# Patient Record
Sex: FEMALE | Race: WHITE | NOT HISPANIC OR LATINO | Employment: FULL TIME | URBAN - METROPOLITAN AREA
[De-identification: names, ages, dates, MRNs, and addresses within clinical notes are randomized per-mention and may not be internally consistent; named-entity substitution may affect disease eponyms.]

---

## 2017-01-10 ENCOUNTER — ALLSCRIPTS OFFICE VISIT (OUTPATIENT)
Dept: OTHER | Facility: OTHER | Age: 57
End: 2017-01-10

## 2017-02-10 ENCOUNTER — ALLSCRIPTS OFFICE VISIT (OUTPATIENT)
Dept: OTHER | Facility: OTHER | Age: 57
End: 2017-02-10

## 2017-02-10 DIAGNOSIS — F41.9 ANXIETY DISORDER: ICD-10-CM

## 2017-02-10 DIAGNOSIS — R07.89 OTHER CHEST PAIN: ICD-10-CM

## 2017-02-10 DIAGNOSIS — R31.9 HEMATURIA: ICD-10-CM

## 2017-02-10 DIAGNOSIS — E78.5 HYPERLIPIDEMIA: ICD-10-CM

## 2017-02-10 DIAGNOSIS — Z00.00 ENCOUNTER FOR GENERAL ADULT MEDICAL EXAMINATION WITHOUT ABNORMAL FINDINGS: ICD-10-CM

## 2017-02-10 DIAGNOSIS — K21.9 GASTRO-ESOPHAGEAL REFLUX DISEASE WITHOUT ESOPHAGITIS: ICD-10-CM

## 2017-02-10 DIAGNOSIS — R94.5 ABNORMAL RESULTS OF LIVER FUNCTION STUDIES: ICD-10-CM

## 2017-02-10 DIAGNOSIS — D64.9 ANEMIA: ICD-10-CM

## 2017-02-24 RX ORDER — IBUPROFEN 200 MG
200-800 TABLET ORAL EVERY 6 HOURS PRN
COMMUNITY
End: 2021-09-22 | Stop reason: ALTCHOICE

## 2017-02-27 ENCOUNTER — ANESTHESIA EVENT (OUTPATIENT)
Dept: PERIOP | Facility: HOSPITAL | Age: 57
End: 2017-02-27
Payer: COMMERCIAL

## 2017-02-28 ENCOUNTER — HOSPITAL ENCOUNTER (OUTPATIENT)
Facility: HOSPITAL | Age: 57
Setting detail: OUTPATIENT SURGERY
Discharge: HOME | End: 2017-02-28
Attending: OBSTETRICS & GYNECOLOGY | Admitting: OBSTETRICS & GYNECOLOGY
Payer: COMMERCIAL

## 2017-02-28 ENCOUNTER — ANESTHESIA (OUTPATIENT)
Dept: PERIOP | Facility: HOSPITAL | Age: 57
End: 2017-02-28
Payer: COMMERCIAL

## 2017-02-28 VITALS
HEIGHT: 63 IN | DIASTOLIC BLOOD PRESSURE: 68 MMHG | SYSTOLIC BLOOD PRESSURE: 121 MMHG | RESPIRATION RATE: 16 BRPM | BODY MASS INDEX: 33.66 KG/M2 | OXYGEN SATURATION: 100 % | TEMPERATURE: 97.6 F | HEART RATE: 53 BPM | WEIGHT: 190 LBS

## 2017-02-28 DIAGNOSIS — N95.0 POSTMENOPAUSAL BLEEDING: ICD-10-CM

## 2017-02-28 PROBLEM — Z98.890 S/P DILATION AND CURETTAGE: Status: ACTIVE | Noted: 2017-02-28

## 2017-02-28 LAB
BASOPHILS # BLD AUTO: 0.01 THOUSANDS/ΜL (ref 0–0.1)
BASOPHILS NFR BLD AUTO: 0 % (ref 0–1)
EOSINOPHIL # BLD AUTO: 0.07 THOUSAND/ΜL (ref 0–0.61)
EOSINOPHIL NFR BLD AUTO: 1 % (ref 0–6)
ERYTHROCYTE [DISTWIDTH] IN BLOOD BY AUTOMATED COUNT: 14 % (ref 11.6–15.1)
HCT VFR BLD AUTO: 45.6 % (ref 34.8–46.1)
HGB BLD-MCNC: 15.3 G/DL (ref 11.5–15.4)
LYMPHOCYTES # BLD AUTO: 1.86 THOUSANDS/ΜL (ref 0.6–4.47)
LYMPHOCYTES NFR BLD AUTO: 38 % (ref 14–44)
MCH RBC QN AUTO: 28.9 PG (ref 26.8–34.3)
MCHC RBC AUTO-ENTMCNC: 33.6 G/DL (ref 31.4–37.4)
MCV RBC AUTO: 86 FL (ref 82–98)
MONOCYTES # BLD AUTO: 0.38 THOUSAND/ΜL (ref 0.17–1.22)
MONOCYTES NFR BLD AUTO: 8 % (ref 4–12)
NEUTROPHILS # BLD AUTO: 2.58 THOUSANDS/ΜL (ref 1.85–7.62)
NEUTS SEG NFR BLD AUTO: 53 % (ref 43–75)
NRBC BLD AUTO-RTO: 0 /100 WBCS
PLATELET # BLD AUTO: 130 THOUSANDS/UL (ref 149–390)
PMV BLD AUTO: 9.3 FL (ref 8.9–12.7)
RBC # BLD AUTO: 5.3 MILLION/UL (ref 3.81–5.12)
WBC # BLD AUTO: 4.9 THOUSAND/UL (ref 4.31–10.16)

## 2017-02-28 PROCEDURE — 88305 TISSUE EXAM BY PATHOLOGIST: CPT | Performed by: OBSTETRICS & GYNECOLOGY

## 2017-02-28 PROCEDURE — 85025 COMPLETE CBC W/AUTO DIFF WBC: CPT | Performed by: OBSTETRICS & GYNECOLOGY

## 2017-02-28 RX ORDER — MIDAZOLAM HYDROCHLORIDE 1 MG/ML
INJECTION INTRAMUSCULAR; INTRAVENOUS AS NEEDED
Status: DISCONTINUED | OUTPATIENT
Start: 2017-02-28 | End: 2017-02-28 | Stop reason: SURG

## 2017-02-28 RX ORDER — ONDANSETRON 2 MG/ML
INJECTION INTRAMUSCULAR; INTRAVENOUS AS NEEDED
Status: DISCONTINUED | OUTPATIENT
Start: 2017-02-28 | End: 2017-02-28 | Stop reason: SURG

## 2017-02-28 RX ORDER — SODIUM CHLORIDE 9 MG/ML
125 INJECTION, SOLUTION INTRAVENOUS CONTINUOUS
Status: DISCONTINUED | OUTPATIENT
Start: 2017-02-28 | End: 2017-02-28

## 2017-02-28 RX ORDER — MEPERIDINE HYDROCHLORIDE 50 MG/ML
12.5 INJECTION INTRAMUSCULAR; INTRAVENOUS; SUBCUTANEOUS AS NEEDED
Status: DISCONTINUED | OUTPATIENT
Start: 2017-02-28 | End: 2017-02-28 | Stop reason: HOSPADM

## 2017-02-28 RX ORDER — FENTANYL CITRATE 50 UG/ML
INJECTION, SOLUTION INTRAMUSCULAR; INTRAVENOUS AS NEEDED
Status: DISCONTINUED | OUTPATIENT
Start: 2017-02-28 | End: 2017-02-28 | Stop reason: SURG

## 2017-02-28 RX ORDER — FENTANYL CITRATE/PF 50 MCG/ML
50 SYRINGE (ML) INJECTION
Status: DISCONTINUED | OUTPATIENT
Start: 2017-02-28 | End: 2017-02-28 | Stop reason: HOSPADM

## 2017-02-28 RX ORDER — OXYCODONE HYDROCHLORIDE AND ACETAMINOPHEN 5; 325 MG/1; MG/1
2 TABLET ORAL EVERY 4 HOURS PRN
Status: DISCONTINUED | OUTPATIENT
Start: 2017-02-28 | End: 2017-02-28 | Stop reason: HOSPADM

## 2017-02-28 RX ORDER — ONDANSETRON 2 MG/ML
4 INJECTION INTRAMUSCULAR; INTRAVENOUS EVERY 6 HOURS PRN
Status: DISCONTINUED | OUTPATIENT
Start: 2017-02-28 | End: 2017-02-28 | Stop reason: HOSPADM

## 2017-02-28 RX ORDER — MAGNESIUM HYDROXIDE 1200 MG/15ML
LIQUID ORAL AS NEEDED
Status: DISCONTINUED | OUTPATIENT
Start: 2017-02-28 | End: 2017-02-28 | Stop reason: HOSPADM

## 2017-02-28 RX ORDER — DEXAMETHASONE SODIUM PHOSPHATE 4 MG/ML
INJECTION, SOLUTION INTRA-ARTICULAR; INTRALESIONAL; INTRAMUSCULAR; INTRAVENOUS; SOFT TISSUE AS NEEDED
Status: DISCONTINUED | OUTPATIENT
Start: 2017-02-28 | End: 2017-02-28 | Stop reason: SURG

## 2017-02-28 RX ORDER — OXYCODONE HYDROCHLORIDE AND ACETAMINOPHEN 5; 325 MG/1; MG/1
1 TABLET ORAL EVERY 4 HOURS PRN
Status: DISCONTINUED | OUTPATIENT
Start: 2017-02-28 | End: 2017-02-28 | Stop reason: HOSPADM

## 2017-02-28 RX ORDER — KETOROLAC TROMETHAMINE 30 MG/ML
INJECTION, SOLUTION INTRAMUSCULAR; INTRAVENOUS AS NEEDED
Status: DISCONTINUED | OUTPATIENT
Start: 2017-02-28 | End: 2017-02-28 | Stop reason: SURG

## 2017-02-28 RX ORDER — IBUPROFEN 600 MG/1
600 TABLET ORAL EVERY 6 HOURS PRN
Status: DISCONTINUED | OUTPATIENT
Start: 2017-02-28 | End: 2017-02-28 | Stop reason: HOSPADM

## 2017-02-28 RX ORDER — ALBUTEROL SULFATE 2.5 MG/3ML
2.5 SOLUTION RESPIRATORY (INHALATION) ONCE AS NEEDED
Status: DISCONTINUED | OUTPATIENT
Start: 2017-02-28 | End: 2017-02-28 | Stop reason: HOSPADM

## 2017-02-28 RX ORDER — PROPOFOL 10 MG/ML
INJECTION, EMULSION INTRAVENOUS AS NEEDED
Status: DISCONTINUED | OUTPATIENT
Start: 2017-02-28 | End: 2017-02-28 | Stop reason: SURG

## 2017-02-28 RX ADMIN — FENTANYL CITRATE 25 MCG: 50 INJECTION, SOLUTION INTRAMUSCULAR; INTRAVENOUS at 15:23

## 2017-02-28 RX ADMIN — PROPOFOL 190 MG: 10 INJECTION, EMULSION INTRAVENOUS at 15:10

## 2017-02-28 RX ADMIN — DEXAMETHASONE SODIUM PHOSPHATE 4 MG: 4 INJECTION, SOLUTION INTRAMUSCULAR; INTRAVENOUS at 15:13

## 2017-02-28 RX ADMIN — SODIUM CHLORIDE 125 ML/HR: 0.9 INJECTION, SOLUTION INTRAVENOUS at 13:12

## 2017-02-28 RX ADMIN — KETOROLAC TROMETHAMINE 30 MG: 30 INJECTION, SOLUTION INTRAMUSCULAR at 15:17

## 2017-02-28 RX ADMIN — MIDAZOLAM HYDROCHLORIDE 2 MG: 1 INJECTION, SOLUTION INTRAMUSCULAR; INTRAVENOUS at 15:02

## 2017-02-28 RX ADMIN — FENTANYL CITRATE 25 MCG: 50 INJECTION, SOLUTION INTRAMUSCULAR; INTRAVENOUS at 15:28

## 2017-02-28 RX ADMIN — FENTANYL CITRATE 50 MCG: 50 INJECTION INTRAMUSCULAR; INTRAVENOUS at 16:14

## 2017-02-28 RX ADMIN — IBUPROFEN 600 MG: 600 TABLET, FILM COATED ORAL at 17:02

## 2017-02-28 RX ADMIN — LIDOCAINE HYDROCHLORIDE 60 MG: 20 INJECTION, SOLUTION INTRAVENOUS at 15:10

## 2017-02-28 RX ADMIN — ONDANSETRON HYDROCHLORIDE 4 MG: 2 INJECTION, SOLUTION INTRAVENOUS at 15:18

## 2017-02-28 RX ADMIN — FENTANYL CITRATE 25 MCG: 50 INJECTION, SOLUTION INTRAMUSCULAR; INTRAVENOUS at 15:14

## 2017-02-28 RX ADMIN — SODIUM CHLORIDE: 0.9 INJECTION, SOLUTION INTRAVENOUS at 15:15

## 2017-02-28 RX ADMIN — FENTANYL CITRATE 25 MCG: 50 INJECTION, SOLUTION INTRAMUSCULAR; INTRAVENOUS at 15:02

## 2017-03-07 ENCOUNTER — GENERIC CONVERSION - ENCOUNTER (OUTPATIENT)
Dept: OTHER | Facility: OTHER | Age: 57
End: 2017-03-07

## 2017-03-17 ENCOUNTER — GENERIC CONVERSION - ENCOUNTER (OUTPATIENT)
Dept: OTHER | Facility: OTHER | Age: 57
End: 2017-03-17

## 2017-04-04 ENCOUNTER — ALLSCRIPTS OFFICE VISIT (OUTPATIENT)
Dept: OTHER | Facility: OTHER | Age: 57
End: 2017-04-04

## 2017-04-18 ENCOUNTER — GENERIC CONVERSION - ENCOUNTER (OUTPATIENT)
Dept: OTHER | Facility: OTHER | Age: 57
End: 2017-04-18

## 2017-04-21 ENCOUNTER — HOSPITAL ENCOUNTER (OUTPATIENT)
Dept: RADIOLOGY | Facility: CLINIC | Age: 57
Discharge: HOME | End: 2017-04-21
Payer: COMMERCIAL

## 2017-04-21 ENCOUNTER — ALLSCRIPTS OFFICE VISIT (OUTPATIENT)
Dept: OTHER | Facility: OTHER | Age: 57
End: 2017-04-21

## 2017-04-21 DIAGNOSIS — M17.10 PRIMARY OSTEOARTHRITIS OF ONE KNEE: ICD-10-CM

## 2017-04-21 DIAGNOSIS — M25.569 PAIN IN KNEE: ICD-10-CM

## 2017-04-21 PROCEDURE — 73562 X-RAY EXAM OF KNEE 3: CPT

## 2017-04-21 PROCEDURE — 73564 X-RAY EXAM KNEE 4 OR MORE: CPT

## 2017-05-24 ENCOUNTER — GENERIC CONVERSION - ENCOUNTER (OUTPATIENT)
Dept: OTHER | Facility: OTHER | Age: 57
End: 2017-05-24

## 2017-05-30 ENCOUNTER — ALLSCRIPTS OFFICE VISIT (OUTPATIENT)
Dept: OTHER | Facility: OTHER | Age: 57
End: 2017-05-30

## 2017-08-15 ENCOUNTER — ALLSCRIPTS OFFICE VISIT (OUTPATIENT)
Dept: OTHER | Facility: OTHER | Age: 57
End: 2017-08-15

## 2017-08-15 DIAGNOSIS — M17.10 PRIMARY OSTEOARTHRITIS OF ONE KNEE: ICD-10-CM

## 2017-08-15 DIAGNOSIS — M77.9 ENTHESOPATHY: ICD-10-CM

## 2017-08-15 DIAGNOSIS — B07.0 PLANTAR WART: ICD-10-CM

## 2017-08-22 ENCOUNTER — APPOINTMENT (OUTPATIENT)
Dept: PHYSICAL THERAPY | Facility: MEDICAL CENTER | Age: 57
End: 2017-08-22
Payer: COMMERCIAL

## 2017-08-22 DIAGNOSIS — M17.10 PRIMARY OSTEOARTHRITIS OF ONE KNEE: ICD-10-CM

## 2017-08-22 DIAGNOSIS — B07.0 PLANTAR WART: ICD-10-CM

## 2017-08-22 DIAGNOSIS — M77.9 ENTHESOPATHY: ICD-10-CM

## 2017-08-22 PROCEDURE — 97161 PT EVAL LOW COMPLEX 20 MIN: CPT

## 2017-08-22 PROCEDURE — G8991 OTHER PT/OT GOAL STATUS: HCPCS | Performed by: PHYSICAL THERAPIST

## 2017-08-22 PROCEDURE — 97112 NEUROMUSCULAR REEDUCATION: CPT

## 2017-08-22 PROCEDURE — G8990 OTHER PT/OT CURRENT STATUS: HCPCS | Performed by: PHYSICAL THERAPIST

## 2017-08-29 ENCOUNTER — APPOINTMENT (OUTPATIENT)
Dept: PHYSICAL THERAPY | Facility: MEDICAL CENTER | Age: 57
End: 2017-08-29
Payer: COMMERCIAL

## 2017-08-29 PROCEDURE — 97110 THERAPEUTIC EXERCISES: CPT

## 2017-08-29 PROCEDURE — 97112 NEUROMUSCULAR REEDUCATION: CPT

## 2017-08-30 ENCOUNTER — GENERIC CONVERSION - ENCOUNTER (OUTPATIENT)
Dept: OTHER | Facility: OTHER | Age: 57
End: 2017-08-30

## 2017-09-01 ENCOUNTER — APPOINTMENT (OUTPATIENT)
Dept: PHYSICAL THERAPY | Facility: MEDICAL CENTER | Age: 57
End: 2017-09-01
Payer: COMMERCIAL

## 2017-09-01 PROCEDURE — 97110 THERAPEUTIC EXERCISES: CPT

## 2017-09-01 PROCEDURE — 97112 NEUROMUSCULAR REEDUCATION: CPT

## 2017-09-05 ENCOUNTER — APPOINTMENT (OUTPATIENT)
Dept: PHYSICAL THERAPY | Facility: MEDICAL CENTER | Age: 57
End: 2017-09-05
Payer: COMMERCIAL

## 2017-09-05 PROCEDURE — 97112 NEUROMUSCULAR REEDUCATION: CPT

## 2017-09-05 PROCEDURE — 97110 THERAPEUTIC EXERCISES: CPT

## 2017-09-08 ENCOUNTER — APPOINTMENT (OUTPATIENT)
Dept: PHYSICAL THERAPY | Facility: MEDICAL CENTER | Age: 57
End: 2017-09-08
Payer: COMMERCIAL

## 2017-09-12 ENCOUNTER — APPOINTMENT (OUTPATIENT)
Dept: PHYSICAL THERAPY | Facility: MEDICAL CENTER | Age: 57
End: 2017-09-12
Payer: COMMERCIAL

## 2017-09-12 PROCEDURE — 97110 THERAPEUTIC EXERCISES: CPT

## 2017-09-12 PROCEDURE — 97112 NEUROMUSCULAR REEDUCATION: CPT

## 2017-09-15 ENCOUNTER — APPOINTMENT (OUTPATIENT)
Dept: PHYSICAL THERAPY | Facility: MEDICAL CENTER | Age: 57
End: 2017-09-15
Payer: COMMERCIAL

## 2017-09-15 PROCEDURE — 97110 THERAPEUTIC EXERCISES: CPT

## 2017-09-19 ENCOUNTER — APPOINTMENT (OUTPATIENT)
Dept: PHYSICAL THERAPY | Facility: MEDICAL CENTER | Age: 57
End: 2017-09-19
Payer: COMMERCIAL

## 2017-09-19 PROCEDURE — 97110 THERAPEUTIC EXERCISES: CPT

## 2017-09-19 PROCEDURE — 97112 NEUROMUSCULAR REEDUCATION: CPT

## 2017-09-26 ENCOUNTER — APPOINTMENT (OUTPATIENT)
Dept: PHYSICAL THERAPY | Facility: MEDICAL CENTER | Age: 57
End: 2017-09-26
Payer: COMMERCIAL

## 2017-09-26 PROCEDURE — 97112 NEUROMUSCULAR REEDUCATION: CPT

## 2017-09-26 PROCEDURE — 97110 THERAPEUTIC EXERCISES: CPT

## 2017-09-29 ENCOUNTER — APPOINTMENT (OUTPATIENT)
Dept: PHYSICAL THERAPY | Facility: MEDICAL CENTER | Age: 57
End: 2017-09-29
Payer: COMMERCIAL

## 2017-10-06 ENCOUNTER — APPOINTMENT (OUTPATIENT)
Dept: PHYSICAL THERAPY | Facility: MEDICAL CENTER | Age: 57
End: 2017-10-06
Payer: COMMERCIAL

## 2017-10-06 PROCEDURE — 97110 THERAPEUTIC EXERCISES: CPT

## 2017-10-06 PROCEDURE — 97112 NEUROMUSCULAR REEDUCATION: CPT

## 2017-10-06 PROCEDURE — 97530 THERAPEUTIC ACTIVITIES: CPT

## 2017-10-10 ENCOUNTER — APPOINTMENT (OUTPATIENT)
Dept: PHYSICAL THERAPY | Facility: MEDICAL CENTER | Age: 57
End: 2017-10-10
Payer: COMMERCIAL

## 2017-10-10 PROCEDURE — 97110 THERAPEUTIC EXERCISES: CPT

## 2017-10-10 PROCEDURE — 97530 THERAPEUTIC ACTIVITIES: CPT

## 2017-10-20 ENCOUNTER — APPOINTMENT (OUTPATIENT)
Dept: PHYSICAL THERAPY | Facility: MEDICAL CENTER | Age: 57
End: 2017-10-20
Payer: COMMERCIAL

## 2017-10-20 PROCEDURE — 97530 THERAPEUTIC ACTIVITIES: CPT

## 2017-10-20 PROCEDURE — G8992 OTHER PT/OT  D/C STATUS: HCPCS

## 2017-10-20 PROCEDURE — G8991 OTHER PT/OT GOAL STATUS: HCPCS

## 2017-10-20 PROCEDURE — 97110 THERAPEUTIC EXERCISES: CPT

## 2017-10-23 ENCOUNTER — GENERIC CONVERSION - ENCOUNTER (OUTPATIENT)
Dept: OTHER | Facility: OTHER | Age: 57
End: 2017-10-23

## 2017-10-27 ENCOUNTER — APPOINTMENT (OUTPATIENT)
Dept: PHYSICAL THERAPY | Facility: MEDICAL CENTER | Age: 57
End: 2017-10-27
Payer: COMMERCIAL

## 2017-12-05 ENCOUNTER — GENERIC CONVERSION - ENCOUNTER (OUTPATIENT)
Dept: FAMILY MEDICINE CLINIC | Facility: CLINIC | Age: 57
End: 2017-12-05

## 2017-12-13 ENCOUNTER — GENERIC CONVERSION - ENCOUNTER (OUTPATIENT)
Dept: FAMILY MEDICINE CLINIC | Facility: CLINIC | Age: 57
End: 2017-12-13

## 2018-01-09 ENCOUNTER — ALLSCRIPTS OFFICE VISIT (OUTPATIENT)
Dept: OTHER | Facility: OTHER | Age: 58
End: 2018-01-09

## 2018-01-10 NOTE — PROGRESS NOTES
Assessment   1  Irritable bowel syndrome (564 1) (K58 9)   2  Anxiety (300 00) (F41 9)   3  Abdominal pain (789 00) (R10 9)    Plan   Irritable bowel syndrome    · Dicyclomine HCl - 10 MG Oral Capsule; TAKE 1 CAPSULE 3 times daily    Discussion/Summary      #1  Abdominal pain located left upper quadrant  Irritable bowel syndrome-patient was given dicyclomine 10 mg 1 3 times a day number 30 with 1 refill  Anxiety-patient states that her work is extremely stressful at this time and the diarrhea and abdominal pain started about that time  to get rid of her abdominal pain  1 week he to check on how the dicyclomine helped and if no better to GI  If her anxiety is still bad consider antianxiety medication at her next visit in a week  Possible side effects of new medications were reviewed with the patient/guardian today  The treatment plan was reviewed with the patient/guardian  The patient/guardian understands and agrees with the treatment plan       Self Referrals: No      Chief Complaint   Decreased appetite, bloating / gassy x 1 week  Concerned due to [facility] discharge when she passes gas  - lsh      History of Present Illness   HPI: This is a 72-year-old female who comes in after having episodes of diarrhea for 3-4 days approximately 7-10 days ago  The diarrhea has stopped with Pepto-Bismol and probiotics  She still is gassy and bloated and has a decreased appetite  She has been eating bland Unique for the past week  She has been having mucousy stools but no blood and has a diagnosis of irritable bowel syndrome  She has not had any recent antibiotic therapy  She also had a recent colonoscopy and 1 polyp was found  She feels that maybe this started after things got stressful at work and kicked up her irritable bowel syndrome  Review of Systems        Constitutional: No fever, no chills, feels well, no tiredness, no recent weight gain or loss        Cardiovascular: no complaints of slow or fast heart rate, no chest pain, no palpitations, no leg claudication or lower extremity edema  Respiratory: no complaints of shortness of breath, no wheezing, no dyspnea on exertion, no orthopnea or PND  Gastrointestinal: Decreased appetite, mucousy stools, but-- as noted in HPI,-- no nausea,-- no vomiting,-- no constipation,-- no diarrhea-- and-- no blood in stools--       The patient presents with complaints of sudden onset of intermittent episodes of mild left upper quadrant abdominal pain, described as crampy, non-radiating  ROS reviewed  Active Problems   1  Allergic Reaction (995 3)   2  Anemia (285 9) (D64 9)   3  Anxiety (300 00) (F41 9)   4  Chest discomfort (786 59) (R07 89)   5  Colon cancer screening (V76 51) (Z12 11)   6  Elevated liver function tests (790 6) (R79 89)   7  GERD (gastroesophageal reflux disease) (530 81) (K21 9)   8  Hematuria (599 70) (R31 9)   9  Hyperlipidemia (272 4) (E78 5)   10  Irritable bowel syndrome (564 1) (K58 9)   11  Joint pain, knee (719 46) (M25 569)   12  Left sided chest pain (786 50) (R07 9)   13  OA (osteoarthritis) of knee (715 36) (M17 10)   14  Plantar warts (078 12) (B07 0)   15  Tendonitis (726 90) (M77 9)    Past Medical History   Active Problems And Past Medical History Reviewed: The active problems and past medical history were reviewed and updated today  Family History   Mother    1  Denied: Family history of Arthritis   2  Denied: Family history of Cancer   3  Denied: Family history of Osteopetrosis  Father    4  Denied: Family history of Arthritis   5  Denied: Family history of Cancer   6  Denied: Family history of Osteopetrosis  Family History    7  Denied: Family history of Arthritis   8  Denied: Family history of Cancer   9  Family history of Family Health Status Of Father - Alive   8  Family history of Family Health Status Of Mother - Alive   6  Denied: Family history of Osteopetrosis  Family History Reviewed:     The family history was reviewed and updated today  Social History    · Employed   · Never a smoker   · Never Drank Alcohol   · No secondhand smoke exposure (V49 89) (Z78 9)   ·  (V61 07) (Z63 4)  The social history was reviewed and updated today  The social history was reviewed and is unchanged  Surgical History   Surgical History Reviewed: The surgical history was reviewed and updated today  Current Meds    1  Advil CAPS Recorded   2  [facility] [place]-[facility] CAPS Recorded   3  Glucosamine Chondr 1500 Complx CAPS; 2 CAPSULES DAILY Recorded   4  Manganese TABS; One capsule twice daily Recorded   5  Naproxen 500 MG Oral Tablet; take 1 tablet every 12 hours with food as needed; Therapy: [id number] to (Evaluate:[id number]); Last Rx:04Apr2017 Ordered   6  Pantethine Plus Oral Tablet; Take 1 tablet daily as directed Recorded   7  Remifemin Menopause TABS Recorded     The medication list was reviewed and updated today  Vitals    Recorded: [id number] 10:58AM   Temperature 97 6 F, Oral   Heart Rate 68, L PT   Pulse Quality Regular, L PT   Systolic 669, LUE, Sitting   Diastolic 60, LUE, Sitting   BP CUFF SIZE Large   Height 5 ft 2 in   Weight 192 lb    BMI Calculated 35 12   BSA Calculated 1 88     Physical Exam        Constitutional      General appearance: No acute distress, well appearing and well nourished  Ears, Nose, Mouth, and Throat      External inspection of ears and nose: Normal        Otoscopic examination: Tympanic membranes translucent with normal light reflex  Canals patent without erythema  Oropharynx: Normal with no erythema, edema, exudate or lesions  Pulmonary      Auscultation of lungs: Clear to auscultation  Cardiovascular      Auscultation of heart: Normal rate and rhythm, normal S1 and S2, without murmurs  Examination of extremities for edema and/or varicosities: Normal        Abdomen      Abdomen: Abnormal  -- Mild tenderness to palpation left upper quadrant with no rebound or guarding  Liver and spleen: No hepatomegaly or splenomegaly            Future Appointments      Date/Time Provider Specialty Site   01/16/2018 12:30 PM Janette Whittaker [facility]     Signatures    Electronically signed by : Janette Orozco; Jan 9 2018 11:41AM EST                       (Author)     Electronically signed by : Karyna Bangura MD; Jan 9 2018  5:44PM EST                       (Author)

## 2018-01-11 NOTE — PROGRESS NOTES
Assessment    1  Hyperlipidemia (272 4) (E78 5)    Plan  Health Maintenance, Hyperlipidemia    · (1) OCCULT BLOOD, FECAL IMMUNOCHEMICAL TEST; Status:Hold For - Exact Date; Requested for:Apr 2016;   Hyperlipidemia    · (1) COMPREHENSIVE METABOLIC PANEL; Status:Hold For - Exact Date; Requested  for:Apr 2016;    · (1) LIPID PANEL, FASTING; Status:Hold For - Exact Date; Requested for:Apr 2016;     Discussion/Summary    #1  Hyperlipidemia-patient's LDL went up from 128 to 159 and patient is not willing to take a statin to reduce the LDL  She wants to wait 3 months to get a medication from the [facility] store  If the medication does not help in 3 months she will consider Lipitor 10 mg daily  Followup 3 months with labs  The treatment plan was reviewed with the patient/guardian  The patient/guardian understands and agrees with the treatment plan      Chief Complaint  FOLLOW UP: (R) FOOT PLANTAR WART, ELEVATED LFT'S, HYPERLIPIDEMIA, IBS  REVIEW LABS  PT STATES PAP DONE LAST MONTH BY DR Marek Tena  REFUSED FLU VACCINE  History of Present Illness  This is a 27-year-old female who comes in for a review of her lab work  She has not had labs in over a year and at that time her LDL was 128 and is now 159  Her total cholesterol is 248  She has gained 4 pounds since the previous visit with her weight being 211 pounds  Her blood pressure was also mildly elevated at 130/86  After a long discussion about taking a statin, the patient wants to wait 3 months and get medication to reduce the LDL from her health food store  She has an aversion to taking medication especially a statin  The rest of her labs include her glucose at 77, AST and ALT are mildly elevated with the AST being 45 and the ALT 62, TSH is normal  she denies any chest pain or shortness of breath or dizziness  Review of Systems    Constitutional: No fever, no chills, feels well, no tiredness, no recent weight gain or weight loss     ENT: no complaints of earache, no loss of hearing, no nose bleeds, no nasal discharge, no sore throat, no hoarseness  Cardiovascular: No complaints of slow heart rate, no fast heart rate, no chest pain, no palpitations, no leg claudication, no lower extremity edema  Respiratory: No complaints of shortness of breath, no wheezing, no cough, no SOB on exertion, no orthopnea, no PND  Gastrointestinal: No complaints of abdominal pain, no constipation, no nausea or vomiting, no diarrhea, no bloody stools  Genitourinary: No complaints of dysuria, no incontinence, no pelvic pain, no dysmenorrhea, no vaginal discharge or bleeding  Active Problems    1  Allergic Reaction (995 3)   2  Anemia (285 9) (D64 9)   3  Anxiety (300 00) (F41 9)   4  Elevated liver function tests (790 6) (R79 89)   5  Hematuria (599 70) (R31 9)   6  Hyperlipidemia (272 4) (E78 5)   7  Irritable bowel syndrome (564 1) (K58 9)   8  Joint pain, knee (719 46) (M25 569)   9  Plantar warts (078 12) (B07 0)   10  Tendonitis (726 90) (M77 9)    Family History    1  Denied: Family history of Arthritis   2  Denied: Family history of Cancer   3  Denied: Family history of Osteopetrosis    4  Denied: Family history of Arthritis   5  Denied: Family history of Cancer   6  Denied: Family history of Osteopetrosis    7  Denied: Family history of Arthritis   8  Denied: Family history of Cancer   9  Family history of Family Health Status Of Father - Alive   8  Family history of Family Health Status Of Mother - Alive   6  Denied: Family history of Osteopetrosis    Social History    · Never A Smoker   · Never Drank Alcohol    Current Meds   1  Advil CAPS Recorded   2  [facility] [place]-[facility] CAPS Recorded   3  Glucosamine Chondr 1500 Complx CAPS; 2 CAPSULES DAILY Recorded   4  Manganese TABS; One capsule twice daily Recorded   5  Pantethine Plus Oral Tablet; Take 1 tablet daily as directed Recorded   6  Remifemin Menopause TABS Recorded    Allergies    1   No Known Drug Allergies    Vitals  Vital Signs [Data Includes: Current Encounter]    Recorded: [id number] 08:07AM   Heart Rate 60   Systolic 928   Diastolic 86   Height 5 ft 2 in   Weight 211 lb 9 6 oz   BMI Calculated 38 7   BSA Calculated 1 96     Physical Exam    Constitutional   General appearance: No acute distress, well appearing and well nourished  Pulmonary   Auscultation of lungs: Clear to auscultation  Cardiovascular   Auscultation of heart: Normal rate and rhythm, normal S1 and S2, without murmurs      Examination of extremities for edema and/or varicosities: Normal     Psychiatric   Orientation to person, place, and time: Normal     Mood and affect: Normal          Results/Data  Encounter Results   [place] [id number] 08:16AM User, Ahs     Test Name Result Flag Reference   SBIRT Screen - Tobacco Screening Result Negative       PHQ-2 Adult Depression Screening 26Jan2016 08:16AM User, Ahs     Test Name Result Flag Reference   PHQ-2 Adult Depression Score 0     Q1: 0, Q2: 0   PHQ-2 Adult Depression Screening Negative         Signatures   Electronically signed by : Emily Merino, [facility]; Jan 26 2016  8:53AM EST                       (Author)    Electronically signed by : BANDAR Potts ; Jan 26 2016 11:45AM EST

## 2018-01-12 VITALS
DIASTOLIC BLOOD PRESSURE: 70 MMHG | HEIGHT: 62 IN | BODY MASS INDEX: 35.61 KG/M2 | WEIGHT: 193.5 LBS | SYSTOLIC BLOOD PRESSURE: 128 MMHG | HEART RATE: 60 BPM

## 2018-01-13 VITALS
HEIGHT: 62 IN | SYSTOLIC BLOOD PRESSURE: 135 MMHG | BODY MASS INDEX: 35.88 KG/M2 | DIASTOLIC BLOOD PRESSURE: 73 MMHG | HEART RATE: 71 BPM | WEIGHT: 195 LBS

## 2018-01-14 VITALS
WEIGHT: 197.63 LBS | SYSTOLIC BLOOD PRESSURE: 130 MMHG | BODY MASS INDEX: 36.37 KG/M2 | HEART RATE: 60 BPM | HEIGHT: 62 IN | DIASTOLIC BLOOD PRESSURE: 78 MMHG

## 2018-01-14 VITALS
SYSTOLIC BLOOD PRESSURE: 134 MMHG | WEIGHT: 196.63 LBS | DIASTOLIC BLOOD PRESSURE: 72 MMHG | BODY MASS INDEX: 35.96 KG/M2 | HEART RATE: 60 BPM

## 2018-01-14 VITALS
HEART RATE: 79 BPM | DIASTOLIC BLOOD PRESSURE: 74 MMHG | WEIGHT: 195.25 LBS | HEIGHT: 62 IN | BODY MASS INDEX: 35.93 KG/M2 | SYSTOLIC BLOOD PRESSURE: 132 MMHG

## 2018-01-15 VITALS
WEIGHT: 192.25 LBS | DIASTOLIC BLOOD PRESSURE: 80 MMHG | BODY MASS INDEX: 35.38 KG/M2 | HEART RATE: 102 BPM | HEIGHT: 62 IN | SYSTOLIC BLOOD PRESSURE: 147 MMHG

## 2018-01-23 VITALS
HEIGHT: 62 IN | HEART RATE: 68 BPM | BODY MASS INDEX: 35.33 KG/M2 | DIASTOLIC BLOOD PRESSURE: 60 MMHG | TEMPERATURE: 97.6 F | WEIGHT: 192 LBS | SYSTOLIC BLOOD PRESSURE: 100 MMHG

## 2018-11-20 ENCOUNTER — OFFICE VISIT (OUTPATIENT)
Dept: FAMILY MEDICINE CLINIC | Facility: CLINIC | Age: 58
End: 2018-11-20
Payer: COMMERCIAL

## 2018-11-20 ENCOUNTER — LAB (OUTPATIENT)
Dept: LAB | Facility: CLINIC | Age: 58
End: 2018-11-20
Payer: COMMERCIAL

## 2018-11-20 VITALS
WEIGHT: 204 LBS | HEART RATE: 60 BPM | HEIGHT: 62 IN | SYSTOLIC BLOOD PRESSURE: 130 MMHG | DIASTOLIC BLOOD PRESSURE: 84 MMHG | BODY MASS INDEX: 37.54 KG/M2

## 2018-11-20 DIAGNOSIS — K21.00 GERD WITH ESOPHAGITIS: Primary | ICD-10-CM

## 2018-11-20 DIAGNOSIS — R00.1 BRADYCARDIA: ICD-10-CM

## 2018-11-20 DIAGNOSIS — K21.00 GASTROESOPHAGEAL REFLUX DISEASE WITH ESOPHAGITIS: ICD-10-CM

## 2018-11-20 DIAGNOSIS — E66.01 SEVERE OBESITY (BMI 35.0-39.9) WITH COMORBIDITY (HCC): ICD-10-CM

## 2018-11-20 PROBLEM — M17.10 OA (OSTEOARTHRITIS) OF KNEE: Status: ACTIVE | Noted: 2017-04-04

## 2018-11-20 PROBLEM — K21.9 GERD (GASTROESOPHAGEAL REFLUX DISEASE): Status: ACTIVE | Noted: 2017-01-10

## 2018-11-20 PROBLEM — N85.00 HYPERPLASIA OF ENDOMETRIUM DETERMINED BY BIOPSY: Status: ACTIVE | Noted: 2017-03-17

## 2018-11-20 PROBLEM — Z98.890 S/P COLONOSCOPY: Status: ACTIVE | Noted: 2018-01-09

## 2018-11-20 PROBLEM — C54.1 ENDOMETRIAL CANCER (HCC): Status: ACTIVE | Noted: 2017-07-06

## 2018-11-20 PROBLEM — M17.9 OA (OSTEOARTHRITIS) OF KNEE: Status: ACTIVE | Noted: 2017-04-04

## 2018-11-20 LAB
ANION GAP SERPL CALCULATED.3IONS-SCNC: 3 MMOL/L (ref 4–13)
BUN SERPL-MCNC: 34 MG/DL (ref 5–25)
CALCIUM SERPL-MCNC: 9.3 MG/DL (ref 8.3–10.1)
CHLORIDE SERPL-SCNC: 107 MMOL/L (ref 100–108)
CO2 SERPL-SCNC: 29 MMOL/L (ref 21–32)
CREAT SERPL-MCNC: 0.63 MG/DL (ref 0.6–1.3)
GFR SERPL CREATININE-BSD FRML MDRD: 99 ML/MIN/1.73SQ M
GLUCOSE P FAST SERPL-MCNC: 90 MG/DL (ref 65–99)
POTASSIUM SERPL-SCNC: 4.5 MMOL/L (ref 3.5–5.3)
SODIUM SERPL-SCNC: 139 MMOL/L (ref 136–145)

## 2018-11-20 PROCEDURE — 36415 COLL VENOUS BLD VENIPUNCTURE: CPT

## 2018-11-20 PROCEDURE — 80048 BASIC METABOLIC PNL TOTAL CA: CPT

## 2018-11-20 PROCEDURE — 99213 OFFICE O/P EST LOW 20 MIN: CPT | Performed by: FAMILY MEDICINE

## 2018-11-20 PROCEDURE — 1036F TOBACCO NON-USER: CPT | Performed by: FAMILY MEDICINE

## 2018-11-20 PROCEDURE — 93000 ELECTROCARDIOGRAM COMPLETE: CPT | Performed by: FAMILY MEDICINE

## 2018-11-20 PROCEDURE — 3008F BODY MASS INDEX DOCD: CPT | Performed by: FAMILY MEDICINE

## 2018-11-20 RX ORDER — HYDROCORTISONE ACETATE 0.5 %
2 CREAM (GRAM) TOPICAL DAILY
COMMUNITY
End: 2021-05-12 | Stop reason: ALTCHOICE

## 2018-11-20 RX ORDER — DICYCLOMINE HYDROCHLORIDE 10 MG/1
1 CAPSULE ORAL 3 TIMES DAILY
COMMUNITY
Start: 2018-01-09 | End: 2018-11-20

## 2018-11-20 RX ORDER — OMEPRAZOLE 20 MG/1
20 CAPSULE, DELAYED RELEASE ORAL DAILY
Qty: 30 CAPSULE | Refills: 1 | Status: SHIPPED | OUTPATIENT
Start: 2018-11-20 | End: 2019-06-18 | Stop reason: ALTCHOICE

## 2018-11-20 NOTE — ASSESSMENT & PLAN NOTE
She was started on omeprazole 20 mg in the morning and Zantac 150 mg HS  She will stay on the omeprazole for 6 weeks and hopefully be controlled on Zantac 150 mg twice a day and then that omeprazole can be discontinued  If she continues to get GERD symptoms a GI consult will be obtained

## 2018-11-20 NOTE — ASSESSMENT & PLAN NOTE
An EKG was done in the office today which showed sinus bradycardia and tall T-waves on V 3, V 4, and V5 with no ST-T-wave changes  It was suggested by Dr José Miguel Roe that she get a BMP done

## 2018-11-20 NOTE — PROGRESS NOTES
Assessment/Plan:    Bradycardia  An EKG was done in the office today which showed sinus bradycardia and tall T-waves on V 3, V 4, and V5 with no ST-T-wave changes  It was suggested by Dr Darrel Marshall that she get a BMP done  GERD (gastroesophageal reflux disease)  She was started on omeprazole 20 mg in the morning and Zantac 150 mg HS  She will stay on the omeprazole for 6 weeks and hopefully be controlled on Zantac 150 mg twice a day and then that omeprazole can be discontinued  If she continues to get GERD symptoms a GI consult will be obtained  Diagnoses and all orders for this visit:    GERD with esophagitis  -     omeprazole (PriLOSEC) 20 mg delayed release capsule; Take 1 capsule (20 mg total) by mouth daily    Gastroesophageal reflux disease with esophagitis    Bradycardia  -     Basic metabolic panel; Future    Severe obesity (BMI 35 0-39  9) with comorbidity ([address] )    Other orders  -     Discontinue: dicyclomine (BENTYL) 10 mg capsule; Take 1 capsule by mouth 3 (three) times a day  -     Glucosamine-Chondroit-Vit C-Mn (GLUCOSAMINE CHONDR 1500 COMPLX) CAPS; Take 2 capsules by mouth daily  -     Lactobacillus Casei-Folic Acid 03-6 89 MG PACK; Take 1 capsule by mouth          Subjective: Follow up  Anxiety, anemia,  GERD, hyperlipidemia, IBS  Pt c/o heartburn for the past 1 1/2 weeks that comes and goes     Flu immunization declilned  -  [facility]     Patient ID: Abdirahman Vallejo is a 62 y o  female  This is a 49-year-old female who comes in with symptoms of heartburn for the past 1 and half weeks intermittently  She had previously been on omeprazole which resolved her symptoms and she stop the medication  She has noticed that some foods give her heartburn in that she had some coffee and that gave her heartburn with some GERD symptoms in that she got some acid reflux in her mouth  She denies any chest pain shortness of breath or dizziness    She does not have any numbness of the left arm or pain radiating to the jaw  Her blood pressure today is 130/84 and her weight is up 12 lb from the previous visit to 204 lb  She had an EKG here in the office which showed sinus bradycardia and tall T-waves  It was read by Dr Salas Villeda and she suggested doing a BMP  The following portions of the patient's history were reviewed and updated as appropriate: allergies, current medications, past family history, past medical history, past social history, past surgical history and problem list     Review of Systems   Constitutional: Negative  HENT: Negative  Eyes: Negative  Respiratory: Negative  Cardiovascular: Negative  Gastrointestinal: Negative  Endocrine: Negative  Genitourinary: Negative  Musculoskeletal: Negative  Skin: Negative  Allergic/Immunologic: Negative  Neurological: Negative  Hematological: Negative  Psychiatric/Behavioral: Negative  Objective:      /84 (BP Location: Left arm, Patient Position: Sitting, Cuff Size: Large)   Pulse 60   Ht 5' 2" (1 575 m)   Wt 92 5 kg (204 lb)   BMI 37 31 kg/m²          Physical Exam   Constitutional: She is oriented to person, place, and time  She appears well-developed and well-nourished  HENT:   Head: Normocephalic  Right Ear: External ear normal    Left Ear: External ear normal    Mouth/Throat: Oropharynx is clear and moist    Eyes: Pupils are equal, round, and reactive to light  Conjunctivae and EOM are normal    Neck: Normal range of motion  Neck supple  Cardiovascular: Normal rate, regular rhythm and normal heart sounds  Pulmonary/Chest: Effort normal and breath sounds normal    Abdominal: Soft  Bowel sounds are normal    Musculoskeletal: Normal range of motion  Neurological: She is alert and oriented to person, place, and time  Skin: Skin is warm  Psychiatric: She has a normal mood and affect  Her behavior is normal  Judgment and thought content normal    Nursing note and vitals reviewed  BMI Counseling: Body mass index is 37 31 kg/m²  Discussed with patient's BMI with her  The BMI is above average  BMI counseling and education was provided to the patient  Nutrition recommendations include reducing portion sizes, decreasing overall calorie intake and 3-5 servings of fruits/vegetables daily  Exercise recommendations include moderate aerobic physical activity for 150 minutes/week

## 2018-12-12 ENCOUNTER — OFFICE VISIT (OUTPATIENT)
Dept: FAMILY MEDICINE CLINIC | Facility: CLINIC | Age: 58
End: 2018-12-12
Payer: COMMERCIAL

## 2018-12-12 VITALS
SYSTOLIC BLOOD PRESSURE: 110 MMHG | DIASTOLIC BLOOD PRESSURE: 78 MMHG | WEIGHT: 204 LBS | BODY MASS INDEX: 37.54 KG/M2 | HEIGHT: 62 IN | HEART RATE: 76 BPM

## 2018-12-12 DIAGNOSIS — F41.9 ANXIETY: ICD-10-CM

## 2018-12-12 DIAGNOSIS — E78.2 MIXED HYPERLIPIDEMIA: ICD-10-CM

## 2018-12-12 DIAGNOSIS — R79.89 ELEVATED LIVER FUNCTION TESTS: ICD-10-CM

## 2018-12-12 DIAGNOSIS — K21.00 GASTROESOPHAGEAL REFLUX DISEASE WITH ESOPHAGITIS: Primary | ICD-10-CM

## 2018-12-12 DIAGNOSIS — Z12.11 SCREEN FOR COLON CANCER: ICD-10-CM

## 2018-12-12 DIAGNOSIS — E66.9 OBESITY (BMI 35.0-39.9 WITHOUT COMORBIDITY): ICD-10-CM

## 2018-12-12 DIAGNOSIS — D64.9 ANEMIA, UNSPECIFIED TYPE: ICD-10-CM

## 2018-12-12 PROCEDURE — 1036F TOBACCO NON-USER: CPT | Performed by: FAMILY MEDICINE

## 2018-12-12 PROCEDURE — 99214 OFFICE O/P EST MOD 30 MIN: CPT | Performed by: FAMILY MEDICINE

## 2018-12-12 PROCEDURE — 3008F BODY MASS INDEX DOCD: CPT | Performed by: FAMILY MEDICINE

## 2018-12-12 NOTE — ASSESSMENT & PLAN NOTE
Her symptoms of GERD have resolved on 6 weeks of omeprazole and Zantac 150 mg at bedtime  She will continue taking the omeprazole for the full 6 week course and then use Zantac once or twice a day to control symptoms

## 2018-12-12 NOTE — PATIENT INSTRUCTIONS

## 2018-12-12 NOTE — PROGRESS NOTES
Assessment/Plan:    Anemia  Patient to get a CBC done with her labs in 6 months  Anxiety  Stable on no medication  Elevated liver function tests  Labs are ordered including liver function studies    GERD (gastroesophageal reflux disease)  Her symptoms of GERD have resolved on 6 weeks of omeprazole and Zantac 150 mg at bedtime  She will continue taking the omeprazole for the full 6 week course and then use Zantac once or twice a day to control symptoms  Hyperlipidemia  A lipid profile is ordered with her labs in June Diagnoses and all orders for this visit:    Gastroesophageal reflux disease with esophagitis  -     CBC and differential; Future  -     Comprehensive metabolic panel; Future  -     Lipid Panel with Direct LDL reflex; Future  -     TSH, 3rd generation; Future    Anemia, unspecified type  -     CBC and differential; Future  -     Comprehensive metabolic panel; Future  -     Lipid Panel with Direct LDL reflex; Future  -     TSH, 3rd generation; Future    Mixed hyperlipidemia  -     CBC and differential; Future  -     Comprehensive metabolic panel; Future  -     Lipid Panel with Direct LDL reflex; Future  -     TSH, 3rd generation; Future    Elevated liver function tests  -     CBC and differential; Future  -     Comprehensive metabolic panel; Future  -     Lipid Panel with Direct LDL reflex; Future  -     TSH, 3rd generation; Future    Anxiety  -     CBC and differential; Future  -     Comprehensive metabolic panel; Future  -     Lipid Panel with Direct LDL reflex; Future  -     TSH, 3rd generation; Future    Screen for colon cancer  -     CBC and differential; Future  -     Comprehensive metabolic panel; Future  -     Lipid Panel with Direct LDL reflex; Future  -     TSH, 3rd generation; Future  -     Occult Blood, Fecal Immunochemical; Future          Subjective: Follow up chest discomfort / GERD  Review BW results  -[facility]     Patient ID: Rina Landaverde is a 62 y o  female      This is a 59-year-old female who comes in for a 3 week check on her GERD with esophagitis  She was placed on omeprazole in the morning and Zantac in the evening and the symptoms have totally resolved and she is slowly weaning herself off the medication  She will stay on the omeprazole for the full 6 weeks and use the Zantac for any kind of break through symptoms of GERD  Her blood pressure is 110/78 her weight remains the same at 204 lb  Her BMI is 37 3 and we had a discussion about diet and exercise  The following portions of the patient's history were reviewed and updated as appropriate: allergies, current medications, past family history, past medical history, past social history, past surgical history and problem list     Review of Systems   Constitutional: Negative  HENT: Negative  Eyes: Negative  Respiratory: Negative  Cardiovascular: Negative  Gastrointestinal: Negative  Endocrine: Negative  Genitourinary: Negative  Musculoskeletal: Negative  Skin: Negative  Allergic/Immunologic: Negative  Neurological: Negative  Hematological: Negative  Psychiatric/Behavioral: Negative  Objective:      /78 (BP Location: Left arm, Patient Position: Sitting, Cuff Size: Large)   Pulse 76   Ht 5' 2" (1 575 m)   Wt 92 5 kg (204 lb)   BMI 37 31 kg/m²          Physical Exam   Constitutional: She is oriented to person, place, and time  She appears well-developed and well-nourished  HENT:   Head: Normocephalic  Right Ear: External ear normal    Left Ear: External ear normal    Mouth/Throat: Oropharynx is clear and moist    Eyes: Pupils are equal, round, and reactive to light  Conjunctivae and EOM are normal    Neck: Normal range of motion  Neck supple  Cardiovascular: Normal rate, regular rhythm and normal heart sounds  Pulmonary/Chest: Effort normal and breath sounds normal    Abdominal: Soft  Bowel sounds are normal    Musculoskeletal: Normal range of motion  Neurological: She is alert and oriented to person, place, and time  Skin: Skin is warm  Psychiatric: She has a normal mood and affect  Her behavior is normal  Judgment and thought content normal    Nursing note and vitals reviewed  BMI Counseling: Body mass index is 37 31 kg/m²  Discussed the patient's BMI with her  The BMI is above average  BMI counseling and education was provided to the patient  Nutrition recommendations include reducing portion sizes, decreasing overall calorie intake, 3-5 servings of fruits/vegetables daily, reducing fast food intake, moderation in carbohydrate intake and reducing intake of cholesterol  Exercise recommendations include moderate aerobic physical activity for 150 minutes/week

## 2019-02-08 ENCOUNTER — OFFICE VISIT (OUTPATIENT)
Dept: OBGYN CLINIC | Facility: MEDICAL CENTER | Age: 59
End: 2019-02-08
Payer: COMMERCIAL

## 2019-02-08 ENCOUNTER — APPOINTMENT (OUTPATIENT)
Dept: RADIOLOGY | Facility: CLINIC | Age: 59
End: 2019-02-08
Payer: COMMERCIAL

## 2019-02-08 VITALS
BODY MASS INDEX: 36.36 KG/M2 | HEART RATE: 51 BPM | WEIGHT: 205.2 LBS | SYSTOLIC BLOOD PRESSURE: 133 MMHG | DIASTOLIC BLOOD PRESSURE: 83 MMHG | HEIGHT: 63 IN

## 2019-02-08 DIAGNOSIS — M75.81 TENDINITIS OF RIGHT ROTATOR CUFF: ICD-10-CM

## 2019-02-08 DIAGNOSIS — M25.511 RIGHT SHOULDER PAIN, UNSPECIFIED CHRONICITY: ICD-10-CM

## 2019-02-08 DIAGNOSIS — M25.511 RIGHT SHOULDER PAIN, UNSPECIFIED CHRONICITY: Primary | ICD-10-CM

## 2019-02-08 DIAGNOSIS — M19.011 PRIMARY OSTEOARTHRITIS OF RIGHT SHOULDER: ICD-10-CM

## 2019-02-08 PROCEDURE — 99213 OFFICE O/P EST LOW 20 MIN: CPT | Performed by: ORTHOPAEDIC SURGERY

## 2019-02-08 PROCEDURE — 73030 X-RAY EXAM OF SHOULDER: CPT

## 2019-02-08 NOTE — PROGRESS NOTES
Orthopaedic Surgery - Office Note  Alexia Lovelace (23 y o  female)   : 1960   MRN: [id number]  Encounter Date: 2019    Chief Complaint   Patient presents with    Right Shoulder - Pain       Assessment / Plan  Rotator cuff tendinitis-RIGHT   OA shoulder-RIGHT    · Activity as tolerated  · Begin outpatient PT for RC tendinitis and OA of the right shoulder  · Anti-inflammatories or Tylenol prn pain  Return in about 6 weeks (around 3/22/2019) for Recheck  History of Present Illness  Alexia Lovelace is a 62 y o  female who presents with right shoulder pain for 2 weeks  She is RHD  She denies neck pain but has occasional radiating pain to the deltoid and forearm  She cannot define an exact location of her pain in the shoulder  She has occasional pain at rest, increasing pain with activity  She denies night pain  There have been no formal treatments for this thus far  At home she has been using an e-stim machine and applying heat with NSAID's as needed  Review of Systems  Pertinent items are noted in HPI  All other systems were reviewed and are negative  Physical Exam  /83   Pulse (!) 51   Ht 5' 3" (1 6 m)   Wt 93 1 kg (205 lb 3 2 oz)   BMI 36 35 kg/m²   Cons: Appears well  No apparent distress  Psych: Alert  Oriented x3  Mood and affect normal   Eyes: PERRLA, EOMI  Resp: Normal effort  No audible wheezing or stridor  CV: Palpable pulse  No discernable arrhythmia  No LE edema  Lymph:  No palpable cervical, axillary, or inguinal lymphadenopathy  Skin: Warm  No palpable masses  No visible lesions  Neuro: Normal muscle tone  Normal and symmetric DTR's  Right Shoulder Exam  Alignment / Posture:  Normal shoulder posture  Normal scapular position  Inspection:  No swelling  No ecchymosis  No muscle atrophy  Palpation:  Mild  tenderness at the subacromial bursa  ROM:  Shoulder   Shoulder ER 40  Shoulder IR T8  Strength:  Supraspinatus 4/5  Infraspinatus 4/5  Subscapularis 5/5  Stability:  No objective shoulder instability  Tests: (+) Villanueva  (-) Speed  Neurovascular:  Sensation intact in Ax/R/M/U nerve distributions  2+ radial pulse  Brisk capillary refill in all fingertips  Fingers warm and perfused  Studies Reviewed  I have personally reviewed pertinent films in PACS and my interpretation is x-rays taken 2/8/19 of the right shoulder show bone spuring of the humeral head with mild joint space narrowing  Procedures  No procedures today  Medical, Surgical, Family, and Social History  The patient's medical history, family history, and social history, were reviewed and updated as appropriate  Past Medical History:   Diagnosis Date    Claustrophobia     mild    Endometrial polyp     PMB (postmenopausal bleeding)        Past Surgical History:   Procedure Laterality Date    COLONOSCOPY      PLANTAR FASCIA SURGERY Bilateral     [place] HYSTEROSCOPY,W/ENDO BX N/A 2/28/2017    Procedure: DILATATION AND CURETTAGE (D&C) WITH HYSTEROSCOPY,POLYPECTOMY;  Surgeon: Brandon Stevens MD;  Location: [facility];  Service: Gynecology    SHOULDER SURGERY Left     TONSILLECTOMY      WISDOM TOOTH EXTRACTION         History reviewed  No pertinent family history  Social History     Occupational History    Not on file       Social History Main Topics    Smoking status: Never Smoker    Smokeless tobacco: Never Used      Comment: No secondhand smoke exposure    Alcohol use No    Drug use: No    Sexual activity: Not on file       Allergies   Allergen Reactions    Iv Contrast  [Iodinated Diagnostic Agents]          Current Outpatient Prescriptions:     BIOTIN PO, Take 1 tablet by mouth daily, Disp: , Rfl:     Black Cohosh (REMIFEMIN MENOPAUSE PO), Take 1 tablet by mouth 2 (two) times a day, Disp: , Rfl:     Glucosamine-Chondroit-Vit C-Mn (GLUCOSAMINE CHONDR 1500 COMPLX) CAPS, Take 2 capsules by mouth daily, Disp: , Rfl:     ibuprofen (MOTRIN) 200 mg tablet, Take 200-800 mg by mouth every 6 (six) hours as needed for mild pain, Disp: , Rfl:     Lactobacillus Casei-Folic Acid 57-6 13 MG PACK, Take 1 capsule by mouth, Disp: , Rfl:     Misc Natural Products (OSTEO BI-FLEX ADV DOUBLE ST PO), Take 1 capsule by mouth 2 (two) times a day, Disp: , Rfl:     Omega 3-6-9 Fatty Acids (OMEGA 3-6-9 PO), Take 1 capsule by mouth daily, Disp: , Rfl:     omeprazole (PriLOSEC) 20 mg delayed release capsule, Take 1 capsule (20 mg total) by mouth daily, Disp: 30 capsule, Rfl: 1    PANTETHINE PO, Take 1 tablet by mouth 2 (two) times a day, Disp: , Rfl:       Sushila Mcgregor MA    Scribe Attestation    I,:   Sushila Mcgregor MA am acting as a scribe while in the presence of the attending physician :        I,:   Rene Ramos MD personally performed the services described in this documentation    as scribed in my presence :

## 2019-02-12 ENCOUNTER — APPOINTMENT (OUTPATIENT)
Dept: PHYSICAL THERAPY | Facility: MEDICAL CENTER | Age: 59
End: 2019-02-12
Payer: COMMERCIAL

## 2019-02-15 ENCOUNTER — EVALUATION (OUTPATIENT)
Dept: PHYSICAL THERAPY | Facility: MEDICAL CENTER | Age: 59
End: 2019-02-15
Payer: COMMERCIAL

## 2019-02-15 ENCOUNTER — TRANSCRIBE ORDERS (OUTPATIENT)
Dept: PHYSICAL THERAPY | Facility: MEDICAL CENTER | Age: 59
End: 2019-02-15

## 2019-02-15 DIAGNOSIS — M75.81 TENDINITIS OF RIGHT ROTATOR CUFF: Primary | ICD-10-CM

## 2019-02-15 DIAGNOSIS — M25.511 ACUTE PAIN OF RIGHT SHOULDER: Primary | ICD-10-CM

## 2019-02-15 DIAGNOSIS — M19.011 PRIMARY OSTEOARTHRITIS OF RIGHT SHOULDER: ICD-10-CM

## 2019-02-15 PROCEDURE — 97161 PT EVAL LOW COMPLEX 20 MIN: CPT | Performed by: PHYSICAL THERAPIST

## 2019-02-15 NOTE — PROGRESS NOTES
PT Evaluation     Today's date: 2/15/2019  Patient name: Anju Davis  : 1960  MRN: [id number]  Referring provider: Hortensia Cazares MD  Dx:   Encounter Diagnosis     ICD-10-CM    1  Tendinitis of right rotator cuff M75 81 Ambulatory referral to Physical Therapy   2  Primary osteoarthritis of right shoulder M19 011 Ambulatory referral to Physical Therapy                  Assessment  Assessment details: Anju Davis is a 62 y o  female was evaluated on 2/15/2019  for Tendinitis of right rotator cuff  (primary encounter diagnosis)  Primary osteoarthritis of right shoulder  Anju Davis has the above listed impairments resulting in functional deficits and negative impact to quality of life  Patient is appropriate for skilled PT intervention to promote maximal return to function and patient specific goals  Patient agrees with outlined treatment plan and all questions were answered to their satisfaction  Impairments: abnormal muscle firing, abnormal muscle tone, abnormal or restricted ROM, impaired physical strength, lacks appropriate home exercise program and pain with function    Symptom irritability: lowUnderstanding of Dx/Px/POC: good   Prognosis: good    Goals  Patient will successfully transition to home exercise program   Patient will be able to manage symptoms independently      Patient will report no pain with overhead reaching  Patient will report no limitation with transferring her father   Patient will report no pain with work activity     Plan  Plan details: Patient given HEP, will only return if needed given high deductible   Patient would benefit from: skilled PT  Referral necessary: No  Planned modality interventions: thermotherapy: hydrocollator packs  Planned therapy interventions: home exercise program, manual therapy, neuromuscular re-education, patient education, functional ROM exercises, strengthening, stretching, joint mobilization, graded activity, graded exercise, therapeutic exercise, body mechanics training, motor coordination training and activity modification  Frequency: 1x week  Duration in weeks: 12  Treatment plan discussed with: patient        Subjective Evaluation    History of Present Illness  Mechanism of injury: Dimas Grissom is a 62 y o  female presenting to therapy with complaints of right shoulder pain  Pain began 2 or so weeks ago and the only causative factor she can think of is helping her dad in and out of car a lot that day and packing his wheelchair in and out of car  Denies any radiation to hand or numbness  No neck pain  She is right hand dominant  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[address] [employer]  and frequently using her shoulder in repetitive motion      Pain  Current pain ratin  At best pain ratin  At worst pain ratin  Quality: dull ache    Patient Goals  Patient goals for therapy: decreased pain  Patient goal: no pain with overhead reaching, no pain with work        Objective   Red flag screen (-)  Cervical screen: Unremarkable with exception of R UT pain with R rotation   Neer (+), Villanueva (+), Drop arm (-), ER Lag (-)  Shoulder AROM:  25% limitation in flexion, 50% limitation in IR BTB  PROM:  Flexion 145, ER 75, IR 45, abduction 145  Weakness present in ER and abduction with pain       Flowsheet Rows      Most Recent Value   PT/OT G-Codes   Current Score  61   Projected Score  72          Precautions: None    Daily Treatment Diary     Manual  /15                                                                                 Exercise Diary              TB single arm row yellow            TB single arm extension yellow            ER stretch             ER walkouts yellow                                                                                                                                                                                                                                Modalities

## 2019-06-12 ENCOUNTER — APPOINTMENT (OUTPATIENT)
Dept: LAB | Facility: CLINIC | Age: 59
End: 2019-06-12
Payer: COMMERCIAL

## 2019-06-12 DIAGNOSIS — F41.9 ANXIETY: ICD-10-CM

## 2019-06-12 DIAGNOSIS — E78.2 MIXED HYPERLIPIDEMIA: ICD-10-CM

## 2019-06-12 DIAGNOSIS — K21.00 GASTROESOPHAGEAL REFLUX DISEASE WITH ESOPHAGITIS: ICD-10-CM

## 2019-06-12 DIAGNOSIS — D64.9 ANEMIA, UNSPECIFIED TYPE: ICD-10-CM

## 2019-06-12 DIAGNOSIS — Z12.11 SCREEN FOR COLON CANCER: ICD-10-CM

## 2019-06-12 DIAGNOSIS — R79.89 ELEVATED LIVER FUNCTION TESTS: ICD-10-CM

## 2019-06-12 LAB
ALBUMIN SERPL BCP-MCNC: 3.7 G/DL (ref 3.5–5)
ALP SERPL-CCNC: 64 U/L (ref 46–116)
ALT SERPL W P-5'-P-CCNC: 45 U/L (ref 12–78)
ANION GAP SERPL CALCULATED.3IONS-SCNC: 5 MMOL/L (ref 4–13)
AST SERPL W P-5'-P-CCNC: 35 U/L (ref 5–45)
BASOPHILS # BLD AUTO: 0.03 THOUSANDS/ΜL (ref 0–0.1)
BASOPHILS NFR BLD AUTO: 1 % (ref 0–1)
BILIRUB SERPL-MCNC: 0.6 MG/DL (ref 0.2–1)
BUN SERPL-MCNC: 32 MG/DL (ref 5–25)
CALCIUM SERPL-MCNC: 9.3 MG/DL (ref 8.3–10.1)
CHLORIDE SERPL-SCNC: 107 MMOL/L (ref 100–108)
CHOLEST SERPL-MCNC: 209 MG/DL (ref 50–200)
CO2 SERPL-SCNC: 27 MMOL/L (ref 21–32)
CREAT SERPL-MCNC: 0.78 MG/DL (ref 0.6–1.3)
EOSINOPHIL # BLD AUTO: 0.08 THOUSAND/ΜL (ref 0–0.61)
EOSINOPHIL NFR BLD AUTO: 2 % (ref 0–6)
ERYTHROCYTE [DISTWIDTH] IN BLOOD BY AUTOMATED COUNT: 13.7 % (ref 11.6–15.1)
GFR SERPL CREATININE-BSD FRML MDRD: 83 ML/MIN/1.73SQ M
GLUCOSE P FAST SERPL-MCNC: 89 MG/DL (ref 65–99)
HCT VFR BLD AUTO: 47.4 % (ref 34.8–46.1)
HDLC SERPL-MCNC: 74 MG/DL (ref 40–60)
HEMOCCULT STL QL IA: NEGATIVE
HGB BLD-MCNC: 14.8 G/DL (ref 11.5–15.4)
IMM GRANULOCYTES # BLD AUTO: 0.01 THOUSAND/UL (ref 0–0.2)
IMM GRANULOCYTES NFR BLD AUTO: 0 % (ref 0–2)
LDLC SERPL CALC-MCNC: 121 MG/DL (ref 0–100)
LYMPHOCYTES # BLD AUTO: 1.55 THOUSANDS/ΜL (ref 0.6–4.47)
LYMPHOCYTES NFR BLD AUTO: 31 % (ref 14–44)
MCH RBC QN AUTO: 26.8 PG (ref 26.8–34.3)
MCHC RBC AUTO-ENTMCNC: 31.2 G/DL (ref 31.4–37.4)
MCV RBC AUTO: 86 FL (ref 82–98)
MONOCYTES # BLD AUTO: 0.49 THOUSAND/ΜL (ref 0.17–1.22)
MONOCYTES NFR BLD AUTO: 10 % (ref 4–12)
NEUTROPHILS # BLD AUTO: 2.79 THOUSANDS/ΜL (ref 1.85–7.62)
NEUTS SEG NFR BLD AUTO: 56 % (ref 43–75)
NRBC BLD AUTO-RTO: 0 /100 WBCS
PLATELET # BLD AUTO: 177 THOUSANDS/UL (ref 149–390)
PMV BLD AUTO: 9.1 FL (ref 8.9–12.7)
POTASSIUM SERPL-SCNC: 4 MMOL/L (ref 3.5–5.3)
PROT SERPL-MCNC: 7.1 G/DL (ref 6.4–8.2)
RBC # BLD AUTO: 5.53 MILLION/UL (ref 3.81–5.12)
SODIUM SERPL-SCNC: 139 MMOL/L (ref 136–145)
TRIGL SERPL-MCNC: 71 MG/DL
TSH SERPL DL<=0.05 MIU/L-ACNC: 2.04 UIU/ML (ref 0.36–3.74)
WBC # BLD AUTO: 4.95 THOUSAND/UL (ref 4.31–10.16)

## 2019-06-12 PROCEDURE — 80061 LIPID PANEL: CPT

## 2019-06-12 PROCEDURE — 84443 ASSAY THYROID STIM HORMONE: CPT

## 2019-06-12 PROCEDURE — 80053 COMPREHEN METABOLIC PANEL: CPT

## 2019-06-12 PROCEDURE — G0328 FECAL BLOOD SCRN IMMUNOASSAY: HCPCS

## 2019-06-12 PROCEDURE — 36415 COLL VENOUS BLD VENIPUNCTURE: CPT

## 2019-06-12 PROCEDURE — 85025 COMPLETE CBC W/AUTO DIFF WBC: CPT

## 2019-06-18 ENCOUNTER — OFFICE VISIT (OUTPATIENT)
Dept: FAMILY MEDICINE CLINIC | Facility: CLINIC | Age: 59
End: 2019-06-18
Payer: COMMERCIAL

## 2019-06-18 ENCOUNTER — APPOINTMENT (OUTPATIENT)
Dept: LAB | Facility: CLINIC | Age: 59
End: 2019-06-18
Payer: COMMERCIAL

## 2019-06-18 VITALS
HEART RATE: 68 BPM | WEIGHT: 200 LBS | SYSTOLIC BLOOD PRESSURE: 120 MMHG | DIASTOLIC BLOOD PRESSURE: 84 MMHG | HEIGHT: 63 IN | BODY MASS INDEX: 35.44 KG/M2

## 2019-06-18 DIAGNOSIS — E78.2 MIXED HYPERLIPIDEMIA: Primary | ICD-10-CM

## 2019-06-18 DIAGNOSIS — M25.50 PAIN IN JOINT, MULTIPLE SITES: ICD-10-CM

## 2019-06-18 LAB — ERYTHROCYTE [SEDIMENTATION RATE] IN BLOOD: 17 MM/HOUR (ref 0–20)

## 2019-06-18 PROCEDURE — 86038 ANTINUCLEAR ANTIBODIES: CPT

## 2019-06-18 PROCEDURE — 86618 LYME DISEASE ANTIBODY: CPT

## 2019-06-18 PROCEDURE — 36415 COLL VENOUS BLD VENIPUNCTURE: CPT

## 2019-06-18 PROCEDURE — 99214 OFFICE O/P EST MOD 30 MIN: CPT | Performed by: FAMILY MEDICINE

## 2019-06-18 PROCEDURE — 85652 RBC SED RATE AUTOMATED: CPT

## 2019-06-18 PROCEDURE — 86430 RHEUMATOID FACTOR TEST QUAL: CPT

## 2019-06-18 PROCEDURE — 1036F TOBACCO NON-USER: CPT | Performed by: FAMILY MEDICINE

## 2019-06-18 PROCEDURE — 3008F BODY MASS INDEX DOCD: CPT | Performed by: FAMILY MEDICINE

## 2019-06-18 RX ORDER — PRAVASTATIN SODIUM 20 MG
20 TABLET ORAL
Qty: 90 TABLET | Refills: 3 | Status: SHIPPED | OUTPATIENT
Start: 2019-06-18 | End: 2019-09-20 | Stop reason: ALTCHOICE

## 2019-06-19 LAB
RHEUMATOID FACT SER QL LA: NEGATIVE
RYE IGE QN: NEGATIVE

## 2019-06-20 LAB
B BURGDOR IGG SER IA-ACNC: 0.07
B BURGDOR IGM SER IA-ACNC: 0.62

## 2019-07-17 ENCOUNTER — OFFICE VISIT (OUTPATIENT)
Dept: FAMILY MEDICINE CLINIC | Facility: CLINIC | Age: 59
End: 2019-07-17
Payer: COMMERCIAL

## 2019-07-17 VITALS
WEIGHT: 204.2 LBS | BODY MASS INDEX: 36.18 KG/M2 | HEIGHT: 63 IN | HEART RATE: 96 BPM | DIASTOLIC BLOOD PRESSURE: 80 MMHG | SYSTOLIC BLOOD PRESSURE: 150 MMHG

## 2019-07-17 DIAGNOSIS — M25.50 ARTHRALGIA, UNSPECIFIED JOINT: Primary | ICD-10-CM

## 2019-07-17 DIAGNOSIS — M25.50 PAIN IN JOINT, MULTIPLE SITES: ICD-10-CM

## 2019-07-17 PROCEDURE — 99214 OFFICE O/P EST MOD 30 MIN: CPT | Performed by: PHYSICIAN ASSISTANT

## 2019-07-17 RX ORDER — MELOXICAM 15 MG/1
15 TABLET ORAL DAILY
Qty: 30 TABLET | Refills: 0 | Status: SHIPPED | OUTPATIENT
Start: 2019-07-17 | End: 2019-09-20 | Stop reason: ALTCHOICE

## 2019-07-17 NOTE — PROGRESS NOTES
Assessment and Plan:    Problem List Items Addressed This Visit        Other    Pain in joint, multiple sites    Relevant Medications    meloxicam (MOBIC) 15 mg tablet    Other Relevant Orders    Ambulatory referral to Rheumatology    Arthralgia - Primary     Pt will be referred to Rheum for further work up  Lyme and RF negative  Trial mobic 15 mg once daily with food, not to take OTC NSAID on same day only tylenol if needed  Relevant Medications    meloxicam (MOBIC) 15 mg tablet    Other Relevant Orders    Ambulatory referral to Rheumatology                 Diagnoses and all orders for this visit:    Arthralgia, unspecified joint  -     Ambulatory referral to Rheumatology; Future  -     meloxicam (MOBIC) 15 mg tablet; Take 1 tablet (15 mg total) by mouth daily for 30 days    Pain in joint, multiple sites  -     Ambulatory referral to Rheumatology; Future  -     meloxicam (MOBIC) 15 mg tablet; Take 1 tablet (15 mg total) by mouth daily for 30 days              Subjective:      Patient ID: Alejandro Chowdhury is a 61 y o  female  CC:    Chief Complaint   Patient presents with    Other     c/o body aches, mainly in feet  Pt  saw her foot dr and had MRI done he recomments she sees Rheum  HPI:      Alejandro Chowdhury is here for chronic conditions f/u including the diagnosis of Arthralgia, unspecified joint  (primary encounter diagnosis)  Pain in joint, multiple sites   Blood work was done including Lyme JOHN rheumatoid factor all within normal limits  Patient has been seeing a foot doctor who has been trying injections in her feet without relief  She is going to the gym on a regular basis which does make her symptoms worse  She is here today because the podiatrist told her that she should see a rheumatologist for further evaluation  Patient states that all of her like she never had this problem until a few months ago now all of her joints are really giving her issues and she is uncertain why    She has been taking 1-3 Advil twice daily without much relief and Tylenol has done nothing for her in the past       The following portions of the patient's history were reviewed and updated as appropriate: allergies, current medications, past family history, past medical history, past social history, past surgical history and problem list       Review of Systems   Constitutional: Negative  HENT: Negative  Eyes: Negative  Respiratory: Negative  Cardiovascular: Negative  Gastrointestinal: Negative  Endocrine: Negative  Genitourinary: Negative  Musculoskeletal: Positive for arthralgias  Skin: Negative  Allergic/Immunologic: Negative  Neurological: Negative  Hematological: Negative  Psychiatric/Behavioral: Negative  Data to review:       Objective:    Vitals:    07/17/19 1003   BP: 150/80   BP Location: Left arm   Patient Position: Sitting   Pulse: 96   Weight: 92 6 kg (204 lb 3 2 oz)   Height: 5' 3" (1 6 m)        Physical Exam   Constitutional: She is oriented to person, place, and time  She appears well-developed and well-nourished  No distress  HENT:   Head: Normocephalic and atraumatic  Eyes: Conjunctivae are normal  Right eye exhibits no discharge  Left eye exhibits no discharge  Neck: Neck supple  Carotid bruit is not present  Cardiovascular: Normal rate  Pulmonary/Chest: Effort normal  No respiratory distress  Neurological: She is alert and oriented to person, place, and time  Skin: Skin is warm and dry  She is not diaphoretic  Psychiatric: She has a normal mood and affect  Judgment normal    Nursing note and vitals reviewed

## 2019-07-17 NOTE — PATIENT INSTRUCTIONS
Problem List Items Addressed This Visit        Other    Pain in joint, multiple sites    Relevant Medications    meloxicam (MOBIC) 15 mg tablet    Other Relevant Orders    Ambulatory referral to Rheumatology    Arthralgia - Primary     Pt will be referred to Rheum for further work up  Lyme and RF negative  Trial mobic 15 mg once daily with food, not to take OTC NSAID on same day only tylenol if needed            Relevant Medications    meloxicam (MOBIC) 15 mg tablet    Other Relevant Orders    Ambulatory referral to Rheumatology

## 2019-07-17 NOTE — ASSESSMENT & PLAN NOTE
Pt will be referred to Rheum for further work up  Lyme and RF negative  Trial mobic 15 mg once daily with food, not to take OTC NSAID on same day only tylenol if needed

## 2019-08-26 ENCOUNTER — OFFICE VISIT (OUTPATIENT)
Dept: OBGYN CLINIC | Facility: MEDICAL CENTER | Age: 59
End: 2019-08-26
Payer: COMMERCIAL

## 2019-08-26 VITALS
WEIGHT: 198.6 LBS | BODY MASS INDEX: 35.19 KG/M2 | HEART RATE: 68 BPM | DIASTOLIC BLOOD PRESSURE: 84 MMHG | HEIGHT: 63 IN | SYSTOLIC BLOOD PRESSURE: 154 MMHG

## 2019-08-26 DIAGNOSIS — M19.072 ARTHRITIS OF MIDTARSAL JOINT OF LEFT FOOT: Primary | ICD-10-CM

## 2019-08-26 PROCEDURE — 99213 OFFICE O/P EST LOW 20 MIN: CPT | Performed by: ORTHOPAEDIC SURGERY

## 2019-08-26 NOTE — PROGRESS NOTES
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 seen in clinic for evaluation of left foot/ankle pain  Pain is located medial   The patient rates the pain as a 3/10  The pain has been present for  years  The patient sustained no acute injury or trauma, gradual progression of pain  She is treating with podiatrist, treating for flat feet, pain in foot  He has tried cortisone injection which only gave 2 days of relief in medial midfoot, possible for arthritis  The pain is characterized as dull, achy  The pain is present daily  Pain is improved by rest, ice and NSAIDS  Pain is aggravated by weight bearing  The patient has tried rest, ice, NSAIDS and orthotics             Ankle Surgical History:  None      Past Medical, Social and Family History:  Past Medical History:   Diagnosis Date    Claustrophobia     mild    Endometrial polyp     PMB (postmenopausal bleeding)      Past Surgical History:   Procedure Laterality Date    COLONOSCOPY      PLANTAR FASCIA SURGERY Bilateral     [place] HYSTEROSCOPY,W/ENDO BX N/A 2/28/2017    Procedure: DILATATION AND CURETTAGE (D&C) WITH HYSTEROSCOPY,POLYPECTOMY;  Surgeon: Gomez Lobo MD;  Location: [facility];  Service: Gynecology    SHOULDER SURGERY Left     TONSILLECTOMY      WISDOM TOOTH EXTRACTION       Allergies   Allergen Reactions    Iv Contrast  [Iodinated Diagnostic Agents]      Current Outpatient Medications on File Prior to Visit   Medication Sig Dispense Refill    BIOTIN PO Take 1 tablet by mouth daily      Black Cohosh (REMIFEMIN MENOPAUSE PO) Take 1 tablet by mouth 2 (two) times a day      Glucosamine-Chondroit-Vit C-Mn (GLUCOSAMINE CHONDR 1500 COMPLX) CAPS Take 2 capsules by mouth daily      ibuprofen (MOTRIN) 200 mg tablet Take 200-800 mg by mouth every 6 (six) hours as needed for mild pain      Misc Natural Products (OSTEO BI-FLEX ADV DOUBLE ST PO) Take 1 capsule by mouth 2 (two) times a day      Omega 3-6-9 Fatty Acids (OMEGA 3-6-9 PO) Take 1 capsule by mouth daily      PANTETHINE PO Take 1 tablet by mouth 2 (two) times a day      pravastatin (PRAVACHOL) 20 mg tablet Take 1 tablet (20 mg total) by mouth daily at bedtime 90 tablet 3    meloxicam (MOBIC) 15 mg tablet Take 1 tablet (15 mg total) by mouth daily for 30 days 30 tablet 0     No current facility-administered medications on file prior to visit  Social History     Socioeconomic History    Marital status:       Spouse name: Not on file    Number of children: Not on file    Years of education: Not on file    Highest education level: Not on file   Occupational History    Not on file   Social Needs    Financial resource strain: Not on file    Food insecurity:     Worry: Not on file     Inability: Not on file    Transportation needs:     Medical: Not on file     Non-medical: Not on file   Tobacco Use    Smoking status: Never Smoker    Smokeless tobacco: Never Used    Tobacco comment: No secondhand smoke exposure   Substance and Sexual Activity    Alcohol use: No    Drug use: No    Sexual activity: Not on file   Lifestyle    Physical activity:     Days per week: Not on file     Minutes per session: Not on file    Stress: Not on file   Relationships    Social connections:     Talks on phone: Not on file     Gets together: Not on file     Attends [employer] service: Not on file     Active member of club or organization: Not on file     Attends meetings of clubs or organizations: Not on file     Relationship status: Not on file    Intimate partner violence:     Fear of current or ex partner: Not on file     Emotionally abused: Not on file     Physically abused: Not on file     Forced sexual activity: Not on file   Other Topics Concern    Not on file   Social History Narrative    Not on file         I have reviewed the past medical, surgical, social and family history, medications and allergies as documented in the EMR  Review of systems: ROS is negative other than that noted in the HPI  Constitutional: Negative for fatigue and fever  HENT: Negative for sore throat  Respiratory: Negative for shortness of breath  Cardiovascular: Negative for chest pain  Gastrointestinal: Negative for abdominal pain  Endocrine: Negative for cold intolerance and heat intolerance  Genitourinary: Negative for flank pain  Musculoskeletal: Negative for back pain  Skin: Negative for rash  Allergic/Immunologic: Negative for immunocompromised state  Neurological: Negative for dizziness  Psychiatric/Behavioral: Negative for agitation  Physical Exam:    Blood pressure 154/84, pulse 68, height 5' 3" (1 6 m), weight 90 1 kg (198 lb 9 6 oz), not currently breastfeeding  General/Constitutional: NAD, well developed, well nourished  HENT: Normocephalic, atraumatic  CV: Intact distal pulses, regular rate  Resp: No respiratory distress or labored breathing  Lymphatic: No lymphadenopathy palpated  Neuro: Alert and Oriented x 3, no focal deficits  Psych: Normal mood, normal affect, normal judgement, normal behavior  Skin: Warm, dry, no rashes, no erythema     Ankle Exam (focused): Ankle Examination (focused):     RIGHT LEFT   ROM:  full full   Palpation:  none Medial midfoot   Anterior Drawer negative negative   Calcaneal inversion negative negative   Squeeze Test negative negative       No subluxation of the peroneal tendons or tenderness to palpation along the peroneal tendons     No pain with palpation or range of motion of midfoot and forefoot bilaterally    No limp upon gait exam    No calf tenderness to palpation bilaterally    LE NV Exam: +2 DP/PT pulses bilaterally  Sensation intact to light touch L2-S1 bilaterally          Ankle Imaging:    MRI of the left foot/ankle were reviewed, which demonstrate diffuse mid foot arthritis changes with no other bony abnormalities  I have reviewed the radiology report and agree with their impression      Scribe Attestation    I,:   Joanna Munguia am acting as a scribe while in the presence of the attending physician :        I,:   Shellie Gallegos DO personally performed the services described in this documentation    as scribed in my presence :

## 2019-08-30 ENCOUNTER — EVALUATION (OUTPATIENT)
Dept: PHYSICAL THERAPY | Facility: MEDICAL CENTER | Age: 59
End: 2019-08-30
Payer: COMMERCIAL

## 2019-08-30 DIAGNOSIS — M19.072 ARTHRITIS OF MIDTARSAL JOINT OF LEFT FOOT: Primary | ICD-10-CM

## 2019-08-30 PROCEDURE — 97110 THERAPEUTIC EXERCISES: CPT | Performed by: PHYSICAL THERAPIST

## 2019-08-30 PROCEDURE — 97161 PT EVAL LOW COMPLEX 20 MIN: CPT | Performed by: PHYSICAL THERAPIST

## 2019-08-30 NOTE — PROGRESS NOTES
PT Evaluation     Today's date: 2019  Patient name: Davion Brown  : 1960  MRN: [id number]  Referring provider: Charissa Leon DO  Dx:   Encounter Diagnosis     ICD-10-CM    1  Arthritis of midtarsal joint of left foot M19 072 Ambulatory referral to Physical Therapy                  Assessment  Assessment details: Davion Brown is a pleasant 61 y o  female who presents with acute L foot pain of a gradual onset for the last 2 months after joining the gym  The patient's greatest concerns are fear of not being able to keep active  No further referral appears necessary at this time based upon examination results  Primary movement impairment diagnosis of midfoot hypomobility resulting in pathoanatomical symptoms of Arthritis of midtarsal joint of left foot and limiting her ability to stand  In addition, patient presents with bilateral pes planus (L worse than R) and weakness of the L posterior tibialis muscle, which limits her ability to achieve proper push-off during the gait cycle  Proximal weakness in the hip girdle also contributes to abnormalities in the resting position of the foot and compensatory strain on bony and muscular structures of the midfoot when descending stairs  Patient would benefit from skilled PT services to address the listed impairments and facilitate a return to OF   Thank you for the referral     Primary Impairments:  1) L midfoot hypomobility--> addressing with joint mobilizations and stretching  2) Decreased bilateral ankle DF ROM--> addressing with joint mobilizations and stretching  3) Weakness in L ankle posterior tibialis--> addressing with progressive exercise as tolerated  4) Weakness in hip ABDs--> addressing with progressive exercise        Impairments: abnormal coordination, abnormal muscle firing, abnormal muscle tone, abnormal or restricted ROM, abnormal movement, activity intolerance, impaired physical strength, lacks appropriate home exercise program, pain with function and poor body mechanics  Functional limitations: standing for job duties, full participation in recreation  Symptom irritability: lowBarriers to therapy: none  Understanding of Dx/Px/POC: good   Prognosis: good  Prognosis details: Positive prognostic indicators include positive attitude toward recovery  Negative prognostic indicators include anxiety  Goals  Patient will be independent with home exercise program    Patient will improve bilateral ankle DF ROM to 10 deg to increase quality of gait  Patient will increase strength in L ankle PF/INV to be comparable to the contralateral side  Patient will increase hip ABD strength to 4 to 4+/5 to control LE mechanics when descending stairs  Patient will be able to perform SLS for 15s bilaterally  Patient will be able to stand for job duties without limitation  Patient will make a full return to gym workouts with modifications as necessary  Patient will be able to manage symptoms independently  Plan  Plan details: Prognosis above is given PT services 2x/week tapering to 1x/week over the next 2 months and home program adherence  Patient would benefit from: skilled physical therapy  Referral necessary: No  Planned modality interventions: thermotherapy: hydrocollator packs and cryotherapy  Planned therapy interventions: activity modification, joint mobilization, manual therapy, motor coordination training, neuromuscular re-education, patient education, self care, therapeutic activities, therapeutic exercise, graded activity, home exercise program, behavior modification, massage, Suárez taping, strengthening, stretching, functional ROM exercises, flexibility and postural training  Frequency: 2x week  Duration in weeks: 6  Treatment plan discussed with: patient        Subjective Evaluation    History of Present Illness  Mechanism of injury: This is 62 yo female presenting with L foot pain for the last 2 months  No hx of trauma   She recently started going to the gym doing the ellipitcal and stair master prior to the foot pain onset  She does report that it feels unstable at times, especially when coming down the stairs  She had an injection 6 weeks ago that gave her relief for 2 days  The pain is located on the inside of her L foot that radiates to the bottom of her foot  She also has numbness at times in the same area  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  Her foot bothers her when she is standing still, and walking seems to help decrease the pain  She has a hx of bilateral plantar fascia surgeries from 20 years ago, but she is unsure what the procedures were  She has a hx of uterine cancer, but she had a hysterectomy in 2018 to remove it  Not a recurrent problem   Quality of life: good    Pain  Current pain ratin  At best pain ratin  At worst pain ratin  Location: L medial ankle/foot  Quality: throbbing  Relieving factors: ice and medications (walking, Advil)  Aggravating factors: standing  Progression: worsening      Diagnostic Tests  MRI studies: normal (per patient report)  Treatments  Previous treatment: injection treatment (1 injection)  Patient Goals  Patient goals for therapy: increased strength, decreased pain and increased motion  Patient goal: to be able to descend stairs, to be able to stand for longer periods at work, to be able to participate in gym workouts without limitations        Objective     Static Posture     Ankle/Foot   Ankle/Foot (Left): Pes planus  Ankle/Foot (Right): Pes planus  Palpation   Left   Tenderness of the posterior tibialis  Tenderness   Left Ankle/Foot   Tenderness in the navicular and posterior tibial tendon       Active Range of Motion   Left Ankle/Foot   Dorsiflexion (ke): 5 degrees   Plantar flexion: 40 degrees   Inversion: 20 degrees   Eversion: 5 degrees     Right Ankle/Foot   Dorsiflexion (ke): 5 degrees   Plantar flexion: 40 degrees   Inversion: 40 degrees   Eversion: 2 degrees     Passive Range of Motion   Left Ankle/Foot    Dorsiflexion (ke): 5 degrees   Plantar flexion: WFL  Inversion: WFL  Eversion: WFL    Right Ankle/Foot    Dorsiflexion (ke): 5 degrees   Plantar flexion: WFL  Inversion: WFL  Eversion: WFL    Joint Play   Left Ankle/Foot  Joints within functional limits are the subtalar joint  Hypomobile in the talocrural joint and midfoot  Right Ankle/Foot  Joints within functional limits are the subtalar joint  Hypomobile in the talocrural joint and midfoot       Strength/Myotome Testing     Left Hip   Planes of Motion   Abduction: 3-    Right Hip   Planes of Motion   Abduction: 3-    Left Ankle/Foot   Dorsiflexion: 4+  Plantar flexion: 3+  Inversion: 3+  Eversion: 4    Right Ankle/Foot   Dorsiflexion: 4+  Plantar flexion: 4+  Inversion: 4+  Eversion: 4+    Ambulation     Observational Gait   Left foot contact pattern: foot flat  Right foot contact pattern: foot flat    Functional Assessment        Single Leg Stance   Left: 2 seconds  Right: 15 seconds    Comments  Pain in L foot with SL heel raise             Precautions: GERD, IBS, hx of endometrial cancer, bilateral plantar fascia surgeries 20 years ago, OA of knee      Manual  8/30            L midfoot mobs             Talocrural mobs for DF                                                        Exercise Diary  8/30            Rec bike ARDON             Strap gastroc stretch 4x30:            Resisted ankle INV 15 pink            Resisted ankle PF             Sidelying clams             Short arch 20            BAPS             Prostretch             Heel raises   With ball squeeze           Tandem stance             SLS                                                                                                                                                   Modalities  8/30

## 2019-09-03 ENCOUNTER — OFFICE VISIT (OUTPATIENT)
Dept: OBGYN CLINIC | Facility: MEDICAL CENTER | Age: 59
End: 2019-09-03
Payer: COMMERCIAL

## 2019-09-03 ENCOUNTER — APPOINTMENT (OUTPATIENT)
Dept: RADIOLOGY | Facility: MEDICAL CENTER | Age: 59
End: 2019-09-03
Payer: COMMERCIAL

## 2019-09-03 VITALS
BODY MASS INDEX: 35.08 KG/M2 | WEIGHT: 198 LBS | SYSTOLIC BLOOD PRESSURE: 160 MMHG | HEIGHT: 63 IN | DIASTOLIC BLOOD PRESSURE: 90 MMHG | HEART RATE: 48 BPM

## 2019-09-03 DIAGNOSIS — M17.0 PRIMARY OSTEOARTHRITIS OF BOTH KNEES: Primary | ICD-10-CM

## 2019-09-03 DIAGNOSIS — M25.561 RIGHT KNEE PAIN, UNSPECIFIED CHRONICITY: ICD-10-CM

## 2019-09-03 DIAGNOSIS — M25.562 LEFT KNEE PAIN, UNSPECIFIED CHRONICITY: ICD-10-CM

## 2019-09-03 PROCEDURE — 20610 DRAIN/INJ JOINT/BURSA W/O US: CPT | Performed by: ORTHOPAEDIC SURGERY

## 2019-09-03 PROCEDURE — 99214 OFFICE O/P EST MOD 30 MIN: CPT | Performed by: ORTHOPAEDIC SURGERY

## 2019-09-03 PROCEDURE — 73564 X-RAY EXAM KNEE 4 OR MORE: CPT

## 2019-09-03 RX ORDER — BUPIVACAINE HYDROCHLORIDE 2.5 MG/ML
4 INJECTION, SOLUTION INFILTRATION; PERINEURAL
Status: COMPLETED | OUTPATIENT
Start: 2019-09-03 | End: 2019-09-03

## 2019-09-03 RX ORDER — METHYLPREDNISOLONE ACETATE 40 MG/ML
1 INJECTION, SUSPENSION INTRA-ARTICULAR; INTRALESIONAL; INTRAMUSCULAR; SOFT TISSUE
Status: COMPLETED | OUTPATIENT
Start: 2019-09-03 | End: 2019-09-03

## 2019-09-03 RX ADMIN — METHYLPREDNISOLONE ACETATE 1 ML: 40 INJECTION, SUSPENSION INTRA-ARTICULAR; INTRALESIONAL; INTRAMUSCULAR; SOFT TISSUE at 09:49

## 2019-09-03 RX ADMIN — BUPIVACAINE HYDROCHLORIDE 4 ML: 2.5 INJECTION, SOLUTION INFILTRATION; PERINEURAL at 09:49

## 2019-09-03 NOTE — PROGRESS NOTES
Orthopaedic Surgery - Office Note  Sheldon Rascon (18 y o  female)   : 1960   MRN: [id number]  Encounter Date: 9/3/2019    Chief Complaint   Patient presents with    Left Knee - Pain    Right Knee - Pain       Assessment / Plan  Bilateral knee OA    · Bilateral knee CSI were offered and accepted today  These were performed without difficulty  She was encouraged to avoid strenuous activity for the next 1-2 days  · Continue OTC analgesics and ice prn pain  · Wear braces as needed for comfort  · Continue exercise as tolerated  Return in about 3 months (around 12/3/2019)  History of Present Illness  Sheldon Rascon is a 61 y o  female who presents for evaluation of bilateral knee pain  Patient has been seen previously, back in 2017, for right knee pain  She states over the past 2 years, she has noted increase in her pain despite a 40 lb weight loss  One knee does not hurt more than the other  She states she goes to the gym twice per week and performs a variety of exercises including weightlifting, elliptical, biking, and treadmill use  She takes Advil daily for pain control  She states she has tried acupuncture in the past without significant improvement  She does states that she had a steroid injection into the right knee in 2017 which did offer her significant improvement  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  She states weight-bearing activities and going down stairs cause her increased pain  She also reports that going down stairs causes clicking and feelings of instability  She wears braces that she purchased from the drugstore for additional support  She denies any numbness or tingling in bilateral lower extremities  Review of Systems  Pertinent items are noted in HPI  All other systems were reviewed and are negative  Physical Exam  /90   Pulse (!) 48   Ht 5' 3" (1 6 m)   Wt 89 8 kg (198 lb)   BMI 35 07 kg/m²   Cons: Appears well  No apparent distress  Psych: Alert  Oriented x3  Mood and affect normal   Eyes: PERRLA, EOMI  Resp: Normal effort  No audible wheezing or stridor  CV: Palpable pulse  No discernable arrhythmia  No LE edema  Lymph:  No palpable cervical, axillary, or inguinal lymphadenopathy  Skin: Warm  No palpable masses  No visible lesions  Neuro: Normal muscle tone  Normal and symmetric DTR's  Right Knee Exam  Alignment:  Valgus defomity  Inspection:  No swelling  No muscle atrophy  Palpation:  medial and lateral joint line  tenderness  ROM:  0-120 degrees  Strength:  5/5 quadriceps and hamstrings  Stability:  No objective knee instability  Stable Varus / Valgus stress, Lachman, and Posterior drawer  Tests:  No pertinent positive or negative tests  Patella:  Patella tracks centrally without crepitus  Neurovascular:  Sensation intact in DP/SP/Gill/Sa/T nerve distributions  2+ DP & PT pulses  Gait:  Normal  Steady  Left Knee Exam  Alignment:  Normal knee alignment  Inspection:  No swelling  Palpation:  mild medial joint line tenderness  ROM:  Normal knee ROM  Strength:  5/5 quadriceps and hamstrings  Stability:  No objective knee instability  Stable Varus / Valgus stress, Lachman, and Posterior drawer  Tests:  No pertinent positive or negative tests  Patella:  Patella tracks centrally without crepitus  Neurovascular:  Sensation intact in DP/SP/Gill/Sa/T nerve distributions  2+ DP & PT pulses  Gait:  Normal         Studies Reviewed  XR of bilateral knee - performed today in clinic demonstrate severe lateral OA of the right knee with valgus deformity and moderate medial compartment OA of the left knee       Large joint arthrocentesis: bilateral knee  Date/Time: 9/3/2019 9:49 AM  Consent given by: patient  Site marked: site marked  Timeout: Immediately prior to procedure a time out was called to verify the correct patient, procedure, equipment, support staff and site/side marked as required Supporting Documentation  Indications: pain   Procedure Details  Location: knee - bilateral knee  Preparation: Patient was prepped and draped in the usual sterile fashion  Needle size: 22 G  Ultrasound guidance: no  Approach: superior    Medications (Right): 4 mL bupivacaine 0 25 %; 1 mL methylPREDNISolone acetate 40 mg/mLMedications (Left): 4 mL bupivacaine 0 25 %; 1 mL methylPREDNISolone acetate 40 mg/mL   Patient tolerance: patient tolerated the procedure well with no immediate complications  Dressing:  Sterile dressing applied             Medical, Surgical, Family, and Social History  The patient's medical history, family history, and social history, were reviewed and updated as appropriate  Past Medical History:   Diagnosis Date    Claustrophobia     mild    Endometrial polyp     PMB (postmenopausal bleeding)        Past Surgical History:   Procedure Laterality Date    COLONOSCOPY      PLANTAR FASCIA SURGERY Bilateral     MD HYSTEROSCOPY,W/ENDO BX N/A 2/28/2017    Procedure: DILATATION AND CURETTAGE (D&C) WITH HYSTEROSCOPY,POLYPECTOMY;  Surgeon: Shahram Avery MD;  [facility];  Service: Gynecology    SHOULDER SURGERY Left     TONSILLECTOMY      WISDOM TOOTH EXTRACTION         History reviewed  No pertinent family history      Social History     Occupational History    Not on file   Tobacco Use    Smoking status: Never Smoker    Smokeless tobacco: Never Used    Tobacco comment: No secondhand smoke exposure   Substance and Sexual Activity    Alcohol use: No    Drug use: No    Sexual activity: Not on file       Allergies   Allergen Reactions    Iv Contrast  [Iodinated Diagnostic Agents]          Current Outpatient Medications:     BIOTIN PO, Take 1 tablet by mouth daily, Disp: , Rfl:     Black Cohosh (REMIFEMIN MENOPAUSE PO), Take 1 tablet by mouth 2 (two) times a day, Disp: , Rfl:     Glucosamine-Chondroit-Vit C-Mn (GLUCOSAMINE CHONDR 1500 COMPLX) CAPS, Take 2 capsules by mouth daily, Disp: , Rfl:     ibuprofen (MOTRIN) 200 mg tablet, Take 200-800 mg by mouth every 6 (six) hours as needed for mild pain, Disp: , Rfl:     Misc Natural Products (OSTEO BI-FLEX ADV DOUBLE ST PO), Take 1 capsule by mouth 2 (two) times a day, Disp: , Rfl:     Omega 3-6-9 Fatty Acids (OMEGA 3-6-9 PO), Take 1 capsule by mouth daily, Disp: , Rfl:     PANTETHINE PO, Take 1 tablet by mouth 2 (two) times a day, Disp: , Rfl:     pravastatin (PRAVACHOL) 20 mg tablet, Take 1 tablet (20 mg total) by mouth daily at bedtime, Disp: 90 tablet, Rfl: 3    meloxicam (MOBIC) 15 mg tablet, Take 1 tablet (15 mg total) by mouth daily for 30 days, Disp: 30 tablet, Rfl: 0      Daniela S ROSEANNA Wong

## 2019-09-06 ENCOUNTER — OFFICE VISIT (OUTPATIENT)
Dept: PHYSICAL THERAPY | Facility: MEDICAL CENTER | Age: 59
End: 2019-09-06
Payer: COMMERCIAL

## 2019-09-06 DIAGNOSIS — M19.072 ARTHRITIS OF MIDTARSAL JOINT OF LEFT FOOT: Primary | ICD-10-CM

## 2019-09-06 PROCEDURE — 97140 MANUAL THERAPY 1/> REGIONS: CPT | Performed by: PHYSICAL THERAPIST

## 2019-09-06 PROCEDURE — 97110 THERAPEUTIC EXERCISES: CPT | Performed by: PHYSICAL THERAPIST

## 2019-09-06 NOTE — PROGRESS NOTES
Daily Note     Today's date: 2019  Patient name: Lory Sauer  : 1960  MRN: [id number]  Referring provider: Sruthi Ward DO  Dx:   Encounter Diagnosis     ICD-10-CM    1  Arthritis of midtarsal joint of left foot M19 072                   Subjective: Patient reports that she is having some soreness today due to performing some heel raises on the leg press at the gym  Overall, she feels that she is making progress, and her ankle feels looser  She reports having pain on the inside of her L foot after periods of prolonged standing on concrete at work  Objective: See treatment diary below      Assessment: L ankle DF ROM improved today compared to last visit  Patient was educated to try to take seated rest breaks during long shifts at work  Added tband resisted PF and heel raises to HEP  Cueing provided during sidelying clams to prevent compensation from trunk extensors  Patient would benefit from continued PT to improve ankle/foot mobility and stability for prolonged standing during job duties  Plan: Continue per plan of care        Precautions: GERD, IBS, hx of endometrial cancer, bilateral plantar fascia surgeries 20 years ago, OA of knee      Manual             L midfoot mobs  KP Gr  III-IV           Talocrural mobs for DF  KP Gr  III-IV                                                      Exercise Diary             Rec bike ARDON  10'           Strap gastroc stretch 4x30: 4x30:           Resisted ankle INV 15 pink 20 pink           Resisted ankle DF  20 pink           Resisted ankle PF  20 pink           Sidelying clams  20 ea           Short arch 20 30           BAPS             Prostretch  NV           Heel raises   20x5: With ball squeeze          Tandem stance  NV           SLS                                                                                                                                                   Modalities

## 2019-09-10 ENCOUNTER — OFFICE VISIT (OUTPATIENT)
Dept: PHYSICAL THERAPY | Facility: MEDICAL CENTER | Age: 59
End: 2019-09-10
Payer: COMMERCIAL

## 2019-09-10 DIAGNOSIS — M19.072 ARTHRITIS OF MIDTARSAL JOINT OF LEFT FOOT: Primary | ICD-10-CM

## 2019-09-10 PROCEDURE — 97110 THERAPEUTIC EXERCISES: CPT | Performed by: PHYSICAL THERAPIST

## 2019-09-10 PROCEDURE — 97140 MANUAL THERAPY 1/> REGIONS: CPT | Performed by: PHYSICAL THERAPIST

## 2019-09-10 NOTE — PROGRESS NOTES
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  She also reports that the pain sometimes travels from the inside of the foot to the bottom and outside of her foot  Her R foot occasionally causes her some pain as well  Objective: See treatment diary below      Assessment: Positive response to manual therapy with an improvement in both midtarsal mobility and DF ROM  Added tandem stance to initiate balance training with some difficulty but no foot pain  All exercises performed in a pain-free range  Patient would benefit from continued PT to improve midtarsal mobility and ankle stability to alleviate tension on soft tissues when walking  Goals  Patient will be independent with home exercise program    Patient will improve bilateral ankle DF ROM to 10 deg to increase quality of gait  Patient will increase strength in L ankle PF/INV to be comparable to the contralateral side  Patient will increase hip ABD strength to 4 to 4+/5 to control LE mechanics when descending stairs  Patient will be able to perform SLS for 15s bilaterally  Patient will be able to stand for job duties without limitation  Patient will make a full return to gym workouts with modifications as necessary  Patient will be able to manage symptoms independently  Plan: Continue per plan of care        Precautions: GERD, IBS, hx of endometrial cancer, bilateral plantar fascia surgeries 20 years ago, OA of knee      Manual   9/10          L midfoot mobs   Gr  III-IV  Gr  III-IV          Talocrural mobs for DF   Gr  III-IV  Gr  III-IV Exercise Diary  8/30 9/6 9/10          Rec bike ARDON  10' 10'          Strap gastroc stretch 4x30: 4x30: 4x30"          Resisted ankle INV 15 pink 20 pink 30 pink          Resisted ankle DF  20 pink           Resisted ankle PF  20 pink 20 pink          Sidelying clams  20 ea 20 ea          Short arch 20 30           BAPS             Prostretch  NV 4x30: gastroc          Heel raises   20x5: 30x5: With ball squeeze         Tandem stance  NV 5x30: b/l airex         SLS   NV                                                                                                                                                Modalities  8/30 9/6 9/10

## 2019-09-11 ENCOUNTER — APPOINTMENT (OUTPATIENT)
Dept: LAB | Facility: CLINIC | Age: 59
End: 2019-09-11
Payer: COMMERCIAL

## 2019-09-11 DIAGNOSIS — E78.2 MIXED HYPERLIPIDEMIA: ICD-10-CM

## 2019-09-11 LAB
ALBUMIN SERPL BCP-MCNC: 4 G/DL (ref 3.5–5)
ALP SERPL-CCNC: 69 U/L (ref 46–116)
ALT SERPL W P-5'-P-CCNC: 40 U/L (ref 12–78)
ANION GAP SERPL CALCULATED.3IONS-SCNC: 7 MMOL/L (ref 4–13)
AST SERPL W P-5'-P-CCNC: 24 U/L (ref 5–45)
BILIRUB SERPL-MCNC: 0.5 MG/DL (ref 0.2–1)
BUN SERPL-MCNC: 25 MG/DL (ref 5–25)
CALCIUM SERPL-MCNC: 9.9 MG/DL (ref 8.3–10.1)
CHLORIDE SERPL-SCNC: 109 MMOL/L (ref 100–108)
CO2 SERPL-SCNC: 26 MMOL/L (ref 21–32)
CREAT SERPL-MCNC: 0.8 MG/DL (ref 0.6–1.3)
GFR SERPL CREATININE-BSD FRML MDRD: 81 ML/MIN/1.73SQ M
GLUCOSE P FAST SERPL-MCNC: 92 MG/DL (ref 65–99)
LDLC SERPL DIRECT ASSAY-MCNC: 125 MG/DL (ref 0–100)
POTASSIUM SERPL-SCNC: 4.3 MMOL/L (ref 3.5–5.3)
PROT SERPL-MCNC: 7 G/DL (ref 6.4–8.2)
SODIUM SERPL-SCNC: 142 MMOL/L (ref 136–145)

## 2019-09-11 PROCEDURE — 83721 ASSAY OF BLOOD LIPOPROTEIN: CPT

## 2019-09-11 PROCEDURE — 80053 COMPREHEN METABOLIC PANEL: CPT

## 2019-09-11 PROCEDURE — 36415 COLL VENOUS BLD VENIPUNCTURE: CPT

## 2019-09-13 ENCOUNTER — OFFICE VISIT (OUTPATIENT)
Dept: PHYSICAL THERAPY | Facility: MEDICAL CENTER | Age: 59
End: 2019-09-13
Payer: COMMERCIAL

## 2019-09-13 DIAGNOSIS — M19.072 ARTHRITIS OF MIDTARSAL JOINT OF LEFT FOOT: Primary | ICD-10-CM

## 2019-09-13 PROCEDURE — 97110 THERAPEUTIC EXERCISES: CPT | Performed by: PHYSICAL THERAPIST

## 2019-09-13 PROCEDURE — 97112 NEUROMUSCULAR REEDUCATION: CPT | Performed by: PHYSICAL THERAPIST

## 2019-09-13 PROCEDURE — 97140 MANUAL THERAPY 1/> REGIONS: CPT | Performed by: PHYSICAL THERAPIST

## 2019-09-13 NOTE — PROGRESS NOTES
Daily Note     Today's date: 2019  Patient name: Felipa Moore  : 1960  MRN: [id number]  Referring provider: Severo Lynn DO  Dx:   Encounter Diagnosis     ICD-10-CM    1  Arthritis of midtarsal joint of left foot M19 072                   Subjective: Patient reports that she had some minor tenderness on the inside of her L foot after last session, but she does report that her foot pain is getting better overall  Objective: See treatment diary below      Assessment: Improvement mobility in both midfoot and talocrural joints today  Able to progress resistance for ankle stability exercises, which demonstrates good progress toward goals  Patient was also able to progress tandem stance to airex pad with some difficulty but no pain  However, patient continues to demonstrate significant difficulty with SLS on L  Patient would benefit from continued PT to further improve mechanics of the foot/ankle complex to increase standing tolerance for job duties  Plan: Continue per plan of care        Precautions: GERD, IBS, hx of endometrial cancer, bilateral plantar fascia surgeries 20 years ago, OA of knee      Manual  8/30 9/6 9/10 9/13         L midfoot mobs  KP Gr  III-IV KP Gr  III-IV KP Gr  III-IV         Talocrural mobs for DF  KP Gr  III-IV KP Gr  III-IV KP Gr  III-IV                                                    Exercise Diary  8/30 9/6 9/10 9/13         Rec bike ARDON  10' 10' 10'         Strap gastroc stretch 4x30: 4x30: 4x30" 4x30"         Resisted ankle INV 15 pink 20 pink 30 pink 30 green         Resisted ankle DF  20 pink           Resisted ankle PF  20 pink 20 pink 30 green         Sidelying clams  20 ea 20 ea 20 ea         Short arch 20 30           BAPS             Prostretch  NV 4x30: gastroc 4x30: gastroc          Heel raises   20x5: 30x5: 30x5: With ball squeeze        Tandem stance  NV 5x30: b/l 5x30: b/l airex         SLS   NV 5x3: Modalities  8/30 9/6 9/10 9/13

## 2019-09-17 ENCOUNTER — OFFICE VISIT (OUTPATIENT)
Dept: PHYSICAL THERAPY | Facility: MEDICAL CENTER | Age: 59
End: 2019-09-17
Payer: COMMERCIAL

## 2019-09-17 DIAGNOSIS — M19.072 ARTHRITIS OF MIDTARSAL JOINT OF LEFT FOOT: Primary | ICD-10-CM

## 2019-09-17 PROCEDURE — 97112 NEUROMUSCULAR REEDUCATION: CPT | Performed by: PHYSICAL THERAPIST

## 2019-09-17 PROCEDURE — 97110 THERAPEUTIC EXERCISES: CPT | Performed by: PHYSICAL THERAPIST

## 2019-09-17 PROCEDURE — 97140 MANUAL THERAPY 1/> REGIONS: CPT | Performed by: PHYSICAL THERAPIST

## 2019-09-17 NOTE — PROGRESS NOTES
Daily Note     Today's date: 2019  Patient name: Norberto Muñoz  : 1960  MRN: [id number]  Referring provider: Cheo Peters DO  Dx:   Encounter Diagnosis     ICD-10-CM    1  Arthritis of midtarsal joint of left foot M19 072                   Subjective: Patient reports that she had some soreness on the inside of her foot after standing for prolonged periods when working  She reports that the pain feels better with resting and when she is walking  Objective: See treatment diary below      Assessment: Patient demonstrated TTP at L hallucis longus tendon today  Added eccentric resisted DF to improve strength of EXTs  Also added short arches to improve stability in medial arch  Improvement in SLS time today  Held heel raises to prevent over-straining soft tissues of the foot  Patient would benefit from continued PT to improve soft tissue extensibility in the foot to increase standing tolerance  Goals  Patient will be independent with home exercise program    Patient will improve bilateral ankle DF ROM to 10 deg to increase quality of gait  Patient will increase strength in L ankle PF/INV to be comparable to the contralateral side  Patient will increase hip ABD strength to 4 to 4+/5 to control LE mechanics when descending stairs  Patient will be able to perform SLS for 15s bilaterally  Patient will be able to stand for job duties without limitation  Patient will make a full return to gym workouts with modifications as necessary  Patient will be able to manage symptoms independently  Plan: Continue per plan of care        Precautions: GERD, IBS, hx of endometrial cancer, bilateral plantar fascia surgeries 20 years ago, OA of knee      Manual   9/6 9/10 9/13 9/17        L midfoot mobs  KP Gr  III-IV KP Gr  III-IV KP Gr  III-IV KP Gr  III-IV        Talocrural mobs for DF  KP Gr  III-IV KP Gr  III-IV KP Gr  III-IV KP Gr  III-IV        STM to L hallucis longus Exercise Diary  8/30 9/6 9/10 9/13 9/17        Rec bike ARDON  10' 10' 10' 10'        Strap gastroc stretch 4x30: 4x30: 4x30" 4x30" 4x30"        Resisted ankle INV 15 pink 20 pink 30 pink 30 green 30 green        Resisted ankle DF  20 pink   30 green eccentric        Resisted ankle PF  20 pink 20 pink 30 green 30 green        Sidelying clams  20 ea 20 ea 20 ea HEP        Short arch 20 30   20        BAPS             Prostretch  NV 4x30: gastroc 4x30: gastroc  4x30: gastroc        Heel raises   20x5: 30x5: 30x5: held With ball squeeze       Tandem stance  NV 5x30: b/l 5x30: b/l airex 5x30: b/l airex        SLS   NV 5x3: 5x10: airex                                                                                                                                              Modalities  8/30 9/6 9/10 9/13 9/17        Laser to L foot

## 2019-09-20 ENCOUNTER — OFFICE VISIT (OUTPATIENT)
Dept: FAMILY MEDICINE CLINIC | Facility: CLINIC | Age: 59
End: 2019-09-20
Payer: COMMERCIAL

## 2019-09-20 ENCOUNTER — OFFICE VISIT (OUTPATIENT)
Dept: PHYSICAL THERAPY | Facility: MEDICAL CENTER | Age: 59
End: 2019-09-20
Payer: COMMERCIAL

## 2019-09-20 VITALS
SYSTOLIC BLOOD PRESSURE: 110 MMHG | HEART RATE: 72 BPM | HEIGHT: 63 IN | BODY MASS INDEX: 35.61 KG/M2 | WEIGHT: 201 LBS | DIASTOLIC BLOOD PRESSURE: 80 MMHG

## 2019-09-20 DIAGNOSIS — M19.072 ARTHRITIS OF MIDTARSAL JOINT OF LEFT FOOT: Primary | ICD-10-CM

## 2019-09-20 DIAGNOSIS — K21.00 GASTROESOPHAGEAL REFLUX DISEASE WITH ESOPHAGITIS: ICD-10-CM

## 2019-09-20 DIAGNOSIS — E78.2 MIXED HYPERLIPIDEMIA: Primary | ICD-10-CM

## 2019-09-20 DIAGNOSIS — M25.50 PAIN IN JOINT, MULTIPLE SITES: ICD-10-CM

## 2019-09-20 DIAGNOSIS — F41.9 ANXIETY: ICD-10-CM

## 2019-09-20 PROBLEM — M79.671 FOOT PAIN, BILATERAL: Status: ACTIVE | Noted: 2019-09-20

## 2019-09-20 PROBLEM — M79.672 FOOT PAIN, BILATERAL: Status: ACTIVE | Noted: 2019-09-20

## 2019-09-20 PROCEDURE — 99214 OFFICE O/P EST MOD 30 MIN: CPT | Performed by: FAMILY MEDICINE

## 2019-09-20 PROCEDURE — 97110 THERAPEUTIC EXERCISES: CPT | Performed by: PHYSICAL THERAPIST

## 2019-09-20 PROCEDURE — 97140 MANUAL THERAPY 1/> REGIONS: CPT | Performed by: PHYSICAL THERAPIST

## 2019-09-20 RX ORDER — PYRIDOXAL PHOSPHATE 100 %
POWDER (GRAM) MISCELLANEOUS
COMMUNITY
Start: 2019-08-16 | End: 2020-11-18

## 2019-09-20 RX ORDER — PRAVASTATIN SODIUM 20 MG
20 TABLET ORAL
Qty: 90 TABLET | Refills: 3 | Status: SHIPPED | OUTPATIENT
Start: 2019-09-20 | End: 2020-11-18

## 2019-09-20 NOTE — PROGRESS NOTES
Assessment/Plan:  Follow-up 6 months with labs  Anxiety  Stable on no medication  GERD (gastroesophageal reflux disease)  Stable on no medication  Mixed hyperlipidemia  Her LDL went up from 121 to 125  She was prescribed Pravachol at her last visit but did not take it  She will now go on Pravachol 10 mg daily for her cholesterol  Pain in joint, multiple sites  She uses tumeric for her joint pain which help slightly       Diagnoses and all orders for this visit:    Mixed hyperlipidemia  -     pravastatin (PRAVACHOL) 20 mg tablet; Take 1 tablet (20 mg total) by mouth daily at bedtime  -     Comprehensive metabolic panel; Future  -     Lipid Panel with Direct LDL reflex; Future  -     TSH, 3rd generation; Future    Gastroesophageal reflux disease with esophagitis  -     Comprehensive metabolic panel; Future  -     Lipid Panel with Direct LDL reflex; Future  -     TSH, 3rd generation; Future    Anxiety  -     Comprehensive metabolic panel; Future  -     Lipid Panel with Direct LDL reflex; Future  -     TSH, 3rd generation; Future    Pain in joint, multiple sites    Other orders  -     Pyridoxal-5 Phosphate POWD  -     Turmeric Curcumin 500 MG CAPS  -     Zinc Picolinate 25 MG TABS  -     patient supplied medication; Healthy feet and nerve  OTC supplement          Subjective:     Patient ID: Mirella Cherry is a 61 y o  female  This is a 59-year-old female who comes in for her regular 3 month visit  She is having a problem with her feet in that 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 [employer] and saw her podiatrist that she is been seen for many years and he gave her a cortisone shot and orthotics and the cortisone shot lasted a day or 2  She went back to him and was told to see another podiatrist   She is currently seeing another podiatrist and sees him next week  Her blood pressure is 110/80 and her weight is up 3 lb from the previous visit to 201 lb  Her BMI is 35 6    Reviewing her labs her LDL went up from 121 to 125 because she did not get her Pravachol filled and will now go on Pravachol once a day  Her glucose was stable at 92  Her anxiety is stable on no medication and her GERD does not bother her at all  Review of Systems   Constitutional: Negative  HENT: Negative  Eyes: Negative  Respiratory: Negative  Cardiovascular: Negative  Gastrointestinal: Negative  Endocrine: Negative  Genitourinary: Negative  Musculoskeletal: Negative  Bilateral foot pain   Skin: Negative  Allergic/Immunologic: Negative  Neurological: Negative  Hematological: Negative  Psychiatric/Behavioral: Negative  Objective:     Physical Exam   Constitutional: She is oriented to person, place, and time  She appears well-developed and well-nourished  HENT:   Head: Normocephalic  Right Ear: External ear normal    Left Ear: External ear normal    Mouth/Throat: Oropharynx is clear and moist    Eyes: Pupils are equal, round, and reactive to light  Conjunctivae and EOM are normal    Neck: Normal range of motion  Neck supple  Cardiovascular: Normal rate, regular rhythm and normal heart sounds  Pulmonary/Chest: Effort normal and breath sounds normal    Abdominal: Soft  Bowel sounds are normal    Musculoskeletal: Normal range of motion  Tender to palpation of arches bilaterally   Neurological: She is alert and oriented to person, place, and time  Skin: Skin is warm  Psychiatric: She has a normal mood and affect  Her behavior is normal  Judgment and thought content normal    Nursing note and vitals reviewed  Body mass index is 35 61 kg/m²

## 2019-09-20 NOTE — PROGRESS NOTES
Daily Note     Today's date: 2019  Patient name: Greg Mccarthy  : 1960  MRN: [id number]  Referring provider: Violette Lopez DO  Dx:   Encounter Diagnosis     ICD-10-CM    1  Arthritis of midtarsal joint of left foot M19 072                   Subjective: Patient reports that she is continuing to see improvements and did not have any soreness after last session  Objective: See treatment diary below      Assessment: Added STM to L EXT hallucis tendon today to address soft tissue restrictions  Improvement in midtarsal mobility and DF mobility after manual interventions  Patient also demonstrated an improvement in stability during balance exercises today  Patient would benefit from continued PT to improve soft tissue extensibility, joint mobility in the foot, and ankle stability for standing and walking at work  Goals  Patient will be independent with home exercise program    Patient will improve bilateral ankle DF ROM to 10 deg to increase quality of gait  Patient will increase strength in L ankle PF/INV to be comparable to the contralateral side  Patient will increase hip ABD strength to 4 to 4+/5 to control LE mechanics when descending stairs  Patient will be able to perform SLS for 15s bilaterally  Patient will be able to stand for job duties without limitation  Patient will make a full return to gym workouts with modifications as necessary  Patient will be able to manage symptoms independently  Plan: Continue per plan of care        Precautions: GERD, IBS, hx of endometrial cancer, bilateral plantar fascia surgeries 20 years ago, OA of knee      Manual  8/30 9/6 9/10 9/13 9/17 9/20       L midfoot mobs  KP Gr  III-IV KP Gr  III-IV KP Gr  III-IV KP Gr  III-IV KP Gr  III-IV       Talocrural mobs for DF  KP Gr  III-IV KP Gr  III-IV KP Gr  III-IV KP Gr  III-IV KP Gr  III-IV       STM to L hallucis longus      KP                                     Exercise Diary  8/30 9/6 9/10 9/13 9/17 9/20       Rec bike ARDON  10' 10' 10' 10' 10'       Strap gastroc stretch 4x30: 4x30: 4x30" 4x30" 4x30" 4x30"       Resisted ankle INV 15 pink 20 pink 30 pink 30 green 30 green 30 green        Resisted ankle DF  20 pink   30 green eccentric 30 green eccentric       Resisted ankle PF  20 pink 20 pink 30 green 30 green 30 green       Sidelying clams  20 ea 20 ea 20 ea HEP HEP       Short arch 20 30   20 20       BAPS             Prostretch  NV 4x30: gastroc 4x30: gastroc  4x30: gastroc 4x30: gastroc       Heel raises   20x5: 30x5: 30x5: held held with ball squeeze      Tandem stance  NV 5x30: b/l 5x30: b/l airex 5x30: b/l airex 5x30: b/l airex       SLS   NV 5x3: 5x10: airex 5x10:, 5x10: airex                                                                                                                                             Modalities  8/30 9/6 9/10 9/13 9/17 9/20       Laser to L foot

## 2019-09-20 NOTE — ASSESSMENT & PLAN NOTE
Her LDL went up from 121 to 125  She was prescribed Pravachol at her last visit but did not take it  She will now go on Pravachol 10 mg daily for her cholesterol

## 2019-09-24 ENCOUNTER — OFFICE VISIT (OUTPATIENT)
Dept: PHYSICAL THERAPY | Facility: MEDICAL CENTER | Age: 59
End: 2019-09-24
Payer: COMMERCIAL

## 2019-09-24 DIAGNOSIS — M19.072 ARTHRITIS OF MIDTARSAL JOINT OF LEFT FOOT: Primary | ICD-10-CM

## 2019-09-24 PROCEDURE — 97140 MANUAL THERAPY 1/> REGIONS: CPT | Performed by: PHYSICAL THERAPIST

## 2019-09-24 PROCEDURE — 97110 THERAPEUTIC EXERCISES: CPT | Performed by: PHYSICAL THERAPIST

## 2019-09-24 NOTE — PROGRESS NOTES
Daily Note     Today's date: 2019  Patient name: Noris Peña  : 1960  MRN: [id number]  Referring provider: Amadeo Phoenix, DO  Dx:   Encounter Diagnosis     ICD-10-CM    1  Arthritis of midtarsal joint of left foot M19 072                   Subjective: Patient reports that she felt pretty good after last session, but she developed increased pain on the bottom of both feet over the weekend  She reports a lot of pain when standing and walking for prolonged periods  Objective: See treatment diary below      Assessment: Patient demonstrated TTP and significant soft tissue restrictions in bilateral plantar fascia today  Added IASTM to address this dysfunction  Held progressive strengthening exercises today due to increased soreness  Patient reported alleviation of pain at end of session  Patient would benefit from continued PT to address mobility limitations and functional stability in the foot for a return to PLOF  Plan: Continue per plan of care        Precautions: GERD, IBS, hx of endometrial cancer, bilateral plantar fascia surgeries 20 years ago, OA of knee      Manual  8/30 9/6 9/10 9/13 9/17 9/20 9/24      L midfoot mobs  KP Gr  III-IV KP Gr  III-IV KP Gr  III-IV KP Gr  III-IV KP Gr  III-IV       Talocrural mobs for DF  KP Gr  III-IV KP Gr  III-IV KP Gr  III-IV KP Gr  III-IV KP Gr  III-IV       STM to L hallucis longus      KP KP      IASTM to bilateral PF/gastroc       KP                       Exercise Diary  8/30 9/6 9/10 9/13 9/17 9/20 9/24      Rec bike ARDON  10' 10' 10' 10' 10' 10'      Strap gastroc stretch 4x30: 4x30: 4x30" 4x30" 4x30" 4x30" HEP      Resisted ankle INV 15 pink 20 pink 30 pink 30 green 30 green 30 green  HEP      Resisted ankle DF  20 pink   30 green eccentric 30 green eccentric HEP      Resisted ankle PF  20 pink 20 pink 30 green 30 green 30 green HEP      Sidelying clams  20 ea 20 ea 20 ea HEP HEP HEP      Short arch 20 30   20 20 HEP      BAPS Prostretch  NV 4x30: gastroc 4x30: gastroc  4x30: gastroc 4x30: gastroc 4x30: gastroc      Heel raises   20x5: 30x5: 30x5: held held HEP      Tandem stance  NV 5x30: b/l 5x30: b/l airex 5x30: b/l airex 5x30: b/l airex       SLS   NV 5x3: 5x10: airex 5x10:, 5x10: airex                                                                                                                                             Modalities  8/30 9/6 9/10 9/13 9/17 9/20 9/24      Laser to L foot

## 2019-09-27 ENCOUNTER — OFFICE VISIT (OUTPATIENT)
Dept: PHYSICAL THERAPY | Facility: MEDICAL CENTER | Age: 59
End: 2019-09-27
Payer: COMMERCIAL

## 2019-09-27 DIAGNOSIS — M19.072 ARTHRITIS OF MIDTARSAL JOINT OF LEFT FOOT: Primary | ICD-10-CM

## 2019-09-27 PROCEDURE — 97110 THERAPEUTIC EXERCISES: CPT | Performed by: PHYSICAL THERAPIST

## 2019-09-27 PROCEDURE — 97140 MANUAL THERAPY 1/> REGIONS: CPT | Performed by: PHYSICAL THERAPIST

## 2019-09-27 NOTE — PROGRESS NOTES
Daily Note     Today's date: 2019  Patient name: Katie Vale  : 1960  MRN: [id number]  Referring provider: Alisia Robledo DO  Dx:   Encounter Diagnosis     ICD-10-CM    1  Arthritis of midtarsal joint of left foot M19 072                   Subjective: Patient reports that she felt achy after last session, but this subsided after a few hours  She does note that she feels the IASTM last session helped alleviate the pain somewhat  She also notes that the other foot is starting to give her discomfort in the same area  Objective: See treatment diary below      Assessment: Soft tissue restrictions noted in bilateral distal posterior tib tendons along with plantar fascia  Addressed restrictions with IASTM due to positive response after last session  Also addressed restrictions in bilateral gastrocs with IASTM to improve DF mobility and alleviate compensatory stress on structures of the feet  Patient may be limited in progress due to inability to rest from prolonged standing during job duties  Will potentially refer for a new orthotic consult if no changes in the next few sessions  Patient would benefit from continued PT to improve ankle/foot mobility to return to PLOF  Plan: Continue per plan of care        Precautions: GERD, IBS, hx of endometrial cancer, bilateral plantar fascia surgeries 20 years ago, OA of knee      Manual  8/30 9/6 9/10 9/13 9/17 9/20 9/24 9/27     L midfoot mobs  KP Gr  III-IV KP Gr  III-IV KP Gr  III-IV KP Gr  III-IV KP Gr  III-IV       Talocrural mobs for DF  KP Gr  III-IV KP Gr  III-IV KP Gr  III-IV KP Gr  III-IV KP Gr  III-IV       STM to L hallucis longus      KP KP      IASTM to bilateral PF/gastroc       KP KP b/l PF/distal post tib/gastroc                      Exercise Diary  8/30 9/6 9/10 9/13 9/17 9/20 9/24 9/27     Rec bike ARDON  10' 10' 10' 10' 10' 10' 10'     Strap gastroc stretch 4x30: 4x30: 4x30" 4x30" 4x30" 4x30" HEP HEP     Resisted ankle INV 15 pink 20 pink 30 pink 30 green 30 green 30 green  HEP HEP     Resisted ankle DF  20 pink   30 green eccentric 30 green eccentric HEP HEP     Resisted ankle PF  20 pink 20 pink 30 green 30 green 30 green HEP HEP     Sidelying clams  20 ea 20 ea 20 ea HEP HEP HEP HEP     Short arch 20 30   20 20 HEP HEP     BAPS             Prostretch  NV 4x30: gastroc 4x30: gastroc  4x30: gastroc 4x30: gastroc 4x30: gastroc 4x30: gastroc      Heel raises   20x5: 30x5: 30x5: held held HEP HEP     Tandem stance  NV 5x30: b/l 5x30: b/l airex 5x30: b/l airex 5x30: b/l airex       SLS   NV 5x3: 5x10: airex 5x10:, 5x10: airex                                                                                                                                             Modalities  8/30 9/6 9/10 9/13 9/17 9/20 9/24 9/27     Laser to L foot

## 2019-10-01 ENCOUNTER — OFFICE VISIT (OUTPATIENT)
Dept: PHYSICAL THERAPY | Facility: MEDICAL CENTER | Age: 59
End: 2019-10-01
Payer: COMMERCIAL

## 2019-10-01 DIAGNOSIS — M19.072 ARTHRITIS OF MIDTARSAL JOINT OF LEFT FOOT: Primary | ICD-10-CM

## 2019-10-01 PROCEDURE — 97140 MANUAL THERAPY 1/> REGIONS: CPT | Performed by: PHYSICAL THERAPIST

## 2019-10-01 PROCEDURE — 97110 THERAPEUTIC EXERCISES: CPT | Performed by: PHYSICAL THERAPIST

## 2019-10-01 NOTE — PROGRESS NOTES
Daily Note     Today's date: 10/1/2019  Patient name: Sudarshan Sousa  : 1960  MRN: [id number]  Referring provider: Qi Montes DO  Dx:   Encounter Diagnosis     ICD-10-CM    1  Arthritis of midtarsal joint of left foot M19 072                   Subjective: Patient reports that she had some tenderness in her foot after last session and continues to have pain after standing for prolonged periods at work  Objective: See treatment diary below      Assessment: Patient initially presented with slight limitation in L ankle DF ROM  Improvement noted in ankle DF mobility after manual intervention  Negative SLR today for tibial/sural/common peroneal nerve contribution to symptoms  Held IASTM due to soreness after last session  Performed manual STM to address soft tissue restrictions with less force  Patient was educated to remove heel raises from HEP to minimize soreness and excessive strain on EHL and posterior tib tendons  She agreed and verbalized understanding  Patient would benefit from continued PT to address impairments to maximize functional mobility  Plan: Continue per plan of care        Precautions: GERD, IBS, hx of endometrial cancer, bilateral plantar fascia surgeries 20 years ago, OA of knee      Manual  8/30 9/6 9/10 9/13 9/17 9/20 9/24 9/27 10/1    L midfoot mobs  KP Gr  III-IV KP Gr  III-IV KP Gr  III-IV KP Gr  III-IV KP Gr  III-IV       Talocrural mobs for DF  KP Gr  III-IV KP Gr  III-IV KP Gr  III-IV KP Gr  III-IV KP Gr  III-IV   KP Gr  III-IV    STM to L hallucis longus      KP KP  KP    IASTM to bilateral PF/gastroc       KP KP b/l PF/distal post tib/gastroc                      Exercise Diary  8/30 9/6 9/10 9/13 9/17 9/20 9/24 9/27 10/1    Rec bike ARDON  10' 10' 10' 10' 10' 10' 10' 10'    Strap gastroc stretch 4x30: 4x30: 4x30" 4x30" 4x30" 4x30" HEP HEP 4x30"    Resisted ankle INV 15 pink 20 pink 30 pink 30 green 30 green 30 green  HEP HEP HEP    Resisted ankle DF  20 pink   30 green eccentric 30 green eccentric HEP HEP HEP    Resisted ankle PF  20 pink 20 pink 30 green 30 green 30 green HEP HEP HEP    Sidelying clams  20 ea 20 ea 20 ea HEP HEP HEP HEP HEP    Short arch 20 30   20 20 HEP HEP HEP    BAPS             Prostretch  NV 4x30: gastroc 4x30: gastroc  4x30: gastroc 4x30: gastroc 4x30: gastroc 4x30: gastroc  4x30: gastroc    Heel raises   20x5: 30x5: 30x5: held held HEP HEP HEP    Tandem stance  NV 5x30: b/l 5x30: b/l airex 5x30: b/l airex 5x30: b/l airex       SLS   NV 5x3: 5x10: airex 5x10:, 5x10: airex                                                                                                                                             Modalities  8/30 9/6 9/10 9/13 9/17 9/20 9/24 9/27 10/1    Laser to L foot

## 2019-10-04 ENCOUNTER — OFFICE VISIT (OUTPATIENT)
Dept: PHYSICAL THERAPY | Facility: MEDICAL CENTER | Age: 59
End: 2019-10-04
Payer: COMMERCIAL

## 2019-10-04 DIAGNOSIS — M19.072 ARTHRITIS OF MIDTARSAL JOINT OF LEFT FOOT: Primary | ICD-10-CM

## 2019-10-04 PROCEDURE — 97140 MANUAL THERAPY 1/> REGIONS: CPT | Performed by: PHYSICAL THERAPIST

## 2019-10-04 PROCEDURE — 97110 THERAPEUTIC EXERCISES: CPT | Performed by: PHYSICAL THERAPIST

## 2019-10-04 NOTE — PROGRESS NOTES
Daily Note     Today's date: 10/4/2019  Patient name: Richie Orellana  : 1960  MRN: [id number]  Referring provider: Hugh Scott DO  Dx:   Encounter Diagnosis     ICD-10-CM    1  Arthritis of midtarsal joint of left foot M19 072               Addendum from 10/21/2019: Patient has been discharged from PT per physician  She returned to physician for further diagnostics/treatment  Subjective: Patient reports that she felt better after the manual therapy performed last session  She worked the night after last session with an improvement in her pain level compared to previous nights of working  Objective: See treatment diary below      Assessment: Continued with STM to L medial plantar foot due to positive response after last session  Improvement in L ankle DF ROM after talocrural mobs  Patient was educated to try to take seated rest breaks as needed when standing for prolonged periods at work  She agreed and verbalized understanding  Patient would benefit from continued PT to improve foot/ankle mobility and stability to return to active lifestyle  Plan: Continue per plan of care        Precautions: GERD, IBS, hx of endometrial cancer, bilateral plantar fascia surgeries 20 years ago, OA of knee      Manual  10/4 8/30 9/6 9/10 9/13 9/17 9/20 9/24 9/27 10/1    L midfoot mobs   KP Gr  III-IV KP Gr  III-IV KP Gr  III-IV KP Gr  III-IV KP Gr  III-IV       Talocrural mobs for DF KP Gr  III-IV  KP Gr  III-IV KP Gr  III-IV KP Gr  III-IV KP Gr  III-IV KP Gr  III-IV   KP Gr  III-IV    STM to L EXT hallucis longus       KP KP  KP    STM to L medial plantar foot KP             IASTM to bilateral PF/gastroc        KP KP b/l PF/distal post tib/gastroc                       Exercise Diary  10/4 8/30 9/6 9/10 9/13 9/17 9/20 9/24 9/27 10/1    Rec bike ARDON 10'  10' 10' 10' 10' 10' 10' 10' 10'    Strap gastroc stretch HEP 4x30: 4x30: 4x30" 4x30" 4x30" 4x30" HEP HEP 4x30"    Resisted ankle INV  15 pink 20 pink 30 pink 30 green 30 green 30 green  HEP HEP HEP    Resisted ankle DF   20 pink   30 green eccentric 30 green eccentric HEP HEP HEP    Resisted ankle PF   20 pink 20 pink 30 green 30 green 30 green HEP HEP HEP    Sidelying clams HEP  20 ea 20 ea 20 ea HEP HEP HEP HEP HEP    Short arch  20 30   20 20 HEP HEP HEP    BAPS              Prostretch 4x30: gastroc  NV 4x30: gastroc 4x30: gastroc  4x30: gastroc 4x30: gastroc 4x30: gastroc 4x30: gastroc  4x30: gastroc    Heel raises  HEP  20x5: 30x5: 30x5: held held HEP HEP HEP    Tandem stance HEP  NV 5x30: b/l 5x30: b/l airex 5x30: b/l airex 5x30: b/l airex       SLS HEP   NV 5x3: 5x10: airex 5x10:, 5x10: airex                                                                                                                                                       Modalities  10/4 8/30 9/6 9/10 9/13 9/17 9/20 9/24 9/27 10/1    Laser to L foot

## 2019-10-07 ENCOUNTER — OFFICE VISIT (OUTPATIENT)
Dept: OBGYN CLINIC | Facility: MEDICAL CENTER | Age: 59
End: 2019-10-07
Payer: COMMERCIAL

## 2019-10-07 ENCOUNTER — APPOINTMENT (OUTPATIENT)
Dept: RADIOLOGY | Facility: MEDICAL CENTER | Age: 59
End: 2019-10-07
Payer: COMMERCIAL

## 2019-10-07 VITALS
WEIGHT: 190 LBS | BODY MASS INDEX: 34.96 KG/M2 | HEIGHT: 62 IN | HEART RATE: 60 BPM | DIASTOLIC BLOOD PRESSURE: 74 MMHG | SYSTOLIC BLOOD PRESSURE: 138 MMHG

## 2019-10-07 DIAGNOSIS — M79.672 PAIN IN LEFT FOOT: Primary | ICD-10-CM

## 2019-10-07 DIAGNOSIS — M79.672 PAIN IN LEFT FOOT: ICD-10-CM

## 2019-10-07 PROCEDURE — 73630 X-RAY EXAM OF FOOT: CPT

## 2019-10-07 PROCEDURE — 99213 OFFICE O/P EST LOW 20 MIN: CPT | Performed by: ORTHOPAEDIC SURGERY

## 2019-10-07 NOTE — PROGRESS NOTES
Ortho Sports Medicine Follow-Up Ankle Visit    Assesment:  61year old Female with Left midfoot arthritis that has continued to be symptomatic after physical therapy and orthotics    Plan:    1  Xray of the Left foot was obtained  2  Instructed to continue with orthotics, limit any painful activity, ice and analgesics as needed  3  Follow-up with Dr Lachman to consider any potential surgical interventions    Follow up: No follow-ups on file  Chief Complaint   Patient presents with    Left Ankle - Follow-up       History of Present Illness: The patient is returns for follow up of Left foot pain  Since the prior visit, She reports no improvement  She stated that she has been going to physical therapy without relief  She has continued to go to the gym and work on strengthening without relief  She stated that she will ride the bike at the gym and placing her foot on the pedal aggravates the foot  She has been wearing her orthotics daily but doesn;t really help  She will experiencing pain in the midfoot after standing for about 10-30 minutes and constantly has to take breaks and sit down to rest the foot  She stated that she has started to experiencing a burning sensation along  The bottom from both feet  She has recently at her PCP and had blood work that was negative for diabetes  The pain is characterized as dull, achy, burning  The pain is present daily  At Today's visit, the pain is located medial midfoot  Pain is improved by rest   Pain is aggravated by weight bearing, walking and standing  The patient has tried rest, ice, NSAIDS, physical therapy, injection and orthotics  I have reviewed the past medical, surgical, social and family history, medications and allergies as documented in the EMR        Ankle Surgical History:  None      Past Medical, Social and Family History:  Past Medical History:   Diagnosis Date    Claustrophobia     mild    Endometrial polyp     PMB (postmenopausal bleeding)      Past Surgical History:   Procedure Laterality Date    COLONOSCOPY      PLANTAR FASCIA SURGERY Bilateral     [place] HYSTEROSCOPY,W/ENDO BX N/A 2/28/2017    Procedure: DILATATION AND CURETTAGE (D&C) WITH HYSTEROSCOPY,POLYPECTOMY;  Surgeon: Lewis Coffey MD;  Location: [facility];  Service: Gynecology    SHOULDER SURGERY Left     TONSILLECTOMY      WISDOM TOOTH EXTRACTION       Allergies   Allergen Reactions    Iv Contrast  [Iodinated Diagnostic Agents]      Current Outpatient Medications on File Prior to Visit   Medication Sig Dispense Refill    BIOTIN PO Take 1 tablet by mouth daily      Black Cohosh (REMIFEMIN MENOPAUSE PO) Take 1 tablet by mouth 2 (two) times a day      Glucosamine-Chondroit-Vit C-Mn (GLUCOSAMINE CHONDR 1500 COMPLX) CAPS Take 2 capsules by mouth daily      ibuprofen (MOTRIN) 200 mg tablet Take 200-800 mg by mouth every 6 (six) hours as needed for mild pain      Misc Natural Products (OSTEO BI-FLEX ADV DOUBLE ST PO) Take 1 capsule by mouth 2 (two) times a day      Omega 3-6-9 Fatty Acids (OMEGA 3-6-9 PO) Take 1 capsule by mouth daily      PANTETHINE PO Take 1 tablet by mouth 2 (two) times a day      patient supplied medication Healthy feet and nerve  OTC supplement      pravastatin (PRAVACHOL) 20 mg tablet Take 1 tablet (20 mg total) by mouth daily at bedtime 90 tablet 3    Pyridoxal-5 Phosphate POWD       Turmeric Curcumin 500 MG CAPS       Zinc Picolinate 25 MG TABS        No current facility-administered medications on file prior to visit  Social History     Socioeconomic History    Marital status:       Spouse name: Not on file    Number of children: Not on file    Years of education: Not on file    Highest education level: Not on file   Occupational History    Not on file   Social Needs    Financial resource strain: Not on file    Food insecurity:     Worry: Not on file     Inability: Not on file    Transportation needs: Medical: Not on file     Non-medical: Not on file   Tobacco Use    Smoking status: Never Smoker    Smokeless tobacco: Never Used    Tobacco comment: No secondhand smoke exposure   Substance and Sexual Activity    Alcohol use: No    Drug use: No    Sexual activity: Not on file   Lifestyle    Physical activity:     Days per week: Not on file     Minutes per session: Not on file    Stress: Not on file   Relationships    Social connections:     Talks on phone: Not on file     Gets together: Not on file     Attends Taoist service: Not on file     Active member of club or organization: Not on file     Attends meetings of clubs or organizations: Not on file     Relationship status: Not on file    Intimate partner violence:     Fear of current or ex partner: Not on file     Emotionally abused: Not on file     Physically abused: Not on file     Forced sexual activity: Not on file   Other Topics Concern    Not on file   Social History Narrative    Not on file         I have reviewed the past medical, surgical, social and family history, medications and allergies as documented in the EMR  Review of systems: ROS is negative other than that noted in the HPI  Constitutional: Negative for fatigue and fever  HENT: Negative for sore throat  Respiratory: Negative for shortness of breath  Cardiovascular: Negative for chest pain  Gastrointestinal: Negative for abdominal pain  Endocrine: Negative for cold intolerance and heat intolerance  Genitourinary: Negative for flank pain  Musculoskeletal: Negative for back pain  Skin: Negative for rash  Allergic/Immunologic: Negative for immunocompromised state  Neurological: Negative for dizziness  Psychiatric/Behavioral: Negative for agitation  Physical Exam:    Blood pressure 138/74, pulse 60, height 5' 2" (1 575 m), weight 86 2 kg (190 lb), not currently breastfeeding      General/Constitutional: NAD, well developed, well nourished  HENT: Normocephalic, atraumatic  CV: Intact distal pulses, regular rate  Resp: No respiratory distress or labored breathing  Lymphatic: No lymphadenopathy palpated  Neuro: Alert and Oriented x 3, no focal deficits  Psych: Normal mood, normal affect, normal judgement, normal behavior  Skin: Warm, dry, no rashes, no erythema       Ankle Examination (focused): RIGHT LEFT   ROM:  Full Full   Palpation:  Non-ttp Medial midfoot    Anterior Drawer negative negative   Calcaneal inversion negative negative   Squeeze Test negative negative       No subluxation of the peroneal tendons or tenderness to palpation along the peroneal tendons     No pain with palpation or range of motion of midfoot and forefoot bilaterally    No limp upon gait exam    No calf tenderness to palpation bilaterally    LE NV Exam: +2 DP/PT pulses bilaterally  Sensation intact to light touch L2-S1 bilaterally        Ankle Imaging:    X-rays of the left foot/ankle were reviewed, which demonstrate arthritis throughout the midfoot, most significant between the navicular and medial cuneiform  I have reviewed the radiology report and do not currently have a radiology reading from [facility], but will check the result once the reading is performed

## 2019-10-08 ENCOUNTER — APPOINTMENT (OUTPATIENT)
Dept: PHYSICAL THERAPY | Facility: MEDICAL CENTER | Age: 59
End: 2019-10-08
Payer: COMMERCIAL

## 2019-10-11 ENCOUNTER — APPOINTMENT (OUTPATIENT)
Dept: PHYSICAL THERAPY | Facility: MEDICAL CENTER | Age: 59
End: 2019-10-11
Payer: COMMERCIAL

## 2019-10-15 ENCOUNTER — APPOINTMENT (OUTPATIENT)
Dept: PHYSICAL THERAPY | Facility: MEDICAL CENTER | Age: 59
End: 2019-10-15
Payer: COMMERCIAL

## 2019-10-18 ENCOUNTER — APPOINTMENT (OUTPATIENT)
Dept: PHYSICAL THERAPY | Facility: MEDICAL CENTER | Age: 59
End: 2019-10-18
Payer: COMMERCIAL

## 2019-10-22 ENCOUNTER — APPOINTMENT (OUTPATIENT)
Dept: PHYSICAL THERAPY | Facility: MEDICAL CENTER | Age: 59
End: 2019-10-22
Payer: COMMERCIAL

## 2019-10-25 ENCOUNTER — APPOINTMENT (OUTPATIENT)
Dept: PHYSICAL THERAPY | Facility: MEDICAL CENTER | Age: 59
End: 2019-10-25
Payer: COMMERCIAL

## 2019-10-29 ENCOUNTER — APPOINTMENT (OUTPATIENT)
Dept: PHYSICAL THERAPY | Facility: MEDICAL CENTER | Age: 59
End: 2019-10-29
Payer: COMMERCIAL

## 2019-10-30 ENCOUNTER — OFFICE VISIT (OUTPATIENT)
Dept: FAMILY MEDICINE CLINIC | Facility: CLINIC | Age: 59
End: 2019-10-30
Payer: COMMERCIAL

## 2019-10-30 VITALS
BODY MASS INDEX: 36.44 KG/M2 | HEIGHT: 62 IN | SYSTOLIC BLOOD PRESSURE: 146 MMHG | HEART RATE: 52 BPM | TEMPERATURE: 97.7 F | WEIGHT: 198 LBS | RESPIRATION RATE: 18 BRPM | DIASTOLIC BLOOD PRESSURE: 82 MMHG

## 2019-10-30 DIAGNOSIS — M54.10 RADICULAR LEG PAIN: ICD-10-CM

## 2019-10-30 DIAGNOSIS — M54.31 SCIATICA OF RIGHT SIDE: Primary | ICD-10-CM

## 2019-10-30 PROCEDURE — 3008F BODY MASS INDEX DOCD: CPT | Performed by: PHYSICIAN ASSISTANT

## 2019-10-30 PROCEDURE — 99214 OFFICE O/P EST MOD 30 MIN: CPT | Performed by: PHYSICIAN ASSISTANT

## 2019-10-30 RX ORDER — METHOCARBAMOL 750 MG/1
750 TABLET, FILM COATED ORAL EVERY 6 HOURS PRN
Qty: 30 TABLET | Refills: 1 | Status: SHIPPED | OUTPATIENT
Start: 2019-10-30 | End: 2020-11-18

## 2019-10-30 NOTE — ASSESSMENT & PLAN NOTE
Could be caused by piriformis syndrome  Given piriformis stretches  Aleve 2 tabs twice daily with food  Robaxin as directed at bedtime and then up to 3 times a day as needed  If no improvement will consider xray lumbar spine with possible MRI to r/o disc disease causing symptoms

## 2019-10-30 NOTE — LETTER
October 30, 2019     Patient: Tariq Diaz   YOB: 1960   Date of Visit: 10/30/2019       To Whom it May Concern:    Naldo Encinas is under my professional care  She was seen in my office on 10/30/2019  She may return to work on 11/4/19  R sided sciatica with weakness       If you have any questions or concerns, please don't hesitate to call           Sincerely,          Liliana Clemens PA-C        CC: No Recipients

## 2019-10-30 NOTE — PROGRESS NOTES
Assessment and Plan:    Problem List Items Addressed This Visit        Nervous and Auditory    Sciatica of right side - Primary     Could be caused by piriformis syndrome  Given piriformis stretches  Aleve 2 tabs twice daily with food  Robaxin as directed at bedtime and then up to 3 times a day as needed  If no improvement will consider xray lumbar spine with possible MRI to r/o disc disease causing symptoms  Relevant Medications    methocarbamol (ROBAXIN) 750 mg tablet       Other    Radicular leg pain                 Diagnoses and all orders for this visit:    Sciatica of right side  -     methocarbamol (ROBAXIN) 750 mg tablet; Take 1 tablet (750 mg total) by mouth every 6 (six) hours as needed for muscle spasms for up to 14 days    Radicular leg pain              Subjective:      Patient ID: Haleigh Hernandez is a 61 y o  female  CC:    Chief Complaint   Patient presents with    Back Pain     Patient present today for right lower back pain radiating down her leg and foot for the past 2 weeks and pt also mentioned an on and off numbness on her leg  HPI:    Two weeks ago woke up with pain in R low back which has been getting worse  Going down R leg  No hx of injury  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  Monday worked 13 hours on feet  She has been seeing chiro and not getting better  Always has knee issues and has a fracture in her L foot and should be wearing a boot but does not as this makes it worse  Advil  Has been using TENS unit  Has been going to the gym  The following portions of the patient's history were reviewed and updated as appropriate: allergies, current medications, past family history, past medical history, past social history, past surgical history and problem list       Review of Systems   Constitutional: Negative  HENT: Negative  Eyes: Negative  Respiratory: Negative  Cardiovascular: Negative  Gastrointestinal: Negative  Endocrine: Negative  Genitourinary: Negative  Musculoskeletal: Positive for back pain  Skin: Negative  Allergic/Immunologic: Negative  Neurological: Positive for numbness  Hematological: Negative  Psychiatric/Behavioral: Negative  Data to review:       Objective:    Vitals:    10/30/19 1134   BP: 146/82   BP Location: Left arm   Patient Position: Sitting   Cuff Size: Large   Pulse: (!) 52   Resp: 18   Temp: 97 7 °F (36 5 °C)   TempSrc: Temporal   Weight: 89 8 kg (198 lb)   Height: 5' 2" (1 575 m)        Physical Exam   Constitutional: She is oriented to person, place, and time  She appears well-developed and well-nourished  No distress  HENT:   Head: Normocephalic and atraumatic  Eyes: Conjunctivae are normal  Right eye exhibits no discharge  Left eye exhibits no discharge  Neck: Neck supple  Carotid bruit is not present  Cardiovascular: Normal rate, regular rhythm and normal heart sounds  Exam reveals no gallop and no friction rub  No murmur heard  Pulmonary/Chest: Effort normal and breath sounds normal  No respiratory distress  She has no wheezes  She has no rales  Musculoskeletal:   No pain to sciatic notch or low back palpation but pt does have weakness to her RLE and only able to straight leg raise 50% on R side  Pt with mild limp on ambulation down the hallway  Neurological: She is alert and oriented to person, place, and time  Skin: Skin is warm and dry  She is not diaphoretic  Psychiatric: She has a normal mood and affect  Judgment normal    Nursing note and vitals reviewed

## 2019-10-30 NOTE — PATIENT INSTRUCTIONS
Problem List Items Addressed This Visit        Nervous and Auditory    Sciatica of right side - Primary     Could be caused by piriformis syndrome  Given piriformis stretches  Aleve 2 tabs twice daily with food  Robaxin as directed at bedtime and then up to 3 times a day as needed  If no improvement will consider xray lumbar spine with possible MRI to r/o disc disease causing symptoms  Relevant Medications    methocarbamol (ROBAXIN) 750 mg tablet        Piriformis Syndrome   WHAT YOU NEED TO KNOW:   Piriformis syndrome is sciatic nerve pain caused by an injured or overused piriformis muscle  This is a muscle inside your buttocks that helps you move your leg  The pain is caused when this muscle pinches your sciatic nerve  You may feel the pain in your hip or down your leg  DISCHARGE INSTRUCTIONS:   Medicines: You may need any of the following:  · Prescription medicines  may be used to relax your muscles and decrease pain and swelling  · NSAIDs  help decrease swelling and pain  This medicine is available with or without a doctor's order  NSAIDs can cause stomach bleeding or kidney problems in certain people  If you take blood thinner medicine, always ask your healthcare provider if NSAIDs are safe for you  Always read the medicine label and follow directions  · Acetaminophen  decreases pain  It is available without a doctor's order  Ask how much to take and how often to take it  Follow directions  Acetaminophen can cause liver damage if not taken correctly  · Take your medicine as directed  Contact your healthcare provider if you think your medicine is not helping or if you have side effects  Tell him or her if you are allergic to any medicine  Keep a list of the medicines, vitamins, and herbs you take  Include the amounts, and when and why you take them  Bring the list or the pill bottles to follow-up visits  Carry your medicine list with you in case of an emergency    Follow up with your healthcare provider as directed: You may need to return for more tests  You may also be referred to a physical therapist  Write down your questions so you remember to ask them during your visits  Manage your symptoms of piriformis syndrome:   · Rest as directed  Avoid activities that make your pain worse  · Apply ice to the buttock on your injured side  Use an ice pack, or put crushed ice in a plastic bag  Cover it with a towel  Leave the ice on for 15 to 20 minutes every hour, or as directed  Ice helps prevent tissue damage and decreases swelling and pain  · Apply heat to the buttock on your injured side  Use heating pads for 20 to 30 minutes every 2 hours for as many days as directed  Heat helps decrease pain and muscle spasms  · Stretch as directed  Lie on your back with your knees bent  Place the ankle of your injured leg on the knee of your other leg  Gently pull your bent leg toward your chest, until you feel a stretch in the buttock of your injured leg  A physical therapist may show you other exercises to stretch and strengthen your hip muscles  Contact your healthcare provider if:   · Your pain worsens or returns, even with treatment  · You have questions or concerns about your condition or care  Return to the emergency department if:   · You cannot move your leg or foot  © 2017 [facility] Information is for End User's use only and may not be sold, redistributed or otherwise used for commercial purposes  All illustrations and images included in CareNotes® are the copyrighted property of [email or web address] , [employer]  or [employer]  The above information is an  only  It is not intended as medical advice for individual conditions or treatments  Talk to your doctor, nurse or pharmacist before following any medical regimen to see if it is safe and effective for you

## 2019-12-03 ENCOUNTER — OFFICE VISIT (OUTPATIENT)
Dept: OBGYN CLINIC | Facility: MEDICAL CENTER | Age: 59
End: 2019-12-03
Payer: COMMERCIAL

## 2019-12-03 VITALS
HEART RATE: 57 BPM | HEIGHT: 63 IN | BODY MASS INDEX: 35.44 KG/M2 | DIASTOLIC BLOOD PRESSURE: 84 MMHG | WEIGHT: 200 LBS | SYSTOLIC BLOOD PRESSURE: 143 MMHG

## 2019-12-03 DIAGNOSIS — M17.0 PRIMARY OSTEOARTHRITIS OF BOTH KNEES: Primary | ICD-10-CM

## 2019-12-03 PROCEDURE — 20610 DRAIN/INJ JOINT/BURSA W/O US: CPT | Performed by: PHYSICIAN ASSISTANT

## 2019-12-03 PROCEDURE — 99213 OFFICE O/P EST LOW 20 MIN: CPT | Performed by: PHYSICIAN ASSISTANT

## 2019-12-03 RX ORDER — METHYLPREDNISOLONE ACETATE 40 MG/ML
1 INJECTION, SUSPENSION INTRA-ARTICULAR; INTRALESIONAL; INTRAMUSCULAR; SOFT TISSUE
Status: COMPLETED | OUTPATIENT
Start: 2019-12-03 | End: 2019-12-03

## 2019-12-03 RX ORDER — BUPIVACAINE HYDROCHLORIDE 5 MG/ML
6 INJECTION, SOLUTION EPIDURAL; INTRACAUDAL
Status: COMPLETED | OUTPATIENT
Start: 2019-12-03 | End: 2019-12-03

## 2019-12-03 RX ADMIN — METHYLPREDNISOLONE ACETATE 1 ML: 40 INJECTION, SUSPENSION INTRA-ARTICULAR; INTRALESIONAL; INTRAMUSCULAR; SOFT TISSUE at 11:02

## 2019-12-03 RX ADMIN — METHYLPREDNISOLONE ACETATE 1 ML: 40 INJECTION, SUSPENSION INTRA-ARTICULAR; INTRALESIONAL; INTRAMUSCULAR; SOFT TISSUE at 11:01

## 2019-12-03 RX ADMIN — BUPIVACAINE HYDROCHLORIDE 6 ML: 5 INJECTION, SOLUTION EPIDURAL; INTRACAUDAL at 11:01

## 2019-12-03 RX ADMIN — BUPIVACAINE HYDROCHLORIDE 6 ML: 5 INJECTION, SOLUTION EPIDURAL; INTRACAUDAL at 11:02

## 2019-12-03 NOTE — PROGRESS NOTES
Orthopaedic Surgery - Office Note  Demi Frey (76 y o  female)   : 1960   MRN: [id number]  Encounter Date: 12/3/2019    Chief Complaint   Patient presents with    Left Knee - Follow-up    Right Knee - Follow-up       Assessment / Plan  Bilateral knee OA    · After discussion of risk and benefits the patient agreed to proceed with bilateral steroid injections in her knees today  These were prepared injected sterilely and tolerated well  · Continue NSAIDs, OTC analgesics and ice prn pain  · Continue with Wear braces as needed for comfort  · Continue exercise as tolerated  · We did have a discussion about viscosupplementation at this time and if she fails to have good relief with the steroid shots we will proceed to getting authorization for the viscosupplementation in the future  Return in about 6 weeks (around 2020)  History of Present Illness  Demi Frey is a 61 y o  female follow-up for bilateral knee osteoarthritis  Patient was seen on September 3, 2019 at which time she received bilateral steroid injections which she feels has helped up to about 2 weeks ago  She states over the past 2 years, she has noted increase in her pain despite a 40 lb weight loss  One knee does not hurt more than the other  She states she goes to the gym twice per week and performs a variety of exercises including weightlifting, elliptical, biking, and treadmill use  She takes Advil daily for pain control  She states she has tried acupuncture in the past without significant improvement  She does states that she had a steroid injection into the right knee in 2017 which did offer her significant improvement at that time  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  She states weight-bearing activities and going down stairs cause her increased pain  She also reports that going down stairs causes clicking and feelings of instability    She wears braces that she purchased from the drugstore for additional support  She denies any numbness or tingling in bilateral lower extremities  She has not had viscosupplementation  Review of Systems  Pertinent items are noted in HPI  All other systems were reviewed and are negative  Physical Exam  /84   Pulse 57   Ht 5' 3" (1 6 m)   Wt 90 7 kg (200 lb)   BMI 35 43 kg/m²   Cons: Appears well  No apparent distress  Psych: Alert  Oriented x3  Mood and affect normal   Eyes: PERRLA, EOMI  Resp: Normal effort  No audible wheezing or stridor  CV: Palpable pulse  No discernable arrhythmia  No LE edema  Lymph:  No palpable cervical, axillary, or inguinal lymphadenopathy  Skin: Warm  No palpable masses  No visible lesions  Neuro: Normal muscle tone  Normal and symmetric DTR's  Right Knee Exam  Alignment:  Valgus defomity  Inspection:  No swelling  No muscle atrophy  Palpation:  medial and lateral joint line  tenderness  ROM:  0-120 degrees  Strength:  5/5 quadriceps and hamstrings  Stability:  No objective knee instability  Stable Varus / Valgus stress, Lachman, and Posterior drawer  Tests:  No pertinent positive or negative tests  Patella:  Patella tracks centrally without crepitus  Neurovascular:  Sensation intact in DP/SP/Gill/Sa/T nerve distributions  2+ DP & PT pulses  Gait:  Normal  Steady  Left Knee Exam  Alignment:  Normal knee alignment  Inspection:  No swelling  Palpation:  mild medial joint line tenderness  ROM:  Normal knee ROM  Strength:  5/5 quadriceps and hamstrings  Stability:  No objective knee instability  Stable Varus / Valgus stress, Lachman, and Posterior drawer  Tests:  No pertinent positive or negative tests  Patella:  Patella tracks centrally without crepitus  Neurovascular:  Sensation intact in DP/SP/Gill/Sa/T nerve distributions  2+ DP & PT pulses    Gait:  Normal         Studies Reviewed  XR of bilateral knee - performed today in clinic demonstrate severe lateral OA of the right knee with valgus deformity and moderate medial compartment OA of the left knee  Large joint arthrocentesis: R knee  Date/Time: 12/3/2019 11:01 AM  Consent given by: patient  Site marked: site marked  Timeout: Immediately prior to procedure a time out was called to verify the correct patient, procedure, equipment, support staff and site/side marked as required   Supporting Documentation  Indications: pain and joint swelling   Procedure Details  Location: knee - R knee  Needle size: 25 G  Ultrasound guidance: no  Approach: anteromedial  Medications administered: 6 mL bupivacaine (PF) 0 5 %; 1 mL methylPREDNISolone acetate 40 mg/mL    Patient tolerance: patient tolerated the procedure well with no immediate complications  Dressing:  Sterile dressing applied    Large joint arthrocentesis: L knee  Date/Time: 12/3/2019 11:02 AM  Consent given by: patient  Supporting Documentation  Indications: pain   Procedure Details  Location: knee - L knee  Needle size: 25 G  Approach: anterolateral  Medications administered: 6 mL bupivacaine (PF) 0 5 %; 1 mL methylPREDNISolone acetate 40 mg/mL    Patient tolerance: patient tolerated the procedure well with no immediate complications  Dressing:  Sterile dressing applied             Medical, Surgical, Family, and Social History  The patient's medical history, family history, and social history, were reviewed and updated as appropriate  Past Medical History:   Diagnosis Date    Claustrophobia     mild    Endometrial polyp     PMB (postmenopausal bleeding)        Past Surgical History:   Procedure Laterality Date    COLONOSCOPY      PLANTAR FASCIA SURGERY Bilateral     [place] HYSTEROSCOPY,W/ENDO BX N/A 2/28/2017    Procedure: DILATATION AND CURETTAGE (D&C) WITH HYSTEROSCOPY,POLYPECTOMY;  Surgeon: Lexi Sanabria MD;  Location: [facility];  Service: Gynecology    SHOULDER SURGERY Left     TONSILLECTOMY      WISDOM TOOTH EXTRACTION         History reviewed   No pertinent family history      Social History     Occupational History    Not on file   Tobacco Use    Smoking status: Never Smoker    Smokeless tobacco: Never Used    Tobacco comment: No secondhand smoke exposure   Substance and Sexual Activity    Alcohol use: No    Drug use: No    Sexual activity: Not on file       Allergies   Allergen Reactions    Iv Contrast  [Iodinated Diagnostic Agents]          Current Outpatient Medications:     BIOTIN PO, Take 1 tablet by mouth daily, Disp: , Rfl:     Black Cohosh (REMIFEMIN MENOPAUSE PO), Take 1 tablet by mouth 2 (two) times a day, Disp: , Rfl:     Glucosamine-Chondroit-Vit C-Mn (GLUCOSAMINE CHONDR 1500 COMPLX) CAPS, Take 2 capsules by mouth daily, Disp: , Rfl:     ibuprofen (MOTRIN) 200 mg tablet, Take 200-800 mg by mouth every 6 (six) hours as needed for mild pain, Disp: , Rfl:     Misc Natural Products (OSTEO BI-FLEX ADV DOUBLE ST PO), Take 1 capsule by mouth 2 (two) times a day, Disp: , Rfl:     Omega 3-6-9 Fatty Acids (OMEGA 3-6-9 PO), Take 1 capsule by mouth daily, Disp: , Rfl:     PANTETHINE PO, Take 1 tablet by mouth 2 (two) times a day, Disp: , Rfl:     patient supplied medication, Healthy feet and nerve  OTC supplement, Disp: , Rfl:     pravastatin (PRAVACHOL) 20 mg tablet, Take 1 tablet (20 mg total) by mouth daily at bedtime, Disp: 90 tablet, Rfl: 3    Pyridoxal-5 Phosphate POWD, , Disp: , Rfl:     Turmeric Curcumin 500 MG CAPS, , Disp: , Rfl:     Zinc Picolinate 25 MG TABS, , Disp: , Rfl:     methocarbamol (ROBAXIN) 750 mg tablet, Take 1 tablet (750 mg total) by mouth every 6 (six) hours as needed for muscle spasms for up to 14 days, Disp: 30 tablet, Rfl: 1      Che Magana PA-C

## 2019-12-30 ENCOUNTER — TELEPHONE (OUTPATIENT)
Dept: OBGYN CLINIC | Facility: HOSPITAL | Age: 59
End: 2019-12-30

## 2019-12-30 NOTE — TELEPHONE ENCOUNTER
Caller: patient  Call back number: [phone]  Patient's doctor: Dr Shelley Fish    Patient called asking if visco injections can be ordered

## 2019-12-30 NOTE — TELEPHONE ENCOUNTER
Please order viscosupplementation for both knees, and notify the patient we are starting the process

## 2020-01-06 DIAGNOSIS — M25.561 PAIN IN BOTH KNEES, UNSPECIFIED CHRONICITY: Primary | ICD-10-CM

## 2020-01-06 DIAGNOSIS — M25.562 PAIN IN BOTH KNEES, UNSPECIFIED CHRONICITY: Primary | ICD-10-CM

## 2020-01-06 NOTE — TELEPHONE ENCOUNTER
Patient is aware that the Visco order was place, I let her know that someone will call her once order is approve

## 2020-02-04 ENCOUNTER — TELEPHONE (OUTPATIENT)
Dept: OBGYN CLINIC | Facility: MEDICAL CENTER | Age: 60
End: 2020-02-04

## 2020-02-04 NOTE — TELEPHONE ENCOUNTER
She stated that she is still waiting for approval on her get shot  No one has gotten back to her yet

## 2020-02-05 ENCOUNTER — TELEPHONE (OUTPATIENT)
Dept: OBGYN CLINIC | Facility: MEDICAL CENTER | Age: 60
End: 2020-02-05

## 2020-02-05 NOTE — TELEPHONE ENCOUNTER
Spoke to patient in regards the Visco injection  Made patient aware this process it take few weeks to be approved  Once order is approved a member team will call patient to schedule injection

## 2020-03-11 ENCOUNTER — TELEPHONE (OUTPATIENT)
Dept: OBGYN CLINIC | Facility: HOSPITAL | Age: 60
End: 2020-03-11

## 2020-03-11 NOTE — TELEPHONE ENCOUNTER
Called and lvm for patient letting her know that her insurance doesn't cover the injections  They consider it a plan exclusion and experimental  Did advise her that she can make an apt to see Dr Terrence Pratt   Provided her with our line [phone]

## 2020-07-06 ENCOUNTER — OFFICE VISIT (OUTPATIENT)
Dept: FAMILY MEDICINE CLINIC | Facility: CLINIC | Age: 60
End: 2020-07-06
Payer: COMMERCIAL

## 2020-07-06 VITALS
TEMPERATURE: 97.5 F | WEIGHT: 200 LBS | HEART RATE: 60 BPM | SYSTOLIC BLOOD PRESSURE: 148 MMHG | DIASTOLIC BLOOD PRESSURE: 90 MMHG | HEIGHT: 61 IN | BODY MASS INDEX: 37.76 KG/M2

## 2020-07-06 DIAGNOSIS — M17.0 PRIMARY OSTEOARTHRITIS OF BOTH KNEES: ICD-10-CM

## 2020-07-06 DIAGNOSIS — M79.671 FOOT PAIN, BILATERAL: Primary | ICD-10-CM

## 2020-07-06 DIAGNOSIS — M79.672 FOOT PAIN, BILATERAL: Primary | ICD-10-CM

## 2020-07-06 DIAGNOSIS — E78.2 MIXED HYPERLIPIDEMIA: ICD-10-CM

## 2020-07-06 PROCEDURE — 1036F TOBACCO NON-USER: CPT | Performed by: PHYSICIAN ASSISTANT

## 2020-07-06 PROCEDURE — 3008F BODY MASS INDEX DOCD: CPT | Performed by: PHYSICIAN ASSISTANT

## 2020-07-06 PROCEDURE — 99213 OFFICE O/P EST LOW 20 MIN: CPT | Performed by: PHYSICIAN ASSISTANT

## 2020-07-06 NOTE — ASSESSMENT & PLAN NOTE
Patient has seen multiple specialists and is now scheduled for an MRI this Friday of her low back for further evaluation  I am hoping patient get me a copy of this dictation and not the actual disc as I cannot technically read the radiology imaging

## 2020-07-06 NOTE — PROGRESS NOTES
Assessment and Plan:    Problem List Items Addressed This Visit        Other    Mixed hyperlipidemia     Check fasting lipid panel which is due in March  Patient is not taking Pravachol  Primary osteoarthritis of both knees    Foot pain, bilateral - Primary     Patient has seen multiple specialists and is now scheduled for an MRI this Friday of her low back for further evaluation  I am hoping patient get me a copy of this dictation and not the actual disc as I cannot technically read the radiology imaging  Diagnoses and all orders for this visit:    Foot pain, bilateral    Primary osteoarthritis of both knees    Mixed hyperlipidemia              Subjective:      Patient ID: Price Sow is a 61 y o  female  CC:    Chief Complaint   Patient presents with    Joint Pain     lower back, knees, feet  and (R) hip pain       HPI:    Patient has been having issues that she is here for today for at least 3 years at the minimum  She has already had bilateral foot plantar fasciitis release is many many years ago  Had continued marissa  foot pain, saw specialist and said no can help, went to another after MRI normal, inserts, too high, then saw another specialist and still with feet pain, knee pain back problems  Has MRI low back this Friday from Dr Linnette Fernandez seeing Dr Terence Howe)  Saw chiro today and past three treatments not helping  All this started when she started working out at the gym  Sheis constantly trying to lose weight  Gets up one hour earlier to do stretching and then walks everyday averaging a mile a day for every month     She also did see Dr Nikkie Stuart in 2017  She is just very frustrated because she has tried some any braces injections specialists that she just does not know where to go at this point  She realizes that weight loss would help overall    She states that her feet from the initial problem which has resolved is now pain in a different area of her foot       The following portions of the patient's history were reviewed and updated as appropriate: allergies, current medications, past family history, past medical history, past social history, past surgical history and problem list       Review of Systems   Constitutional: Negative  HENT: Negative  Eyes: Negative  Respiratory: Negative  Cardiovascular: Negative  Gastrointestinal: Negative  Endocrine: Negative  Genitourinary: Negative  Musculoskeletal: Positive for arthralgias  Skin: Negative  Allergic/Immunologic: Negative  Neurological: Negative  Hematological: Negative  Psychiatric/Behavioral: Negative  Data to review:       Objective:    Vitals:    07/06/20 1408   BP: 148/90   BP Location: Left arm   Patient Position: Sitting   Cuff Size: Large   Pulse: 60   Temp: 97 5 °F (36 4 °C)   TempSrc: Tympanic   Weight: 90 7 kg (200 lb)   Height: 5' 1" (1 549 m)        Physical Exam   Constitutional: She is oriented to person, place, and time  She appears well-developed and well-nourished  No distress  HENT:   Head: Normocephalic and atraumatic  Eyes: Conjunctivae are normal  Right eye exhibits no discharge  Left eye exhibits no discharge  Neck: Neck supple  Carotid bruit is not present  Cardiovascular: Normal rate, regular rhythm and normal heart sounds  Exam reveals no gallop and no friction rub  No murmur heard  Pulmonary/Chest: Effort normal and breath sounds normal  No respiratory distress  She has no wheezes  She has no rales  Musculoskeletal:   Bilateral knee pain  Bilateral outer edge foot pain  Neurological: She is alert and oriented to person, place, and time  Skin: Skin is warm and dry  She is not diaphoretic  Psychiatric: She has a normal mood and affect  Judgment normal    Nursing note and vitals reviewed  BMI Counseling: Body mass index is 37 79 kg/m²   The BMI is above normal  Nutrition recommendations include decreasing portion sizes, encouraging healthy choices of fruits and vegetables, decreasing fast food intake, consuming healthier snacks, limiting drinks that contain sugar, moderation in carbohydrate intake, increasing intake of lean protein, reducing intake of saturated and trans fat and reducing intake of cholesterol  Exercise recommendations include exercising 3-5 times per week  No pharmacotherapy was ordered

## 2020-07-10 ENCOUNTER — LAB (OUTPATIENT)
Dept: LAB | Facility: CLINIC | Age: 60
End: 2020-07-10
Payer: COMMERCIAL

## 2020-07-10 DIAGNOSIS — F41.9 ANXIETY: ICD-10-CM

## 2020-07-10 DIAGNOSIS — K21.00 GASTROESOPHAGEAL REFLUX DISEASE WITH ESOPHAGITIS: ICD-10-CM

## 2020-07-10 DIAGNOSIS — E78.2 MIXED HYPERLIPIDEMIA: ICD-10-CM

## 2020-07-10 LAB
ALBUMIN SERPL BCP-MCNC: 3.5 G/DL (ref 3.5–5)
ALP SERPL-CCNC: 59 U/L (ref 46–116)
ALT SERPL W P-5'-P-CCNC: 33 U/L (ref 12–78)
ANION GAP SERPL CALCULATED.3IONS-SCNC: 4 MMOL/L (ref 4–13)
AST SERPL W P-5'-P-CCNC: 26 U/L (ref 5–45)
BILIRUB SERPL-MCNC: 0.6 MG/DL (ref 0.2–1)
BUN SERPL-MCNC: 25 MG/DL (ref 5–25)
CALCIUM SERPL-MCNC: 9 MG/DL (ref 8.3–10.1)
CHLORIDE SERPL-SCNC: 110 MMOL/L (ref 100–108)
CHOLEST SERPL-MCNC: 221 MG/DL (ref 50–200)
CO2 SERPL-SCNC: 28 MMOL/L (ref 21–32)
CREAT SERPL-MCNC: 0.59 MG/DL (ref 0.6–1.3)
GFR SERPL CREATININE-BSD FRML MDRD: 100 ML/MIN/1.73SQ M
GLUCOSE P FAST SERPL-MCNC: 92 MG/DL (ref 65–99)
HDLC SERPL-MCNC: 75 MG/DL
LDLC SERPL CALC-MCNC: 132 MG/DL (ref 0–100)
POTASSIUM SERPL-SCNC: 4.4 MMOL/L (ref 3.5–5.3)
PROT SERPL-MCNC: 6.7 G/DL (ref 6.4–8.2)
SODIUM SERPL-SCNC: 142 MMOL/L (ref 136–145)
TRIGL SERPL-MCNC: 71 MG/DL
TSH SERPL DL<=0.05 MIU/L-ACNC: 2.05 UIU/ML (ref 0.36–3.74)

## 2020-07-10 PROCEDURE — 36415 COLL VENOUS BLD VENIPUNCTURE: CPT

## 2020-07-10 PROCEDURE — 80053 COMPREHEN METABOLIC PANEL: CPT

## 2020-07-10 PROCEDURE — 80061 LIPID PANEL: CPT

## 2020-07-10 PROCEDURE — 84443 ASSAY THYROID STIM HORMONE: CPT

## 2020-07-11 DIAGNOSIS — E78.2 MIXED HYPERLIPIDEMIA: ICD-10-CM

## 2020-07-13 DIAGNOSIS — E78.2 MIXED HYPERLIPIDEMIA: Primary | ICD-10-CM

## 2020-07-13 NOTE — RESULT ENCOUNTER NOTE
Please let the patient know that to have her blood work results back her CMP TSH is within normal limits however her LDL shows an elevation at 132 up from 121  Her good cholesterol is 75  Instead of restarting cholesterol medication at this time as patient had expressed she is not taking her Pravachol  I would like to have her work on her fat and cholesterol intake and repeat in 6 months  Orders are placed  Thank you

## 2020-07-17 ENCOUNTER — TELEPHONE (OUTPATIENT)
Dept: FAMILY MEDICINE CLINIC | Facility: CLINIC | Age: 60
End: 2020-07-17

## 2020-07-17 DIAGNOSIS — M54.10 RADICULAR LEG PAIN: ICD-10-CM

## 2020-07-17 DIAGNOSIS — M79.671 FOOT PAIN, BILATERAL: ICD-10-CM

## 2020-07-17 DIAGNOSIS — M51.36 DEGENERATIVE DISC DISEASE, LUMBAR: ICD-10-CM

## 2020-07-17 DIAGNOSIS — M79.672 FOOT PAIN, BILATERAL: ICD-10-CM

## 2020-07-17 DIAGNOSIS — M48.07 SPINAL STENOSIS OF LUMBOSACRAL REGION: Primary | ICD-10-CM

## 2020-07-17 PROBLEM — M51.369 DEGENERATIVE DISC DISEASE, LUMBAR: Status: ACTIVE | Noted: 2020-07-17

## 2020-07-17 NOTE — TELEPHONE ENCOUNTER
Patient notified of results and recommendations    Please submit an order for her to see Spine specialist

## 2020-07-17 NOTE — TELEPHONE ENCOUNTER
OK so tell the pt it does look like she has some significant findings on her MRI low back including spinal stenosis and disc issues all of which could be the cause of her symptoms  I would like to place an order for her to see our Spine specialists with [facility] for further eval  If this is ok with her?

## 2020-07-31 ENCOUNTER — CONSULT (OUTPATIENT)
Dept: PAIN MEDICINE | Facility: MEDICAL CENTER | Age: 60
End: 2020-07-31
Payer: COMMERCIAL

## 2020-07-31 VITALS
BODY MASS INDEX: 36.25 KG/M2 | DIASTOLIC BLOOD PRESSURE: 81 MMHG | HEIGHT: 62 IN | HEART RATE: 80 BPM | RESPIRATION RATE: 18 BRPM | TEMPERATURE: 98.6 F | WEIGHT: 197 LBS | SYSTOLIC BLOOD PRESSURE: 159 MMHG

## 2020-07-31 DIAGNOSIS — M79.671 FOOT PAIN, BILATERAL: ICD-10-CM

## 2020-07-31 DIAGNOSIS — M51.36 DEGENERATIVE DISC DISEASE, LUMBAR: ICD-10-CM

## 2020-07-31 DIAGNOSIS — M54.10 RADICULAR LEG PAIN: Primary | ICD-10-CM

## 2020-07-31 DIAGNOSIS — M48.07 SPINAL STENOSIS OF LUMBOSACRAL REGION: ICD-10-CM

## 2020-07-31 DIAGNOSIS — M79.672 FOOT PAIN, BILATERAL: ICD-10-CM

## 2020-07-31 PROCEDURE — 3008F BODY MASS INDEX DOCD: CPT | Performed by: PHYSICAL MEDICINE & REHABILITATION

## 2020-07-31 PROCEDURE — 99244 OFF/OP CNSLTJ NEW/EST MOD 40: CPT | Performed by: PHYSICAL MEDICINE & REHABILITATION

## 2020-07-31 NOTE — PATIENT INSTRUCTIONS

## 2020-07-31 NOTE — PROGRESS NOTES
Assessment  1  Spinal stenosis of lumbosacral region    2  Radicular leg pain    3  Foot pain, bilateral    4  Degenerative disc disease, lumbar        Plan  Ms Mikhail Perez is a pleasant 80-year-old female who presents for initial evaluation regarding low back pain with intermittent radiating symptoms into the bilateral lower extremities  She has been dealing with this pain that got progressively worse over the last year and has continued to trial conservative measures including physical therapy, home stretches, over-the-counter NSAIDs all with minimal relief in her pain  During today's evaluation she is demonstrating clinical as well as diagnostic evidence of lumbar radiculopathy likely in relation to the disc herniation at L4-L5  As such I believe an interventional approach would be beneficial warranted  We will  1  Plan for right and left-sided L4-L5 TFESI under fluoro guidance  2  Advised to continue with current medication regimen  She does not wish to trial any further oral medications due to side effect profiles  3  Advised to continue home exercises and core strengthening  I will also provide the patient with core strengthening exercises to be done at home several times per week  4  Complete risks and benefits including bleeding, infection, tissue reaction, nerve injury and allergic reaction were discussed  The approach was demonstrated using models and literature was provided  Verbal and written consent was obtained  My impressions and treatment recommendations were discussed in detail with the patient who verbalized understanding and had no further questions  Discharge instructions were provided  I personally saw and examined the patient and I agree with the above discussed plan of care  No orders of the defined types were placed in this encounter  No orders of the defined types were placed in this encounter        History of Present Illness    Juan Bermudez is a 61 y o  female presents to [facility] for initial evaluation regarding 1 year in 4 months duration of right and left-sided low back pain with intermittent radiating symptoms into the bilateral knees  Patient denies any significant inciting event or recent trauma  Today reports moderate to severe pain rated 4 to 7/10 pain that is interfering with activities of daily living  Pain is constant 100% the time with no specific pattern of morning, afternoon or evening  Describes the pain as sharp, pressure-like, throbbing, dull and aching  Denies significant weakness or falls  Pain is worse with standing  She has had moderate relief with physical therapy, exercise, acupuncture, chiropractic treatment, 10s unit  Currently taking over-the-counter NSAIDs including Tylenol and ibuprofen which is providing some relief in her pain  Presents today for initial evaluation  We do have an MRI of the lumbar spine that has been scanned into documents and does demonstrate a central and right-sided herniation at L4-L5 with impingement upon the right anterolateral margin of the thecal sac and right lateral recess as well as multilevel central canal stenosis  I have personally reviewed and/or updated the patient's past medical history, past surgical history, family history, social history, current medications, allergies, and vital signs today  Review of Systems   Constitutional: Negative for fever and unexpected weight change  HENT: Negative for trouble swallowing  Eyes: Negative for visual disturbance  Respiratory: Negative for shortness of breath and wheezing  Cardiovascular: Negative for chest pain and palpitations  Gastrointestinal: Negative for constipation, diarrhea, nausea and vomiting  Endocrine: Negative for cold intolerance, heat intolerance and polydipsia  Genitourinary: Negative for difficulty urinating and frequency  Musculoskeletal: Positive for back pain, joint swelling and myalgias  Negative for arthralgias and gait problem  Skin: Negative for rash  Neurological: Positive for weakness  Negative for dizziness, seizures, syncope and headaches  Hematological: Does not bruise/bleed easily  Psychiatric/Behavioral: Positive for sleep disturbance  Negative for dysphoric mood  All other systems reviewed and are negative        Patient Active Problem List   Diagnosis    Postmenopausal bleeding    S/P dilation and curettage    Allergic state    Anxiety    S/P colonoscopy    Elevated liver function tests    Endometrial cancer (HCC)    GERD (gastroesophageal reflux disease)    Mixed hyperlipidemia    Hyperplasia of endometrium determined by biopsy    Irritable bowel syndrome    OA (osteoarthritis) of knee    Bradycardia    Pain in joint, multiple sites    Arthralgia    Primary osteoarthritis of both knees    Foot pain, bilateral    Sciatica of right side    Radicular leg pain    Degenerative disc disease, lumbar    Spinal stenosis of lumbosacral region       Past Medical History:   Diagnosis Date    Chronic pain disorder     Claustrophobia     mild    Endometrial polyp     Low back pain     Osteoarthritis     PMB (postmenopausal bleeding)        Past Surgical History:   Procedure Laterality Date    COLONOSCOPY      ORTHOPEDIC SURGERY      PLANTAR FASCIA SURGERY Bilateral     [place] HYSTEROSCOPY,W/ENDO BX N/A 2/28/2017    Procedure: DILATATION AND CURETTAGE (D&C) WITH HYSTEROSCOPY,POLYPECTOMY;  Surgeon: Julio Garcia MD;  Location: [facility] OR;  Service: Gynecology    SHOULDER SURGERY Left     TONSILLECTOMY      WISDOM TOOTH EXTRACTION         Family History   Problem Relation Age of Onset    No Known Problems Mother     No Known Problems Father        Social History     Occupational History    Not on file   Tobacco Use    Smoking status: Never Smoker    Smokeless tobacco: Never Used    Tobacco comment: No secondhand smoke exposure   Substance and Sexual Activity    Alcohol use: No    Drug use: No    Sexual activity: Not on file       Current Outpatient Medications on File Prior to Visit   Medication Sig    BIOTIN PO Take 1 tablet by mouth daily    Black Cohosh (REMIFEMIN MENOPAUSE PO) Take 1 tablet by mouth 2 (two) times a day    Glucosamine-Chondroit-Vit C-Mn (GLUCOSAMINE CHONDR 1500 COMPLX) CAPS Take 2 capsules by mouth daily    ibuprofen (MOTRIN) 200 mg tablet Take 200-800 mg by mouth every 6 (six) hours as needed for mild pain    Misc Natural Products (OSTEO BI-FLEX ADV DOUBLE ST PO) Take 1 capsule by mouth 2 (two) times a day    Omega 3-6-9 Fatty Acids (OMEGA 3-6-9 PO) Take 1 capsule by mouth daily    PANTETHINE PO Take 1 tablet by mouth 2 (two) times a day    methocarbamol (ROBAXIN) 750 mg tablet Take 1 tablet (750 mg total) by mouth every 6 (six) hours as needed for muscle spasms for up to 14 days    patient supplied medication Healthy feet and nerve  OTC supplement    pravastatin (PRAVACHOL) 20 mg tablet Take 1 tablet (20 mg total) by mouth daily at bedtime (Patient not taking: Reported on 7/6/2020)    Pyridoxal-5 Phosphate POWD     Turmeric Curcumin 500 MG CAPS     Zinc Picolinate 25 MG TABS      No current facility-administered medications on file prior to visit  Allergies   Allergen Reactions    Iv Contrast  [Iodinated Diagnostic Agents]        Physical Exam    /81   Pulse 80   Temp 98 6 °F (37 °C)   Resp 18   Ht 5' 2" (1 575 m)   Wt 89 4 kg (197 lb)   BMI 36 03 kg/m²     General: Well-developed, well-nourished individual in no acute distress  Mental: Appropriate mood and affect  Grossly oriented with coherent speech and thought processing  Neuro:  Cranial nerves: Cranial nerve function is grossly intact bilaterally  Strength: Bilateral lower extremity strength is normal and symmetric  No atrophy or tone abnormalities noted    Reflexes: Bilateral lower extremity muscle stretch reflexes are physiologic and symmetric  No ankle clonus is noted  Sensation:  Decreased sensation to light touch in patchy distribution right sided low back and lower extremity  SLR/Foraminal Compression Maneuvers: Straight leg raising in the   supine position is  positive for radicular pain right lower extremity  Gait:  Gait/gross motor: Gait is normal  Station is for flexed posture  Musculoskeletal:  Palpation: Inspection and palpation of the spine and extremities are remarkable for tenderness to palpation bilateral lumbar paraspinals  Spine:  Decreased active and passive range of motion with lumbar flexion and extension limited by pain  No gross axial skeletal deformities  Skin: Skin inspection grossly negative for erythema, breakdown, or concerning lesions in affected area  Lymph: No lymphadenopathy is appreciated in the involved extremity  Vessels: No lower extremity edema  Lungs: Breathing is comfortable and regular  No dyspnea noted during examination  Eyes: Visual field grossly intact to confrontation  No redness appreciated  ENT: No craniofacial deformities or asymmetry  No neck masses appreciated  Imaging  Main Report    Report Date    Jul 13,2020 08:07    Approval Date    Jul 13,2020 12:29        Radiologist    Rosalina Murillo MD      Approved by    Josiah Gautam MD         Images    0                     OBSERVATION    MRI LUMBAR SPINE  HISTORY:         Pain  TECHNIQUE:  Sagittal and axial imaging was obtained  Sequences included T1 as well as T2 weighted imaging   X-rays are not available for review    FINDINGS:        Anterolisthesis of the L4 on L5 vertebral body is present with an offset of 3 mm  Degenerative desiccation is seen throughout the lumbar disc spaces  Narrowing of the disc space involves L5-S1 with degenerative marrow reaction beneath the vertebral endplates  There are no findings of an acute fracture    The conus medullaris is normal   The L1-2 level is notable for bulging of the disc with bilateral impingement upon the foraminal fat without compromise of the exiting nerve roots  Arthritic changes of the posterior joints result in minor encroachment upon the central canal   The L2-3 disc space level is notable for a small component of central canal stenosis from arthritic changes of the posterior joints  Bulging of the disc results in minor impingement upon the foraminal fat without compromise of the exiting nerve roots  The L3-4 disc space level is notable for a small component of central canal stenosis from left-sided bulging of the disc, spondylosis, and arthritic changes of the posterior joints with impingement upon the left and to a lesser extent right foraminal fat without compromise of the exiting nerve roots  The L4-5 disc space level is notable for a small to moderate-sized right posterolateral herniation and spondylosis impinging upon the right anterolateral margin of the thecal sac and right lateral recess containing the right L5 proximal traversing nerve root which is most likely impinged upon  Arthritic changes of the posterior joints contribute to early central canal stenosis  Additional factors include bulging of the disc, spondylosis, and the anterolisthesis  The disc bulge impinges upon the right and to a lesser extent left foraminal fat  There is questionable abutment upon the right L4 exiting nerve root  The L5-S1 level is notable for a small broad-based central and left-sided disc protrusion and spondylosis which abut against the left anterolateral margin of the thecal sac with little if any deformity  Impingement is present upon the left foraminal fat without compromise of the left exiting nerve root  The right neural foramen is minimally compromised  Arthritic changes of the posterior joints result in minimal impingement upon the central canal   IMPRESSION:     1   Central and right-sided herniation at L4-5 with impingement upon the right anterolateral margin of the thecal sac and right lateral recess  2  Multilevel central canal stenosis  3  No acute osseous abnormality  JS/RC/pp   Released by BANDAR Aguilar

## 2020-08-24 ENCOUNTER — HOSPITAL ENCOUNTER (OUTPATIENT)
Dept: RADIOLOGY | Facility: MEDICAL CENTER | Age: 60
Discharge: HOME | End: 2020-08-24
Attending: PHYSICAL MEDICINE & REHABILITATION | Admitting: PHYSICAL MEDICINE & REHABILITATION
Payer: COMMERCIAL

## 2020-08-24 VITALS
DIASTOLIC BLOOD PRESSURE: 87 MMHG | SYSTOLIC BLOOD PRESSURE: 161 MMHG | RESPIRATION RATE: 20 BRPM | OXYGEN SATURATION: 100 % | HEART RATE: 56 BPM | TEMPERATURE: 98.3 F

## 2020-08-24 DIAGNOSIS — M79.672 FOOT PAIN, BILATERAL: ICD-10-CM

## 2020-08-24 DIAGNOSIS — M51.36 DEGENERATIVE DISC DISEASE, LUMBAR: ICD-10-CM

## 2020-08-24 DIAGNOSIS — M79.671 FOOT PAIN, BILATERAL: ICD-10-CM

## 2020-08-24 DIAGNOSIS — M48.07 SPINAL STENOSIS OF LUMBOSACRAL REGION: ICD-10-CM

## 2020-08-24 DIAGNOSIS — M54.10 RADICULAR LEG PAIN: ICD-10-CM

## 2020-08-24 PROCEDURE — 64483 NJX AA&/STRD TFRM EPI L/S 1: CPT | Performed by: PHYSICAL MEDICINE & REHABILITATION

## 2020-08-24 RX ORDER — PAPAVERINE HCL 150 MG
20 CAPSULE, EXTENDED RELEASE ORAL ONCE
Status: COMPLETED | OUTPATIENT
Start: 2020-08-24 | End: 2020-08-24

## 2020-08-24 RX ORDER — BUPIVACAINE HCL/PF 2.5 MG/ML
10 VIAL (ML) INJECTION ONCE
Status: COMPLETED | OUTPATIENT
Start: 2020-08-24 | End: 2020-08-24

## 2020-08-24 RX ORDER — LIDOCAINE HYDROCHLORIDE 10 MG/ML
5 INJECTION, SOLUTION EPIDURAL; INFILTRATION; INTRACAUDAL; PERINEURAL ONCE
Status: COMPLETED | OUTPATIENT
Start: 2020-08-24 | End: 2020-08-24

## 2020-08-24 RX ADMIN — LIDOCAINE HYDROCHLORIDE 4 ML: 10 INJECTION, SOLUTION EPIDURAL; INFILTRATION; INTRACAUDAL; PERINEURAL at 09:29

## 2020-08-24 RX ADMIN — Medication 1 ML: at 09:34

## 2020-08-24 RX ADMIN — DEXAMETHASONE SODIUM PHOSPHATE 20 MG: 10 INJECTION, SOLUTION INTRAMUSCULAR; INTRAVENOUS at 09:34

## 2020-08-24 NOTE — H&P
History of Present Illness:  The patient is a 61 y o  female who presents with complaints of low back pain    Patient Active Problem List   Diagnosis    Postmenopausal bleeding    S/P dilation and curettage    Allergic state    Anxiety    S/P colonoscopy    Elevated liver function tests    Endometrial cancer (HCC)    GERD (gastroesophageal reflux disease)    Mixed hyperlipidemia    Hyperplasia of endometrium determined by biopsy    Irritable bowel syndrome    OA (osteoarthritis) of knee    Bradycardia    Pain in joint, multiple sites    Arthralgia    Primary osteoarthritis of both knees    Foot pain, bilateral    Sciatica of right side    Radicular leg pain    Degenerative disc disease, lumbar    Spinal stenosis of lumbosacral region       Past Medical History:   Diagnosis Date    Chronic pain disorder     Claustrophobia     mild    Endometrial polyp     Low back pain     Osteoarthritis     PMB (postmenopausal bleeding)        Past Surgical History:   Procedure Laterality Date    COLONOSCOPY      ORTHOPEDIC SURGERY      PLANTAR FASCIA SURGERY Bilateral     [facility] HYSTEROSCOPY,W/ENDO BX N/A 2/28/2017    Procedure: DILATATION AND CURETTAGE (D&C) WITH HYSTEROSCOPY,POLYPECTOMY;  Surgeon: Cheryl Ashby MD;  Location: [facility] OR;  Service: Gynecology    SHOULDER SURGERY Left     TONSILLECTOMY      WISDOM TOOTH EXTRACTION           Current Outpatient Medications:     BIOTIN PO, Take 1 tablet by mouth daily, Disp: , Rfl:     Black Cohosh (REMIFEMIN MENOPAUSE PO), Take 1 tablet by mouth 2 (two) times a day, Disp: , Rfl:     Glucosamine-Chondroit-Vit C-Mn (GLUCOSAMINE CHONDR 1500 COMPLX) CAPS, Take 2 capsules by mouth daily, Disp: , Rfl:     ibuprofen (MOTRIN) 200 mg tablet, Take 200-800 mg by mouth every 6 (six) hours as needed for mild pain, Disp: , Rfl:     methocarbamol (ROBAXIN) 750 mg tablet, Take 1 tablet (750 mg total) by mouth every 6 (six) hours as needed for muscle spasms for up to 14 days, Disp: 30 tablet, Rfl: 1    Misc Natural Products (OSTEO BI-FLEX ADV DOUBLE ST PO), Take 1 capsule by mouth 2 (two) times a day, Disp: , Rfl:     Omega 3-6-9 Fatty Acids (OMEGA 3-6-9 PO), Take 1 capsule by mouth daily, Disp: , Rfl:     PANTETHINE PO, Take 1 tablet by mouth 2 (two) times a day, Disp: , Rfl:     patient supplied medication, Healthy feet and nerve  OTC supplement, Disp: , Rfl:     pravastatin (PRAVACHOL) 20 mg tablet, Take 1 tablet (20 mg total) by mouth daily at bedtime (Patient not taking: Reported on 7/6/2020), Disp: 90 tablet, Rfl: 3    Pyridoxal-5 Phosphate POWD, , Disp: , Rfl:     Turmeric Curcumin 500 MG CAPS, , Disp: , Rfl:     Zinc Picolinate 25 MG TABS, , Disp: , Rfl:     Current Facility-Administered Medications:     bupivacaine (PF) (MARCAINE) 0 25 % injection 10 mL, 10 mL, Epidural, Once, Isadora Boom, DO    dexamethasone (PF) (DECADRON) injection 20 mg, 20 mg, Epidural, Once, Isadora Boom, DO    gadopentetate dimeglumine (MAGNEVIST) injection 10 mL, 10 mL, Epidural, Once in imaging, Isadora Boom, DO    lidocaine (PF) (XYLOCAINE-MPF) 1 % injection 5 mL, 5 mL, Infiltration, Once, Isadora Boom, DO    Allergies   Allergen Reactions    Iv Contrast  [Iodinated Diagnostic Agents]        Physical Exam:   Vitals:    08/24/20 0908   BP: 147/90   Pulse: 64   Resp: 20   Temp: 98 3 °F (36 8 °C)   SpO2: 97%     General: Awake, Alert, Oriented x 3, Mood and affect appropriate  Respiratory: Respirations even and unlabored  Cardiovascular: Peripheral pulses intact; no edema  Musculoskeletal Exam:  Tenderness to palpation bilateral lumbar paraspinals    ASA Score: 2  Patient has been made explicitly aware that the injection will consist of steroid which may cause a temporary suppression in immune system activity  This, in turn, may increase the patient's risk of casie corona virus  He was advised to continue with social distancing and self quarantine    Patient wishes to proceed with the injection    Patient/Chart Verification  Patient ID Verified: Verbal  ID Band Applied: No  Consents Confirmed: Procedural, To be obtained in the Pre-Procedure area  H&P( within 30 days) Verified: To be obtained in the Pre-Procedure area  Interval H&P(within 24 hr) Complete (required for Outpatients and Surgery Admit only): To be obtained in the Pre-Procedure area  Allergies Reviewed: Yes(contrast allergy)  Anticoag/NSAID held?: NA  Currently on antibiotics?: No    Assessment:   1  Spinal stenosis of lumbosacral region    2  Radicular leg pain    3  Foot pain, bilateral    4   Degenerative disc disease, lumbar        Plan: Bilateral L4-L5 TFESI

## 2020-08-24 NOTE — DISCHARGE INSTRUCTIONS
Epidural Steroid Injection   WHAT YOU NEED TO KNOW:   An epidural steroid injection (ANAHY) is a procedure to inject steroid medicine into the epidural space  The epidural space is between your spinal cord and vertebrae  Steroids reduce inflammation and fluid buildup in your spine that may be causing pain  You may be given pain medicine along with the steroids  ACTIVITY  · Do not drive or operate machinery today  · No strenuous activity today - bending, lifting, etc   · You may resume normal activites starting tomorrow - start slowly and as tolerated  · You may shower today, but no tub baths or hot tubs  · You may have numbness for several hours from the local anesthetic  Please use caution and common sense, especially with weight-bearing activities  CARE OF THE INJECTION SITE  · If you have soreness or pain, apply ice to the area today (20 minutes on/20 minutes off)  · Starting tomorrow, you may use warm, moist heat or ice if needed  · You may have an increase or change in your discomfort for 36-48 hours after your treatment  · Apply ice and continue with any pain medication you have been prescribed  · Notify [facility] if you have any of the following: redness, drainage, swelling, headache, stiff neck or fever above 100°F     SPECIAL INSTRUCTIONS  · Our office will contact you in approximately 7 days for a progress report  MEDICATIONS  · Continue to take all routine medications  · Our office may have instructed you to hold some medications  If you have a problem specifically related to your procedure, please call our office at [phone]  Problems not related to your procedure should be directed to your primary care physician

## 2020-08-31 ENCOUNTER — TELEPHONE (OUTPATIENT)
Dept: PAIN MEDICINE | Facility: CLINIC | Age: 60
End: 2020-08-31

## 2020-09-02 NOTE — TELEPHONE ENCOUNTER
Pt report 98% improvement     5/10  Pt says every now and then she will get a dull ache for a few minutes and then it goes away

## 2020-09-29 ENCOUNTER — OFFICE VISIT (OUTPATIENT)
Dept: OBGYN CLINIC | Facility: MEDICAL CENTER | Age: 60
End: 2020-09-29
Payer: COMMERCIAL

## 2020-09-29 VITALS
DIASTOLIC BLOOD PRESSURE: 87 MMHG | WEIGHT: 197 LBS | BODY MASS INDEX: 36.03 KG/M2 | HEART RATE: 66 BPM | SYSTOLIC BLOOD PRESSURE: 143 MMHG | TEMPERATURE: 98.8 F

## 2020-09-29 DIAGNOSIS — M17.0 PRIMARY OSTEOARTHRITIS OF BOTH KNEES: Primary | ICD-10-CM

## 2020-09-29 PROCEDURE — 1036F TOBACCO NON-USER: CPT | Performed by: ORTHOPAEDIC SURGERY

## 2020-09-29 PROCEDURE — 99213 OFFICE O/P EST LOW 20 MIN: CPT | Performed by: ORTHOPAEDIC SURGERY

## 2020-09-29 NOTE — PROGRESS NOTES
Orthopaedic Surgery - Office Note  Graciela Carrion (8 y o  female)   : 1960   MRN: [id number]  Encounter Date: 2020    Chief Complaint   Patient presents with    Left Knee - Follow-up    Right Knee - Follow-up       Assessment / Plan  Serve lateral OA of right knee    · Discussed at length surgical intervention of a right total knee replacement  She has some concerns regarding work, getting help at home, post op care, transportation and care of her father  She is going to work on figuring out the logistics and will be in contact if or when she is to move forward with surgery  · Continue activity as tolerated  Return if symptoms worsen or fail to improve  History of Present Illness  Graciela Carrion is a 61 y o  female who presents for follow-up of bilateral knee osteoarthritis  She was last seen in the office in  2019 where she received a CSI in bilateral knees  She states she did have some relief from the injections but then sought out a second opinion  The physician she saw offer do to Viscosupplementation which she just completed the 5th round on 2020  She states she has not had an relief from these injections  She continues to be active, walking daily and working out 3-4 times a week at the gym  She states she is unable to walk as much as she used to  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-12 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 which has become almost unmanageable  She reports weight-bearing activities cause her the most discomfort including steps  She does report a feeling of instability and popping as she walks  Her right knee is bothering her more than her left knee at this point  She has been using ice, heat, TENS unit and Advil for pain management  She denies any further injury or trauma  She denies any distal paresthesias  Review of Systems  Pertinent items are noted in HPI  All other systems were reviewed and are negative      Physical Exam  /87   Pulse 66   Temp 98 8 °F (37 1 °C)   Wt 89 4 kg (197 lb)   BMI 36 03 kg/m²   Cons: Appears well  No apparent distress  Psych: Alert  Oriented x3  Mood and affect normal   Eyes: PERRLA, EOMI  Resp: Normal effort  No audible wheezing or stridor  CV: Palpable pulse  No discernable arrhythmia  No LE edema  Lymph:  No palpable cervical, axillary, or inguinal lymphadenopathy  Skin: Warm  No palpable masses  No visible lesions  Neuro: Normal muscle tone  Normal and symmetric DTR's  Right Knee Exam  Alignment:  Valgus deformity correctable to neutral  Inspection:  No swelling  No ecchymosis  Palpation:  mild joint line tenderness  ROM:  Knee Extension 0  Knee Flexion 120  Strength:  Not tested  Stability:  No objective knee instability  Stable Varus / Valgus stress, Lachman, and Posterior drawer  Tests:  No pertinent positive or negative tests  Patella:  Not tested  Neurovascular:  Sensation intact in DP/SP/Gill/Sa/T nerve distributions  2+ DP & PT pulses  Gait:  Antalgic  Studies Reviewed  I have personally reviewed pertinent films in PACS  XR of bilateral knee - images reviewed from 09/03/2019 which show severe lateral OA of the right knee with valgus deformity  Moderate OA of the left knee in the meidal compartement     Procedures  No procedures today  Medical, Surgical, Family, and Social History  The patient's medical history, family history, and social history, were reviewed and updated as appropriate      Past Medical History:   Diagnosis Date    Chronic pain disorder     Claustrophobia     mild    Endometrial polyp     Low back pain     Osteoarthritis     PMB (postmenopausal bleeding)        Past Surgical History:   Procedure Laterality Date    COLONOSCOPY      ORTHOPEDIC SURGERY      PLANTAR FASCIA SURGERY Bilateral     RI HYSTEROSCOPY,W/ENDO BX N/A 2/28/2017    Procedure: DILATATION AND CURETTAGE (D&C) WITH HYSTEROSCOPY,POLYPECTOMY;  Surgeon: Katiuska Brown MD;  Location: [facility]; Service: Gynecology    SHOULDER SURGERY Left     TONSILLECTOMY      WISDOM TOOTH EXTRACTION         Family History   Problem Relation Age of Onset    No Known Problems Mother     No Known Problems Father        Social History     Occupational History    Not on file   Tobacco Use    Smoking status: Never Smoker    Smokeless tobacco: Never Used    Tobacco comment: No secondhand smoke exposure   Substance and Sexual Activity    Alcohol use: No    Drug use: No    Sexual activity: Not on file       Allergies   Allergen Reactions    Iv Contrast  [Iodinated Diagnostic Agents]          Current Outpatient Medications:     BIOTIN PO, Take 1 tablet by mouth daily, Disp: , Rfl:     Black Cohosh (REMIFEMIN MENOPAUSE PO), Take 1 tablet by mouth 2 (two) times a day, Disp: , Rfl:     Glucosamine-Chondroit-Vit C-Mn (GLUCOSAMINE CHONDR 1500 COMPLX) CAPS, Take 2 capsules by mouth daily, Disp: , Rfl:     ibuprofen (MOTRIN) 200 mg tablet, Take 200-800 mg by mouth every 6 (six) hours as needed for mild pain, Disp: , Rfl:     Misc Natural Products (OSTEO BI-FLEX ADV DOUBLE ST PO), Take 1 capsule by mouth 2 (two) times a day, Disp: , Rfl:     Omega 3-6-9 Fatty Acids (OMEGA 3-6-9 PO), Take 1 capsule by mouth daily, Disp: , Rfl:     PANTETHINE PO, Take 1 tablet by mouth 2 (two) times a day, Disp: , Rfl:     patient supplied medication, Healthy feet and nerve  OTC supplement, Disp: , Rfl:     pravastatin (PRAVACHOL) 20 mg tablet, Take 1 tablet (20 mg total) by mouth daily at bedtime, Disp: 90 tablet, Rfl: 3    Pyridoxal-5 Phosphate POWD, , Disp: , Rfl:     Turmeric Curcumin 500 MG CAPS, , Disp: , Rfl:     Zinc Picolinate 25 MG TABS, , Disp: , Rfl:     methocarbamol (ROBAXIN) 750 mg tablet, Take 1 tablet (750 mg total) by mouth every 6 (six) hours as needed for muscle spasms for up to 14 days, Disp: 30 tablet, Rfl: 1      [employer]    I,:   Yonis Sandoval am acting as a scribe while in the presence of the attending physician :        I,:   Pancho Ricks MD personally performed the services described in this documentation    as scribed in my presence :

## 2020-11-03 ENCOUNTER — OFFICE VISIT (OUTPATIENT)
Dept: OBGYN CLINIC | Facility: MEDICAL CENTER | Age: 60
End: 2020-11-03
Payer: COMMERCIAL

## 2020-11-03 ENCOUNTER — APPOINTMENT (OUTPATIENT)
Dept: RADIOLOGY | Facility: MEDICAL CENTER | Age: 60
End: 2020-11-03
Payer: COMMERCIAL

## 2020-11-03 VITALS
DIASTOLIC BLOOD PRESSURE: 79 MMHG | HEART RATE: 66 BPM | HEIGHT: 62 IN | TEMPERATURE: 97.3 F | SYSTOLIC BLOOD PRESSURE: 147 MMHG | WEIGHT: 197 LBS | BODY MASS INDEX: 36.25 KG/M2

## 2020-11-03 DIAGNOSIS — M17.0 PRIMARY OSTEOARTHRITIS OF BOTH KNEES: ICD-10-CM

## 2020-11-03 DIAGNOSIS — M17.0 PRIMARY OSTEOARTHRITIS OF BOTH KNEES: Primary | ICD-10-CM

## 2020-11-03 PROCEDURE — 1036F TOBACCO NON-USER: CPT | Performed by: ORTHOPAEDIC SURGERY

## 2020-11-03 PROCEDURE — 99213 OFFICE O/P EST LOW 20 MIN: CPT | Performed by: ORTHOPAEDIC SURGERY

## 2020-11-03 PROCEDURE — 77073 BONE LENGTH STUDIES: CPT

## 2020-11-03 RX ORDER — CHLORHEXIDINE GLUCONATE 0.12 MG/ML
15 RINSE ORAL ONCE
Status: CANCELLED | OUTPATIENT
Start: 2020-11-30 | End: 2020-11-03

## 2020-11-03 RX ORDER — CEFAZOLIN SODIUM 1 G/50ML
1000 SOLUTION INTRAVENOUS ONCE
Status: CANCELLED | OUTPATIENT
Start: 2020-11-30 | End: 2020-11-03

## 2020-11-09 ENCOUNTER — TELEPHONE (OUTPATIENT)
Dept: OBGYN CLINIC | Facility: HOSPITAL | Age: 60
End: 2020-11-09

## 2020-11-10 ENCOUNTER — LAB (OUTPATIENT)
Dept: LAB | Facility: CLINIC | Age: 60
End: 2020-11-10
Payer: COMMERCIAL

## 2020-11-10 ENCOUNTER — TRANSCRIBE ORDERS (OUTPATIENT)
Dept: LAB | Facility: CLINIC | Age: 60
End: 2020-11-10

## 2020-11-10 DIAGNOSIS — M17.0 PRIMARY OSTEOARTHRITIS OF BOTH KNEES: ICD-10-CM

## 2020-11-10 DIAGNOSIS — M17.0 PRIMARY OSTEOARTHRITIS OF BOTH KNEES: Primary | ICD-10-CM

## 2020-11-10 LAB
ABO GROUP BLD: NORMAL
ALBUMIN SERPL BCP-MCNC: 3.6 G/DL (ref 3.5–5)
ALP SERPL-CCNC: 61 U/L (ref 46–116)
ALT SERPL W P-5'-P-CCNC: 45 U/L (ref 12–78)
ANION GAP SERPL CALCULATED.3IONS-SCNC: 4 MMOL/L (ref 4–13)
APTT PPP: 26 SECONDS (ref 23–37)
AST SERPL W P-5'-P-CCNC: 29 U/L (ref 5–45)
BASOPHILS # BLD AUTO: 0.02 THOUSANDS/ΜL (ref 0–0.1)
BASOPHILS NFR BLD AUTO: 1 % (ref 0–1)
BILIRUB SERPL-MCNC: 0.55 MG/DL (ref 0.2–1)
BLD GP AB SCN SERPL QL: NEGATIVE
BUN SERPL-MCNC: 36 MG/DL (ref 5–25)
CALCIUM SERPL-MCNC: 9.8 MG/DL (ref 8.3–10.1)
CHLORIDE SERPL-SCNC: 110 MMOL/L (ref 100–108)
CO2 SERPL-SCNC: 28 MMOL/L (ref 21–32)
CREAT SERPL-MCNC: 0.82 MG/DL (ref 0.6–1.3)
CRP SERPL QL: <3 MG/L
EOSINOPHIL # BLD AUTO: 0.05 THOUSAND/ΜL (ref 0–0.61)
EOSINOPHIL NFR BLD AUTO: 1 % (ref 0–6)
ERYTHROCYTE [DISTWIDTH] IN BLOOD BY AUTOMATED COUNT: 13.8 % (ref 11.6–15.1)
EST. AVERAGE GLUCOSE BLD GHB EST-MCNC: 111 MG/DL
FERRITIN SERPL-MCNC: 36 NG/ML (ref 8–388)
GFR SERPL CREATININE-BSD FRML MDRD: 78 ML/MIN/1.73SQ M
GLUCOSE SERPL-MCNC: 89 MG/DL (ref 65–140)
HBA1C MFR BLD: 5.5 %
HCT VFR BLD AUTO: 45.1 % (ref 34.8–46.1)
HGB BLD-MCNC: 14.3 G/DL (ref 11.5–15.4)
IMM GRANULOCYTES # BLD AUTO: 0 THOUSAND/UL (ref 0–0.2)
IMM GRANULOCYTES NFR BLD AUTO: 0 % (ref 0–2)
INR PPP: 0.88 (ref 0.84–1.19)
IRON SATN MFR SERPL: 27 %
IRON SERPL-MCNC: 101 UG/DL (ref 50–170)
LYMPHOCYTES # BLD AUTO: 1.34 THOUSANDS/ΜL (ref 0.6–4.47)
LYMPHOCYTES NFR BLD AUTO: 31 % (ref 14–44)
MCH RBC QN AUTO: 27.4 PG (ref 26.8–34.3)
MCHC RBC AUTO-ENTMCNC: 31.7 G/DL (ref 31.4–37.4)
MCV RBC AUTO: 87 FL (ref 82–98)
MONOCYTES # BLD AUTO: 0.3 THOUSAND/ΜL (ref 0.17–1.22)
MONOCYTES NFR BLD AUTO: 7 % (ref 4–12)
NEUTROPHILS # BLD AUTO: 2.57 THOUSANDS/ΜL (ref 1.85–7.62)
NEUTS SEG NFR BLD AUTO: 60 % (ref 43–75)
NRBC BLD AUTO-RTO: 0 /100 WBCS
PLATELET # BLD AUTO: 177 THOUSANDS/UL (ref 149–390)
PMV BLD AUTO: 8.9 FL (ref 8.9–12.7)
POTASSIUM SERPL-SCNC: 4.2 MMOL/L (ref 3.5–5.3)
PROT SERPL-MCNC: 6.7 G/DL (ref 6.4–8.2)
PROTHROMBIN TIME: 12 SECONDS (ref 11.6–14.5)
RBC # BLD AUTO: 5.21 MILLION/UL (ref 3.81–5.12)
RH BLD: POSITIVE
SODIUM SERPL-SCNC: 142 MMOL/L (ref 136–145)
SPECIMEN EXPIRATION DATE: NORMAL
TIBC SERPL-MCNC: 369 UG/DL (ref 250–450)
WBC # BLD AUTO: 4.28 THOUSAND/UL (ref 4.31–10.16)

## 2020-11-10 PROCEDURE — 86901 BLOOD TYPING SEROLOGIC RH(D): CPT

## 2020-11-10 PROCEDURE — 80053 COMPREHEN METABOLIC PANEL: CPT

## 2020-11-10 PROCEDURE — 83036 HEMOGLOBIN GLYCOSYLATED A1C: CPT

## 2020-11-10 PROCEDURE — 83540 ASSAY OF IRON: CPT

## 2020-11-10 PROCEDURE — 82728 ASSAY OF FERRITIN: CPT

## 2020-11-10 PROCEDURE — 86850 RBC ANTIBODY SCREEN: CPT

## 2020-11-10 PROCEDURE — 85730 THROMBOPLASTIN TIME PARTIAL: CPT

## 2020-11-10 PROCEDURE — 86140 C-REACTIVE PROTEIN: CPT

## 2020-11-10 PROCEDURE — 86900 BLOOD TYPING SEROLOGIC ABO: CPT

## 2020-11-10 PROCEDURE — 85025 COMPLETE CBC W/AUTO DIFF WBC: CPT

## 2020-11-10 PROCEDURE — 36415 COLL VENOUS BLD VENIPUNCTURE: CPT

## 2020-11-10 PROCEDURE — 85610 PROTHROMBIN TIME: CPT

## 2020-11-10 PROCEDURE — 83550 IRON BINDING TEST: CPT

## 2020-11-10 RX ORDER — MULTIVITAMIN
1 TABLET ORAL DAILY
Qty: 30 TABLET | Refills: 1 | Status: SHIPPED | OUTPATIENT
Start: 2020-11-10 | End: 2020-11-18

## 2020-11-10 RX ORDER — FERROUS SULFATE TAB EC 324 MG (65 MG FE EQUIVALENT) 324 (65 FE) MG
324 TABLET DELAYED RESPONSE ORAL
Qty: 60 TABLET | Refills: 0 | Status: SHIPPED | OUTPATIENT
Start: 2020-11-10 | End: 2021-05-12 | Stop reason: ALTCHOICE

## 2020-11-10 RX ORDER — FOLIC ACID 1 MG/1
1 TABLET ORAL DAILY
Qty: 30 TABLET | Refills: 0 | Status: SHIPPED | OUTPATIENT
Start: 2020-11-10 | End: 2021-05-12 | Stop reason: ALTCHOICE

## 2020-11-10 RX ORDER — ASCORBIC ACID 500 MG
500 TABLET ORAL 2 TIMES DAILY
Qty: 60 TABLET | Refills: 1 | Status: SHIPPED | OUTPATIENT
Start: 2020-11-10 | End: 2021-05-12 | Stop reason: ALTCHOICE

## 2020-11-11 ENCOUNTER — TELEPHONE (OUTPATIENT)
Dept: OCCUPATIONAL THERAPY | Facility: HOSPITAL | Age: 60
End: 2020-11-11

## 2020-11-12 ENCOUNTER — TELEPHONE (OUTPATIENT)
Dept: ADMINISTRATIVE | Facility: OTHER | Age: 60
End: 2020-11-12

## 2020-11-17 ENCOUNTER — TELEPHONE (OUTPATIENT)
Dept: OBGYN CLINIC | Facility: MEDICAL CENTER | Age: 60
End: 2020-11-17

## 2020-11-18 ENCOUNTER — ANESTHESIA EVENT (OUTPATIENT)
Dept: PERIOP | Facility: HOSPITAL | Age: 60
DRG: 470 | End: 2020-11-18
Payer: COMMERCIAL

## 2020-11-18 RX ORDER — COVID-19 ANTIGEN TEST
2 KIT MISCELLANEOUS DAILY
COMMUNITY
End: 2021-09-22 | Stop reason: DRUGHIGH

## 2020-11-19 PROBLEM — Z98.890 S/P DILATION AND CURETTAGE: Status: RESOLVED | Noted: 2017-02-28 | Resolved: 2020-11-19

## 2020-11-19 PROBLEM — E66.9 OBESITY: Status: ACTIVE | Noted: 2020-07-10

## 2020-11-19 PROBLEM — Z01.818 PRE-OP EXAMINATION: Status: ACTIVE | Noted: 2020-11-19

## 2020-11-20 ENCOUNTER — OFFICE VISIT (OUTPATIENT)
Dept: FAMILY MEDICINE CLINIC | Facility: CLINIC | Age: 60
End: 2020-11-20
Payer: COMMERCIAL

## 2020-11-20 VITALS
WEIGHT: 200 LBS | SYSTOLIC BLOOD PRESSURE: 130 MMHG | BODY MASS INDEX: 36.8 KG/M2 | HEART RATE: 60 BPM | DIASTOLIC BLOOD PRESSURE: 72 MMHG | HEIGHT: 62 IN | TEMPERATURE: 97.5 F

## 2020-11-20 DIAGNOSIS — M17.0 PRIMARY OSTEOARTHRITIS OF BOTH KNEES: ICD-10-CM

## 2020-11-20 DIAGNOSIS — Z01.818 PRE-OP EXAMINATION: Primary | ICD-10-CM

## 2020-11-20 DIAGNOSIS — M17.11 PRIMARY OSTEOARTHRITIS OF RIGHT KNEE: ICD-10-CM

## 2020-11-20 PROCEDURE — 3725F SCREEN DEPRESSION PERFORMED: CPT | Performed by: PHYSICIAN ASSISTANT

## 2020-11-20 PROCEDURE — 3008F BODY MASS INDEX DOCD: CPT | Performed by: PHYSICIAN ASSISTANT

## 2020-11-20 PROCEDURE — 99243 OFF/OP CNSLTJ NEW/EST LOW 30: CPT | Performed by: PHYSICIAN ASSISTANT

## 2020-11-20 PROCEDURE — U0003 INFECTIOUS AGENT DETECTION BY NUCLEIC ACID (DNA OR RNA); SEVERE ACUTE RESPIRATORY SYNDROME CORONAVIRUS 2 (SARS-COV-2) (CORONAVIRUS DISEASE [COVID-19]), AMPLIFIED PROBE TECHNIQUE, MAKING USE OF HIGH THROUGHPUT TECHNOLOGIES AS DESCRIBED BY CMS-2020-01-R: HCPCS | Performed by: ORTHOPAEDIC SURGERY

## 2020-11-20 PROCEDURE — 93000 ELECTROCARDIOGRAM COMPLETE: CPT | Performed by: PHYSICIAN ASSISTANT

## 2020-11-20 PROCEDURE — 1036F TOBACCO NON-USER: CPT | Performed by: PHYSICIAN ASSISTANT

## 2020-11-22 LAB — SARS-COV-2 RNA SPEC QL NAA+PROBE: NOT DETECTED

## 2020-11-24 ENCOUNTER — EVALUATION (OUTPATIENT)
Dept: PHYSICAL THERAPY | Facility: MEDICAL CENTER | Age: 60
End: 2020-11-24
Payer: COMMERCIAL

## 2020-11-24 DIAGNOSIS — M17.0 PRIMARY OSTEOARTHRITIS OF BOTH KNEES: Primary | ICD-10-CM

## 2020-11-24 PROCEDURE — 97161 PT EVAL LOW COMPLEX 20 MIN: CPT | Performed by: PHYSICAL THERAPIST

## 2020-11-30 ENCOUNTER — ANESTHESIA (OUTPATIENT)
Dept: PERIOP | Facility: HOSPITAL | Age: 60
DRG: 470 | End: 2020-11-30
Payer: COMMERCIAL

## 2020-11-30 ENCOUNTER — HOSPITAL ENCOUNTER (INPATIENT)
Facility: HOSPITAL | Age: 60
LOS: 1 days | Discharge: HOME | DRG: 470 | End: 2020-12-01
Attending: ORTHOPAEDIC SURGERY | Admitting: ORTHOPAEDIC SURGERY
Payer: COMMERCIAL

## 2020-11-30 VITALS — HEART RATE: 54 BPM

## 2020-11-30 DIAGNOSIS — M17.11 PRIMARY OSTEOARTHRITIS OF RIGHT KNEE: Primary | ICD-10-CM

## 2020-11-30 PROCEDURE — C1713 ANCHOR/SCREW BN/BN,TIS/BN: HCPCS | Performed by: ORTHOPAEDIC SURGERY

## 2020-11-30 PROCEDURE — 0SRC0J9 REPLACEMENT OF RIGHT KNEE JOINT WITH SYNTHETIC SUBSTITUTE, CEMENTED, OPEN APPROACH: ICD-10-PCS | Performed by: ORTHOPAEDIC SURGERY

## 2020-11-30 PROCEDURE — C1776 JOINT DEVICE (IMPLANTABLE): HCPCS | Performed by: ORTHOPAEDIC SURGERY

## 2020-11-30 PROCEDURE — 27447 TOTAL KNEE ARTHROPLASTY: CPT | Performed by: ORTHOPAEDIC SURGERY

## 2020-11-30 PROCEDURE — 97163 PT EVAL HIGH COMPLEX 45 MIN: CPT

## 2020-11-30 PROCEDURE — 27447 TOTAL KNEE ARTHROPLASTY: CPT | Performed by: PHYSICIAN ASSISTANT

## 2020-11-30 DEVICE — SMARTSET GMV HIGH PERFORMANCE GENTAMICIN MEDIUM VISCOSITY BONE CEMENT 40G
Type: IMPLANTABLE DEVICE | Site: KNEE | Status: FUNCTIONAL
Brand: SMARTSET

## 2020-11-30 DEVICE — ATTUNE PATELLA MEDIALIZED ANATOMIC 35MM CEMENTED AOX
Type: IMPLANTABLE DEVICE | Site: KNEE | Status: FUNCTIONAL
Brand: ATTUNE

## 2020-11-30 DEVICE — ATTUNE KNEE SYSTEM FEMORAL POSTERIOR STABILIZED SIZE 5 RIGHT CEMENTED
Type: IMPLANTABLE DEVICE | Site: KNEE | Status: FUNCTIONAL
Brand: ATTUNE

## 2020-11-30 DEVICE — ATTUNE KNEE SYSTEM TIBIAL BASE ROTATING PLATFORM SIZE 5 CEMENTED
Type: IMPLANTABLE DEVICE | Site: KNEE | Status: FUNCTIONAL
Brand: ATTUNE

## 2020-11-30 DEVICE — ATTUNE KNEE SYSTEM TIBIAL INSERT ROTATING PLATFORM POSTERIOR STABILIZED 5 6MM AOX
Type: IMPLANTABLE DEVICE | Site: KNEE | Status: FUNCTIONAL
Brand: ATTUNE

## 2020-11-30 RX ORDER — SODIUM CHLORIDE 9 MG/ML
125 INJECTION, SOLUTION INTRAVENOUS CONTINUOUS
Status: DISCONTINUED | OUTPATIENT
Start: 2020-11-30 | End: 2020-12-01 | Stop reason: HOSPADM

## 2020-11-30 RX ORDER — OXYCODONE HYDROCHLORIDE 5 MG/1
5 TABLET ORAL EVERY 4 HOURS PRN
Status: DISCONTINUED | OUTPATIENT
Start: 2020-11-30 | End: 2020-12-01 | Stop reason: HOSPADM

## 2020-11-30 RX ORDER — CHLORHEXIDINE GLUCONATE 0.12 MG/ML
15 RINSE ORAL ONCE
Status: COMPLETED | OUTPATIENT
Start: 2020-11-30 | End: 2020-11-30

## 2020-11-30 RX ORDER — FENTANYL CITRATE 50 UG/ML
INJECTION, SOLUTION INTRAMUSCULAR; INTRAVENOUS AS NEEDED
Status: DISCONTINUED | OUTPATIENT
Start: 2020-11-30 | End: 2020-11-30

## 2020-11-30 RX ORDER — TRAMADOL HYDROCHLORIDE 50 MG/1
50 TABLET ORAL EVERY 6 HOURS SCHEDULED
Status: DISCONTINUED | OUTPATIENT
Start: 2020-11-30 | End: 2020-12-01 | Stop reason: HOSPADM

## 2020-11-30 RX ORDER — DOCUSATE SODIUM 100 MG/1
100 CAPSULE, LIQUID FILLED ORAL 2 TIMES DAILY
Status: DISCONTINUED | OUTPATIENT
Start: 2020-11-30 | End: 2020-12-01 | Stop reason: HOSPADM

## 2020-11-30 RX ORDER — SODIUM CHLORIDE, SODIUM LACTATE, POTASSIUM CHLORIDE, CALCIUM CHLORIDE 600; 310; 30; 20 MG/100ML; MG/100ML; MG/100ML; MG/100ML
75 INJECTION, SOLUTION INTRAVENOUS CONTINUOUS
Status: DISCONTINUED | OUTPATIENT
Start: 2020-11-30 | End: 2020-12-01 | Stop reason: HOSPADM

## 2020-11-30 RX ORDER — ACETAMINOPHEN 325 MG/1
650 TABLET ORAL EVERY 4 HOURS PRN
Status: DISCONTINUED | OUTPATIENT
Start: 2020-11-30 | End: 2020-12-01 | Stop reason: HOSPADM

## 2020-11-30 RX ORDER — PROPOFOL 10 MG/ML
INJECTION, EMULSION INTRAVENOUS CONTINUOUS PRN
Status: DISCONTINUED | OUTPATIENT
Start: 2020-11-30 | End: 2020-11-30

## 2020-11-30 RX ORDER — OXYCODONE HYDROCHLORIDE 10 MG/1
10 TABLET ORAL EVERY 4 HOURS PRN
Status: DISCONTINUED | OUTPATIENT
Start: 2020-11-30 | End: 2020-12-01 | Stop reason: HOSPADM

## 2020-11-30 RX ORDER — GABAPENTIN 300 MG/1
300 CAPSULE ORAL
Status: DISCONTINUED | OUTPATIENT
Start: 2020-11-30 | End: 2020-12-01 | Stop reason: HOSPADM

## 2020-11-30 RX ORDER — MIDAZOLAM HYDROCHLORIDE 2 MG/2ML
INJECTION, SOLUTION INTRAMUSCULAR; INTRAVENOUS AS NEEDED
Status: DISCONTINUED | OUTPATIENT
Start: 2020-11-30 | End: 2020-11-30

## 2020-11-30 RX ORDER — SIMETHICONE 80 MG
80 TABLET,CHEWABLE ORAL 4 TIMES DAILY PRN
Status: DISCONTINUED | OUTPATIENT
Start: 2020-11-30 | End: 2020-12-01 | Stop reason: HOSPADM

## 2020-11-30 RX ORDER — MAGNESIUM HYDROXIDE 1200 MG/15ML
LIQUID ORAL AS NEEDED
Status: DISCONTINUED | OUTPATIENT
Start: 2020-11-30 | End: 2020-11-30 | Stop reason: HOSPADM

## 2020-11-30 RX ORDER — FENTANYL CITRATE/PF 50 MCG/ML
50 SYRINGE (ML) INJECTION
Status: DISCONTINUED | OUTPATIENT
Start: 2020-11-30 | End: 2020-11-30 | Stop reason: HOSPADM

## 2020-11-30 RX ORDER — FOLIC ACID 1 MG/1
1 TABLET ORAL DAILY
Status: DISCONTINUED | OUTPATIENT
Start: 2020-11-30 | End: 2020-12-01 | Stop reason: HOSPADM

## 2020-11-30 RX ORDER — ROPIVACAINE HYDROCHLORIDE 5 MG/ML
INJECTION, SOLUTION EPIDURAL; INFILTRATION; PERINEURAL
Status: COMPLETED | OUTPATIENT
Start: 2020-11-30 | End: 2020-11-30

## 2020-11-30 RX ORDER — CALCIUM CARBONATE 200(500)MG
1000 TABLET,CHEWABLE ORAL DAILY PRN
Status: DISCONTINUED | OUTPATIENT
Start: 2020-11-30 | End: 2020-12-01 | Stop reason: HOSPADM

## 2020-11-30 RX ORDER — CEFAZOLIN SODIUM 1 G/50ML
1000 SOLUTION INTRAVENOUS ONCE
Status: COMPLETED | OUTPATIENT
Start: 2020-11-30 | End: 2020-11-30

## 2020-11-30 RX ORDER — FERROUS SULFATE 325(65) MG
325 TABLET ORAL 2 TIMES DAILY WITH MEALS
Status: DISCONTINUED | OUTPATIENT
Start: 2020-11-30 | End: 2020-12-01 | Stop reason: HOSPADM

## 2020-11-30 RX ORDER — ONDANSETRON 2 MG/ML
INJECTION INTRAMUSCULAR; INTRAVENOUS AS NEEDED
Status: DISCONTINUED | OUTPATIENT
Start: 2020-11-30 | End: 2020-11-30

## 2020-11-30 RX ORDER — PANTOPRAZOLE SODIUM 40 MG/1
40 TABLET, DELAYED RELEASE ORAL
Status: DISCONTINUED | OUTPATIENT
Start: 2020-12-01 | End: 2020-12-01 | Stop reason: HOSPADM

## 2020-11-30 RX ORDER — SENNOSIDES 8.6 MG
1 TABLET ORAL DAILY
Status: DISCONTINUED | OUTPATIENT
Start: 2020-11-30 | End: 2020-12-01 | Stop reason: HOSPADM

## 2020-11-30 RX ORDER — BUPIVACAINE HYDROCHLORIDE 7.5 MG/ML
INJECTION, SOLUTION INTRASPINAL AS NEEDED
Status: DISCONTINUED | OUTPATIENT
Start: 2020-11-30 | End: 2020-11-30

## 2020-11-30 RX ORDER — ONDANSETRON 2 MG/ML
4 INJECTION INTRAMUSCULAR; INTRAVENOUS ONCE AS NEEDED
Status: DISCONTINUED | OUTPATIENT
Start: 2020-11-30 | End: 2020-11-30 | Stop reason: HOSPADM

## 2020-11-30 RX ORDER — ACETAMINOPHEN 325 MG/1
650 TABLET ORAL EVERY 6 HOURS SCHEDULED
Status: DISCONTINUED | OUTPATIENT
Start: 2020-11-30 | End: 2020-12-01 | Stop reason: HOSPADM

## 2020-11-30 RX ORDER — CEFAZOLIN SODIUM 2 G/50ML
2000 SOLUTION INTRAVENOUS EVERY 8 HOURS
Status: COMPLETED | OUTPATIENT
Start: 2020-11-30 | End: 2020-12-01

## 2020-11-30 RX ADMIN — ACETAMINOPHEN 650 MG: 325 TABLET, FILM COATED ORAL at 17:45

## 2020-11-30 RX ADMIN — CEFAZOLIN SODIUM 2000 MG: 2 SOLUTION INTRAVENOUS at 23:38

## 2020-11-30 RX ADMIN — ONDANSETRON 4 MG: 2 INJECTION INTRAMUSCULAR; INTRAVENOUS at 09:05

## 2020-11-30 RX ADMIN — FENTANYL CITRATE 50 MCG: 50 INJECTION, SOLUTION INTRAMUSCULAR; INTRAVENOUS at 07:21

## 2020-11-30 RX ADMIN — SODIUM CHLORIDE: 0.9 INJECTION, SOLUTION INTRAVENOUS at 07:49

## 2020-11-30 RX ADMIN — CEFAZOLIN SODIUM 2000 MG: 1 SOLUTION INTRAVENOUS at 07:10

## 2020-11-30 RX ADMIN — BUPIVACAINE HYDROCHLORIDE IN DEXTROSE 1.6 ML: 7.5 INJECTION, SOLUTION SUBARACHNOID at 07:25

## 2020-11-30 RX ADMIN — CEFAZOLIN SODIUM 2000 MG: 2 SOLUTION INTRAVENOUS at 17:05

## 2020-11-30 RX ADMIN — GABAPENTIN 300 MG: 300 CAPSULE ORAL at 21:20

## 2020-11-30 RX ADMIN — TRAMADOL HYDROCHLORIDE 50 MG: 50 TABLET, FILM COATED ORAL at 23:37

## 2020-11-30 RX ADMIN — ACETAMINOPHEN 650 MG: 325 TABLET, FILM COATED ORAL at 23:37

## 2020-11-30 RX ADMIN — DOCUSATE SODIUM 100 MG: 100 CAPSULE, LIQUID FILLED ORAL at 17:46

## 2020-11-30 RX ADMIN — FENTANYL CITRATE 50 MCG: 50 INJECTION, SOLUTION INTRAMUSCULAR; INTRAVENOUS at 07:23

## 2020-11-30 RX ADMIN — IRON SUCROSE 300 MG: 20 INJECTION, SOLUTION INTRAVENOUS at 17:44

## 2020-11-30 RX ADMIN — PHENYLEPHRINE HYDROCHLORIDE 50 MCG: 10 INJECTION INTRAVENOUS at 07:56

## 2020-11-30 RX ADMIN — MIDAZOLAM 1 MG: 1 INJECTION INTRAMUSCULAR; INTRAVENOUS at 07:23

## 2020-11-30 RX ADMIN — TRAMADOL HYDROCHLORIDE 50 MG: 50 TABLET, FILM COATED ORAL at 17:45

## 2020-11-30 RX ADMIN — MIDAZOLAM 1 MG: 1 INJECTION INTRAMUSCULAR; INTRAVENOUS at 07:21

## 2020-11-30 RX ADMIN — ROPIVACAINE HYDROCHLORIDE 30 ML: 5 INJECTION, SOLUTION EPIDURAL; INFILTRATION; PERINEURAL at 07:05

## 2020-11-30 RX ADMIN — MIDAZOLAM 2 MG: 1 INJECTION INTRAMUSCULAR; INTRAVENOUS at 07:02

## 2020-11-30 RX ADMIN — SODIUM CHLORIDE, SODIUM LACTATE, POTASSIUM CHLORIDE, AND CALCIUM CHLORIDE 75 ML/HR: .6; .31; .03; .02 INJECTION, SOLUTION INTRAVENOUS at 11:01

## 2020-11-30 RX ADMIN — TRANEXAMIC ACID 1000 MG: 1 INJECTION, SOLUTION INTRAVENOUS at 07:30

## 2020-11-30 RX ADMIN — PHENYLEPHRINE HYDROCHLORIDE 50 MCG: 10 INJECTION INTRAVENOUS at 08:13

## 2020-11-30 RX ADMIN — CHLORHEXIDINE GLUCONATE 0.12% ORAL RINSE 15 ML: 1.2 LIQUID ORAL at 05:35

## 2020-11-30 RX ADMIN — PROPOFOL 75 MCG/KG/MIN: 10 INJECTION, EMULSION INTRAVENOUS at 07:26

## 2020-11-30 RX ADMIN — SENNOSIDES 8.6 MG: 8.6 TABLET ORAL at 17:08

## 2020-11-30 RX ADMIN — SODIUM CHLORIDE, SODIUM LACTATE, POTASSIUM CHLORIDE, AND CALCIUM CHLORIDE 75 ML/HR: .6; .31; .03; .02 INJECTION, SOLUTION INTRAVENOUS at 14:45

## 2020-11-30 RX ADMIN — PHENYLEPHRINE HYDROCHLORIDE 100 MCG: 10 INJECTION INTRAVENOUS at 07:41

## 2020-11-30 RX ADMIN — PHENYLEPHRINE HYDROCHLORIDE 50 MCG: 10 INJECTION INTRAVENOUS at 08:07

## 2020-11-30 RX ADMIN — ENOXAPARIN SODIUM 40 MG: 40 INJECTION SUBCUTANEOUS at 21:20

## 2020-11-30 RX ADMIN — FOLIC ACID 1 MG: 1 TABLET ORAL at 17:09

## 2020-11-30 RX ADMIN — FERROUS SULFATE TAB 325 MG (65 MG ELEMENTAL FE) 325 MG: 325 (65 FE) TAB at 17:09

## 2020-11-30 RX ADMIN — SODIUM CHLORIDE 125 ML/HR: 0.9 INJECTION, SOLUTION INTRAVENOUS at 05:45

## 2020-12-01 VITALS
DIASTOLIC BLOOD PRESSURE: 65 MMHG | HEIGHT: 62 IN | SYSTOLIC BLOOD PRESSURE: 101 MMHG | TEMPERATURE: 98.1 F | BODY MASS INDEX: 36.25 KG/M2 | RESPIRATION RATE: 18 BRPM | HEART RATE: 60 BPM | OXYGEN SATURATION: 98 % | WEIGHT: 197 LBS

## 2020-12-01 PROBLEM — M17.11 PRIMARY OSTEOARTHRITIS OF RIGHT KNEE: Status: ACTIVE | Noted: 2019-09-03

## 2020-12-01 PROBLEM — D62 ACUTE BLOOD LOSS ANEMIA: Status: ACTIVE | Noted: 2020-12-01

## 2020-12-01 LAB
ANION GAP SERPL CALCULATED.3IONS-SCNC: 5 MMOL/L (ref 4–13)
BUN SERPL-MCNC: 25 MG/DL (ref 5–25)
CALCIUM SERPL-MCNC: 7.8 MG/DL (ref 8.3–10.1)
CHLORIDE SERPL-SCNC: 109 MMOL/L (ref 100–108)
CO2 SERPL-SCNC: 25 MMOL/L (ref 21–32)
CREAT SERPL-MCNC: 0.85 MG/DL (ref 0.6–1.3)
GFR SERPL CREATININE-BSD FRML MDRD: 75 ML/MIN/1.73SQ M
GLUCOSE SERPL-MCNC: 97 MG/DL (ref 65–140)
HCT VFR BLD AUTO: 33.9 % (ref 34.8–46.1)
HGB BLD-MCNC: 10.4 G/DL (ref 11.5–15.4)
POTASSIUM SERPL-SCNC: 4.3 MMOL/L (ref 3.5–5.3)
SODIUM SERPL-SCNC: 139 MMOL/L (ref 136–145)

## 2020-12-01 PROCEDURE — NC001 PR NO CHARGE: Performed by: PHYSICIAN ASSISTANT

## 2020-12-01 PROCEDURE — 85018 HEMOGLOBIN: CPT | Performed by: PHYSICIAN ASSISTANT

## 2020-12-01 PROCEDURE — 80048 BASIC METABOLIC PNL TOTAL CA: CPT | Performed by: PHYSICIAN ASSISTANT

## 2020-12-01 PROCEDURE — 97530 THERAPEUTIC ACTIVITIES: CPT

## 2020-12-01 PROCEDURE — 97116 GAIT TRAINING THERAPY: CPT

## 2020-12-01 PROCEDURE — 85014 HEMATOCRIT: CPT | Performed by: PHYSICIAN ASSISTANT

## 2020-12-01 PROCEDURE — 97110 THERAPEUTIC EXERCISES: CPT

## 2020-12-01 PROCEDURE — 99024 POSTOP FOLLOW-UP VISIT: CPT | Performed by: PHYSICIAN ASSISTANT

## 2020-12-01 PROCEDURE — 99252 IP/OBS CONSLTJ NEW/EST SF 35: CPT | Performed by: PHYSICIAN ASSISTANT

## 2020-12-01 PROCEDURE — 97166 OT EVAL MOD COMPLEX 45 MIN: CPT

## 2020-12-01 RX ORDER — OXYCODONE HYDROCHLORIDE 5 MG/1
5 TABLET ORAL EVERY 4 HOURS PRN
Qty: 50 TABLET | Refills: 0 | Status: SHIPPED | OUTPATIENT
Start: 2020-12-01 | End: 2020-12-11

## 2020-12-01 RX ADMIN — SENNOSIDES 8.6 MG: 8.6 TABLET ORAL at 08:44

## 2020-12-01 RX ADMIN — OXYCODONE HYDROCHLORIDE 5 MG: 5 TABLET ORAL at 08:48

## 2020-12-01 RX ADMIN — FOLIC ACID 1 MG: 1 TABLET ORAL at 08:43

## 2020-12-01 RX ADMIN — OXYCODONE HYDROCHLORIDE 10 MG: 10 TABLET ORAL at 13:10

## 2020-12-01 RX ADMIN — PANTOPRAZOLE SODIUM 40 MG: 40 TABLET, DELAYED RELEASE ORAL at 06:09

## 2020-12-01 RX ADMIN — FERROUS SULFATE TAB 325 MG (65 MG ELEMENTAL FE) 325 MG: 325 (65 FE) TAB at 08:43

## 2020-12-01 RX ADMIN — DOCUSATE SODIUM 100 MG: 100 CAPSULE, LIQUID FILLED ORAL at 08:49

## 2020-12-01 RX ADMIN — CEFAZOLIN SODIUM 2000 MG: 2 SOLUTION INTRAVENOUS at 08:45

## 2020-12-01 RX ADMIN — OXYCODONE HYDROCHLORIDE 5 MG: 5 TABLET ORAL at 02:42

## 2020-12-01 RX ADMIN — ENOXAPARIN SODIUM 40 MG: 40 INJECTION SUBCUTANEOUS at 08:44

## 2020-12-01 RX ADMIN — ACETAMINOPHEN 650 MG: 325 TABLET, FILM COATED ORAL at 12:02

## 2020-12-01 RX ADMIN — IRON SUCROSE 300 MG: 20 INJECTION, SOLUTION INTRAVENOUS at 10:04

## 2020-12-01 RX ADMIN — ACETAMINOPHEN 650 MG: 325 TABLET, FILM COATED ORAL at 06:08

## 2020-12-01 RX ADMIN — TRAMADOL HYDROCHLORIDE 50 MG: 50 TABLET, FILM COATED ORAL at 06:09

## 2020-12-01 RX ADMIN — TRAMADOL HYDROCHLORIDE 50 MG: 50 TABLET, FILM COATED ORAL at 12:02

## 2020-12-02 ENCOUNTER — TRANSITIONAL CARE MANAGEMENT (OUTPATIENT)
Dept: FAMILY MEDICINE CLINIC | Facility: CLINIC | Age: 60
End: 2020-12-02

## 2020-12-03 ENCOUNTER — TELEPHONE (OUTPATIENT)
Dept: OBGYN CLINIC | Facility: HOSPITAL | Age: 60
End: 2020-12-03

## 2020-12-04 ENCOUNTER — TELEPHONE (OUTPATIENT)
Dept: OBGYN CLINIC | Facility: HOSPITAL | Age: 60
End: 2020-12-04

## 2020-12-08 ENCOUNTER — OFFICE VISIT (OUTPATIENT)
Dept: OBGYN CLINIC | Facility: MEDICAL CENTER | Age: 60
End: 2020-12-08

## 2020-12-08 ENCOUNTER — APPOINTMENT (OUTPATIENT)
Dept: RADIOLOGY | Facility: MEDICAL CENTER | Age: 60
End: 2020-12-08
Payer: COMMERCIAL

## 2020-12-08 VITALS
TEMPERATURE: 98.5 F | DIASTOLIC BLOOD PRESSURE: 97 MMHG | HEART RATE: 71 BPM | SYSTOLIC BLOOD PRESSURE: 146 MMHG | BODY MASS INDEX: 36.25 KG/M2 | WEIGHT: 197 LBS | HEIGHT: 62 IN

## 2020-12-08 DIAGNOSIS — Z96.651 S/P TOTAL KNEE ARTHROPLASTY, RIGHT: Primary | ICD-10-CM

## 2020-12-08 DIAGNOSIS — Z96.651 S/P TOTAL KNEE ARTHROPLASTY, RIGHT: ICD-10-CM

## 2020-12-08 PROCEDURE — 73562 X-RAY EXAM OF KNEE 3: CPT

## 2020-12-08 PROCEDURE — 3008F BODY MASS INDEX DOCD: CPT | Performed by: PHYSICIAN ASSISTANT

## 2020-12-08 PROCEDURE — 99024 POSTOP FOLLOW-UP VISIT: CPT | Performed by: ORTHOPAEDIC SURGERY

## 2020-12-11 ENCOUNTER — TELEPHONE (OUTPATIENT)
Dept: OBGYN CLINIC | Facility: HOSPITAL | Age: 60
End: 2020-12-11

## 2020-12-15 ENCOUNTER — EVALUATION (OUTPATIENT)
Dept: PHYSICAL THERAPY | Facility: CLINIC | Age: 60
End: 2020-12-15
Payer: COMMERCIAL

## 2020-12-15 DIAGNOSIS — Z96.651 S/P TOTAL KNEE ARTHROPLASTY, RIGHT: Primary | ICD-10-CM

## 2020-12-15 PROCEDURE — 97110 THERAPEUTIC EXERCISES: CPT | Performed by: PHYSICAL THERAPIST

## 2020-12-15 PROCEDURE — 97161 PT EVAL LOW COMPLEX 20 MIN: CPT | Performed by: PHYSICAL THERAPIST

## 2020-12-16 ENCOUNTER — APPOINTMENT (OUTPATIENT)
Dept: PHYSICAL THERAPY | Facility: CLINIC | Age: 60
End: 2020-12-16
Payer: COMMERCIAL

## 2020-12-17 ENCOUNTER — APPOINTMENT (OUTPATIENT)
Dept: PHYSICAL THERAPY | Facility: CLINIC | Age: 60
End: 2020-12-17
Payer: COMMERCIAL

## 2020-12-18 ENCOUNTER — OFFICE VISIT (OUTPATIENT)
Dept: PHYSICAL THERAPY | Facility: CLINIC | Age: 60
End: 2020-12-18
Payer: COMMERCIAL

## 2020-12-18 DIAGNOSIS — M17.0 PRIMARY OSTEOARTHRITIS OF BOTH KNEES: ICD-10-CM

## 2020-12-18 DIAGNOSIS — Z96.651 S/P TOTAL KNEE ARTHROPLASTY, RIGHT: Primary | ICD-10-CM

## 2020-12-18 PROCEDURE — 97110 THERAPEUTIC EXERCISES: CPT | Performed by: PHYSICAL THERAPIST

## 2020-12-18 PROCEDURE — 97140 MANUAL THERAPY 1/> REGIONS: CPT | Performed by: PHYSICAL THERAPIST

## 2020-12-18 PROCEDURE — 97530 THERAPEUTIC ACTIVITIES: CPT | Performed by: PHYSICAL THERAPIST

## 2020-12-21 ENCOUNTER — OFFICE VISIT (OUTPATIENT)
Dept: PHYSICAL THERAPY | Facility: CLINIC | Age: 60
End: 2020-12-21
Payer: COMMERCIAL

## 2020-12-21 DIAGNOSIS — Z96.651 S/P TOTAL KNEE ARTHROPLASTY, RIGHT: Primary | ICD-10-CM

## 2020-12-21 DIAGNOSIS — M17.0 PRIMARY OSTEOARTHRITIS OF BOTH KNEES: ICD-10-CM

## 2020-12-21 PROCEDURE — 97530 THERAPEUTIC ACTIVITIES: CPT

## 2020-12-21 PROCEDURE — 97110 THERAPEUTIC EXERCISES: CPT

## 2020-12-21 PROCEDURE — 97140 MANUAL THERAPY 1/> REGIONS: CPT

## 2020-12-23 ENCOUNTER — OFFICE VISIT (OUTPATIENT)
Dept: PHYSICAL THERAPY | Facility: CLINIC | Age: 60
End: 2020-12-23
Payer: COMMERCIAL

## 2020-12-23 DIAGNOSIS — Z96.651 S/P TOTAL KNEE ARTHROPLASTY, RIGHT: Primary | ICD-10-CM

## 2020-12-23 DIAGNOSIS — M17.0 PRIMARY OSTEOARTHRITIS OF BOTH KNEES: ICD-10-CM

## 2020-12-23 PROCEDURE — 97110 THERAPEUTIC EXERCISES: CPT

## 2020-12-23 PROCEDURE — 97530 THERAPEUTIC ACTIVITIES: CPT

## 2020-12-23 PROCEDURE — 97140 MANUAL THERAPY 1/> REGIONS: CPT

## 2020-12-28 ENCOUNTER — OFFICE VISIT (OUTPATIENT)
Dept: PHYSICAL THERAPY | Facility: CLINIC | Age: 60
End: 2020-12-28
Payer: COMMERCIAL

## 2020-12-28 DIAGNOSIS — Z96.651 S/P TOTAL KNEE ARTHROPLASTY, RIGHT: Primary | ICD-10-CM

## 2020-12-28 PROCEDURE — 97110 THERAPEUTIC EXERCISES: CPT | Performed by: PHYSICAL THERAPIST

## 2020-12-28 PROCEDURE — 97140 MANUAL THERAPY 1/> REGIONS: CPT | Performed by: PHYSICAL THERAPIST

## 2020-12-28 PROCEDURE — 97530 THERAPEUTIC ACTIVITIES: CPT | Performed by: PHYSICAL THERAPIST

## 2020-12-30 ENCOUNTER — OFFICE VISIT (OUTPATIENT)
Dept: PHYSICAL THERAPY | Facility: CLINIC | Age: 60
End: 2020-12-30
Payer: COMMERCIAL

## 2020-12-30 DIAGNOSIS — Z96.651 S/P TOTAL KNEE ARTHROPLASTY, RIGHT: Primary | ICD-10-CM

## 2020-12-30 PROCEDURE — 97110 THERAPEUTIC EXERCISES: CPT | Performed by: PHYSICAL THERAPIST

## 2020-12-30 PROCEDURE — 97530 THERAPEUTIC ACTIVITIES: CPT | Performed by: PHYSICAL THERAPIST

## 2020-12-30 PROCEDURE — 97140 MANUAL THERAPY 1/> REGIONS: CPT | Performed by: PHYSICAL THERAPIST

## 2021-01-04 ENCOUNTER — OFFICE VISIT (OUTPATIENT)
Dept: PHYSICAL THERAPY | Facility: CLINIC | Age: 61
End: 2021-01-04
Payer: COMMERCIAL

## 2021-01-04 DIAGNOSIS — Z96.651 S/P TOTAL KNEE ARTHROPLASTY, RIGHT: Primary | ICD-10-CM

## 2021-01-04 PROCEDURE — 97530 THERAPEUTIC ACTIVITIES: CPT | Performed by: PHYSICAL THERAPIST

## 2021-01-04 PROCEDURE — 97140 MANUAL THERAPY 1/> REGIONS: CPT | Performed by: PHYSICAL THERAPIST

## 2021-01-04 PROCEDURE — 97110 THERAPEUTIC EXERCISES: CPT | Performed by: PHYSICAL THERAPIST

## 2021-01-04 NOTE — PROGRESS NOTES
Daily Note     Today's date: 2021  Patient name: Marisela Zuñiga  : 1960  MRN: [id number]  Referring provider: Stormy Gazron MD  Dx:   Encounter Diagnosis     ICD-10-CM    1  S/P total knee arthroplasty, right  Z96 651                   Subjective: "I'm only using the cane when I go out for a long walk "      Objective: See treatment diary below      Assessment: Pt had good tolerance to progression of program  Able to complete 8" step ups and had better tolerance to manual ext stretch  Plan: Continue per plan of care        Precautions: none      Manuals 12/15 12/18 12/21 12/23 12/28 12/30 1/4      PROM to hammad  10'SZ 10' MO 10' MO 8' ext only 8' ext only 8' ext only                                             Neuro Re-Ed             SLS      x30" x30"      Tandem      x30" x30"      Aeromat             Toe taps on cones             Heel/toe walking                                       Ther Ex             Recumbent bike  10' 10' 10' 8' 8' 8'      Quad sets 10x10" 10x10" 10x10" 10x10"  HEP       SAQ 5"x15 5"x15 5"x15 5"x20 5"x20 5"x20 HEP      Supine SLR x15 x15 x15 x16 x20 x20 x20      SL clamshells      NV 5"x15      Bridging x15 x15 x15 x15 x20 x20 DC      Stair stretches (ext/gastroc)  Step flex FF/HS stretch 3x15" FF/HS  3x30"  ea Ext/gastroc  3x30"  ea 3x30" ea 3x30" ea 3x30" ea      Standing SLR 3 ways    x10ea  R only x20ea B x20ea B x20ea B      ALBARO     R x10 x20 x20 x20      Heel/toe walking     x1 lap ea x2 laps ea x2 laps ea      Heel slides  10x15" ea  10x15"  ea  10x15"  w/strap x20 x20 x20      Ther Activity             Step ups (f/l)    6"x15  ea 6"x15ea 6"x15ea 8"x15ea      Sit to stand  10x 12x 12x airex on seat x15 airex on seat x15 airex on seat x20 without     BOSU step ups      NV x15      Squats     x15 x20 x20      Step overs             Eccentric leg lowering             Leg press             Stairs  Up step over, down step to 2 flights with B UE hold 2 flights

## 2021-01-07 ENCOUNTER — OFFICE VISIT (OUTPATIENT)
Dept: PHYSICAL THERAPY | Facility: CLINIC | Age: 61
End: 2021-01-07
Payer: COMMERCIAL

## 2021-01-07 DIAGNOSIS — Z96.651 S/P TOTAL KNEE ARTHROPLASTY, RIGHT: Primary | ICD-10-CM

## 2021-01-07 DIAGNOSIS — M17.0 PRIMARY OSTEOARTHRITIS OF BOTH KNEES: ICD-10-CM

## 2021-01-07 PROCEDURE — 97110 THERAPEUTIC EXERCISES: CPT

## 2021-01-07 PROCEDURE — 97140 MANUAL THERAPY 1/> REGIONS: CPT

## 2021-01-07 PROCEDURE — 97530 THERAPEUTIC ACTIVITIES: CPT

## 2021-01-07 NOTE — PROGRESS NOTES
Daily Note     Today's date: 2021  Patient name: Shanice Linton  : 1960  MRN: [id number]  Referring provider: Poppy Dunlap MD  Dx:   Encounter Diagnosis     ICD-10-CM    1  S/P total knee arthroplasty, right  Z96 651    2  Primary osteoarthritis of both knees  M17 0                   Subjective: "Not a whole lot, it depends on what I'm doing "      Objective: See treatment diary below       Assessment: Performed exercise program w/o complaint of pain  End reps 8" step ups were challenging, 2* fatigue  Able to hammad manual extension stretch  Tolerated treatment well  Will monitor  Patient would benefit from continued PT      Plan: Continue per plan of care        Precautions: none      Manuals 12/15 12/18 12/21 12/23 12/28 12/30 1/4 1/7     PROM to hammad  10'SZ 10' MO 10' MO 8' ext only 8' ext only 8' ext only 8' ext  only                                            Neuro Re-Ed             SLS      x30" x30" x30"     Tandem      x30" x30" x30"ea     Aeromat             Toe taps on cones             Heel/toe walking                                       Ther Ex             Recumbent bike  10' 10' 10' 8' 8' 8' 8'     Quad sets 10x10" 10x10" 10x10" 10x10"  HEP  HEP     SAQ 5"x15 5"x15 5"x15 5"x20 5"x20 5"x20 HEP HEP     Supine SLR x15 x15 x15 x16 x20 x20 x20 x20     SL clamshells      NV 5"x15 5"x15     Bridging x15 x15 x15 x15 x20 x20 DC ----     Stair stretches (ext/gastroc)  Step flex FF/HS stretch 3x15" FF/HS  3x30"  ea Ext/gastroc  3x30"  ea 3x30" ea 3x30" ea 3x30" ea 3x30"  ea     Standing SLR 3 ways    x10ea  R only x20ea B x20ea B x20ea B x20ea  B     ALBARO     R x10 x20 x20 x20  x20     Heel/toe walking     x1 lap ea x2 laps ea x2 laps ea  x2 laps   ea     Heel slides  10x15" ea  10x15"  ea  10x15"  w/strap x20 x20 x20  x20     Ther Activity             Step ups (f/l)    6"x15  ea 6"x15ea 6"x15ea 8"x15ea 8" x15  ea     Sit to stand  10x 12x 12x airex on seat x15 airex on seat x15 airex on seat x20 Without  airex  x15     BOSU step ups      NV x15 x15     Squats     x15 x20 x20 x20     Step overs             Eccentric leg lowering             Leg press             Stairs  Up step over, down step to 2 flights with B UE hold 2 flights

## 2021-01-11 ENCOUNTER — OFFICE VISIT (OUTPATIENT)
Dept: PHYSICAL THERAPY | Facility: CLINIC | Age: 61
End: 2021-01-11
Payer: COMMERCIAL

## 2021-01-11 DIAGNOSIS — Z96.651 S/P TOTAL KNEE ARTHROPLASTY, RIGHT: Primary | ICD-10-CM

## 2021-01-11 DIAGNOSIS — M17.0 PRIMARY OSTEOARTHRITIS OF BOTH KNEES: ICD-10-CM

## 2021-01-11 PROCEDURE — 97530 THERAPEUTIC ACTIVITIES: CPT

## 2021-01-11 PROCEDURE — 97140 MANUAL THERAPY 1/> REGIONS: CPT

## 2021-01-11 PROCEDURE — 97110 THERAPEUTIC EXERCISES: CPT

## 2021-01-11 NOTE — PROGRESS NOTES
Daily Note     Today's date: 2021  Patient name: Tang Martinez  : 1960  MRN: [id number]  Referring provider: Samuel River MD  Dx:   Encounter Diagnosis     ICD-10-CM    1  S/P total knee arthroplasty, right  Z96 651    2  Primary osteoarthritis of both knees  M17 0                   Subjective: "It hurt" over the weekend, adding "it feels really tight " Notes "the bottom of my feet hurt," adding "I can't break this waddling when I walk "      Objective: See treatment diary below      Assessment: Performed exercise progressions w/o complaint, concentrated on maintaining level hips during same  Able to hammad manual extension stretch  Tolerated treatment well  Will monitor  Patient would benefit from continued PT      Plan: Continue per plan of care        Precautions: none      Manuals 12/15 12/18 12/21 12/23 12/28 12/30 1/4 1/7 1/11    PROM to hammad  10'SZ 10' MO 10' MO 8' ext only 8' ext only 8' ext only 8' ext  only 8' ext  only                                           Neuro Re-Ed             SLS      x30" x30" x30" x30"    Tandem      x30" x30" x30"ea x30"ea    Aeromat             Toe taps on cones             Heel/toe walking                                       Ther Ex             Recumbent bike  10' 10' 10' 8' 8' 8' 8' 8'    Quad sets 10x10" 10x10" 10x10" 10x10"  HEP  HEP HEP    SAQ 5"x15 5"x15 5"x15 5"x20 5"x20 5"x20 HEP HEP HEP    Supine SLR x15 x15 x15 x16 x20 x20 x20 x20 x20    SL clamshells      NV 5"x15 5"x15 5"x15    Bridging x15 x15 x15 x15 x20 x20 DC ---- ----    Stair stretches (ext/gastroc)  Step flex FF/HS stretch 3x15" FF/HS  3x30"  ea Ext/gastroc  3x30"  ea 3x30" ea 3x30" ea 3x30" ea 3x30"  ea 3x30"  ea    Standing SLR 3 ways    x10ea  R only x20ea B x20ea B x20ea B x20ea  B x20ea  B    ALBARO     R x10 x20 x20 x20  x20  x20    Heel/toe walking     x1 lap ea x2 laps ea x2 laps ea  x2 laps   ea  x2 laps  ea    Heel slides  10x15" ea  10x15"  ea  10x15"  w/strap x20 x20 x20  x20  x20 Ther Activity             Step ups (f/l)    6"x15  ea 6"x15ea 6"x15ea 8"x15ea 8" x15  ea 8"x20  ea    Sit to stand  10x 12x 12x airex on seat x15 airex on seat x15 airex on seat x20 Without  airex  x15 W/o  airex  X15, no  UE    BOSU step ups      NV x15 x15  x15    Squats     x15 x20 x20 x20 x20    Step overs             Eccentric leg lowering             Leg press             Stairs  Up step over, down step to 2 flights with B UE hold 2 flights

## 2021-01-14 ENCOUNTER — OFFICE VISIT (OUTPATIENT)
Dept: PHYSICAL THERAPY | Facility: CLINIC | Age: 61
End: 2021-01-14
Payer: COMMERCIAL

## 2021-01-14 DIAGNOSIS — M17.0 PRIMARY OSTEOARTHRITIS OF BOTH KNEES: ICD-10-CM

## 2021-01-14 DIAGNOSIS — Z96.651 S/P TOTAL KNEE ARTHROPLASTY, RIGHT: Primary | ICD-10-CM

## 2021-01-14 PROCEDURE — 97530 THERAPEUTIC ACTIVITIES: CPT

## 2021-01-14 PROCEDURE — 97110 THERAPEUTIC EXERCISES: CPT

## 2021-01-14 PROCEDURE — 97140 MANUAL THERAPY 1/> REGIONS: CPT

## 2021-01-14 NOTE — PROGRESS NOTES
Daily Note     Today's date: 2021  Patient name: Sudarshan Sousa  : 1960  MRN: [id number]  Referring provider: Elizabeth Park MD  Dx:   Encounter Diagnosis     ICD-10-CM    1  S/P total knee arthroplasty, right  Z96 651    2  Primary osteoarthritis of both knees  M17 0                   Subjective: "It has it's moments "        Objective: See treatment diary below      Assessment: New exercise and ex progressions w/o difficulty or discomfort  Able to hammad passive extension  Tolerated treatment well  Patient would benefit from continued PT      Plan: Continue per plan of care        Precautions: none      Manuals 12/15 12/18 12/21 12/23 12/28 12/30 1/4 1/7 1/11 1/14   PROM to hammad  10'SZ 10' MO 10' MO 8' ext only 8' ext only 8' ext only 8' ext  only 8' ext  only 8' ext  only                                          Neuro Re-Ed             SLS      x30" x30" x30" x30" x30"   Tandem      x30" x30" x30"ea x30"ea x30"ea   Aeromat             Toe taps on cones          x15  ea   Heel/toe walking                                       Ther Ex             Recumbent bike  10' 10' 10' 8' 8' 8' 8' 8' 8'   Quad sets 10x10" 10x10" 10x10" 10x10"  HEP  HEP HEP ----   SAQ 5"x15 5"x15 5"x15 5"x20 5"x20 5"x20 HEP HEP HEP ----   Supine SLR x15 x15 x15 x16 x20 x20 x20 x20 x20 x20   SL clamshells      NV 5"x15 5"x15 5"x15 5"x15   Bridging x15 x15 x15 x15 x20 x20 DC ---- ---- ----   Stair stretches (ext/gastroc)  Step flex FF/HS stretch 3x15" FF/HS  3x30"  ea Ext/gastroc  3x30"  ea 3x30" ea 3x30" ea 3x30" ea 3x30"  ea 3x30"  ea 3x30"  ea   Standing SLR 3 ways    x10ea  R only x20ea B x20ea B x20ea B x20ea  B x20ea  B x20ea  B   ALBARO     R x10 x20 x20 x20  x20  x20 x20   Heel/toe walking     x1 lap ea x2 laps ea x2 laps ea  x2 laps   ea  x2 laps  ea  x2 laps   ea   Heel slides  10x15" ea  10x15"  ea  10x15"  w/strap x20 x20 x20  x20  x20  x20   Ther Activity             Step ups (f/l)    6"x15  ea 6"x15ea 6"x15ea 8"x15ea 8" x15  ea 8"x20  ea 8"x 20  ea   Sit to stand  10x 12x 12x airex on seat x15 airex on seat x15 airex on seat x20 Without  airex  x15 W/o  airex  X15, no  UE W/o airex  x25  No UE   BOSU step ups      NV x15 x15  x15 x20   Squats     x15 x20 x20 x20 x20 x20   Step overs             Eccentric leg lowering             Leg press             Stairs  Up step over, down step to 2 flights with B UE hold 2 flights

## 2021-01-18 ENCOUNTER — OFFICE VISIT (OUTPATIENT)
Dept: PHYSICAL THERAPY | Facility: CLINIC | Age: 61
End: 2021-01-18
Payer: COMMERCIAL

## 2021-01-18 DIAGNOSIS — M17.0 PRIMARY OSTEOARTHRITIS OF BOTH KNEES: ICD-10-CM

## 2021-01-18 DIAGNOSIS — Z96.651 S/P TOTAL KNEE ARTHROPLASTY, RIGHT: Primary | ICD-10-CM

## 2021-01-18 PROCEDURE — 97140 MANUAL THERAPY 1/> REGIONS: CPT

## 2021-01-18 PROCEDURE — 97110 THERAPEUTIC EXERCISES: CPT

## 2021-01-18 PROCEDURE — 97530 THERAPEUTIC ACTIVITIES: CPT

## 2021-01-18 NOTE — PROGRESS NOTES
Daily Note     Today's date: 2021  Patient name: Alexandro Lopez  : 1960  MRN: [id number]  Referring provider: Andrea Do MD  Dx:   Encounter Diagnosis     ICD-10-CM    1  S/P total knee arthroplasty, right  Z96 651    2  Primary osteoarthritis of both knees  M17 0                   Subjective: "It's a little stiff today "       Objective: See treatment diary below      Assessment: Performed exercise program w/o increasing symptoms  Able to hammad manual therapy  Tolerated treatment well  Will monitor  Patient would benefit from continued PT      Plan: Continue per plan of care        Precautions: none      Manuals    PROM to hammad 8' Ext  only  10' MO 10' MO 8' ext only 8' ext only 8' ext only 8' ext  only 8' ext  only 8' ext  only                                          Neuro Re-Ed             SLS x30"     x30" x30" x30" x30" x30"   Tandem X30" ea     x30" x30" x30"ea x30"ea x30"ea   Aeromat             Toe taps on cones x15ea         x15  ea   Heel/toe walking                                       Ther Ex             Recumbent bike 8'  10' 10' 8' 8' 8' 8' 8' 8'   Quad sets ----  10x10" 10x10"  HEP  HEP HEP ----   SAQ ----  5"x15 5"x20 5"x20 5"x20 HEP HEP HEP ----   Supine SLR x20  x15 x16 x20 x20 x20 x20 x20 x20   SL clamshells      NV 5"x15 5"x15 5"x15 5"x15   Bridging ----  x15 x15 x20 x20 DC ---- ---- ----   Stair stretches (ext/gastroc) 3x30"  ea  FF/HS  3x30"  ea Ext/gastroc  3x30"  ea 3x30" ea 3x30" ea 3x30" ea 3x30"  ea 3x30"  ea 3x30"  ea   Standing SLR 3 ways x20ea  B   x10ea  R only x20ea B x20ea B x20ea B x20ea  B x20ea  B x20ea  B   ALBARO x20    R x10 x20 x20 x20  x20  x20 x20   Heel/toe walking x2 laps  ea    x1 lap ea x2 laps ea x2 laps ea  x2 laps   ea  x2 laps  ea  x2 laps   ea   Heel slides x20   10x15"  ea  10x15"  w/strap x20 x20 x20  x20  x20  x20   Ther Activity             Step ups (f/l) 8"x20  ea   6"x15  ea 6"x15ea 6"x15ea 8"x15ea 8" x15  ea 8"x20  ea 8"x 20  ea   Sit to stand W/o  airex  x20  12x 12x airex on seat x15 airex on seat x15 airex on seat x20 Without  airex  x15 W/o  airex  X15, no  UE W/o airex  x25  No UE   BOSU step ups x20     NV x15 x15  x15 x20   Squats x20    x15 x20 x20 x20 x20 x20   Step overs             Eccentric leg lowering             Leg press             Stairs   2 flights

## 2021-01-19 ENCOUNTER — OFFICE VISIT (OUTPATIENT)
Dept: OBGYN CLINIC | Facility: MEDICAL CENTER | Age: 61
End: 2021-01-19
Payer: COMMERCIAL

## 2021-01-19 VITALS
HEART RATE: 57 BPM | SYSTOLIC BLOOD PRESSURE: 149 MMHG | DIASTOLIC BLOOD PRESSURE: 83 MMHG | HEIGHT: 62 IN | WEIGHT: 197 LBS | BODY MASS INDEX: 36.25 KG/M2

## 2021-01-19 DIAGNOSIS — Z96.651 S/P TOTAL KNEE ARTHROPLASTY, RIGHT: Primary | ICD-10-CM

## 2021-01-19 PROCEDURE — 20610 DRAIN/INJ JOINT/BURSA W/O US: CPT | Performed by: PHYSICIAN ASSISTANT

## 2021-01-19 PROCEDURE — 99024 POSTOP FOLLOW-UP VISIT: CPT | Performed by: PHYSICIAN ASSISTANT

## 2021-01-19 RX ORDER — BUPIVACAINE HYDROCHLORIDE 2.5 MG/ML
4 INJECTION, SOLUTION INFILTRATION; PERINEURAL
Status: COMPLETED | OUTPATIENT
Start: 2021-01-19 | End: 2021-01-19

## 2021-01-19 RX ORDER — METHYLPREDNISOLONE ACETATE 40 MG/ML
1 INJECTION, SUSPENSION INTRA-ARTICULAR; INTRALESIONAL; INTRAMUSCULAR; SOFT TISSUE
Status: COMPLETED | OUTPATIENT
Start: 2021-01-19 | End: 2021-01-19

## 2021-01-19 RX ADMIN — BUPIVACAINE HYDROCHLORIDE 4 ML: 2.5 INJECTION, SOLUTION INFILTRATION; PERINEURAL at 12:07

## 2021-01-19 RX ADMIN — METHYLPREDNISOLONE ACETATE 1 ML: 40 INJECTION, SUSPENSION INTRA-ARTICULAR; INTRALESIONAL; INTRAMUSCULAR; SOFT TISSUE at 12:07

## 2021-01-19 NOTE — LETTER
January 19, 2021     Patient: Funmilayo Mesa   YOB: 1960   Date of Visit: 1/19/2021       To Whom it May Concern:    Jose Landaverde is under my professional care  She was seen in my office on 1/19/2021  She will be cleared to return to work February 23rd 2021  At that time she should be able to work 25 hours a week  This may need to be adjusted based on her progress over the next 6 weeks  If you have any questions or concerns, please don't hesitate to call           Sincerely,          Aletha Spurling, MD        CC: Marden Hodgkins

## 2021-01-19 NOTE — PROGRESS NOTES
Orthopaedic Surgery - Office Note  Tairq Diaz (71 y o  female)   : 1960   MRN: [id number]  Encounter Date: 2021    Chief Complaint   Patient presents with    Right Knee - Follow-up       Assessment / Plan  Right total knee arthroplasty 20 and osteoarthritis left knee    · Continue outpatient physical therapy with focus on hip and thigh strengthening and gait training  Patient did receive an updated PT script stating this  · We did discuss treatment options for osteoarthritis of the knee and concern for the left knee discomfort she was having  These included conservative versus surgical treatment  After discussion of risk and benefits the patient did agree to proceed with a steroid injection in the left knee  This was injected sterilely and tolerated well  · Continue with ice and analgesics as needed for both knees  · We did discuss that the small patch of numbness skin over the lateral aspect of the knee was normal at this point and will hopefully continue to resolve but we will continue to watch at this time  · Patient was provided with a note for return to work on 2021  At that time we expect her to go back to 25 hours a week but we will adjust it based on her progress over the next 6 weeks  Return in about 6 weeks (around 3/2/2021)  History of Present Illness  Tariq Diaz is a 61 y o  female status post right knee arthroplasty on 2020 and osteoarthritis of the left knee  She states that she has been becoming more more limited with the left knee medial joint line pain over the last few weeks  Her right knee has continue to improve and she feels she is tolerating the physical therapy without much issue  She is walking frequently and does have some concern about the way that she wobbles  She denies any distal numbness or tingling but does have a patch of numb skin over the lateral aspect of the right knee         Review of Systems  Pertinent items are noted in HPI  All other systems were reviewed and are negative  Physical Exam  /83   Pulse 57   Ht 5' 2" (1 575 m)   Wt 89 4 kg (197 lb)   BMI 36 03 kg/m²   Cons: Appears well  No apparent distress  Psych: Alert  Oriented x3  Mood and affect normal   Eyes: PERRLA, EOMI  Resp: Normal effort  No audible wheezing or stridor  CV: Palpable pulse  No discernable arrhythmia  No LE edema  Lymph:  No palpable cervical, axillary, or inguinal lymphadenopathy  Skin: Warm  No palpable masses  No visible lesions  Neuro: Normal muscle tone  Normal and symmetric DTR's  Right Knee Exam  Alignment:  Normal knee alignment  Inspection:  No edema  No erythema  No ecchymosis  normal PO swelling Incisions clean and dry  Palpation:  No tenderness  calf is soft and supple   ROM:  Knee Extension 0°  Knee Flexion 125°  Strength:   Patient able to straight leg raise without lag  Stability:  No objective knee instability  Stable Varus / Valgus stress, Lachman, and Posterior drawer  Tests:  No pertinent positive or negative tests  Patella:  Not tested  Neurovascular:  Sensation intact in DP/SP/Gill/Sa/T nerve distributions  2+ DP & PT pulses  Gait:  Trendelenberg  Left Knee Exam  Alignment:  Normal knee alignment  Inspection:  No swelling  Palpation:  Moderate tenderness at Medial joint line of the right knee  ROM:  Knee Extension 0°  Knee Flexion 120°  Strength:  5/5 quadriceps and hamstrings  Stability:  No objective knee instability  Stable Varus / Valgus stress, Lachman, and Posterior drawer  Tests:  (+) Miracle  Patella:  Patella tracks centrally with crepitus  Neurovascular:  Sensation intact in DP/SP/Gill/Sa/T nerve distributions  Sensation intact in all digital nerve distributions  Toes warm and perfused  Gait:  Trendelenberg  Studies Reviewed  I have personally reviewed pertinent films in PACS    XR of right knee - From 12/08/2020 no fracture or dislocation, prothesis remain in acceptable alignment and position   XR of bilateral knee - From 2019 shows moderate medial joint line narrowing in the left knee with severe narrowing in the lateral joint line of the right knee  Large joint arthrocentesis: L knee  Universal Protocol:  Consent: Verbal consent obtained  Consent given by: patient    Supporting Documentation  Indications: pain   Procedure Details  Location: knee - L knee  Needle size: 25 G  Approach: anterolateral  Medications administered: 4 mL bupivacaine 0 25 %; 1 mL methylPREDNISolone acetate 40 mg/mL    Patient tolerance: patient tolerated the procedure well with no immediate complications  Dressing:  Sterile dressing applied             Medical, Surgical, Family, and Social History  The patient's medical history, family history, and social history, were reviewed and updated as appropriate      Past Medical History:   Diagnosis Date    Ambulatory dysfunction     uses walking stick    Chronic pain disorder     Claustrophobia     mild    DDD (degenerative disc disease), lumbar     Low back pain     Mild acid reflux     OA (osteoarthritis)     marissa knees    Spinal stenosis of lumbosacral region     Uterine cancer ([facility] [address] )     2017- hysterectomy and chemo    Wears glasses        Past Surgical History:   Procedure Laterality Date    COLONOSCOPY      ORTHOPEDIC SURGERY      PLANTAR FASCIA SURGERY Bilateral     [place] HYSTEROSCOPY,W/ENDO BX N/A 2/28/2017    Procedure: DILATATION AND CURETTAGE (D&C) WITH HYSTEROSCOPY,POLYPECTOMY;  Surgeon: Kev Wisdom MD;  Location: [facility];  Service: Gynecology    [place] TOTAL KNEE ARTHROPLASTY Right 11/30/2020    Procedure: ARTHROPLASTY KNEE TOTAL;  Surgeon: Tasha Loyola MD;  Location: [facility];  Service: Orthopedics    SHOULDER SURGERY Left     TONSILLECTOMY      WISDOM TOOTH EXTRACTION         Family History   Problem Relation Age of Onset    No Known Problems Mother     No Known Problems Father        Social History Occupational History    Not on file   Tobacco Use    Smoking status: Never Smoker    Smokeless tobacco: Never Used    Tobacco comment: No secondhand smoke exposure   Substance and Sexual Activity    Alcohol use: Yes     Frequency: Monthly or less     Drinks per session: 1 or 2     Binge frequency: Never     Comment: rarely    Drug use: No    Sexual activity: Not on file       Allergies   Allergen Reactions    Iv Contrast [Iodinated Diagnostic Agents] Hives    Vitamin C [Ascorbate]      Queasy stomach, loose stool         Current Outpatient Medications:     BIOTIN PO, Take 1 tablet by mouth daily, Disp: , Rfl:     Black Cohosh (REMIFEMIN MENOPAUSE PO), Take 1 tablet by mouth 2 (two) times a day, Disp: , Rfl:     Glucosamine-Chondroit-Vit C-Mn (GLUCOSAMINE CHONDR 1500 COMPLX) CAPS, Take 2 capsules by mouth daily, Disp: , Rfl:     ibuprofen (MOTRIN) 200 mg tablet, Take 200-800 mg by mouth every 6 (six) hours as needed for mild pain, Disp: , Rfl:     Naproxen Sodium 220 MG CAPS, Take 2 capsules by mouth daily, Disp: , Rfl:     Omega 3-6-9 Fatty Acids (OMEGA 3-6-9 PO), Take 1 capsule by mouth daily, Disp: , Rfl:     PANTETHINE PO, Take 1 tablet by mouth 2 (two) times a day, Disp: , Rfl:     apixaban (ELIQUIS) 2 5 mg, Take 1 tablet (2 5 mg total) by mouth 2 (two) times a day for 29 days, Disp: 58 tablet, Rfl: 0    ascorbic acid (VITAMIN C) 500 mg tablet, Take 1 tablet (500 mg total) by mouth 2 (two) times a day (Patient not taking: Reported on 11/30/2020), Disp: 60 tablet, Rfl: 1    ferrous sulfate 324 (65 Fe) mg, Take 1 tablet (324 mg total) by mouth 2 (two) times a day before meals, Disp: 60 tablet, Rfl: 0    folic acid (FOLVITE) 1 mg tablet, Take 1 tablet (1 mg total) by mouth daily, Disp: 30 tablet, Rfl: 0      Manny Amaya PA-C    Scribe Attestation    I,:   am acting as a scribe while in the presence of the attending physician :       I,:   personally performed the services described in this documentation    as scribed in my presence :

## 2021-01-21 ENCOUNTER — OFFICE VISIT (OUTPATIENT)
Dept: PHYSICAL THERAPY | Facility: CLINIC | Age: 61
End: 2021-01-21
Payer: COMMERCIAL

## 2021-01-21 DIAGNOSIS — Z96.651 S/P TOTAL KNEE ARTHROPLASTY, RIGHT: Primary | ICD-10-CM

## 2021-01-21 DIAGNOSIS — M17.0 PRIMARY OSTEOARTHRITIS OF BOTH KNEES: ICD-10-CM

## 2021-01-21 PROCEDURE — 97110 THERAPEUTIC EXERCISES: CPT

## 2021-01-21 PROCEDURE — 97140 MANUAL THERAPY 1/> REGIONS: CPT

## 2021-01-21 PROCEDURE — 97530 THERAPEUTIC ACTIVITIES: CPT

## 2021-01-21 NOTE — PROGRESS NOTES
Daily Note     Today's date: 2021  Patient name: Richie Orellana  : 1960  MRN: [id number]  Referring provider: Merari Thakkar MD  Dx:   Encounter Diagnosis     ICD-10-CM    1  S/P total knee arthroplasty, right  Z96 651    2  Primary osteoarthritis of both knees  M17 0                   Subjective: "Pretty good, I can't complain "       Objective: See treatment diary below      Assessment: Added stair climbing, required B hand rails  Remaining exercise program performed w/o difficulty or discomfort  Passive R knee extension cont to improve  Tolerated treatment well  Patient demonstrated fatigue post treatment      Plan: Continue per plan of care        Precautions: none      Manuals    PROM to hammad 8' Ext  only 8' ext  only  10' MO 8' ext only 8' ext only 8' ext only 8' ext  only 8' ext  only 8' ext  only                                          Neuro Re-Ed             SLS x30" x30"    x30" x30" x30" x30" x30"   Tandem X30" ea X30" ea    x30" x30" x30"ea x30"ea x30"ea   [employer]             Toe taps on cones x15ea x20ea        x15  ea   Heel/toe walking                                       Ther Ex             Recumbent bike 8' 8'  10' 8' 8' 8' 8' 8' 8'   Quad sets ---- ----  10x10"  HEP  HEP HEP ----   SAQ ---- ----  5"x20 5"x20 5"x20 HEP HEP HEP ----   Supine SLR x20 x20  x16 x20 x20 x20 x20 x20 x20   SL clamshells  5"x20    NV 5"x15 5"x15 5"x15 5"x15   Bridging ---- ----  x15 x20 x20 DC ---- ---- ----   Stair stretches (ext/gastroc) 3x30"  ea 3x30"  ea  Ext/gastroc  3x30"  ea 3x30" ea 3x30" ea 3x30" ea 3x30"  ea 3x30"  ea 3x30"  ea   Standing SLR 3 ways x20ea  B x20ea  B  x10ea  R only x20ea B x20ea B x20ea B x20ea  B x20ea  B x20ea  B   ALBARO x20 x20ea   R x10 x20 x20 x20  x20  x20 x20   Heel/toe walking x2 laps  ea  x2 laps   ea   x1 lap ea x2 laps ea x2 laps ea  x2 laps   ea  x2 laps  ea  x2 laps   ea   Heel slides x20  x20   10x15"  w/strap x20 x20 x20 x20  x20  x20   Ther Activity             Step ups (f/l) 8"x20  ea 8"x20  ea  6"x15  ea 6"x15ea 6"x15ea 8"x15ea 8" x15  ea 8"x20  ea 8"x 20  ea   Sit to stand W/o  airex  x20 W/o  airex  x20  12x airex on seat x15 airex on seat x15 airex on seat x20 Without  airex  x15 W/o  airex  X15, no  UE W/o airex  x25  No UE   BOSU step ups x20   x20    NV x15 x15  x15 x20   Squats x20 x20   x15 x20 x20 x20 x20 x20   Step overs  NV           Eccentric leg lowering   NV           Leg press             Stairs  x2

## 2021-01-25 ENCOUNTER — OFFICE VISIT (OUTPATIENT)
Dept: PHYSICAL THERAPY | Facility: CLINIC | Age: 61
End: 2021-01-25
Payer: COMMERCIAL

## 2021-01-25 DIAGNOSIS — M17.0 PRIMARY OSTEOARTHRITIS OF BOTH KNEES: ICD-10-CM

## 2021-01-25 DIAGNOSIS — Z96.651 S/P TOTAL KNEE ARTHROPLASTY, RIGHT: Primary | ICD-10-CM

## 2021-01-25 PROCEDURE — 97110 THERAPEUTIC EXERCISES: CPT

## 2021-01-25 PROCEDURE — 97530 THERAPEUTIC ACTIVITIES: CPT

## 2021-01-25 NOTE — PROGRESS NOTES
Daily Note     Today's date: 2021  Patient name: Sepideh Newell  : 1960  MRN: [id number]  Referring provider: Terry Beauchamp MD  Dx:   Encounter Diagnosis     ICD-10-CM    1  S/P total knee arthroplasty, right  Z96 651    2  Primary osteoarthritis of both knees  M17 0                   Subjective: pt cont to report her R knee is improving  Objective: See treatment diary below      Assessment: Able to hammad new exercises and ex progressions  Required vc'ing at times throughout same  Passive R knee extension is improving  Tolerated treatment well  Patient would benefit from continued PT      Plan: Continue per plan of care        Precautions: none      Manuals    PROM to hammad 8' Ext  only 8' ext  only 5' ext  only  8' ext only 8' ext only 8' ext only 8' ext  only 8' ext  only 8' ext  only                                          Neuro Re-Ed             SLS x30" x30" airex  30"   x30" x30" x30" x30" x30"   Tandem X30" ea X30" ea airex  30"ea   x30" x30" x30"ea x30"ea x30"ea   Toe taps on cones x15ea x20ea x20ea       x15  ea   Heel/toe walking                                       Ther Ex             Recumbent bike 8' 8' 8'  8' 8' 8' 8' 8' 8'   Quad sets ---- ---- ----   HEP  HEP HEP ----   SAQ ---- ---- ----  5"x20 5"x20 HEP HEP HEP ----   Supine SLR x20 x20 x20  x20 x20 x20 x20 x20 x20   SL clamshells  5"x20 GTB  5"x15   NV 5"x15 5"x15 5"x15 5"x15   Bridging ---- ---- ----  x20 x20 DC ---- ---- ----   Stair stretches (ext/gastroc) 3x30"  ea 3x30"  ea 3x30"  ea  3x30" ea 3x30" ea 3x30" ea 3x30"  ea 3x30"  ea 3x30"  ea   Standing SLR 3 ways x20ea  B x20ea  B airex  20ea B  x20ea B x20ea B x20ea B x20ea  B x20ea  B x20ea  B   ALBARO x20 x20ea  x20ea  x20 x20 x20  x20  x20 x20   Heel/toe walking x2 laps  ea  x2 laps   ea  x2 laps   ea  x1 lap ea x2 laps ea x2 laps ea  x2 laps   ea  x2 laps  ea  x2 laps   ea   Heel slides x20  x20  x20  x20 x20 x20  x20  x20  x20 Ther Activity             Step ups (f/l) 8"x20  ea 8"x20  ea 8"x20  ea  6"x15ea 6"x15ea 8"x15ea 8" x15  ea 8"x20  ea 8"x 20  ea   Sit to stand W/o  airex  x20 W/o  airex  x20 W/o  airex  x20  airex on seat x15 airex on seat x15 airex on seat x20 Without  airex  x15 W/o  airex  X15, no  UE W/o airex  x25  No UE   BOSU step ups x20   x20  x20   NV x15 x15  x15 x20   Squats x20 x20 x20  x15 x20 x20 x20 x20 x20   Step overs  NV 4"x10          Eccentric leg lowering   NV  4"x10          Leg press             Stairs  x2 x1

## 2021-01-28 ENCOUNTER — OFFICE VISIT (OUTPATIENT)
Dept: PHYSICAL THERAPY | Facility: CLINIC | Age: 61
End: 2021-01-28
Payer: COMMERCIAL

## 2021-01-28 DIAGNOSIS — Z96.651 S/P TOTAL KNEE ARTHROPLASTY, RIGHT: Primary | ICD-10-CM

## 2021-01-28 DIAGNOSIS — M17.0 PRIMARY OSTEOARTHRITIS OF BOTH KNEES: ICD-10-CM

## 2021-01-28 PROCEDURE — 97110 THERAPEUTIC EXERCISES: CPT

## 2021-01-28 PROCEDURE — 97530 THERAPEUTIC ACTIVITIES: CPT

## 2021-01-28 NOTE — PROGRESS NOTES
Daily Note     Today's date: 2021  Patient name: Funmilayo Mesa  : 1960  MRN: [id number]  Referring provider: Christina Wilkerson MD  Dx:   Encounter Diagnosis     ICD-10-CM    1  S/P total knee arthroplasty, right  Z96 651    2  Primary osteoarthritis of both knees  M17 0                   Subjective: "The side and bottom of my foot (R) bother me " Pt later noted she saw her podiatrist on Tuesday he added to her R orthotic and "injected the top of my foot "        Objective: See treatment diary below      Assessment: Performed exercise program w/o complaint, occasional vc'ing needed  Able to hammad passive R knee extension  Tolerated treatment well  Patient would benefit from continued PT   MB, PT examined pt's gait and her orthotics  Plan: Continue per plan of care        Precautions: none      Manuals    PROM to hammad 8' Ext  only 8' ext  only 5' ext  only 5'ext  only  8' ext only 8' ext only 8' ext  only 8' ext  only 8' ext  only                                          Neuro Re-Ed             SLS x30" x30" airex  30" airex  30"  x30" x30" x30" x30" x30"   Tandem X30" ea X30" ea airex  30"ea airex  30"ea  x30" x30" x30"ea x30"ea x30"ea   Toe taps on cones x15ea x20ea x20ea x20ea      x15  ea   Heel/toe walking                                       Ther Ex             Recumbent bike 8' 8' 8' 8'  8' 8' 8' 8' 8'   Quad sets ---- ---- ---- ----  HEP  HEP HEP ----   SAQ ---- ---- ---- ----  5"x20 HEP HEP HEP ----   Supine SLR x20 x20 x20 x20  x20 x20 x20 x20 x20   SL clamshells  5"x20 GTB  5"x15 GTB  5"x15  NV 5"x15 5"x15 5"x15 5"x15   Bridging ---- ---- ---- ----  x20 DC ---- ---- ----   Stair stretches (ext/gastroc) 3x30"  ea 3x30"  ea 3x30"  ea 3x30"  ea  3x30" ea 3x30" ea 3x30"  ea 3x30"  ea 3x30"  ea   Standing SLR 3 ways x20ea  B x20ea  B airex  20ea B airex  x20ea  x20ea B x20ea B x20ea  B x20ea  B x20ea  B   ALBARO x20 x20ea  x20ea x20ea  x20 x20  x20  x20 x20   Heel/toe walking x2 laps  ea  x2 laps   ea  x2 laps   ea  x2laps   ea  x2 laps ea x2 laps ea  x2 laps   ea  x2 laps  ea  x2 laps   ea   Heel slides x20  x20  x20  x20  x20 x20  x20  x20  x20   Ther Activity             Step ups (f/l) 8"x20  ea 8"x20  ea 8"x20  ea 8"x20  ea  6"x15ea 8"x15ea 8" x15  ea 8"x20  ea 8"x 20  ea   Sit to stand W/o  airex  x20 W/o  airex  x20 W/o  airex  x20 W/o  airex  x20  airex on seat x15 airex on seat x20 Without  airex  x15 W/o  airex  X15, no  UE W/o airex  x25  No UE   BOSU step ups x20   x20  x20  x20  NV x15 x15  x15 x20   Squats x20 x20 x20 x20  x20 x20 x20 x20 x20   Step overs  NV 4"x10 4"x15          Eccentric leg lowering   NV  4"x10   4"x15         Leg press             Stairs  x2 x1 x1

## 2021-02-01 ENCOUNTER — APPOINTMENT (OUTPATIENT)
Dept: PHYSICAL THERAPY | Facility: CLINIC | Age: 61
End: 2021-02-01
Payer: COMMERCIAL

## 2021-02-03 ENCOUNTER — OFFICE VISIT (OUTPATIENT)
Dept: PHYSICAL THERAPY | Facility: CLINIC | Age: 61
End: 2021-02-03
Payer: COMMERCIAL

## 2021-02-03 DIAGNOSIS — Z96.651 S/P TOTAL KNEE ARTHROPLASTY, RIGHT: Primary | ICD-10-CM

## 2021-02-03 PROCEDURE — 97110 THERAPEUTIC EXERCISES: CPT | Performed by: PHYSICAL THERAPIST

## 2021-02-03 PROCEDURE — 97530 THERAPEUTIC ACTIVITIES: CPT | Performed by: PHYSICAL THERAPIST

## 2021-02-03 NOTE — PROGRESS NOTES
Daily Note     Today's date: 2/3/2021  Patient name: Funmilayo Mesa  : 1960  MRN: [id number]  Referring provider: Christina Wilkerson MD  Dx:   Encounter Diagnosis     ICD-10-CM    1  S/P total knee arthroplasty, right  Z96 651                   Subjective: "I'm just stiff with this weather "      Objective: See treatment diary below      Assessment: Able to obtain 0-135 degrees of knee ROM  Considerable weakness is glute med, resulting in Trendelenburg gait  Will continue to strengthen  Plan: Continue per plan of care        Precautions: none      Manuals  23        PROM to hammad 8' Ext  only 8' ext  only 5' ext  only 5'ext  only DC                                               Neuro Re-Ed             SLS x30" x30" airex  30" airex  30" airex 30"        Tandem X30" ea X30" ea airex  30"ea airex  30"ea airex 30" ea        Toe taps on cones x15ea x20ea x20ea x20ea x20ea                                               Ther Ex             Recumbent bike 8' 8' 8' 8' upright 8'        Supine SLR x20 x20 x20 x20 2 5# x20        SL clamshells  5"x20 GTB  5"x15 GTB  5"x15 BTB 5"x15                     Stair stretches (ext/gastroc) 3x30"  ea 3x30"  ea 3x30"  ea 3x30"  ea         Standing SLR 3 ways x20ea  B x20ea  B airex  20ea B airex  x20ea         ALBARO x20 x20ea  x20ea x20ea x20ea        Heel/toe walking x2 laps  ea  x2 laps   ea  x2 laps   ea  x2laps   ea         Heel slides x20  x20  x20  x20 DC        Ther Activity             Step ups (f/l) 8"x20  ea 8"x20  ea 8"x20  ea 8"x20  ea 8"x 20 (lat only)        Crossover step ups     6"x20        Sit to stand W/o  airex  x20 W/o  airex  x20 W/o  airex  x20 W/o  airex  x20 x20        BOSU step ups x20   x20  x20  x20 x20        Squats x20 x20 x20 x20         Step overs  NV 4"x10 4"x15  6"x15- with SPC        Eccentric leg lowering   NV  4"x10   4"x15 6"x20- manual cues on hips        Leg press     NV        Stairs  x2 x1 x1

## 2021-02-04 ENCOUNTER — OFFICE VISIT (OUTPATIENT)
Dept: PHYSICAL THERAPY | Facility: CLINIC | Age: 61
End: 2021-02-04
Payer: COMMERCIAL

## 2021-02-04 DIAGNOSIS — Z96.651 S/P TOTAL KNEE ARTHROPLASTY, RIGHT: Primary | ICD-10-CM

## 2021-02-04 DIAGNOSIS — M17.0 PRIMARY OSTEOARTHRITIS OF BOTH KNEES: ICD-10-CM

## 2021-02-04 PROCEDURE — 97530 THERAPEUTIC ACTIVITIES: CPT

## 2021-02-04 PROCEDURE — 97110 THERAPEUTIC EXERCISES: CPT

## 2021-02-04 NOTE — PROGRESS NOTES
Daily Note     Today's date: 2021  Patient name: Mey Erwin  : 1960  MRN: [id number]  Referring provider: Deb Jimenez MD  Dx:   Encounter Diagnosis     ICD-10-CM    1  S/P total knee arthroplasty, right  Z96 651    2  Primary osteoarthritis of both knees  M17 0                   Subjective: "I was a little sore, it wasn't anything I couldn't handle " "They were a little stiff this morning "      Objective: See treatment diary below      Assessment: New exercise and ex progression performed w/o complaint  Technique for step overs and eccentric leg lowering exercises improved slightly  Tolerated treatment well  Patient would benefit from continued PT      Plan: Continue per plan of care        Precautions: none      Manuals 1/18  1/21 1/25 1/28 2/3 2/4       PROM to hammad 8' Ext  only 8' ext  only 5' ext  only 5'ext  only DC ----                                              Neuro Re-Ed             SLS x30" x30" airex  30" airex  30" airex 30" airex  30"       Tandem X30" ea X30" ea airex  30"ea airex  30"ea airex 30" ea airex  30"ea       Toe taps on cones x15ea x20ea x20ea x20ea x20ea x20ea                                              Ther Ex             Recumbent bike 8' 8' 8' 8' upright 8' upright 8'       Supine SLR x20 x20 x20 x20 2 5# x20 2 5#  x20       SL clamshells  5"x20 GTB  5"x15 GTB  5"x15 BTB 5"x15 BTB  5"x20                    Stair stretches (ext/gastroc) 3x30"  ea 3x30"  ea 3x30"  ea 3x30"  ea         Standing SLR 3 ways x20ea  B x20ea  B airex  20ea B airex  x20ea         ALBARO x20 x20ea  x20ea x20ea x20ea x20ea       Heel/toe walking x2 laps  ea  x2 laps   ea  x2 laps   ea  x2laps   ea         Heel slides x20  x20  x20  x20 DC  ----       Ther Activity             Step ups (f/l) 8"x20  ea 8"x20  ea 8"x20  ea 8"x20  ea 8"x 20 (lat only) 8"x20  (Lat only)       Crossover step ups     6"x20 6"x20       Sit to stand W/o  airex  x20 W/o  airex  x20 W/o  airex  x20 W/o  airex  x20 x20 x20 BOSU step ups x20   x20  x20  x20 x20  x20       Squats x20 x20 x20 x20         Step overs  NV 4"x10 4"x15  6"x15- with SPC [id number]  SPC       Eccentric leg lowering   NV  4"x10   4"x15 6"x20- manual cues on hips 6"x20  Manual cues on  hips       Leg press     NV 20#  x20       Stairs  x2 x1 x1

## 2021-02-08 ENCOUNTER — OFFICE VISIT (OUTPATIENT)
Dept: PHYSICAL THERAPY | Facility: CLINIC | Age: 61
End: 2021-02-08
Payer: COMMERCIAL

## 2021-02-08 DIAGNOSIS — Z96.651 S/P TOTAL KNEE ARTHROPLASTY, RIGHT: Primary | ICD-10-CM

## 2021-02-08 DIAGNOSIS — M17.0 PRIMARY OSTEOARTHRITIS OF BOTH KNEES: ICD-10-CM

## 2021-02-08 PROCEDURE — 97110 THERAPEUTIC EXERCISES: CPT

## 2021-02-08 PROCEDURE — 97530 THERAPEUTIC ACTIVITIES: CPT

## 2021-02-08 NOTE — PROGRESS NOTES
Daily Note     Today's date: 2021  Patient name: Tim Fletcher  : 1960  MRN: [id number]  Referring provider: Stefany Brito MD  Dx:   Encounter Diagnosis     ICD-10-CM    1  S/P total knee arthroplasty, right  Z96 651    2  Primary osteoarthritis of both knees  M17 0                   Subjective: "Maybe a little stiff "      Objective: See treatment diary below      Assessment: Performed exercise program w/o complaint  Eccentric leg lowering technique is improving  Performed stairs with SPC and handrail  Tolerated treatment well  Will monitor  Patient would benefit from continued PT      Plan: Continue per plan of care        Precautions: none      Manuals 1/18  1/21 1/25 1/28 2/3 2/4 2/8      PROM to hammad 8' Ext  only 8' ext  only 5' ext  only 5'ext  only DC ---- ----                                             Neuro Re-Ed             SLS x30" x30" airex  30" airex  30" airex 30" airex  30" airex  30"      Tandem X30" ea X30" ea airex  30"ea airex  30"ea airex 30" ea airex  30"ea airex  30"ea      Toe taps on cones x15ea x20ea x20ea x20ea x20ea x20ea x20ea                                             Ther Ex             Recumbent bike 8' 8' 8' 8' upright 8' upright 8' Upright  8'      Supine SLR x20 x20 x20 x20 2 5# x20 2 5#  x20 2 5#  x20      SL clamshells  5"x20 GTB  5"x15 GTB  5"x15 BTB 5"x15 BTB  5"x20 BTB  5"x20                   Stair stretches (ext/gastroc) 3x30"  ea 3x30"  ea 3x30"  ea 3x30"  ea         Standing SLR 3 ways x20ea  B x20ea  B airex  20ea B airex  x20ea         ALBARO x20 x20ea  x20ea x20ea x20ea x20ea        Heel/toe walking x2 laps  ea  x2 laps   ea  x2 laps   ea  x2laps   ea         Heel slides x20  x20  x20  x20 DC  ----  ----      Ther Activity             Step ups (f/l) 8"x20  ea 8"x20  ea 8"x20  ea 8"x20  ea 8"x 20 (lat only) 8"x20  (Lat only) 8'x20  (Lat  Only)      Crossover step ups     6"x20 6"x20 6"x20      Sit to stand W/o  airex  x20 W/o  airex  x20 W/o  airex  x20 W/o  airex  x20 x20 x20 x20      BOSU step ups x20   x20  x20  x20 x20  x20 x20      Squats x20 x20 x20 x20         Step overs  NV 4"x10 4"x15  6"x15- with SPC [id number]  SPC 6"x20  With  [facility]      Eccentric leg lowering   NV  4"x10   4"x15 6"x20- manual cues on hips 6"x20  Manual cues on  hips 6"x10  Manual  Cues on hips      Leg press     NV 20#  x20 20#  x20      Stairs  x2 x1 x1   x1 w/SPC

## 2021-02-11 ENCOUNTER — OFFICE VISIT (OUTPATIENT)
Dept: PHYSICAL THERAPY | Facility: CLINIC | Age: 61
End: 2021-02-11
Payer: COMMERCIAL

## 2021-02-11 DIAGNOSIS — Z96.651 S/P TOTAL KNEE ARTHROPLASTY, RIGHT: Primary | ICD-10-CM

## 2021-02-11 DIAGNOSIS — M17.0 PRIMARY OSTEOARTHRITIS OF BOTH KNEES: ICD-10-CM

## 2021-02-11 PROCEDURE — 97530 THERAPEUTIC ACTIVITIES: CPT

## 2021-02-11 PROCEDURE — 97110 THERAPEUTIC EXERCISES: CPT

## 2021-02-11 NOTE — PROGRESS NOTES
Daily Note     Today's date: 2021  Patient name: Clem Borden  : 1960  MRN: [id number]  Referring provider: Aleks Obrien MD  Dx: No diagnosis found  Subjective: "I'm doing okay "      Objective: See treatment diary below      Assessment: Performed exercise progressions w/o difficulty or discomfort  Pt noted she has been using 3# wt  for SLR at home for a week  Tolerated treatment well  Patient would benefit from continued PT      Plan: Continue per plan of care        Precautions: none      Manuals 1/18  1/21 1/25 1/28 2/3 2/4 2/8 2/11     PROM to hammad 8' Ext  only 8' ext  only 5' ext  only 5'ext  only DC ---- ---- ----                                            Neuro Re-Ed             SLS x30" x30" airex  30" airex  30" airex 30" airex  30" airex  30" airex  30"     Tandem X30" ea X30" ea airex  30"ea airex  30"ea airex 30" ea airex  30"ea airex  30"ea airex  30"ea     Toe taps on cones x15ea x20ea x20ea x20ea x20ea x20ea x20ea x20ea                                            Ther Ex             Recumbent bike 8' 8' 8' 8' upright 8' upright 8' Upright  8' Upright  8'     Supine SLR x20 x20 x20 x20 2 5# x20 2 5#  x20 2 5#  x20 3#  x20     SL clamshells  5"x20 GTB  5"x15 GTB  5"x15 BTB 5"x15 BTB  5"x20 BTB  5"x20 BTB  5"x20                  Stair stretches (ext/gastroc) 3x30"  ea 3x30"  ea 3x30"  ea 3x30"  ea         Standing SLR 3 ways x20ea  B x20ea  B airex  20ea B airex  x20ea         ALBARO x20 x20ea  x20ea x20ea x20ea x20ea        Heel/toe walking x2 laps  ea  x2 laps   ea  x2 laps   ea  x2laps   ea         Heel slides x20  x20  x20  x20 DC  ----  ----  ----     Ther Activity             Step ups (f/l) 8"x20  ea 8"x20  ea 8"x20  ea 8"x20  ea 8"x 20 (lat only) 8"x20  (Lat only) 8"x20  (Lat  Only) 8"x20  (Lat only)     Crossover step ups     6"x20 6"x20 6"x20 6"x20     Sit to stand W/o  airex  x20 W/o  airex  x20 W/o  airex  x20 W/o  airex  x20 x20 x20 x20 x20     BOSU step ups x20 x20  x20  x20 x20  x20 x20  x20     Squats x20 x20 x20 x20         Step overs  NV 4"x10 4"x15  6"x15- with SPC 6"[id number]  SPC 6"x20  With  SPC 6"x20  With  [facility]     Eccentric leg lowering   NV  4"x10   4"x15 6"x20- manual cues on hips 6"x20  Manual cues on  hips 6"x10  Manual  Cues on hips 6"x20  Manual  Cues on  hips     Leg press     NV 20#  x20 20#  x20 30#  x20     Stairs  x2 x1 x1   x1 w/SPC

## 2021-02-15 ENCOUNTER — OFFICE VISIT (OUTPATIENT)
Dept: PHYSICAL THERAPY | Facility: CLINIC | Age: 61
End: 2021-02-15
Payer: COMMERCIAL

## 2021-02-15 DIAGNOSIS — Z96.651 S/P TOTAL KNEE ARTHROPLASTY, RIGHT: Primary | ICD-10-CM

## 2021-02-15 DIAGNOSIS — M17.0 PRIMARY OSTEOARTHRITIS OF BOTH KNEES: ICD-10-CM

## 2021-02-15 PROCEDURE — 97110 THERAPEUTIC EXERCISES: CPT

## 2021-02-15 PROCEDURE — 97530 THERAPEUTIC ACTIVITIES: CPT

## 2021-02-15 NOTE — PROGRESS NOTES
Daily Note     Today's date: 2/15/2021  Patient name: Kevin Haas  : 1960  MRN: [id number]  Referring provider: Fernando Brown MD  Dx:   Encounter Diagnosis     ICD-10-CM    1  S/P total knee arthroplasty, right  Z96 651    2  Primary osteoarthritis of both knees  M17 0                   Subjective: "Pretty good," adding the "waddling is coming down a bit " Notes the bottom of her feet are still bothering her  Objective: See treatment diary below      Assessment: Demonstrates good knowledge of exercise program  Required less manual cueing for eccentric leg lowering  Tolerated treatment well  Patient would benefit from continued PT      Plan: Continue per plan of care        Precautions: none      Manuals 1/18  1/21 1/25 1/28 2/3 2/4 2/8 2/11 2/15    PROM to hammad 8' Ext  only 8' ext  only 5' ext  only 5'ext  only DC ---- ---- ---- ----                                           Neuro Re-Ed             SLS x30" x30" airex  30" airex  30" airex 30" airex  30" airex  30" airex  30" airex  30"    Tandem X30" ea X30" ea airex  30"ea airex  30"ea airex 30" ea airex  30"ea airex  30"ea airex  30"ea airex  30'ea    Toe taps on cones x15ea x20ea x20ea x20ea x20ea x20ea x20ea x20ea x20ea                                           Ther Ex             Recumbent bike 8' 8' 8' 8' upright 8' upright 8' Upright  8' Upright  8' Upright  8'    Supine SLR x20 x20 x20 x20 2 5# x20 2 5#  x20 2 5#  x20 3#  x20 3#  x20    SL clamshells  5"x20 GTB  5"x15 GTB  5"x15 BTB 5"x15 BTB  5"x20 BTB  5"x20 BTB  5"x20 BTB  5"x20                 Stair stretches (ext/gastroc) 3x30"  ea 3x30"  ea 3x30"  ea 3x30"  ea         Standing SLR 3 ways x20ea  B x20ea  B airex  20ea B airex  x20ea         ALBARO x20 x20ea  x20ea x20ea x20ea x20ea        Heel/toe walking x2 laps  ea  x2 laps   ea  x2 laps   ea  x2laps   ea         Heel slides x20  x20  x20  x20 DC  ----  ----  ----  ----    Ther Activity             Step ups (f/l) 8"x20  ea 8"x20  ea 8"x20  ea 8"x20  ea 8"x 20 (lat only) 8"x20  (Lat only) 8"x20  (Lat  Only) 8"x20  (Lat only) 8"x20  (lat only)    Crossover step ups     6"x20 6"x20 6"x20 6"x20 6"x20    Sit to stand W/o  airex  x20 W/o  airex  x20 W/o  airex  x20 W/o  airex  x20 x20 x20 x20 x20 x20    BOSU step ups x20   x20  x20  x20 x20  x20 x20  x20  x20    Squats x20 x20 x20 x20         Step overs  NV 4"x10 4"x15  6"x15- with SPC 6"[id number]  SPC 6"x20  With  SPC 6"x20  With  SPC 6"x20  With  [facility]    Eccentric leg lowering   NV  4"x10   4"x15 6"x20- manual cues on hips 6"x20  Manual cues on  hips 6"x10  Manual  Cues on hips 6"x20  Manual  Cues on  hips 6"x20  Minimal  Cues on  hips    Leg press     NV 20#  x20 20#  x20 30#  x20 30#  x20 40#   Stairs  x2 x1 x1   x1 w/SPC

## 2021-02-18 ENCOUNTER — APPOINTMENT (OUTPATIENT)
Dept: PHYSICAL THERAPY | Facility: CLINIC | Age: 61
End: 2021-02-18
Payer: COMMERCIAL

## 2021-02-22 ENCOUNTER — OFFICE VISIT (OUTPATIENT)
Dept: PHYSICAL THERAPY | Facility: CLINIC | Age: 61
End: 2021-02-22
Payer: COMMERCIAL

## 2021-02-22 DIAGNOSIS — M17.0 PRIMARY OSTEOARTHRITIS OF BOTH KNEES: ICD-10-CM

## 2021-02-22 DIAGNOSIS — Z96.651 S/P TOTAL KNEE ARTHROPLASTY, RIGHT: Primary | ICD-10-CM

## 2021-02-22 PROCEDURE — 97110 THERAPEUTIC EXERCISES: CPT

## 2021-02-22 PROCEDURE — 97530 THERAPEUTIC ACTIVITIES: CPT

## 2021-02-22 NOTE — PROGRESS NOTES
Daily Note     Today's date: 2021  Patient name: Shanice Linton  : 1960  MRN: [id number]  Referring provider: Poppy Dunlap MD  Dx:   Encounter Diagnosis     ICD-10-CM    1  S/P total knee arthroplasty, right  Z96 651    2  Primary osteoarthritis of both knees  M17 0                   Subjective: "Tomorrow is my first day back to work," adding she is limited to 25 hours a week for 2 weeks  Notes some discomfort R SIJ and an episode of L knee pain this morning  Objective: See treatment diary below      Assessment: Increased wt on leg press, as below  Performed exercise program w/o complaint  Tolerated treatment well  Patient would benefit from continued PT      Plan: Continue per plan of care        Precautions: none      Manuals 1/18  1/21 1/25 1/28 2/3 2/4 2/8 2/11 2/15 2/22   PROM to hammad 8' Ext  only 8' ext  only 5' ext  only 5'ext  only DC ---- ---- ---- ---- ---                                          Neuro Re-Ed             SLS x30" x30" airex  30" airex  30" airex 30" airex  30" airex  30" airex  30" airex  30" airex  30"   Tandem X30" ea X30" ea airex  30"ea airex  30"ea airex 30" ea airex  30"ea airex  30"ea airex  30"ea airex  30'ea airex  30"ea   Toe taps on cones x15ea x20ea x20ea x20ea x20ea x20ea x20ea x20ea x20ea x20ea                                          Ther Ex             Recumbent bike 8' 8' 8' 8' upright 8' upright 8' Upright  8' Upright  8' Upright  8' Upright  8'   Supine SLR x20 x20 x20 x20 2 5# x20 2 5#  x20 2 5#  x20 3#  x20 3#  x20 3#  x20   SL clamshells  5"x20 GTB  5"x15 GTB  5"x15 BTB 5"x15 BTB  5"x20 BTB  5"x20 BTB  5"x20 BTB  5"x20 BTB  5"x20                Stair stretches (ext/gastroc) 3x30"  ea 3x30"  ea 3x30"  ea 3x30"  ea         Standing SLR 3 ways x20ea  B x20ea  B airex  20ea B airex  x20ea         ALBARO x20 x20ea  x20ea x20ea x20ea x20ea        Heel/toe walking x2 laps  ea  x2 laps   ea  x2 laps   ea  x2laps   ea         Heel slides x20  x20  x20  x20 DC ----  ----  ----  ----  ----   Ther Activity             Step ups (f/l) 8"x20  ea 8"x20  ea 8"x20  ea 8"x20  ea 8"x 20 (lat only) 8"x20  (Lat only) 8"x20  (Lat  Only) 8"x20  (Lat only) 8"x20  (lat only) 8'x20  (Lat only)   Crossover step ups     6"x20 6"x20 6"x20 6"x20 6"x20 6"x20   Sit to stand W/o  airex  x20 W/o  airex  x20 W/o  airex  x20 W/o  airex  x20 x20 x20 x20 x20 x20 x20   BOSU step ups x20   x20  x20  x20 x20  x20 x20  x20  x20  x20   Squats x20 x20 x20 x20         Step overs  NV 4"x10 4"x15  6"x15- with SPC 6"[id number]  SPC 6"x20  With  SPC 6"x20  With  SPC 6"x20  With  SPC 6"x20  w/SPC   Eccentric leg lowering   NV  4"x10   4"x15 6"x20- manual cues on hips 6"x20  Manual cues on  hips 6"x10  Manual  Cues on hips 6"x20  Manual  Cues on  hips 6"x20  Minimal  Cues on  hips 6"x20  Minimal  Cues on  hips   Leg press     NV 20#  x20 20#  x20 30#  x20 30#  x20 40#  x20   Stairs  x2 x1 x1   x1 w/SPC

## 2021-02-25 ENCOUNTER — OFFICE VISIT (OUTPATIENT)
Dept: PHYSICAL THERAPY | Facility: CLINIC | Age: 61
End: 2021-02-25
Payer: COMMERCIAL

## 2021-02-25 DIAGNOSIS — Z96.651 S/P TOTAL KNEE ARTHROPLASTY, RIGHT: Primary | ICD-10-CM

## 2021-02-25 DIAGNOSIS — M17.0 PRIMARY OSTEOARTHRITIS OF BOTH KNEES: ICD-10-CM

## 2021-02-25 PROCEDURE — 97110 THERAPEUTIC EXERCISES: CPT

## 2021-02-25 PROCEDURE — 97530 THERAPEUTIC ACTIVITIES: CPT

## 2021-02-25 NOTE — PROGRESS NOTES
Daily Note     Today's date: 2021  Patient name: Shanice Linton  : 1960  MRN: [id number]  Referring provider: Poppy Dunlap MD  Dx:   Encounter Diagnosis     ICD-10-CM    1  S/P total knee arthroplasty, right  Z96 651    2  Primary osteoarthritis of both knees  M17 0                   Subjective: RTW yesterday, notes regarding her R knee, "It was challenging " Notes her LB and feet hurt as well  Objective: See treatment diary below      Assessment: Performed exercise program w/o difficulty or discomfort  Tolerated treatment well  Will monitor  Patient would benefit from continued PT      Plan: Continue per plan of care        Precautions: none      Manuals  2/25  1/25 1/28 2/3 2/4 2/8 2/11 2/15 2/22   PROM to hammad ----  5' ext  only 5'ext  only DC ---- ---- ---- ---- ---                                          Neuro Re-Ed             SLS airex  30"  airex  30" airex  30" airex 30" airex  30" airex  30" airex  30" airex  30" airex  30"   Tandem airex  30"ea  airex  30"ea airex  30"ea airex 30" ea airex  30"ea airex  30"ea airex  30"ea airex  30'ea airex  30"ea   Toe taps on cones x20ea  x20ea x20ea x20ea x20ea x20ea x20ea x20ea x20ea                                          Ther Ex             Recumbent bike Upright  8'  8' 8' upright 8' upright 8' Upright  8' Upright  8' Upright  8' Upright  8'   Supine SLR 3#x20  x20 x20 2 5# x20 2 5#  x20 2 5#  x20 3#  x20 3#  x20 3#  x20   SL clamshells BTB  5"x20  GTB  5"x15 GTB  5"x15 BTB 5"x15 BTB  5"x20 BTB  5"x20 BTB  5"x20 BTB  5"x20 BTB  5"x20                Stair stretches (ext/gastroc)   3x30"  ea 3x30"  ea         Standing SLR 3 ways   airex  20ea B airex  x20ea         ALBARO    x20ea x20ea x20ea x20ea        Heel/toe walking    x2 laps   ea  x2laps   ea         Heel slides    x20  x20 DC  ----  ----  ----  ----  ----   Ther Activity             Step ups (f/l) 8"x20  (lat  Only)  8"x20  ea 8"x20  ea 8"x 20 (lat only) 8"x20  (Lat only) 8"x20  (Lat  Only) 8"x20  (Lat only) 8"x20  (lat only) 8"x20  (Lat only)   Crossover step ups 6"x20    6"x20 6"x20 6"x20 6"x20 6"x20 6"x20   Sit to stand x20  W/o  airex  x20 W/o  airex  x20 x20 x20 x20 x20 x20 x20   BOSU step ups x20   x20  x20 x20  x20 x20  x20  x20  x20   Squats   x20 x20         Step overs 6"x20  w/SPC  4"x10 4"x15  6"x15- with SPC 6"[id number]  SPC 6"x20  With  SPC 6"x20  With  SPC 6"x20  With  SPC 6"x20  w/SPC   Eccentric leg lowering 6"x20  Minimal cues on  hips   4"x10   4"x15 6"x20- manual cues on hips 6"x20  Manual cues on  hips 6"x10  Manual  Cues on hips 6"x20  Manual  Cues on  hips 6"x20  Minimal  Cues on  hips 6"x20  Minimal  Cues on  hips   Leg press 40#x20    NV 20#  x20 20#  x20 30#  x20 30#  x20 40#  x20   Stairs   x1 x1   x1 w/SPC

## 2021-03-02 ENCOUNTER — OFFICE VISIT (OUTPATIENT)
Dept: OBGYN CLINIC | Facility: MEDICAL CENTER | Age: 61
End: 2021-03-02
Payer: COMMERCIAL

## 2021-03-02 VITALS — HEIGHT: 62 IN | WEIGHT: 197 LBS | BODY MASS INDEX: 36.25 KG/M2

## 2021-03-02 DIAGNOSIS — M25.561 ACUTE PAIN OF RIGHT KNEE: Primary | ICD-10-CM

## 2021-03-02 DIAGNOSIS — M17.11 PRIMARY OSTEOARTHRITIS OF RIGHT KNEE: ICD-10-CM

## 2021-03-02 PROCEDURE — 99213 OFFICE O/P EST LOW 20 MIN: CPT | Performed by: ORTHOPAEDIC SURGERY

## 2021-03-02 PROCEDURE — 1036F TOBACCO NON-USER: CPT | Performed by: ORTHOPAEDIC SURGERY

## 2021-03-02 RX ORDER — CEPHALEXIN 500 MG/1
2000 CAPSULE ORAL ONCE
Qty: 4 CAPSULE | Refills: 0 | Status: SHIPPED | OUTPATIENT
Start: 2021-03-02 | End: 2021-03-02

## 2021-03-02 NOTE — PROGRESS NOTES
Orthopaedic Surgery - Office Note  Olimpia Reid (89 y o  female)   : 1960   MRN: [id number]  Encounter Date: 3/2/2021    Chief Complaint   Patient presents with    Right Knee - Follow-up       Assessment / Plan  S/p Right total knee arthroplasty with good progress    · Continue with PT and HEP  · Anti-inflammatories and ice prn pain  · Activity as tolerated  Return in about 2 months (around 2021)  History of Present Illness  Olimpia Reid is a 61 y o  female status post right knee arthroplasty on 2020  She is feeling well with respect to the right knee  She denies significant right knee pain or swelling  She still is working on normalizing her gait, but she feels this may be related to her back  Overall she seems to be pleased with her progress so far  Review of Systems  Pertinent items are noted in HPI  All other systems were reviewed and are negative  Physical Exam  Ht 5' 2" (1 575 m)   Wt 89 4 kg (197 lb)   BMI 36 03 kg/m²   Cons: Appears well  No apparent distress  Psych: Alert  Oriented x3  Mood and affect normal   Eyes: PERRLA, EOMI  Resp: Normal effort  No audible wheezing or stridor  CV: Palpable pulse  No discernable arrhythmia  No LE edema  Lymph:  No palpable cervical, axillary, or inguinal lymphadenopathy  Skin: Warm  No palpable masses  No visible lesions  Neuro: Normal muscle tone  Normal and symmetric DTR's  Right Knee Exam  Alignment:  Normal knee alignment  Inspection:  No edema  No erythema  No ecchymosis  Incision healed  Palpation:  No tenderness  ROM:  Knee Extension 0°  Knee Flexion 125°  Strength:  Quad 5/5; Hamstring 5/5  Stability:  No objective knee instability  Stable Varus / Valgus stress, Lachman, and Posterior drawer  Tests:  No pertinent positive or negative tests  Patella:  Not tested  Neurovascular:  Sensation intact in DP/SP/Gill/Sa/T nerve distributions  2+ DP & PT pulses  Gait:  Trendelenberg       Studies Reviewed  No studies to review    Procedures       Medical, Surgical, Family, and Social History  The patient's medical history, family history, and social history, were reviewed and updated as appropriate      Past Medical History:   Diagnosis Date    Ambulatory dysfunction     uses walking stick    Chronic pain disorder     Claustrophobia     mild    DDD (degenerative disc disease), lumbar     Low back pain     Mild acid reflux     OA (osteoarthritis)     marissa knees    Spinal stenosis of lumbosacral region     Uterine cancer ([facility] [address] )     2017- hysterectomy and chemo    Wears glasses        Past Surgical History:   Procedure Laterality Date    COLONOSCOPY      ORTHOPEDIC SURGERY      PLANTAR FASCIA SURGERY Bilateral     DC HYSTEROSCOPY,W/ENDO BX N/A 2/28/2017    Procedure: DILATATION AND CURETTAGE (D&C) WITH HYSTEROSCOPY,POLYPECTOMY;  Surgeon: Jaqueline Olmstead MD;  Location: [facility];  Service: Gynecology    DC TOTAL KNEE ARTHROPLASTY Right 11/30/2020    Procedure: ARTHROPLASTY KNEE TOTAL;  Surgeon: Elmira Bucio MD;  Location: [facility];  Service: Orthopedics    SHOULDER SURGERY Left     TONSILLECTOMY      WISDOM TOOTH EXTRACTION         Family History   Problem Relation Age of Onset    No Known Problems Mother     No Known Problems Father        Social History     Occupational History    Not on file   Tobacco Use    Smoking status: Never Smoker    Smokeless tobacco: Never Used    Tobacco comment: No secondhand smoke exposure   Substance and Sexual Activity    Alcohol use: Yes     Frequency: Monthly or less     Drinks per session: 1 or 2     Binge frequency: Never     Comment: rarely    Drug use: No    Sexual activity: Not on file       Allergies   Allergen Reactions    Iv Contrast [Iodinated Diagnostic Agents] Hives    Vitamin C [Ascorbate]      Queasy stomach, loose stool         Current Outpatient Medications:     BIOTIN PO, Take 1 tablet by mouth daily, Disp: , Rfl:     Black Cohosh (REMIFEMIN MENOPAUSE PO), Take 1 tablet by mouth 2 (two) times a day, Disp: , Rfl:     Glucosamine-Chondroit-Vit C-Mn (GLUCOSAMINE CHONDR 1500 COMPLX) CAPS, Take 2 capsules by mouth daily, Disp: , Rfl:     ibuprofen (MOTRIN) 200 mg tablet, Take 200-800 mg by mouth every 6 (six) hours as needed for mild pain, Disp: , Rfl:     Naproxen Sodium 220 MG CAPS, Take 2 capsules by mouth daily, Disp: , Rfl:     Omega 3-6-9 Fatty Acids (OMEGA 3-6-9 PO), Take 1 capsule by mouth daily, Disp: , Rfl:     PANTETHINE PO, Take 1 tablet by mouth 2 (two) times a day, Disp: , Rfl:     apixaban (ELIQUIS) 2 5 mg, Take 1 tablet (2 5 mg total) by mouth 2 (two) times a day for 29 days, Disp: 58 tablet, Rfl: 0    ascorbic acid (VITAMIN C) 500 mg tablet, Take 1 tablet (500 mg total) by mouth 2 (two) times a day (Patient not taking: Reported on 11/30/2020), Disp: 60 tablet, Rfl: 1    cephalexin (KEFLEX) 500 mg capsule, Take 4 capsules (2,000 mg total) by mouth once for 1 dose Take 1 hour prior to dental work , Disp: 4 capsule, Rfl: 0    ferrous sulfate 324 (65 Fe) mg, Take 1 tablet (324 mg total) by mouth 2 (two) times a day before meals, Disp: 60 tablet, Rfl: 0    folic acid (FOLVITE) 1 mg tablet, Take 1 tablet (1 mg total) by mouth daily, Disp: 30 tablet, Rfl: 0      Kandis Randall MD    Scribe Attestation    I,:   am acting as a scribe while in the presence of the attending physician :       I,:   personally performed the services described in this documentation    as scribed in my presence :

## 2021-05-03 ENCOUNTER — TELEPHONE (OUTPATIENT)
Dept: OBGYN CLINIC | Facility: HOSPITAL | Age: 61
End: 2021-05-03

## 2021-05-12 ENCOUNTER — OFFICE VISIT (OUTPATIENT)
Dept: FAMILY MEDICINE CLINIC | Facility: CLINIC | Age: 61
End: 2021-05-12
Payer: COMMERCIAL

## 2021-05-12 VITALS
HEART RATE: 58 BPM | BODY MASS INDEX: 38.28 KG/M2 | DIASTOLIC BLOOD PRESSURE: 82 MMHG | WEIGHT: 208 LBS | HEIGHT: 62 IN | TEMPERATURE: 97.5 F | SYSTOLIC BLOOD PRESSURE: 154 MMHG

## 2021-05-12 DIAGNOSIS — D62 ACUTE BLOOD LOSS ANEMIA: ICD-10-CM

## 2021-05-12 DIAGNOSIS — Z11.4 SCREENING FOR HIV (HUMAN IMMUNODEFICIENCY VIRUS): ICD-10-CM

## 2021-05-12 DIAGNOSIS — M79.10 MYALGIA: ICD-10-CM

## 2021-05-12 DIAGNOSIS — R03.0 ELEVATED BP WITHOUT DIAGNOSIS OF HYPERTENSION: ICD-10-CM

## 2021-05-12 DIAGNOSIS — C54.1 ENDOMETRIAL CANCER (HCC): ICD-10-CM

## 2021-05-12 DIAGNOSIS — M25.50 ARTHRALGIA, UNSPECIFIED JOINT: ICD-10-CM

## 2021-05-12 DIAGNOSIS — E78.2 MIXED HYPERLIPIDEMIA: Primary | ICD-10-CM

## 2021-05-12 DIAGNOSIS — M25.50 PAIN IN JOINT, MULTIPLE SITES: ICD-10-CM

## 2021-05-12 DIAGNOSIS — R79.89 ELEVATED LIVER FUNCTION TESTS: ICD-10-CM

## 2021-05-12 PROBLEM — Z01.818 PRE-OP EXAMINATION: Status: RESOLVED | Noted: 2020-11-19 | Resolved: 2021-05-12

## 2021-05-12 PROBLEM — R00.1 BRADYCARDIA: Status: RESOLVED | Noted: 2018-11-20 | Resolved: 2021-05-12

## 2021-05-12 PROBLEM — N85.00 HYPERPLASIA OF ENDOMETRIUM DETERMINED BY BIOPSY: Status: RESOLVED | Noted: 2017-03-17 | Resolved: 2021-05-12

## 2021-05-12 PROBLEM — N95.0 POSTMENOPAUSAL BLEEDING: Status: RESOLVED | Noted: 2017-02-28 | Resolved: 2021-05-12

## 2021-05-12 PROBLEM — M17.11 PRIMARY OSTEOARTHRITIS OF RIGHT KNEE: Status: RESOLVED | Noted: 2019-09-03 | Resolved: 2021-05-12

## 2021-05-12 PROCEDURE — 99214 OFFICE O/P EST MOD 30 MIN: CPT | Performed by: PHYSICIAN ASSISTANT

## 2021-05-12 NOTE — PROGRESS NOTES
Assessment and Plan:    Problem List Items Addressed This Visit        Genitourinary    RESOLVED: Endometrial cancer ([address] )       Other    Elevated liver function tests     Check CMP  Relevant Orders    Comprehensive metabolic panel    Mixed hyperlipidemia - Primary     Check fasting lipid panel  Relevant Orders    Lipid Panel with Direct LDL reflex    Pain in joint, multiple sites     Refer to Rheum for further eval  Labs done in the past WNL/negaitve  Relevant Orders    Ambulatory referral to Rheumatology    TSH, 3rd generation    Arthralgia     Ongoing  Lab work up done in 2019 WNL  At this time I would like to refer pt to Rheum for further eval           Relevant Orders    Ambulatory referral to Rheumatology    TSH, 3rd generation    Elevated BP without diagnosis of hypertension     Info given for HTN  Check labs  Repeat in 2-3 weeks and if still elevated needs meds  RESOLVED: Acute blood loss anemia    Relevant Orders    CBC and differential      Other Visit Diagnoses     Myalgia        Relevant Orders    Vitamin D 25 hydroxy    TSH, 3rd generation with Free T4 reflex    Screening for HIV (human immunodeficiency virus)        Relevant Orders    HIV 1/2 Antigen/Antibody (4th Generation) w Reflex [facility]                 Diagnoses and all orders for this visit:    Mixed hyperlipidemia  -     Lipid Panel with Direct LDL reflex    Acute blood loss anemia  -     CBC and differential    Elevated liver function tests  -     Comprehensive metabolic panel    Myalgia  -     Vitamin D 25 hydroxy  -     TSH, 3rd generation with Free T4 reflex    Screening for HIV (human immunodeficiency virus)  -     HIV 1/2 Antigen/Antibody (4th Generation) w Reflex [facility]; Future    Arthralgia, unspecified joint  -     Ambulatory referral to Rheumatology; Future  -     TSH, 3rd generation;  Future    Endometrial cancer (HCC)    Pain in joint, multiple sites  -     Ambulatory referral to Rheumatology; Future  -     TSH, 3rd generation; Future    Elevated BP without diagnosis of hypertension              Subjective:      Patient ID: Heath Parrish is a 64 y o  female  CC:    Chief Complaint   Patient presents with    GERD    Irritable Bowel Syndrome     Pt states there are no concerns at this time  - [facility]       HPI:     Patient here today for chronic condition check  Patient states that she know she is due for blood work  She had a knee replacement in the fall of last year and she still is having a lot of problems with her right knee being very stiff  She states that she also has continued pain bilateral feet and soles of her feet and had 200 dollar shoes bought for her and still has no improvement she has been seeing Podiatry  She also has pain in her hands elbows and tends to wobble when she walks  She is very frustrated all this and states that she does know what else to do orthopedics does not seem to be helping her  She has never seen rheumatoid period about 2 years ago we did check her for multiple rule out conditions such as RF JOHN and Lyme which were all negative in 2019  The following portions of the patient's history were reviewed and updated as appropriate: allergies, current medications, past family history, past medical history, past social history, past surgical history and problem list       Review of Systems   Constitutional: Negative  HENT: Negative  Eyes: Negative  Respiratory: Negative  Cardiovascular: Negative  Gastrointestinal: Negative  Endocrine: Negative  Genitourinary: Negative  Musculoskeletal: Negative  Skin: Negative  Allergic/Immunologic: Negative  Neurological: Negative  Hematological: Negative  Psychiatric/Behavioral: Negative            Data to review:       Objective:    Vitals:    05/12/21 1042   BP: 154/82   BP Location: Left arm   Patient Position: Sitting   Cuff Size: Large   Pulse: 58   Temp: 97 5 °F (36 4 °C)   TempSrc: Oral   Weight: 94 3 kg (208 lb)   Height: 5' 2" (1 575 m)        Physical Exam  Vitals signs and nursing note reviewed  Constitutional:       Appearance: Normal appearance  She is well-developed  HENT:      Head: Normocephalic and atraumatic  Eyes:      General: Lids are normal       Conjunctiva/sclera: Conjunctivae normal       Pupils: Pupils are equal, round, and reactive to light  Cardiovascular:      Rate and Rhythm: Normal rate and regular rhythm  Heart sounds: No murmur  Pulmonary:      Effort: Pulmonary effort is normal       Breath sounds: Normal breath sounds  Musculoskeletal:      Comments: Patient does waddle from side to side at her waist when walking down the hallway it does appear that her feet and legs are appropriately ambulating  Skin:     General: Skin is warm and dry  Neurological:      General: No focal deficit present  Mental Status: She is alert  Coordination: Coordination is intact  Psychiatric:         Mood and Affect: Mood normal          Behavior: Behavior normal  Behavior is cooperative  Thought Content: Thought content normal          Judgment: Judgment normal            BMI Counseling: Body mass index is 38 04 kg/m²  The BMI is above normal  Nutrition recommendations include decreasing portion sizes, encouraging healthy choices of fruits and vegetables, decreasing fast food intake, consuming healthier snacks, limiting drinks that contain sugar, moderation in carbohydrate intake, increasing intake of lean protein, reducing intake of saturated and trans fat and reducing intake of cholesterol  Exercise recommendations include exercising 3-5 times per week  No pharmacotherapy was ordered

## 2021-05-12 NOTE — ASSESSMENT & PLAN NOTE
Ongoing  Lab work up done in 2019 WNL   At this time I would like to refer pt to Rheum for further eval

## 2021-05-12 NOTE — PATIENT INSTRUCTIONS
Problem List Items Addressed This Visit        Genitourinary    RESOLVED: Endometrial cancer ([facility] [address] )       Other    Elevated liver function tests     Check CMP  Relevant Orders    Comprehensive metabolic panel    Mixed hyperlipidemia - Primary     Check fasting lipid panel  Relevant Orders    Lipid Panel with Direct LDL reflex    Pain in joint, multiple sites    Relevant Orders    Ambulatory referral to Rheumatology    TSH, 3rd generation    Arthralgia     Ongoing  Lab work up done in 2019 WNL  At this time I would like to refer pt to Rheum for further eval           Relevant Orders    Ambulatory referral to Rheumatology    TSH, 3rd generation    Elevated BP without diagnosis of hypertension     Info given for HTN  Check labs  Repeat in 2-3 weeks and if still elevated needs meds  RESOLVED: Acute blood loss anemia    Relevant Orders    CBC and differential      Other Visit Diagnoses     Myalgia        Relevant Orders    Vitamin D 25 hydroxy    TSH, 3rd generation with Free T4 reflex    Screening for HIV (human immunodeficiency virus)        Relevant Orders    HIV 1/2 Antigen/Antibody (4th Generation) w Reflex [facility]        Chronic Hypertension   AMBULATORY CARE:   Hypertension  is high blood pressure  Your blood pressure is the force of your blood moving against the walls of your arteries  Hypertension causes your blood pressure to get so high that your heart has to work much harder than normal  This can damage your heart  Even if you have hypertension for years, lifestyle changes, medicines, or both can help bring your blood pressure to normal   Call 911 for any of the following:   · You have chest pain  · You have any of the following signs of a heart attack:      ?  Squeezing, pressure, or pain in your chest    ? You may  also have any of the following:     § Discomfort or pain in your back, neck, jaw, stomach, or arm    § Shortness of breath    § Nausea or vomiting    § Lightheadedness or a sudden cold sweat    · You become confused or have difficulty speaking  · You suddenly feel lightheaded or have trouble breathing  Seek care immediately if:   · You have a severe headache or vision loss  · You have weakness in an arm or leg  Contact your healthcare provider if:   · You feel faint, dizzy, confused, or drowsy  · You have been taking your blood pressure medicine but your pressure is higher than your provider says it should be  · You have questions or concerns about your condition or care  Treatment for chronic hypertension  may include medicine to lower your blood pressure and cholesterol levels  A low cholesterol level helps prevent heart disease and makes it easier to control your blood pressure  Heart disease can make your blood pressure harder to control  You may also need to make lifestyle changes  What you need to know about the stages of hypertension:       · Normal blood pressure is 119/79 or lower   Your healthcare provider may only check your blood pressure each year if it stays at a normal level  · Elevated blood pressure is 120/79 to 129/79   This is sometimes called prehypertension  Your healthcare provider may suggest lifestyle changes to help lower your blood pressure to a normal level  He or she may then check it again in 3 to 6 months  · Stage 1 hypertension is 130/80  to 139/89   Your provider may recommend lifestyle changes, medication, and checks every 3 to 6 months until your blood pressure is controlled  · Stage 2 hypertension is 140/90 or higher   Your provider will recommend lifestyle changes and have you take 2 kinds of hypertension medicines  You will also need to have your blood pressure checked monthly until it is controlled  Manage chronic hypertension:   · Check your blood pressure at home  Avoid smoking, caffeine, and exercise at least 30 minutes before checking your blood pressure   Sit and rest for 5 minutes before you take your blood pressure  Extend your arm and support it on a flat surface  Your arm should be at the same level as your heart  Follow the directions that came with your blood pressure monitor  Check your blood pressure 2 times, 1 minute apart, before you take your medicine in the morning  Also check your blood pressure before your evening meal  Keep a record of your readings and bring it to your follow-up visits  Ask your healthcare provider what your blood pressure should be  · Manage any other health conditions you have  Health conditions such as diabetes can increase your risk for hypertension  Follow your healthcare provider's instructions and take all your medicines as directed  Talk to your healthcare provider about any new health conditions you have recently developed  · Ask about all medicines  Certain medicines can increase your blood pressure  Examples include oral birth control pills, decongestants, herbal supplements, and NSAIDs, such as ibuprofen  Your healthcare provider can tell you which medicines are safe for you to take  This includes prescription and over-the-counter medicines  Lifestyle changes you can make to lower your blood pressure: Your provider may want you to make more lifestyle changes if you are having trouble controlling your blood pressure  This may feel difficult over time, especially if you think you are making good changes but your pressure is still high  It might help to focus on one new change at a time  For example, try to add 1 more day of exercise, or exercise for an extra 10 minutes on 2 days  Small changes can make a big difference  Your healthcare provider can also refer you to specialists such as a dietitian who can help you make small changes  · Limit sodium (salt) as directed  Too much sodium can affect your fluid balance  Check labels to find low-sodium or no-salt-added foods  Some low-sodium foods use potassium salts for flavor   Too much potassium can also cause health problems  Your healthcare provider will tell you how much sodium and potassium are safe for you to have in a day  He or she may recommend that you limit sodium to 2,300 mg a day  · Follow the meal plan recommended by your healthcare provider  A dietitian or your provider can give you more information on low-sodium plans or the DASH (Dietary Approaches to Stop Hypertension) eating plan  The DASH plan is low in sodium, unhealthy fats, and total fat  It is high in potassium, calcium, and fiber  · Exercise to maintain a healthy weight  Exercise at least 30 minutes per day, on most days of the week  This will help decrease your blood pressure  Ask your healthcare provider about the best exercise plan for you  · Decrease stress  This may help lower your blood pressure  Learn ways to relax, such as deep breathing or listening to music  · Limit alcohol as directed  Alcohol can increase your blood pressure  A drink of alcohol is 12 ounces of beer, 5 ounces of wine, or 1½ ounces of liquor  · Do not smoke  Nicotine and other chemicals in cigarettes and cigars can increase your blood pressure and also cause lung damage  Ask your healthcare provider for information if you currently smoke and need help to quit  E-cigarettes or smokeless tobacco still contain nicotine  Talk to your healthcare provider before you use these products  Follow up with your healthcare provider as directed: You will need to return to have your blood pressure checked and to have other lab tests done  Write down your questions so you remember to ask them during your visits  © Copyright [address] Information is for End User's use only and may not be sold, redistributed or otherwise used for commercial purposes  All illustrations and images included in CareNotes® are the copyrighted property of A D [employer] M [employer] Inc  or [facility]   The above information is an  only   It is not intended as medical advice for individual conditions or treatments  Talk to your doctor, nurse or pharmacist before following any medical regimen to see if it is safe and effective for you

## 2021-05-21 ENCOUNTER — APPOINTMENT (OUTPATIENT)
Dept: LAB | Facility: CLINIC | Age: 61
End: 2021-05-21
Payer: COMMERCIAL

## 2021-05-21 DIAGNOSIS — M25.50 PAIN IN JOINT, MULTIPLE SITES: ICD-10-CM

## 2021-05-21 DIAGNOSIS — Z11.4 SCREENING FOR HIV (HUMAN IMMUNODEFICIENCY VIRUS): ICD-10-CM

## 2021-05-21 DIAGNOSIS — M25.50 ARTHRALGIA, UNSPECIFIED JOINT: ICD-10-CM

## 2021-05-21 DIAGNOSIS — E78.2 MIXED HYPERLIPIDEMIA: ICD-10-CM

## 2021-05-21 LAB
25(OH)D3 SERPL-MCNC: 19.9 NG/ML (ref 30–100)
ALBUMIN SERPL BCP-MCNC: 3.5 G/DL (ref 3.5–5)
ALP SERPL-CCNC: 62 U/L (ref 46–116)
ALT SERPL W P-5'-P-CCNC: 40 U/L (ref 12–78)
ANION GAP SERPL CALCULATED.3IONS-SCNC: 7 MMOL/L (ref 4–13)
AST SERPL W P-5'-P-CCNC: 34 U/L (ref 5–45)
BASOPHILS # BLD AUTO: 0.02 THOUSANDS/ΜL (ref 0–0.1)
BASOPHILS NFR BLD AUTO: 1 % (ref 0–1)
BILIRUB SERPL-MCNC: 0.67 MG/DL (ref 0.2–1)
BUN SERPL-MCNC: 29 MG/DL (ref 5–25)
CALCIUM SERPL-MCNC: 9.2 MG/DL (ref 8.3–10.1)
CHLORIDE SERPL-SCNC: 111 MMOL/L (ref 100–108)
CHOLEST SERPL-MCNC: 230 MG/DL (ref 50–200)
CO2 SERPL-SCNC: 25 MMOL/L (ref 21–32)
CREAT SERPL-MCNC: 0.74 MG/DL (ref 0.6–1.3)
EOSINOPHIL # BLD AUTO: 0.11 THOUSAND/ΜL (ref 0–0.61)
EOSINOPHIL NFR BLD AUTO: 3 % (ref 0–6)
ERYTHROCYTE [DISTWIDTH] IN BLOOD BY AUTOMATED COUNT: 13.9 % (ref 11.6–15.1)
GFR SERPL CREATININE-BSD FRML MDRD: 88 ML/MIN/1.73SQ M
GLUCOSE P FAST SERPL-MCNC: 91 MG/DL (ref 65–99)
HCT VFR BLD AUTO: 44.1 % (ref 34.8–46.1)
HDLC SERPL-MCNC: 73 MG/DL
HGB BLD-MCNC: 14 G/DL (ref 11.5–15.4)
IMM GRANULOCYTES # BLD AUTO: 0.01 THOUSAND/UL (ref 0–0.2)
IMM GRANULOCYTES NFR BLD AUTO: 0 % (ref 0–2)
LDLC SERPL CALC-MCNC: 141 MG/DL (ref 0–100)
LYMPHOCYTES # BLD AUTO: 1.1 THOUSANDS/ΜL (ref 0.6–4.47)
LYMPHOCYTES NFR BLD AUTO: 29 % (ref 14–44)
MCH RBC QN AUTO: 28.6 PG (ref 26.8–34.3)
MCHC RBC AUTO-ENTMCNC: 31.7 G/DL (ref 31.4–37.4)
MCV RBC AUTO: 90 FL (ref 82–98)
MONOCYTES # BLD AUTO: 0.3 THOUSAND/ΜL (ref 0.17–1.22)
MONOCYTES NFR BLD AUTO: 8 % (ref 4–12)
NEUTROPHILS # BLD AUTO: 2.25 THOUSANDS/ΜL (ref 1.85–7.62)
NEUTS SEG NFR BLD AUTO: 59 % (ref 43–75)
NRBC BLD AUTO-RTO: 0 /100 WBCS
PLATELET # BLD AUTO: 169 THOUSANDS/UL (ref 149–390)
PMV BLD AUTO: 8.9 FL (ref 8.9–12.7)
POTASSIUM SERPL-SCNC: 4.2 MMOL/L (ref 3.5–5.3)
PROT SERPL-MCNC: 6.5 G/DL (ref 6.4–8.2)
RBC # BLD AUTO: 4.9 MILLION/UL (ref 3.81–5.12)
SODIUM SERPL-SCNC: 143 MMOL/L (ref 136–145)
TRIGL SERPL-MCNC: 81 MG/DL
TSH SERPL DL<=0.05 MIU/L-ACNC: 2.61 UIU/ML (ref 0.36–3.74)
WBC # BLD AUTO: 3.79 THOUSAND/UL (ref 4.31–10.16)

## 2021-05-21 PROCEDURE — 80053 COMPREHEN METABOLIC PANEL: CPT | Performed by: PHYSICIAN ASSISTANT

## 2021-05-21 PROCEDURE — 85025 COMPLETE CBC W/AUTO DIFF WBC: CPT | Performed by: PHYSICIAN ASSISTANT

## 2021-05-21 PROCEDURE — 84443 ASSAY THYROID STIM HORMONE: CPT | Performed by: PHYSICIAN ASSISTANT

## 2021-05-21 PROCEDURE — 80061 LIPID PANEL: CPT | Performed by: PHYSICIAN ASSISTANT

## 2021-05-21 PROCEDURE — 36415 COLL VENOUS BLD VENIPUNCTURE: CPT | Performed by: PHYSICIAN ASSISTANT

## 2021-05-21 PROCEDURE — 87389 HIV-1 AG W/HIV-1&-2 AB AG IA: CPT

## 2021-05-21 PROCEDURE — 82306 VITAMIN D 25 HYDROXY: CPT | Performed by: PHYSICIAN ASSISTANT

## 2021-05-23 LAB — HIV 1+2 AB+HIV1 P24 AG SERPL QL IA: NORMAL

## 2021-06-04 ENCOUNTER — OFFICE VISIT (OUTPATIENT)
Dept: OBGYN CLINIC | Facility: MEDICAL CENTER | Age: 61
End: 2021-06-04
Payer: COMMERCIAL

## 2021-06-04 ENCOUNTER — APPOINTMENT (OUTPATIENT)
Dept: RADIOLOGY | Facility: MEDICAL CENTER | Age: 61
End: 2021-06-04
Payer: COMMERCIAL

## 2021-06-04 VITALS
BODY MASS INDEX: 38.09 KG/M2 | WEIGHT: 207 LBS | HEART RATE: 57 BPM | DIASTOLIC BLOOD PRESSURE: 76 MMHG | SYSTOLIC BLOOD PRESSURE: 152 MMHG | RESPIRATION RATE: 16 BRPM | HEIGHT: 62 IN

## 2021-06-04 DIAGNOSIS — Z96.651 S/P TOTAL KNEE ARTHROPLASTY, RIGHT: ICD-10-CM

## 2021-06-04 DIAGNOSIS — R29.898 WEAKNESS OF BOTH HIPS: ICD-10-CM

## 2021-06-04 DIAGNOSIS — M17.11 PRIMARY OSTEOARTHRITIS OF RIGHT KNEE: ICD-10-CM

## 2021-06-04 DIAGNOSIS — M17.11 PRIMARY OSTEOARTHRITIS OF RIGHT KNEE: Primary | ICD-10-CM

## 2021-06-04 PROCEDURE — 99213 OFFICE O/P EST LOW 20 MIN: CPT | Performed by: ORTHOPAEDIC SURGERY

## 2021-06-04 PROCEDURE — 1036F TOBACCO NON-USER: CPT | Performed by: ORTHOPAEDIC SURGERY

## 2021-06-04 PROCEDURE — 73562 X-RAY EXAM OF KNEE 3: CPT

## 2021-06-04 NOTE — PROGRESS NOTES
Orthopaedic Surgery - Office Note  Rina Landaverde (32 y o  female)   : 1960   MRN: [id number]  Encounter Date: 2021    Chief Complaint   Patient presents with    Right Knee - Follow-up       Assessment / Plan   Status post right total knee arthroplasty performed on 2020 with pes anserine bursitis and bilateral hip weakness    · Activity as tolerated  · Home exercise program reviewed  · Begin outpatient PT for bilateral hip weakness  Focus on hip, thigh and   Core strengthening and gait training  · Focus on progressing strength  · anti-inflammatories and ice for pain control  · She is also experiencing pes anserine bursitis a CSI was offered today but she would like to hold off at this time  Return in about 3 months (around 2021)  History of Present Illness  Rina Landaverde is a 64 y o  female who presents for follow-up evaluation, she is status post right knee  Arthroplasty performed on 2020  She states overall she is doing well however she is experiencing anterior knee medial knee pain  She is also noting that she is "wobbling" when she walks  Overall she is happy with her progress thus far  She denies any further injury or trauma to her right knee  She denies any distal paresthesias  Review of Systems  Pertinent items are noted in HPI  All other systems were reviewed and are negative  Physical Exam  /76   Pulse 57   Resp 16   Ht 5' 2" (1 575 m)   Wt 93 9 kg (207 lb)   BMI 37 86 kg/m²   Cons: Appears well  No apparent distress  Psych: Alert  Oriented x3  Mood and affect normal   Eyes: PERRLA, EOMI  Resp: Normal effort  No audible wheezing or stridor  CV: Palpable pulse  No discernable arrhythmia  No LE edema  Lymph:  No palpable cervical, axillary, or inguinal lymphadenopathy  Skin: Warm  No palpable masses  No visible lesions  Neuro: Normal muscle tone  Normal and symmetric DTR's       Right Knee Exam  Alignment:  Normal knee alignment  Inspection:  No swelling  No edema  No erythema  No ecchymosis  Incision healed  Palpation:  pes anserine  tenderness  No effusion  No warmth  ROM:  Knee Extension 0  Knee Flexion 125  Strength:  Hip Flexors 4+/5  Hip Adductors 3/5  5/5 quadriceps and hamstrings  Able to SLR without lag  Able to actively extend knee against gravity  Stability:  No objective knee instability  Stable Varus / Valgus stress, Lachman, and Posterior drawer  Tests:  No pertinent positive or negative tests  Patella:  Normal patellar mobility  Neurovascular:  Sensation intact in DP/SP/Gill/Sa/T nerve distributions  2+ DP & PT pulses  Gait:  Trendelenberg  Studies Reviewed  XR of right knee - i personally reviewed the xray obtained on june 4, 2021 which demonstrates acceptable alignment and stable appearance of impants    Procedures  No procedures today  Medical, Surgical, Family, and Social History  The patient's medical history, family history, and social history, were reviewed and updated as appropriate      Past Medical History:   Diagnosis Date    Ambulatory dysfunction     uses walking stick    Chronic pain disorder     Claustrophobia     mild    DDD (degenerative disc disease), lumbar     Endometrial cancer ([facility] [address] ) 7/6/2017    Low back pain     Mild acid reflux     OA (osteoarthritis)     marissa knees    Spinal stenosis of lumbosacral region     Uterine cancer ([address] )     2017- hysterectomy and chemo    Wears glasses        Past Surgical History:   Procedure Laterality Date    COLONOSCOPY      ORTHOPEDIC SURGERY      PLANTAR FASCIA SURGERY Bilateral     LA HYSTEROSCOPY,W/ENDO BX N/A 2/28/2017    Procedure: DILATATION AND CURETTAGE (D&C) WITH HYSTEROSCOPY,POLYPECTOMY;  Surgeon: Nesha Liu MD;  Location: [facility];  Service: Gynecology    LA TOTAL KNEE ARTHROPLASTY Right 11/30/2020    Procedure: ARTHROPLASTY KNEE TOTAL;  Surgeon: Simpson Olszewski, MD;  Location: [facility];  Service: Orthopedics  SHOULDER SURGERY Left     TONSILLECTOMY      WISDOM TOOTH EXTRACTION         Family History   Problem Relation Age of Onset    No Known Problems Mother     No Known Problems Father        Social History     Occupational History    Not on file   Tobacco Use    Smoking status: Never Smoker    Smokeless tobacco: Never Used    Tobacco comment: No secondhand smoke exposure   Substance and Sexual Activity    Alcohol use: Yes     Frequency: Monthly or less     Drinks per session: 1 or 2     Binge frequency: Never     Comment: very rarely    Drug use: No    Sexual activity: Not on file       Allergies   Allergen Reactions    Iv Contrast [Iodinated Diagnostic Agents] Hives    Vitamin C [Ascorbate - Food Allergy]      Queasy stomach, loose stool         Current Outpatient Medications:     Black Cohosh (REMIFEMIN MENOPAUSE PO), Take 1 tablet by mouth 2 (two) times a day, Disp: , Rfl:     ibuprofen (MOTRIN) 200 mg tablet, Take 200-800 mg by mouth every 6 (six) hours as needed for mild pain, Disp: , Rfl:     Naproxen Sodium 220 MG CAPS, Take 2 capsules by mouth daily, Disp: , Rfl:     Omega 3-6-9 Fatty Acids (OMEGA 3-6-9 PO), Take 1 capsule by mouth daily, Disp: , Rfl:     PANTETHINE PO, Take 1 tablet by mouth 2 (two) times a day, Disp: , Rfl:       Chacha Cyr    Scribe Attestation    I,:  Brittaney Dean am acting as a scribe while in the presence of the attending physician :       I,:  Yomaira Crandall MD personally performed the services described in this documentation    as scribed in my presence :

## 2021-06-08 ENCOUNTER — EVALUATION (OUTPATIENT)
Dept: PHYSICAL THERAPY | Facility: CLINIC | Age: 61
End: 2021-06-08
Payer: COMMERCIAL

## 2021-06-08 DIAGNOSIS — M17.11 PRIMARY OSTEOARTHRITIS OF RIGHT KNEE: ICD-10-CM

## 2021-06-08 DIAGNOSIS — R29.898 WEAKNESS OF BOTH HIPS: Primary | ICD-10-CM

## 2021-06-08 PROCEDURE — 97110 THERAPEUTIC EXERCISES: CPT | Performed by: PHYSICAL THERAPIST

## 2021-06-08 PROCEDURE — 97161 PT EVAL LOW COMPLEX 20 MIN: CPT | Performed by: PHYSICAL THERAPIST

## 2021-06-08 NOTE — PROGRESS NOTES
PT Evaluation     Today's date: 2021  Patient name: Chelita Fernandez  : 1960  MRN: [id number]  Referring provider: Jose Brown MD  Dx:   Encounter Diagnosis     ICD-10-CM    1  Primary osteoarthritis of right knee  M17 11 Ambulatory referral to Physical Therapy   2  Weakness of both hips  R29 898 Ambulatory referral to Physical Therapy                  Assessment  Assessment details: Chelita Fernandez is a 64 y o  female who presents with B hip weakness  Pt has decreased B LE strength and Trendelenburg gait  Pt currently has difficulty negotiating stairs/curbs, difficulty with prolonged standing/ambulation (>20 minutes), difficulty negotiating uneven surfaces  Pt would benefit from skilled PT to address the above impairments and to return to pain free function  Impairments: abnormal or restricted ROM, impaired physical strength, lacks appropriate home exercise program and pain with function  Functional limitations: difficulty negotiating stairs/curbs, difficulty with prolonged standing/ambulation (>20 minutes), difficulty negotiating uneven surfaces  Symptom irritability: moderateUnderstanding of Dx/Px/POC: good   Prognosis: good    Goals  1  Pt will be independent with HEP upon DC  2  Pt's B LE strength will be at least 4/5 t/o to allow for stair/curb negotiation with greater ease  3  Pt will report ambulation up to 40 minutes at a time  4  Pt's pain will be no more than 2/10 to allow for prolonged standing  Plan  Patient would benefit from: skilled physical therapy  Planned modality interventions: low level laser therapy  Planned therapy interventions: therapeutic activities, therapeutic exercise, neuromuscular re-education and joint mobilization  Frequency: 2x week  Duration in visits: 12  Duration in weeks: 6  Treatment plan discussed with: patient        Subjective Evaluation    History of Present Illness  Onset date: chronic  Mechanism of injury: Pt underwent a R TKA back in November   Pt reports that she still has residual pain and struggles at time with functional things such as stairs  Pt has B hip weakness and still presents with trendelenburg gait  Pt had an x-ray of the knee which was unremarkable  Pt presents to OP PT            Recurrent probem    Quality of life: good    Pain  Current pain ratin  At best pain ratin  At worst pain ratin  Location: R knee  Quality: dull ache  Relieving factors: rest  Aggravating factors: stair climbing and standing  Progression: no change      Diagnostic Tests  X-ray: normal  Treatments  Previous treatment: physical therapy  Patient Goals  Patient goals for therapy: increased strength, decreased pain and independence with ADLs/IADLs          Objective     Active Range of Motion     Right Knee   Flexion: WFL  Extension: 4 degrees     Strength/Myotome Testing     Left Hip   Planes of Motion   Flexion: 4-  Extension: 4-  Abduction: 3+    Right Hip   Planes of Motion   Flexion: 4-  Extension: 4-  Abduction: 3    Left Knee   Flexion: 5  Extension: 5             Precautions: none      Manuals                                                                 Neuro Re-Ed             SLS             Tandem stance                                                                              Ther Ex             SL Clamshells RTB 5"x15            SL abduction x20            Reverse clamshell             Prone ER ball sq             Prone IR tband             Prone hip ext             Prone donkey kick             Standing fire hydrants             Standing abduction with tband             Ther Activity             TM             Step ups (lat/cross)             Lat BOSU step ups             Squats             Side stepping with tband

## 2021-06-10 ENCOUNTER — OFFICE VISIT (OUTPATIENT)
Dept: PHYSICAL THERAPY | Facility: CLINIC | Age: 61
End: 2021-06-10
Payer: COMMERCIAL

## 2021-06-10 DIAGNOSIS — R29.898 WEAKNESS OF BOTH HIPS: Primary | ICD-10-CM

## 2021-06-10 PROCEDURE — 97530 THERAPEUTIC ACTIVITIES: CPT | Performed by: PHYSICAL THERAPIST

## 2021-06-10 PROCEDURE — 97110 THERAPEUTIC EXERCISES: CPT | Performed by: PHYSICAL THERAPIST

## 2021-06-10 NOTE — PROGRESS NOTES
Daily Note     Today's date: 6/10/2021  Patient name: Lakhwinder Jordan  : 1960  MRN: [id number]  Referring provider: Amanda Huang MD  Dx:   Encounter Diagnosis     ICD-10-CM    1  Weakness of both hips  R29 898                   Subjective: Pt reports no new changes  Objective: See treatment diary below      Assessment: Pt required verbal and manual cues t/o session to avoid compensation  Substantial weakness in hip abductors  Plan: Continue per plan of care        Precautions: none      Manuals 6/8 6/10                                                               Neuro Re-Ed             SLS             Tandem stance                                                                              Ther Ex             SL Clamshells RTB 5"x15 RTB 5"x15ea           SL abduction x20 x15ea           Reverse clamshell             Prone ER ball sq  5"x15           Prone IR tband  RTB 5"x15           Prone hip ext  x15ea           Prone donkey kick             Standing fire hydrants  x15ea           Standing abduction with tband             Ther Activity             TM   0 5 mph 6'           Step ups (lat/cross)  6"x15 ea           Lat BOSU step ups             Squats             Side stepping with tband             Hip hiking

## 2021-06-15 ENCOUNTER — OFFICE VISIT (OUTPATIENT)
Dept: FAMILY MEDICINE CLINIC | Facility: CLINIC | Age: 61
End: 2021-06-15
Payer: COMMERCIAL

## 2021-06-15 VITALS
DIASTOLIC BLOOD PRESSURE: 88 MMHG | WEIGHT: 206 LBS | HEART RATE: 80 BPM | SYSTOLIC BLOOD PRESSURE: 134 MMHG | BODY MASS INDEX: 37.91 KG/M2 | HEIGHT: 62 IN | TEMPERATURE: 98 F

## 2021-06-15 DIAGNOSIS — D72.819 LEUKOPENIA, UNSPECIFIED TYPE: ICD-10-CM

## 2021-06-15 DIAGNOSIS — E78.2 MIXED HYPERLIPIDEMIA: Primary | ICD-10-CM

## 2021-06-15 DIAGNOSIS — R03.0 ELEVATED BP WITHOUT DIAGNOSIS OF HYPERTENSION: ICD-10-CM

## 2021-06-15 DIAGNOSIS — R79.89 ELEVATED LIVER FUNCTION TESTS: ICD-10-CM

## 2021-06-15 DIAGNOSIS — M79.671 FOOT PAIN, BILATERAL: ICD-10-CM

## 2021-06-15 DIAGNOSIS — E55.9 VITAMIN D DEFICIENCY: ICD-10-CM

## 2021-06-15 DIAGNOSIS — M79.672 FOOT PAIN, BILATERAL: ICD-10-CM

## 2021-06-15 PROCEDURE — 1036F TOBACCO NON-USER: CPT | Performed by: PHYSICIAN ASSISTANT

## 2021-06-15 PROCEDURE — 3725F SCREEN DEPRESSION PERFORMED: CPT | Performed by: PHYSICIAN ASSISTANT

## 2021-06-15 PROCEDURE — 3008F BODY MASS INDEX DOCD: CPT | Performed by: PHYSICIAN ASSISTANT

## 2021-06-15 PROCEDURE — 99214 OFFICE O/P EST MOD 30 MIN: CPT | Performed by: PHYSICIAN ASSISTANT

## 2021-06-15 RX ORDER — CHOLECALCIFEROL (VITAMIN D3) 50 MCG
4000 CAPSULE ORAL DAILY
Qty: 30 CAPSULE | Refills: 0
Start: 2021-06-15

## 2021-06-15 NOTE — PATIENT INSTRUCTIONS
Problem List Items Addressed This Visit        Other    Elevated liver function tests      Within normal limits today  Mixed hyperlipidemia - Primary       Patient is on no medication for cholesterol and her LDL is elevated at 141 up from 132  Pt  will decrease fat/chol and keep up her activity which is keeping her good HDL great at 70's  Relevant Orders    Lipid Panel with Direct LDL reflex    Elevated BP without diagnosis of hypertension       Blood pressure improved currently 134/88 down from 152  Vitamin D deficiency      Vitamin-D is low at 23  I will have patient initiate 4000 International Units vitamin-D once daily recheck in 4-6  Months  Relevant Medications    Cholecalciferol (HM Vitamin D3) 100 MCG (4000 UT) CAPS    Other Relevant Orders    Vitamin D 25 hydroxy    Leukopenia      White count slightly low at 3 79 with otherwise normal CBC  Continue to observe yearly  Hyperlipidemia   WHAT YOU NEED TO KNOW:   What is hyperlipidemia? Hyperlipidemia is a high level of lipids (fats) in your blood  These lipids include cholesterol or triglycerides  Lipids are made by your body  They also come from the foods you eat  Your body needs lipids to work properly, but high levels increase your risk for heart disease, heart attack, and stroke  What increases my risk for hyperlipidemia? · Family history of high lipid levels    · Diet high in saturated fats, cholesterol, or calories     · High alcohol intake or smoking    · Lack of regular physical activity    · Medical conditions such as hypothyroidism, obesity, or type 2 diabetes    · Certain medicines, such as blood pressure medicines, hormones, and steroids    How is hyperlipidemia managed and treated? Your healthcare provider may first recommend that you make lifestyle changes to help decrease your lipid levels  You may also need to take medicine to lower your lipid levels   Some of the lifestyle changes you may need to make include the following:  · Maintain a healthy weight  Ask your healthcare provider how much you should weigh  Ask him or her to help you create a weight loss plan if you are overweight  Weight loss can decrease your cholesterol and triglyceride levels  · Exercise as directed  Exercise lowers your cholesterol levels and helps you maintain a healthy weight  Get 30 minutes or more of aerobic exercise 4 to 6 days each week  You can split your exercise into four 10-minute workouts instead of 30 minutes at one time  Examples of aerobic exercises include walking briskly, swimming, or riding a bike  Work with your healthcare provider to plan the best exercise program for you  · Do not smoke  Nicotine and other chemicals in cigarettes and cigars can increase your risk for a heart attack and stroke  Ask your healthcare provider for information if you currently smoke and need help to quit  E-cigarettes or smokeless tobacco still contain nicotine  Talk to your healthcare provider before you use these products  · Eat heart-healthy foods  Talk to your dietitian about a heart-healthy diet  The following will help you manage hyperlipidemia:     ? Decrease the total amount of fat you eat  Choose lean meats, fat-free or 1% fat milk, and low-fat dairy products, such as yogurt and cheese  Limit or do not eat red meat  Red meats are high in fat and cholesterol  ? Replace unhealthy fats with healthy fats  Unhealthy fats include saturated fat, trans fat, and cholesterol  Choose soft margarines that are low in saturated fat and have little or no trans fat  Monounsaturated fats are healthy fats  These are found in olive oil, canola oil, avocado, and nuts  Polyunsaturated fats are also healthy  These are found in fish, flaxseed, walnuts, and soybeans  ? Eat 5 or more servings of fruits and vegetables every day  They are low in calories and fat, and a good source of essential vitamins   Include dark green, red, and orange vegetables  Examples include spinach, kale, broccoli, and carrots  ? Eat foods high in fiber  Fiber can help lower your cholesterol levels  Choose whole grain, high-fiber foods  Good choices include whole-wheat breads or cereals, beans, peas, fruits, and vegetables  · Ask your healthcare provider if it is safe for you to drink alcohol  Alcohol can increase your cholesterol and triglyceride levels  A drink of alcohol is 12 ounces of beer, 5 ounces of wine, or 1½ ounces of liquor  Call 911 for any of the following:   · You have any of the following signs of a heart attack:      ? Squeezing, pressure, or pain in your chest    ? You may  also have any of the following:     ? Discomfort or pain in your back, neck, jaw, stomach, or arm    ? Shortness of breath    ? Nausea or vomiting    ? Lightheadedness or a sudden cold sweat    · You have any of the following signs of a stroke:      ? Numbness or drooping on one side of your face     ? Weakness in an arm or leg    ? Confusion or difficulty speaking    ? Dizziness, a severe headache, or vision loss    When should I contact my healthcare provider? · You have questions or concerns about your condition or care  CARE AGREEMENT:   You have the right to help plan your care  Learn about your health condition and how it may be treated  Discuss treatment options with your healthcare providers to decide what care you want to receive  You always have the right to refuse treatment  The above information is an  only  It is not intended as medical advice for individual conditions or treatments  Talk to your doctor, nurse or pharmacist before following any medical regimen to see if it is safe and effective for you  © Copyright [address] Information is for End User's use only and may not be sold, redistributed or otherwise used for commercial purposes   All illustrations and images included in CareNotes® are the copyrighted property of [employer]  or [address] [place] [address]

## 2021-06-15 NOTE — PROGRESS NOTES
Assessment and Plan:    Problem List Items Addressed This Visit        Other    Elevated liver function tests      Within normal limits today  Mixed hyperlipidemia - Primary       Patient is on no medication for cholesterol and her LDL is elevated at 141 up from 132  Pt  will decrease fat/chol and keep up her activity which is keeping her good HDL great at 70's  Relevant Orders    Lipid Panel with Direct LDL reflex    Foot pain, bilateral     Consider seeing Dr Tucker Pearson  Relevant Orders    Ambulatory referral to Podiatry    Elevated BP without diagnosis of hypertension       Blood pressure improved currently 134/88 down from 152  Check again in 4 months  No meds started today  Vitamin D deficiency      Vitamin-D is low at 23  I will have patient initiate 4000 International Units vitamin-D once daily recheck in 4-6  Months  Relevant Medications    Cholecalciferol (HM Vitamin D3) 100 MCG (4000 UT) CAPS    Other Relevant Orders    Vitamin D 25 hydroxy    Leukopenia      White count slightly low at 3 79 with otherwise normal CBC  Continue to observe yearly  Diagnoses and all orders for this visit:    Mixed hyperlipidemia  -     Lipid Panel with Direct LDL reflex; Future    Elevated liver function tests    Elevated BP without diagnosis of hypertension    Vitamin D deficiency  -     Vitamin D 25 hydroxy; Future  -     Cholecalciferol (HM Vitamin D3) 100 MCG (4000 UT) CAPS; Take 1 capsule (4,000 Units total) by mouth daily    Leukopenia, unspecified type    Foot pain, bilateral  -     Ambulatory referral to Podiatry; Future              Subjective:      Patient ID: Virginia Clemente is a 64 y o  female      CC:    Chief Complaint   Patient presents with    Follow-up     review labs 5/21/21       HPI:      Virginia Clemente is here for chronic conditions f/u including the diagnosis of Mixed hyperlipidemia  (primary encounter diagnosis)  Elevated liver function tests  Elevated bp without diagnosis of hypertension  Vitamin d deficiency  Leukopenia, unspecified type   Pt  states they are taking all medications as directed without complaints or side effects   Pt  had labs done prior to today's visit which included Recent Results (from the past 672 hour(s))  -CBC and differential  Collection Time: 05/21/21  9:54 AM       Result                      Value             Ref Range           WBC                         3 79 (L)          4 31 - 10 16*       RBC                         4 90              3 81 - 5 12 *       Hemoglobin                  14 0              11 5 - 15 4 *       Hematocrit                  44 1              34 8 - 46 1 %       MCV                         90                82 - 98 fL          MCH                         28 6              26 8 - 34 3 *       MCHC                        31 7              31 4 - 37 4 *       RDW                         13 9              11 6 - 15 1 %       MPV                         8 9               8 9 - 12 7 fL       Platelets                   169               149 - 390 Th*       nRBC                        0                 /100 WBCs           Neutrophils Relative        59                43 - 75 %           Immat GRANS %               0                 0 - 2 %             Lymphocytes Relative        29                14 - 44 %           Monocytes Relative          8                 4 - 12 %            Eosinophils Relative        3                 0 - 6 %             Basophils Relative          1                 0 - 1 %             Neutrophils Absolute        2 25              1 85 - 7 62 *       Immature Grans Absolute     0 01              0 00 - 0 20 *       Lymphocytes Absolute        1 10              0 60 - 4 47 *       Monocytes Absolute          0 30              0 17 - 1 22 *       Eosinophils Absolute        0 11              0 00 - 0 61 *       Basophils Absolute          0 02              0 00 - 0 10 *  -Comprehensive metabolic panel  Collection Time: 05/21/21  9:54 AM       Result                      Value             Ref Range           Sodium                      143               136 - 145 mm*       Potassium                   4 2               3 5 - 5 3 mm*       Chloride                    111 (H)           100 - 108 mm*       CO2                         25                21 - 32 mmol*       ANION GAP                   7                 4 - 13 mmol/L       BUN                         29 (H)            5 - 25 mg/dL        Creatinine                  0 74              0 60 - 1 30 *       Glucose, Fasting            91                65 - 99 mg/dL       Calcium                     9 2               8 3 - 10 1 m*       AST                         34                5 - 45 U/L          ALT                         40                12 - 78 U/L         Alkaline Phosphatase        62                46 - 116 U/L        Total Protein               6 5               6 4 - 8 2 g/*       Albumin                     3 5               3 5 - 5 0 g/*       Total Bilirubin             0 67              0 20 - 1 00 *       eGFR                        88                ml/min/1 73s*  -Lipid Panel with Direct LDL reflex  Collection Time: 05/21/21  9:54 AM       Result                      Value             Ref Range           Cholesterol                 230 (H)           50 - 200 mg/*       Triglycerides               81                <=150 mg/dL         HDL, Direct                 73                >=40 mg/dL          LDL Calculated              141 (H)           0 - 100 mg/dL  -Vitamin D 25 hydroxy  Collection Time: 05/21/21  9:54 AM       Result                      Value             Ref Range           Vit D, 25-Hydroxy           19 9 (L)          30 0 - 100 0*  -TSH, 3rd generation with Free T4 reflex  Collection Time: 05/21/21  9:54 AM       Result                      Value             Ref Range           TSH 3RD FLO           2 610             0 358 - 3 74*  -HIV 1/2 Antigen/Antibody (4th Generation) w Reflex [facility]  Collection Time: 05/21/21  9:54 AM       Result                      Value             Ref Range           HIV-1/HIV-2 Ab              Non-Reactive      Non-Reactive         The following portions of the patient's history were reviewed and updated as appropriate: allergies, current medications, past family history, past medical history, past social history, past surgical history and problem list       Review of Systems   Constitutional: Negative  HENT: Negative  Eyes: Negative  Respiratory: Negative  Cardiovascular: Negative  Gastrointestinal: Negative  Endocrine: Negative  Genitourinary: Negative  Musculoskeletal: Negative  Skin: Negative  Allergic/Immunologic: Negative  Neurological: Negative  Hematological: Negative  Psychiatric/Behavioral: Negative  Data to review:       Objective:    Vitals:    06/15/21 1258   BP: 134/88   BP Location: Left arm   Patient Position: Sitting   Cuff Size: Adult   Pulse: 80   Temp: 98 °F (36 7 °C)   TempSrc: Temporal   Weight: 93 4 kg (206 lb)   Height: 5' 2" (1 575 m)        Physical Exam  Vitals and nursing note reviewed  Constitutional:       Appearance: Normal appearance  She is well-developed  HENT:      Head: Normocephalic and atraumatic  Eyes:      General: Lids are normal       Conjunctiva/sclera: Conjunctivae normal       Pupils: Pupils are equal, round, and reactive to light  Cardiovascular:      Rate and Rhythm: Normal rate and regular rhythm  Heart sounds: No murmur heard  Pulmonary:      Effort: Pulmonary effort is normal       Breath sounds: Normal breath sounds  Skin:     General: Skin is warm and dry  Neurological:      General: No focal deficit present  Mental Status: She is alert  Coordination: Coordination is intact     Psychiatric:         Mood and Affect: Mood normal  Behavior: Behavior normal  Behavior is cooperative  Thought Content:  Thought content normal          Judgment: Judgment normal

## 2021-06-15 NOTE — ASSESSMENT & PLAN NOTE
Patient is on no medication for cholesterol and her LDL is elevated at 141 up from 132  Pt  will decrease fat/chol and keep up her activity which is keeping her good HDL great at 70's

## 2021-06-15 NOTE — ASSESSMENT & PLAN NOTE
Vitamin-D is low at 19  I will have patient initiate 4000 International Units vitamin-D once daily recheck in 4-6  Months

## 2021-06-15 NOTE — ASSESSMENT & PLAN NOTE
Blood pressure improved currently 134/88 down from 152  Check again in 4 months  No meds started today

## 2021-06-17 ENCOUNTER — OCCMED (OUTPATIENT)
Dept: URGENT CARE | Facility: CLINIC | Age: 61
End: 2021-06-17
Payer: COMMERCIAL

## 2021-06-17 ENCOUNTER — APPOINTMENT (OUTPATIENT)
Dept: RADIOLOGY | Facility: CLINIC | Age: 61
End: 2021-06-17
Payer: COMMERCIAL

## 2021-06-17 DIAGNOSIS — S89.92XA INJURY OF LEFT KNEE, INITIAL ENCOUNTER: ICD-10-CM

## 2021-06-17 DIAGNOSIS — S89.92XA INJURY OF LEFT KNEE, INITIAL ENCOUNTER: Primary | ICD-10-CM

## 2021-06-17 PROCEDURE — 73564 X-RAY EXAM KNEE 4 OR MORE: CPT

## 2021-06-17 PROCEDURE — 99213 OFFICE O/P EST LOW 20 MIN: CPT | Performed by: PHYSICIAN ASSISTANT

## 2021-06-18 ENCOUNTER — OFFICE VISIT (OUTPATIENT)
Dept: PHYSICAL THERAPY | Facility: CLINIC | Age: 61
End: 2021-06-18
Payer: COMMERCIAL

## 2021-06-18 DIAGNOSIS — R29.898 WEAKNESS OF BOTH HIPS: Primary | ICD-10-CM

## 2021-06-18 DIAGNOSIS — M17.11 PRIMARY OSTEOARTHRITIS OF RIGHT KNEE: ICD-10-CM

## 2021-06-18 PROCEDURE — 97110 THERAPEUTIC EXERCISES: CPT

## 2021-06-18 PROCEDURE — 97530 THERAPEUTIC ACTIVITIES: CPT

## 2021-06-18 NOTE — PROGRESS NOTES
Daily Note     Today's date: 2021  Patient name: Chelita Fernandez  : 1960  MRN: [id number]  Referring provider: Jose Brown MD  Dx: No diagnosis found  Subjective: Pt reports yesterday when at work, she stepped in a pot hole and her L knee gave out  Notes she did not fall, but hurt her knee  She is not aware if the incident aggravated her hips yet, as she hasn't done much walking today  Objective: See treatment diary below      Assessment: Addition of SLS was challenging on L 2* L knee pain  Able to to addtion of prone donkey kicks and ex progressions w/o complaint  CP to L knee x 10'  Will monitor  Patient would benefit from continued PT      Plan: Continue per plan of care        Precautions: none      Manuals 6/8 6/10 6/18                                                              Neuro Re-Ed             SLS   R30"x2  L30"x1          Tandem stance                                                                              Ther Ex             SL Clamshells RTB 5"x15 RTB 5"x15ea RTB  5"x20ea          SL abduction x20 x15ea x20ea          Reverse clamshell             Prone ER ball sq  5"x15 5"x20          Prone IR tband  RTB 5"x15 RTB  5"x20          Prone hip ext  x15ea x20ea          Prone donkey kick   x10ea          Standing fire hydrants  x15ea x15          Standing abduction with tband             Ther Activity             TM   0 5 mph 6' 0 5mph 6'          Step ups (lat/cross)  6"x15 ea np          Lat BOSU step ups             Squats             Side stepping with tband             Hip hiking             Modalities             CP   10' L knee

## 2021-06-22 ENCOUNTER — OFFICE VISIT (OUTPATIENT)
Dept: PHYSICAL THERAPY | Facility: CLINIC | Age: 61
End: 2021-06-22
Payer: COMMERCIAL

## 2021-06-22 ENCOUNTER — APPOINTMENT (OUTPATIENT)
Dept: URGENT CARE | Facility: CLINIC | Age: 61
End: 2021-06-22
Payer: COMMERCIAL

## 2021-06-22 DIAGNOSIS — R29.898 WEAKNESS OF BOTH HIPS: Primary | ICD-10-CM

## 2021-06-22 PROCEDURE — 97110 THERAPEUTIC EXERCISES: CPT | Performed by: PHYSICAL THERAPIST

## 2021-06-22 PROCEDURE — 97530 THERAPEUTIC ACTIVITIES: CPT | Performed by: PHYSICAL THERAPIST

## 2021-06-22 PROCEDURE — 99213 OFFICE O/P EST LOW 20 MIN: CPT | Performed by: FAMILY MEDICINE

## 2021-06-22 NOTE — PROGRESS NOTES
Daily Note     Today's date: 2021  Patient name: Lakhwinder Jordan  : 1960  MRN: [id number]  Referring provider: Amanda Huang MD  Dx:   Encounter Diagnosis     ICD-10-CM    1  Weakness of both hips  R29 898                   Subjective: Pt reports since her accident at work her L knee has been buckeling causing near falls  Pt to f/u with Eddi [address] Dr today  Objective: See treatment diary below      Assessment: Did not progress to WB exercises due to L knee injury  Will progress as tolerated  Plan: Continue per plan of care        Precautions: none      Manuals 6/8 6/10 6/18 6/22                                                             Neuro Re-Ed             SLS   R30"x2  L30"x1          Tandem stance                                                                              Ther Ex             SL Clamshells RTB 5"x15 RTB 5"x15ea RTB  5"x20ea RTB 5"x20         SL abduction x20 x15ea x20ea x20ea         Reverse clamshell    5"x15         Prone ER ball sq  5"x15 5"x20 5"x20         Prone IR tband  RTB 5"x15 RTB  5"x20 RTB 5"x20         Prone hip ext  x15ea x20ea x20ea         Prone donkey kick   x10ea x15         Standing fire hydrants  x15ea x15 x15         Standing abduction with tband             Ther Activity             TM   0 5 mph 6' 0 5mph 6' upright bike 8'         Step ups (lat/cross)  6"x15 ea np          Lat BOSU step ups             Squats             Side stepping with tband             Hip hiking             Modalities             CP   10' L knee

## 2021-06-24 ENCOUNTER — OFFICE VISIT (OUTPATIENT)
Dept: PHYSICAL THERAPY | Facility: CLINIC | Age: 61
End: 2021-06-24
Payer: COMMERCIAL

## 2021-06-24 DIAGNOSIS — R29.898 WEAKNESS OF BOTH HIPS: Primary | ICD-10-CM

## 2021-06-24 PROCEDURE — 97530 THERAPEUTIC ACTIVITIES: CPT | Performed by: PHYSICAL THERAPIST

## 2021-06-24 PROCEDURE — 97110 THERAPEUTIC EXERCISES: CPT | Performed by: PHYSICAL THERAPIST

## 2021-06-24 NOTE — PROGRESS NOTES
Daily Note     Today's date: 2021  Patient name: Gerald Jones  : 1960  MRN: [id number]  Referring provider: Krista Lopez MD  Dx:   Encounter Diagnosis     ICD-10-CM    1  Weakness of both hips  R29 898                   Subjective: Pt reports that the R [facility] [address] wants her to have an MRI of L knee  Objective: See treatment diary below      Assessment: Continued with all non weightbearing exercises due to continued L knee pain and buckling  Required frequent manual cues to avoid compensation  Plan: Continue per plan of care        Precautions: none      Manuals 6/8 6/10 6/18 6/22 6/24                                                            Neuro Re-Ed             SLS   R30"x2  L30"x1          Tandem stance                                                                              Ther Ex             SL Clamshells RTB 5"x15 RTB 5"x15ea RTB  5"x20ea RTB 5"x20 GTB 5"x20        Bridging     x20        Supine SLR with ER     x20        SL abduction x20 x15ea x20ea x20ea x20        Reverse clamshell    5"x15 5"x20         Prone ER ball sq  5"x15 5"x20 5"x20 5"x20        Prone IR tband  RTB 5"x15 RTB  5"x20 RTB 5"x20 RTB 5"x20        Prone hip ext  x15ea x20ea x20ea x20ea        Prone donkey kick   x10ea x15 x20        Standing fire hydrants  x15ea x15 x15         Standing abduction with tband             Ther Activity             TM   0 5 mph 6' 0 5mph 6' upright bike 8' upright bike 8'        Step ups (lat/cross)  6"x15 ea np          Lat BOSU step ups             Squats             Side stepping with tband             Hip hiking             Modalities             CP   10' L knee Venancio Raygoza

## 2021-06-28 ENCOUNTER — OFFICE VISIT (OUTPATIENT)
Dept: PHYSICAL THERAPY | Facility: CLINIC | Age: 61
End: 2021-06-28
Payer: COMMERCIAL

## 2021-06-28 DIAGNOSIS — M17.11 PRIMARY OSTEOARTHRITIS OF RIGHT KNEE: ICD-10-CM

## 2021-06-28 DIAGNOSIS — R29.898 WEAKNESS OF BOTH HIPS: Primary | ICD-10-CM

## 2021-06-28 PROCEDURE — 97530 THERAPEUTIC ACTIVITIES: CPT | Performed by: PHYSICAL THERAPIST

## 2021-06-28 PROCEDURE — 97110 THERAPEUTIC EXERCISES: CPT | Performed by: PHYSICAL THERAPIST

## 2021-06-28 NOTE — PROGRESS NOTES
Daily Note     Today's date: 2021  Patient name: Ilda Russo  : 1960  MRN: [id number]  Referring provider: Jacobo Guzmán MD  Dx:   Encounter Diagnosis     ICD-10-CM    1  Weakness of both hips  R29 898    2  Primary osteoarthritis of right knee  M17 11                   Subjective: No new changes verbalized after last tx  (+) antalgic gait with L knee brace in place and u/l walking stick use  4-5/10 in L knee today voiced  Still awaiting MRI  Objective: See treatment diary below      Assessment: Tolerated treatment well  Patient demonstrated fatigue post treatment and would benefit from continued PT  Modified therex was performed without any worsening of pain  Plan: Continue per plan of care  Progress treatment as tolerated  Precautions: none      Manuals 6/8 6/10 6/18 6/22 6/24 6/28                                                           Neuro Re-Ed             SLS   R30"x2  L30"x1          Tandem stance                                                                              Ther Ex             SL Clamshells RTB 5"x15 RTB 5"x15ea RTB  5"x20ea RTB 5"x20 GTB 5"x20 GTB 5"x20       Bridging     x20 x20       Supine SLR with ER     x20 x20       SL abduction x20 x15ea x20ea x20ea x20 x20       Reverse clamshell    5"x15 5"x20         Prone ER ball sq  5"x15 5"x20 5"x20 5"x20 5"x20       Prone IR tband  RTB 5"x15 RTB  5"x20 RTB 5"x20 RTB 5"x20 RTB 5"x20       Prone hip ext  x15ea x20ea x20ea x20ea x20 ea       Prone donkey kick   x10ea x15 x20 x20       Standing fire hydrants  x15ea x15 x15         Standing abduction with tband      GTB 10x2 ea LE, B UE use       Ther Activity             TM   0 5 mph 6' 0 5mph 6' upright bike 8' upright bike 8' Upright bike 10'       Step ups (lat/cross)  6"x15 ea np          Lat BOSU step ups             Squats             Side stepping with tband             Hip hiking             Modalities             CP   10' L knee   7' L knee post tx

## 2021-07-02 ENCOUNTER — OFFICE VISIT (OUTPATIENT)
Dept: PHYSICAL THERAPY | Facility: CLINIC | Age: 61
End: 2021-07-02
Payer: COMMERCIAL

## 2021-07-02 DIAGNOSIS — R29.898 WEAKNESS OF BOTH HIPS: Primary | ICD-10-CM

## 2021-07-02 DIAGNOSIS — M17.11 PRIMARY OSTEOARTHRITIS OF RIGHT KNEE: ICD-10-CM

## 2021-07-02 PROCEDURE — 97530 THERAPEUTIC ACTIVITIES: CPT

## 2021-07-02 PROCEDURE — 97110 THERAPEUTIC EXERCISES: CPT

## 2021-07-02 NOTE — PROGRESS NOTES
Daily Note     Today's date: 2021  Patient name: Anahi Mason  : 1960  MRN: [id number]  Referring provider: Dimple Sutton MD  Dx:   Encounter Diagnosis     ICD-10-CM    1  Weakness of both hips  R29 898    2  Primary osteoarthritis of right knee  M17 11                   Subjective: Regarding how her hips are feeling, pt stated "it's hard to say, with my knee issues " Notes she still "wobbles" when she walks  Objective: See treatment diary below      Assessment: Performed exercise program w/o complaint  Required VC's and TC's during same for technique/form  Tolerated treatment well  Will monitor  Patient would benefit from continued PT      Plan: Continue per plan of care        Precautions: none      Manuals 6/8 6/10 6/18 6/22 6/24 6/28 7/2                                                          Neuro Re-Ed             SLS   R30"x2  L30"x1          Tandem stance                                                                              Ther Ex             SL Clamshells RTB 5"x15 RTB 5"x15ea RTB  5"x20ea RTB 5"x20 GTB 5"x20 GTB 5"x20 GTB  5"x20      Bridging     x20 x20 x20      Supine SLR with ER     x20 x20 x20      SL abduction x20 x15ea x20ea x20ea x20 x20 x20      Reverse clamshell    5"x15 5"x20         Prone ER ball sq  5"x15 5"x20 5"x20 5"x20 5"x20 5"x20      Prone IR tband  RTB 5"x15 RTB  5"x20 RTB 5"x20 RTB 5"x20 RTB 5"x20 RTB  5"x20      Prone hip ext  x15ea x20ea x20ea x20ea x20 ea x20 ea      Prone donkey kick   x10ea x15 x20 x20 x20 ea      Standing fire hydrants  x15ea x15 x15         Standing abduction with tband      GTB 10x2 ea LE, B UE use GTB  10x2 ea  LE, B UE use      Ther Activity             TM   0 5 mph 6' 0 5mph 6' upright bike 8' upright bike 8' Upright bike 10' Upright  Bike 10'      Step ups (lat/cross)  6"x15 ea np          Lat BOSU step ups             Squats             Side stepping with tband             Hip hiking             Modalities             CP   10' L knee   7' L knee post tx

## 2021-07-07 ENCOUNTER — OFFICE VISIT (OUTPATIENT)
Dept: PHYSICAL THERAPY | Facility: CLINIC | Age: 61
End: 2021-07-07
Payer: COMMERCIAL

## 2021-07-07 ENCOUNTER — APPOINTMENT (OUTPATIENT)
Dept: URGENT CARE | Facility: CLINIC | Age: 61
End: 2021-07-07
Payer: OTHER MISCELLANEOUS

## 2021-07-07 DIAGNOSIS — M17.11 PRIMARY OSTEOARTHRITIS OF RIGHT KNEE: ICD-10-CM

## 2021-07-07 DIAGNOSIS — R29.898 WEAKNESS OF BOTH HIPS: Primary | ICD-10-CM

## 2021-07-07 PROCEDURE — 97110 THERAPEUTIC EXERCISES: CPT

## 2021-07-07 PROCEDURE — 99213 OFFICE O/P EST LOW 20 MIN: CPT | Performed by: FAMILY MEDICINE

## 2021-07-07 PROCEDURE — 97530 THERAPEUTIC ACTIVITIES: CPT

## 2021-07-07 NOTE — PROGRESS NOTES
Daily Note     Today's date: 2021  Patient name: Favio Spain  : 1960  MRN: [id number]  Referring provider: Julienne Mascorro MD  Dx:   Encounter Diagnosis     ICD-10-CM    1  Weakness of both hips  R29 898    2  Primary osteoarthritis of right knee  M17 11                   Subjective: "I still have the wobbling thing going on " Pt getting L knee MRI results today  Objective: See treatment diary below      Assessment: Performed exercise w/o discomfort  Required VC's and TC's for donkey kicks  Fatigue w/exercise cont  Will monitor  Patient would benefit from continued PT      Plan: Continue per plan of care        Precautions: none      Manuals 6/8 6/10 6/18 6/22 6/24 6/28 7/2 7/7                                                         Neuro Re-Ed             SLS   R30"x2  L30"x1          Tandem stance                                                                              Ther Ex             SL Clamshells RTB 5"x15 RTB 5"x15ea RTB  5"x20ea RTB 5"x20 GTB 5"x20 GTB 5"x20 GTB  5"x20 GTB  5"x20     Bridging     x20 x20 x20 x20     Supine SLR with ER     x20 x20 x20 x20     SL abduction x20 x15ea x20ea x20ea x20 x20 x20 x20     Reverse clamshell    5"x15 5"x20         Prone ER ball sq  5"x15 5"x20 5"x20 5"x20 5"x20 5"x20 5"x20     Prone IR tband  RTB 5"x15 RTB  5"x20 RTB 5"x20 RTB 5"x20 RTB 5"x20 RTB  5"x20 RTB  5"x20     Prone hip ext  x15ea x20ea x20ea x20ea x20 ea x20 ea x20ea     Prone donkey kick   x10ea x15 x20 x20 x20 ea x20 ea     Standing fire hydrants  x15ea x15 x15         Standing abduction with tband      GTB 10x2 ea LE, B UE use GTB  10x2 ea  LE, B UE use GTB  10x2 ea  LE, B UE use     Ther Activity             TM   0 5 mph 6' 0 5mph 6' upright bike 8' upright bike 8' Upright bike 10' Upright  Bike 10' Upright  Bike  10'     Step ups (lat/cross)  6"x15 ea np          Lat BOSU step ups             Squats             Side stepping with tband             Hip hiking             Modalities CP   10' L knee   7' L knee post tx

## 2021-07-09 ENCOUNTER — APPOINTMENT (OUTPATIENT)
Dept: PHYSICAL THERAPY | Facility: CLINIC | Age: 61
End: 2021-07-09
Payer: COMMERCIAL

## 2021-07-12 ENCOUNTER — APPOINTMENT (OUTPATIENT)
Dept: PHYSICAL THERAPY | Facility: CLINIC | Age: 61
End: 2021-07-12
Payer: COMMERCIAL

## 2021-07-15 ENCOUNTER — TELEPHONE (OUTPATIENT)
Dept: OBGYN CLINIC | Facility: MEDICAL CENTER | Age: 61
End: 2021-07-15

## 2021-07-15 ENCOUNTER — APPOINTMENT (OUTPATIENT)
Dept: PHYSICAL THERAPY | Facility: CLINIC | Age: 61
End: 2021-07-15
Payer: COMMERCIAL

## 2021-07-15 ENCOUNTER — OFFICE VISIT (OUTPATIENT)
Dept: OBGYN CLINIC | Facility: MEDICAL CENTER | Age: 61
End: 2021-07-15
Payer: OTHER MISCELLANEOUS

## 2021-07-15 VITALS
WEIGHT: 206 LBS | HEART RATE: 80 BPM | DIASTOLIC BLOOD PRESSURE: 78 MMHG | SYSTOLIC BLOOD PRESSURE: 117 MMHG | HEIGHT: 62 IN | BODY MASS INDEX: 37.91 KG/M2

## 2021-07-15 DIAGNOSIS — S83.242A OTHER TEAR OF MEDIAL MENISCUS OF LEFT KNEE AS CURRENT INJURY, INITIAL ENCOUNTER: ICD-10-CM

## 2021-07-15 DIAGNOSIS — M17.12 PRIMARY OSTEOARTHRITIS OF LEFT KNEE: Primary | ICD-10-CM

## 2021-07-15 DIAGNOSIS — S83.512A RUPTURE OF ANTERIOR CRUCIATE LIGAMENT OF LEFT KNEE, INITIAL ENCOUNTER: ICD-10-CM

## 2021-07-15 DIAGNOSIS — M25.562 LEFT KNEE PAIN, UNSPECIFIED CHRONICITY: ICD-10-CM

## 2021-07-15 PROCEDURE — 3008F BODY MASS INDEX DOCD: CPT | Performed by: PHYSICIAN ASSISTANT

## 2021-07-15 PROCEDURE — 99214 OFFICE O/P EST MOD 30 MIN: CPT | Performed by: ORTHOPAEDIC SURGERY

## 2021-07-15 NOTE — PROGRESS NOTES
Assessment/Plan:  1  Primary osteoarthritis of left knee    2  Rupture of anterior cruciate ligament of left knee, initial encounter    3  Other tear of medial meniscus of left knee as current injury, initial encounter    4  Left knee pain, unspecified chronicity      Orders Placed This Encounter   Procedures    Brace    Ambulatory referral to Physical Therapy     Ms Karyn Doshi has an ACL tear, meniscal tear, and moderate arthritis  She recently had a fall on 6/16/21 causing the ACL tear and possibly meniscal tear  Brace L knee  PT  Continue advil prn pain, declined rx  Recommended a trial of conservative treatment as above  Check weight bearing XR L knee  Would consider L TKA if continued pain and instability despite conservative treatment  Return for appt with Dr Jessi Romero  I answered all of the patient's questions during the visit and provided education of the patient's condition during the visit  The patient verbalized understanding of the information given and agrees with the plan  This note was dictated using [employer] software  It may contain errors including improperly dictated words  Please contact physician directly for any questions  Subjective   Chief Complaint:   Chief Complaint   Patient presents with    Left Knee - Pain       HPI  Agustin Culp is a 64 y o  female who presents for left knee pain since a fall on 6/16/2021  She states she was walking through the parking lot at work when her left foot stepped on the side of a pothole and fell on medial aspect of the left knee  She states she was having increased pain and swelling later that day  She went to urgent care the same day had x-rays taken the left knee which showed no acute fractures and was ordered MRI of the right left knee  She was provided with crutches and short hinged knee brace for comfort  She states she is having a sharp pain that comes and goes over the anteromedial and anterolateral aspect of the left knee    She does state instability  Pain is worse with standing, pivoting, walking  as of today patient states that her instability feels it has gotten a little better, but her pain is no better and possibly a little worse on the medial aspect of the knee  She has been taking over-the-counter naproxen and over-the-counter Advil two tablets at night as needed for pain with relief  She also has been icing and elevating daily  She has been using a walking stick  for  assistance when ambulating  She did have a left knee steroid injection by Dr Lisa Holguin on 01/19/2021 with 50/50 relief  She does have history of having a right total knee arthroplasty by Dr Lisa Holguin  She has no history of having any surgeries or physical therapy on the left knee  Review of Systems  ROS:    See HPI for musculoskeletal review     All other systems reviewed are negative     History:  Past Medical History:   Diagnosis Date    Ambulatory dysfunction     uses walking stick    Chronic pain disorder     Claustrophobia     mild    DDD (degenerative disc disease), lumbar     Endometrial cancer ([facility] [address] ) 7/6/2017    Low back pain     Mild acid reflux     OA (osteoarthritis)     marissa knees    Spinal stenosis of lumbosacral region     Uterine cancer ([facility] [address] )     2017- hysterectomy and chemo    Wears glasses      Past Surgical History:   Procedure Laterality Date    COLONOSCOPY      ORTHOPEDIC SURGERY      PLANTAR FASCIA SURGERY Bilateral     [place] HYSTEROSCOPY,W/ENDO BX N/A 2/28/2017    Procedure: DILATATION AND CURETTAGE (D&C) WITH HYSTEROSCOPY,POLYPECTOMY;  Surgeon: Bria Pemberton MD;  [facility];  Service: Gynecology    [place] TOTAL KNEE ARTHROPLASTY Right 11/30/2020    Procedure: ARTHROPLASTY KNEE TOTAL;  Surgeon: Frank Ramires MD;  Location: [facility];  Service: Orthopedics    SHOULDER SURGERY Left     TONSILLECTOMY      WISDOM TOOTH EXTRACTION       Social History   Social History     Substance and Sexual Activity   Alcohol Use Yes    Comment: very rarely     Social History     Substance and Sexual Activity   Drug Use No     Social History     Tobacco Use   Smoking Status Never Smoker   Smokeless Tobacco Never Used   Tobacco Comment    No secondhand smoke exposure     Family History:   Family History   Problem Relation Age of Onset    No Known Problems Mother     No Known Problems Father        Current Outpatient Medications on File Prior to Visit   Medication Sig Dispense Refill    Black Cohosh (REMIFEMIN MENOPAUSE PO) Take 1 tablet by mouth 2 (two) times a day      Cholecalciferol (HM Vitamin D3) 100 MCG (4000 UT) CAPS Take 1 capsule (4,000 Units total) by mouth daily 30 capsule 0    ibuprofen (MOTRIN) 200 mg tablet Take 200-800 mg by mouth every 6 (six) hours as needed for mild pain      Naproxen Sodium 220 MG CAPS Take 2 capsules by mouth daily      Omega 3-6-9 Fatty Acids (OMEGA 3-6-9 PO) Take 1 capsule by mouth daily      PANTETHINE PO Take 1 tablet by mouth 2 (two) times a day       No current facility-administered medications on file prior to visit       Allergies   Allergen Reactions    Iv Contrast [Iodinated Diagnostic Agents] Hives    Vitamin C [Ascorbate - Food Allergy]      Queasy stomach, loose stool        Objective     /78   Pulse 80   Ht 5' 2" (1 575 m)   Wt 93 4 kg (206 lb)   BMI 37 68 kg/m²      PE:  AAOx 3  WDWN  Hearing intact, no drainage from eyes  no audible wheezing  no abdominal distension  LE compartments soft, skin intact    leftknee:    Appearance:  Mild generalized swelling   No bruising  no obvious joint deformity   mild effusion  Palpation/Tenderness:  Generalized TTP  Active Range of Motion:  AROM:   Special Tests:  Medial Miracle's Test:  Positive  Lateral Miracle's Test:  +  Apley's compression test:  +  Lachman's Test:  +  Anterior Drawer Test:  negative  Valgus Stress Test:  Mild laxity at 0 and 30 degrees  Varus Stress Test:  negative       Imaging Studies: I have personally reviewed pertinent films in PACS  XR left knee: moderate DJD, of note supine XRs  MRI L knee:  ACL tear, medial meniscal tear, arthritis

## 2021-07-15 NOTE — LETTER
July 15, 2021     Patient: Sherin Rausch   YOB: 1960   Date of Visit: 7/15/2021       To Whom it May Concern:    Moise Frey is under my professional care  She was seen in my office on 7/15/2021  She may return to work on 7/16/21 on sedentary duty only       If you have any questions or concerns, please don't hesitate to call           Sincerely,          Nicolas Shrot DO        CC: No Recipients

## 2021-07-15 NOTE — TELEPHONE ENCOUNTER
Patient was seen today with Dr Lang  The knee brace pt receive is too big and would like to know if she can stop by and get a different brace?        Please advise,       Cb#: [phone]

## 2021-07-16 ENCOUNTER — TELEPHONE (OUTPATIENT)
Dept: OBGYN CLINIC | Facility: HOSPITAL | Age: 61
End: 2021-07-16

## 2021-07-16 NOTE — TELEPHONE ENCOUNTER
Patient sees Dr Solo Santoro    Patient states her work note must include more information about what sedentary includes in as much detail as possible   They need to know how long the patient can stand for, if at all, how many hours she can work, can she twist and bend, etc     Call back # [phone]

## 2021-07-16 NOTE — TELEPHONE ENCOUNTER
Revised work note placed on chart  Called patient and made her aware  She will call back on Monday if she needs it faxed to work

## 2021-07-20 ENCOUNTER — APPOINTMENT (OUTPATIENT)
Dept: PHYSICAL THERAPY | Facility: CLINIC | Age: 61
End: 2021-07-20
Payer: COMMERCIAL

## 2021-07-22 ENCOUNTER — EVALUATION (OUTPATIENT)
Dept: PHYSICAL THERAPY | Facility: CLINIC | Age: 61
End: 2021-07-22
Payer: OTHER MISCELLANEOUS

## 2021-07-22 ENCOUNTER — APPOINTMENT (OUTPATIENT)
Dept: PHYSICAL THERAPY | Facility: CLINIC | Age: 61
End: 2021-07-22
Payer: COMMERCIAL

## 2021-07-22 DIAGNOSIS — S83.242A OTHER TEAR OF MEDIAL MENISCUS OF LEFT KNEE AS CURRENT INJURY, INITIAL ENCOUNTER: ICD-10-CM

## 2021-07-22 DIAGNOSIS — M17.12 PRIMARY OSTEOARTHRITIS OF LEFT KNEE: ICD-10-CM

## 2021-07-22 DIAGNOSIS — M25.562 LEFT KNEE PAIN, UNSPECIFIED CHRONICITY: ICD-10-CM

## 2021-07-22 DIAGNOSIS — S83.512A RUPTURE OF ANTERIOR CRUCIATE LIGAMENT OF LEFT KNEE, INITIAL ENCOUNTER: Primary | ICD-10-CM

## 2021-07-22 PROCEDURE — 97110 THERAPEUTIC EXERCISES: CPT | Performed by: PHYSICAL THERAPIST

## 2021-07-22 PROCEDURE — 97161 PT EVAL LOW COMPLEX 20 MIN: CPT | Performed by: PHYSICAL THERAPIST

## 2021-07-22 NOTE — PROGRESS NOTES
PT Evaluation     Today's date: 2021  Patient name: Katie Vale  : 1960  MRN: [id number]  Referring provider: Ana Maria Siddiqui DO  Dx:   Encounter Diagnosis     ICD-10-CM    1  Primary osteoarthritis of left knee  M17 12 Ambulatory referral to Physical Therapy   2  Rupture of anterior cruciate ligament of left knee, initial encounter  S83 512A Ambulatory referral to Physical Therapy   3  Other tear of medial meniscus of left knee as current injury, initial encounter  S83 242A Ambulatory referral to Physical Therapy   4  Left knee pain, unspecified chronicity  M25 562 Ambulatory referral to Physical Therapy                  Assessment  Assessment details: Katie Vale is a 64 y o  female who presents with an acute L ACL rupture  Pt has decreased L LE strength and instability  Pt currently is unable to ambulate w/o SPC, difficulty negotiating stairs, difficulty negotiating uneven surfaces, OOW  Pt would benefit from 2-4 sessions of PT to establish independent HEP  Impairments: abnormal or restricted ROM, impaired physical strength, lacks appropriate home exercise program and pain with function  Functional limitations: unable to ambulate w/o SPC, difficulty negotiating stairs, difficulty negotiating uneven surfaces, OOW  Symptom irritability: highUnderstanding of Dx/Px/POC: good   Prognosis: good    Goals  1  Pt will be independent with HEP upon DC  2  Pt's L LE strength will be at least 4-/5 t/o to allow for safe stair negotiation  3  Pt's pain will be no more than 5/10   4  Pt will report 50% return to PLOF      Plan  Patient would benefit from: skilled physical therapy  Planned therapy interventions: joint mobilization, manual therapy, neuromuscular re-education, therapeutic activities and therapeutic exercise  Frequency: 1x week  Duration in visits: 4  Duration in weeks: 4  Treatment plan discussed with: patient        Subjective Evaluation    History of Present Illness  Date of onset: 2021  Mechanism of injury: Pt fell into a pot hole at work in  and twisted her knee  Pt reported that her knee has been "giving out" ever since  MRI revealed tear of ACL, partial tear of PCL, and meniscus tear  Dr recommended a TKR  Pt presents to OP PT for strengthening of L knee            Not a recurrent problem   Quality of life: good    Pain  Current pain ratin  At best pain ratin  At worst pain ratin  Location: L knee  Quality: sharp, dull ache and throbbing  Relieving factors: rest  Aggravating factors: stair climbing, walking and standing  Progression: no change      Diagnostic Tests  MRI studies: abnormal  Treatments  No previous or current treatments  Patient Goals  Patient goals for therapy: decreased pain and return to work          Objective     Active Range of Motion   Left Knee   Flexion: 115 degrees   Extension: 0 degrees     Strength/Myotome Testing     Left Hip   Planes of Motion   Flexion: 3+  Extension: 3+  Abduction: 3+    Left Knee   Flexion: 4  Extension: 4-             Precautions: L ACL rupture      Manuals                                                                 Neuro Re-Ed             Heel/toe walking             Toe taps on cones             SLS             NBOS EC             Bideox balance                                       Ther Ex             Upright bike             Quad sets 10x10"            SAQ 5"x15            Supine SLR x20            Bridging with ball sq             SLR with ER             Prone ALBARO                          Ther Activity                                       Gait Training                                       Modalities

## 2021-07-26 ENCOUNTER — OFFICE VISIT (OUTPATIENT)
Dept: PHYSICAL THERAPY | Facility: CLINIC | Age: 61
End: 2021-07-26
Payer: OTHER MISCELLANEOUS

## 2021-07-26 DIAGNOSIS — S83.512A RUPTURE OF ANTERIOR CRUCIATE LIGAMENT OF LEFT KNEE, INITIAL ENCOUNTER: Primary | ICD-10-CM

## 2021-07-26 PROCEDURE — 97010 HOT OR COLD PACKS THERAPY: CPT

## 2021-07-26 PROCEDURE — 97110 THERAPEUTIC EXERCISES: CPT

## 2021-07-26 PROCEDURE — 97112 NEUROMUSCULAR REEDUCATION: CPT

## 2021-07-26 NOTE — PROGRESS NOTES
Daily Note     Today's date: 2021  Patient name: Asia Valdez  : 1960  MRN: [id number]  Referring provider: Kevin Arredondo DO  Dx: No diagnosis found  Subjective: Pt states she has pain,"it depends on what I'm doing," adding the pain "comes and goes "      Objective: See treatment diary below      Assessment: L knee discomfort with addition of bike, toe taps and SLS  Concluded w/CP x 10  Will monitor  Patient would benefit from continued PT      Plan: Continue per plan of care  Re-assess NV, will D/C at that time       Precautions: L ACL rupture      Manuals                                                                 Neuro Re-Ed             Heel/toe walking             Toe taps on cones  x10ea           SLS  15"x2           NBOS EC  3x30"           Bideox balance                                       Ther Ex             Upright bike  4'           Quad sets 10x10" 10x  10"           SAQ 5"x15 5"x15           Supine SLR x20 x20           Bridging with ball sq  x15           SLR with ER  x12           Prone ALBARO  x15                        Ther Activity                                       Gait Training                                       Modalities

## 2021-07-27 ENCOUNTER — APPOINTMENT (OUTPATIENT)
Dept: PHYSICAL THERAPY | Facility: CLINIC | Age: 61
End: 2021-07-27
Payer: COMMERCIAL

## 2021-07-29 ENCOUNTER — APPOINTMENT (OUTPATIENT)
Dept: PHYSICAL THERAPY | Facility: CLINIC | Age: 61
End: 2021-07-29
Payer: COMMERCIAL

## 2021-08-02 ENCOUNTER — EVALUATION (OUTPATIENT)
Dept: PHYSICAL THERAPY | Facility: CLINIC | Age: 61
End: 2021-08-02
Payer: OTHER MISCELLANEOUS

## 2021-08-02 DIAGNOSIS — S83.512A RUPTURE OF ANTERIOR CRUCIATE LIGAMENT OF LEFT KNEE, INITIAL ENCOUNTER: Primary | ICD-10-CM

## 2021-08-02 PROCEDURE — 97110 THERAPEUTIC EXERCISES: CPT | Performed by: PHYSICAL THERAPIST

## 2021-08-02 PROCEDURE — 97164 PT RE-EVAL EST PLAN CARE: CPT | Performed by: PHYSICAL THERAPIST

## 2021-08-02 NOTE — PROGRESS NOTES
Daily Note/Discharge summary     Today's date: 2021  Patient name: Alejandro Chowdhury  : 1960  MRN: [id number]  Referring provider: Lisa Garcia DO  Dx:   Encounter Diagnosis     ICD-10-CM    1  Rupture of anterior cruciate ligament of left knee, initial encounter  S83 512A                   Subjective: "I'm just frustrated "      Objective: See treatment diary below            Assessment  All L LE strength and ROM remains unchanged  Pain levels persist  Pt continues to have the following limitations    unable to ambulate w/o SPC, difficulty negotiating stairs, difficulty negotiating uneven surfaces, OOW  Maximum rehab potential has been reached at this time  No further skilled PT required  Pt to f/u with the surgeon  Goals  1  Pt will be independent with HEP upon DC - met  2  Pt's L LE strength will be at least 4-/5 t/o to allow for safe stair negotiation  - not met  3  Pt's pain will be no more than 5/10 - not met  4  Pt will report 50% return to PLOF  - not met    Plan  No further skilled PT required          Subjective Evaluation    History of Present Illness    Pain  Current pain ratin  At best pain ratin  At worst pain ratin  Location: L knee        Patient Goals  Patient goals for therapy: decreased pain and return to work          Objective     Active Range of Motion   Left Knee   Flexion: 115 degrees   Extension: 0 degrees     Strength/Myotome Testing     Left Hip   Planes of Motion   Flexion: 3+  Extension: 3+  Abduction: 3+    Left Knee   Flexion: 4  Extension: 4-     Precautions: L ACL rupture      Manuals                                                                Neuro Re-Ed             Heel/toe walking             Toe taps on cones  x10ea           SLS  15"x2           NBOS EC  3x30"           Bideox balance                                       Ther Ex             Upright bike  4' 8'          Quad sets 10x10" 10x  10" 10x10"          SAQ 5"x15 5"x15 5"x15 Supine SLR x20 x20 x20          Bridging with ball sq  x15 x20          SLR with ER  x12 x15          Prone ALBARO  x15 x15          Prone hip ext   x15          Prone ER ball sq   5"x15          Ther Activity                                       Gait Training                                       Modalities

## 2021-08-03 ENCOUNTER — OFFICE VISIT (OUTPATIENT)
Dept: OBGYN CLINIC | Facility: MEDICAL CENTER | Age: 61
End: 2021-08-03
Payer: OTHER MISCELLANEOUS

## 2021-08-03 VITALS
WEIGHT: 200 LBS | SYSTOLIC BLOOD PRESSURE: 122 MMHG | DIASTOLIC BLOOD PRESSURE: 77 MMHG | HEIGHT: 62 IN | HEART RATE: 58 BPM | BODY MASS INDEX: 36.8 KG/M2

## 2021-08-03 DIAGNOSIS — M17.12 PRIMARY OSTEOARTHRITIS OF LEFT KNEE: ICD-10-CM

## 2021-08-03 DIAGNOSIS — S83.512A RUPTURE OF ANTERIOR CRUCIATE LIGAMENT OF LEFT KNEE, INITIAL ENCOUNTER: Primary | ICD-10-CM

## 2021-08-03 DIAGNOSIS — S83.242A OTHER TEAR OF MEDIAL MENISCUS OF LEFT KNEE AS CURRENT INJURY, INITIAL ENCOUNTER: ICD-10-CM

## 2021-08-03 PROCEDURE — 3008F BODY MASS INDEX DOCD: CPT | Performed by: PHYSICIAN ASSISTANT

## 2021-08-03 PROCEDURE — 99213 OFFICE O/P EST LOW 20 MIN: CPT | Performed by: ORTHOPAEDIC SURGERY

## 2021-08-03 NOTE — LETTER
August 3, 2021     Patient: Rudy Mail   YOB: 1960   Date of Visit: 8/3/2021       To Whom it May Concern:    Anthony Billingsley is under my professional care  She was seen in my office on 8/3/2021  She can return to work on with a limitation of working a maximum 25 hours a week/ 5 hours a day  If you have any questions or concerns, please don't hesitate to call           Sincerely,          Emely Williamson MD        CC: No Recipients

## 2021-08-03 NOTE — PROGRESS NOTES
Orthopaedic Surgery - Office Note  Adam Grimes (50 y o  female)   : 1960   MRN: [id number]  Encounter Date: 8/3/2021    Chief Complaint   Patient presents with    Left Knee - Pain       Assessment / Plan  Left knee ACL tear and medial mensicus tear, current injury   Left knee moderate OA, preexisting   History of RTKA on 2020    · Activity as tolerated   · Continue with outpatient PT to work on strengthening hip and thigh   · Continue to wear brace prn  · Continue to use anti-inflammatories and ice for pain and swelling management   · Patient can consider CSI to aid in pain management   · Work note provided today stating she can work a maximum of 25 hours a week/ 5 hours a day  Please allow her to use cane prn  Please allow sitting breaks prn She can return to work on 8/10/2021  · Patient understands that surgical intervention of the ACL and meniscus is not recommended at this time   Return in about 1 month (around 9/3/2021)  History of Present Illness  Adam Grimes is a 64 y o  female who presents for evaluation of left knee pain  She was referred to my office for further evaluation by Dr Jennifer Lopez  She states that on 2021 she caught her left foot in a pothole at work causing her to fall sustaining a twisting injury  She then had an MRI showing an ACL tear and medial mensicus tear  She immediately had pain, swelling and pain with WB following the injury  She was seen by occupational medicine and was given brace and crutches for comfort  She has since transitioned to using a cane for ambulation due to continued to instability  She has been wearing a brace which has provided her some relief in symptoms  She has attended a few sessions of PT which she hasn't seen much improvement with at this time  She has remained out of work at this time due to the injury and no sedentary work being available  She denies any radiating symptoms  Review of Systems  Pertinent items are noted in HPI    All other systems were reviewed and are negative  Physical Exam  /77   Pulse 58   Ht 5' 2" (1 575 m)   Wt 90 7 kg (200 lb)   BMI 36 58 kg/m²   Cons: Appears well  No apparent distress  Psych: Alert  Oriented x3  Mood and affect normal   Eyes: PERRLA, EOMI  Resp: Normal effort  No audible wheezing or stridor  CV: Palpable pulse  No discernable arrhythmia  No LE edema  Lymph:  No palpable cervical, axillary, or inguinal lymphadenopathy  Skin: Warm  No palpable masses  No visible lesions  Neuro: Normal muscle tone  Normal and symmetric DTR's  Left Knee Exam  Alignment:  Normal knee alignment  Inspection:  mild swelling  No erythema  No ecchymosis  Palpation:  mild medial joint line tenderness  No effusion  ROM:  Knee Extension 0  Knee Flexion 100  Strength:  Able to actively extend knee against gravity  Stability:  (+) Lachman (Grade 2b) with significant muscle guarding  (-) Varus instability  (-) Valgus instability   (+) Miracle  Tests:  not tested  Patella:  Patella tracks centrally with crepitus  Neurovascular:  Sensation intact in DP/SP/Gill/Sa/T nerve distributions  2+ DP & PT pulses  Gait:  Steady with cane  Studies Reviewed  I have personally reviewed pertinent films in PACS  XR of left knee - images from 6/17/2021 which show moderate OA more severe in the medial compartment   MRI of left knee - outside images from 7/02/2021 which show complete tear of the ACL and medial meniscus tear    Procedures  No procedures today  Medical, Surgical, Family, and Social History  The patient's medical history, family history, and social history, were reviewed and updated as appropriate      Past Medical History:   Diagnosis Date    Ambulatory dysfunction     uses walking stick    Chronic pain disorder     Claustrophobia     mild    DDD (degenerative disc disease), lumbar     Endometrial cancer ([facility] [address] 7/6/2017    Low back pain     Mild acid reflux     OA (osteoarthritis)     marissa knees    Spinal stenosis of lumbosacral region     Uterine cancer ([facility] [address] )     2017- hysterectomy and chemo    Wears glasses        Past Surgical History:   Procedure Laterality Date    COLONOSCOPY      ORTHOPEDIC SURGERY      PLANTAR FASCIA SURGERY Bilateral     [place] HYSTEROSCOPY,W/ENDO BX N/A 2/28/2017    Procedure: DILATATION AND CURETTAGE (D&C) WITH HYSTEROSCOPY,POLYPECTOMY;  Surgeon: Raulito Major MD;  [facility];  Service: Gynecology    [place] TOTAL KNEE ARTHROPLASTY Right 11/30/2020    Procedure: ARTHROPLASTY KNEE TOTAL;  Surgeon: Nya Merino MD;  [facility];  Service: Orthopedics    SHOULDER SURGERY Left     TONSILLECTOMY      WISDOM TOOTH EXTRACTION         Family History   Problem Relation Age of Onset    No Known Problems Mother     No Known Problems Father        Social History     Occupational History    Not on file   Tobacco Use    Smoking status: Never Smoker    Smokeless tobacco: Never Used    Tobacco comment: No secondhand smoke exposure   Vaping Use    Vaping Use: Never used   Substance and Sexual Activity    Alcohol use: Yes     Comment: very rarely    Drug use: No    Sexual activity: Not on file       Allergies   Allergen Reactions    Iv Contrast [Iodinated Diagnostic Agents] Hives    Vitamin C [Ascorbate - Food Allergy]      Queasy stomach, loose stool         Current Outpatient Medications:     Black Cohosh (REMIFEMIN MENOPAUSE PO), Take 1 tablet by mouth 2 (two) times a day, Disp: , Rfl:     Cholecalciferol (HM Vitamin D3) 100 MCG (4000 UT) CAPS, Take 1 capsule (4,000 Units total) by mouth daily, Disp: 30 capsule, Rfl: 0    ibuprofen (MOTRIN) 200 mg tablet, Take 200-800 mg by mouth every 6 (six) hours as needed for mild pain, Disp: , Rfl:     Naproxen Sodium 220 MG CAPS, Take 2 capsules by mouth daily, Disp: , Rfl:     Omega 3-6-9 Fatty Acids (OMEGA 3-6-9 PO), Take 1 capsule by mouth daily, Disp: , Rfl:     PANTETHINE PO, Take 1 tablet by mouth 2 (two) times a day, Disp: , Rfl:       [employer]    I,:  Komal Beth am acting as a scribe while in the presence of the attending physician :       I,:  Alessandra Valdez MD personally performed the services described in this documentation    as scribed in my presence :

## 2021-08-03 NOTE — LETTER
August 3, 2021     Patient: Favio Spain   YOB: 1960   Date of Visit: 8/3/2021       To Whom it May Concern:    · Davidson Ramos is under my professional care  She was seen in my office on 8/3/2021  She can work a maximum of 25 hours a week/ 5 hours a day  Please allow her to use cane as needed  Please allow her sitting breaks as needed  She can return to work on 8/10/2021  If you have any questions or concerns, please don't hesitate to call           Sincerely,          Veronica Umana MD        CC: No Recipients

## 2021-08-12 ENCOUNTER — OFFICE VISIT (OUTPATIENT)
Dept: OBGYN CLINIC | Facility: MEDICAL CENTER | Age: 61
End: 2021-08-12
Payer: COMMERCIAL

## 2021-08-12 VITALS
BODY MASS INDEX: 36.8 KG/M2 | HEIGHT: 62 IN | DIASTOLIC BLOOD PRESSURE: 85 MMHG | SYSTOLIC BLOOD PRESSURE: 146 MMHG | HEART RATE: 46 BPM | WEIGHT: 200 LBS

## 2021-08-12 DIAGNOSIS — M19.011 PRIMARY OSTEOARTHRITIS OF RIGHT SHOULDER: Primary | ICD-10-CM

## 2021-08-12 PROCEDURE — 99213 OFFICE O/P EST LOW 20 MIN: CPT | Performed by: ORTHOPAEDIC SURGERY

## 2021-08-12 NOTE — PROGRESS NOTES
Ortho Sports Medicine Shoulder New Patient Visit     Assesment:   64 y o  female right shoulder severe glenohumeral OA with possible biceps tendonitis    Plan:    Conservative treatment:    Ice to shoulder 1-2 times daily, for 20 minutes at a time  PT for ROM and strengthening to shoulder, rotator cuff, scapular stabilizers  FL  Spine and Pain procedure written to receive an ultrasound-guided corticosteroid injection into the right glenohumeral joint by Dr Jennifer Simeon: All imaging from today was reviewed by myself and explained to the patient  Injection:  Referral to Spine and Pain to receive injection       Surgery:     No surgery is recommended at this point, continue with conservative treatment plan as noted  Follow up:    Return for Injection with Terell Issa and then 6 weeks after with Gabrielle   Chief Complaint   Patient presents with    Right Shoulder - Pain       History of Present Illness: The patient is a 64 y o , right hand dominant female whose occupation is unemployed, referred to me by their primary care physician, seen in clinic for consultation of right shoulder pain  The patient denies a history of diabetes  The patient denies a history of thyroid disorder  Pain is located anterior  The patient rates the pain as a 8/10  The pain has been present for a few weeks  The patient denies specific injury  Patient underwent recent left knee surgery and has been having to use the arm more to push herself up out of chairs  Patient believes that this has been aggravating the shoulder  She states most of her pain is in the anterior aspect of the shoulder  She states that she does have pain at night that wakes her up from sleep  She also notes having restricted motion  Patient denies prior physical therapy or receiving corticosteroid injection into the shoulder  The pain is characterized as sharp, stabbing, dull, achy  The pain is present daily  Pain is improved by rest   Pain is aggravated by overhead activity, reaching back and lifting   The patient denies weakness  The patient denies numbness and tingling  The patient has tried rest, ice, NSAIDS and HEP            Shoulder Surgical History:  None    Past Medical, Social and Family History:  Past Medical History:   Diagnosis Date    Ambulatory dysfunction     uses walking stick    Chronic pain disorder     Claustrophobia     mild    DDD (degenerative disc disease), lumbar     Endometrial cancer ([facility] [address] ) 7/6/2017    Low back pain     Mild acid reflux     OA (osteoarthritis)     marissa knees    Spinal stenosis of lumbosacral region     Uterine cancer ([facility] [address] )     2017- hysterectomy and chemo    Wears glasses      Past Surgical History:   Procedure Laterality Date    COLONOSCOPY      ORTHOPEDIC SURGERY      PLANTAR FASCIA SURGERY Bilateral     IA HYSTEROSCOPY,W/ENDO BX N/A 2/28/2017    Procedure: DILATATION AND CURETTAGE (D&C) WITH HYSTEROSCOPY,POLYPECTOMY;  Surgeon: Jalil Rodriguez MD;  Location: [facility];  Service: Gynecology    IA TOTAL KNEE ARTHROPLASTY Right 11/30/2020    Procedure: ARTHROPLASTY KNEE TOTAL;  Surgeon: Prieto Friend MD;  Location: [facility];  Service: Orthopedics    SHOULDER SURGERY Left     TONSILLECTOMY      WISDOM TOOTH EXTRACTION       Allergies   Allergen Reactions    Iv Contrast [Iodinated Diagnostic Agents] Hives    Vitamin C [Ascorbate - Food Allergy]      Queasy stomach, loose stool     Current Outpatient Medications on File Prior to Visit   Medication Sig Dispense Refill    Black Cohosh (REMIFEMIN MENOPAUSE PO) Take 1 tablet by mouth 2 (two) times a day      Cholecalciferol (HM Vitamin D3) 100 MCG (4000 UT) CAPS Take 1 capsule (4,000 Units total) by mouth daily 30 capsule 0    ibuprofen (MOTRIN) 200 mg tablet Take 200-800 mg by mouth every 6 (six) hours as needed for mild pain      Naproxen Sodium 220 MG CAPS Take 2 capsules by mouth daily  Omega 3-6-9 Fatty Acids (OMEGA 3-6-9 PO) Take 1 capsule by mouth daily      PANTETHINE PO Take 1 tablet by mouth 2 (two) times a day       No current facility-administered medications on file prior to visit  Social History     Socioeconomic History    Marital status:      Spouse name: Not on file    Number of children: Not on file    Years of education: Not on file    Highest education level: Not on file   Occupational History    Not on file   Tobacco Use    Smoking status: Never Smoker    Smokeless tobacco: Never Used    Tobacco comment: No secondhand smoke exposure   Vaping Use    Vaping Use: Never used   Substance and Sexual Activity    Alcohol use: Yes     Comment: very rarely    Drug use: No    Sexual activity: Not on file   Other Topics Concern    Not on file   Social History Narrative    Not on file     Social Determinants of Health     Financial Resource Strain:     Difficulty of Paying Living Expenses:    Food Insecurity:     Worried About Running Out of Food in the Last Year:     [address] in the Last Year:    Transportation Needs:     Lack of Transportation (Medical):  Lack of Transportation (Non-Medical):    Physical Activity:     Days of Exercise per Week:     Minutes of Exercise per Session:    Stress:     Feeling of Stress :    Social Connections:     Frequency of Communication with Friends and Family:     Frequency of Social Gatherings with Friends and Family:     Attends Bahai Services:     Active Member of Clubs or Organizations:     Attends Club or Organization Meetings:     Marital Status:    Intimate Partner Violence:     Fear of Current or Ex-Partner:     Emotionally Abused:     Physically Abused:     Sexually Abused:          I have reviewed the past medical, surgical, social and family history, medications and allergies as documented in the EMR  Review of systems: ROS is negative other than that noted in the HPI    Constitutional: Negative for fatigue and fever  HENT: Negative for sore throat  Respiratory: Negative for shortness of breath  Cardiovascular: Negative for chest pain  Gastrointestinal: Negative for abdominal pain  Endocrine: Negative for cold intolerance and heat intolerance  Genitourinary: Negative for flank pain  Musculoskeletal: Negative for back pain  Skin: Negative for rash  Allergic/Immunologic: Negative for immunocompromised state  Neurological: Negative for dizziness  Psychiatric/Behavioral: Negative for agitation  Physical Exam:    Blood pressure 146/85, pulse (!) 46, height 5' 2" (1 575 m), weight 90 7 kg (200 lb), not currently breastfeeding      General/Constitutional: NAD, well developed, well nourished  HENT: Normocephalic, atraumatic  CV: Intact distal pulses, regular rate  Resp: No respiratory distress or labored breathing  Lymphatic: No lymphadenopathy palpated  Neuro: Alert and Oriented x 3, no focal deficits  Psych: Normal mood, normal affect, normal judgement, normal behavior  Skin: Warm, dry, no rashes, no erythema      Shoulder focused exam:       RIGHT LEFT    Scapula Atrophy Negative Negative     Winging Negative Negative     Protraction Negative Negative    Rotator cuff SS 4+/5 5/5     IS 5/5 5/5     SubS 5/5 5/5    ROM      150     ER0 45 60                   IRb L5    L5    TTP: AC Negative Negative     Biceps Positive Negative     Coracoid Negative Negative    Special Tests: O'Briens Positive Negative     Correia-shear Negative Negative     Cross body Adduction Negative Negative     Speeds  Negative Negative     Parmjit's Negative Negative     Whipple Negative 5/5 without pain Negative       Neer Negative Negative     Villanueva Negative Negative    Instability: Apprehension & relocation not tested not tested     Load & shift not tested not tested    Other: Crank Negative Negative                 UE NV Exam: +2 Radial pulses bilaterally  Sensation intact to light touch C5-T1 bilaterally, Radial/median/ulnar nerve motor intact      Bilateral elbow, wrist, and and forearm ROM full, painless with passive ROM, no ttp or crepitance throughout extremities below shoulder joint    Cervical ROM is full without pain, numbness or tingling      Shoulder Imaging    X-rays of the right shoulder were reviewed, which demonstrate   Severe osteoarthritis of the glenohumeral joint with joint space narrowing and osteophyte formation  I have reviewed the radiology report and agree with their impression

## 2021-08-16 ENCOUNTER — EVALUATION (OUTPATIENT)
Dept: PHYSICAL THERAPY | Facility: CLINIC | Age: 61
End: 2021-08-16
Payer: OTHER MISCELLANEOUS

## 2021-08-16 ENCOUNTER — EVALUATION (OUTPATIENT)
Dept: PHYSICAL THERAPY | Facility: CLINIC | Age: 61
End: 2021-08-16
Payer: COMMERCIAL

## 2021-08-16 DIAGNOSIS — M19.011 PRIMARY OSTEOARTHRITIS OF RIGHT SHOULDER: Primary | ICD-10-CM

## 2021-08-16 DIAGNOSIS — S83.512A RUPTURE OF ANTERIOR CRUCIATE LIGAMENT OF LEFT KNEE, INITIAL ENCOUNTER: Primary | ICD-10-CM

## 2021-08-16 DIAGNOSIS — S83.242A OTHER TEAR OF MEDIAL MENISCUS OF LEFT KNEE AS CURRENT INJURY, INITIAL ENCOUNTER: ICD-10-CM

## 2021-08-16 PROCEDURE — 97164 PT RE-EVAL EST PLAN CARE: CPT | Performed by: PHYSICAL THERAPIST

## 2021-08-16 PROCEDURE — 97110 THERAPEUTIC EXERCISES: CPT | Performed by: PHYSICAL THERAPIST

## 2021-08-16 PROCEDURE — 97161 PT EVAL LOW COMPLEX 20 MIN: CPT | Performed by: PHYSICAL THERAPIST

## 2021-08-16 NOTE — PROGRESS NOTES
PT Evaluation     Today's date: 2021  Patient name: Vinny Loaiza  : 1960  MRN: [id number]  Referring provider: Jovan Ramirez,*  Dx:   Encounter Diagnosis     ICD-10-CM    1  Primary osteoarthritis of right shoulder  M19 011 Ambulatory referral to Physical Therapy                  Assessment  Assessment details: Vinny Loaiza is a 64 y o  female who presents with acute R shoulder pain  Pt has decreased R UE strength and ROM as well as constant pain  Pt currently has interrupted sleep 3x/night, difficulty reaching above head, modified ADL's  Pt would benefit from skilled PT to address the above impairments and to return to pain free function  Impairments: abnormal or restricted ROM, impaired physical strength, lacks appropriate home exercise program and pain with function  Functional limitations: interrupted sleep 3x/night, difficulty reaching above head, modified ADL's  Symptom irritability: moderateUnderstanding of Dx/Px/POC: good   Prognosis: good    Goals  1  Pt will be independent with HEP upon DC  2  Pt's R UE ROM will increase by at least 25% to allow for performing ADL's with ease  3  Pt's R UE strength will be at least 4/5 and pain free  4  Pt's pain will be no more than 3/10 to allow for uninterrupted sleep  Plan  Patient would benefit from: skilled physical therapy  Planned modality interventions: low level laser therapy  Planned therapy interventions: joint mobilization, manual therapy, neuromuscular re-education, therapeutic activities and therapeutic exercise  Frequency: 2x week  Duration in visits: 12  Duration in weeks: 6  Treatment plan discussed with: patient        Subjective Evaluation    History of Present Illness  Date of onset: 2021  Mechanism of injury: Pt reports an insidious onset of R shoulder pain that began a few weeks ago  Pt reports that she injured her L knee and has been using her R arm for support when walking, climbing stairs, and standing  X-rays revealed OA of GHJ  Pt presents to OP PT  Not a recurrent problem   Quality of life: good    Pain  Current pain ratin  At best pain rating: 3  At worst pain rating: 10  Location: R shoulder  Quality: throbbing, dull ache and sharp  Progression: worsening    Hand dominance: right      Diagnostic Tests  X-ray: abnormal  Treatments  No previous or current treatments  Patient Goals  Patient goals for therapy: increased strength, decreased pain, increased motion and independence with ADLs/IADLs          Objective     Active Range of Motion     Right Shoulder   Flexion: 130 degrees   Abduction: 120 degrees   External rotation 90°: 40 degrees  Internal rotation 90°: 40 degrees     Passive Range of Motion     Right Shoulder   Flexion: 140 degrees   Abduction: 145 degrees   External rotation 90°: 52 degrees   Internal rotation 90°: 60 degrees     Strength/Myotome Testing     Right Shoulder     Planes of Motion   Flexion: 4   Abduction: 4-   External rotation at 90°: 4-   Internal rotation at 90°: 4     Tests     Right Shoulder   Positive full can, Hawkin's and Neer's                Precautions: none      Manuals             Laser to LHB insertion             PROM to hammad                                       Neuro Re-Ed             Cane ext with scap sq             Posture tband             B Er with tband             H ab with tband             Prone flex/h ab ext                                       Ther Ex             UBE (back)             Pulleys (flex/ab)             Wall slides (flex) 10x10" HEP           IR stretch with strap 3x30"            Scap retraction 5"x15 HEP           Cane AAROM (flex/ER)             Supine flexion             SL serratus punch             SL ER             Wall push ups                                                    Ther Activity             Dolphin lift             [facility]

## 2021-08-16 NOTE — PROGRESS NOTES
PT Evaluation     Today's date: 2021  Patient name: Mary Beth Perkins  : 1960  MRN: [id number]  Referring provider: Bobby Proctor, *  Dx:   Encounter Diagnosis     ICD-10-CM    1  Rupture of anterior cruciate ligament of left knee, initial encounter  S83 512A Ambulatory referral to Physical Therapy   2  Other tear of medial meniscus of left knee as current injury, initial encounter  S83 242A Ambulatory referral to Physical Therapy                  Assessment  Assessment details: Mary Beth Perkins is a 64 y o  female who presents with rupture of L ACL  Pt has decreased L LE strength and ROM as well as gait dysfunction  Pt currently has difficulty negotiating uneven surfaces, difficulty negotiating stairs/curbs, difficulty with prolonged walking (requires SPC), OOW  Pt would benefit from skilled PT to address the above impairments and to return to pain free function  Impairments: abnormal or restricted ROM, impaired physical strength, lacks appropriate home exercise program and pain with function  Functional limitations: difficulty negotiating uneven surfaces, difficulty negotiating stairs/curbs, difficulty with prolonged walking (requires SPC), OOW  Symptom irritability: highUnderstanding of Dx/Px/POC: good   Prognosis: good    Goals  1  Pt will be independent with HEP upon DC  2  Pt's L LE strength will be at least 4-/5 t/o to allow for safe stair negotiation  3  Pt's pain will be no more than 5/10   4  Pt will report 50% return to PLOF      Plan  Patient would benefit from: skilled physical therapy  Planned modality interventions: low level laser therapy  Planned therapy interventions: joint mobilization, manual therapy, neuromuscular re-education, therapeutic activities and therapeutic exercise  Frequency: 1x week  Duration in visits: 8  Duration in weeks: 8  Treatment plan discussed with: patient        Subjective Evaluation    History of Present Illness  Date of onset: 2021  Mechanism of injury: Pt fell into a pot hole at work in  and twisted her knee  Pt reported that initially her knee was frequently giving out  Pt reports that over the past few weeks the "giving out" has improved however knee still feels unstable and pt has difficulty negotiating uneven terrain and downhill slopes  MRI revealed tear of ACL, partial tear of PCL, and meniscus tear  Pt presents to OP PT for strengthening of L knee  Not a recurrent problem   Quality of life: good    Pain  Current pain ratin  At best pain ratin  At worst pain ratin  Location: L knee  Quality: dull ache and sharp  Relieving factors: rest  Aggravating factors: stair climbing, walking and standing  Progression: no change      Diagnostic Tests  MRI studies: abnormal  Treatments  Previous treatment: physical therapy  Patient Goals  Patient goals for therapy: decreased pain, return to work, increased strength and return to sport/leisure activities          Objective     Active Range of Motion   Left Knee   Flexion: 115 degrees   Extension: 0 degrees     Strength/Myotome Testing     Left Hip   Planes of Motion   Flexion: 3+  Extension: 4-  Abduction: 3+    Left Knee   Flexion: 5  Extension: 3+    Tests     Left Knee   Positive anterior drawer                Precautions: rupture of L ACL      Manuals             Laser prn                                                    Neuro Re-Ed             SLS             biodex balance             Tandem walking             Toe taps on cones                                                    Ther Ex             Upright bike             Sl clamshells             SL abduction 5"x15            Bridging with ball sq 5"x15            Standing SLR 3 ways             ALBARO             Prone IR tband             Prone ER ball sq             TKE with tband                                       Ther Activity             Step ups (f/l/cross)             Sit to stand             [facility] step ups Hip hiking

## 2021-08-18 ENCOUNTER — OFFICE VISIT (OUTPATIENT)
Dept: PHYSICAL THERAPY | Facility: CLINIC | Age: 61
End: 2021-08-18
Payer: COMMERCIAL

## 2021-08-18 ENCOUNTER — OFFICE VISIT (OUTPATIENT)
Dept: PHYSICAL THERAPY | Facility: CLINIC | Age: 61
End: 2021-08-18
Payer: OTHER MISCELLANEOUS

## 2021-08-18 ENCOUNTER — TELEPHONE (OUTPATIENT)
Dept: OBGYN CLINIC | Facility: MEDICAL CENTER | Age: 61
End: 2021-08-18

## 2021-08-18 DIAGNOSIS — M19.011 PRIMARY OSTEOARTHRITIS OF RIGHT SHOULDER: Primary | ICD-10-CM

## 2021-08-18 DIAGNOSIS — S83.512A RUPTURE OF ANTERIOR CRUCIATE LIGAMENT OF LEFT KNEE, INITIAL ENCOUNTER: Primary | ICD-10-CM

## 2021-08-18 PROCEDURE — 97110 THERAPEUTIC EXERCISES: CPT

## 2021-08-18 PROCEDURE — 97530 THERAPEUTIC ACTIVITIES: CPT

## 2021-08-18 PROCEDURE — 97140 MANUAL THERAPY 1/> REGIONS: CPT

## 2021-08-18 NOTE — TELEPHONE ENCOUNTER
Patient brenna borden requesting an updated work note  Her employer is requesting more details on her work restrictions  They want to know         1  For how long does the dr want pt to be on the restriction of 25 hrs a week/5 hrs day for? 2  How many breaks does dr want her to take? And how long does he want the breaks? 3  Which job duties does the dr recommend with her using the cane?        Please adviseMic#: [phone]

## 2021-08-18 NOTE — PROGRESS NOTES
PT Evaluation     Today's date: 2021  Patient name: Xochilt Simons  : 1960  MRN: [id number]  Referring provider: Jose Pagan,*  Dx:   Encounter Diagnosis     ICD-10-CM    1  Primary osteoarthritis of right shoulder  M19 011 Ambulatory referral to Physical Therapy          Subjective: "A little achy today " Pt compliant w/HEP  Objective: See treatment diary below  Assessment: Performed new exercises w/o difficulty or discomfort  Added PROM to R shoulder as hammad and laser  Will monitor  Pt would benefit from continued PT  Plan: Continue treatment as per PT plan of care         Precautions: none      Manuals            Laser to LHB insertion  4'           PROM to hammad  8'                                     Neuro Re-Ed             Cane ext with scap sq  NV           Posture tband             B Er with tband             H ab with tband             Prone flex/h ab ext                                       Ther Ex             UBE (back)  4'           Pulleys (flex/ab)  10"x10  ea             Wall slides (flex) 10x10" HEP           IR stretch with strap 3x30" 3x30"           Scap retraction 5"x15 HEP           Cane AAROM (flex/ER)             Supine flexion             SL serratus punch             SL ER             Wall push ups                                                    Ther Activity             Dolphin lift             [facility] [employer]

## 2021-08-18 NOTE — TELEPHONE ENCOUNTER
New note provided  Spoke with patient to confirm info was sufficient  She will let us know if she needs anything else

## 2021-08-23 ENCOUNTER — OFFICE VISIT (OUTPATIENT)
Dept: PHYSICAL THERAPY | Facility: CLINIC | Age: 61
End: 2021-08-23
Payer: COMMERCIAL

## 2021-08-23 ENCOUNTER — APPOINTMENT (OUTPATIENT)
Dept: PHYSICAL THERAPY | Facility: CLINIC | Age: 61
End: 2021-08-23
Payer: OTHER MISCELLANEOUS

## 2021-08-23 DIAGNOSIS — M19.011 PRIMARY OSTEOARTHRITIS OF RIGHT SHOULDER: Primary | ICD-10-CM

## 2021-08-23 PROCEDURE — 97110 THERAPEUTIC EXERCISES: CPT

## 2021-08-23 PROCEDURE — 97140 MANUAL THERAPY 1/> REGIONS: CPT

## 2021-08-23 NOTE — PROGRESS NOTES
PT Evaluation     Today's date: 2021  Patient name: Davion Brown  : 1960  MRN: [id number]  Referring provider: Alhaji Lindsey,*  Dx:   Encounter Diagnosis     ICD-10-CM    1  Primary osteoarthritis of right shoulder  M19 011 Ambulatory referral to Physical Therapy          Subjective: Pt states her R shoulder feels "a little better " Pt compliant w/HEP  Objective: See treatment diary below  Assessment: Performed new exercises w/o complaint  PROM to R shoulder, flex is most limited by pain/mm guarding  Will monitor  Pt would benefit from continued PT  Plan: Continue treatment as per PT plan of care         Precautions: none      Manuals           Laser to LHB insertion  4' 4'          PROM to hammad  8' 8'                                    Neuro Re-Ed             [place] ext with scap sq  NV 10"x10          Posture tband   GTB  x15ea          B Er with tband   RTB  x15          H ab with tband             Prone flex/h ab ext                                       Ther Ex             UBE (back)  4' 6'          Pulleys (flex/ab)  10"x10  ea   10"x10  Ea          Wall slides (flex) 10x10" HEP HEP          IR stretch with strap 3x30" 3x30" 3x30"          Scap retraction 5"x15 HEP HEP          Cane AAROM (flex/ER)   10"x10  flex          Supine flexion             SL serratus punch             SL ER             Wall push ups                                                    Ther Activity             Dolphin lift             Shari James

## 2021-08-25 ENCOUNTER — PROCEDURE VISIT (OUTPATIENT)
Dept: PAIN MEDICINE | Facility: MEDICAL CENTER | Age: 61
End: 2021-08-25
Payer: COMMERCIAL

## 2021-08-25 DIAGNOSIS — M75.51 BURSITIS OF RIGHT SHOULDER: Primary | ICD-10-CM

## 2021-08-25 PROCEDURE — 20611 DRAIN/INJ JOINT/BURSA W/US: CPT | Performed by: PHYSICAL MEDICINE & REHABILITATION

## 2021-08-25 RX ORDER — METHYLPREDNISOLONE ACETATE 40 MG/ML
40 INJECTION, SUSPENSION INTRA-ARTICULAR; INTRALESIONAL; INTRAMUSCULAR; SOFT TISSUE ONCE
Status: COMPLETED | OUTPATIENT
Start: 2021-08-25 | End: 2021-08-25

## 2021-08-25 RX ORDER — BUPIVACAINE HYDROCHLORIDE 2.5 MG/ML
10 INJECTION, SOLUTION EPIDURAL; INFILTRATION; INTRACAUDAL ONCE
Status: COMPLETED | OUTPATIENT
Start: 2021-08-25 | End: 2021-08-25

## 2021-08-25 RX ADMIN — METHYLPREDNISOLONE ACETATE 40 MG: 40 INJECTION, SUSPENSION INTRA-ARTICULAR; INTRALESIONAL; INTRAMUSCULAR; SOFT TISSUE at 14:45

## 2021-08-25 RX ADMIN — BUPIVACAINE HYDROCHLORIDE 10 ML: 2.5 INJECTION, SOLUTION EPIDURAL; INFILTRATION; INTRACAUDAL at 14:45

## 2021-08-25 NOTE — PROGRESS NOTES
Indication:  Shoulder pain  Preprocedure diagnosis:  1  Osteoarthritis of the shoulder                                                   2  Shoulder pain  Postprocedure diagnosis:  1  Osteoarthritis of the shoulder                                                     2  Shoulder pain    Procedure: Ultrasound-guided right glenohumeral injection    After discussing the risks, benefits, and alternatives to the procedure, the patient expressed understanding and wished to proceed  The patient was brought to the procedure suite and placed in the sidelying position  A procedural pause was conducted to verify:  correct patient identity, procedure to be performed and as applicable, correct side and site, correct patient position, and availability of implants, special equipment or special requirements  A simple surgical tray was used  Prior to the procedure, the shoulder was examined with a 12 MHz transducer to visualize the glenohumeral joint and determine the optimal needle path  Following this, the shoulder was prepared with a ChloraPrep scrub, then re-examined using the same transducer, a sterile ultrasound transducer cover, and sterile ultrasound transducer gel  Thereafter, using continuous ultrasound guidance, a 2 5 in 25 gauge needle was advanced into the glenohumeral joint  After visualization of the tip in the target area and negative aspiration for blood, a mixture 40 mg Depo-Medrol in 3 mL of 0 25% bupivacaine was injected into the joint  Following the injection the needle was withdrawn  The patient tolerated the procedure well and there were no apparent complications  After an appropriate amount of observation, the patient was dismissed from the clinic in good condition under their own power

## 2021-08-27 ENCOUNTER — OFFICE VISIT (OUTPATIENT)
Dept: PHYSICAL THERAPY | Facility: CLINIC | Age: 61
End: 2021-08-27
Payer: COMMERCIAL

## 2021-08-27 DIAGNOSIS — M19.011 PRIMARY OSTEOARTHRITIS OF RIGHT SHOULDER: Primary | ICD-10-CM

## 2021-08-27 PROCEDURE — 97140 MANUAL THERAPY 1/> REGIONS: CPT | Performed by: PHYSICAL THERAPIST

## 2021-08-27 PROCEDURE — 97112 NEUROMUSCULAR REEDUCATION: CPT | Performed by: PHYSICAL THERAPIST

## 2021-08-27 PROCEDURE — 97110 THERAPEUTIC EXERCISES: CPT | Performed by: PHYSICAL THERAPIST

## 2021-08-27 NOTE — PROGRESS NOTES
Daily Note     Today's date: 2021  Patient name: Reno Bonilla  : 1960  MRN: [id number]  Referring provider: Vashti Zuluaga,*  Dx:   Encounter Diagnosis     ICD-10-CM    1  Primary osteoarthritis of right shoulder  M19 011                   Subjective: "It just feels sore "      Objective: See treatment diary below      Assessment: Pt had good tolerance to program  Increase in overall ROM noted  Plan: Continue per plan of care        Precautions: rupture of L ACL      Manuals           Laser to LHB insertion  4' 4'          PROM to hammad  8' 8'                                    Neuro Re-Ed             [employer] ext with scap sq  NV 10x10"          Posture tband   RTB x15ea          B Er with tband             H ab with tband             Prone flex/h ab ext                                       Ther Ex             UBE (back)  4' 6'          Pulleys (flex/ab)  10"x10  ea   10x10" ea          Wall slides (flex) 10x10" HEP           IR stretch with strap 3x30" 3x30" 3x30"          Scap retraction 5"x15 HEP           Cane AAROM (flex/ER)   10x10" ea          Supine flexion             SL serratus punch             SL ER             Wall push ups                                                    Ther Activity             Dolphin lift             Nisa Poll

## 2021-08-30 ENCOUNTER — OFFICE VISIT (OUTPATIENT)
Dept: PHYSICAL THERAPY | Facility: CLINIC | Age: 61
End: 2021-08-30
Payer: COMMERCIAL

## 2021-08-30 DIAGNOSIS — M19.011 PRIMARY OSTEOARTHRITIS OF RIGHT SHOULDER: Primary | ICD-10-CM

## 2021-08-30 PROCEDURE — 97112 NEUROMUSCULAR REEDUCATION: CPT

## 2021-08-30 PROCEDURE — 97110 THERAPEUTIC EXERCISES: CPT

## 2021-08-30 PROCEDURE — 97140 MANUAL THERAPY 1/> REGIONS: CPT

## 2021-08-30 NOTE — PROGRESS NOTES
Daily Note     Today's date: 2021  Patient name: Alejandro Chowdhury  : 1960  MRN: [id number]  Referring provider: Antonio Myers,*  Dx:   Encounter Diagnosis     ICD-10-CM    1  Primary osteoarthritis of right shoulder  M19 011                   Subjective: Patient states, "It feels a little stronger "  Patient reports she is sleeping better  Patient complains of "pulling and it feels weird" in the posterior aspect of the right shoulder when reaching overhead  Objective: See treatment diary below  Assessment: Therapeutic exercise program is tolerated well  Right shoulder external rotation ROM most limited during the manual stretching today  Plan: Continue treatment as per PT plan of care         Precautions: rupture of L ACL      Manuals          Laser to LHB insertion  4' 4' 4'         PROM to hammad  8' 8' 8'                                   Neuro Re-Ed             [facility] ext with scap sq  NV 10x10" 10"x10         Posture tband   RTB x15ea red  20 ea         B Er with tband             H ab with tband             Prone flex/h ab ext                                       Ther Ex             UBE (back)  4' 6' 6'         Pulleys (flex/ab)  10"x10  ea   10x10" ea 10"x10 ea         Wall slides (flex) 10x10" HEP           IR stretch with strap 3x30" 3x30" 3x30" 30"x3         Scap retraction 5"x15 HEP           Cane AAROM (flex/ER)   10x10" ea 10"x10 ea         Supine flexion             SL serratus punch             SL ER             Wall push ups                                                    Ther Activity             Dolphin lift             Christi Nieves

## 2021-08-31 ENCOUNTER — OFFICE VISIT (OUTPATIENT)
Dept: OBGYN CLINIC | Facility: MEDICAL CENTER | Age: 61
End: 2021-08-31
Payer: COMMERCIAL

## 2021-08-31 ENCOUNTER — APPOINTMENT (OUTPATIENT)
Dept: RADIOLOGY | Facility: MEDICAL CENTER | Age: 61
End: 2021-08-31
Payer: COMMERCIAL

## 2021-08-31 VITALS
DIASTOLIC BLOOD PRESSURE: 77 MMHG | WEIGHT: 199 LBS | BODY MASS INDEX: 36.62 KG/M2 | HEART RATE: 74 BPM | SYSTOLIC BLOOD PRESSURE: 138 MMHG | HEIGHT: 62 IN

## 2021-08-31 DIAGNOSIS — M17.11 PRIMARY OSTEOARTHRITIS OF RIGHT KNEE: Primary | ICD-10-CM

## 2021-08-31 DIAGNOSIS — M17.11 PRIMARY OSTEOARTHRITIS OF RIGHT KNEE: ICD-10-CM

## 2021-08-31 DIAGNOSIS — Z96.651 HISTORY OF TOTAL RIGHT KNEE REPLACEMENT: ICD-10-CM

## 2021-08-31 PROCEDURE — 73562 X-RAY EXAM OF KNEE 3: CPT

## 2021-08-31 PROCEDURE — 3008F BODY MASS INDEX DOCD: CPT | Performed by: ORTHOPAEDIC SURGERY

## 2021-08-31 PROCEDURE — 99214 OFFICE O/P EST MOD 30 MIN: CPT | Performed by: ORTHOPAEDIC SURGERY

## 2021-08-31 PROCEDURE — 1036F TOBACCO NON-USER: CPT | Performed by: ORTHOPAEDIC SURGERY

## 2021-08-31 NOTE — PROGRESS NOTES
Orthopaedic Surgery - Office Note  Lizbeth Soulier (89 y o  female)   : 1960   MRN: [id number]  Encounter Date: 2021    Chief Complaint   Patient presents with    Left Knee - Follow-up       Assessment / Plan  s/p right total knee arthroplasty, with hamstring tendonitis and patellar tendonitis     · Activity as tolerated  · Home exercise program reviewed, Focus on low weight and eccentric exercises focusing on hip and thigh strengthening  · Anti-inflammatories or Tylenol prn pain  · heat and ice for pain control  · XR AT NEXT VISIT:  right knee, 3 views  · if she continues to experience pain we will consider a full work up of right knee s/p TKA  If she experiences increased pain before her next appointment she should call for a sooner appointment  Return in about 3 months (around 2021)  History of Present Illness  Lizbeth Soulier is a 64 y o  female who presents for follow-up evaluation, she is status post right total knee arthroplasty performed on 2020  She states she is experiencing continued pain in her right knee  She did complete 2 months of formal physical therapy after surgery and has been compliant with a home exercise program 5-6 days a week with daily walks  She has been experiencing anterior lateral and posterior medial knee pain  She indicates she experiences a mild to moderate pain with prolonged standing and going up and down stairs  She uses Advil for pain control as needed  She denies any instability  She denies any swelling  She denies any further injury or trauma to her right knee  She denies any distal paresthesias  Review of Systems  Pertinent items are noted in HPI  All other systems were reviewed and are negative  Physical Exam  /77   Pulse 74   Ht 5' 2" (1 575 m)   Wt 90 3 kg (199 lb)   BMI 36 40 kg/m²   Cons: Appears well  No apparent distress  Psych: Alert  Oriented x3    Mood and affect normal   Eyes: PERRLA, EOMI  Resp: Normal effort  No audible wheezing or stridor  CV: Palpable pulse  No discernable arrhythmia  No LE edema  Lymph:  No palpable cervical, axillary, or inguinal lymphadenopathy  Skin: Warm  No palpable masses  No visible lesions  Neuro: Normal muscle tone  Normal and symmetric DTR's  Right Knee Exam  Alignment:  Normal knee alignment  Inspection:  No swelling  No edema  No erythema  No ecchymosis  Incision healed  Palpation:  anterior lateral and posterior medial knee tenderness  trace effusion  No warmth  ROM:  Knee Extension 0  Knee Flexion 130  Strength:  Able to actively extend knee against gravity  Stability:  No objective knee instability  Stable Varus / Valgus stress, Lachman, and Posterior drawer  Tests:  No pertinent positive or negative tests  Patella:  Normal patellar mobility  Neurovascular:  Sensation intact in DP/SP/Gill/Sa/T nerve distributions  2+ DP & PT pulses  Gait:  Antalgic  Studies Reviewed  XR of right knee - i personally reviewed the xray obtained on 8/31/21  Acceptable alignment and stable appearance of hardware  Procedures  No procedures today  Medical, Surgical, Family, and Social History  The patient's medical history, family history, and social history, were reviewed and updated as appropriate      Past Medical History:   Diagnosis Date    Ambulatory dysfunction     uses walking stick    Chronic pain disorder     Claustrophobia     mild    DDD (degenerative disc disease), lumbar     Endometrial cancer ([facility] [address] ) 7/6/2017    Low back pain     Mild acid reflux     OA (osteoarthritis)     marissa knees    Spinal stenosis of lumbosacral region     Uterine cancer ([address] )     2017- hysterectomy and chemo    Wears glasses        Past Surgical History:   Procedure Laterality Date    COLONOSCOPY      ORTHOPEDIC SURGERY      PLANTAR FASCIA SURGERY Bilateral     RI HYSTEROSCOPY,W/ENDO BX N/A 2/28/2017    Procedure: DILATATION AND CURETTAGE (D&C) WITH HYSTEROSCOPY,POLYPECTOMY;  Surgeon: Perla Soler MD;  Location: [facility];  Service: Gynecology    MO TOTAL KNEE ARTHROPLASTY Right 11/30/2020    Procedure: ARTHROPLASTY KNEE TOTAL;  Surgeon: Moses Narayanan MD;  Location: [facility];  Service: Orthopedics    SHOULDER SURGERY Left     TONSILLECTOMY      WISDOM TOOTH EXTRACTION         Family History   Problem Relation Age of Onset    No Known Problems Mother     No Known Problems Father        Social History     Occupational History    Not on file   Tobacco Use    Smoking status: Never Smoker    Smokeless tobacco: Never Used    Tobacco comment: No secondhand smoke exposure   Vaping Use    Vaping Use: Never used   Substance and Sexual Activity    Alcohol use: Yes     Comment: very rarely    Drug use: No    Sexual activity: Not on file       Allergies   Allergen Reactions    Iv Contrast [Iodinated Diagnostic Agents] Hives    Vitamin C [Ascorbate - Food Allergy]      Queasy stomach, loose stool         Current Outpatient Medications:     Black Cohosh (REMIFEMIN MENOPAUSE PO), Take 1 tablet by mouth 2 (two) times a day, Disp: , Rfl:     Cholecalciferol (HM Vitamin D3) 100 MCG (4000 UT) CAPS, Take 1 capsule (4,000 Units total) by mouth daily, Disp: 30 capsule, Rfl: 0    ibuprofen (MOTRIN) 200 mg tablet, Take 200-800 mg by mouth every 6 (six) hours as needed for mild pain, Disp: , Rfl:     Naproxen Sodium 220 MG CAPS, Take 2 capsules by mouth daily, Disp: , Rfl:     Omega 3-6-9 Fatty Acids (OMEGA 3-6-9 PO), Take 1 capsule by mouth daily, Disp: , Rfl:     PANTETHINE PO, Take 1 tablet by mouth 2 (two) times a day, Disp: , Rfl:       Chacha Cyr    Scribe Attestation    I,:  Khris Webb am acting as a scribe while in the presence of the attending physician :       I,:  Madhuri Meneses MD personally performed the services described in this documentation    as scribed in my presence :

## 2021-09-02 ENCOUNTER — OFFICE VISIT (OUTPATIENT)
Dept: PHYSICAL THERAPY | Facility: CLINIC | Age: 61
End: 2021-09-02
Payer: COMMERCIAL

## 2021-09-02 DIAGNOSIS — M19.011 PRIMARY OSTEOARTHRITIS OF RIGHT SHOULDER: Primary | ICD-10-CM

## 2021-09-02 PROCEDURE — 97140 MANUAL THERAPY 1/> REGIONS: CPT

## 2021-09-02 PROCEDURE — 97110 THERAPEUTIC EXERCISES: CPT

## 2021-09-02 NOTE — PROGRESS NOTES
Daily Note     Today's date: 2021  Patient name: Mary Beth Perkins  : 1960  MRN: [id number]  Referring provider: Leroy Portillo,*  Dx:   Encounter Diagnosis     ICD-10-CM    1  Primary osteoarthritis of right shoulder  M19 011                   Subjective: "It was a little better, until I had to pull the generator out yesterday, so it's a little aggravated today "      Objective: See treatment diary below      Assessment: Performed exercise program w/o increasing symptoms  Able to hammad PROM to R shoulder  Tolerated treatment well  Patient would benefit from continued PT      Plan: Continue per plan of care        Precautions: rupture of L ACL      Manuals         Laser to LHB insertion  4' 4' 4' 4'        PROM to hammad  8' 8' 8' 8'                                  Neuro Re-Ed             [facility] ext with scap sq  NV 10x10" 10"x10 10"x10        Posture tband   RTB x15ea red  20 ea Red  20 ea        B Er with tband             H ab with tband             Prone flex/h ab ext                                       Ther Ex             UBE (back)  4' 6' 6' 6'        Pulleys (flex/ab)  10"x10  ea   10x10" ea 10"x10 ea 10"x10ea          Wall slides (flex) 10x10" HEP           IR stretch with strap 3x30" 3x30" 3x30" 30"x3 30"x3        Scap retraction 5"x15 HEP           Cane AAROM (flex/ER)   10x10" ea 10"x10 ea 10x  10"  ea        Supine flexion             SL serratus punch             SL ER             Wall push ups                                                    Ther Activity             Dolphin lift             [facility]

## 2021-09-07 ENCOUNTER — OFFICE VISIT (OUTPATIENT)
Dept: PHYSICAL THERAPY | Facility: CLINIC | Age: 61
End: 2021-09-07
Payer: COMMERCIAL

## 2021-09-07 DIAGNOSIS — M19.011 PRIMARY OSTEOARTHRITIS OF RIGHT SHOULDER: Primary | ICD-10-CM

## 2021-09-07 PROCEDURE — 97140 MANUAL THERAPY 1/> REGIONS: CPT

## 2021-09-07 PROCEDURE — 97110 THERAPEUTIC EXERCISES: CPT

## 2021-09-07 NOTE — PROGRESS NOTES
Daily Note     Today's date: 2021  Patient name: Bishop Seip  : 1960  MRN: [id number]  Referring provider: Nerissa Patton,*  Dx:   Encounter Diagnosis     ICD-10-CM    1  Primary osteoarthritis of right shoulder  M19 011                   Subjective: Pt reports her R shoulder is feeling better overall  Objective: See treatment diary below      Assessment: Performed new exercises and ex progressions w/o complaint  Comfortable throughout PROM to R shoulder  Tolerated treatment well  Patient would benefit from continued PT      Plan: Continue per plan of care        Precautions: rupture of L ACL      Manuals        Laser to LHB insertion  4' 4' 4' 4' 4'       PROM to hammad  8' 8' 8' 8' 8'                                 Neuro Re-Ed             Cane ext with scap sq  NV 10x10" 10"x10 10"x10 10x  10"       Posture tband   RTB x15ea red  20 ea Red  20 ea GTB  x15  ea       B Er with tband      RTB  x15       H ab with tband      RTB  x15       Prone flex/h ab ext                                       Ther Ex             UBE (back)  4' 6' 6' 6' 6'       Pulleys (flex/ab)  10"x10  ea   10x10" ea 10"x10 ea 10"x10ea   10"x  10 ea       Wall slides (flex) 10x10" HEP           IR stretch with strap 3x30" 3x30" 3x30" 30"x3 30"x3 30"  x3       Scap retraction 5"x15 HEP           Cane AAROM (flex/ER)   10x10" ea 10"x10 ea 10x  10"  ea 10x  10" ea       Supine flexion      x10       SL serratus punch             SL ER             Wall push ups                                                    Ther Activity             Dolphin lift             [facility]

## 2021-09-09 ENCOUNTER — OFFICE VISIT (OUTPATIENT)
Dept: PHYSICAL THERAPY | Facility: CLINIC | Age: 61
End: 2021-09-09
Payer: COMMERCIAL

## 2021-09-09 DIAGNOSIS — M19.011 PRIMARY OSTEOARTHRITIS OF RIGHT SHOULDER: Primary | ICD-10-CM

## 2021-09-09 PROCEDURE — 97110 THERAPEUTIC EXERCISES: CPT | Performed by: PHYSICAL THERAPIST

## 2021-09-09 PROCEDURE — 97112 NEUROMUSCULAR REEDUCATION: CPT | Performed by: PHYSICAL THERAPIST

## 2021-09-09 PROCEDURE — 97140 MANUAL THERAPY 1/> REGIONS: CPT | Performed by: PHYSICAL THERAPIST

## 2021-09-09 NOTE — PROGRESS NOTES
Daily Note     Today's date: 2021  Patient name: Adam Grimes  : 1960  MRN: [id number]  Referring provider: Palak Ga,*  Dx:   Encounter Diagnosis     ICD-10-CM    1  Primary osteoarthritis of right shoulder  M19 011                   Subjective: "I was trimming tree branches and I think I overdid it  I'm feeling sore "      Objective: See treatment diary below      Assessment: Substantial increase in R shoulder ROM  Pt tolerated progression without complaints  Plan: Continue per plan of care        Precautions: rupture of L ACL      Manuals       Laser to LHB insertion  4' 4' 4' 4' 4' 4'      PROM to hammad  8' 8' 8' 8' 8' 6'                                Neuro Re-Ed             Cane ext with scap sq  NV 10x10" 10"x10 10"x10 10x  10" 10x10"      Posture tband   RTB x15ea red  20 ea Red  20 ea GTB  x15  ea GTB x20ea      B Er with tband      RTB  x15 RTB x15      H ab with tband      RTB  x15 RTB x15      Prone flex/h ab ext                                       Ther Ex             UBE (back)  4' 6' 6' 6' 6' 6'      Pulleys (flex/ab)  10"x10  ea   10x10" ea 10"x10 ea 10"x10ea   10"x  10 ea 10x10" ea      Wall slides (flex) 10x10" HEP           IR stretch with strap 3x30" 3x30" 3x30" 30"x3 30"x3 30"  x3       Scap retraction 5"x15 HEP           Cane AAROM (flex/ER)   10x10" ea 10"x10 ea 10x  10"  ea 10x  10" ea 10x10" ea      Supine flexion      x10 x15      SL serratus punch             SL ER             Wall push ups                                                    Ther Activity             Dolphin lift             Mabel Glass

## 2021-09-14 ENCOUNTER — OFFICE VISIT (OUTPATIENT)
Dept: PHYSICAL THERAPY | Facility: CLINIC | Age: 61
End: 2021-09-14
Payer: COMMERCIAL

## 2021-09-14 DIAGNOSIS — M19.011 PRIMARY OSTEOARTHRITIS OF RIGHT SHOULDER: Primary | ICD-10-CM

## 2021-09-14 PROCEDURE — 97110 THERAPEUTIC EXERCISES: CPT

## 2021-09-14 PROCEDURE — 97140 MANUAL THERAPY 1/> REGIONS: CPT

## 2021-09-14 PROCEDURE — 97112 NEUROMUSCULAR REEDUCATION: CPT

## 2021-09-14 NOTE — PROGRESS NOTES
Daily Note     Today's date: 2021  Patient name: Davion Brown  : 1960  MRN: [id number]  Referring provider: Alhaji Lindsey,*  Dx:   Encounter Diagnosis     ICD-10-CM    1  Primary osteoarthritis of right shoulder  M19 011                   Subjective: "There's days when it's doing pretty descent and days when it's uncomfortable "  Notes more better days then bad days  Objective: See treatment diary below      Assessment: Performed new exercises and progressions w/o issue  Comfortable throughout PROM to R shoulder  Tolerated treatment well  Will monitor  Patient would benefit from continued PT      Plan: Continue per plan of care        Precautions: rupture of L ACL      Manuals      Laser to LHB insertion  4' 4' 4' 4' 4' 4' 4'     PROM to hammad  8' 8' 8' 8' 8' 6' 6'                               Neuro Re-Ed             Cane ext with scap sq  NV 10x10" 10"x10 10"x10 10x  10" 10x10" 10x10"     Posture tband   RTB x15ea red  20 ea Red  20 ea GTB  x15  ea GTB x20ea GTB  x20 ea     B Er with tband      RTB  x15 RTB x15 RTB  x20     H ab with tband      RTB  x15 RTB x15 RTB  x20     Prone flex/h ab ext                                       Ther Ex             UBE (back)  4' 6' 6' 6' 6' 6' 6'     Pulleys (flex/ab)  10"x10  ea   10x10" ea 10"x10 ea 10"x10ea   10"x  10 ea 10x10" ea 10x10"  Ea     Wall slides (flex) 10x10" HEP           IR stretch with strap 3x30" 3x30" 3x30" 30"x3 30"x3 30"  x3  30"x3     Scap retraction 5"x15 HEP           Cane AAROM (flex/ER)   10x10" ea 10"x10 ea 10x  10"  ea 10x  10" ea 10x10" ea 10x10"  ea     Supine flexion      x10 x15 x15     SL serratus punch        x10     SL ER        x10     Wall push ups                                                    Ther Activity             Dolphin lift             Shari James

## 2021-09-16 ENCOUNTER — OFFICE VISIT (OUTPATIENT)
Dept: PHYSICAL THERAPY | Facility: CLINIC | Age: 61
End: 2021-09-16
Payer: COMMERCIAL

## 2021-09-16 DIAGNOSIS — M19.011 PRIMARY OSTEOARTHRITIS OF RIGHT SHOULDER: Primary | ICD-10-CM

## 2021-09-16 PROCEDURE — 97110 THERAPEUTIC EXERCISES: CPT | Performed by: PHYSICAL THERAPIST

## 2021-09-16 PROCEDURE — 97140 MANUAL THERAPY 1/> REGIONS: CPT | Performed by: PHYSICAL THERAPIST

## 2021-09-16 PROCEDURE — 97112 NEUROMUSCULAR REEDUCATION: CPT | Performed by: PHYSICAL THERAPIST

## 2021-09-16 NOTE — PROGRESS NOTES
Daily Note     Today's date: 2021  Patient name: Melissa Griffith  : 1960  MRN: [id number]  Referring provider: Pat Keen,*  Dx:   Encounter Diagnosis     ICD-10-CM    1  Primary osteoarthritis of right shoulder  M19 011                   Subjective: Pt reports she's feeling better but every so often has a twinge of pain  Objective: See treatment diary below      Assessment: Tolerated treatment well  Patient exhibited good technique with therapeutic exercises and would benefit from continued PT      Plan: Continue per plan of care        Precautions: rupture of L ACL      Manuals     Laser to LHB insertion  4' 4' 4' 4' 4' 4' 4' 4'    PROM to hammad  8' 8' 8' 8' 8' 6' 6' 6'                              Neuro Re-Ed             Cane ext with scap sq  NV 10x10" 10"x10 10"x10 10x  10" 10x10" 10x10" 10x10" red    Posture tband   RTB x15ea red  20 ea Red  20 ea GTB  x15  ea GTB x20ea GTB  x20 ea grn 30x ea    B Er with tband      RTB  x15 RTB x15 RTB  x20 Red x30    H ab with tband      RTB  x15 RTB x15 RTB  x20 Red 30x    Prone flex/h ab ext                                       Ther Ex             UBE (back)  4' 6' 6' 6' 6' 6' 6' 6'    Pulleys (flex/ab)  10"x10  ea   10x10" ea 10"x10 ea 10"x10ea   10"x  10 ea 10x10" ea 10x10"  Ea 10x10" ea    Wall slides (flex) 10x10" HEP           IR stretch with strap 3x30" 3x30" 3x30" 30"x3 30"x3 30"  x3  30"x3 30"x3    Scap retraction 5"x15 HEP           Cane AAROM (flex/ER)   10x10" ea 10"x10 ea 10x  10"  ea 10x  10" ea 10x10" ea 10x10"  ea 10x10" ea    Supine flexion      x10 x15 x15 x15    SL serratus punch        x10 x15    SL ER        x10 x15    Wall push ups         x10                                           Ther Activity             Dolphin lift             [employer]

## 2021-09-20 ENCOUNTER — APPOINTMENT (OUTPATIENT)
Dept: RADIOLOGY | Facility: MEDICAL CENTER | Age: 61
End: 2021-09-20
Payer: COMMERCIAL

## 2021-09-20 ENCOUNTER — CONSULT (OUTPATIENT)
Dept: RHEUMATOLOGY | Facility: CLINIC | Age: 61
End: 2021-09-20
Payer: COMMERCIAL

## 2021-09-20 ENCOUNTER — APPOINTMENT (OUTPATIENT)
Dept: LAB | Facility: MEDICAL CENTER | Age: 61
End: 2021-09-20
Payer: COMMERCIAL

## 2021-09-20 ENCOUNTER — OFFICE VISIT (OUTPATIENT)
Dept: OBGYN CLINIC | Facility: MEDICAL CENTER | Age: 61
End: 2021-09-20
Payer: COMMERCIAL

## 2021-09-20 VITALS
BODY MASS INDEX: 36.62 KG/M2 | DIASTOLIC BLOOD PRESSURE: 80 MMHG | SYSTOLIC BLOOD PRESSURE: 140 MMHG | WEIGHT: 199 LBS | HEIGHT: 62 IN

## 2021-09-20 VITALS
HEART RATE: 71 BPM | SYSTOLIC BLOOD PRESSURE: 152 MMHG | DIASTOLIC BLOOD PRESSURE: 87 MMHG | HEIGHT: 62 IN | BODY MASS INDEX: 36.62 KG/M2 | WEIGHT: 199 LBS

## 2021-09-20 DIAGNOSIS — M25.50 PAIN IN JOINT, MULTIPLE SITES: ICD-10-CM

## 2021-09-20 DIAGNOSIS — M17.11 PRIMARY OSTEOARTHRITIS OF RIGHT KNEE: Primary | ICD-10-CM

## 2021-09-20 DIAGNOSIS — M25.50 ARTHRALGIA, UNSPECIFIED JOINT: ICD-10-CM

## 2021-09-20 DIAGNOSIS — M19.90 OSTEOARTHRITIS, UNSPECIFIED OSTEOARTHRITIS TYPE, UNSPECIFIED SITE: Primary | ICD-10-CM

## 2021-09-20 DIAGNOSIS — M25.511 ACUTE PAIN OF RIGHT SHOULDER: ICD-10-CM

## 2021-09-20 LAB
CRP SERPL QL: <3 MG/L
ERYTHROCYTE [SEDIMENTATION RATE] IN BLOOD: 31 MM/HOUR (ref 0–29)

## 2021-09-20 PROCEDURE — 86140 C-REACTIVE PROTEIN: CPT | Performed by: INTERNAL MEDICINE

## 2021-09-20 PROCEDURE — 99213 OFFICE O/P EST LOW 20 MIN: CPT | Performed by: ORTHOPAEDIC SURGERY

## 2021-09-20 PROCEDURE — 73630 X-RAY EXAM OF FOOT: CPT

## 2021-09-20 PROCEDURE — 36415 COLL VENOUS BLD VENIPUNCTURE: CPT | Performed by: INTERNAL MEDICINE

## 2021-09-20 PROCEDURE — 99244 OFF/OP CNSLTJ NEW/EST MOD 40: CPT | Performed by: INTERNAL MEDICINE

## 2021-09-20 PROCEDURE — 85652 RBC SED RATE AUTOMATED: CPT | Performed by: INTERNAL MEDICINE

## 2021-09-20 PROCEDURE — 86200 CCP ANTIBODY: CPT | Performed by: INTERNAL MEDICINE

## 2021-09-20 NOTE — PROGRESS NOTES
Assessment and Plan:   Kathie Hernandez is a 64 y o   female who presents as a Rheumatology consult referred by Burke Driscoll PA-C for evaluation of possible inflammatory arthritis  Patient's clinical picture of bilateral knee and foot pain seems due to underlying osteoarthritis based on degenerative changes on imaging and the fact that it has gotten worse with use  Inflammatory arthritis workup In the past has been unremarkable  Patient has history of negative rheumatoid factor and JOHN  To complete an inflammatory arthritis workup, ordered anti CCP along with ESR/ CRP inflammatory markers  Also ordered repeat feet x-rays to evaluate for extent of likely progression of osteoarthritis; asked patient to follow-up with Podiatry regarding her feet osteoarthritis; can be considered by them for steroid injections  She can continue to take Advil as needed up to 800mg three times a day with food, which is an appropriate osteoarthritis dose  Plan:  Diagnoses and all orders for this visit:    Osteoarthritis, unspecified osteoarthritis type, unspecified site    Arthralgia, unspecified joint  -     Ambulatory referral to Rheumatology  -     Sedimentation rate, automated  -     C-reactive protein  -     Cyclic citrul peptide antibody, IgG  -     XR foot 3+ vw left; Future  -     XR foot 3+ vw right; Future    Pain in joint, multiple sites  -     Ambulatory referral to Rheumatology  -     XR foot 3+ vw left; Future  -     XR foot 3+ vw right; Future    Follow-up plan: RTC as needed        HPI  Kathie Hernandez is a 64 y o   female who presents as a Rheumatology consult referred by Burke Driscoll PA-C for evaluation of possible inflammatory arthritis  Patient had right knee replacement in 11/2020; fell in pothole at work in 6/2021, and injured left knee, tore meniscus and ACL, is workman's compensation case  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[employer]; requires a lot of manual labor   Feet have been bothersome last two years on and off; no set pattern  Sometimes when first gets up in the morning, feet are a little sore  Walking makes foot pain better  Has right shoulder issues; has been going to PT for it; was using her shoulders to lift herself as compensation for her knees  Injection with Dr Domitila Naidu helped  Orthotics made foot pain worse  Takes 2 Advil before she goes to bed  Review of Systems  Review of Systems   Constitutional: Negative for chills, fatigue, fever and unexpected weight change  HENT: Negative for mouth sores and trouble swallowing  Eyes: Negative for pain and visual disturbance  Respiratory: Negative for cough and shortness of breath  Cardiovascular: Negative for chest pain and leg swelling  Gastrointestinal: Negative for abdominal pain, blood in stool, constipation, diarrhea and nausea  Heartburn   Endocrine:        Tired/sluggish   Genitourinary: Positive for frequency  Musculoskeletal: Positive for arthralgias and back pain  Negative for joint swelling and myalgias  Skin: Negative for color change and rash  itching   Neurological: Positive for numbness  Negative for weakness  Hematological: Negative for adenopathy  Psychiatric/Behavioral: Negative for sleep disturbance  Reviewed and agree      Allergies  Allergies   Allergen Reactions    Iv Contrast [Iodinated Diagnostic Agents] Hives    Vitamin C [Ascorbate - Food Allergy]      Queasy stomach, loose stool       Home Medications    Current Outpatient Medications:     Black Cohosh (REMIFEMIN MENOPAUSE PO), Take 1 tablet by mouth 2 (two) times a day, Disp: , Rfl:     Cholecalciferol (HM Vitamin D3) 100 MCG (4000 UT) CAPS, Take 1 capsule (4,000 Units total) by mouth daily, Disp: 30 capsule, Rfl: 0    ibuprofen (MOTRIN) 200 mg tablet, Take 200-800 mg by mouth every 6 (six) hours as needed for mild pain, Disp: , Rfl:     Naproxen Sodium 220 MG CAPS, Take 2 capsules by mouth daily, Disp: , Rfl:     Omega 3-6-9 Fatty Acids (OMEGA 3-6-9 PO), Take 1 capsule by mouth daily, Disp: , Rfl:     PANTETHINE PO, Take 1 tablet by mouth 2 (two) times a day, Disp: , Rfl:     Past Medical History  Past Medical History:   Diagnosis Date    Ambulatory dysfunction     uses walking stick    Chronic pain disorder     Claustrophobia     mild    DDD (degenerative disc disease), lumbar     Endometrial cancer ([facility] [address] ) 7/6/2017    Low back pain     Mild acid reflux     OA (osteoarthritis)     marissa knees    Spinal stenosis of lumbosacral region     Uterine cancer ([facility] [address] )     2017- hysterectomy and chemo    Wears glasses        Past Surgical History   Past Surgical History:   Procedure Laterality Date    COLONOSCOPY      ORTHOPEDIC SURGERY      PLANTAR FASCIA SURGERY Bilateral     LA HYSTEROSCOPY,W/ENDO BX N/A 2/28/2017    Procedure: DILATATION AND CURETTAGE (D&C) WITH HYSTEROSCOPY,POLYPECTOMY;  Surgeon: Fabian Johnson MD;  Location: [facility];  Service: Gynecology    LA TOTAL KNEE ARTHROPLASTY Right 11/30/2020    Procedure: ARTHROPLASTY KNEE TOTAL;  Surgeon: Jasson Burns MD;  Location: [facility];  Service: Orthopedics    SHOULDER SURGERY Left     TONSILLECTOMY      WISDOM TOOTH EXTRACTION         Family History    Family History   Problem Relation Age of Onset    No Known Problems Mother     No Known Problems Father      No known family history of autoimmune or inflammatory diseases      Social History  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 [address] History     Substance and Sexual Activity   Alcohol Use Yes    Comment: very rarely     Social History     Substance and Sexual Activity   Drug Use No     Social History     Tobacco Use   Smoking Status Never Smoker   Smokeless Tobacco Never Used   Tobacco Comment    No secondhand smoke exposure       Objective:  Vitals:    09/20/21 0845   BP: 140/80   BP Location: Left arm   Patient Position: Sitting   Cuff Size: Standard   Weight: 90 3 kg (199 lb)   Height: 5' 2" (1 575 m)       Physical Exam  Constitutional:       General: She is not in acute distress  Appearance: She is well-developed  HENT:      Head: Normocephalic and atraumatic  Eyes:      General: Lids are normal  No scleral icterus  Conjunctiva/sclera: Conjunctivae normal    Neck:      Thyroid: No thyromegaly  Cardiovascular:      Rate and Rhythm: Normal rate and regular rhythm  Heart sounds: S1 normal and S2 normal  No murmur heard  No friction rub  Pulmonary:      Effort: Pulmonary effort is normal  No tachypnea or respiratory distress  Breath sounds: Normal breath sounds  No wheezing, rhonchi or rales  Musculoskeletal:         General: Swelling and deformity present  Cervical back: Neck supple  No muscular tenderness  Comments: Halima's nodes present bilaterally; slight right ankle swelling; left knee brace present   Lymphadenopathy:      Head:      Right side of head: No submental or submandibular adenopathy  Left side of head: No submental or submandibular adenopathy  Cervical: No cervical adenopathy  Skin:     General: Skin is warm and dry  Findings: No rash  Nails: There is no clubbing  Comments: Right knee surgical scar   Neurological:      Mental Status: She is alert  Sensory: No sensory deficit  Psychiatric:         Behavior: Behavior normal  Behavior is cooperative  Reviewed labs and imaging  Imaging:   XR knee 3 vw right non injury  Result Date: 9/7/2021  Narrative: RIGHT KNEE INDICATION:   M17 11: Unilateral primary osteoarthritis, right knee Z96 651: Presence of right artificial knee joint  COMPARISON:  None VIEWS:  XR KNEE 3 VW RIGHT NON INJURY FINDINGS: There is no acute fracture or dislocation  There is no joint effusion  Unremarkable appearance of total knee arthroplasty  No evidence of hardware complication  No lytic or blastic osseous lesion  Soft tissues are unremarkable       Impression: Unremarkable appearance of total knee arthroplasty  Workstation performed: [id number]     Left Knee x-rays 6/17/21  There is a large joint effusion however laterally suggesting possible significant internal derangement       Left foot x-rays 10/07/2019  Mild degenerative osteoarthritis of the midfoot with dorsal tarsal metatarsal osteophyte formation  There is a large plantar calcaneal spur and a small retrocalcaneal spur  Mild degenerative osteoarthritis of the 1st metatarsophalangeal joint      Labs:   Appointment on 05/21/2021   Component Date Value Ref Range Status    HIV-1/HIV-2 Ab 05/21/2021 Non-Reactive  Non-Reactive Final   Office Visit on 05/12/2021   Component Date Value Ref Range Status    WBC 05/21/2021 3 79* 4 31 - 10 16 Thousand/uL Final    RBC 05/21/2021 4 90  3 81 - 5 12 Million/uL Final    Hemoglobin 05/21/2021 14 0  11 5 - 15 4 g/dL Final    Hematocrit 05/21/2021 44 1  34 8 - 46 1 % Final    MCV 05/21/2021 90  82 - 98 fL Final    MCH 05/21/2021 28 6  26 8 - 34 3 pg Final    MCHC 05/21/2021 31 7  31 4 - 37 4 g/dL Final    RDW 05/21/2021 13 9  11 6 - 15 1 % Final    MPV 05/21/2021 8 9  8 9 - 12 7 fL Final    Platelets 22/49/3012 169  149 - 390 Thousands/uL Final    nRBC 05/21/2021 0  /100 WBCs Final    Neutrophils Relative 05/21/2021 59  43 - 75 % Final    Immat GRANS % 05/21/2021 0  0 - 2 % Final    Lymphocytes Relative 05/21/2021 29  14 - 44 % Final    Monocytes Relative 05/21/2021 8  4 - 12 % Final    Eosinophils Relative 05/21/2021 3  0 - 6 % Final    Basophils Relative 05/21/2021 1  0 - 1 % Final    Neutrophils Absolute 05/21/2021 2 25  1 85 - 7 62 Thousands/µL Final    Immature Grans Absolute 05/21/2021 0 01  0 00 - 0 20 Thousand/uL Final    Lymphocytes Absolute 05/21/2021 1 10  0 60 - 4 47 Thousands/µL Final    Monocytes Absolute 05/21/2021 0 30  0 17 - 1 22 Thousand/µL Final    Eosinophils Absolute 05/21/2021 0 11  0 00 - 0 61 Thousand/µL Final    Basophils Absolute 05/21/2021 0 02  0 00 - 0 10 Thousands/µL Final    Sodium 05/21/2021 143  136 - 145 mmol/L Final    Potassium 05/21/2021 4 2  3 5 - 5 3 mmol/L Final    Chloride 05/21/2021 111* 100 - 108 mmol/L Final    CO2 05/21/2021 25  21 - 32 mmol/L Final    ANION GAP 05/21/2021 7  4 - 13 mmol/L Final    BUN 05/21/2021 29* 5 - 25 mg/dL Final    Creatinine 05/21/2021 0 74  0 60 - 1 30 mg/dL Final    Standardized to IDMS reference method    Glucose, Fasting 05/21/2021 91  65 - 99 mg/dL Final    Specimen collection should occur prior to Sulfasalazine administration due to the potential for falsely depressed results  Specimen collection should occur prior to Sulfapyridine administration due to the potential for falsely elevated results   Calcium 05/21/2021 9 2  8 3 - 10 1 mg/dL Final    AST 05/21/2021 34  5 - 45 U/L Final    Specimen collection should occur prior to Sulfasalazine administration due to the potential for falsely depressed results   ALT 05/21/2021 40  12 - 78 U/L Final    Specimen collection should occur prior to Sulfasalazine and/or Sulfapyridine administration due to the potential for falsely depressed results   Alkaline Phosphatase 05/21/2021 62  46 - 116 U/L Final    Total Protein 05/21/2021 6 5  6 4 - 8 2 g/dL Final    Albumin 05/21/2021 3 5  3 5 - 5 0 g/dL Final    Total Bilirubin 05/21/2021 0 67  0 20 - 1 00 mg/dL Final    Use of this assay is not recommended for patients undergoing treatment with eltrombopag due to the potential for falsely elevated results      eGFR 05/21/2021 88  ml/min/1 73sq m Final    Cholesterol 05/21/2021 230* 50 - 200 mg/dL Final    Cholesterol:       Desirable         <200 mg/dl       Borderline         200-239 mg/dl       High              >239           Triglycerides 05/21/2021 81  <=150 mg/dL Final    Triglyceride:     Normal          <150 mg/dl     Borderline High 150-199 mg/dl     High            200-499 mg/dl        Very High       >499 mg/dl    Specimen collection should occur prior to N-Acetylcysteine or Metamizole administration due to the potential for falsely depressed results   HDL, Direct 05/21/2021 73  >=40 mg/dL Final    HDL Cholesterol:       Low     <41 mg/dL  Specimen collection should occur prior to Metamizole administration due to the potential for falsley depressed results   LDL Calculated 05/21/2021 141* 0 - 100 mg/dL Final    LDL Cholesterol:     Optimal           <100 mg/dl     Near Optimal      100-129 mg/dl     Above Optimal       Borderline High 130-159 mg/dl       High            160-189 mg/dl       Very High       >189 mg/dl         This screening LDL is a calculated result  It does not have the accuracy of the Direct Measured LDL in the monitoring of patients with hyperlipidemia and/or statin therapy  Direct Measure LDL (GOM297) must be ordered separately in these patients   Vit D, 25-Hydroxy 05/21/2021 19 9* 30 0 - 100 0 ng/mL Final    TSH 3RD GENERATON 05/21/2021 2 610  0 358 - 3 740 uIU/mL Final    The recommended reference ranges for TSH during pregnancy are as follows:   First trimester 0 1 to 2 5 uIU/mL   Second trimester  0 2 to 3 0 uIU/mL   Third trimester 0 3 to 3 0 uIU/m    Note: Normal ranges may not apply to patients who are transgender, non-binary, or whose legal sex, sex at birth, and gender identity differ

## 2021-09-20 NOTE — PATIENT INSTRUCTIONS
Do labs  Do feet x-rays; follow-up with Podiatry  Can continue to take Advil as needed up to 800mg three times a day with food    Return to clinic as needed    Osteoarthritis   AMBULATORY CARE:   Osteoarthritis  occurs when cartilage (tissue that cushions a joint) wears away slowly and causes the bones to rub together  Osteoarthritis (OA) is a long-term condition that often affects the hands, neck, lower back, knees, and hips  OA is also called arthrosis or degenerative joint disease  Common signs and symptoms include the following:   · Joint pain that gets worse when you move the joint     · Joint stiffness that decreases after you move the joint     · Decreased range of movement     · Hard, bony enlargement on your fingers or toes    · A grinding or cracking sound when you move your joint    Call your doctor or specialist if:   · You have severe pain  · You cannot move your joint  · You have a fever  · Your joint is red and tender  · You have questions or concerns about your condition or care  Treatment for osteoarthritis  may include any of the following:  · Acetaminophen  decreases pain and fever  It is available without a doctor's order  Ask how much to take and how often to take it  Follow directions  Read the labels of all other medicines you are using to see if they also contain acetaminophen, or ask your doctor or pharmacist  Acetaminophen can cause liver damage if not taken correctly  Do not use more than 4 grams (4,000 milligrams) total of acetaminophen in one day  · NSAIDs , such as ibuprofen, help decrease swelling, pain, and fever  This medicine is available with or without a doctor's order  NSAIDs can cause stomach bleeding or kidney problems in certain people  If you take blood thinner medicine, always ask your healthcare provider if NSAIDs are safe for you  Always read the medicine label and follow directions  · Capsaicin cream  may help decrease pain in your joint  · Prescription pain medicine  may be given  Ask your healthcare provider how to take this medicine safely  Some prescription pain medicines contain acetaminophen  Do not take other medicines that contain acetaminophen without talking to your healthcare provider  Too much acetaminophen may cause liver damage  Prescription pain medicine may cause constipation  Ask your healthcare provider how to prevent or treat constipation  · A steroid injection  may be given if your symptoms get worse  · Physical therapy  is used to teach you exercises to help improve movement and strength, and to decrease pain  A physical therapist may move an area with his or her hands  For example, he or she may move your leg in certain ways to treat osteoarthritis in your hip  · Ultrasound  may be used to treat osteoarthritis in certain areas, such as your knee  Ultrasound produces heat that can relieve pain  · Surgery  may be needed if other treatments do not work  Manage your symptoms:   · Stay active  Physical activity may reduce your pain and improve your ability to do daily activities  Avoid activities that cause pain  Ask your healthcare provider what type of exercise would be best for you  · Maintain a healthy weight  This helps decrease the strain on the joints in your back, hips, knees, ankles, and feet  Ask your healthcare provider what a healthy weight is for you  He or she can help you create a weight loss plan if you are overweight  · Use heat or ice on your joints as directed  Heat and ice help decrease pain, swelling, and muscle spasms  For heat, use a heating pad on a low setting for 20 minutes, or take a warm bath  For ice, use an ice pack, or put crushed ice in a plastic bag  Cover it with a towel before you place it on your joint  Use ice for 15 minutes every hour  · Massage the muscles around the joint  Massage helps relieve pain and stiffness   Your healthcare provider or a physical therapist can show you how to do this  If you have hip OA, another person may need to help you massage the area  · Use a cane, crutches, or a walker if directed  These help protect and relieve pressure on your ankle, knee, and hip joints  You may also be prescribed shoe inserts to decrease pressure in your joints  · Wear flat or low-heeled shoes  This will help decrease pain and reduce pressure on your ankle, knee, and hip joints  Follow up with your healthcare provider as directed:  Write down your questions so you remember to ask them during your visits  © Copyright [employer] 2021 Information is for End User's use only and may not be sold, redistributed or otherwise used for commercial purposes  All illustrations and images included in CareNotes® are the copyrighted property of [employer]  or Sharri George  The above information is an  only  It is not intended as medical advice for individual conditions or treatments  Talk to your doctor, nurse or pharmacist before following any medical regimen to see if it is safe and effective for you

## 2021-09-20 NOTE — PROGRESS NOTES
Ortho Sports Medicine Shoulder Follow Up Visit     Assesment:     64 y o  female right shoulder osteoarthritis [address] [facility] [place] [facility]    Plan:    Conservative treatment:    Ice to shoulder 1-2 times daily, for 20 minutes at a time  PT for ROM and strengthening to shoulder, rotator cuff, scapular stabilizers  She can gradually transition to a HEP and save a couple visits here and there to check in with the therapist    Home exercise program for shoulder, including ROM and strenthening  Instructions given to patient of what exercises to perform  Let pain guide return to activities  Imaging:    No imaging was available for review today  Injection:    No Injection planned at this time  Surgery:     No surgery is recommended at this point, continue with conservative treatment plan as noted  Follow up:    Return if symptoms worsen or fail to improve  Chief Complaint   Patient presents with    Right Shoulder - Follow-up         History of Present Illness: The patient is returns for follow up of Right shoulder  Since the prior visit, She reports significant improvement  Pain is improved by rest, physical therapy and injection  Pain is aggravated by overhead activity and reaching back  The patient denies weakness  The patient has tried rest and physical therapy          Shoulder Surgical History:  None    Past Medical, Social and Family History:  Past Medical History:   Diagnosis Date    Ambulatory dysfunction     uses walking stick    Chronic pain disorder     Claustrophobia     mild    DDD (degenerative disc disease), lumbar     Endometrial cancer ([facility] [address] ) 7/6/2017    Low back pain     Mild acid reflux     OA (osteoarthritis)     marissa knees    Spinal stenosis of lumbosacral region     Uterine cancer ([facility] [address] )     2017- hysterectomy and chemo    Wears glasses      Past Surgical History:   Procedure Laterality Date    COLONOSCOPY      ORTHOPEDIC SURGERY      PLANTAR FASCIA SURGERY Bilateral     MI HYSTEROSCOPY,W/ENDO BX N/A 2/28/2017    Procedure: DILATATION AND CURETTAGE (D&C) WITH HYSTEROSCOPY,POLYPECTOMY;  Surgeon: Raulito Major MD;  [facility];  Service: Gynecology    MI TOTAL KNEE ARTHROPLASTY Right 11/30/2020    Procedure: ARTHROPLASTY KNEE TOTAL;  Surgeon: Nya Merino MD;  [facility];  Service: Orthopedics    SHOULDER SURGERY Left     TONSILLECTOMY      WISDOM TOOTH EXTRACTION       Allergies   Allergen Reactions    Iv Contrast [Iodinated Diagnostic Agents] Hives    Vitamin C [Ascorbate - Food Allergy]      Queasy stomach, loose stool     Current Outpatient Medications on File Prior to Visit   Medication Sig Dispense Refill    Black Cohosh (REMIFEMIN MENOPAUSE PO) Take 1 tablet by mouth 2 (two) times a day      Cholecalciferol (HM Vitamin D3) 100 MCG (4000 UT) CAPS Take 1 capsule (4,000 Units total) by mouth daily 30 capsule 0    ibuprofen (MOTRIN) 200 mg tablet Take 200-800 mg by mouth every 6 (six) hours as needed for mild pain      Naproxen Sodium 220 MG CAPS Take 2 capsules by mouth daily      Omega 3-6-9 Fatty Acids (OMEGA 3-6-9 PO) Take 1 capsule by mouth daily      PANTETHINE PO Take 1 tablet by mouth 2 (two) times a day       No current facility-administered medications on file prior to visit  Social History     Socioeconomic History    Marital status:       Spouse name: Not on file    Number of children: Not on file    Years of education: Not on file    Highest education level: Not on file   Occupational History    Not on file   Tobacco Use    Smoking status: Never Smoker    Smokeless tobacco: Never Used    Tobacco comment: No secondhand smoke exposure   Vaping Use    Vaping Use: Never used   Substance and Sexual Activity    Alcohol use: Yes     Comment: very rarely    Drug use: No    Sexual activity: Not on file   Other Topics Concern    Not on file   Social History Narrative    Not on file     Social Determinants of Health     Financial Resource Strain:     Difficulty of Paying Living Expenses:    Food Insecurity:     Worried About Running Out of Food in the Last Year:     [address] in the Last Year:    Transportation Needs:     Lack of Transportation (Medical):  Lack of Transportation (Non-Medical):    Physical Activity:     Days of Exercise per Week:     Minutes of Exercise per Session:    Stress:     Feeling of Stress :    Social Connections:     Frequency of Communication with Friends and Family:     Frequency of Social Gatherings with Friends and Family:     Attends [employer] Services:     Active Member of Clubs or Organizations:     Attends Club or Organization Meetings:     Marital Status:    Intimate Partner Violence:     Fear of Current or Ex-Partner:     Emotionally Abused:     Physically Abused:     Sexually Abused:        I have reviewed the past medical, surgical, social and family history, medications and allergies as documented in the EMR  Review of systems: ROS is negative other than that noted in the HPI  Constitutional: Negative for fatigue and fever  Physical Exam:    Blood pressure 152/87, pulse 71, height 5' 2" (1 575 m), weight 90 3 kg (199 lb), not currently breastfeeding      General/Constitutional: NAD, well developed, well nourished  HENT: Normocephalic, atraumatic  CV: Intact distal pulses, regular rate  Resp: No respiratory distress or labored breathing  Lymphatic: No lymphadenopathy palpated  Neuro: Alert and Oriented x 3, no focal deficits  Psych: Normal mood, normal affect, normal judgement, normal behavior  Skin: Warm, dry, no rashes, no erythema      Shoulder focused exam:       RIGHT LEFT    Scapula Atrophy Negative Negative     Winging Negative Negative     Protraction Negative Negative    Rotator cuff SS 5/5 5/5     IS 5/5 5/5     SubS 5/5 5/5    ROM     170     ER0 60 60     ER90 90    90     IR90 T6    T6     IRb T6    T6    TTP: AC Negative Negative     Biceps Negative Negative     Coracoid Negative Negative    Special Tests: O'Briens Negative Negative     Correia-shear Negative Negative     Cross body Adduction Negative Negative     Speeds  Negative Negative     Parmjit's Negative Negative     Whipple Negative Negative       Neer Negative Negative     Ivllanueva Negative Negative    Instability: Apprehension & relocation not tested not tested     Load & shift not tested not tested    Other: Crank Negative Negative               UE NV Exam: +2 Radial pulses bilaterally  Sensation intact to light touch C5-T1 bilaterally, Radial/median/ulnar nerve motor intact    Cervical ROM is full without pain, numbness or tingling      Shoulder Imaging    No imaging was performed today      Scribe Attestation    I,:  Sandra Robles am acting as a scribe while in the presence of the attending physician :       I,:  Praneeth Anthony, DO personally performed the services described in this documentation    as scribed in my presence :

## 2021-09-21 ENCOUNTER — OFFICE VISIT (OUTPATIENT)
Dept: PHYSICAL THERAPY | Facility: CLINIC | Age: 61
End: 2021-09-21
Payer: COMMERCIAL

## 2021-09-21 DIAGNOSIS — M19.011 PRIMARY OSTEOARTHRITIS OF RIGHT SHOULDER: Primary | ICD-10-CM

## 2021-09-21 LAB — CCP AB SER IA-ACNC: 0.7

## 2021-09-21 PROCEDURE — 97530 THERAPEUTIC ACTIVITIES: CPT | Performed by: PHYSICAL THERAPIST

## 2021-09-21 PROCEDURE — 97140 MANUAL THERAPY 1/> REGIONS: CPT | Performed by: PHYSICAL THERAPIST

## 2021-09-21 PROCEDURE — 97112 NEUROMUSCULAR REEDUCATION: CPT | Performed by: PHYSICAL THERAPIST

## 2021-09-21 PROCEDURE — 97110 THERAPEUTIC EXERCISES: CPT | Performed by: PHYSICAL THERAPIST

## 2021-09-21 NOTE — PROGRESS NOTES
Daily Note     Today's date: 2021  Patient name: Noris Peña  : 1960  MRN: [id number]  Referring provider: Lakeisha Stanford,*  Dx:   Encounter Diagnosis     ICD-10-CM    1  Primary osteoarthritis of right shoulder  M19 011                   Subjective: "My shoulder was feeling really good but then I went to the Dr's yesterday and he was pushing and pulling on it  I had a hard time sleeping last night "      Objective: See treatment diary below      Assessment: Pt had good tolerance to progression  Limited into ER/IR during PROM  Plan: Continue per plan of care        Precautions: rupture of L ACL      Manuals    Laser to LHB insertion  4' 4' 4' 4' 4' 4' 4' 4' 4'   PROM to hammad  8' 8' 8' 8' 8' 6' 6' 6' 6'                             Neuro Re-Ed             Cane ext with scap sq  NV 10x10" 10"x10 10"x10 10x  10" 10x10" 10x10" 10x10" red 10x10"   Posture tband   RTB x15ea red  20 ea Red  20 ea GTB  x15  ea GTB x20ea GTB  x20 ea grn 30x ea GTB x20ea   B Er with tband      RTB  x15 RTB x15 RTB  x20 Red x30 GTB x20   H ab with tband      RTB  x15 RTB x15 RTB  x20 Red 30x GTB x20   Prone flex/h ab ext          NV                             Ther Ex             UBE (back)  4' 6' 6' 6' 6' 6' 6' 6' 6'   Pulleys (flex/ab)  10"x10  ea   10x10" ea 10"x10 ea 10"x10ea   10"x  10 ea 10x10" ea 10x10"  Ea 10x10" ea DC   IR stretch with strap 3x30" 3x30" 3x30" 30"x3 30"x3 30"  x3  30"x3 30"x3    Sleeper stretch          3x30"   Cane AAROM (flex/ER)   10x10" ea 10"x10 ea 10x  10"  ea 10x  10" ea 10x10" ea 10x10"  ea 10x10" ea 10x10" ea   Supine flexion      x10 x15 x15 x15 x20   SL serratus punch        x10 x15 x20   SL ER        x10 x15 x20   Wall push ups         x10                                           Ther Activity             Dolphin lift          x15-[address] [place] [address]          x15

## 2021-09-22 ENCOUNTER — OFFICE VISIT (OUTPATIENT)
Dept: PAIN MEDICINE | Facility: MEDICAL CENTER | Age: 61
End: 2021-09-22
Payer: COMMERCIAL

## 2021-09-22 VITALS
WEIGHT: 209 LBS | BODY MASS INDEX: 38.46 KG/M2 | HEIGHT: 62 IN | HEART RATE: 55 BPM | DIASTOLIC BLOOD PRESSURE: 84 MMHG | SYSTOLIC BLOOD PRESSURE: 153 MMHG

## 2021-09-22 DIAGNOSIS — M51.36 DEGENERATIVE DISC DISEASE, LUMBAR: ICD-10-CM

## 2021-09-22 DIAGNOSIS — G89.4 CHRONIC PAIN SYNDROME: Primary | ICD-10-CM

## 2021-09-22 DIAGNOSIS — M46.1 SACROILIITIS (HCC): ICD-10-CM

## 2021-09-22 PROCEDURE — 99214 OFFICE O/P EST MOD 30 MIN: CPT | Performed by: NURSE PRACTITIONER

## 2021-09-22 PROCEDURE — 3008F BODY MASS INDEX DOCD: CPT | Performed by: NURSE PRACTITIONER

## 2021-09-22 PROCEDURE — 1036F TOBACCO NON-USER: CPT | Performed by: NURSE PRACTITIONER

## 2021-09-22 RX ORDER — NAPROXEN 500 MG/1
TABLET ORAL
Qty: 60 TABLET | Refills: 1 | Status: SHIPPED | OUTPATIENT
Start: 2021-09-22 | End: 2022-04-27

## 2021-09-22 NOTE — PROGRESS NOTES
Assessment  1  Chronic pain syndrome    2  Degenerative disc disease, lumbar    3  Sacroiliitis ([address] )        Plan  The patient was seen in the office today for follow-up after her glenohumeral joint injection on 08/25/2021  She states that this injection helped her shoulder and that her shoulder is no longer really bothering her  She does complain of right-sided low back and upper buttock pain that does not radiate down her leg as well as bilateral feeling of pins and needles intermittently in her feet  She had a right-sided L4-L5 transforaminal epidural steroid injection on 08/24/2020 that she states provided her with excellent relief from her back pain  She also had a right total knee replacement in November of 2020 and is going for a right total knee replacement on 10/22/2021  We will hold off on doing any injections on her at this time due to her upcoming surgery  Patient has been taking over-the-counter Naprosyn 220 mg in the morning and ibuprofen 200-800 mg at night  At this time I did feel like it was reasonable to start her on prescription strength naproxen 500 mg up to twice a day as needed for her pain symptoms  She knows not to take ibuprofen with naproxen if she takes naproxen twice a day  I did review side effects with patient and she verbalized understanding and she will take with food  Prescription was sent to her pharmacy on file  She will follow-up in 3 months or sooner if warranted  At that time, she may benefit from right-sided sacroiliac joint injection  My impressions and treatment recommendations were discussed in detail with the patient who verbalized understanding and had no further questions  Discharge instructions were provided  I personally saw and examined the patient and I agree with the above discussed plan of care  No orders of the defined types were placed in this encounter      New Medications Ordered This Visit   Medications    naproxen (NAPROSYN) 500 mg tablet     Sig: TAKE ONE TABLET (500 MG) BY MOUTH UP TO TWICE DAILY AS NEEDED FOR PAIN  TAKE WITH FOOD     Dispense:  60 tablet     Refill:  1       History of Present Illness    Kathie Hernandez is a 64 y o  female who presents to the office with a pain score of 4/10 today that she states is intermittent and the quality is dull aching, sharp and pressure-like in her low back and upper buttock on the right side  She also states she has intermittent feeling of pins and needles in both of her feet  She currently takes over-the-counter Naprosyn and ibuprofen  She takes Naprosyn 220 mg in the morning and 200 mg-800 mg of ibuprofen at night  She has been going to physical therapy recently for her knee  She had a right total knee replacement last November  She states that she continues to walk and do exercises at home at least 5 days a week  I have personally reviewed and/or updated the patient's past medical history, past surgical history, family history, social history, current medications, allergies, and vital signs today  Review of Systems   Respiratory: Negative for shortness of breath  Cardiovascular: Negative for chest pain  Gastrointestinal: Negative for constipation, diarrhea, nausea and vomiting  Musculoskeletal: Positive for arthralgias, back pain and gait problem  Negative for joint swelling and myalgias  Skin: Negative for rash  Neurological: Negative for dizziness, seizures and weakness  All other systems reviewed and are negative        Patient Active Problem List   Diagnosis    Anxiety    S/P colonoscopy    Elevated liver function tests    GERD (gastroesophageal reflux disease)    Mixed hyperlipidemia    Irritable bowel syndrome    OA (osteoarthritis) of knee    Pain in joint, multiple sites    Arthralgia    Foot pain, bilateral    Sciatica of right side    Radicular leg pain    Degenerative disc disease, lumbar    Spinal stenosis of lumbosacral region    Obesity    Chronic pain syndrome    Elevated BP without diagnosis of hypertension    Vitamin D deficiency    Leukopenia    Bursitis of right shoulder       Past Medical History:   Diagnosis Date    Ambulatory dysfunction     uses walking stick    Chronic pain disorder     Claustrophobia     mild    DDD (degenerative disc disease), lumbar     Endometrial cancer ([facility] [address] ) 7/6/2017    Low back pain     Mild acid reflux     OA (osteoarthritis)     marissa knees    Spinal stenosis of lumbosacral region     Uterine cancer ([facility] [address] )     2017- hysterectomy and chemo    Wears glasses        Past Surgical History:   Procedure Laterality Date    COLONOSCOPY      ORTHOPEDIC SURGERY      PLANTAR FASCIA SURGERY Bilateral     MT HYSTEROSCOPY,W/ENDO BX N/A 2/28/2017    Procedure: DILATATION AND CURETTAGE (D&C) WITH HYSTEROSCOPY,POLYPECTOMY;  Surgeon: Job Melendez MD;  [facility];  Service: Gynecology    MT TOTAL KNEE ARTHROPLASTY Right 11/30/2020    Procedure: ARTHROPLASTY KNEE TOTAL;  Surgeon: Dony Lopez MD;  [facility];  Service: Orthopedics    SHOULDER SURGERY Left     TONSILLECTOMY      WISDOM TOOTH EXTRACTION         Family History   Problem Relation Age of Onset    No Known Problems Mother     No Known Problems Father        Social History     Occupational History    Not on file   Tobacco Use    Smoking status: Never Smoker    Smokeless tobacco: Never Used    Tobacco comment: No secondhand smoke exposure   Vaping Use    Vaping Use: Never used   Substance and Sexual Activity    Alcohol use: Yes     Comment: very rarely    Drug use: No    Sexual activity: Not on file       Current Outpatient Medications on File Prior to Visit   Medication Sig    Black Cohosh (REMIFEMIN MENOPAUSE PO) Take 1 tablet by mouth 2 (two) times a day    Cholecalciferol (HM Vitamin D3) 100 MCG (4000 UT) CAPS Take 1 capsule (4,000 Units total) by mouth daily    Omega 3-6-9 Fatty Acids (OMEGA 3-6-9 PO) Take 1 capsule by mouth daily    PANTETHINE PO Take 1 tablet by mouth 2 (two) times a day    [DISCONTINUED] ibuprofen (MOTRIN) 200 mg tablet Take 200-800 mg by mouth every 6 (six) hours as needed for mild pain    [DISCONTINUED] Naproxen Sodium 220 MG CAPS Take 2 capsules by mouth daily     No current facility-administered medications on file prior to visit         Allergies   Allergen Reactions    Iv Contrast [Iodinated Diagnostic Agents] Hives    Vitamin C [Ascorbate - Food Allergy]      Queasy stomach, loose stool       Physical Exam    /84   Pulse 55   Ht 5' 2" (1 575 m)   Wt 94 8 kg (209 lb)   BMI 38 23 kg/m²     Constitutional: obese  Eyes: anicteric  HEENT: grossly intact  Neck: supple, symmetric, trachea midline and no masses   Pulmonary:even and unlabored  Cardiovascular:No edema or pitting edema present  Skin:Normal without rashes or lesions and well hydrated  Psychiatric:Mood and affect appropriate  Neurologic:Cranial Nerves II-XII grossly intact  Musculoskeletal:antalgic   Lumbar Spine Exam    Appearance:  Normal lordosis  Palpation/Tenderness:  right sacroiliac joint tenderness  Sensory:  no sensory deficits noted  Special Tests:  Right Straight Leg Test:  negative  Right Delmar's Maneuver:  positive  Right Gaenslen's Test:  positive  Right Pelvic Distraction Test:  negative  Right Thigh Thrust: positive      Imaging

## 2021-09-22 NOTE — PATIENT INSTRUCTIONS
LOOK UP SACROILIAC JOINT EXERCISES ON [facility] OR [employer]  SACROILIITIS    Hip Bursitis Exercises   AMBULATORY CARE:   Hip bursitis exercises  help strengthen the muscles in your hip and keep the joint flexible  Strong muscles can help reduce pain, prevent injury, and keep the joint stable  The exercises can also help increase the range of motion in your hip joint  What you need to know about exercise safety:   · Move slowly and smoothly  Avoid fast or jerky motions  This will help prevent an injury  · Breathe normally  Do not hold your breath  It is important to breathe in and out so you do not tense up during exercise  Tension could prevent you from moving your joint in a full range of motion  · Do the exercises and stretches on both legs  Do this so both hips remain strong and flexible  · Stop if you feel sharp pain or an increase in pain  Contact your healthcare provider or physical therapist  It is normal to feel some discomfort during exercise  Regular exercise will help decrease your discomfort over time  · Warm up before you stretch and exercise  Walk or ride a stationary bike for 5 to 10 minutes  How to do hip stretches: Your healthcare provider or physical therapist will tell you how many times to do each stretch  Do the stretch on both sides before you move to the next stretch  · Standing iliotibial band stretch:  Stand with the leg on your injured side behind your other leg  Bend sideways toward the side that is not injured  Stop when you feel a stretch in your outer hip  Hold for 5 to 10 seconds  Then return to the starting position  · Lying iliotibial band stretch:  Lie on your back  Bend the knee on your injured side toward your chest  Place your hands on the outside of your knee and thigh  Slowly pull the knee across your body  Stop when you feel a stretch in your hip and outer thigh  Hold for 5 to 10 seconds  Return your leg to the starting position  · Hip stretch:  Lie on your back with both legs straight and on the ground  Bend the knee on your injured side toward your chest until you can reach your lower leg  Place both hands on your shin and pull your knee toward your chest  Hold for 5 to 10 seconds  Return your leg to the starting position  · Knee to chest:  Lie on your back with both knees bent and feet flat on the floor  Bend the knee on your injured side toward your chest until you can reach your lower leg  Place both hands on your shin and pull your knee toward your chest  Hold for 5 to 10 seconds  Return your leg to the starting position  · Internal hip rotator stretch:  You will do this exercise on a table  Lie on your side with the injured hip on top  You may be told to keep a pillow between your thighs  Move the top leg so the foot hangs below the edge of the table  Rotate your hip to raise your foot in the opposite direction of the bottom shoulder  Raise your foot as high as you can so you feel a stretch in the back of your thigh  Hold for 5 seconds  Then slowly lower your foot to the starting position  · External hip rotator stretch:  You will do this exercise on a table  Lie on your side with the injured hip on the bottom  You do not need a pillow between your thighs for this exercise  Move the bottom leg so the foot is off the edge of the table  Rotate your hip to lift the foot in the opposite direction of the bottom shoulder  Raise your foot as high as you can so you feel a stretch in your buttock  Hold for 5 seconds  Then slowly lower your foot to the starting position  · Kneeling hip flexor stretch:  Kneel on your knee on the injured side  Place the foot of your other leg on the floor so the knee is bent  Put both hands on top of your thigh  Keep your back straight and abdominal muscles tight  Lean forward until you feel a stretch in your other thigh  Hold the stretch for 10 seconds   Return to the starting position  How to do hip strengthening exercises: Your healthcare provider or physical therapist will tell you how many times to do each exercise  Do the exercise on both sides before you move to the next exercise  · Straight leg lift to the side: This may also be called hip abduction  Lie on your side with straight legs, with the injured hip on top  Slowly raise your top leg toward the ceiling as high as you can  Keep your foot pointed  Hold for 5 seconds  Then slowly lower your leg to the starting position  · Inner thigh lift: This may also be called hip adduction  Lie on your side with straight legs, with the injured hip on the bottom  Cross your top leg over your bottom leg  Put the foot of your top leg on the floor in front of you  Raise your bottom leg until it touches the top leg  Hold for 5 seconds  Then slowly lower the leg to the floor  · Clam exercise:  Lie on your side so your injured side is on top  Bend your knees  Keep your heels together during this exercise  Slowly raise your top knee toward the ceiling  Then lower your leg so your knees are together  Call your doctor if:   · You have sharp pain during exercise or at rest     · You have questions or concerns about the stretches or exercises  © Copyright [employer] 2021 Information is for End User's use only and may not be sold, redistributed or otherwise used for commercial purposes  All illustrations and images included in CareNotes® are the copyrighted property of [employer]  or Sharri George  The above information is an  only  It is not intended as medical advice for individual conditions or treatments  Talk to your doctor, nurse or pharmacist before following any medical regimen to see if it is safe and effective for you  Lower Back Exercises   AMBULATORY CARE:   Lower back exercises  help heal and strengthen your back muscles to prevent another injury   Ask your healthcare provider if you need to see a physical therapist for more advanced exercises  Seek care immediately if:   · You have severe pain that prevents you from moving  Contact your healthcare provider if:   · Your pain becomes worse  · You have new pain  · You have questions or concerns about your condition or care  Do lower back exercises safely:   · Do the exercises on a mat or firm surface  (not on a bed) to support your spine and prevent low back pain  · Move slowly and smoothly  Avoid fast or jerky motions  · Breathe normally  Do not hold your breath  · Stop if you feel pain  It is normal to feel some discomfort at first  Regular exercise will help decrease your discomfort over time  Lower back exercises: Your healthcare provider may recommend that you do back exercises 10 to 30 minutes each day  He may also recommend that you do exercises 1 to 3 times each day  Ask your healthcare provider which exercises are best for you and how often to do them  · Ankle pumps:  Lie on your back  Move your foot up (with your toes pointing toward your head)  Then, move your foot down (with your toes pointing away from you)  Repeat this exercise 10 times on each side  · Heel slides:  Lie on your back  Slowly bend one leg and then straighten it  Next, bend the other leg and then straighten it  Repeat 10 times on each side  · Pelvic tilt:  Lie on your back with your knees bent and feet flat on the floor  Place your arms in a relaxed position beside your body  Tighten the muscles of your abdomen and flatten your back against the floor  Hold for 5 seconds  Repeat 5 times  · Back stretch:  Lie on your back with your hands behind your head  Bend your knees and turn the lower half of your body to one side  Hold this position for 10 seconds  Repeat 3 times on each side  · Straight leg raises:  Lie on your back with one leg straight  Bend the other knee   Tighten your abdomen and then slowly lift the straight leg up about 6 to 12 inches off the floor  Hold for 1 to 5 seconds  Lower your leg slowly  Repeat 10 times on each leg  · Knee-to-chest:  Lie on your back with your knees bent and feet flat on the floor  Pull one of your knees toward your chest and hold it there for 5 seconds  Return your leg to the starting position  Lift the other knee toward your chest and hold for 5 seconds  Do this 5 times on each side  · Cat and camel:  Place your hands and knees on the floor  Arch your back upward toward the ceiling and lower your head  Round out your spine as much as you can  Hold for 5 seconds  Lift your head upward and push your chest downward toward the floor  Hold for 5 seconds  Do 3 sets or as directed  · Wall squats:  Stand with your back against a wall  Tighten the muscles of your abdomen  Slowly lower your body until your knees are bent at a 45 degree angle  Hold this position for 5 seconds  Slowly move back up to a standing position  Repeat 10 times  · Curl up:  Lie on your back with your knees bent and feet flat on the floor  Place your hands, palms down, underneath the curve in your lower back  Next, with your elbows on the floor, lift your shoulders and chest 2 to 3 inches  Keep your head in line with your shoulders  Hold this position for 5 seconds  When you can do this exercise without pain for 10 to 15 seconds, you may add a rotation  While your shoulders and chest are lifted off the ground, turn slightly to the left and hold  Repeat on the other side  · Bird dog:  Place your hands and knees on the floor  Keep your wrists directly below your shoulders and your knees directly below your hips  Pull your belly button in toward your spine  Do not flatten or arch your back  Tighten your abdominal muscles  Raise one arm straight out so that it is aligned with your head  Next, raise the leg opposite your arm  Hold this position for 15 seconds   Lower your arm and leg slowly and change sides  Do 5 sets  © Copyright [employer] 2021 Information is for End User's use only and may not be sold, redistributed or otherwise used for commercial purposes  All illustrations and images included in CareNotes® are the copyrighted property of [employer]  or Sharri George  The above information is an  only  It is not intended as medical advice for individual conditions or treatments  Talk to your doctor, nurse or pharmacist before following any medical regimen to see if it is safe and effective for you  Core Strengthening Exercises   AMBULATORY CARE:   What you need to know about core strengthening exercises: Your core includes the muscles of your lower back, hip, pelvis, and abdomen  Core strengthening exercises help heal and strengthen these muscles  This helps prevent another injury, and keeps your pelvis, spine, and hips in the correct position  Contact your healthcare provider if:   · You have sharp or worsening pain during exercise or at rest     · You have questions or concerns about your shoulder exercises  Safety tips:  Talk to your healthcare provider before you start an exercise program  A physical therapist can teach you how to do core strengthening exercises safely  · Do the exercises on a mat or firm surface  A firm surface will support your spine and prevent low back pain  Do not do these exercises on a bed  · Move slowly and smoothly  Avoid fast or jerky motions  · Stop if you feel pain  Core exercises should not be painful  Stop if you feel pain  · Breathe normally during core exercises  Do not hold your breath  This may cause an increase in blood pressure and prevent muscle strengthening  Your healthcare provider will tell you when to inhale and exhale during the exercise  · Begin all of your exercises with abdominal bracing  Abdominal bracing helps warm up your core muscles   You can also practice abdominal bracing throughout the day  Lie on your back with your knees bent and feet flat on the floor  Place your arms in a relaxed position beside your body  Tighten your abdominal muscles  Pull your belly button in and up toward your spine  Hold for 5 seconds  Relax your muscles  Repeat 10 times  Core strengthening exercises: Your healthcare provider will tell you how often to do these exercises  The provider will also tell you how many repetitions of each exercise you should do  Hold each exercise for 5 seconds or as directed  As you get stronger, increase your hold to 10 to 15 seconds  You can do some of these exercises on a stability ball, or with a weight  Ask your healthcare provider how to use a stability ball or weight for these exercises:  · Bridging:  Lie on your back with your knees bent and feet flat on the floor  Rest your arms at your side  Tighten your buttocks, and then lift your hips 1 inch off the floor  Hold for 5 seconds  When you can do this exercise without pain for 10 seconds, increase the distance you lift your hips  A good goal is to be able to lift your hips so that your shoulders, hips, and knees are in a straight line  · Dead bug:  Lie on your back with your knees bent and feet flat on the floor  Place your arms in a relaxed position beside your body  Begin with abdominal bracing  Next, raise one leg, keeping your knee bent  Hold for 5 seconds  Repeat with the other leg  When you can do this exercise without pain for 10 to 15 seconds, you may raise one straight leg and hold  Repeat with the other leg  · Quadruped:  Place your hands and knees on the floor  Keep your wrists directly below your shoulders and your knees directly below your hips  Pull your belly button in toward your spine  Do not flatten or arch your back  Tighten your abdominal muscles below your belly button  Hold for 5 seconds   When you can do this exercise without pain for 10 to 15 seconds, you may extend one arm and hold  Repeat on the other side  · Side bridge exercises:      ? Standing side bridge:  Stand next to a wall and extend one arm toward the wall  Place your palm flat on the wall with your fingers pointing upward  Begin with abdominal bracing  Next, without moving your feet, slowly bend your arm to 90 degrees  Hold for 5 seconds  Repeat on the other side  When you can do this exercise without pain for 10 to 15 seconds, you may do the bent leg side bridge on the floor  ? Bent leg side bridge:  Lie on one side with your legs, hips, and shoulders in a straight line  Prop yourself up onto your forearm so your elbow is directly below your shoulder  Bend your knees back to 90 degrees  Begin with abdominal bracing  Next, lift your hips and balance yourself on your forearm and knees  Hold for 5 seconds  Repeat on the other side  When you can do this exercise without pain for 10 to 15 seconds, you may do the straight leg side bridge on the floor  ? Straight leg side bridge:  Lie on one side with your legs, hips, and shoulders in a straight line  Prop yourself up onto your forearm so your elbow is directly below your shoulder  Begin with abdominal bracing  Lift your hips off the floor and balance yourself on your forearm and the outside of your flexed foot  Do not let your ankle bend sideways  Hold for 5 seconds  Repeat on the other side  When you can do this exercise without pain for 10 to 15 seconds, ask your healthcare provider for more advanced exercises  · Superman:  Lie on your stomach  Extend your arms forward on the floor  Tighten your abdominal muscles and lift your right hand and left leg off the floor  Hold this position  Slowly return to the starting position  Tighten your abdominal muscles and lift your left hand and right leg off the floor  Hold this position  Slowly return to the starting position  · Clam:  Lie on your side with your knees bent   Put your bottom arm under your head to keep your neck in line  Put your top hand on your hip to keep your pelvis from moving  Put your heels together, and keep them together during this exercise  Slowly raise your top knee toward the ceiling  Then lower your leg so your knees are together  Repeat this exercise 10 times  Then switch sides and do the exercise 10 times with the other leg  · Curl up:  Lie on your back with your knees bent and feet flat on the floor  Place your hands, palms down, underneath your lower back  Next, with your elbows on the floor, lift your shoulders and chest 2 to 3 inches off the floor  Keep your head in line with your shoulders  Hold this position  Slowly return to the starting position  · Straight leg raises:  Lie on your back with one leg straight  Bend the other knee and place your foot flat on the floor  Tighten your abdominal muscles  Keep your leg straight and slowly lift it straight up 6 to 12 inches off the floor  Hold this position  Lower your leg slowly  Do as many repetitions as directed on this side  Repeat with the other leg  · Plank:  Lie on your stomach  Bend your elbows and place your forearms flat on the floor  Lift your chest, stomach, and knees off the floor  Make sure your elbows are below your shoulders  Your body should be in a straight line  Do not let your hips or lower back sink to the ground  Squeeze your abdominal muscles together and hold for 15 seconds  To make this exercise harder, hold for 30 seconds or lift 1 leg at a time  · Bicycles:  Lie on your back  Bend both knees and bring them toward your chest  Your calves should be parallel to the floor  Place the palms of your hands on the back of your head  Straighten your right leg and keep it lifted 2 inches off the floor  Raise your head and shoulders off the floor and twist towards your left  Keep your head and shoulders lifted   Bend your right knee while you straighten your left leg  Keep your left leg 2 inches off the floor  Twist your head and chest towards the left leg  Continue to straighten 1 leg at a time and twist        Follow up with your healthcare provider as directed:  Write down your questions so you remember to ask them during your visits  © Copyright [employer] 2021 Information is for End User's use only and may not be sold, redistributed or otherwise used for commercial purposes  All illustrations and images included in CareNotes® are the copyrighted property of [employer]  or Sharri Montaño   The above information is an  only  It is not intended as medical advice for individual conditions or treatments  Talk to your doctor, nurse or pharmacist before following any medical regimen to see if it is safe and effective for you  Sacroiliac Joint Injection   WHAT YOU NEED TO KNOW:   What do I need to know about a sacroiliac joint injection? A sacroiliac (SI) joint injection is done to diagnose or treat pain from sacroiliac joint syndrome  The pain caused by this syndrome may be felt in your lower back, buttocks, groin, and your thigh  How do I prepare for an SI injection? Your healthcare provider will ask you to not take any pain medicine the day of the injection  Ask him if there are any other medicines you should not take  You will need to find someone to drive you home after your procedure  What will happen during the SI injection? You will be awake for your injection  You may be given calming medicine if you are anxious  · You will lie on your stomach with a pillow under your abdomen  Your healthcare provider will give you an injection of medicine to numb the area  He may use an x-ray, ultrasound, or CT scan to find the area to inject  You may also be given an injection of contrast material to help your SI joint show up better   Then, your healthcare provider will inject local anesthesia, antiinflammatory medicine, or both into your SI joint     · Healthcare providers will watch you closely for any problems for up to 30 minutes after your injection  Your healthcare provider will check to see if your pain decreases after the injection  What are the risks of an SI injection? You may have some weakness for a short time after your injection  The SI injection can cause bleeding, infection, and pain  It can also cause temporary weakness in your leg and problems urinating  You may have an allergic reaction to the medicine that is injected into your SI joint  Your pain may return and you may need more treatment  CARE AGREEMENT:   You have the right to help plan your care  Learn about your health condition and how it may be treated  Discuss treatment options with your healthcare providers to decide what care you want to receive  You always have the right to refuse treatment  The above information is an  only  It is not intended as medical advice for individual conditions or treatments  Talk to your doctor, nurse or pharmacist before following any medical regimen to see if it is safe and effective for you  © Copyright [employer] 2021 Information is for End User's use only and may not be sold, redistributed or otherwise used for commercial purposes   All illustrations and images included in CareNotes® are the copyrighted property of A [employer] A M [employer] Inc  or [address] [place] [address] [employer]

## 2021-09-23 ENCOUNTER — OFFICE VISIT (OUTPATIENT)
Dept: PHYSICAL THERAPY | Facility: CLINIC | Age: 61
End: 2021-09-23
Payer: COMMERCIAL

## 2021-09-23 DIAGNOSIS — M19.011 PRIMARY OSTEOARTHRITIS OF RIGHT SHOULDER: Primary | ICD-10-CM

## 2021-09-23 PROCEDURE — 97112 NEUROMUSCULAR REEDUCATION: CPT

## 2021-09-23 PROCEDURE — 97110 THERAPEUTIC EXERCISES: CPT

## 2021-09-23 PROCEDURE — 97140 MANUAL THERAPY 1/> REGIONS: CPT

## 2021-09-23 NOTE — PROGRESS NOTES
Daily Note     Today's date: 2021  Patient name: Xochilt Simons  : 1960  MRN: [id number]  Referring provider: Jose Pagan,*  Dx:   Encounter Diagnosis     ICD-10-CM    1  Primary osteoarthritis of right shoulder  M19 011                   Subjective: "It's doing okay " Notes she saw her Dr "the other day , and he moved it around a lot, then it was a little sore "       Objective: See treatment diary below      Assessment: Performed exercise progressions Tolerated treatment well  Patient would benefit from continued PT      Plan: Continue per plan of care        Precautions: rupture of L ACL      Manuals    Laser to LHB insertion 4'  4' 4' 4' 4' 4' 4' 4' 4'   PROM to hammad 6'  8' 8' 8' 8' 6' 6' 6' 6'                             Neuro Re-Ed             Cane ext with scap sq Red  10x10"  10x10" 10"x10 10"x10 10x  10" 10x10" 10x10" 10x10" red 10x10"   Posture tband GTB  30x ea  RTB x15ea red  20 ea Red  20 ea GTB  x15  ea GTB x20ea GTB  x20 ea grn 30x ea GTB x20ea   B Er with tband GTB  x20     RTB  x15 RTB x15 RTB  x20 Red x30 GTB x20   H ab with tband GTB  x20      RTB  x15 RTB x15 RTB  x20 Red 30x GTB x20   Prone flex/h ab ext x10 ea         NV                             Ther Ex             UBE (back) 6'  6' 6' 6' 6' 6' 6' 6' 6'   Pulleys (flex/ab) ----  10x10" ea 10"x10 ea 10"x10ea   10"x  10 ea 10x10" ea 10x10"  Ea 10x10" ea DC   IR stretch with strap   3x30" 30"x3 30"x3 30"  x3  30"x3 30"x3    Sleeper stretch 3x30"         3x30"   Cane AAROM (flex/ER) 10x10"  ea  10x10" ea 10"x10 ea 10x  10"  ea 10x  10" ea 10x10" ea 10x10"  ea 10x10" ea 10x10" ea   Supine flexion x20     x10 x15 x15 x15 x20   SL serratus punch x20       x10 x15 x20   SL ER x20       x10 x15 x20   Wall push ups         x10                                           Ther Activity             Dolphin lift x20 2nd  shelf         x15-2nd shelf   Squigz x20         x15

## 2021-09-24 ENCOUNTER — APPOINTMENT (OUTPATIENT)
Dept: LAB | Facility: MEDICAL CENTER | Age: 61
End: 2021-09-24
Payer: COMMERCIAL

## 2021-09-24 ENCOUNTER — CLINICAL SUPPORT (OUTPATIENT)
Dept: URGENT CARE | Facility: MEDICAL CENTER | Age: 61
End: 2021-09-24
Payer: COMMERCIAL

## 2021-09-24 DIAGNOSIS — S83.8X2D SPRAIN OF OTHER SPECIFIED PARTS OF LEFT KNEE, SUBSEQUENT ENCOUNTER: ICD-10-CM

## 2021-09-24 DIAGNOSIS — M25.462 SWELLING OF LEFT KNEE JOINT: ICD-10-CM

## 2021-09-24 DIAGNOSIS — S83.232A COMPLEX TEAR OF MEDIAL MENISCUS OF LEFT KNEE AS CURRENT INJURY, INITIAL ENCOUNTER: ICD-10-CM

## 2021-09-24 DIAGNOSIS — M23.622: ICD-10-CM

## 2021-09-24 LAB
ALBUMIN SERPL BCP-MCNC: 3.9 G/DL (ref 3.5–5)
ALP SERPL-CCNC: 79 U/L (ref 46–116)
ALT SERPL W P-5'-P-CCNC: 42 U/L (ref 12–78)
ANION GAP SERPL CALCULATED.3IONS-SCNC: 4 MMOL/L (ref 4–13)
AST SERPL W P-5'-P-CCNC: 34 U/L (ref 5–45)
ATRIAL RATE: 58 BPM
BASOPHILS # BLD AUTO: 0.02 THOUSANDS/ΜL (ref 0–0.1)
BASOPHILS NFR BLD AUTO: 0 % (ref 0–1)
BILIRUB SERPL-MCNC: 0.72 MG/DL (ref 0.2–1)
BUN SERPL-MCNC: 31 MG/DL (ref 5–25)
CALCIUM SERPL-MCNC: 9.9 MG/DL (ref 8.3–10.1)
CHLORIDE SERPL-SCNC: 108 MMOL/L (ref 100–108)
CO2 SERPL-SCNC: 28 MMOL/L (ref 21–32)
CREAT SERPL-MCNC: 0.88 MG/DL (ref 0.6–1.3)
EOSINOPHIL # BLD AUTO: 0.01 THOUSAND/ΜL (ref 0–0.61)
EOSINOPHIL NFR BLD AUTO: 0 % (ref 0–6)
ERYTHROCYTE [DISTWIDTH] IN BLOOD BY AUTOMATED COUNT: 14.1 % (ref 11.6–15.1)
GFR SERPL CREATININE-BSD FRML MDRD: 71 ML/MIN/1.73SQ M
GLUCOSE P FAST SERPL-MCNC: 163 MG/DL (ref 65–99)
HCT VFR BLD AUTO: 49.3 % (ref 34.8–46.1)
HGB BLD-MCNC: 15.4 G/DL (ref 11.5–15.4)
IMM GRANULOCYTES # BLD AUTO: 0.01 THOUSAND/UL (ref 0–0.2)
IMM GRANULOCYTES NFR BLD AUTO: 0 % (ref 0–2)
INR PPP: 0.94 (ref 0.84–1.19)
LYMPHOCYTES # BLD AUTO: 0.67 THOUSANDS/ΜL (ref 0.6–4.47)
LYMPHOCYTES NFR BLD AUTO: 14 % (ref 14–44)
MCH RBC QN AUTO: 27.9 PG (ref 26.8–34.3)
MCHC RBC AUTO-ENTMCNC: 31.2 G/DL (ref 31.4–37.4)
MCV RBC AUTO: 89 FL (ref 82–98)
MONOCYTES # BLD AUTO: 0.09 THOUSAND/ΜL (ref 0.17–1.22)
MONOCYTES NFR BLD AUTO: 2 % (ref 4–12)
NEUTROPHILS # BLD AUTO: 3.94 THOUSANDS/ΜL (ref 1.85–7.62)
NEUTS SEG NFR BLD AUTO: 84 % (ref 43–75)
NRBC BLD AUTO-RTO: 0 /100 WBCS
P AXIS: 55 DEGREES
PLATELET # BLD AUTO: 186 THOUSANDS/UL (ref 149–390)
PMV BLD AUTO: 8.9 FL (ref 8.9–12.7)
POTASSIUM SERPL-SCNC: 5.6 MMOL/L (ref 3.5–5.3)
PR INTERVAL: 166 MS
PROT SERPL-MCNC: 7.7 G/DL (ref 6.4–8.2)
PROTHROMBIN TIME: 12.2 SECONDS (ref 11.6–14.5)
QRS AXIS: -16 DEGREES
QRSD INTERVAL: 80 MS
QT INTERVAL: 394 MS
QTC INTERVAL: 386 MS
RBC # BLD AUTO: 5.52 MILLION/UL (ref 3.81–5.12)
SODIUM SERPL-SCNC: 140 MMOL/L (ref 136–145)
T WAVE AXIS: 30 DEGREES
VENTRICULAR RATE: 58 BPM
WBC # BLD AUTO: 4.74 THOUSAND/UL (ref 4.31–10.16)

## 2021-09-24 PROCEDURE — 80053 COMPREHEN METABOLIC PANEL: CPT

## 2021-09-24 PROCEDURE — 36415 COLL VENOUS BLD VENIPUNCTURE: CPT

## 2021-09-24 PROCEDURE — 85025 COMPLETE CBC W/AUTO DIFF WBC: CPT

## 2021-09-24 PROCEDURE — 93010 ELECTROCARDIOGRAM REPORT: CPT | Performed by: INTERNAL MEDICINE

## 2021-09-24 PROCEDURE — 85610 PROTHROMBIN TIME: CPT

## 2021-09-24 PROCEDURE — 93005 ELECTROCARDIOGRAM TRACING: CPT

## 2021-09-28 ENCOUNTER — EVALUATION (OUTPATIENT)
Dept: PHYSICAL THERAPY | Facility: CLINIC | Age: 61
End: 2021-09-28
Payer: COMMERCIAL

## 2021-09-28 DIAGNOSIS — M19.011 PRIMARY OSTEOARTHRITIS OF RIGHT SHOULDER: Primary | ICD-10-CM

## 2021-09-28 PROCEDURE — 97110 THERAPEUTIC EXERCISES: CPT | Performed by: PHYSICAL THERAPIST

## 2021-09-28 PROCEDURE — 97140 MANUAL THERAPY 1/> REGIONS: CPT | Performed by: PHYSICAL THERAPIST

## 2021-09-28 PROCEDURE — 97164 PT RE-EVAL EST PLAN CARE: CPT | Performed by: PHYSICAL THERAPIST

## 2021-09-28 PROCEDURE — 97112 NEUROMUSCULAR REEDUCATION: CPT | Performed by: PHYSICAL THERAPIST

## 2021-09-28 PROCEDURE — 97530 THERAPEUTIC ACTIVITIES: CPT | Performed by: PHYSICAL THERAPIST

## 2021-09-28 NOTE — PROGRESS NOTES
Daily Note/Re-evaluation     Today's date: 2021  Patient name: Chyna Park  : 1960  MRN: [id number]  Referring provider: Nathalie Wei,*  Dx:   Encounter Diagnosis     ICD-10-CM    1  Primary osteoarthritis of right shoulder  M19 011                   Subjective: "It's better  I still feel pain from time to time "        Assessment  Pt has made gains in R UE strength and ROM  Pt reports a decrease in overall pain levels  Pt no longer has interrupted sleep, no difficulty with ADL's, and has improvements with reaching above head  Pt would benefit from 2 more weeks of skilled PT to further address impairments and to maximize function  Goals  1  Pt will be independent with HEP upon DC - ongoing  2  Pt's R UE ROM will increase by at least 25% to allow for performing ADL's with ease  - ongoing  3  Pt's R UE strength will be at least 4/5 and pain free  -met  4   Pt's pain will be no more than 3/10 to allow for uninterrupted sleep - met    Plan  Patient would benefit from: skilled physical therapy  Planned modality interventions: low level laser therapy  Planned therapy interventions: joint mobilization, manual therapy, neuromuscular re-education, therapeutic activities and therapeutic exercise  Frequency: 2x week  Duration in visits: 4  Duration in weeks: 2  Treatment plan discussed with: patient        Subjective Evaluation        Pain  Current pain ratin  At best pain ratin  At worst pain ratin  Location: R shoulder          Patient Goals  Patient goals for therapy: increased strength, decreased pain, increased motion and independence with ADLs/IADLs          Objective     Active Range of Motion     Right Shoulder   Flexion: 140 degrees   Abduction: 125 degrees   External rotation 90°: 40 degrees  Internal rotation 90°: 50 degrees     Passive Range of Motion     Right Shoulder   Flexion: 150 degrees   Abduction: 150 degrees   External rotation 90°: 60 degrees   Internal rotation 90°: 60 degrees     Strength/Myotome Testing     Right Shoulder     Planes of Motion   Flexion: 5   Abduction: 4   External rotation at 90°: 4   Internal rotation at 90°: 5              Precautions: rupture of L ACL      Manuals 9/23 9/28 9/9 9/14 9/16 9/21   Laser to LHB insertion 4'      4' 4' 4' 4'   PROM to hammad 6' 8'     6' 6' 6' 6'                             Neuro Re-Ed             Cane ext with scap sq Red  10x10" 10x10"     10x10" 10x10" 10x10" red 10x10"   Posture tband GTB  30x ea GTB x20ea     GTB x20ea GTB  x20 ea grn 30x ea GTB x20ea   B Er with tband GTB  x20 GTB 5"x15     RTB x15 RTB  x20 Red x30 GTB x20   H ab with tband GTB  x20       RTB x15 RTB  x20 Red 30x GTB x20   Prone flex/h ab ext x10 ea x15ea        NV                             Ther Ex             UBE (back) 6' 6'     6' 6' 6' 6'   Pulleys (flex/ab) ----      10x10" ea 10x10"  Ea 10x10" ea DC   IR stretch with strap        30"x3 30"x3    Sleeper stretch 3x30" 3x30"        3x30"   Cane AAROM (flex/ER) 10x10"  ea Red 10x10" ea     10x10" ea 10x10"  ea 10x10" ea 10x10" ea   Supine flexion x20 1#x20     x15 x15 x15 x20   SL serratus punch x20 1#x15      x10 x15 x20   SL ER x20 1#x15      x10 x15 x20   Wall push ups         x10    Prolonged ER stretch   1#x1'                                     Ther Activity             Dolphin lift x20 2nd  shelf x20 2nd shelf        x15-2nd shelf   Squigz x20 x20        x15

## 2021-09-30 ENCOUNTER — OFFICE VISIT (OUTPATIENT)
Dept: PHYSICAL THERAPY | Facility: CLINIC | Age: 61
End: 2021-09-30
Payer: COMMERCIAL

## 2021-09-30 DIAGNOSIS — M19.011 PRIMARY OSTEOARTHRITIS OF RIGHT SHOULDER: Primary | ICD-10-CM

## 2021-09-30 PROCEDURE — 97530 THERAPEUTIC ACTIVITIES: CPT

## 2021-09-30 PROCEDURE — 97110 THERAPEUTIC EXERCISES: CPT

## 2021-09-30 PROCEDURE — 97112 NEUROMUSCULAR REEDUCATION: CPT

## 2021-09-30 PROCEDURE — 97140 MANUAL THERAPY 1/> REGIONS: CPT

## 2021-09-30 NOTE — PROGRESS NOTES
Daily Note     Today's date: 2021  Patient name: Tony Bolton  : 1960  MRN: [id number]  Referring provider: Aissatou Stark,*  Dx:   Encounter Diagnosis     ICD-10-CM    1  Primary osteoarthritis of right shoulder  M19 011                   Subjective: "It's doing good, it's a lot better "      Objective: See treatment diary below      Assessment: Performed exercise program w/o complaint  PROM to R shoulder mild discomfort x2 end range passive flexion  Tolerated treatment well  Will monitor  Patient would benefit from continued PT      Plan: Continue per plan of care        Precautions: rupture of L ACL      Manuals    Laser to LHB insertion 4'      4' 4' 4' 4'   PROM to hammad 6' 8' 8'    6' 6' 6' 6'                             Neuro Re-Ed             Cane ext with scap sq Red  10x10" 10x10" 10x10"    10x10" 10x10" 10x10" red 10x10"   Posture tband GTB  30x ea GTB x20ea GTB  x20ea    GTB x20ea GTB  x20 ea grn 30x ea GTB x20ea   B Er with tband GTB  x20 GTB 5"x15 GTB  5"x    RTB x15 RTB  x20 Red x30 GTB x20   H ab with tband GTB  x20       RTB x15 RTB  x20 Red 30x GTB x20   Prone flex/h ab ext x10 ea X78DL x15ea       NV                             Ther Ex             UBE (back) 6' 6' 6'    6' 6' 6' 6'   Pulleys (flex/ab) ----      10x10" ea 10x10"  Ea 10x10" ea DC   IR stretch with strap        30"x3 30"x3    Sleeper stretch 3x30" 3x30" 3x30"       3x30"   Cane AAROM (flex/ER) 10x10"  ea Red 10x10" ea Red  10x10" ea    10x10" ea 10x10"  ea 10x10" ea 10x10" ea   Supine flexion x20 1#x20 1#x20    x15 x15 x15 x20   SL serratus punch x20 1#x15 1#x20     x10 x15 x20   SL ER x20 1#x15 1#x20     x10 x15 x20   Wall push ups         x10    Prolonged ER stretch   1#x1' 1#x1'                                    Ther Activity             Dolphin lift x20 2nd  shelf x20 2nd shelf x20  2nd  shelf       x15-2nd shelf   Squigz x20 x20 x20       x15

## 2021-10-05 ENCOUNTER — OFFICE VISIT (OUTPATIENT)
Dept: PHYSICAL THERAPY | Facility: CLINIC | Age: 61
End: 2021-10-05
Payer: COMMERCIAL

## 2021-10-05 DIAGNOSIS — M19.011 PRIMARY OSTEOARTHRITIS OF RIGHT SHOULDER: Primary | ICD-10-CM

## 2021-10-05 PROCEDURE — 97140 MANUAL THERAPY 1/> REGIONS: CPT | Performed by: PHYSICAL THERAPIST

## 2021-10-05 PROCEDURE — 97110 THERAPEUTIC EXERCISES: CPT | Performed by: PHYSICAL THERAPIST

## 2021-10-05 PROCEDURE — 97112 NEUROMUSCULAR REEDUCATION: CPT | Performed by: PHYSICAL THERAPIST

## 2021-10-05 PROCEDURE — 97530 THERAPEUTIC ACTIVITIES: CPT | Performed by: PHYSICAL THERAPIST

## 2021-10-07 ENCOUNTER — OFFICE VISIT (OUTPATIENT)
Dept: PHYSICAL THERAPY | Facility: CLINIC | Age: 61
End: 2021-10-07
Payer: COMMERCIAL

## 2021-10-07 DIAGNOSIS — M19.011 PRIMARY OSTEOARTHRITIS OF RIGHT SHOULDER: Primary | ICD-10-CM

## 2021-10-07 PROCEDURE — 97140 MANUAL THERAPY 1/> REGIONS: CPT | Performed by: PHYSICAL THERAPIST

## 2021-10-07 PROCEDURE — 97530 THERAPEUTIC ACTIVITIES: CPT | Performed by: PHYSICAL THERAPIST

## 2021-10-07 PROCEDURE — 97112 NEUROMUSCULAR REEDUCATION: CPT | Performed by: PHYSICAL THERAPIST

## 2021-10-07 PROCEDURE — 97110 THERAPEUTIC EXERCISES: CPT | Performed by: PHYSICAL THERAPIST

## 2021-10-14 ENCOUNTER — OFFICE VISIT (OUTPATIENT)
Dept: FAMILY MEDICINE CLINIC | Facility: CLINIC | Age: 61
End: 2021-10-14
Payer: COMMERCIAL

## 2021-10-14 VITALS
HEART RATE: 60 BPM | WEIGHT: 208 LBS | RESPIRATION RATE: 20 BRPM | DIASTOLIC BLOOD PRESSURE: 82 MMHG | HEIGHT: 62 IN | TEMPERATURE: 96.5 F | BODY MASS INDEX: 38.28 KG/M2 | SYSTOLIC BLOOD PRESSURE: 148 MMHG

## 2021-10-14 DIAGNOSIS — R73.9 HYPERGLYCEMIA: ICD-10-CM

## 2021-10-14 DIAGNOSIS — Z01.818 PRE-OP EXAMINATION: Primary | ICD-10-CM

## 2021-10-14 DIAGNOSIS — S83.512D RUPTURE OF ANTERIOR CRUCIATE LIGAMENT OF LEFT KNEE, SUBSEQUENT ENCOUNTER: ICD-10-CM

## 2021-10-14 DIAGNOSIS — S83.242D ACUTE MEDIAL MENISCUS TEAR OF LEFT KNEE, SUBSEQUENT ENCOUNTER: ICD-10-CM

## 2021-10-14 DIAGNOSIS — M17.12 PRIMARY OSTEOARTHRITIS OF LEFT KNEE: ICD-10-CM

## 2021-10-14 PROBLEM — S83.242A ACUTE MEDIAL MENISCUS TEAR OF LEFT KNEE: Status: ACTIVE | Noted: 2021-10-14

## 2021-10-14 PROBLEM — S83.512A RUPTURE OF ANTERIOR CRUCIATE LIGAMENT OF LEFT KNEE: Status: ACTIVE | Noted: 2021-10-14

## 2021-10-14 LAB — SL AMB POCT HEMOGLOBIN AIC: 5.1 (ref ?–6.5)

## 2021-10-14 PROCEDURE — 99244 OFF/OP CNSLTJ NEW/EST MOD 40: CPT | Performed by: PHYSICIAN ASSISTANT

## 2021-10-14 PROCEDURE — 83036 HEMOGLOBIN GLYCOSYLATED A1C: CPT | Performed by: PHYSICIAN ASSISTANT

## 2021-10-15 ENCOUNTER — APPOINTMENT (OUTPATIENT)
Dept: LAB | Facility: CLINIC | Age: 61
End: 2021-10-15
Payer: COMMERCIAL

## 2021-10-15 DIAGNOSIS — E55.9 VITAMIN D DEFICIENCY: ICD-10-CM

## 2021-10-15 DIAGNOSIS — E78.2 MIXED HYPERLIPIDEMIA: ICD-10-CM

## 2021-10-15 LAB
25(OH)D3 SERPL-MCNC: 36.9 NG/ML (ref 30–100)
CHOLEST SERPL-MCNC: 250 MG/DL (ref 50–200)
HDLC SERPL-MCNC: 82 MG/DL
LDLC SERPL CALC-MCNC: 147 MG/DL (ref 0–100)
TRIGL SERPL-MCNC: 105 MG/DL

## 2021-10-15 PROCEDURE — 82306 VITAMIN D 25 HYDROXY: CPT

## 2021-10-15 PROCEDURE — 80061 LIPID PANEL: CPT

## 2021-10-15 PROCEDURE — 36415 COLL VENOUS BLD VENIPUNCTURE: CPT

## 2021-10-19 ENCOUNTER — CLINICAL SUPPORT (OUTPATIENT)
Dept: FAMILY MEDICINE CLINIC | Facility: CLINIC | Age: 61
End: 2021-10-19

## 2021-10-19 DIAGNOSIS — Z01.812 ENCOUNTER FOR PREOPERATIVE SCREENING LABORATORY TESTING FOR COVID-19 VIRUS: Primary | ICD-10-CM

## 2021-10-19 DIAGNOSIS — Z20.822 ENCOUNTER FOR PREOPERATIVE SCREENING LABORATORY TESTING FOR COVID-19 VIRUS: Primary | ICD-10-CM

## 2021-10-19 PROBLEM — R73.9 HYPERGLYCEMIA: Status: ACTIVE | Noted: 2021-10-19

## 2021-10-19 PROCEDURE — U0005 INFEC AGEN DETEC AMPLI PROBE: HCPCS | Performed by: PHYSICIAN ASSISTANT

## 2021-10-19 PROCEDURE — U0003 INFECTIOUS AGENT DETECTION BY NUCLEIC ACID (DNA OR RNA); SEVERE ACUTE RESPIRATORY SYNDROME CORONAVIRUS 2 (SARS-COV-2) (CORONAVIRUS DISEASE [COVID-19]), AMPLIFIED PROBE TECHNIQUE, MAKING USE OF HIGH THROUGHPUT TECHNOLOGIES AS DESCRIBED BY CMS-2020-01-R: HCPCS | Performed by: PHYSICIAN ASSISTANT

## 2021-10-20 ENCOUNTER — OFFICE VISIT (OUTPATIENT)
Dept: FAMILY MEDICINE CLINIC | Facility: CLINIC | Age: 61
End: 2021-10-20
Payer: COMMERCIAL

## 2021-10-20 VITALS
DIASTOLIC BLOOD PRESSURE: 94 MMHG | HEART RATE: 60 BPM | BODY MASS INDEX: 38.64 KG/M2 | SYSTOLIC BLOOD PRESSURE: 150 MMHG | HEIGHT: 62 IN | TEMPERATURE: 97.7 F | WEIGHT: 210 LBS

## 2021-10-20 DIAGNOSIS — R03.0 ELEVATED BP WITHOUT DIAGNOSIS OF HYPERTENSION: ICD-10-CM

## 2021-10-20 DIAGNOSIS — D72.819 LEUKOPENIA, UNSPECIFIED TYPE: ICD-10-CM

## 2021-10-20 DIAGNOSIS — R73.9 HYPERGLYCEMIA: ICD-10-CM

## 2021-10-20 DIAGNOSIS — E55.9 VITAMIN D DEFICIENCY: ICD-10-CM

## 2021-10-20 DIAGNOSIS — E78.2 MIXED HYPERLIPIDEMIA: Primary | ICD-10-CM

## 2021-10-20 PROBLEM — Z90.710 ABSENCE OF CERVIX, ACQUIRED: Status: ACTIVE | Noted: 2021-10-20

## 2021-10-20 LAB — SARS-COV-2 RNA RESP QL NAA+PROBE: NEGATIVE

## 2021-10-20 PROCEDURE — 3008F BODY MASS INDEX DOCD: CPT | Performed by: PHYSICIAN ASSISTANT

## 2021-10-20 PROCEDURE — 99213 OFFICE O/P EST LOW 20 MIN: CPT | Performed by: PHYSICIAN ASSISTANT

## 2021-10-20 PROCEDURE — 1036F TOBACCO NON-USER: CPT | Performed by: PHYSICIAN ASSISTANT

## 2021-11-05 ENCOUNTER — HOSPITAL ENCOUNTER (EMERGENCY)
Facility: HOSPITAL | Age: 61
Discharge: HOME | End: 2021-11-05
Attending: EMERGENCY MEDICINE
Payer: COMMERCIAL

## 2021-11-05 ENCOUNTER — EVALUATION (OUTPATIENT)
Dept: PHYSICAL THERAPY | Facility: CLINIC | Age: 61
End: 2021-11-05
Payer: COMMERCIAL

## 2021-11-05 VITALS
OXYGEN SATURATION: 98 % | HEART RATE: 85 BPM | TEMPERATURE: 98.3 F | DIASTOLIC BLOOD PRESSURE: 74 MMHG | BODY MASS INDEX: 40.16 KG/M2 | HEIGHT: 62 IN | WEIGHT: 218.26 LBS | SYSTOLIC BLOOD PRESSURE: 154 MMHG | RESPIRATION RATE: 17 BRPM

## 2021-11-05 DIAGNOSIS — Z96.652 TOTAL KNEE REPLACEMENT STATUS, LEFT: Primary | ICD-10-CM

## 2021-11-05 DIAGNOSIS — T81.9XXA POST-OPERATIVE COMPLICATION: Primary | ICD-10-CM

## 2021-11-05 PROCEDURE — 97110 THERAPEUTIC EXERCISES: CPT | Performed by: PHYSICAL THERAPIST

## 2021-11-05 PROCEDURE — 99283 EMERGENCY DEPT VISIT LOW MDM: CPT

## 2021-11-05 PROCEDURE — 97161 PT EVAL LOW COMPLEX 20 MIN: CPT | Performed by: PHYSICAL THERAPIST

## 2021-11-05 PROCEDURE — 99284 EMERGENCY DEPT VISIT MOD MDM: CPT | Performed by: EMERGENCY MEDICINE

## 2021-11-10 ENCOUNTER — OFFICE VISIT (OUTPATIENT)
Dept: PHYSICAL THERAPY | Facility: CLINIC | Age: 61
End: 2021-11-10
Payer: COMMERCIAL

## 2021-11-10 DIAGNOSIS — Z96.652 TOTAL KNEE REPLACEMENT STATUS, LEFT: Primary | ICD-10-CM

## 2021-11-10 PROCEDURE — 97110 THERAPEUTIC EXERCISES: CPT

## 2021-11-10 PROCEDURE — 97140 MANUAL THERAPY 1/> REGIONS: CPT

## 2021-11-12 ENCOUNTER — OFFICE VISIT (OUTPATIENT)
Dept: PHYSICAL THERAPY | Facility: CLINIC | Age: 61
End: 2021-11-12
Payer: COMMERCIAL

## 2021-11-12 DIAGNOSIS — Z96.652 TOTAL KNEE REPLACEMENT STATUS, LEFT: Primary | ICD-10-CM

## 2021-11-12 PROCEDURE — 97140 MANUAL THERAPY 1/> REGIONS: CPT | Performed by: PHYSICAL THERAPIST

## 2021-11-12 PROCEDURE — 97110 THERAPEUTIC EXERCISES: CPT | Performed by: PHYSICAL THERAPIST

## 2021-11-16 ENCOUNTER — OFFICE VISIT (OUTPATIENT)
Dept: PHYSICAL THERAPY | Facility: CLINIC | Age: 61
End: 2021-11-16
Payer: COMMERCIAL

## 2021-11-16 DIAGNOSIS — Z96.652 TOTAL KNEE REPLACEMENT STATUS, LEFT: Primary | ICD-10-CM

## 2021-11-16 PROCEDURE — 97140 MANUAL THERAPY 1/> REGIONS: CPT

## 2021-11-16 PROCEDURE — 97110 THERAPEUTIC EXERCISES: CPT

## 2021-11-17 ENCOUNTER — TELEPHONE (OUTPATIENT)
Dept: ADMINISTRATIVE | Facility: OTHER | Age: 61
End: 2021-11-17

## 2021-11-18 ENCOUNTER — OFFICE VISIT (OUTPATIENT)
Dept: PHYSICAL THERAPY | Facility: CLINIC | Age: 61
End: 2021-11-18
Payer: COMMERCIAL

## 2021-11-18 DIAGNOSIS — Z96.652 TOTAL KNEE REPLACEMENT STATUS, LEFT: Primary | ICD-10-CM

## 2021-11-18 PROCEDURE — 97530 THERAPEUTIC ACTIVITIES: CPT

## 2021-11-18 PROCEDURE — 97110 THERAPEUTIC EXERCISES: CPT

## 2021-11-18 PROCEDURE — 97140 MANUAL THERAPY 1/> REGIONS: CPT

## 2021-11-22 ENCOUNTER — OFFICE VISIT (OUTPATIENT)
Dept: PHYSICAL THERAPY | Facility: CLINIC | Age: 61
End: 2021-11-22
Payer: COMMERCIAL

## 2021-11-22 DIAGNOSIS — Z96.652 TOTAL KNEE REPLACEMENT STATUS, LEFT: Primary | ICD-10-CM

## 2021-11-22 PROCEDURE — 97530 THERAPEUTIC ACTIVITIES: CPT | Performed by: PHYSICAL THERAPIST

## 2021-11-22 PROCEDURE — 97110 THERAPEUTIC EXERCISES: CPT | Performed by: PHYSICAL THERAPIST

## 2021-11-22 PROCEDURE — 97140 MANUAL THERAPY 1/> REGIONS: CPT | Performed by: PHYSICAL THERAPIST

## 2021-11-26 ENCOUNTER — OFFICE VISIT (OUTPATIENT)
Dept: PHYSICAL THERAPY | Facility: CLINIC | Age: 61
End: 2021-11-26
Payer: COMMERCIAL

## 2021-11-26 DIAGNOSIS — Z96.652 TOTAL KNEE REPLACEMENT STATUS, LEFT: Primary | ICD-10-CM

## 2021-11-26 PROCEDURE — 97110 THERAPEUTIC EXERCISES: CPT

## 2021-11-26 PROCEDURE — 97140 MANUAL THERAPY 1/> REGIONS: CPT

## 2021-11-26 PROCEDURE — 97530 THERAPEUTIC ACTIVITIES: CPT

## 2021-11-29 ENCOUNTER — OFFICE VISIT (OUTPATIENT)
Dept: PHYSICAL THERAPY | Facility: CLINIC | Age: 61
End: 2021-11-29
Payer: COMMERCIAL

## 2021-11-29 DIAGNOSIS — Z96.652 TOTAL KNEE REPLACEMENT STATUS, LEFT: Primary | ICD-10-CM

## 2021-11-29 PROCEDURE — 97110 THERAPEUTIC EXERCISES: CPT | Performed by: PHYSICAL THERAPIST

## 2021-11-29 PROCEDURE — 97530 THERAPEUTIC ACTIVITIES: CPT | Performed by: PHYSICAL THERAPIST

## 2021-11-29 PROCEDURE — 97140 MANUAL THERAPY 1/> REGIONS: CPT | Performed by: PHYSICAL THERAPIST

## 2021-11-30 ENCOUNTER — APPOINTMENT (OUTPATIENT)
Dept: RADIOLOGY | Facility: MEDICAL CENTER | Age: 61
End: 2021-11-30
Payer: COMMERCIAL

## 2021-11-30 ENCOUNTER — OFFICE VISIT (OUTPATIENT)
Dept: OBGYN CLINIC | Facility: MEDICAL CENTER | Age: 61
End: 2021-11-30
Payer: COMMERCIAL

## 2021-11-30 VITALS
HEIGHT: 62 IN | HEART RATE: 76 BPM | WEIGHT: 209.6 LBS | SYSTOLIC BLOOD PRESSURE: 137 MMHG | BODY MASS INDEX: 38.57 KG/M2 | DIASTOLIC BLOOD PRESSURE: 81 MMHG

## 2021-11-30 DIAGNOSIS — Z96.651 S/P TOTAL KNEE REPLACEMENT, RIGHT: Primary | ICD-10-CM

## 2021-11-30 DIAGNOSIS — Z96.651 HISTORY OF TOTAL RIGHT KNEE REPLACEMENT: ICD-10-CM

## 2021-11-30 PROCEDURE — 73564 X-RAY EXAM KNEE 4 OR MORE: CPT

## 2021-11-30 PROCEDURE — 1036F TOBACCO NON-USER: CPT | Performed by: ORTHOPAEDIC SURGERY

## 2021-11-30 PROCEDURE — 99213 OFFICE O/P EST LOW 20 MIN: CPT | Performed by: ORTHOPAEDIC SURGERY

## 2021-11-30 PROCEDURE — 3008F BODY MASS INDEX DOCD: CPT | Performed by: ORTHOPAEDIC SURGERY

## 2021-12-01 ENCOUNTER — OFFICE VISIT (OUTPATIENT)
Dept: PHYSICAL THERAPY | Facility: CLINIC | Age: 61
End: 2021-12-01
Payer: COMMERCIAL

## 2021-12-01 DIAGNOSIS — Z96.652 TOTAL KNEE REPLACEMENT STATUS, LEFT: Primary | ICD-10-CM

## 2021-12-01 PROCEDURE — 97530 THERAPEUTIC ACTIVITIES: CPT | Performed by: PHYSICAL THERAPIST

## 2021-12-01 PROCEDURE — 97110 THERAPEUTIC EXERCISES: CPT | Performed by: PHYSICAL THERAPIST

## 2021-12-01 PROCEDURE — 97140 MANUAL THERAPY 1/> REGIONS: CPT | Performed by: PHYSICAL THERAPIST

## 2021-12-06 ENCOUNTER — OFFICE VISIT (OUTPATIENT)
Dept: PHYSICAL THERAPY | Facility: CLINIC | Age: 61
End: 2021-12-06
Payer: COMMERCIAL

## 2021-12-06 DIAGNOSIS — Z96.652 TOTAL KNEE REPLACEMENT STATUS, LEFT: Primary | ICD-10-CM

## 2021-12-06 PROCEDURE — 97110 THERAPEUTIC EXERCISES: CPT

## 2021-12-06 PROCEDURE — 97140 MANUAL THERAPY 1/> REGIONS: CPT

## 2021-12-06 PROCEDURE — 97530 THERAPEUTIC ACTIVITIES: CPT

## 2021-12-08 ENCOUNTER — OFFICE VISIT (OUTPATIENT)
Dept: PHYSICAL THERAPY | Facility: CLINIC | Age: 61
End: 2021-12-08
Payer: COMMERCIAL

## 2021-12-08 DIAGNOSIS — Z96.652 TOTAL KNEE REPLACEMENT STATUS, LEFT: Primary | ICD-10-CM

## 2021-12-08 PROCEDURE — 97530 THERAPEUTIC ACTIVITIES: CPT

## 2021-12-08 PROCEDURE — 97110 THERAPEUTIC EXERCISES: CPT

## 2021-12-08 PROCEDURE — 97140 MANUAL THERAPY 1/> REGIONS: CPT

## 2021-12-14 ENCOUNTER — OFFICE VISIT (OUTPATIENT)
Dept: PHYSICAL THERAPY | Facility: CLINIC | Age: 61
End: 2021-12-14
Payer: COMMERCIAL

## 2021-12-14 DIAGNOSIS — Z96.652 TOTAL KNEE REPLACEMENT STATUS, LEFT: Primary | ICD-10-CM

## 2021-12-14 PROCEDURE — 97110 THERAPEUTIC EXERCISES: CPT

## 2021-12-14 PROCEDURE — 97140 MANUAL THERAPY 1/> REGIONS: CPT

## 2021-12-14 PROCEDURE — 97530 THERAPEUTIC ACTIVITIES: CPT

## 2021-12-17 ENCOUNTER — OFFICE VISIT (OUTPATIENT)
Dept: PHYSICAL THERAPY | Facility: CLINIC | Age: 61
End: 2021-12-17
Payer: COMMERCIAL

## 2021-12-17 DIAGNOSIS — Z96.652 TOTAL KNEE REPLACEMENT STATUS, LEFT: Primary | ICD-10-CM

## 2021-12-17 PROCEDURE — 97140 MANUAL THERAPY 1/> REGIONS: CPT

## 2021-12-17 PROCEDURE — 97110 THERAPEUTIC EXERCISES: CPT

## 2021-12-17 PROCEDURE — 97530 THERAPEUTIC ACTIVITIES: CPT

## 2021-12-20 ENCOUNTER — OFFICE VISIT (OUTPATIENT)
Dept: PHYSICAL THERAPY | Facility: CLINIC | Age: 61
End: 2021-12-20
Payer: COMMERCIAL

## 2021-12-20 DIAGNOSIS — Z96.652 TOTAL KNEE REPLACEMENT STATUS, LEFT: Primary | ICD-10-CM

## 2021-12-20 PROCEDURE — 97140 MANUAL THERAPY 1/> REGIONS: CPT

## 2021-12-20 PROCEDURE — 97530 THERAPEUTIC ACTIVITIES: CPT

## 2021-12-20 PROCEDURE — 97110 THERAPEUTIC EXERCISES: CPT

## 2021-12-22 ENCOUNTER — OFFICE VISIT (OUTPATIENT)
Dept: PHYSICAL THERAPY | Facility: CLINIC | Age: 61
End: 2021-12-22
Payer: COMMERCIAL

## 2021-12-22 DIAGNOSIS — Z96.652 TOTAL KNEE REPLACEMENT STATUS, LEFT: Primary | ICD-10-CM

## 2021-12-22 PROCEDURE — 97530 THERAPEUTIC ACTIVITIES: CPT

## 2021-12-22 PROCEDURE — 97110 THERAPEUTIC EXERCISES: CPT

## 2021-12-22 PROCEDURE — 97140 MANUAL THERAPY 1/> REGIONS: CPT

## 2021-12-27 ENCOUNTER — APPOINTMENT (OUTPATIENT)
Dept: PHYSICAL THERAPY | Facility: CLINIC | Age: 61
End: 2021-12-27
Payer: COMMERCIAL

## 2021-12-29 ENCOUNTER — OFFICE VISIT (OUTPATIENT)
Dept: PHYSICAL THERAPY | Facility: CLINIC | Age: 61
End: 2021-12-29
Payer: COMMERCIAL

## 2021-12-29 DIAGNOSIS — Z96.652 TOTAL KNEE REPLACEMENT STATUS, LEFT: Primary | ICD-10-CM

## 2021-12-29 PROCEDURE — 97140 MANUAL THERAPY 1/> REGIONS: CPT | Performed by: PHYSICAL THERAPIST

## 2021-12-29 PROCEDURE — 97530 THERAPEUTIC ACTIVITIES: CPT | Performed by: PHYSICAL THERAPIST

## 2021-12-29 PROCEDURE — 97110 THERAPEUTIC EXERCISES: CPT | Performed by: PHYSICAL THERAPIST

## 2022-01-03 ENCOUNTER — OFFICE VISIT (OUTPATIENT)
Dept: PHYSICAL THERAPY | Facility: CLINIC | Age: 62
End: 2022-01-03
Payer: OTHER MISCELLANEOUS

## 2022-01-03 DIAGNOSIS — Z96.652 TOTAL KNEE REPLACEMENT STATUS, LEFT: Primary | ICD-10-CM

## 2022-01-03 PROCEDURE — 97110 THERAPEUTIC EXERCISES: CPT

## 2022-01-03 PROCEDURE — 97530 THERAPEUTIC ACTIVITIES: CPT

## 2022-01-03 NOTE — PROGRESS NOTES
Daily Note     Today's date: 1/3/2022  Patient name: Chelita Fernandez  : 1960  MRN: [id number]  Referring provider: Pat Garcia MD  Dx:   Encounter Diagnosis     ICD-10-CM    1  Total knee replacement status, left  Z96 652        Start Time: 1447  Stop Time: 1533  Total time in clinic (min): 46 minutes       Subjective: Patient complains of stiffness in her left knee partially attributing this to the weather  Objective: See treatment diary below  Left knee AROM 0-121 degrees  Assessment: Step downs added to improve eccentric quadriceps control  Patient would benefit from continued PT to improve left lower extremity strength  Plan: Continue treatment as per PT plan of care         Precautions: none      Manuals 12/8 12/14 12/17 12/20 12/22 12/29  1/3  12/1 12/6   PROM to hammad JLW JLW MO MO JLW kl NP  kl JLW                                          Neuro Re-Ed             SLS             Toe taps on cones 20 ea 30 ea 30 ea 30 ea 30 ea  30 ea      Aeromat (f/l)                                                                 Ther Ex             recumbent bike 8' 8' 10' 10' 10' 10' 10'  8' 8'   Quad sets             SAQ 5"x20        5"x20 5"x20   Flexion stretch with strap 10"x10 10"x10 10"x10 10"x10 10"x10 10"x10 10"x10  10"x10 10"x10   Bridging     3"x20 x30       Supine SLR 20 20 20 20 20 2#x20 2#  30  x20 20   sidelying slr abd 20 20 20 20 20 2#x20 0#  30   20   SL clamshells 20 20 20 20 20 x30 30  x20 20   Standing SLR 3 ways         x20    ALBARO 20 HS curl machine 50#  20 HS curls  50#  30 HS curls  50#  30 HS curls  50#  30 50#x30 50#  30  x20 20   Knee ext machine  30#  20 30#  20 30#  3x10 30#  3x10 30#x30 30#  30      HR/TR 30 ea        x30 30 ea   Seated HS stretch 30"x3 30"x3 30"x3 30"x3 30"x3 30"x4 30"x3  3x30"                 Ther Activity             Sit to stand 2x10 3x10 3x10 3x10 3x10 3x10 3x10  nv 2x10   Step ups (f/l) fwd  6"  30 fwd  6"  30 Fwd 6"  30 Fwd 6"  30 6"  fwd 30  lat  20 30ea fwd/lat 6"  fwd, lat  30 ea  nv fwd  6"  x30   BOSU step ups             Step downs       6"  10      Eccentric leg lowering             Leg press  30#  30  30#   30  30#  3x10  30#  3x10  40# x10   30# x20 30#x30  30#   3x10  30#x30  30#   30                Modalities             CP         10'

## 2022-01-05 ENCOUNTER — OFFICE VISIT (OUTPATIENT)
Dept: PHYSICAL THERAPY | Facility: CLINIC | Age: 62
End: 2022-01-05
Payer: OTHER MISCELLANEOUS

## 2022-01-05 DIAGNOSIS — Z96.652 TOTAL KNEE REPLACEMENT STATUS, LEFT: Primary | ICD-10-CM

## 2022-01-05 PROCEDURE — 97110 THERAPEUTIC EXERCISES: CPT

## 2022-01-05 PROCEDURE — 97530 THERAPEUTIC ACTIVITIES: CPT

## 2022-01-05 NOTE — PROGRESS NOTES
Daily Note     Today's date: 2022  Patient name: Emanuel Rider  : 1960  MRN: [id number]  Referring provider: Rober Johnson MD  Dx:   Encounter Diagnosis     ICD-10-CM    1  Total knee replacement status, left  [id number] [id number]        Start Time: 1517  Stop Time: 1559  Total time in clinic (min): 42 minutes       Subjective: Since performing step downs during the last PT treatment, patient reports descending the stairs seems to be getting easier  Objective: See treatment diary below  Assessment: Treatment is tolerated well  Patient would benefit from continued PT to improve left lower extremity strength and function  Plan: Continue treatment as per PT plan of care         Precautions: none      Manuals 12/8 12/14 12/17 12/20 12/22 12/29  1/3 1/5     PROM to hammad JLW JLW MO MO JLW kl NP                                             Neuro Re-Ed             SLS             Toe taps on cones 20 ea 30 ea 30 ea 30 ea 30 ea  30 ea 30 ea     Aeromat (f/l)                                                                 Ther Ex             recumbent bike 8' 8' 10' 10' 10' 10' 10' 10'     Quad sets             SAQ 5"x20            Flexion stretch with strap 10"x10 10"x10 10"x10 10"x10 10"x10 10"x10 10"x10      Bridging     3"x20 x30       Supine SLR 20 20 20 20 20 2#x20 2#  30 2#  30     sidelying slr abd 20 20 20 20 20 2#x20 0#  30 0#  30     SL clamshells 20 20 20 20 20 x30 30 30     Standing SLR 3 ways             ALBARO 20 HS curl machine 50#  20 HS curls  50#  30 HS curls  50#  30 HS curls  50#  30 50#x30 50#  30 50#  30     Knee ext machine  30#  20 30#  20 30#  3x10 30#  3x10 30#x30 30#  30 30#  30     HR/TR 30 ea            Seated HS stretch 30"x3 30"x3 30"x3 30"x3 30"x3 30"x4 30"x3 30"x3                  Ther Activity             stairs             Sit to stand 2x10 3x10 3x10 3x10 3x10 3x10 3x10 3x10     Step ups (f/l) fwd  6"  30 fwd  6"  30 Fwd 6"  30 Fwd 6"  30 6"  fwd   30  lat  20 30ea fwd/lat 6"  fwd, lat  30 ea 6"  fwd, lat  30 ea     BOSU step ups             Step downs       6"  10 6"  15     Eccentric leg lowering             Leg press  30#  30  30#   30  30#  3x10  30#  3x10  40# x10   30# x20 30#x30  30#   3x10  30#   3x10     tb sidestepping        NV                  Modalities             CP

## 2022-01-10 ENCOUNTER — OFFICE VISIT (OUTPATIENT)
Dept: PHYSICAL THERAPY | Facility: CLINIC | Age: 62
End: 2022-01-10
Payer: OTHER MISCELLANEOUS

## 2022-01-10 DIAGNOSIS — Z96.652 TOTAL KNEE REPLACEMENT STATUS, LEFT: Primary | ICD-10-CM

## 2022-01-10 PROCEDURE — 97530 THERAPEUTIC ACTIVITIES: CPT

## 2022-01-10 PROCEDURE — 97110 THERAPEUTIC EXERCISES: CPT

## 2022-01-10 NOTE — PROGRESS NOTES
Daily Note     Today's date: 1/10/2022  Patient name: Piotr Degroot  : 1960  MRN: [id number]  Referring provider: Karene Blizzard, MD  Dx:   Encounter Diagnosis     ICD-10-CM    1  Total knee replacement status, left  [id number] [id number]        Start Time: 1400  Stop Time: 4708  Total time in clinic (min): 42 minutes       Subjective: Patient reports her surgeon suggested she continue PT to get stronger  Objective: See treatment diary below  Assessment: Treatment is tolerated well  Patient would benefit from continued PT to increase lower extremity strength to improve gait and stair climbing ability  Plan: Continue treatment as per PT plan of care         Precautions: none      Manuals 12/8 12/14 12/17 12/20 12/22 12/29  1/3 1/5 1/10    PROM to hammad JLW JLW MO MO JLW kl NP                                             Neuro Re-Ed             SLS             Toe taps on cones 20 ea 30 ea 30 ea 30 ea 30 ea  30 ea 30 ea     Aeromat (f/l)                                                                 Ther Ex             recumbent bike 8' 8' 10' 10' 10' 10' 10' 10' 10'    Quad sets             SAQ 5"x20            Flexion stretch with strap 10"x10 10"x10 10"x10 10"x10 10"x10 10"x10 10"x10      Bridging     3"x20 x30       Supine SLR 20 20 20 20 20 2#x20 2#  30 2#  30 2#  30    sidelying slr abd 20 20 20 20 20 2#x20 0#  30 0#  30 0#  30    SL clamshells 20 20 20 20 20 x30 30 30 30    Standing SLR 3 ways             ALBARO 20 HS curl machine 50#  20 HS curls  50#  30 HS curls  50#  30 HS curls  50#  30 50#x30 50#  30 50#  30 50#  30    Knee ext machine  30#  20 30#  20 30#  3x10 30#  3x10 30#x30 30#  30 30#  30 30#  30    HR/TR 30 ea            Seated HS stretch 30"x3 30"x3 30"x3 30"x3 30"x3 30"x4 30"x3 30"x3 30"x3                 Ther Activity             stairs         2 flights    Sit to stand 2x10 3x10 3x10 3x10 3x10 3x10 3x10 3x10 3x10    Step ups (f/l) fwd  6"  30 fwd  6"  30 Fwd  Fwd "  fwd 30  lat  20 30ea fwd/lat 6"  fwd, lat  30 ea 6"  fwd, lat  30 ea 6"  fwd, lat  30 ea    BOSU step ups             Step downs       6"  10 6"  15 6"  20    Eccentric leg lowering             Leg press  30#  30  30#   30  30#  3x10  30#  3x10  40# x10   30# x20 30#x30  30#   3x10  30#   3x10  30#   30    tb sidestepping        NV green  12ft x6                 Modalities             CP

## 2022-01-12 ENCOUNTER — EVALUATION (OUTPATIENT)
Dept: PHYSICAL THERAPY | Facility: CLINIC | Age: 62
End: 2022-01-12
Payer: OTHER MISCELLANEOUS

## 2022-01-12 DIAGNOSIS — Z96.652 TOTAL KNEE REPLACEMENT STATUS, LEFT: Primary | ICD-10-CM

## 2022-01-12 PROCEDURE — 97530 THERAPEUTIC ACTIVITIES: CPT | Performed by: PHYSICAL THERAPIST

## 2022-01-12 PROCEDURE — 97164 PT RE-EVAL EST PLAN CARE: CPT | Performed by: PHYSICAL THERAPIST

## 2022-01-12 PROCEDURE — 97110 THERAPEUTIC EXERCISES: CPT | Performed by: PHYSICAL THERAPIST

## 2022-01-12 NOTE — PROGRESS NOTES
Daily Note/Re-evaluation     Today's date: 2022  Patient name: Abdirahman Vallejo  : 1960  MRN: [id number]  Referring provider: Ericka Mcmahan MD  Dx:   Encounter Diagnosis     ICD-10-CM    1  Total knee replacement status, left  R26 123                   Subjective: Pt to f/u with surgeon next Wednesday  Objective: See treatment diary below             Assessment  Pt has made improvements in L LE strength and ROM  Pt is now ambulating independently and can ambulate up to 45 minutes at a time  Pt continues to have interrupted sleep at times and has difficulty negotiating stairs and difficulty transferring from low surfaces  Pt would benefit from continued skilled PT to further address impairments and to return to PLOF  Goals  1  Pt will be independent with HEP upon DC -ongoing  2  Pt's L knee ROM will be 0-120 degrees to allow for stair negotiation with ease  - met  3  Pt's L LE strength will be at least 4/5 t/o to allow for independent ambulation - ongoing  4  Pt's pain will be no more than 2/10 to allow for uninterrupted sleep  - ongoing    Plan  Patient would benefit from: skilled physical therapy  Planned modality interventions: low level laser therapy  Planned therapy interventions: joint mobilization, manual therapy, neuromuscular re-education, patient education, therapeutic activities, therapeutic exercise, strengthening and home exercise program  Frequency: 2x week  Duration in visits: 8  Duration in weeks: 4  Treatment plan discussed with: patient        Subjective Evaluation        Pain  Current pain ratin  At best pain ratin  At worst pain ratin  Location: L knee    Patient Goals  Patient goals for therapy: return to work, independence with ADLs/IADLs, decreased pain and return to sport/leisure activities          Objective     Active Range of Motion   Left Knee   Flexion: 125 degrees   Extension: 0 degrees     Strength/Myotome Testing     Left Hip   Planes of Motion   Flexion: 4  Extension: 5  Abduction: 3    Left Knee   Flexion: 5  Extension: 4       Precautions: none      Manuals 1/12  12/17 12/20 12/22 12/29  1/3 1/5 1/10   PROM to hammad DC  MO MO JLW kl NP                                         Neuro Re-Ed            SLS            Toe taps on cones   30 ea 30 ea 30 ea  30 ea 30 ea    Aeromat (f/l)                                                            Ther Ex            recumbent bike 8'  10' 10' 10' 10' 10' 10' 10'   Bridging     3"x20 x30      Supine SLR   20 20 20 2#x20 2#  30 2#  30 2#  30   sidelying slr abd x15  20 20 20 2#x20 0#  30 0#  30 0#  30   SL clamshells 5"x15  20 20 20 x30 30 30 30   Reverse clamshells 5"x15           Standing fire hydrant x15                                               Ther Activity            stairs         2 flights   Sit to stand airex under R ft x10  3x10 3x10 3x10 3x10 3x10 3x10 3x10   Step ups (f/l) Lat 6"x20 ea  Cross 4"x20  Fwd 6"  30 Fwd 6"  30 6"  fwd   30  lat  20 30ea fwd/lat 6"  fwd, lat  30 ea 6"  fwd, lat  30 ea 6"  fwd, lat  30 ea   BOSU step ups NV           Step overs  6"x10      6"  10 6"  15 6"  20   Eccentric leg lowering 6"x15- hip stab           Leg press SL 10# x15   30#  3x10  30#  3x10  40# x10   30# x20 30#x30  30#   3x10  30#   3x10  30#   30   tb sidestepping NV       NV green  12ft x6               Modalities            CP

## 2022-01-12 NOTE — LETTER
2022    MD Krysta Champion [facility]  [address] [facility],[address] [place] [address]    Patient: Maine Bains   YOB: 1960   Date of Visit: 2022     Encounter Diagnosis     ICD-10-CM    1  Total knee replacement status, left  P23 362        Dear Dr Watt Healdton: Thank you for your recent referral of Maine Bains  Please review the attached evaluation summary from Cristin's recent visit  Please verify that you agree with the plan of care by signing the attached order  If you have any questions or concerns, please do not hesitate to call  I sincerely appreciate the opportunity to share in the care of one of your patients and hope to have another opportunity to work with you in the near future  Sincerely,    Praveen Russell, PT      Referring Provider:      I certify that I have read the below Plan of Care and certify the need for these services furnished under this plan of treatment while under my care  MD Krysta Champion [facility]  [address]  Via Fax: [phone]          Daily Note/Re-evaluation     Today's date: 2022  Patient name: Maine Bains  : 1960  MRN: [id number]  Referring provider: Rakesh Martins MD  Dx:   Encounter Diagnosis     ICD-10-CM    1  Total knee replacement status, left  T50 [id number]                   Subjective: Pt to f/u with surgeon next Wednesday  Objective: See treatment diary below             Assessment  Pt has made improvements in L LE strength and ROM  Pt is now ambulating independently and can ambulate up to 45 minutes at a time  Pt continues to have interrupted sleep at times and has difficulty negotiating stairs and difficulty transferring from low surfaces  Pt would benefit from continued skilled PT to further address impairments and to return to PLOF  Goals  1  Pt will be independent with HEP upon DC -ongoing  2   Pt's L knee ROM will be 0-120 degrees to allow for stair negotiation with ease  - met  3  Pt's L LE strength will be at least 4/5 t/o to allow for independent ambulation - ongoing  4  Pt's pain will be no more than 2/10 to allow for uninterrupted sleep  - ongoing    Plan  Patient would benefit from: skilled physical therapy  Planned modality interventions: low level laser therapy  Planned therapy interventions: joint mobilization, manual therapy, neuromuscular re-education, patient education, therapeutic activities, therapeutic exercise, strengthening and home exercise program  Frequency: 2x week  Duration in visits: 8  Duration in weeks: 4  Treatment plan discussed with: patient        Subjective Evaluation        Pain  Current pain ratin  At best pain ratin  At worst pain ratin  Location: L knee    Patient Goals  Patient goals for therapy: return to work, independence with ADLs/IADLs, decreased pain and return to sport/leisure activities          Objective     Active Range of Motion   Left Knee   Flexion: 125 degrees   Extension: 0 degrees     Strength/Myotome Testing     Left Hip   Planes of Motion   Flexion: 4  Extension: 5  Abduction: 3    Left Knee   Flexion: 5  Extension: 4       Precautions: none      Manuals 1/12  12/17 12/20 12/22 12/29  1/3 1/5 1/10   PROM to hammad SMITH  MO MO JLW kl NP                                         Neuro Re-Ed            SLS            Toe taps on cones   30 ea 30 ea 30 ea  30 ea 30 ea    Aeromat (f/l)                                                            Ther Ex            recumbent bike 8'  10' 10' 10' 10' 10' 10' 10'   Bridging     3"x20 x30      Supine SLR   20 20 20 2#x20 2#  30 2#  30 2#  30   sidelying slr abd x15  20 20 20 2#x20 0#  30 0#  30 0#  30   SL clamshells 5"x15  20 20 20 x30 30 30 30   Reverse clamshells 5"x15           Standing fire hydrant x15                                               Ther Activity            stairs         2 flights   Sit to stand airex under R ft x10  3x10 3x10 3x10 3x10 3x10 3x10 3x10   Step ups (f/l) Lat 6"x20 ea  Cross 4"x20  Fwd 6"  30 Fwd 6"  30 6"  fwd   30  lat  20 30ea fwd/lat 6"  fwd, lat  30 ea 6"  fwd, lat  30 ea 6"  fwd, lat  30 ea   BOSU step ups NV           Step overs  6"x10      6"  10 6"  15 6"  20   Eccentric leg lowering 6"x15- hip stab           Leg press SL 10# x15   30#  3x10  30#  3x10  40# x10   30# x20 30#x30  30#   3x10  30#   3x10  30#   30   tb sidestepping NV       NV green  12ft x6               Modalities            CP

## 2022-01-18 ENCOUNTER — OFFICE VISIT (OUTPATIENT)
Dept: PHYSICAL THERAPY | Facility: CLINIC | Age: 62
End: 2022-01-18
Payer: OTHER MISCELLANEOUS

## 2022-01-18 DIAGNOSIS — Z96.652 TOTAL KNEE REPLACEMENT STATUS, LEFT: Primary | ICD-10-CM

## 2022-01-18 PROCEDURE — 97110 THERAPEUTIC EXERCISES: CPT

## 2022-01-18 PROCEDURE — 97530 THERAPEUTIC ACTIVITIES: CPT

## 2022-01-18 NOTE — PROGRESS NOTES
Daily Note     Today's date: 2022  Patient name: Keisha Davalos  : 1960  MRN: [id number]  Referring provider: Carla Hamlin MD  Dx:   Encounter Diagnosis     ICD-10-CM    1  Total knee replacement status, left  Z96 [id number]        Start Time: 1234  Stop Time: 1315  Total time in clinic (min): 41 minutes       Subjective: Patient reports she is exercising in the pool 3 times per week  Objective: See treatment diary below  Assessment: Treatment is tolerated well  Patient would benefit from continued PT to improve lower extremity strength  Encouraged patient to perform hip abduction and sidestepping in the pool  Plan: Continue treatment as per PT plan of care         Precautions: none      Manuals 1/12 1/18  12/20 12/22 12/29  1/3 1/5 1/10   PROM to hammad DC   MO JLW kl NP                                         Neuro Re-Ed            SLS            Toe taps on cones    30 ea 30 ea  30 ea 30 ea    Aeromat (f/l)                                                            Ther Ex            recumbent bike 8' 10'  10' 10' 10' 10' 10' 10'   Bridging  30   3"x20 x30      Supine SLR    20 20 2#x20 2#  30 2#  30 2#  30   sidelying slr abd x15 20  20 20 2#x20 0#  30 0#  30 0#  30   SL clamshells 5"x15 5"x15  20 20 x30 30 30 30   Reverse clamshells 5"x15 5"x15          Standing fire hydrant x15 15 ea                                              Ther Activity            stairs         2 flights   Sit to stand airex under R ft x10 airex  under  R foot x12  3x10 3x10 3x10 3x10 3x10 3x10   Step ups (f/l) Lat 6"x20 ea  Cross 4"x20 lat  6"x 20  cross  4" x20 15 ea Fwd 6"  30 6"  fwd   30  lat  20 30ea fwd/lat 6"  fwd, lat  30 ea 6"  fwd, lat  30 ea 6"  fwd, lat  30 ea   BOSU step ups NV  20          Step overs  6"x10 6" x12     6"  10 6"  15 6"  20   Eccentric leg lowering 6"x15 hip stab 6" x15          Leg press SL 10# x15  SL 10#   2x10   30#  3x10  40# x10   30# x20 30#x30  30#   3x10  30#   3x10  30#   30 tb sidestepping NV green  12 ft x6      NV green  12ft x6               Modalities            CP

## 2022-01-20 ENCOUNTER — OFFICE VISIT (OUTPATIENT)
Dept: PHYSICAL THERAPY | Facility: CLINIC | Age: 62
End: 2022-01-20
Payer: OTHER MISCELLANEOUS

## 2022-01-20 DIAGNOSIS — Z96.652 TOTAL KNEE REPLACEMENT STATUS, LEFT: Primary | ICD-10-CM

## 2022-01-20 PROCEDURE — 97530 THERAPEUTIC ACTIVITIES: CPT

## 2022-01-20 PROCEDURE — 97110 THERAPEUTIC EXERCISES: CPT

## 2022-01-20 NOTE — PROGRESS NOTES
Daily Note     Today's date: 2022  Patient name: Heath Parrish  : 1960  MRN: [id number]  Referring provider: Az Crump MD  Dx:   Encounter Diagnosis     ICD-10-CM    1  Total knee replacement status, left  Z96 [id number]                   Subjective: Patient noted L knee pain  Objective: See treatment diary below      Assessment: Tolerated treatment fair  Patient was challenged with SLS but able to perform  Patient would benefit from continued PT      Plan: Continue per plan of care        Precautions: none      Manuals 1/12 1/18 1/20  12/22 12/29  1/3 1/5 1/10   PROM to hammad DC    JLW kl NP                                         Neuro Re-Ed            SLS            Toe taps on cones     30 ea  30 ea 30 ea    Aeromat (f/l)                                                            Ther Ex            recumbent bike 8' 10' 10'  10' 10' 10' 10' 10'   Bridging  30 30  3"x20 x30      Supine SLR     20 2#x20 2#  30 2#  30 2#  30   sidelying slr abd x15 20 20  20 2#x20 0#  30 0#  30 0#  30   SL clamshells 5"x15 5"x15 5" x 15   20 x30 30 30 30   Reverse clamshells 5"x15 5"x15 5" x15         Standing fire hydrant x15 15 ea 15xea                                             Ther Activity            stairs         2 flights   Sit to stand airex under R ft x10 airex  under  R foot x12 airex  under  R foot x12  3x10 3x10 3x10 3x10 3x10   Step ups (f/l) Lat 6"x20 ea  Cross 4"x20 lat  6"x 20  cross  4" x20 Lat 6"x20  Cross 4'' x 20  6"  fwd   30  lat  20 30ea fwd/lat 6"  fwd, lat  30 ea 6"  fwd, lat  30 ea 6"  fwd, lat  30 ea   BOSU step ups NV  20 20         Step overs  6"x10 6" x12 6" x12    6"  10 6"  15 6"  20   Eccentric leg lowering 6"x15 hip stab 6" x15 6'' x 15         Leg press SL 10# x15  SL 10#   2x10 SL 10 2x10#   40# x10   30# x20 30#x30  30#   3x10  30#   3x10  30#   30   tb sidestepping NV green  12 ft x6 gtb 12ftx6      NV green  12ft x6               Modalities            CP

## 2022-01-26 ENCOUNTER — OFFICE VISIT (OUTPATIENT)
Dept: PHYSICAL THERAPY | Facility: CLINIC | Age: 62
End: 2022-01-26
Payer: OTHER MISCELLANEOUS

## 2022-01-26 DIAGNOSIS — Z96.652 TOTAL KNEE REPLACEMENT STATUS, LEFT: Primary | ICD-10-CM

## 2022-01-26 PROCEDURE — 97530 THERAPEUTIC ACTIVITIES: CPT

## 2022-01-26 PROCEDURE — 97110 THERAPEUTIC EXERCISES: CPT

## 2022-01-26 NOTE — PROGRESS NOTES
Daily Note     Today's date: 2022  Patient name: Felipe Camacho  : 1960  MRN: [id number]  Referring provider: Sarah Graff MD  Dx:   Encounter Diagnosis     ICD-10-CM    1  Total knee replacement status, left  Z96 [id number]        Start Time: 935  Stop Time: 1015  Total time in clinic (min): 40 minutes       Subjective: Patient complains of B/L ankle pain that started last week  Patient reports she is wearing orthotics and is wondering if they are sized incorrectly  Objective: See treatment diary below  Assessment: Treatment is tolerated well  Patient with noticeable gluteus medius weakness when performing sidelying slr abduction  Patient would benefit from continued PT to improve B/L lower extremity strength  Plan: Continue treatment as per PT plan of care         Precautions: none      Manuals 1/12 1/18 1/20 1/26  12/29  1/3 1/5 1/10   PROM to hammad SARAH     kl NP                                         Neuro Re-Ed            SLS            Toe taps on cones       30 ea 30 ea    Aeromat (f/l)                                                            Ther Ex            recumbent bike 8' 10' 10' 10'  10' 10' 10' 10'   Bridging  30 30 30  x30      Supine SLR      2#x20 2#  30 2#  30 2#  30   sidelying slr abd x15 20 20 20  2#x20 0#  30 0#  30 0#  30   SL clamshells 5"x15 5"x15 5" x 15  5"x30  x30 30 30 30   Reverse clamshells 5"x15 5"x15 5" x15 5"x30        Standing fire hydrant x15 15 ea 15xea 20 ea                                            Ther Activity            stairs         2 flights   Sit to stand airex under R ft x10 airex  under  R foot x12 airex  under  R foot x12 airex under R foot  15  3x10 3x10 3x10 3x10   Step ups (f/l) Lat 6"x20 ea  Cross 4"x20 lat  6"x 20  cross  4" x20 Lat 6"x20  Cross 4'' x 20 lat  6"x 20  cross  4" x20  30ea fwd/lat 6"  fwd, lat  30 ea 6"  fwd, lat  30 ea 6"  fwd, lat  30 ea   BOSU step ups NV  20 20  20 ea        Step overs  6"x10 6" x12 6" x12 6" x15 6"  10 6"  15 6"  20   Eccentric leg lowering 6"x15 hip stab 6" x15 6'' x 15 4" x20        Leg press SL 10# x15  SL 10#   2x10 SL 10 2x10#  SL 10#   2x10  30#x30  30#   3x10  30#   3x10  30#   30   tb sidestepping NV green  12 ft x6 gtb 12ftx6  green  12ft x6    NV green  12ft x6               Modalities            CP

## 2022-01-28 ENCOUNTER — OFFICE VISIT (OUTPATIENT)
Dept: PHYSICAL THERAPY | Facility: CLINIC | Age: 62
End: 2022-01-28
Payer: OTHER MISCELLANEOUS

## 2022-01-28 DIAGNOSIS — Z96.652 TOTAL KNEE REPLACEMENT STATUS, LEFT: Primary | ICD-10-CM

## 2022-01-28 PROCEDURE — 97112 NEUROMUSCULAR REEDUCATION: CPT

## 2022-01-28 PROCEDURE — 97110 THERAPEUTIC EXERCISES: CPT

## 2022-01-28 PROCEDURE — 97530 THERAPEUTIC ACTIVITIES: CPT

## 2022-01-28 NOTE — PROGRESS NOTES
Daily Note     Today's date: 2022  Patient name: Gerald Jones  : 1960  MRN: [id number]  Referring provider: Lesley Beck MD  Dx:   Encounter Diagnosis     ICD-10-CM    1  Total knee replacement status, left  [id number] [id number]                   Subjective: Pt reports feeling a little stiff today  Objective: See treatment diary below      Assessment: Tolerated treatment well  Patient had orthotics addressed by PT, MB today not all TE performed today, resume NV  Plan: Continue per plan of care      1:1 with PT/PTA     Precautions: none      Manuals         PROM to hammad DC                                                   Neuro Re-Ed             SLS             Toe taps on cones             Aeromat (f/l)                                                                 Ther Ex             recumbent bike 8' 10' 10' 10' 10'        Bridging  30 30 30 NV        Supine SLR             sidelying slr abd x15 20 20 20 NV        SL clamshells 5"x15 5"x15 5" x 15  5"x30 NV        Reverse clamshells 5"x15 5"x15 5" x15 5"x30 NV        Standing fire hydrant x15 15 ea 15xea 20 ea NV                                               Ther Activity             stairs             Sit to stand airex under R ft x10 airex  under  R foot x12 airex  under  R foot x12 airex under R foot  15 airex under R foot x15        Step ups (f/l) Lat 6"x20 ea  Cross 4"x20 lat  6"x 20  cross  4" x20 Lat 6"x20  Cross 4'' x 20 lat  6"x 20  cross  4" x20 Lat 6"x20 cross 4" x20        BOSU step ups NV  20 20  20 ea 20 ea        Step overs  6"x10 6" x12 6" x12 6" x15 6"x15        Eccentric leg lowering 6"x15 hip stab 6" x15 6'' x 15 4" x20 4"x20        Leg press SL 10# x15  SL 10#   2x10 SL 10 2x10#  SL 10#   2x10 SL 20# 2x10        tb sidestepping NV green  12 ft x6 gtb 12ftx6  green  12ft x6 NV                     Modalities             CP

## 2022-02-01 ENCOUNTER — OFFICE VISIT (OUTPATIENT)
Dept: PHYSICAL THERAPY | Facility: CLINIC | Age: 62
End: 2022-02-01
Payer: OTHER MISCELLANEOUS

## 2022-02-01 DIAGNOSIS — Z96.652 TOTAL KNEE REPLACEMENT STATUS, LEFT: Primary | ICD-10-CM

## 2022-02-01 PROCEDURE — 97110 THERAPEUTIC EXERCISES: CPT | Performed by: PHYSICAL THERAPIST

## 2022-02-01 PROCEDURE — 97112 NEUROMUSCULAR REEDUCATION: CPT | Performed by: PHYSICAL THERAPIST

## 2022-02-01 NOTE — PROGRESS NOTES
Daily Note     Today's date: 2022  Patient name: Chelita Fernandez  : 1960  MRN: [id number]  Referring provider: Pat Garcia MD  Dx:   Encounter Diagnosis     ICD-10-CM    1  Total knee replacement status, left  [id number] [id number]                   Subjective: "I'm feeling pretty good "      Objective: See treatment diary below      Assessment: Pt had good tolerance to increase in step height  Anticipate DC next week  Plan: Continue per plan of care        Precautions: none      Manuals        PROM to hammad DC                                                   Neuro Re-Ed             SLS             Toe taps on cones             Aeromat (f/l)                                                                 Ther Ex             recumbent bike 8' 10' 10' 10' 10' TM 10'       Bridging  30 30 30 NV        Supine SLR             sidelying slr abd x15 20 20 20 NV        SL clamshells 5"x15 5"x15 5" x 15  5"x30 NV        Reverse clamshells 5"x15 5"x15 5" x15 5"x30 NV        Standing fire hydrant x15 15 ea 15xea 20 ea NV 5"x15                                              Ther Activity             stairs             Sit to stand airex under R ft x10 airex  under  R foot x12 airex  under  R foot x12 airex under R foot  15 airex under R foot x15 airex under R foot x20       Step ups (f/l) Lat 6"x20 ea  Cross 4"x20 lat  6"x 20  cross  4" x20 Lat 6"x20  Cross 4'' x 20 lat  6"x 20  cross  4" x20 Lat 6"x20 cross 4" x20 6"X 20ea       BOSU step ups NV  20 20  20 ea 20 ea 20 ea       Step overs  6"x10 6" x12 6" x12 6" x15 6"x15 6"x20       Eccentric leg lowering 6"x15 hip stab 6" x15 6'' x 15 4" x20 4"x20 6"x20       Leg press SL 10# x15  SL 10#   2x10 SL 10 2x10#  SL 10#   2x10 SL 20# 2x10 SL 20# x20       tb sidestepping NV green  12 ft x6 gtb 12ftx6  green  12ft x6 NV GTB 12' x6                    Modalities             CP

## 2022-02-04 ENCOUNTER — OFFICE VISIT (OUTPATIENT)
Dept: PHYSICAL THERAPY | Facility: CLINIC | Age: 62
End: 2022-02-04
Payer: OTHER MISCELLANEOUS

## 2022-02-04 DIAGNOSIS — Z96.652 TOTAL KNEE REPLACEMENT STATUS, LEFT: Primary | ICD-10-CM

## 2022-02-04 PROCEDURE — 97110 THERAPEUTIC EXERCISES: CPT

## 2022-02-04 PROCEDURE — 97112 NEUROMUSCULAR REEDUCATION: CPT

## 2022-02-04 PROCEDURE — 97530 THERAPEUTIC ACTIVITIES: CPT

## 2022-02-04 NOTE — PROGRESS NOTES
Daily Note     Today's date: 2022  Patient name: Jasmyne Manrique  : 1960  MRN: [id number]  Referring provider: Monet Bui MD  Dx:   Encounter Diagnosis     ICD-10-CM    1  Total knee replacement status, left  Z96 507                   Subjective: Patient reports fatigue from hydro biking before PT  Objective: See treatment diary below      Assessment: Patient did well with all TE as listed, reports fatigue only at this time  Plan: Continue per plan of care        Precautions: none      Manuals       PROM to hammad DC                                                   Neuro Re-Ed             SLS             Toe taps on cones             Aeromat (f/l)                                                                 Ther Ex             recumbent bike 8' 10' 10' 10' 10' TM 10' TM 5'      Bridging  30 30 30 NV        Supine SLR             sidelying slr abd x15 20 20 20 NV  20      SL clamshells 5"x15 5"x15 5" x 15  5"x30 NV  Stage 2 3" x30      Reverse clamshells 5"x15 5"x15 5" x15 5"x30 NV        Standing fire hydrant x15 15 ea 15xea 20 ea NV 5"x15 5"x15      Bent over mules kicks       x20                                Ther Activity             stairs             Sit to stand airex under R ft x10 airex  under  R foot x12 airex  under  R foot x12 airex under R foot  15 airex under R foot x15 airex under R foot x20 airex under R foot x20      Step ups (f/l) Lat 6"x20 ea  Cross 4"x20 lat  6"x 20  cross  4" x20 Lat 6"x20  Cross 4'' x 20 lat  6"x 20  cross  4" x20 Lat 6"x20 cross 4" x20 6"X 20ea 6"x20 ea      BOSU step ups NV  20 20  20 ea 20 ea 20 ea 20 ea      Step overs  6"x10 6" x12 6" x12 6" x15 6"x15 6"x20 6"x20      Eccentric leg lowering 6"x15 hip stab 6" x15 6'' x 15 4" x20 4"x20 6"x20 6"x20      Leg press SL 10# x15  SL 10#   2x10 SL 10 2x10#  SL 10#   2x10 SL 20# 2x10 SL 20# x20 SL 20# x20      tb sidestepping NV green  12 ft x6 gtb 12ftx6  green  12ft x6 NV GTB 12' x6 Blue 12'x6                   Modalities             CP

## 2022-02-08 ENCOUNTER — OFFICE VISIT (OUTPATIENT)
Dept: PHYSICAL THERAPY | Facility: CLINIC | Age: 62
End: 2022-02-08
Payer: OTHER MISCELLANEOUS

## 2022-02-08 DIAGNOSIS — Z96.652 TOTAL KNEE REPLACEMENT STATUS, LEFT: Primary | ICD-10-CM

## 2022-02-08 PROCEDURE — 97110 THERAPEUTIC EXERCISES: CPT | Performed by: PHYSICAL THERAPIST

## 2022-02-08 PROCEDURE — 97530 THERAPEUTIC ACTIVITIES: CPT | Performed by: PHYSICAL THERAPIST

## 2022-02-08 NOTE — PROGRESS NOTES
Daily Note/Discharge    Today's date: 2022  Patient name: Anju Davis  : 1960  MRN: [id number]  Referring provider: Marley Billy MD  Dx:   Encounter Diagnosis     ICD-10-CM    1  Total knee replacement status, left  Z96 652                   Subjective: "I feel ready "      Objective: See treatment diary below      Assessment: Pt has good knowledge of program  Pt to transition to independent HEP  Plan: No further skilled PT required       Precautions: none      Manuals      PROM to hammad DC                                                   Neuro Re-Ed             SLS             Toe taps on cones             Aeromat (f/l)                                                                 Ther Ex             recumbent bike 8' 10' 10' 10' 10' TM 10' TM 5' TM 8'     Bridging  30 30 30 NV        Supine SLR             sidelying slr abd x15 20 20 20 NV  20      SL clamshells 5"x15 5"x15 5" x 15  5"x30 NV  Stage 2 3" x30      Reverse clamshells 5"x15 5"x15 5" x15 5"x30 NV        Standing fire hydrant x15 15 ea 15xea 20 ea NV 5"x15 5"x15 5"x20     Bent over mules kicks       x20                                Ther Activity             stairs             Sit to stand airex under R ft x10 airex  under  R foot x12 airex  under  R foot x12 airex under R foot  15 airex under R foot x15 airex under R foot x20 airex under R foot x20 airex under R foot x20     Step ups (f/l) Lat 6"x20 ea  Cross 4"x20 lat  6"x 20  cross  4" x20 Lat 6"x20  Cross 4'' x 20 lat  6"x 20  cross  4" x20 Lat 6"x20 cross 4" x20 6"X 20ea 6"x20 ea 6"x20 ea     BOSU step ups NV  20 20  20 ea 20 ea 20 ea 20 ea      Step overs  6"x10 6" x12 6" x12 6" x15 6"x15 6"x20 6"x20 6"x20     Eccentric leg lowering 6"x15 hip stab 6" x15 6'' x 15 4" x20 4"x20 6"x20 6"x20 6"x20     Leg press SL 10# x15  SL 10#   2x10 SL 10 2x10#  SL 10#   2x10 SL 20# 2x10 SL 20# x20 SL 20# x20 SL 20# x20     tb sidestepping NV green  12 ft x6 gtb 12ftx6  green  12ft x6 NV GTB 12' x6 Blue 12'x6                   Modalities             CP

## 2022-02-11 ENCOUNTER — APPOINTMENT (OUTPATIENT)
Dept: PHYSICAL THERAPY | Facility: CLINIC | Age: 62
End: 2022-02-11
Payer: OTHER MISCELLANEOUS

## 2022-04-21 ENCOUNTER — APPOINTMENT (OUTPATIENT)
Dept: LAB | Facility: CLINIC | Age: 62
End: 2022-04-21
Payer: COMMERCIAL

## 2022-04-21 DIAGNOSIS — R73.9 HYPERGLYCEMIA: ICD-10-CM

## 2022-04-21 DIAGNOSIS — E78.2 MIXED HYPERLIPIDEMIA: ICD-10-CM

## 2022-04-21 LAB
ALBUMIN SERPL BCP-MCNC: 3.4 G/DL (ref 3.5–5)
ALP SERPL-CCNC: 71 U/L (ref 46–116)
ALT SERPL W P-5'-P-CCNC: 46 U/L (ref 12–78)
ANION GAP SERPL CALCULATED.3IONS-SCNC: 2 MMOL/L (ref 4–13)
AST SERPL W P-5'-P-CCNC: 40 U/L (ref 5–45)
BILIRUB SERPL-MCNC: 0.62 MG/DL (ref 0.2–1)
BUN SERPL-MCNC: 31 MG/DL (ref 5–25)
CALCIUM ALBUM COR SERPL-MCNC: 10.2 MG/DL (ref 8.3–10.1)
CALCIUM SERPL-MCNC: 9.7 MG/DL (ref 8.3–10.1)
CHLORIDE SERPL-SCNC: 110 MMOL/L (ref 100–108)
CHOLEST SERPL-MCNC: 248 MG/DL
CO2 SERPL-SCNC: 30 MMOL/L (ref 21–32)
CREAT SERPL-MCNC: 0.84 MG/DL (ref 0.6–1.3)
EST. AVERAGE GLUCOSE BLD GHB EST-MCNC: 108 MG/DL
GFR SERPL CREATININE-BSD FRML MDRD: 74 ML/MIN/1.73SQ M
GLUCOSE P FAST SERPL-MCNC: 96 MG/DL (ref 65–99)
HBA1C MFR BLD: 5.4 %
HDLC SERPL-MCNC: 73 MG/DL
LDLC SERPL CALC-MCNC: 156 MG/DL (ref 0–100)
POTASSIUM SERPL-SCNC: 4.8 MMOL/L (ref 3.5–5.3)
PROT SERPL-MCNC: 6.8 G/DL (ref 6.4–8.2)
SODIUM SERPL-SCNC: 142 MMOL/L (ref 136–145)
TRIGL SERPL-MCNC: 93 MG/DL

## 2022-04-21 PROCEDURE — 80053 COMPREHEN METABOLIC PANEL: CPT

## 2022-04-21 PROCEDURE — 36415 COLL VENOUS BLD VENIPUNCTURE: CPT

## 2022-04-21 PROCEDURE — 83036 HEMOGLOBIN GLYCOSYLATED A1C: CPT

## 2022-04-21 PROCEDURE — 80061 LIPID PANEL: CPT

## 2022-04-27 ENCOUNTER — OFFICE VISIT (OUTPATIENT)
Dept: FAMILY MEDICINE CLINIC | Facility: CLINIC | Age: 62
End: 2022-04-27
Payer: COMMERCIAL

## 2022-04-27 VITALS
WEIGHT: 208 LBS | DIASTOLIC BLOOD PRESSURE: 74 MMHG | HEIGHT: 62 IN | HEART RATE: 80 BPM | SYSTOLIC BLOOD PRESSURE: 124 MMHG | BODY MASS INDEX: 38.28 KG/M2

## 2022-04-27 DIAGNOSIS — C54.1 ENDOMETRIAL CANCER (HCC): ICD-10-CM

## 2022-04-27 DIAGNOSIS — M79.672 FOOT PAIN, BILATERAL: ICD-10-CM

## 2022-04-27 DIAGNOSIS — M25.572 BILATERAL ANKLE JOINT PAIN: ICD-10-CM

## 2022-04-27 DIAGNOSIS — M79.671 FOOT PAIN, BILATERAL: ICD-10-CM

## 2022-04-27 DIAGNOSIS — M25.571 BILATERAL ANKLE JOINT PAIN: ICD-10-CM

## 2022-04-27 DIAGNOSIS — E78.2 MIXED HYPERLIPIDEMIA: ICD-10-CM

## 2022-04-27 DIAGNOSIS — R03.0 ELEVATED BP WITHOUT DIAGNOSIS OF HYPERTENSION: Primary | ICD-10-CM

## 2022-04-27 DIAGNOSIS — R73.9 HYPERGLYCEMIA: ICD-10-CM

## 2022-04-27 DIAGNOSIS — E55.9 VITAMIN D DEFICIENCY: ICD-10-CM

## 2022-04-27 PROBLEM — Z01.818 PRE-OP EXAMINATION: Status: RESOLVED | Noted: 2020-11-19 | Resolved: 2022-04-27

## 2022-04-27 PROCEDURE — 1036F TOBACCO NON-USER: CPT | Performed by: PHYSICIAN ASSISTANT

## 2022-04-27 PROCEDURE — 3008F BODY MASS INDEX DOCD: CPT | Performed by: PHYSICIAN ASSISTANT

## 2022-04-27 PROCEDURE — 3725F SCREEN DEPRESSION PERFORMED: CPT | Performed by: PHYSICIAN ASSISTANT

## 2022-04-27 PROCEDURE — 99214 OFFICE O/P EST MOD 30 MIN: CPT | Performed by: PHYSICIAN ASSISTANT

## 2022-04-27 RX ORDER — EZETIMIBE 10 MG/1
10 TABLET ORAL DAILY
Qty: 30 TABLET | Refills: 5 | Status: SHIPPED | OUTPATIENT
Start: 2022-04-27

## 2022-04-27 NOTE — ASSESSMENT & PLAN NOTE
Pt saw Dr Helga Hope made her shoe inserts which were too high and made her ankles hurt more so she stopped this however she still is having pain marissa ankles   Refer to [facility] podiatrist

## 2022-04-27 NOTE — PATIENT INSTRUCTIONS
Problem List Items Addressed This Visit        Other    Mixed hyperlipidemia     LDL has gone up even more from 149 to 156 HDL is 73 triglycerides are actually great at 93  Relevant Medications    ezetimibe (ZETIA) 10 mg tablet    Other Relevant Orders    Lipid Panel with Direct LDL reflex    Foot pain, bilateral     Pt saw Dr Juvenal Rahman made her shoe inserts which were too high and made her ankles hurt more so she stopped this however she still is having pain marissa ankles  Refer to  podiatrist          Relevant Orders    XR ankle 3+ vw left    XR ankle 3+ vw right    Ambulatory Referral to Podiatry    Elevated BP without diagnosis of hypertension - Primary     Blood pressure very well controlled today  Vitamin D deficiency     Check vitamin D next labs  Relevant Orders    Vitamin D 25 hydroxy    Hyperglycemia     Fasting sugar is normal at 96  A1c is 5 4           Relevant Orders    Comprehensive metabolic panel      Other Visit Diagnoses     Bilateral ankle joint pain        Relevant Orders    XR ankle 3+ vw left    XR ankle 3+ vw right    Ambulatory Referral to Podiatry    Endometrial cancer ([facility] [address] )

## 2022-04-27 NOTE — PROGRESS NOTES
Assessment and Plan:    Problem List Items Addressed This Visit        Other    Mixed hyperlipidemia     LDL has gone up even more from 149 to 156 HDL is 73 triglycerides are actually great at 93  Relevant Medications    ezetimibe (ZETIA) 10 mg tablet    Other Relevant Orders    Lipid Panel with Direct LDL reflex    Foot pain, bilateral     Pt saw Dr Juvenal Rahman made her shoe inserts which were too high and made her ankles hurt more so she stopped this however she still is having pain marissa ankles  Refer to  podiatrist          Relevant Orders    XR ankle 3+ vw left    XR ankle 3+ vw right    Ambulatory Referral to Podiatry    Elevated BP without diagnosis of hypertension - Primary     Blood pressure very well controlled today  Vitamin D deficiency     Check vitamin D next labs  Relevant Orders    Vitamin D 25 hydroxy    Hyperglycemia     Fasting sugar is normal at 96  A1c is 5 4  Relevant Orders    Comprehensive metabolic panel      Other Visit Diagnoses     Bilateral ankle joint pain        Relevant Orders    XR ankle 3+ vw left    XR ankle 3+ vw right    Ambulatory Referral to Podiatry    Endometrial cancer ([facility] )                     Diagnoses and all orders for this visit:    Elevated BP without diagnosis of hypertension    Mixed hyperlipidemia  -     ezetimibe (ZETIA) 10 mg tablet; Take 1 tablet (10 mg total) by mouth daily  -     Lipid Panel with Direct LDL reflex; Future    Hyperglycemia  -     Comprehensive metabolic panel; Future    Vitamin D deficiency  -     Vitamin D 25 hydroxy; Future    Foot pain, bilateral  -     XR ankle 3+ vw left; Future  -     XR ankle 3+ vw right; Future  -     Ambulatory Referral to Podiatry; Future    Bilateral ankle joint pain  -     XR ankle 3+ vw left; Future  -     XR ankle 3+ vw right; Future  -     Ambulatory Referral to Podiatry; Future    Endometrial cancer ([facility] )    Other orders  -     cyanocobalamin (VITAMIN B-12) 1000 MCG tablet;  Take 1,000 mcg by mouth daily  -     Misc Natural Products (GLUCOSAMINE CHOND CMP TRIPLE PO); Take by mouth            Subjective:      Patient ID: Alexandro Lopez is a 58 y o  female  CC:    Chief Complaint   Patient presents with    Follow-up     f/u to chronic conditions and review lab results  mjs    Results    Ankle Pain     c/o B/L ankle pain  She has been treated in the past by Podiatry and PT  Onset for a few years  But worse since she had her knee surgeries  mjs       HPI:      Alexandro Lopez is here for chronic conditions f/u including the diagnosis of No diagnosis found    Pt  states they are taking all medications as directed without complaints or side effects   Pt  had labs done prior to today's visit which included Recent Results (from the past 672 hour(s))  -Hemoglobin A1C:   Collection Time: 04/21/22  9:15 AM       Result                      Value             Ref Range           Hemoglobin A1C              5 4               Normal 3 8-5*       EAG                         108               mg/dl          -Comprehensive metabolic panel:   Collection Time: 04/21/22  9:15 AM       Result                      Value             Ref Range           Sodium                      142               136 - 145 mm*       Potassium                   4 8               3 5 - 5 3 mm*       Chloride                    110 (H)           100 - 108 mm*       CO2                         30                21 - 32 mmol*       ANION GAP                   2 (L)             4 - 13 mmol/L       BUN                         31 (H)            5 - 25 mg/dL        Creatinine                  0 84              0 60 - 1 30 *       Glucose, Fasting            96                65 - 99 mg/dL       Calcium                     9 7               8 3 - 10 1 m*       Corrected Calcium           10 2 (H)          8 3 - 10 1 m*       AST                         40                5 - 45 U/L          ALT                         46 12 - 78 U/L         Alkaline Phosphatase        71                46 - 116 U/L        Total Protein               6 8               6 4 - 8 2 g/*       Albumin                     3 4 (L)           3 5 - 5 0 g/*       Total Bilirubin             0 62              0 20 - 1 00 *       eGFR                        74                ml/min/1 73s*  -Lipid Panel with Direct LDL reflex:   Collection Time: 04/21/22  9:15 AM       Result                      Value             Ref Range           Cholesterol                 248 (H)           See Comment *       Triglycerides               93                See Comment *       HDL, Direct                 73                >=50 mg/dL          LDL Calculated              156 (H)           0 - 100 mg/dL        The following portions of the patient's history were reviewed and updated as appropriate: allergies, current medications, past family history, past medical history, past social history, past surgical history and problem list       Review of Systems   Constitutional: Negative  HENT: Negative  Eyes: Negative  Respiratory: Negative  Cardiovascular: Negative  Gastrointestinal: Negative  Endocrine: Negative  Genitourinary: Negative  Musculoskeletal: Negative  Skin: Negative  Allergic/Immunologic: Negative  Neurological: Negative  Hematological: Negative  Psychiatric/Behavioral: Negative  Data to review:       Objective:    Vitals:    04/27/22 1012   BP: 124/74   Pulse: 80   Weight: 94 3 kg (208 lb)   Height: 5' 2" (1 575 m)        Physical Exam  Vitals and nursing note reviewed  Constitutional:       Appearance: Normal appearance  She is well-developed  HENT:      Head: Normocephalic and atraumatic  Eyes:      General: Lids are normal       Conjunctiva/sclera: Conjunctivae normal       Pupils: Pupils are equal, round, and reactive to light  Cardiovascular:      Rate and Rhythm: Normal rate and regular rhythm        Heart sounds: No murmur heard  Pulmonary:      Effort: Pulmonary effort is normal       Breath sounds: Normal breath sounds  Skin:     General: Skin is warm and dry  Neurological:      General: No focal deficit present  Mental Status: She is alert  Coordination: Coordination is intact  Psychiatric:         Mood and Affect: Mood normal          Behavior: Behavior normal  Behavior is cooperative  Thought Content: Thought content normal          Judgment: Judgment normal          BMI Counseling: Body mass index is 38 04 kg/m²  The BMI is above normal  Nutrition recommendations include reducing portion sizes, decreasing overall calorie intake, 3-5 servings of fruits/vegetables daily, reducing fast food intake, consuming healthier snacks, decreasing soda and/or juice intake, moderation in carbohydrate intake, increasing intake of lean protein, reducing intake of saturated fat and trans fat and reducing intake of cholesterol  BMI Counseling: Body mass index is 38 04 kg/m²  The BMI is above normal  Nutrition recommendations include decreasing portion sizes, encouraging healthy choices of fruits and vegetables, decreasing fast food intake, consuming healthier snacks, limiting drinks that contain sugar, moderation in carbohydrate intake, increasing intake of lean protein, reducing intake of saturated and trans fat and reducing intake of cholesterol  Exercise recommendations include exercising 3-5 times per week  Rationale for BMI follow-up plan is due to patient being overweight or obese  Depression Screening and Follow-up Plan: Patient was screened for depression during today's encounter  They screened negative with a PHQ-2 score of 0

## 2022-05-18 ENCOUNTER — APPOINTMENT (OUTPATIENT)
Dept: RADIOLOGY | Facility: CLINIC | Age: 62
End: 2022-05-18
Payer: COMMERCIAL

## 2022-05-18 ENCOUNTER — OFFICE VISIT (OUTPATIENT)
Dept: PODIATRY | Facility: CLINIC | Age: 62
End: 2022-05-18
Payer: COMMERCIAL

## 2022-05-18 VITALS
HEIGHT: 62 IN | WEIGHT: 204.4 LBS | BODY MASS INDEX: 37.61 KG/M2 | HEART RATE: 65 BPM | DIASTOLIC BLOOD PRESSURE: 105 MMHG | SYSTOLIC BLOOD PRESSURE: 148 MMHG

## 2022-05-18 DIAGNOSIS — M79.672 FOOT PAIN, BILATERAL: ICD-10-CM

## 2022-05-18 DIAGNOSIS — M24.874 OTHER SPECIFIC JOINT DERANGEMENTS OF RIGHT FOOT, NOT ELSEWHERE CLASSIFIED: ICD-10-CM

## 2022-05-18 DIAGNOSIS — M25.572 BILATERAL ANKLE JOINT PAIN: ICD-10-CM

## 2022-05-18 DIAGNOSIS — M24.875 OTHER SPECIFIC JOINT DERANGEMENTS LEFT FOOT, NOT ELSEWHERE CLASSIFIED: ICD-10-CM

## 2022-05-18 DIAGNOSIS — M79.671 FOOT PAIN, BILATERAL: ICD-10-CM

## 2022-05-18 DIAGNOSIS — M25.571 BILATERAL ANKLE JOINT PAIN: ICD-10-CM

## 2022-05-18 DIAGNOSIS — M21.41 PES PLANUS OF BOTH FEET: Primary | ICD-10-CM

## 2022-05-18 DIAGNOSIS — M21.42 PES PLANUS OF BOTH FEET: Primary | ICD-10-CM

## 2022-05-18 PROCEDURE — 1036F TOBACCO NON-USER: CPT | Performed by: PODIATRIST

## 2022-05-18 PROCEDURE — 99203 OFFICE O/P NEW LOW 30 MIN: CPT | Performed by: PODIATRIST

## 2022-05-18 PROCEDURE — 73610 X-RAY EXAM OF ANKLE: CPT

## 2022-05-18 PROCEDURE — 3008F BODY MASS INDEX DOCD: CPT | Performed by: PODIATRIST

## 2022-05-18 PROCEDURE — 20600 DRAIN/INJ JOINT/BURSA W/O US: CPT | Performed by: PODIATRIST

## 2022-05-18 RX ORDER — UBIDECARENONE 50 MG
CAPSULE ORAL DAILY
COMMUNITY

## 2022-05-18 NOTE — LETTER
May 20, 2022     Christian Sepulveda, [address]  [place]   [address]  [id number]    Patient: Andreia Mcmahan   YOB: 1960   Date of Visit: 5/18/2022       Dear Dr Mike Andujar: Thank you for referring Cherie Craig to me for evaluation  Below are my notes for this consultation  If you have questions, please do not hesitate to call me  I look forward to following your patient along with you  Sincerely,        Diego Arnold DPM        CC: No Recipients  Jean Paul Gamino  5/20/2022  2:48 PM  Signed                 PATIENT:  Andreia Mcmahan  1960       ASSESSMENT:     1  Pes planus of both feet     2  Other specific joint derangements of right foot, not elsewhere classified  Small joint arthrocentesis: R subtalar   3  Other specific joint derangements left foot, not elsewhere classified     4  Foot pain, bilateral  Ambulatory Referral to Podiatry   5  Bilateral ankle joint pain  Ambulatory Referral to Podiatry             PLAN:  1  Patient was counseled and educated on the condition and the diagnosis  2  X-ray of bilateral foot and ankles was personally reviewed  The radiological findings were discussed with the patient  3  The diagnosis, treatment options and prognosis were discussed with the patient  4  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  Treatment options were discussed and the patient wished to proceed with an injection  See procedure  5  Instructed supportive care, home exercise, icing, and proper footwear/ arch support  6   Patient will return in 4 weeks for re-evaluation  Small joint arthrocentesis: R subtalar  Universal Protocol:  Consent: Verbal consent obtained  Risks and benefits: risks, benefits and alternatives were discussed  Consent given by: patient  Time out: Immediately prior to procedure a "time out" was called to verify the correct patient, procedure, equipment, support staff and site/side marked as required    Timeout called at: 5/18/2022 9:45 AM   Patient understanding: patient states understanding of the procedure being performed  Site marked: the operative site was marked  Patient identity confirmed: verbally with patient    Supporting Documentation  Indications: pain   Procedure Details  Location: foot - R subtalar  Needle size: 25 G  Ultrasound guidance: no  Approach: lateral  Medications administered: 20 mg triamcinolone acetonide 40 mg/mL; 2 mL lidocaine 1 %    Patient tolerance: patient tolerated the procedure well with no immediate complications              Subjective:       HPI  The patient was referred to my office for bilateral foot/ ankle pain  She has a chief complaint of pain around ankles, right worse than left for about 2 years  She has seen 4 other specialists  She was treated with different orthotics  No significant improvement with treatment  Pain level is about 8-9 out of 10  She has post-static dyskinesia  Pain also increases with walking and standing  The patient does not recall any injury  Denied any swelling  No associated numbness or paresthesia  No significant weakness  The following portions of the patient's history were reviewed and updated as appropriate: allergies, current medications, past family history, past medical history, past social history, past surgical history and problem list   All pertinent labs and images were reviewed        Past Medical History  Past Medical History:   Diagnosis Date    Ambulatory dysfunction     uses walking stick    Chronic pain disorder     Claustrophobia     mild    DDD (degenerative disc disease), lumbar     Endometrial cancer ([facility] [address] ) 7/6/2017    Low back pain     Mild acid reflux     OA (osteoarthritis)     marissa knees    Spinal stenosis of lumbosacral region     Uterine cancer ([facility] [address] )     2017- hysterectomy and chemo    Wears glasses        Past Surgical History  Past Surgical History:   Procedure Laterality Date    COLONOSCOPY      ORTHOPEDIC SURGERY      PLANTAR FASCIA SURGERY Bilateral     ID HYSTEROSCOPY,W/ENDO BX N/A 2/28/2017    Procedure: DILATATION AND CURETTAGE (D&C) WITH HYSTEROSCOPY,POLYPECTOMY;  Surgeon: Sahra Bhakta MD;  Location: [facility];  Service: Gynecology    ID TOTAL KNEE ARTHROPLASTY Right 11/30/2020    Procedure: ARTHROPLASTY KNEE TOTAL;  Surgeon: Albino Kendrick MD;  [facility]: [facility];  Service: Orthopedics    REPLACEMENT TOTAL KNEE Left 10/22/2021    SHOULDER SURGERY Left     TONSILLECTOMY      WISDOM TOOTH EXTRACTION          Allergies: Iv contrast [iodinated diagnostic agents] and Vitamin c [ascorbate - food allergy]    Medications:  Current Outpatient Medications   Medication Sig Dispense Refill    Cholecalciferol (HM Vitamin D3) 100 MCG (4000 UT) CAPS Take 1 capsule (4,000 Units total) by mouth daily 30 capsule 0    cyanocobalamin (VITAMIN B-12) 1000 MCG tablet Take 1,000 mcg by mouth daily      ezetimibe (ZETIA) 10 mg tablet Take 1 tablet (10 mg total) by mouth daily 30 tablet 5    Misc Natural Products (GLUCOSAMINE CHOND CMP TRIPLE PO) Take by mouth      Omega 3-6-9 Fatty Acids (OMEGA 3-6-9 PO) Take 1 capsule by mouth daily      PANTETHINE PO Take 1 tablet by mouth 2 (two) times a day      Red Yeast Rice 600 MG TABS Take by mouth daily 2 Tab's once daily(morning)       No current facility-administered medications for this visit  Social History:  Social History     Socioeconomic History    Marital status:       Spouse name: None    Number of children: None    Years of education: None    Highest education level: None   Occupational History    None   Tobacco Use    Smoking status: Never Smoker    Smokeless tobacco: Never Used    Tobacco comment: No secondhand smoke exposure   Vaping Use    Vaping Use: Never used   Substance and Sexual Activity    Alcohol use: Yes     Comment: very rarely    Drug use: No    Sexual activity: None   Other Topics Concern    None   Social History Narrative    None     Social Determinants of Health     Financial Resource Strain: Not on file   Food Insecurity: Not on file   Transportation Needs: Not on file   Physical Activity: Not on file   Stress: Not on file   Social Connections: Not on file   Intimate Partner Violence: Not on file   Housing Stability: Not on file          Review of Systems   Constitutional: Negative for appetite change, chills and fever  HENT: Negative for sore throat  Respiratory: Negative for cough and shortness of breath  Cardiovascular: Negative for chest pain  Gastrointestinal: Negative for diarrhea, nausea and vomiting  Musculoskeletal: Positive for arthralgias  Skin: Negative for rash and wound  Allergic/Immunologic: Negative for immunocompromised state  Neurological: Negative for weakness and numbness  Hematological: Negative  Psychiatric/Behavioral: Negative for behavioral problems and confusion  Objective:      BP (!) 148/105   Pulse 65   Ht 5' 2" (1 575 m) Comment: verbal  Wt 92 7 kg (204 lb 6 4 oz)   BMI 37 39 kg/m²          Physical Exam  Vitals reviewed  Constitutional:       General: She is not in acute distress  Appearance: She is obese  She is not toxic-appearing  HENT:      Head: Normocephalic and atraumatic  Eyes:      Extraocular Movements: Extraocular movements intact  Cardiovascular:      Rate and Rhythm: Normal rate and regular rhythm  Pulses: Normal pulses  Dorsalis pedis pulses are 2+ on the right side and 2+ on the left side  Posterior tibial pulses are 2+ on the right side and 2+ on the left side  Pulmonary:      Effort: Pulmonary effort is normal  No respiratory distress  Musculoskeletal:         General: Tenderness present  No signs of injury  Cervical back: Normal range of motion and neck supple  Right lower leg: No edema  Left lower leg: No edema  Right foot: No foot drop  Left foot: No foot drop  Comments: Focal pain and mild fullness bilateral sinus tarsi, right worse than left  No pain around ankle ligaments / tendon  No ankle instability  Skin:     General: Skin is warm  Capillary Refill: Capillary refill takes less than 2 seconds  Coloration: Skin is not cyanotic or mottled  Findings: No abscess, ecchymosis, erythema or wound  Nails: There is no clubbing  Neurological:      General: No focal deficit present  Mental Status: She is alert and oriented to person, place, and time  Cranial Nerves: No cranial nerve deficit  Sensory: No sensory deficit  Coordination: Coordination normal    Psychiatric:         Mood and Affect: Mood normal          Behavior: Behavior normal          Thought Content:  Thought content normal          Judgment: Judgment normal

## 2022-05-18 NOTE — PROGRESS NOTES
PATIENT:  Tremaine Simpson  1960       ASSESSMENT:     1  Pes planus of both feet     2  Other specific joint derangements of right foot, not elsewhere classified  Small joint arthrocentesis: R subtalar   3  Other specific joint derangements left foot, not elsewhere classified     4  Foot pain, bilateral  Ambulatory Referral to Podiatry   5  Bilateral ankle joint pain  Ambulatory Referral to Podiatry             PLAN:  1  Patient was counseled and educated on the condition and the diagnosis  2  X-ray of bilateral foot and ankles was personally reviewed  The radiological findings were discussed with the patient  3  The diagnosis, treatment options and prognosis were discussed with the patient  4  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  Treatment options were discussed and the patient wished to proceed with an injection  See procedure  5  Instructed supportive care, home exercise, icing, and proper footwear/ arch support  6   Patient will return in 4 weeks for re-evaluation  Small joint arthrocentesis: R subtalar  Universal Protocol:  Consent: Verbal consent obtained  Risks and benefits: risks, benefits and alternatives were discussed  Consent given by: patient  Time out: Immediately prior to procedure a "time out" was called to verify the correct patient, procedure, equipment, support staff and site/side marked as required    Timeout called at: 5/18/2022 9:45 AM   Patient understanding: patient states understanding of the procedure being performed  Site marked: the operative site was marked  Patient identity confirmed: verbally with patient    Supporting Documentation  Indications: pain   Procedure Details  Location: foot - R subtalar  Needle size: 25 G  Ultrasound guidance: no  Approach: lateral  Medications administered: 20 mg triamcinolone acetonide 40 mg/mL; 2 mL lidocaine 1 %    Patient tolerance: patient tolerated the procedure well with no immediate complications              Subjective:       HPI  The patient was referred to my office for bilateral foot/ ankle pain  She has a chief complaint of pain around ankles, right worse than left for about 2 years  She has seen 4 other specialists  She was treated with different orthotics  No significant improvement with treatment  Pain level is about 8-9 out of 10  She has post-static dyskinesia  Pain also increases with walking and standing  The patient does not recall any injury  Denied any swelling  No associated numbness or paresthesia  No significant weakness  The following portions of the patient's history were reviewed and updated as appropriate: allergies, current medications, past family history, past medical history, past social history, past surgical history and problem list   All pertinent labs and images were reviewed        Past Medical History  Past Medical History:   Diagnosis Date    Ambulatory dysfunction     uses walking stick    Chronic pain disorder     Claustrophobia     mild    DDD (degenerative disc disease), lumbar     Endometrial cancer ([facility] [address] ) 7/6/2017    Low back pain     Mild acid reflux     OA (osteoarthritis)     marissa knees    Spinal stenosis of lumbosacral region     Uterine cancer ([facility] [address] )     2017- hysterectomy and chemo    Wears glasses        Past Surgical History  Past Surgical History:   Procedure Laterality Date    COLONOSCOPY      ORTHOPEDIC SURGERY      PLANTAR FASCIA SURGERY Bilateral     DC HYSTEROSCOPY,W/ENDO BX N/A 2/28/2017    Procedure: DILATATION AND CURETTAGE (D&C) WITH HYSTEROSCOPY,POLYPECTOMY;  Surgeon: Jaqueline Olmstead MD;  [facility];  Service: Gynecology    DC TOTAL KNEE ARTHROPLASTY Right 11/30/2020    Procedure: ARTHROPLASTY KNEE TOTAL;  Surgeon: Elmira Bucio MD;  [facility];  Service: Orthopedics    REPLACEMENT TOTAL KNEE Left 10/22/2021    SHOULDER SURGERY Left     TONSILLECTOMY      WISDOM TOOTH EXTRACTION          Allergies: Iv contrast [iodinated diagnostic agents] and Vitamin c [ascorbate - food allergy]    Medications:  Current Outpatient Medications   Medication Sig Dispense Refill    Cholecalciferol (HM Vitamin D3) 100 MCG (4000 UT) CAPS Take 1 capsule (4,000 Units total) by mouth daily 30 capsule 0    cyanocobalamin (VITAMIN B-12) 1000 MCG tablet Take 1,000 mcg by mouth daily      ezetimibe (ZETIA) 10 mg tablet Take 1 tablet (10 mg total) by mouth daily 30 tablet 5    Misc Natural Products (GLUCOSAMINE CHOND CMP TRIPLE PO) Take by mouth      Omega 3-6-9 Fatty Acids (OMEGA 3-6-9 PO) Take 1 capsule by mouth daily      PANTETHINE PO Take 1 tablet by mouth 2 (two) times a day      Red Yeast Rice 600 MG TABS Take by mouth daily 2 Tab's once daily(morning)       No current facility-administered medications for this visit  Social History:  Social History     Socioeconomic History    Marital status:      Spouse name: None    Number of children: None    Years of education: None    Highest education level: None   Occupational History    None   Tobacco Use    Smoking status: Never Smoker    Smokeless tobacco: Never Used    Tobacco comment: No secondhand smoke exposure   Vaping Use    Vaping Use: Never used   Substance and Sexual Activity    Alcohol use: Yes     Comment: very rarely    Drug use: No    Sexual activity: None   Other Topics Concern    None   Social History Narrative    None     Social Determinants of Health     Financial Resource Strain: Not on file   Food Insecurity: Not on file   Transportation Needs: Not on file   Physical Activity: Not on file   Stress: Not on file   Social Connections: Not on file   Intimate Partner Violence: Not on file   Housing Stability: Not on file          Review of Systems   Constitutional: Negative for appetite change, chills and fever  HENT: Negative for sore throat  Respiratory: Negative for cough and shortness of breath  Cardiovascular: Negative for chest pain  Gastrointestinal: Negative for diarrhea, nausea and vomiting  Musculoskeletal: Positive for arthralgias  Skin: Negative for rash and wound  Allergic/Immunologic: Negative for immunocompromised state  Neurological: Negative for weakness and numbness  Hematological: Negative  Psychiatric/Behavioral: Negative for behavioral problems and confusion  Objective:      BP (!) 148/105   Pulse 65   Ht 5' 2" (1 575 m) Comment: verbal  Wt 92 7 kg (204 lb 6 4 oz)   BMI 37 39 kg/m²          Physical Exam  Vitals reviewed  Constitutional:       General: She is not in acute distress  Appearance: She is obese  She is not toxic-appearing  HENT:      Head: Normocephalic and atraumatic  Eyes:      Extraocular Movements: Extraocular movements intact  Cardiovascular:      Rate and Rhythm: Normal rate and regular rhythm  Pulses: Normal pulses  Dorsalis pedis pulses are 2+ on the right side and 2+ on the left side  Posterior tibial pulses are 2+ on the right side and 2+ on the left side  Pulmonary:      Effort: Pulmonary effort is normal  No respiratory distress  Musculoskeletal:         General: Tenderness present  No signs of injury  Cervical back: Normal range of motion and neck supple  Right lower leg: No edema  Left lower leg: No edema  Right foot: No foot drop  Left foot: No foot drop  Comments: Focal pain and mild fullness bilateral sinus tarsi, right worse than left  No pain around ankle ligaments / tendon  No ankle instability  Skin:     General: Skin is warm  Capillary Refill: Capillary refill takes less than 2 seconds  Coloration: Skin is not cyanotic or mottled  Findings: No abscess, ecchymosis, erythema or wound  Nails: There is no clubbing  Neurological:      General: No focal deficit present  Mental Status: She is alert and oriented to person, place, and time  Cranial Nerves: No cranial nerve deficit  Sensory: No sensory deficit  Coordination: Coordination normal    Psychiatric:         Mood and Affect: Mood normal          Behavior: Behavior normal          Thought Content:  Thought content normal          Judgment: Judgment normal

## 2022-05-20 RX ORDER — TRIAMCINOLONE ACETONIDE 40 MG/ML
20 INJECTION, SUSPENSION INTRA-ARTICULAR; INTRAMUSCULAR
Status: SHIPPED | OUTPATIENT
Start: 2022-05-20

## 2022-05-20 RX ORDER — LIDOCAINE HYDROCHLORIDE 10 MG/ML
2 INJECTION, SOLUTION INFILTRATION; PERINEURAL
Status: SHIPPED | OUTPATIENT
Start: 2022-05-20

## 2022-05-20 RX ADMIN — TRIAMCINOLONE ACETONIDE 20 MG: 40 INJECTION, SUSPENSION INTRA-ARTICULAR; INTRAMUSCULAR at 14:45

## 2022-05-20 RX ADMIN — LIDOCAINE HYDROCHLORIDE 2 ML: 10 INJECTION, SOLUTION INFILTRATION; PERINEURAL at 14:45

## 2022-05-22 NOTE — RESULT ENCOUNTER NOTE
Please let pt know that her ankle xrays show she has degenerative changes and spurring  She should keep her apt next month with podiatry

## 2022-06-01 ENCOUNTER — OFFICE VISIT (OUTPATIENT)
Dept: OBGYN CLINIC | Facility: CLINIC | Age: 62
End: 2022-06-01
Payer: COMMERCIAL

## 2022-06-01 VITALS
DIASTOLIC BLOOD PRESSURE: 70 MMHG | SYSTOLIC BLOOD PRESSURE: 112 MMHG | WEIGHT: 204 LBS | HEIGHT: 62 IN | BODY MASS INDEX: 37.54 KG/M2

## 2022-06-01 DIAGNOSIS — Z12.31 ENCOUNTER FOR SCREENING MAMMOGRAM FOR BREAST CANCER: ICD-10-CM

## 2022-06-01 DIAGNOSIS — Z85.42 HISTORY OF ENDOMETRIAL CANCER: ICD-10-CM

## 2022-06-01 DIAGNOSIS — Z01.419 ENCOUNTER FOR GYNECOLOGICAL EXAMINATION WITHOUT ABNORMAL FINDING: Primary | ICD-10-CM

## 2022-06-01 PROCEDURE — 99386 PREV VISIT NEW AGE 40-64: CPT | Performed by: NURSE PRACTITIONER

## 2022-06-01 PROCEDURE — 1036F TOBACCO NON-USER: CPT | Performed by: NURSE PRACTITIONER

## 2022-06-01 PROCEDURE — 3008F BODY MASS INDEX DOCD: CPT | Performed by: NURSE PRACTITIONER

## 2022-06-01 RX ORDER — IBUPROFEN 200 MG
TABLET ORAL EVERY 6 HOURS PRN
COMMUNITY

## 2022-06-01 NOTE — PROGRESS NOTES
Assessment / Plan    1  Encounter for gynecological examination without abnormal finding  Normal well woman exam  Cervical cancer screening not indicated, post total hyst for stage 1B EM cancer  Up to date on colonoscopy    2  Encounter for screening mammogram for breast cancer  Order provided for update    - Mammo screening bilateral w 3d & cad; Future    3  History of endometrial cancer  2017, RA tot hyst/BSO, RT  5 years out        Subjective      Chaparro Pencil is a 58 y o  female who presents for her annual gynecologic exam   NEW PATIENT    57 yo G0, PMF  HX of complete hysterectomy w/ BSO for early stage endometrial cancer, 2017, w/ BSO, Dr Omar Almeida  She will be 5 years out from treatment this July  Her insurance changed and she needed to change to [facility] provider  Last pap: approx   Last mammogram: 2021  Colonoscopy: , 5 yr update  Does robotic exercise 4x week    Periods are absent  Current contraception: status post hysterectomy  History of abnormal Pap smear: no  Family history of breast,uterine, ovarian or colon cancer: yes - Mother- ovarian cancer    Menstrual History:  OB History        0    Para   0    Term   0       0    AB   0    Living   0       SAB   0    IAB   0    Ectopic   0    Multiple   0    Live Births   0                Menarche age: 15  No LMP recorded  Patient has had a hysterectomy  The following portions of the patient's history were reviewed and updated as appropriate: allergies, current medications, past family history, past medical history, past social history, past surgical history and problem list     Review of Systems      Review of Systems   Constitutional: Negative for chills and fever  Respiratory: Negative for cough and shortness of breath  Gastrointestinal: Negative for abdominal distention, abdominal pain, blood in stool, constipation, diarrhea, nausea and vomiting     Genitourinary: Negative for difficulty urinating, dysuria, frequency, genital sores, hematuria, menstrual problem, pelvic pain, urgency, vaginal bleeding and vaginal discharge  Musculoskeletal: Negative for arthralgias and myalgias  Breasts:  Negative for skin changes, dimpling, asymmetry, nipple discharge, redness, tenderness or palpable masses      Objective      /70 (BP Location: Right arm, Patient Position: Sitting, Cuff Size: Standard)   Ht 5' 2" (1 575 m)   Wt 92 5 kg (204 lb)   BMI 37 31 kg/m²      Physical Exam  Constitutional:       General: She is not in acute distress  Appearance: Normal appearance  She is well-developed  She is obese  She is not ill-appearing or diaphoretic  HENT:      Head: Normocephalic and atraumatic  Eyes:      Pupils: Pupils are equal, round, and reactive to light  Neck:      Thyroid: No thyromegaly  Pulmonary:      Effort: Pulmonary effort is normal    Chest:   Breasts: Breasts are symmetrical       Right: No inverted nipple, mass, nipple discharge, skin change, tenderness or supraclavicular adenopathy  Left: No inverted nipple, mass, nipple discharge, skin change, tenderness or supraclavicular adenopathy  Abdominal:      General: There is no distension  Palpations: Abdomen is soft  There is no mass  Tenderness: There is no abdominal tenderness  There is no guarding or rebound  Genitourinary:     General: Normal vulva  Exam position: Lithotomy position  Labia:         Right: No rash, tenderness, lesion or injury  Left: No rash, tenderness, lesion or injury  Vagina: No signs of injury and foreign body  No vaginal discharge, erythema, tenderness or bleeding  Rectum: Normal       Comments: Cervix, uterus and ovaries are surgically absent  Vagina is foreshortened, ~ 1 inch  Musculoskeletal:      Cervical back: Neck supple  Lymphadenopathy:      Cervical: No cervical adenopathy  Upper Body:      Right upper body: No supraclavicular adenopathy        Left upper body: No supraclavicular adenopathy  Skin:     General: Skin is warm and dry  Neurological:      General: No focal deficit present  Mental Status: She is alert and oriented to person, place, and time  Psychiatric:         Mood and Affect: Mood normal          Behavior: Behavior normal          Thought Content:  Thought content normal          Judgment: Judgment normal

## 2022-06-01 NOTE — Clinical Note
Michelle-- patient is now 5 years post tx for EM cancer-- she no longer needs to f/u with GYN onc, correct?

## 2022-08-29 ENCOUNTER — APPOINTMENT (OUTPATIENT)
Dept: LAB | Facility: CLINIC | Age: 62
End: 2022-08-29
Payer: COMMERCIAL

## 2022-08-29 DIAGNOSIS — E55.9 VITAMIN D DEFICIENCY: ICD-10-CM

## 2022-08-29 DIAGNOSIS — R73.9 HYPERGLYCEMIA: ICD-10-CM

## 2022-08-29 DIAGNOSIS — E78.2 MIXED HYPERLIPIDEMIA: ICD-10-CM

## 2022-08-29 LAB
25(OH)D3 SERPL-MCNC: 60.7 NG/ML (ref 30–100)
ALBUMIN SERPL BCP-MCNC: 3.4 G/DL (ref 3.5–5)
ALP SERPL-CCNC: 79 U/L (ref 46–116)
ALT SERPL W P-5'-P-CCNC: 41 U/L (ref 12–78)
ANION GAP SERPL CALCULATED.3IONS-SCNC: 3 MMOL/L (ref 4–13)
AST SERPL W P-5'-P-CCNC: 32 U/L (ref 5–45)
BILIRUB SERPL-MCNC: 0.75 MG/DL (ref 0.2–1)
BUN SERPL-MCNC: 29 MG/DL (ref 5–25)
CALCIUM ALBUM COR SERPL-MCNC: 10.1 MG/DL (ref 8.3–10.1)
CALCIUM SERPL-MCNC: 9.6 MG/DL (ref 8.3–10.1)
CHLORIDE SERPL-SCNC: 110 MMOL/L (ref 96–108)
CHOLEST SERPL-MCNC: 248 MG/DL
CO2 SERPL-SCNC: 28 MMOL/L (ref 21–32)
CREAT SERPL-MCNC: 0.85 MG/DL (ref 0.6–1.3)
GFR SERPL CREATININE-BSD FRML MDRD: 73 ML/MIN/1.73SQ M
GLUCOSE P FAST SERPL-MCNC: 98 MG/DL (ref 65–99)
HDLC SERPL-MCNC: 68 MG/DL
LDLC SERPL CALC-MCNC: 155 MG/DL (ref 0–100)
POTASSIUM SERPL-SCNC: 5 MMOL/L (ref 3.5–5.3)
PROT SERPL-MCNC: 7.2 G/DL (ref 6.4–8.4)
SODIUM SERPL-SCNC: 141 MMOL/L (ref 135–147)
TRIGL SERPL-MCNC: 125 MG/DL

## 2022-08-29 PROCEDURE — 80053 COMPREHEN METABOLIC PANEL: CPT

## 2022-08-29 PROCEDURE — 82306 VITAMIN D 25 HYDROXY: CPT

## 2022-08-29 PROCEDURE — 36415 COLL VENOUS BLD VENIPUNCTURE: CPT

## 2022-08-29 PROCEDURE — 80061 LIPID PANEL: CPT

## 2022-09-02 ENCOUNTER — OFFICE VISIT (OUTPATIENT)
Dept: FAMILY MEDICINE CLINIC | Facility: CLINIC | Age: 62
End: 2022-09-02
Payer: COMMERCIAL

## 2022-09-02 VITALS
WEIGHT: 201 LBS | HEIGHT: 62 IN | SYSTOLIC BLOOD PRESSURE: 130 MMHG | DIASTOLIC BLOOD PRESSURE: 82 MMHG | TEMPERATURE: 97.1 F | HEART RATE: 54 BPM | BODY MASS INDEX: 36.99 KG/M2

## 2022-09-02 DIAGNOSIS — R03.0 ELEVATED BP WITHOUT DIAGNOSIS OF HYPERTENSION: ICD-10-CM

## 2022-09-02 DIAGNOSIS — R01.1 NEWLY RECOGNIZED HEART MURMUR: ICD-10-CM

## 2022-09-02 DIAGNOSIS — E78.2 MIXED HYPERLIPIDEMIA: ICD-10-CM

## 2022-09-02 DIAGNOSIS — E55.9 VITAMIN D DEFICIENCY: ICD-10-CM

## 2022-09-02 DIAGNOSIS — R73.9 HYPERGLYCEMIA: Primary | ICD-10-CM

## 2022-09-02 PROBLEM — M71.30: Status: ACTIVE | Noted: 2022-09-02

## 2022-09-02 PROCEDURE — 99213 OFFICE O/P EST LOW 20 MIN: CPT | Performed by: PHYSICIAN ASSISTANT

## 2022-09-02 NOTE — PROGRESS NOTES
Assessment and Plan:    Problem List Items Addressed This Visit        Other    Mixed hyperlipidemia     Patient is not taking any medication for cholesterol her LDL continues to be elevated at 155  She tried red yeast rice since the last labs and no change  PT was only taking 1/2 of the recommended rice so she would like to take the full dose and check in 3-4 months and then may be willing to try the zetia that was previously given to her and she never started  Relevant Orders    Comprehensive metabolic panel    Lipid Panel with Direct LDL reflex    Elevated BP without diagnosis of hypertension     Stable blood pressure today without medication  Vitamin D deficiency     Vitamin-D is normal at 60 continue supplementation check yearly  Hyperglycemia - Primary     Fasting sugar good at 98  Still be conscious of simple carbohydrate use especially bread pasta potatoes rice junk food desserts and sweets  Newly recognized heart murmur     Very slight  No need for ECHO at this time  Diagnoses and all orders for this visit:    Hyperglycemia    Mixed hyperlipidemia  -     Comprehensive metabolic panel; Future  -     Lipid Panel with Direct LDL reflex; Future    Elevated BP without diagnosis of hypertension    Vitamin D deficiency    Newly recognized heart murmur            Subjective:      Patient ID: Jasmyen Manrique is a 58 y o  female  CC:    Chief Complaint   Patient presents with    Follow-up     4 month f/u        HPI:      Jasmyne Manrique is here for chronic conditions f/u including the diagnosis of No diagnosis found    Pt  states they are taking all medications as directed without complaints or side effects   Pt  had labs done prior to today's visit which included Recent Results (from the past 672 hour(s))  -Lipid Panel with Direct LDL reflex:   Collection Time: 08/29/22  9:13 AM       Result                      Value             Ref Range Cholesterol                 248 (H)           See Comment *       Triglycerides               125               See Comment *       HDL, Direct                 68                >=50 mg/dL          LDL Calculated              155 (H)           0 - 100 mg/dL  -Comprehensive metabolic panel:   Collection Time: 08/29/22  9:13 AM       Result                      Value             Ref Range           Sodium                      141               135 - 147 mm*       Potassium                   5 0               3 5 - 5 3 mm*       Chloride                    110 (H)           96 - 108 mmo*       CO2                         28                21 - 32 mmol*       ANION GAP                   3 (L)             4 - 13 mmol/L       BUN                         29 (H)            5 - 25 mg/dL        Creatinine                  0 85              0 60 - 1 30 *       Glucose, Fasting            98                65 - 99 mg/dL       Calcium                     9 6               8 3 - 10 1 m*       Corrected Calcium           10 1              8 3 - 10 1 m*       AST                         32                5 - 45 U/L          ALT                         41                12 - 78 U/L         Alkaline Phosphatase        79                46 - 116 U/L        Total Protein               7 2               6 4 - 8 4 g/*       Albumin                     3 4 (L)           3 5 - 5 0 g/*       Total Bilirubin             0 75              0 20 - 1 00 *       eGFR                        73                ml/min/1 73s*  -Vitamin D 25 hydroxy:   Collection Time: 08/29/22  9:13 AM       Result                      Value             Ref Range           Vit D, 25-Hydroxy           60 7              30 0 - 100 0*        The following portions of the patient's history were reviewed and updated as appropriate: allergies, current medications, past family history, past medical history, past social history, past surgical history and problem list       Review of Systems   Constitutional: Negative  HENT: Negative  Eyes: Negative  Respiratory: Negative  Cardiovascular: Negative  Gastrointestinal: Negative  Endocrine: Negative  Genitourinary: Negative  Musculoskeletal: Negative  Skin: Negative  Allergic/Immunologic: Negative  Neurological: Negative  Hematological: Negative  Psychiatric/Behavioral: Negative  Data to review:       Objective:    Vitals:    09/02/22 1111   BP: 130/82   BP Location: Left arm   Patient Position: Sitting   Cuff Size: Adult   Pulse: (!) 54   Temp: (!) 97 1 °F (36 2 °C)   TempSrc: Temporal   Weight: 91 2 kg (201 lb)   Height: 5' 2" (1 575 m)        Physical Exam  Vitals and nursing note reviewed  Constitutional:       Appearance: Normal appearance  She is well-developed  HENT:      Head: Normocephalic and atraumatic  Eyes:      General: Lids are normal       Conjunctiva/sclera: Conjunctivae normal       Pupils: Pupils are equal, round, and reactive to light  Cardiovascular:      Rate and Rhythm: Normal rate and regular rhythm  Heart sounds: Murmur heard  Systolic murmur is present with a grade of 1/6  Pulmonary:      Effort: Pulmonary effort is normal       Breath sounds: Normal breath sounds  Skin:     General: Skin is warm and dry  Neurological:      General: No focal deficit present  Mental Status: She is alert  Coordination: Coordination is intact  Psychiatric:         Mood and Affect: Mood normal          Behavior: Behavior normal  Behavior is cooperative  Thought Content:  Thought content normal          Judgment: Judgment normal

## 2022-09-02 NOTE — ASSESSMENT & PLAN NOTE
Patient is not taking any medication for cholesterol her LDL continues to be elevated at 155  She tried red yeast rice since the last labs and no change  PT was only taking 1/2 of the recommended rice so she would like to take the full dose and check in 3-4 months and then may be willing to try the zetia that was previously given to her and she never started

## 2022-09-02 NOTE — ASSESSMENT & PLAN NOTE
Fasting sugar good at 98  Still be conscious of simple carbohydrate use especially bread pasta potatoes rice junk food desserts and sweets

## 2022-09-02 NOTE — PATIENT INSTRUCTIONS
Problem List Items Addressed This Visit          Other    Mixed hyperlipidemia     Patient is not taking any medication for cholesterol her LDL continues to be elevated at 155  She tried red yeast rice since the last labs and no change  PT was only taking 1/2 of the recommended rice so she would like to take the full dose and check in 3-4 months and then may be willing to try the zetia that was previously given to her and she never started  Relevant Orders    Comprehensive metabolic panel    Lipid Panel with Direct LDL reflex    Elevated BP without diagnosis of hypertension     Stable blood pressure today without medication  Vitamin D deficiency     Vitamin-D is normal at 60 continue supplementation check yearly  Hyperglycemia - Primary     Fasting sugar good at 98  Still be conscious of simple carbohydrate use especially bread pasta potatoes rice junk food desserts and sweets  Newly recognized heart murmur     Very slight  No need for ECHO at this time  Hyperlipidemia   WHAT YOU NEED TO KNOW:   What is hyperlipidemia? Hyperlipidemia is a high level of lipids (fats) in your blood  These lipids include cholesterol or triglycerides  Lipids are made by your body  They also come from the foods you eat  Your body needs lipids to work properly, but high levels increase your risk for heart disease, heart attack, and stroke  What increases my risk for hyperlipidemia? Family history of high lipid levels    Diet high in saturated fats, cholesterol, or calories    High alcohol intake or smoking    Lack of regular physical activity    Medical conditions such as hypothyroidism, obesity, or type 2 diabetes    Certain medicines, such as blood pressure medicines, hormones, and steroids    How is hyperlipidemia managed and treated? Your healthcare provider may first recommend that you make lifestyle changes to help decrease your lipid levels   Your provider may recommend you work with a team to manage hyperlipidemia  The team may include medical experts such as a dietitian, an exercise or physical therapist, and a behavior therapist  Your family members may be included in helping you create lifestyle changes  You may also need to take medicine to lower your lipid levels  Some of the lifestyle changes you may need to make include the following:  Maintain a healthy weight  Ask your healthcare provider what a healthy weight is for you  Ask him or her to help you create a weight loss plan if you are overweight  Weight loss can decrease your cholesterol and triglyceride levels  Be physically active throughout the day  Physical activity, such as exercise, lowers your cholesterol levels and helps you maintain a healthy weight  Get 30 minutes or more of aerobic exercise 4 to 6 days each week  You can split your exercise into four 10-minute workouts instead of 30 minutes at one time  Examples of aerobic exercises include walking briskly, swimming, or riding a bike  Work with your healthcare provider to plan the best exercise program for you  Also include strength training at least 2 times each week  Your healthcare providers can help you create a physical activity plan  Do not smoke  Nicotine and other chemicals in cigarettes and cigars can increase your risk for a heart attack and stroke  Ask your healthcare provider for information if you currently smoke and need help to quit  E-cigarettes or smokeless tobacco still contain nicotine  Talk to your healthcare provider before you use these products  Eat heart-healthy foods  A dietitian or your provider can give you more information on low-sodium plans or the DASH (Dietary Approaches to Stop Hypertension) eating plan  The DASH plan is low in sodium, processed sugar, unhealthy fats, and total fat  It is high in potassium, calcium, and fiber  It is high in potassium, calcium, and fiber   These can be found in vegetables, fruit, and whole-grain foods  The following are ways to get more heart-healthy foods:         Decrease the total amount of fat you eat  Choose lean meats, fat-free or 1% fat milk, and low-fat dairy products, such as yogurt and cheese  Limit or do not eat red meat  Red meats are high in fat and cholesterol  Replace unhealthy fats with healthy fats  Unhealthy fats include saturated fat, trans fat, and cholesterol  Choose soft margarines that are low in saturated fat and have little or no trans fat  Monounsaturated fats are healthy fats  These are found in olive oil, canola oil, avocado, and nuts  Polyunsaturated fats are also healthy  These are found in fish, flaxseed, walnuts, and soybeans  Eat 5 or more servings of fruits and vegetables every day  They are low in calories and fat, and a good source of essential vitamins  Include dark green, red, and orange vegetables  Examples include spinach, kale, broccoli, and carrots  Eat foods high in fiber  Fiber can help lower your cholesterol levels  Choose whole grain, high-fiber foods  Good choices include whole-wheat breads or cereals, beans, peas, fruits, and vegetables  Limit sodium (salt) as directed  Too much sodium can affect your fluid balance and blood pressure  Your healthcare provider will tell you how much sodium and potassium are safe for you to have in a day  He or she may recommend that you limit sodium to 2,300 mg a day  Your provider or a dietitian can help you find ways to limit sodium  For example, if you add salt while you cook, do not add more salt at the table  Check labels to find low-sodium or no-salt-added foods  Some low-sodium foods use potassium salts for flavor  Too much potassium can also cause health problems  Ask your healthcare provider if it is okay for you to drink alcohol  Alcohol can increase your cholesterol and triglyceride levels   Your provider can tell you how many drinks are okay to have within 24 hours and within 1 week  A drink of alcohol is 12 ounces of beer, 5 ounces of wine, or 1½ ounces of liquor  Call your local emergency number (911 in the [address]) or have someone call if:   You have any of the following signs of a heart attack:      Squeezing, pressure, or pain in your chest    You may  also have any of the following:     Discomfort or pain in your back, neck, jaw, stomach, or arm    Shortness of breath    Nausea or vomiting    Lightheadedness or a sudden cold sweat    You have any of the following signs of a stroke:      Numbness or drooping on one side of your face     Weakness in an arm or leg    Confusion or difficulty speaking    Dizziness, a severe headache, or vision loss    When should I call my doctor? You have questions or concerns about your condition or care  CARE AGREEMENT:   You have the right to help plan your care  Learn about your health condition and how it may be treated  Discuss treatment options with your healthcare providers to decide what care you want to receive  You always have the right to refuse treatment  The above information is an  only  It is not intended as medical advice for individual conditions or treatments  Talk to your doctor, nurse or pharmacist before following any medical regimen to see if it is safe and effective for you  © Copyright [employer] 2022 Information is for End User's use only and may not be sold, redistributed or otherwise used for commercial purposes   All illustrations and images included in CareNotes® are the copyrighted property of [employer]  or [address] [place] [address] [employer]

## 2022-09-20 ENCOUNTER — HOSPITAL ENCOUNTER (OUTPATIENT)
Dept: MAMMOGRAPHY | Facility: MEDICAL CENTER | Age: 62
Discharge: HOME | End: 2022-09-20
Payer: COMMERCIAL

## 2022-09-20 VITALS — WEIGHT: 201 LBS | BODY MASS INDEX: 36.99 KG/M2 | HEIGHT: 62 IN

## 2022-09-20 DIAGNOSIS — Z12.31 ENCOUNTER FOR SCREENING MAMMOGRAM FOR BREAST CANCER: ICD-10-CM

## 2022-09-20 PROCEDURE — 77067 SCR MAMMO BI INCL CAD: CPT

## 2022-09-20 PROCEDURE — 77063 BREAST TOMOSYNTHESIS BI: CPT

## 2022-12-02 ENCOUNTER — APPOINTMENT (OUTPATIENT)
Dept: RADIOLOGY | Facility: MEDICAL CENTER | Age: 62
End: 2022-12-02

## 2022-12-02 ENCOUNTER — OFFICE VISIT (OUTPATIENT)
Dept: OBGYN CLINIC | Facility: MEDICAL CENTER | Age: 62
End: 2022-12-02

## 2022-12-02 VITALS
BODY MASS INDEX: 36.86 KG/M2 | DIASTOLIC BLOOD PRESSURE: 79 MMHG | HEART RATE: 55 BPM | WEIGHT: 200.3 LBS | HEIGHT: 62 IN | SYSTOLIC BLOOD PRESSURE: 117 MMHG

## 2022-12-02 DIAGNOSIS — M25.562 LEFT KNEE PAIN, UNSPECIFIED CHRONICITY: ICD-10-CM

## 2022-12-02 DIAGNOSIS — M25.562 LEFT KNEE PAIN, UNSPECIFIED CHRONICITY: Primary | ICD-10-CM

## 2022-12-02 DIAGNOSIS — M25.561 RIGHT KNEE PAIN, UNSPECIFIED CHRONICITY: ICD-10-CM

## 2022-12-02 NOTE — PROGRESS NOTES
Orthopaedic Surgery - Office Note  Mey Erwin (44 y o  female)   : 1960   MRN: [id number]  Encounter Date: 2022    Chief Complaint   Patient presents with   • Left Knee - Follow-up   • Right Knee - Follow-up       Assessment / Plan  Left total knee arthroplasty 10/21/21 at [facility] (used [facility] [employer] [facility] [employer])  Right total knee arthroplasty 20    · Bilateral knees exams and x-rays are normal on exam today  · Discussed clicking is normal following a total knee arthroplasty  · Continue activities as tolerated  · Continue strengthening and home exercise program  · Discussed do not force the knee flexion past 120 as the implant is not designed to flex past this  Return if symptoms worsen or fail to improve  History of Present Illness  Mey Erwin is a 58 y o  female who presents presents today for follow up of bilateral TKA  Right was done by my[facility] 20, the left at [facility] 10/21/21  She reports overall she is doing well  She was concerned about the clicking she is getting in her knees  Patient reports she has been trying to flex her knee with her foot to her buttocks  Review of Systems  Pertinent items are noted in HPI  All other systems were reviewed and are negative  Physical Exam  /79   Pulse 55   Ht 5' 2" (1 575 m)   Wt 90 9 kg (200 lb 4 8 oz)   BMI 36 64 kg/m²   Cons: Appears well  No apparent distress  Psych: Alert  Oriented x3  Mood and affect normal   Eyes: PERRLA, EOMI  Resp: Normal effort  No audible wheezing or stridor  CV: Palpable pulse  No discernable arrhythmia  No LE edema  Lymph:  No palpable cervical, axillary, or inguinal lymphadenopathy  Skin: Warm  No palpable masses  No visible lesions  Neuro: Normal muscle tone  Normal and symmetric DTR's  Bilateral Knee Exam  Alignment:  Normal knee alignment  Inspection:  No swelling  No edema  No erythema  No ecchymosis  No muscle atrophy    Palpation:  No tenderness  ROM:  Normal knee ROM  Strength:  5/5 quadriceps and hamstrings  Stability:  No objective knee instability  Stable Varus / Valgus stress, Lachman, and Posterior drawer  Tests:  No pertinent positive or negative tests  Patella:  Normal patellar mobility  Neurovascular:  Sensation intact in DP/SP/Gill/Sa/T nerve distributions  2+ DP & PT pulses  Gait:  Normal     Studies Reviewed  XR of left knee - no fracture or dislocation, prothesis remains in acceptable alignment and position   XR of right knee - no fracture or dislocation, prothesis remains in acceptable alignment and position     Procedures  No procedures today  Medical, Surgical, Family, and Social History  The patient's medical history, family history, and social history, were reviewed and updated as appropriate      Past Medical History:   Diagnosis Date   • Ambulatory dysfunction     uses walking stick   • Chronic pain disorder    • Claustrophobia     mild   • DDD (degenerative disc disease), lumbar    • Endometrial cancer ([facility] [address] ) 07/06/2017    tRA TLH BSO SLND on 7/17/17 by Dr Darcie Cortez, followed by 3 cycles of vaginal brachytherapy completed in August 2017   • Low back pain    • Mild acid reflux    • OA (osteoarthritis)     marissa knees   • Spinal stenosis of lumbosacral region    • Wears glasses        Past Surgical History:   Procedure Laterality Date   • COLONOSCOPY     • HYSTERECTOMY     • ORTHOPEDIC SURGERY     • PLANTAR FASCIA SURGERY Bilateral    • MS HYSTEROSCOPY,W/ENDO BX N/A 02/28/2017    Procedure: DILATATION AND CURETTAGE (D&C) WITH HYSTEROSCOPY,POLYPECTOMY;  Surgeon: Pierre Bee MD;  Location: [facility];  Service: Gynecology   • MS TOTAL KNEE ARTHROPLASTY Right 11/30/2020    Procedure: ARTHROPLASTY KNEE TOTAL;  Surgeon: Jd Neri MD;  Location: [facility];  Service: Orthopedics   • REPLACEMENT TOTAL KNEE Left 10/22/2021   • SHOULDER SURGERY Left    • TONSILLECTOMY     • TOTAL ABDOMINAL HYSTERECTOMY W/ BILATERAL SALPINGOOPHORECTOMY Bilateral 07/2017    endometrial cancer, ? stage 1   • WISDOM TOOTH EXTRACTION         Family History   Problem Relation Age of Onset   • Ovarian cancer Mother 79   • Esophageal cancer Father    • No Known Problems Maternal Grandmother    • No Known Problems Maternal Grandfather    • No Known Problems Paternal Grandmother    • No Known Problems Paternal Grandfather    • No Known Problems Maternal Aunt    • No Known Problems Paternal Aunt    • Breast cancer Neg Hx    • Colon cancer Neg Hx    • Uterine cancer Neg Hx        Social History     Occupational History   • Not on file   Tobacco Use   • Smoking status: Never   • Smokeless tobacco: Never   • Tobacco comments:     No secondhand smoke exposure   Vaping Use   • Vaping Use: Never used   Substance and Sexual Activity   • Alcohol use: Yes     Comment: very rarely   • Drug use: No   • Sexual activity: Not Currently     Partners: Male     Birth control/protection: Female Sterilization, Surgical       Allergies   Allergen Reactions   • Iv Contrast [Iodinated Diagnostic Agents] Hives   • Vitamin C [Ascorbate - Food Allergy]      Queasy stomach, loose stool         Current Outpatient Medications:   •  Cholecalciferol (HM Vitamin D3) 100 MCG (4000 UT) CAPS, Take 1 capsule (4,000 Units total) by mouth daily, Disp: 30 capsule, Rfl: 0  •  cyanocobalamin (VITAMIN B-12) 1000 MCG tablet, Take 1,000 mcg by mouth daily, Disp: , Rfl:   •  Misc Natural Products (GLUCOSAMINE CHOND CMP TRIPLE PO), Take by mouth, Disp: , Rfl:   •  Omega 3-6-9 Fatty Acids (OMEGA 3-6-9 PO), Take 1 capsule by mouth daily, Disp: , Rfl:   •  Red Yeast Rice 600 MG TABS, Take by mouth daily 2 Tab's once daily(morning), Disp: , Rfl:   •  PANTETHINE PO, Take 1 tablet by mouth 2 (two) times a day, Disp: , Rfl:     Current Facility-Administered Medications:   •  lidocaine (XYLOCAINE) 1 % injection 2 mL, 2 mL, Injection, , Leonila Ramos DPM, 2 mL at 05/20/22 1445  •  triamcinolone acetonide (KENALOG-40) 40 mg/mL injection 20 mg, 20 mg, Intra-articular, , Leonila Ramos, ANDREW, 20 mg at 05/20/22 [address] [facility]    I,:  Marla Lujan am acting as a scribe while in the presence of the attending physician :       I,:  Vesna Zurita MD personally performed the services described in this documentation    as scribed in my presence :

## 2023-01-05 ENCOUNTER — APPOINTMENT (OUTPATIENT)
Dept: LAB | Facility: CLINIC | Age: 63
End: 2023-01-05

## 2023-01-05 DIAGNOSIS — E78.2 MIXED HYPERLIPIDEMIA: ICD-10-CM

## 2023-01-05 LAB
ALBUMIN SERPL BCP-MCNC: 3.3 G/DL (ref 3.5–5)
ALP SERPL-CCNC: 78 U/L (ref 46–116)
ALT SERPL W P-5'-P-CCNC: 36 U/L (ref 12–78)
ANION GAP SERPL CALCULATED.3IONS-SCNC: 6 MMOL/L (ref 4–13)
AST SERPL W P-5'-P-CCNC: 33 U/L (ref 5–45)
BILIRUB SERPL-MCNC: 0.47 MG/DL (ref 0.2–1)
BUN SERPL-MCNC: 31 MG/DL (ref 5–25)
CALCIUM ALBUM COR SERPL-MCNC: 10.1 MG/DL (ref 8.3–10.1)
CALCIUM SERPL-MCNC: 9.5 MG/DL (ref 8.3–10.1)
CHLORIDE SERPL-SCNC: 110 MMOL/L (ref 96–108)
CHOLEST SERPL-MCNC: 253 MG/DL
CO2 SERPL-SCNC: 25 MMOL/L (ref 21–32)
CREAT SERPL-MCNC: 0.74 MG/DL (ref 0.6–1.3)
GFR SERPL CREATININE-BSD FRML MDRD: 87 ML/MIN/1.73SQ M
GLUCOSE P FAST SERPL-MCNC: 106 MG/DL (ref 65–99)
HDLC SERPL-MCNC: 64 MG/DL
LDLC SERPL CALC-MCNC: 171 MG/DL (ref 0–100)
POTASSIUM SERPL-SCNC: 4 MMOL/L (ref 3.5–5.3)
PROT SERPL-MCNC: 7 G/DL (ref 6.4–8.4)
SODIUM SERPL-SCNC: 141 MMOL/L (ref 135–147)
TRIGL SERPL-MCNC: 92 MG/DL

## 2023-01-09 ENCOUNTER — OFFICE VISIT (OUTPATIENT)
Dept: FAMILY MEDICINE CLINIC | Facility: CLINIC | Age: 63
End: 2023-01-09

## 2023-01-09 VITALS
DIASTOLIC BLOOD PRESSURE: 90 MMHG | HEART RATE: 88 BPM | TEMPERATURE: 97.3 F | SYSTOLIC BLOOD PRESSURE: 132 MMHG | BODY MASS INDEX: 35.7 KG/M2 | WEIGHT: 194 LBS | HEIGHT: 62 IN

## 2023-01-09 DIAGNOSIS — C54.1 ENDOMETRIAL CANCER (HCC): ICD-10-CM

## 2023-01-09 DIAGNOSIS — R03.0 ELEVATED BP WITHOUT DIAGNOSIS OF HYPERTENSION: ICD-10-CM

## 2023-01-09 DIAGNOSIS — R73.9 HYPERGLYCEMIA: ICD-10-CM

## 2023-01-09 DIAGNOSIS — E78.2 MIXED HYPERLIPIDEMIA: Primary | ICD-10-CM

## 2023-01-09 DIAGNOSIS — E66.09 CLASS 2 OBESITY DUE TO EXCESS CALORIES WITHOUT SERIOUS COMORBIDITY WITH BODY MASS INDEX (BMI) OF 35.0 TO 35.9 IN ADULT: ICD-10-CM

## 2023-01-09 DIAGNOSIS — E55.9 VITAMIN D DEFICIENCY: ICD-10-CM

## 2023-01-09 PROBLEM — K21.9 GERD (GASTROESOPHAGEAL REFLUX DISEASE): Status: RESOLVED | Noted: 2017-01-10 | Resolved: 2023-01-09

## 2023-01-09 RX ORDER — ATORVASTATIN CALCIUM 10 MG/1
10 TABLET, FILM COATED ORAL DAILY
Qty: 90 TABLET | Refills: 1 | Status: SHIPPED | OUTPATIENT
Start: 2023-01-09

## 2023-01-09 NOTE — PROGRESS NOTES
Name: Theressa Nissen      : 1960      MRN: [id number]  Encounter Provider: Yasmani Kwok PA-C  Encounter Date: 2023   Encounter department: [facility] PRIMARY CARE    Assessment & Plan     1  Mixed hyperlipidemia  Assessment & Plan:  Patient's LDL has now gone up to 171  I am highly recommending treatment at this point in time  At this time pt will take lipitor 10 mg once daily acn check in 4 months  Stop red yeast rice  Orders:  -     atorvastatin (LIPITOR) 10 mg tablet; Take 1 tablet (10 mg total) by mouth daily  -     Lipid Panel with Direct LDL reflex; Future; Expected date: 2023    2  Vitamin D deficiency  Assessment & Plan:  Check vitamin D with next labs continue supplementation  3  Hyperglycemia  Assessment & Plan:  Fasting sugar 106 and A1c was not done this time  Continue to observe decrease simple carbs such as bread pasta potatoes rice junk  and sweets  Orders:  -     Comprehensive metabolic panel; Future; Expected date: 2023  -     Hemoglobin A1C; Future; Expected date: 2023    4  Elevated BP without diagnosis of hypertension  Assessment & Plan:  Home BP good [phone]  5  Class 2 obesity due to excess calories without serious comorbidity with body mass index (BMI) of 35 0 to 35 9 in adult  Assessment & Plan:  Patient is down 6 pounds since her last appointment  She is congratulated and encouraged to continue her weight loss efforts  6  Endometrial cancer [facility])  Assessment & Plan:  Hx of in the past s/p total hysterectomy  BMI Counseling: Body mass index is 35 48 kg/m²   The BMI is above normal  Nutrition recommendations include decreasing portion sizes, encouraging healthy choices of fruits and vegetables, decreasing fast food intake, consuming healthier snacks, limiting drinks that contain sugar, moderation in carbohydrate intake, increasing intake of lean protein, reducing intake of saturated and trans fat and reducing intake of cholesterol  Exercise recommendations include exercising 3-5 times per week  No pharmacotherapy was ordered  Rationale for BMI follow-up plan is due to patient being overweight or obese  Depression Screening and Follow-up Plan: Patient was screened for depression during today's encounter  They screened negative with a PHQ-2 score of 0  Kehinde Davis is here for chronic conditions f/u including the diagnosis of No diagnosis found    Pt  states they are taking all medications as directed without complaints or side effects   Pt  had labs done prior to today's visit which included Recent Results (from the past 672 hour(s))  -Comprehensive metabolic panel:   Collection Time: 01/05/23  8:37 AM       Result                      Value             Ref Range           Sodium                      141               135 - 147 mm*       Potassium                   4 0               3 5 - 5 3 mm*       Chloride                    110 (H)           96 - 108 mmo*       CO2                         25                21 - 32 mmol*       ANION GAP                   6                 4 - 13 mmol/L       BUN                         31 (H)            5 - 25 mg/dL        Creatinine                  0 74              0 60 - 1 30 *       Glucose, Fasting            106 (H)           65 - 99 mg/dL       Calcium                     9 5               8 3 - 10 1 m*       Corrected Calcium           10 1              8 3 - 10 1 m*       AST                         33                5 - 45 U/L          ALT                         36                12 - 78 U/L         Alkaline Phosphatase        78                46 - 116 U/L        Total Protein               7 0               6 4 - 8 4 g/*       Albumin                     3 3 (L)           3 5 - 5 0 g/*       Total Bilirubin             0 47              0 20 - 1 00 *       eGFR                        87                ml/min/1 73s*  -Lipid Panel with Direct LDL reflex:   Collection Time: 01/05/23  8:37 AM       Result                      Value             Ref Range           Cholesterol                 253 (H)           See Comment *       Triglycerides               92                See Comment *       HDL, Direct                 64                >=50 mg/dL          LDL Calculated              171 (H)           0 - 100 mg/dL      Review of Systems   Constitutional: Negative  HENT: Negative  Eyes: Negative  Respiratory: Negative  Cardiovascular: Negative  Gastrointestinal: Negative  Endocrine: Negative  Genitourinary: Negative  Musculoskeletal: Negative  Skin: Negative  Allergic/Immunologic: Negative  Neurological: Negative  Hematological: Negative  Psychiatric/Behavioral: Negative  Current Outpatient Medications on File Prior to Visit   Medication Sig   • Cholecalciferol (HM Vitamin D3) 100 MCG (4000 UT) CAPS Take 1 capsule (4,000 Units total) by mouth daily   • Coenzyme Q10-Vitamin E (QUNOL ULTRA COQ10 PO)    • cyanocobalamin (VITAMIN B-12) 1000 MCG tablet Take 1,000 mcg by mouth daily   • Misc Natural Products (GLUCOSAMINE CHOND CMP TRIPLE PO) Take by mouth   • Omega 3-6-9 Fatty Acids (OMEGA 3-6-9 PO) Take 1 capsule by mouth daily   • Red Yeast Rice 600 MG TABS Take by mouth daily 2 Tab's once daily(morning)   • [DISCONTINUED] PANTETHINE PO Take 1 tablet by mouth 2 (two) times a day       Objective     /90   Pulse 88   Temp (!) 97 3 °F (36 3 °C) (Temporal)   Ht 5' 2" (1 575 m) Comment: on record  Wt 88 kg (194 lb)   BMI 35 48 kg/m²     Physical Exam  Vitals and nursing note reviewed  Constitutional:       General: She is not in acute distress  Appearance: She is well-developed  She is not diaphoretic  HENT:      Head: Normocephalic and atraumatic  Eyes:      General:         Right eye: No discharge  Left eye: No discharge        Conjunctiva/sclera: Conjunctivae normal    Neck: Vascular: No carotid bruit  Cardiovascular:      Rate and Rhythm: Normal rate and regular rhythm  Heart sounds: Normal heart sounds  No murmur heard  No friction rub  No gallop  Pulmonary:      Effort: Pulmonary effort is normal  No respiratory distress  Breath sounds: Normal breath sounds  No wheezing or rales  Musculoskeletal:      Cervical back: Neck supple  Skin:     General: Skin is warm and dry  Neurological:      Mental Status: She is alert and oriented to person, place, and time     Psychiatric:         Judgment: Judgment normal        Delon Parker PA-C

## 2023-01-09 NOTE — ASSESSMENT & PLAN NOTE
Patient's LDL has now gone up to 171  I am highly recommending treatment at this point in time  At this time pt will take lipitor 10 mg once daily acn check in 4 months  Stop red yeast rice

## 2023-01-09 NOTE — PATIENT INSTRUCTIONS
1  Mixed hyperlipidemia  Assessment & Plan:  Patient's LDL has now gone up to 171  I am highly recommending treatment at this point in time  At this time pt will take lipitor 10 mg once daily acn check in 4 months  Stop red yeast rice  Orders:  -     atorvastatin (LIPITOR) 10 mg tablet; Take 1 tablet (10 mg total) by mouth daily  -     Lipid Panel with Direct LDL reflex; Future; Expected date: 05/09/2023    2  Vitamin D deficiency  Assessment & Plan:  Check vitamin D with next labs continue supplementation  3  Hyperglycemia  Assessment & Plan:  Fasting sugar 106 and A1c was not done this time  Continue to observe decrease simple carbs such as bread pasta potatoes rice junk  and sweets  Orders:  -     Comprehensive metabolic panel; Future; Expected date: 05/09/2023  -     Hemoglobin A1C; Future; Expected date: 05/09/2023    4  Elevated BP without diagnosis of hypertension  Assessment & Plan:  Home BP good [phone]  5  Class 2 obesity due to excess calories without serious comorbidity with body mass index (BMI) of 35 0 to 35 9 in adult  Assessment & Plan:  Patient is down 6 pounds since her last appointment  She is congratulated and encouraged to continue her weight loss efforts  6  Endometrial cancer [facility])  Assessment & Plan:  Hx of in the past s/p total hysterectomy

## 2023-01-09 NOTE — ASSESSMENT & PLAN NOTE
Fasting sugar 106 and A1c was not done this time  Continue to observe decrease simple carbs such as bread pasta potatoes rice junk  and sweets

## 2023-01-09 NOTE — ASSESSMENT & PLAN NOTE
Patient is down 6 pounds since her last appointment  She is congratulated and encouraged to continue her weight loss efforts

## 2023-04-03 ENCOUNTER — TELEPHONE (OUTPATIENT)
Dept: GASTROENTEROLOGY | Facility: AMBULARY SURGERY CENTER | Age: 63
End: 2023-04-03

## 2023-04-03 ENCOUNTER — PREP FOR PROCEDURE (OUTPATIENT)
Dept: GASTROENTEROLOGY | Facility: CLINIC | Age: 63
End: 2023-04-03

## 2023-04-03 DIAGNOSIS — Z86.010 HISTORY OF COLON POLYPS: Primary | ICD-10-CM

## 2023-04-03 NOTE — TELEPHONE ENCOUNTER
04/03/23  Screened by: Yomaira Genao    Referring Provider     Pre- Screening: There is no height or weight on file to calculate BMI  Has patient been referred for a routine screening Colonoscopy? yes  Is the patient between 39-70 years old? yes      Previous Colonoscopy yes   If yes:    Date: 3 years    Facility:     Reason:       SCHEDULING STAFF: If the patient is between 39yrs-47yrs, please advise patient to confirm benefits/coverage with their insurance company for a routine screening colonoscopy, some insurance carriers will only cover at [phone] or older  If the patient is over 66years old, please schedule an office visit  Does the patient want to see a Gastroenterologist prior to their procedure OR are they having any GI symptoms? no    Has the patient been hospitalized or had abdominal surgery in the past 6 months? no    Does the patient use supplemental oxygen? no    Does the patient take Coumadin, Lovenox, Plavix, Elliquis, Xarelto, or other blood thinning medication? no    Has the patient had a stroke, cardiac event, or stent placed in the past year? no    SCHEDULING STAFF: If patient answers NO to above questions, then schedule procedure  If patient answers YES to above questions, then schedule office appointment  If patient is between 45yrs - 49yrs, please advise patient that we will have to confirm benefits & coverage with their insurance company for a routine screening colonoscopy        PASSED OA, [facility]

## 2023-04-03 NOTE — TELEPHONE ENCOUNTER
Scheduled date of colonoscopy (as of today): 5/16/2023    Physician performing colonoscopy: Dr Eriberto Art    Location of colonoscopy: [facility]    Clearances: N/A

## 2023-04-04 ENCOUNTER — TELEPHONE (OUTPATIENT)
Dept: GASTROENTEROLOGY | Facility: CLINIC | Age: 63
End: 2023-04-04

## 2023-04-04 NOTE — TELEPHONE ENCOUNTER
Scheduled date of colonoscopy (as of today): 04/25/2023  Physician performing colonoscopy: Dr Aleks Ng  Location of colonoscopy:[facility]   Clearances: n/a

## 2023-04-24 RX ORDER — SODIUM CHLORIDE 9 MG/ML
125 INJECTION, SOLUTION INTRAVENOUS CONTINUOUS
Status: CANCELLED | OUTPATIENT
Start: 2023-04-24

## 2023-04-25 ENCOUNTER — ANESTHESIA (OUTPATIENT)
Dept: GASTROENTEROLOGY | Facility: MEDICAL CENTER | Age: 63
End: 2023-04-25

## 2023-04-25 ENCOUNTER — ANESTHESIA EVENT (OUTPATIENT)
Dept: GASTROENTEROLOGY | Facility: MEDICAL CENTER | Age: 63
End: 2023-04-25

## 2023-04-25 ENCOUNTER — HOSPITAL ENCOUNTER (OUTPATIENT)
Dept: GASTROENTEROLOGY | Facility: MEDICAL CENTER | Age: 63
Setting detail: OUTPATIENT SURGERY
Discharge: HOME | End: 2023-04-25

## 2023-04-25 VITALS
OXYGEN SATURATION: 100 % | WEIGHT: 190 LBS | RESPIRATION RATE: 20 BRPM | HEART RATE: 65 BPM | SYSTOLIC BLOOD PRESSURE: 147 MMHG | HEIGHT: 62 IN | DIASTOLIC BLOOD PRESSURE: 74 MMHG | BODY MASS INDEX: 34.96 KG/M2 | TEMPERATURE: 97.8 F

## 2023-04-25 DIAGNOSIS — Z86.010 HISTORY OF COLON POLYPS: ICD-10-CM

## 2023-04-25 RX ORDER — PROPOFOL 10 MG/ML
INJECTION, EMULSION INTRAVENOUS AS NEEDED
Status: DISCONTINUED | OUTPATIENT
Start: 2023-04-25 | End: 2023-04-25

## 2023-04-25 RX ORDER — SODIUM CHLORIDE 9 MG/ML
125 INJECTION, SOLUTION INTRAVENOUS CONTINUOUS
Status: DISCONTINUED | OUTPATIENT
Start: 2023-04-25 | End: 2023-04-29 | Stop reason: HOSPADM

## 2023-04-25 RX ORDER — LIDOCAINE HYDROCHLORIDE 20 MG/ML
INJECTION, SOLUTION EPIDURAL; INFILTRATION; INTRACAUDAL; PERINEURAL AS NEEDED
Status: DISCONTINUED | OUTPATIENT
Start: 2023-04-25 | End: 2023-04-25

## 2023-04-25 RX ORDER — PROPOFOL 10 MG/ML
INJECTION, EMULSION INTRAVENOUS CONTINUOUS PRN
Status: DISCONTINUED | OUTPATIENT
Start: 2023-04-25 | End: 2023-04-25

## 2023-04-25 RX ADMIN — PROPOFOL 120 MCG/KG/MIN: 10 INJECTION, EMULSION INTRAVENOUS at 08:53

## 2023-04-25 RX ADMIN — LIDOCAINE HYDROCHLORIDE 60 MG: 20 INJECTION, SOLUTION EPIDURAL; INFILTRATION; INTRACAUDAL at 08:53

## 2023-04-25 RX ADMIN — PROPOFOL 120 MG: 10 INJECTION, EMULSION INTRAVENOUS at 08:54

## 2023-04-25 RX ADMIN — SODIUM CHLORIDE 125 ML/HR: 0.9 INJECTION, SOLUTION INTRAVENOUS at 08:17

## 2023-04-25 NOTE — ANESTHESIA POSTPROCEDURE EVALUATION
"Post-Op Assessment Note    CV Status:  Stable    Pain management: adequate     Mental Status:  Alert and awake   Hydration Status:  Euvolemic   PONV Controlled:  Controlled   Airway Patency:  Patent      Post Op Vitals Reviewed: Yes      Staff: Anesthesiologist         No notable events documented      BP      Temp      Pulse     Resp      SpO2      /74   Pulse 65   Temp 97 8 °F (36 6 °C) (Temporal)   Resp 20   Ht 5' 2\" (1 575 m)   Wt 86 2 kg (190 lb)   SpO2 100%   BMI 34 75 kg/m²     "

## 2023-04-25 NOTE — H&P
History and Physical - [facility]  Tamra Lyons 61 y o  female MRN: [id number]                  HPI: Tamra Lyons is a 61y o  year old female who presents for CRC screening eval      REVIEW OF SYSTEMS: Per the HPI, and otherwise unremarkable      Historical Information   Past Medical History:   Diagnosis Date   • Ambulatory dysfunction     uses walking stick   • Chronic pain disorder    • Claustrophobia     mild   • DDD (degenerative disc disease), lumbar    • Endometrial cancer ([facility] [address] ) 07/06/2017    tRA TLH BSO SLND on 7/17/17 by Dr lForina Benitez, followed by 3 cycles of vaginal brachytherapy completed in August 2017   • Low back pain    • Mild acid reflux    • OA (osteoarthritis)     marissa knees   • Spinal stenosis of lumbosacral region    • Wears glasses      Past Surgical History:   Procedure Laterality Date   • COLONOSCOPY     • HYSTERECTOMY     • ORTHOPEDIC SURGERY     • PLANTAR FASCIA SURGERY Bilateral    • NC ARTHRP KNE CONDYLE&PLATU MEDIAL&LAT COMPARTMENTS Right 11/30/2020    Procedure: ARTHROPLASTY KNEE TOTAL;  Surgeon: Mani Martinez MD;  Location: [facility];  Service: Orthopedics   • NC HYSTEROSCOPY BX ENDOMETRIUM&/POLYPC W/WO D&C N/A 02/28/2017    Procedure: DILATATION AND CURETTAGE (D&C) WITH HYSTEROSCOPY,POLYPECTOMY;  Surgeon: Briseida Morocho MD;  Location: [facility];  Service: Gynecology   • REPLACEMENT TOTAL KNEE Left 10/22/2021   • SHOULDER SURGERY Left    • TONSILLECTOMY     • TOTAL ABDOMINAL HYSTERECTOMY W/ BILATERAL SALPINGOOPHORECTOMY Bilateral 07/2017    endometrial cancer, ? stage 1   • WISDOM TOOTH EXTRACTION       Social History   Social History     Substance and Sexual Activity   Alcohol Use Yes    Comment: very rarely     Social History     Substance and Sexual Activity   Drug Use No     Social History     Tobacco Use   Smoking Status Never   Smokeless Tobacco Never   Tobacco Comments    No secondhand smoke exposure     Family History   Problem Relation Age of Onset   • Ovarian cancer Mother 79   • Esophageal cancer Father    • No Known Problems Maternal Grandmother    • No Known Problems Maternal Grandfather    • No Known Problems Paternal Grandmother    • No Known Problems Paternal Grandfather    • No Known Problems Maternal Aunt    • No Known Problems Paternal Aunt    • Breast cancer Neg Hx    • Colon cancer Neg Hx    • Uterine cancer Neg Hx        Meds/Allergies       Current Outpatient Medications:   •  atorvastatin (LIPITOR) 10 mg tablet  •  Cholecalciferol (HM Vitamin D3) 100 MCG (4000 UT) CAPS  •  Coenzyme Q10-Vitamin E (QUNOL ULTRA COQ10 PO)  •  cyanocobalamin (VITAMIN B-12) 1000 MCG tablet  •  Misc Natural Products (GLUCOSAMINE CHOND CMP TRIPLE PO)  •  Omega 3-6-9 Fatty Acids (OMEGA 3-6-9 PO)  •  polyethylene glycol (GOLYTELY) 4000 mL solution  •  Red Yeast Rice 600 MG TABS    Current Facility-Administered Medications:   •  lidocaine (XYLOCAINE) 1 % injection 2 mL, 2 mL, Injection, , 2 mL at 05/20/22 1445  •  triamcinolone acetonide (KENALOG-40) 40 mg/mL injection 20 mg, 20 mg, Intra-articular, , 20 mg at 05/20/22 1445    Allergies   Allergen Reactions   • Iv Contrast [Iodinated Contrast Media] Hives   • Vitamin C [Ascorbate - Food Allergy]      Queasy stomach, loose stool       Objective     There were no vitals taken for this visit  PHYSICAL EXAM    Gen: NAD  Head: NCAT  CV: RRR  CHEST: Clear  ABD: soft, NT/ND  EXT: no edema      ASSESSMENT/PLAN:  This is a 61y o  year old female here for colonoscopy, and she is stable and optimized for her procedure

## 2023-04-25 NOTE — ANESTHESIA PREPROCEDURE EVALUATION
Procedure:  COLONOSCOPY    Relevant Problems   CARDIO   (+) Mixed hyperlipidemia   (+) Newly recognized heart murmur      GYN   (+) Endometrial cancer (HCC)      MUSCULOSKELETAL   (+) Degenerative disc disease, lumbar   (+) OA (osteoarthritis) of knee   (+) Sciatica of right side      NEURO/PSYCH   (+) Chronic pain syndrome        Physical Exam    Airway    Mallampati score: III  TM Distance: >3 FB  Neck ROM: full     Dental   No notable dental hx upper dentures,     Cardiovascular  Rhythm: regular, Rate: normal, Cardiovascular exam normal    Pulmonary  Pulmonary exam normal Breath sounds clear to auscultation,     Other Findings        Anesthesia Plan  ASA Score- 2     Anesthesia Type- IV sedation with anesthesia with ASA Monitors  Additional Monitors:   Airway Plan:           Plan Factors-    Chart reviewed  Patient summary reviewed  Induction-     Postoperative Plan-     Informed Consent- Anesthetic plan and risks discussed with patient

## 2023-05-09 LAB — HBA1C MFR BLD HPLC: 5.3 %

## 2023-05-11 ENCOUNTER — VBI (OUTPATIENT)
Dept: ADMINISTRATIVE | Facility: OTHER | Age: 63
End: 2023-05-11

## 2023-05-15 ENCOUNTER — OFFICE VISIT (OUTPATIENT)
Dept: FAMILY MEDICINE CLINIC | Facility: CLINIC | Age: 63
End: 2023-05-15

## 2023-05-15 VITALS
HEART RATE: 56 BPM | HEIGHT: 62 IN | WEIGHT: 198 LBS | SYSTOLIC BLOOD PRESSURE: 133 MMHG | DIASTOLIC BLOOD PRESSURE: 77 MMHG | BODY MASS INDEX: 36.44 KG/M2

## 2023-05-15 DIAGNOSIS — M25.50 PAIN IN JOINT, MULTIPLE SITES: ICD-10-CM

## 2023-05-15 DIAGNOSIS — M25.50 ARTHRALGIA, UNSPECIFIED JOINT: ICD-10-CM

## 2023-05-15 DIAGNOSIS — E78.2 MIXED HYPERLIPIDEMIA: ICD-10-CM

## 2023-05-15 DIAGNOSIS — R03.0 ELEVATED BP WITHOUT DIAGNOSIS OF HYPERTENSION: Primary | ICD-10-CM

## 2023-05-15 DIAGNOSIS — E55.9 VITAMIN D DEFICIENCY: ICD-10-CM

## 2023-05-15 DIAGNOSIS — R73.9 HYPERGLYCEMIA: ICD-10-CM

## 2023-05-15 DIAGNOSIS — D72.819 LEUKOPENIA, UNSPECIFIED TYPE: ICD-10-CM

## 2023-05-15 DIAGNOSIS — E79.0 ELEVATED URIC ACID IN BLOOD: ICD-10-CM

## 2023-05-15 NOTE — PATIENT INSTRUCTIONS
1  Elevated BP without diagnosis of hypertension  Assessment & Plan:  BP from home is great with home monitor  Goal is below 140/90       2  Mixed hyperlipidemia  Assessment & Plan:  Patient Lipitor 10 mg once daily since the last visit at 80 from 171  NO change and check in 6 moths  Orders:  -     Lipid Panel with Direct LDL reflex; Future; Expected date: 11/15/2023  -     Comprehensive metabolic panel; Future; Expected date: 11/15/2023    3  Vitamin D deficiency  -     Vitamin D 25 hydroxy; Future; Expected date: 11/15/2023    4  Leukopenia, unspecified type    5  Hyperglycemia  Assessment & Plan:  Glucose was normal below 100 at 91 and A1C was 5 3  Continue to observe  6  Pain in joint, multiple sites  Comments:  Pt ambulates with a waddle s/p marissa TKR  I would like pt to see a physiologist but I do not see one in the network so we will order sports med eval    Orders:  -     Ambulatory Referral to Sports Medicine; Future    7  Arthralgia, unspecified joint  -     Ambulatory Referral to Sports Medicine; Future    8  Elevated uric acid in blood  Assessment & Plan:  NO hx of gout that we are aware of but info given to decrease purine in diet and gout explination  Low Purine Diet   WHAT YOU NEED TO KNOW:   What is a low-purine diet? A low-purine diet is a meal plan based on foods that are low in purine content  Purine is a substance that is found in foods and is produced naturally by the body  Purines are broken down by the body and changed to uric acid  The kidneys normally filter the uric acid, and it leaves the body through the urine  However, people with gout sometimes have a buildup of uric acid in the blood  This buildup of uric acid can cause swelling and pain (a gout attack)  A low-purine diet may help to treat and prevent gout attacks  What foods can I include? The following foods are low in purine    Eggs, nuts, and peanut butter    Low-fat and fat free cheese and ice cream    Skim or 1% milk    Soup made without meat extract or broth    Vegetables that are not on the medium-purine list below    All fruit and fruit juices    Bread, pasta, rice, cake, cornbread, and popcorn    Water, soda, tea, coffee, and cocoa    Sugar, sweets, and gelatin    Fat and oil    What foods should I limit? Medium-purine foods:      Meats:  Limit the following to 4 to 6 ounces each day  Meat and poultry     Crab, lobster, oysters, and shrimp    Vegetables:  Limit the following vegetables to ½ cup each day  Asparagus    Cauliflower    Spinach    Mushrooms    Green peas    Beans, peas, and lentils (limit to 1 cup each day)    Oats and oatmeal (limit to ? cup uncooked each day)    Wheat germ and bran (limit to ¼ cup each day)    High-purine foods:  Limit or avoid foods high in purine  Anchovies, sardines, scallops, and mussels    [place], codfish, herring, and [employer], like goose and duck    Organ meats, such as brains, heart, kidney, liver, and sweetbreads    Gravies and sauces made with meat    Yeast extracts taken in the form of a supplement    What other guidelines should I follow? Increase liquid intake  Drink 8 to 16 (eight-ounce) cups of liquid each day  At least half of the liquid you drink should be water  Liquid can help your body get rid of extra uric acid  Limit or avoid alcohol  Alcohol (especially beer) increases your risk of a gout attack  Beer contains a high amount of purine  Maintain a healthy weight  If you are overweight, you should lose weight slowly  Weight loss can help decrease the amount of stress on your joints  Regular exercise can help you lose weight if you are overweight, or maintain your weight if you are at a normal weight  Talk to your healthcare provider before you begin an exercise program     CARE AGREEMENT:   You have the right to help plan your care  Discuss treatment options with your healthcare provider to decide what care you want to receive  You always have the right to refuse treatment  The above information is an  only  It is not intended as medical advice for individual conditions or treatments  Talk to your doctor, nurse or pharmacist before following any medical regimen to see if it is safe and effective for you  © Copyright [employer] 2022 Information is for End User's use only and may not be sold, redistributed or otherwise used for commercial purposes  Gout   AMBULATORY CARE:   Gout  is a form of arthritis that causes severe joint pain and stiffness  Acute gout pain starts suddenly, gets worse quickly, and stops on its own  Acute gout can become chronic and cause permanent damage to your joints  Seek care immediately if:   You have severe pain in one or more of your joints that you cannot tolerate  You have a fever or redness that spreads beyond the joint area  Call your doctor if:   You have new symptoms, such as a rash, after you start gout treatment  Your joint pain and swelling do not go away, even after treatment  You are not urinating as much or as often as you usually do  You have trouble taking your gout medicines  You have questions or concerns about your condition or care  Stages of gout:   Hyperuricemia  starts with high levels of uric acid  Hyperuricemia is not gout, but it increases your risk for gout  You may have no symptoms at this stage, and it usually does not need treatment  Acute gouty arthritis  starts with a sudden attack of pain and swelling, usually in 1 joint  The attack may last from a few days to 2 weeks  Intercritical gout  is the time between attacks  You may go months or years without another attack  You will not have joint pain or stiffness, but this does not mean your gout is cured  You will still need treatment to prevent chronic gout  Chronic tophaceous gout  develops if gout is not treated   Large amounts of uric acid crystals, called tophi, collect around your joints  The crystals can destroy or deform the joints  Gout attacks occur more often, and last hours to weeks  More than 1 joint may be painful and swollen  At this stage, gout symptoms do not go away on their own  Medicines: You may need any of the following:  Prescription pain medicine  may be given  Ask your healthcare provider how to take this medicine safely  Some prescription pain medicines contain acetaminophen  Do not take other medicines that contain acetaminophen without talking to your healthcare provider  Too much acetaminophen may cause liver damage  Prescription pain medicine may cause constipation  Ask your healthcare provider how to prevent or treat constipation  NSAIDs , such as ibuprofen, help decrease swelling, pain, and fever  This medicine is available with or without a doctor's order  NSAIDs can cause stomach bleeding or kidney problems in certain people  If you take blood thinner medicine, always ask your healthcare provider if NSAIDs are safe for you  Always read the medicine label and follow directions  Gout medicine  decreases joint pain and swelling  It may also be given to prevent new gout attacks  Steroids  reduce inflammation and can help your joint stiffness and pain during gout attacks  Uric acid medicine  may be given to reduce the amount of uric acid your body makes  Some medicines may help you pass more uric acid when you urinate  Take your medicine as directed  Contact your healthcare provider if you think your medicine is not helping or if you have side effects  Tell your provider if you are allergic to any medicine  Keep a list of the medicines, vitamins, and herbs you take  Include the amounts, and when and why you take them  Bring the list or the pill bottles to follow-up visits  Carry your medicine list with you in case of an emergency      Manage your symptoms:       Rest your painful joint so it can heal   Your healthcare provider may recommend crutches or a walker if the affected joint is in a leg  Apply ice to your joint  Ice decreases pain and swelling  Use an ice pack, or put crushed ice in a plastic bag  Cover the ice pack or bag with a towel before you apply it to your painful joint  Apply ice for 15 to 20 minutes every hour, or as directed  Elevate your joint  Elevation helps reduce swelling and pain  Raise your joint above the level of your heart as often as you can  Prop your painful joint on pillows to keep it above your heart comfortably  Go to physical therapy if directed  A physical therapist can teach you exercises to improve flexibility and range of motion  Help prevent gout attacks:   Do not eat high-purine foods  These foods include meats, seafood, asparagus, spinach, cauliflower, and some types of beans  Healthcare providers may tell you to eat more low-fat milk products, such as yogurt  Milk products may decrease your risk for gout attacks  Vitamin C and coffee may also help  Your healthcare provider or dietitian can help you create a meal plan  Drink liquids as directed  Liquids such as water help remove uric acid from your body  Ask how much liquid to drink each day and which liquids are best for you  Maintain a healthy weight  Weight loss may decrease the amount of uric acid in your body  Ask your healthcare provider what a healthy weight is for you  Ask him or her to help you create a weight loss plan if you are overweight  Control your blood sugar level if you have diabetes  Keep your blood sugar level in a normal range  This can help prevent gout attacks  Limit or do not drink alcohol as directed  Alcohol can trigger a gout attack  Alcohol also increases your risk for dehydration  Ask your healthcare provider if alcohol is safe for you  Follow up with your doctor as directed: You may be referred to a rheumatologist or podiatrist  Write down your questions so you remember to ask them during your visits    © Copyright [employer] 2022 Information is for End User's use only and may not be sold, redistributed or otherwise used for commercial purposes  The above information is an  only  It is not intended as medical advice for individual conditions or treatments  Talk to your doctor, nurse or pharmacist before following any medical regimen to see if it is safe and effective for you

## 2023-05-15 NOTE — ASSESSMENT & PLAN NOTE
Patient Lipitor 10 mg once daily since the last visit at 80 from 171  NO change and check in 6 moths

## 2023-05-15 NOTE — PROGRESS NOTES
Name: Prachi Wallace      : 1960      MRN: [id number]  Encounter Provider: Natalee Fonseca PA-C  Encounter Date: 5/15/2023   Encounter department: [facility]    Assessment & Plan     1  Elevated BP without diagnosis of hypertension  Assessment & Plan:  BP from home is great with home monitor  Goal is below 140/90       2  Mixed hyperlipidemia  Assessment & Plan:  Patient Lipitor 10 mg once daily since the last visit at 80 from 171  NO change and check in 6 moths  Orders:  -     Lipid Panel with Direct LDL reflex; Future; Expected date: 11/15/2023  -     Comprehensive metabolic panel; Future; Expected date: 11/15/2023    3  Vitamin D deficiency  -     Vitamin D 25 hydroxy; Future; Expected date: 11/15/2023    4  Leukopenia, unspecified type    5  Hyperglycemia  Assessment & Plan:  Glucose was normal below 100 at 91 and A1C was 5 3  Continue to observe  6  Pain in joint, multiple sites  Comments:  Pt ambulates with a waddle s/p marissa TKR  I would like pt to see a physiologist but I do not see one in the network so we will order sports med eval    Orders:  -     Ambulatory Referral to Sports Medicine; Future    7  Arthralgia, unspecified joint  -     Ambulatory Referral to Sports Medicine; Future    8  Elevated uric acid in blood  Assessment & Plan:  NO hx of gout that we are aware of but info given to decrease purine in diet and gout explination  Subjective        Prachi Wallace is here for chronic conditions f/u  Pt  had labs done prior to today's visit which included    Review of Systems   Constitutional: Negative  HENT: Negative  Eyes: Negative  Respiratory: Negative  Cardiovascular: Negative  Gastrointestinal: Negative  Endocrine: Negative  Genitourinary: Negative  Musculoskeletal: Negative  Skin: Negative  Allergic/Immunologic: Negative  Neurological: Negative  Hematological: Negative  Psychiatric/Behavioral: Negative  "      Current Outpatient Medications on File Prior to Visit   Medication Sig   • atorvastatin (LIPITOR) 10 mg tablet Take 1 tablet (10 mg total) by mouth daily   • Cholecalciferol (HM Vitamin D3) 100 MCG (4000 UT) CAPS Take 1 capsule (4,000 Units total) by mouth daily   • Coenzyme Q10-Vitamin E (QUNOL ULTRA COQ10 PO)    • cyanocobalamin (VITAMIN B-12) 1000 MCG tablet Take 1,000 mcg by mouth daily   • Misc Natural Products (GLUCOSAMINE CHOND CMP TRIPLE PO) Take by mouth   • Omega 3-6-9 Fatty Acids (OMEGA 3-6-9 PO) Take 1 capsule by mouth daily   • [DISCONTINUED] Red Yeast Rice 600 MG TABS Take by mouth daily 2 Tab's once daily(morning)       Objective     /77 (Cuff Size: Standard) Comment: Home monitor  Pulse 56   Ht 5' 2\" (1 575 m)   Wt 89 8 kg (198 lb)   BMI 36 21 kg/m²     Physical Exam  Vitals and nursing note reviewed  Constitutional:       General: She is not in acute distress  Appearance: She is well-developed  She is not diaphoretic  HENT:      Head: Normocephalic and atraumatic  Eyes:      General:         Right eye: No discharge  Left eye: No discharge  Conjunctiva/sclera: Conjunctivae normal    Neck:      Vascular: No carotid bruit  Cardiovascular:      Rate and Rhythm: Normal rate and regular rhythm  Heart sounds: Normal heart sounds  No murmur heard  No friction rub  No gallop  Pulmonary:      Effort: Pulmonary effort is normal  No respiratory distress  Breath sounds: Normal breath sounds  No wheezing or rales  Musculoskeletal:      Cervical back: Neck supple  Skin:     General: Skin is warm and dry  Neurological:      Mental Status: She is alert and oriented to person, place, and time     Psychiatric:         Judgment: Judgment normal        Uday Alvarez PA-C  "

## 2023-05-24 ENCOUNTER — TELEPHONE (OUTPATIENT)
Dept: NEUROLOGY | Facility: CLINIC | Age: 63
End: 2023-05-24

## 2023-05-24 NOTE — TELEPHONE ENCOUNTER
I called patient and left a voicemail stating that unfortunately we do not see for that particular dx and that our physiatrist highly recommends that she follows with sports medicine and I provided sports medicine phone number and also provided our number and let her know she can call us if she has any questions and that I have cancelled her appointment with our practice at this time

## 2023-05-31 DIAGNOSIS — R26.2 IMPAIRED AMBULATION: ICD-10-CM

## 2023-05-31 DIAGNOSIS — M48.07 SPINAL STENOSIS OF LUMBOSACRAL REGION: Primary | ICD-10-CM

## 2023-05-31 DIAGNOSIS — M17.0 PRIMARY OSTEOARTHRITIS OF BOTH KNEES: ICD-10-CM

## 2023-06-13 ENCOUNTER — TELEPHONE (OUTPATIENT)
Dept: PAIN MEDICINE | Facility: MEDICAL CENTER | Age: 63
End: 2023-06-13

## 2023-06-13 NOTE — TELEPHONE ENCOUNTER
Caller: Abigail Andrade    Doctor: Kevon Wilkins     Reason for call: patient accepted Dr Lana Bloom in [place] [id number] scheduled 6/20 1:30 pm

## 2023-06-13 NOTE — TELEPHONE ENCOUNTER
Caller: Lesa Tijerina    Doctor/OfficeLast العراقي    Callback#: [phone]        Patient is requesting a transfer of care for the following reason: Location last seen 9/2021 new issue hip pain        Doctor: Anatoly Villarreal    Would you release patient from your care? Doctor: Zack Bustillos    Would you take patient on as a patient? Please advise,   Thank you

## 2023-06-23 DIAGNOSIS — E78.2 MIXED HYPERLIPIDEMIA: ICD-10-CM

## 2023-06-23 RX ORDER — ATORVASTATIN CALCIUM 10 MG/1
10 TABLET, FILM COATED ORAL DAILY
Qty: 90 TABLET | Refills: 0 | Status: SHIPPED | OUTPATIENT
Start: 2023-06-23

## 2023-06-27 ENCOUNTER — OFFICE VISIT (OUTPATIENT)
Dept: OBGYN CLINIC | Facility: MEDICAL CENTER | Age: 63
End: 2023-06-27
Payer: COMMERCIAL

## 2023-06-27 ENCOUNTER — APPOINTMENT (OUTPATIENT)
Dept: RADIOLOGY | Facility: MEDICAL CENTER | Age: 63
End: 2023-06-27
Payer: COMMERCIAL

## 2023-06-27 VITALS
HEIGHT: 62 IN | SYSTOLIC BLOOD PRESSURE: 146 MMHG | DIASTOLIC BLOOD PRESSURE: 83 MMHG | BODY MASS INDEX: 36.44 KG/M2 | HEART RATE: 69 BPM | WEIGHT: 198 LBS

## 2023-06-27 DIAGNOSIS — M25.551 BILATERAL HIP PAIN: ICD-10-CM

## 2023-06-27 DIAGNOSIS — M25.552 BILATERAL HIP PAIN: ICD-10-CM

## 2023-06-27 DIAGNOSIS — M54.16 LUMBAR RADICULOPATHY: Primary | ICD-10-CM

## 2023-06-27 DIAGNOSIS — M25.552 LEFT HIP PAIN: ICD-10-CM

## 2023-06-27 DIAGNOSIS — M25.551 RIGHT HIP PAIN: ICD-10-CM

## 2023-06-27 PROCEDURE — 73521 X-RAY EXAM HIPS BI 2 VIEWS: CPT

## 2023-06-27 PROCEDURE — 99214 OFFICE O/P EST MOD 30 MIN: CPT | Performed by: STUDENT IN AN ORGANIZED HEALTH CARE EDUCATION/TRAINING PROGRAM

## 2023-06-27 RX ORDER — METHYLPREDNISOLONE 4 MG/1
TABLET ORAL
Qty: 21 TABLET | Refills: 0 | Status: SHIPPED | OUTPATIENT
Start: 2023-06-27

## 2023-06-27 NOTE — PROGRESS NOTES
Hip New Office Note    Assessment:     1  Lumbar radiculopathy    2  Bilateral hip pain        Plan:  Findings today are consistent with bilateral hip pain secondary to lumbar radiculopathy  Imaging and prognosis was reviewed with the patient today  Discussed with patient that imaging of her hips demonstrates minimal degenerative changes  Symptoms she is experiencing are likely due to degenerative changes to her lumbar spine and lumbar radiculopathy  Referral for a medrol dose pack was placed today  She was provided at home exercises for core strengthening  If patient does not see success with conservative treatments a referral was placed with Pain Management to further evaluate for her lumbar spine  Patient can follow up as needed  Problem List Items Addressed This Visit    None  Visit Diagnoses     Lumbar radiculopathy    -  Primary    Relevant Orders    XR hip/pelv 2-3 vws right if performed    Ambulatory Referral to Pain Management    Bilateral hip pain             Subjective:     Patient ID: Roxane Guevara is a 61 y o  female  Chief Complaint:  HPI:  The patient presents with a chief complaint of bilateral hip pain  The pain began 6 week(s) ago and is not associated with an acute injury  The patient describes the pain as aching, throbbing and 6 out of 10 in intensity  It is intermittent in timing, and localizes the pain to the posterior hip with radiating into the groin  The pain is worse with standing, squatting and bending and relieved with resting with ice, stretching and being active  She denies instability of the knee  Patient has not had any previous treatments for bilateral hips  She notes she does aqua therapy 4x's a week  She uses a cane to ambulate for long distances  She does report low back pain       Allergy:  Allergies   Allergen Reactions   • Iv Contrast [Iodinated Contrast Media] Hives   • Vitamin C [Ascorbate - Food Allergy]      Queasy stomach, loose stool     Medications:  all current active meds have been reviewed  Past Medical History:  Past Medical History:   Diagnosis Date   • Ambulatory dysfunction     uses walking stick   • Chronic pain disorder    • Claustrophobia     mild   • DDD (degenerative disc disease), lumbar    • Endometrial cancer ([facility] [address] ) 07/06/2017    tRA TLH BSO SLND on 7/17/17 by Dr Norma Key, followed by 3 cycles of vaginal brachytherapy completed in August 2017   • Low back pain    • Mild acid reflux    • OA (osteoarthritis)     marissa knees   • Spinal stenosis of lumbosacral region    • Wears glasses      Past Surgical History:  Past Surgical History:   Procedure Laterality Date   • COLONOSCOPY     • HYSTERECTOMY     • JOINT REPLACEMENT Bilateral     knees   • ORTHOPEDIC SURGERY     • PLANTAR FASCIA SURGERY Bilateral    • LA ARTHRP KNE CONDYLE&PLATU MEDIAL&LAT COMPARTMENTS Right 11/30/2020    Procedure: ARTHROPLASTY KNEE TOTAL;  Surgeon: Josselyn Bolden MD;  Location: [facility];  Service: Orthopedics   • LA HYSTEROSCOPY BX ENDOMETRIUM&/POLYPC W/WO D&C N/A 02/28/2017    Procedure: DILATATION AND CURETTAGE (D&C) WITH HYSTEROSCOPY,POLYPECTOMY;  Surgeon: Elsa Perdomo MD;  Location: [facility];  Service: Gynecology   • REPLACEMENT TOTAL KNEE Left 10/22/2021   • SHOULDER SURGERY Left    • TONSILLECTOMY     • TOTAL ABDOMINAL HYSTERECTOMY W/ BILATERAL SALPINGOOPHORECTOMY Bilateral 07/2017    endometrial cancer, ? stage 1   • WISDOM TOOTH EXTRACTION       Family History:  Family History   Problem Relation Age of Onset   • Ovarian cancer Mother 79   • Esophageal cancer Father    • No Known Problems Maternal Grandmother    • No Known Problems Maternal Grandfather    • No Known Problems Paternal Grandmother    • No Known Problems Paternal Grandfather    • No Known Problems Maternal Aunt    • No Known Problems Paternal Aunt    • Breast cancer Neg Hx    • Colon cancer Neg Hx    • Uterine cancer Neg Hx      Social History:  Social History     Substance and Sexual Activity "  Alcohol Use Yes    Comment: very rarely     Social History     Substance and Sexual Activity   Drug Use No     Social History     Tobacco Use   Smoking Status Never   Smokeless Tobacco Never   Tobacco Comments    No secondhand smoke exposure           ROS:  General: Per HPI  Skin: Negative, except if noted below  HEENT: Negative  Respiratory: Negative  Cardiovascular: Negative  Gastrointestinal: Negative  Urinary: Negative  Vascular: Negative  Musculoskeletal: Positive per HPI   Neurologic: Positive per HPI  Endocrine: Negative    Objective:  BP Readings from Last 1 Encounters:   06/27/23 146/83      Wt Readings from Last 1 Encounters:   06/27/23 89 8 kg (198 lb)        Respiratory:   non-labored respirations    Lymphatics:  no palpable lymph nodes    Gait and Station:   altered    Neurologic:   Alert and oriented times 3  Patient with normal sensation except as noted below  Deep tendon reflexes 2+ except as noted in MSK exam    Bilateral Lower Extremity:  Left Hip     Inspection: skin intact    Range of Motion: WNL w/o pain    - log roll    - Trendelenburg sign    Motor: 5/5 Q/HS/TA/GS/P    Pulses: 2+ DP / 2+ PT    SILT DP/SP/S/S/TN    Right Hip     Inspection: skin intact    Range of Motion: WNL w/o pain    - log roll    - Trendelenburg sign    Motor: 5/5 Q/HS/TA/GS/P    Pulses: 2+ DP / 2+ PT    SILT DP/SP/S/S/TN    Imaging:  My interpretation XR AP pelvis/ bilateral hip: mild joint space narrowing, subchondral sclerosis, subchondral cysts, osteophyte formation  No fracture or dislocation  Lumbar DJD with flat-back deformity evident with outlet standing pelvis XR  BMI:   Estimated body mass index is 36 21 kg/m² as calculated from the following:    Height as of this encounter: 5' 2\" (1 575 m)  Weight as of this encounter: 89 8 kg (198 lb)  BSA:   Estimated body surface area is 1 9 meters squared as calculated from the following:    Height as of this encounter: 5' 2\" (1 575 m)      Weight as of this " encounter: 89 8 kg (198 lb)             Scribe Attestation    I,:  Asaf Gates am acting as a scribe while in the presence of the attending physician :       I,:  Johanna Heath, DO personally performed the services described in this documentation    as scribed in my presence :

## 2023-07-19 ENCOUNTER — OFFICE VISIT (OUTPATIENT)
Dept: PAIN MEDICINE | Facility: CLINIC | Age: 63
End: 2023-07-19
Payer: COMMERCIAL

## 2023-07-19 VITALS
SYSTOLIC BLOOD PRESSURE: 146 MMHG | WEIGHT: 198 LBS | DIASTOLIC BLOOD PRESSURE: 82 MMHG | BODY MASS INDEX: 36.44 KG/M2 | HEIGHT: 62 IN

## 2023-07-19 DIAGNOSIS — M25.552 LEFT HIP PAIN: Primary | ICD-10-CM

## 2023-07-19 DIAGNOSIS — M47.816 LUMBAR SPONDYLOSIS: ICD-10-CM

## 2023-07-19 PROCEDURE — 99204 OFFICE O/P NEW MOD 45 MIN: CPT | Performed by: ANESTHESIOLOGY

## 2023-07-19 NOTE — PROGRESS NOTES
Assessment:  1. Left hip pain    2. Lumbar spondylosis        Plan:    Patient presenting with chronic back and bilateral left > right hip pain for years, worsening over the past 6+ weeks. Symptoms are accompanied by pain >7/10 on the pain scale with inability to participate in IADLs for >6 weeks. Patient has been adherent with home exercises and stretches. Has been taking NSAIDs with modest benefit. Denies any gait instability, saddle anesthesia. In regards to the patient's pathology, we discussed the various treatment options including physical therapy, chiropractic treatment, medication management, activity modifications, interventional spine procedures. Given that patient has not had any benefit with conservative treatments, I think patient would benefit from targeted interventional treatment modalities. Given the location of her pain and findings of moderate left hip OA and severe right hip OA on x-rays, I would recommend proceeding with a left intra-articular hip injection. Risks, benefits, and alternatives to steroid injections thoroughly discussed with patient. Complete risks and benefits including bleeding, infection, tissue reaction, nerve injury and allergic reaction were discussed. The approach was demonstrated using models and literature was provided. Verbal consent was obtained. Reviewed and interpreted relevant imaging studies - specifically hip XR, lumbar MRI and discussed the results and clinical significance with the patient. Her prior lumbar MRI showed a central to right disc herniation at L4-5. She underwent bilateral L4 TF ANAHY for this with Dr. Karishma Hamlin 8/24/2020. I discussed that pain radiating into the groin on the left is not reflective of a L4-5 disc herniation. She does not have spinal pathology at L1/L2.     Reviewed external notes from the relevant aspects of the patient's medical record, specifically Orthopedic, primary care physician notes in regards to current and prior treatments tried (as mentioned in history of present illness). Reviewed pertinent laboratory studies, specifically renal function, hemoglobin A1c, CBC, coagulation studies, prior to recommending medication therapies/interventional treatment options. [place] Prescription Drug Monitoring Program report was reviewed and was appropriate     My impressions and treatment recommendations were discussed in detail with the patient who verbalized understanding and had no further questions. Discharge instructions were provided. I personally saw and examined the patient and I agree with the above discussed plan of care. Orders Placed This Encounter   Procedures   • FL spine and pain procedure     Standing Status:   Future     Standing Expiration Date:   7/19/2027     Order Specific Question:   Reason for Exam:     Answer:   left hip injection     Order Specific Question:   Anticoagulant hold needed? Answer:   no     No orders of the defined types were placed in this encounter. History of Present Illness:  Yoana Gimenez is a 61 y.o. female who presents for consultationin regards to chronic lower back and hip pain. The patient’s current symptoms include left > right low back pain and left > right hip pain. Patient rates the pain as a 4 out of 10 on the pain scale and describes it as an intermittent dull/aching pain with no typical pattern. Pain has been present for greater than 6 weeks without improvement despite conservative treatment. She does attend aquatic therapy which has helped for her lower back pain in the past but has not currently help for her hip pains. She currently takes ibuprofen as needed which does help with the pain. I have personally reviewed and/or updated the patient's past medical history, past surgical history, family history, social history, current medications, allergies, and vital signs today. Review of Systems   Constitutional: Negative for chills and fever. HENT: Negative for ear pain and sore throat. Eyes: Negative for pain and visual disturbance. Respiratory: Negative for cough and shortness of breath. Cardiovascular: Negative for chest pain and palpitations. Gastrointestinal: Negative for abdominal pain and vomiting. Genitourinary: Negative for dysuria and hematuria. Musculoskeletal: Positive for arthralgias, back pain, gait problem, joint swelling (knees) and myalgias. Skin: Negative for color change and rash. Neurological: Positive for weakness (back and hips). Negative for seizures and syncope. All other systems reviewed and are negative.       Patient Active Problem List   Diagnosis   • S/P colonoscopy   • Endometrial cancer ([address])   • Mixed hyperlipidemia   • OA (osteoarthritis) of knee   • Pain in joint, multiple sites   • Arthralgia   • Foot pain, bilateral   • Sciatica of right side   • Radicular leg pain   • Degenerative disc disease, lumbar   • Spinal stenosis of lumbosacral region   • Obesity   • Chronic pain syndrome   • Elevated BP without diagnosis of hypertension   • Vitamin D deficiency   • Leukopenia   • Bursitis of right shoulder   • Acute medial meniscus tear of left knee   • Rupture of anterior cruciate ligament of left knee   • Hyperglycemia   • Absence of cervix, acquired   • Newly recognized heart murmur   • Synovial bursa cyst   • Elevated uric acid in blood       Past Medical History:   Diagnosis Date   • Ambulatory dysfunction     uses walking stick   • Anxiety    • Arthritis    • Cancer ([address]) 7/15/17    All better now   • Chronic pain disorder    • Claustrophobia     mild   • DDD (degenerative disc disease), lumbar    • Endometrial cancer ([address]) 07/06/2017    tRA TLH BSO SLND on 7/17/17 by Dr. Jada Portillo, followed by 3 cycles of vaginal brachytherapy completed in August 2017   • Low back pain    • Mild acid reflux    • OA (osteoarthritis)     marissa knees   • Spinal stenosis of lumbosacral region    • Wears glasses        Past Surgical History:   Procedure Laterality Date   • COLONOSCOPY     • HYSTERECTOMY     • JOINT REPLACEMENT Bilateral     knees   • ORTHOPEDIC SURGERY     • PLANTAR FASCIA SURGERY Bilateral    • [place] ARTHRP KNE CONDYLE&PLATU MEDIAL&LAT COMPARTMENTS Right 11/30/2020    Procedure: ARTHROPLASTY KNEE TOTAL;  Surgeon: Bertram Gutiérrez MD;  [facility];  Service: Orthopedics   • [place] HYSTEROSCOPY BX ENDOMETRIUM&/POLYPC W/WO D&C N/A 02/28/2017    Procedure: DILATATION AND CURETTAGE (D&C) WITH HYSTEROSCOPY,POLYPECTOMY;  Surgeon: Poppy Ji MD;  [facility];  Service: Gynecology   • REPLACEMENT TOTAL KNEE Left 10/22/2021   • SHOULDER SURGERY Left    • TONSILLECTOMY     • TOTAL ABDOMINAL HYSTERECTOMY W/ BILATERAL SALPINGOOPHORECTOMY Bilateral 07/2017    endometrial cancer, ? stage 1   • WISDOM TOOTH EXTRACTION         Family History   Problem Relation Age of Onset   • Ovarian cancer Mother 79   • Cancer Mother    • Esophageal cancer Father    • Cancer Father    • No Known Problems Maternal Grandmother    • No Known Problems Maternal Grandfather    • No Known Problems Paternal Grandmother    • No Known Problems Paternal Grandfather    • No Known Problems Maternal Aunt    • No Known Problems Paternal Aunt    • Breast cancer Neg Hx    • Colon cancer Neg Hx    • Uterine cancer Neg Hx        Social History     Occupational History   • Not on file   Tobacco Use   • Smoking status: Never   • Smokeless tobacco: Never   • Tobacco comments:     No secondhand smoke exposure   Vaping Use   • Vaping Use: Never used   Substance and Sexual Activity   • Alcohol use: Not Currently     Comment: very rarely   • Drug use: No   • Sexual activity: Not Currently     Partners: Male     Birth control/protection: Abstinence       Current Outpatient Medications on File Prior to Visit   Medication Sig   • atorvastatin (LIPITOR) 10 mg tablet Take 1 tablet (10 mg total) by mouth daily   • Cholecalciferol (HM Vitamin D3) 100 MCG (4000 UT) CAPS Take 1 capsule (4,000 Units total) by mouth daily   • Coenzyme Q10-Vitamin E (QUNOL ULTRA COQ10 PO)    • cyanocobalamin (VITAMIN B-12) 1000 MCG tablet Take 1,000 mcg by mouth daily   • methylPREDNISolone 4 MG tablet therapy pack Use as directed on package   • Misc Natural Products (GLUCOSAMINE CHOND CMP TRIPLE PO) Take by mouth   • Omega 3-6-9 Fatty Acids (OMEGA 3-6-9 PO) Take 1 capsule by mouth daily     Current Facility-Administered Medications on File Prior to Visit   Medication   • lidocaine (XYLOCAINE) 1 % injection 2 mL   • triamcinolone acetonide (KENALOG-40) 40 mg/mL injection 20 mg       Allergies   Allergen Reactions   • Iv Contrast [Iodinated Contrast Media] Hives   • Vitamin C [Ascorbate - Food Allergy]      Queasy stomach, loose stool       Physical Exam:    /82   Ht 5' 2" (1.575 m)   Wt 89.8 kg (198 lb)   BMI 36.21 kg/m²     Constitutional:normal, well developed, well nourished, alert, in no distress and non-toxic and no overt pain behavior. Eyes:anicteric  HEENT:grossly intact  Neck:supple, symmetric, trachea midline and no masses   Pulmonary:even and unlabored  Cardiovascular:No edema or pitting edema present  Skin:Normal without rashes or lesions and well hydrated  Psychiatric:Mood and affect appropriate  Neurologic: Motor function is grossly intact. Musculoskeletal: Slight pain with left hip ROM. Pain with lumbar spine ROM. Imaging  XR Hips bilateral  FINDINGS:  The bony pelvis appears intact. Degenerative changes visualized lower lumbar spine.     LEFT HIP:  There is no acute fracture or dislocation. Moderate left hip osteoarthritis is seen. No lytic or blastic osseous lesion. Soft tissues are unremarkable.     RIGHT HIP:  There is no acute fracture or dislocation. Severe right hip osteoarthritis is seen. No lytic or blastic osseous lesion.   Soft tissues are unremarkable.     IMPRESSION:  Severe right and moderate left hip degenerative osteoarthritis

## 2023-08-10 ENCOUNTER — HOSPITAL ENCOUNTER (OUTPATIENT)
Dept: RADIOLOGY | Facility: MEDICAL CENTER | Age: 63
Discharge: HOME | End: 2023-08-10
Payer: COMMERCIAL

## 2023-08-10 VITALS
HEART RATE: 55 BPM | SYSTOLIC BLOOD PRESSURE: 156 MMHG | TEMPERATURE: 97.4 F | DIASTOLIC BLOOD PRESSURE: 79 MMHG | RESPIRATION RATE: 18 BRPM | OXYGEN SATURATION: 97 %

## 2023-08-10 DIAGNOSIS — M25.552 LEFT HIP PAIN: ICD-10-CM

## 2023-08-10 PROCEDURE — 20610 DRAIN/INJ JOINT/BURSA W/O US: CPT | Performed by: ANESTHESIOLOGY

## 2023-08-10 PROCEDURE — 77002 NEEDLE LOCALIZATION BY XRAY: CPT

## 2023-08-10 PROCEDURE — A9585 GADOBUTROL INJECTION: HCPCS | Performed by: ANESTHESIOLOGY

## 2023-08-10 PROCEDURE — 77002 NEEDLE LOCALIZATION BY XRAY: CPT | Performed by: ANESTHESIOLOGY

## 2023-08-10 RX ORDER — LIDOCAINE HYDROCHLORIDE 10 MG/ML
3 INJECTION, SOLUTION EPIDURAL; INFILTRATION; INTRACAUDAL; PERINEURAL ONCE
Status: COMPLETED | OUTPATIENT
Start: 2023-08-10 | End: 2023-08-10

## 2023-08-10 RX ORDER — BUPIVACAINE HCL/PF 2.5 MG/ML
3 VIAL (ML) INJECTION ONCE
Status: COMPLETED | OUTPATIENT
Start: 2023-08-10 | End: 2023-08-10

## 2023-08-10 RX ORDER — METHYLPREDNISOLONE ACETATE 40 MG/ML
40 INJECTION, SUSPENSION INTRA-ARTICULAR; INTRALESIONAL; INTRAMUSCULAR; PARENTERAL; SOFT TISSUE ONCE
Status: COMPLETED | OUTPATIENT
Start: 2023-08-10 | End: 2023-08-10

## 2023-08-10 RX ORDER — GADOBUTROL 604.72 MG/ML
1 INJECTION INTRAVENOUS ONCE
Status: COMPLETED | OUTPATIENT
Start: 2023-08-10 | End: 2023-08-10

## 2023-08-10 RX ADMIN — GADOBUTROL 1 ML: 604.72 INJECTION INTRAVENOUS at 13:11

## 2023-08-10 RX ADMIN — METHYLPREDNISOLONE ACETATE 40 MG: 40 INJECTION, SUSPENSION INTRA-ARTICULAR; INTRALESIONAL; INTRAMUSCULAR; PARENTERAL; SOFT TISSUE at 13:11

## 2023-08-10 RX ADMIN — LIDOCAINE HYDROCHLORIDE 3 ML: 10 INJECTION, SOLUTION EPIDURAL; INFILTRATION; INTRACAUDAL; PERINEURAL at 13:09

## 2023-08-10 RX ADMIN — Medication 3 ML: at 13:11

## 2023-08-10 NOTE — DISCHARGE INSTRUCTIONS

## 2023-08-10 NOTE — H&P
History of Present Illness:  The patient is a 61 y.o. female who presents with complaints of hip pain    Past Medical History:   Diagnosis Date   • Ambulatory dysfunction     uses walking stick   • Anxiety    • Arthritis    • Cancer ([address]) 7/15/17    All better now   • Chronic pain disorder    • Claustrophobia     mild   • DDD (degenerative disc disease), lumbar    • Endometrial cancer ([address]) 07/06/2017    tRA TLH BSO SLND on 7/17/17 by Dr. Carrie Stewart, followed by 3 cycles of vaginal brachytherapy completed in August 2017   • Low back pain    • Mild acid reflux    • OA (osteoarthritis)     marissa knees   • Spinal stenosis of lumbosacral region    • Wears glasses        Past Surgical History:   Procedure Laterality Date   • COLONOSCOPY     • HYSTERECTOMY     • JOINT REPLACEMENT Bilateral     knees   • ORTHOPEDIC SURGERY     • PLANTAR FASCIA SURGERY Bilateral    • [place] ARTHRP KNE CONDYLE&PLATU MEDIAL&LAT COMPARTMENTS Right 11/30/2020    Procedure: ARTHROPLASTY KNEE TOTAL;  Surgeon: Rey Richard MD;  [facility];  Service: Orthopedics   • [place] HYSTEROSCOPY BX ENDOMETRIUM&/POLYPC W/WO D&C N/A 02/28/2017    Procedure: DILATATION AND CURETTAGE (D&C) WITH HYSTEROSCOPY,POLYPECTOMY;  Surgeon: Dioni Méndez MD;  [facility];  Service: Gynecology   • REPLACEMENT TOTAL KNEE Left 10/22/2021   • SHOULDER SURGERY Left    • TONSILLECTOMY     • TOTAL ABDOMINAL HYSTERECTOMY W/ BILATERAL SALPINGOOPHORECTOMY Bilateral 07/2017    endometrial cancer, ? stage 1   • WISDOM TOOTH EXTRACTION           Current Outpatient Medications:   •  atorvastatin (LIPITOR) 10 mg tablet, Take 1 tablet (10 mg total) by mouth daily, Disp: 90 tablet, Rfl: 0  •  Cholecalciferol (HM Vitamin D3) 100 MCG (4000 UT) CAPS, Take 1 capsule (4,000 Units total) by mouth daily, Disp: 30 capsule, Rfl: 0  •  Coenzyme Q10-Vitamin E (QUNOL ULTRA COQ10 PO), , Disp: , Rfl:   •  cyanocobalamin (VITAMIN B-12) 1000 MCG tablet, Take 1,000 mcg by mouth daily, Disp: , Rfl:   •  Misc Natural Products (GLUCOSAMINE CHOND CMP TRIPLE PO), Take by mouth, Disp: , Rfl:   •  Omega 3-6-9 Fatty Acids (OMEGA 3-6-9 PO), Take 1 capsule by mouth daily, Disp: , Rfl:     Current Facility-Administered Medications:   •  Gadobutrol injection (SINGLE-DOSE) SOLN 1 mL, 1 mL, Intra-articular, Once, Will Jose Officer, MD  •  lidocaine (PF) (XYLOCAINE-MPF) 1 % injection 3 mL, 3 mL, Infiltration, Once, Will [place] Officer, MD  •  lidocaine (XYLOCAINE) 1 % injection 2 mL, 2 mL, Injection, , Francois Sanchez DPM, 2 mL at 05/20/22 1445  •  methylPREDNISolone acetate (DEPO-MEDROL) injection 40 mg, 40 mg, Intra-articular, Once, Will [place] OfficeMD mahogany  •  triamcinolone acetonide (KENALOG-40) 40 mg/mL injection 20 mg, 20 mg, Intra-articular, , Francois Sanchez DPM, 20 mg at 05/20/22 1445    Allergies   Allergen Reactions   • Iv Contrast [Iodinated Contrast Media] Hives   • Vitamin C [Ascorbate - Food Allergy]      Queasy stomach, loose stool       Physical Exam:   Vitals:    08/10/23 1254   BP: 145/83   Pulse: 58   Resp: 20   Temp: (!) 97.4 °F (36.3 °C)   SpO2: 99%     General: Awake, Alert, Oriented x 3, Mood and affect appropriate  Respiratory: Respirations even and unlabored  Cardiovascular: Peripheral pulses intact; no edema  Musculoskeletal Exam: pain with left hip ROM    ASA Score: 2    Patient/Chart Verification  Patient ID Verified: Verbal  ID Band Applied: No  Consents Confirmed: Procedural, To be obtained in the Pre-Procedure area  H&P( within 30 days) Verified: To be obtained in the Pre-Procedure area  Interval H&P(within 24 hr) Complete (required for Outpatients and Surgery Admit only): To be obtained in the Pre-Procedure area  Allergies Reviewed: Yes (contrast DYE gets hives)  Anticoag/NSAID held?: NA  Currently on antibiotics?: No    Assessment:   1.  Left hip pain        Plan: left hip injection

## 2023-08-17 ENCOUNTER — TELEPHONE (OUTPATIENT)
Dept: RADIOLOGY | Facility: MEDICAL CENTER | Age: 63
End: 2023-08-17

## 2023-09-22 DIAGNOSIS — E78.2 MIXED HYPERLIPIDEMIA: ICD-10-CM

## 2023-09-22 RX ORDER — ATORVASTATIN CALCIUM 10 MG/1
10 TABLET, FILM COATED ORAL DAILY
Qty: 90 TABLET | Refills: 0 | Status: SHIPPED | OUTPATIENT
Start: 2023-09-22

## 2023-09-28 ENCOUNTER — HOSPITAL ENCOUNTER (OUTPATIENT)
Dept: MAMMOGRAPHY | Facility: MEDICAL CENTER | Age: 63
Discharge: HOME | End: 2023-09-28
Payer: COMMERCIAL

## 2023-09-28 VITALS — BODY MASS INDEX: 36.43 KG/M2 | WEIGHT: 197.97 LBS | HEIGHT: 62 IN

## 2023-09-28 DIAGNOSIS — Z12.31 VISIT FOR SCREENING MAMMOGRAM: ICD-10-CM

## 2023-09-28 PROCEDURE — 77067 SCR MAMMO BI INCL CAD: CPT

## 2023-09-28 PROCEDURE — 77063 BREAST TOMOSYNTHESIS BI: CPT

## 2023-10-17 ENCOUNTER — OFFICE VISIT (OUTPATIENT)
Dept: OBGYN CLINIC | Facility: MEDICAL CENTER | Age: 63
End: 2023-10-17
Payer: COMMERCIAL

## 2023-10-17 VITALS
WEIGHT: 204.4 LBS | HEART RATE: 74 BPM | BODY MASS INDEX: 37.61 KG/M2 | HEIGHT: 62 IN | DIASTOLIC BLOOD PRESSURE: 82 MMHG | SYSTOLIC BLOOD PRESSURE: 137 MMHG

## 2023-10-17 DIAGNOSIS — M54.16 LUMBAR RADICULOPATHY: ICD-10-CM

## 2023-10-17 DIAGNOSIS — M16.12 PRIMARY OSTEOARTHRITIS OF LEFT HIP: Primary | ICD-10-CM

## 2023-10-17 PROCEDURE — 99214 OFFICE O/P EST MOD 30 MIN: CPT | Performed by: ORTHOPAEDIC SURGERY

## 2023-10-17 NOTE — PROGRESS NOTES
Assessment/Plan     1. Primary osteoarthritis of left hip    2. Lumbar radiculopathy      Orders Placed This Encounter   Procedures    MRI hip left wo contrast    Ambulatory referral to Spine & Pain Management       Patient is experiencing mild to moderate osteoarthritis of left hip. MRI of left hip without contrast was ordered at today's visit for suspicious labrum tear and abductor tear. Spine and pain management referral was given to patient today to evaluate lumbar spine. Diclofenac was prescribed to patient today. Patient educated to not mix with Advil and Aleve. Patient should take medication with food. Follow up after MRI results of the left hip. Return for after MRI results of left hip. I answered all of the patient's questions during the visit and provided education of the patient's condition during the visit. The patient verbalized understanding of the information given and agrees with the plan. This note was dictated using [employer] software. It may contain errors including improperly dictated words. Please contact physician directly for any questions. History of Present Illness   Chief complaint: No chief complaint on file. HPI: Carlos Montanez is a 61 y.o. female that c/o left hip pain. Patient states her left hip pain started 6 weeks ago when she was hydroplaning and she went to bend over for a cooldown session and felt pain in her groin and left buttocks. Patient states her pain is achy in characteristic and is aggravated with steps, walking, prolonged standing. Patient states she feels like she is hobbling when she is walking. Patient states he alleviates her pain. Takes Advil as needed for for pain management as Aleve and Tylenol provided minimal relief for her. Patient has had no previous surgeries for the left hip. Patient states she was seeing Dr. Nichole Fang and referral for pain management was given for left hip injection and was given a Medrol Dosepak.   Patient stated her left hip injection was done on 8/10/2023 which provided relief for only a couple of days but then her pain had returned. Patient states she does have a leg length discrepancy where her right leg is half an inch shorter then her left. She states she wears a heel lift on the right shoe. ROS:    See HPI for musculoskeletal review.    All other systems reviewed are negative     Historical Information   Past Medical History:   Diagnosis Date    Ambulatory dysfunction     uses walking stick    Anxiety     Arthritis     Cancer ([address]) 7/15/17    All better now    Chronic pain disorder     Claustrophobia     mild    DDD (degenerative disc disease), lumbar     Endometrial cancer ([address]) 07/06/2017    tRA TLH BSO SLND on 7/17/17 by Dr. Anila Turcios, followed by 3 cycles of vaginal brachytherapy completed in August 2017    Low back pain     Mild acid reflux     OA (osteoarthritis)     marissa knees    Spinal stenosis of lumbosacral region     Wears glasses      Past Surgical History:   Procedure Laterality Date    COLONOSCOPY      HYSTERECTOMY      JOINT REPLACEMENT Bilateral     knees    ORTHOPEDIC SURGERY      PLANTAR FASCIA SURGERY Bilateral     W[place] ARTHRP KNE CONDYLE&PLATU MEDIAL&LAT COMPARTMENTS Right 11/30/2020    Procedure: ARTHROPLASTY KNEE TOTAL;  Surgeon: Pineda Chavira MD;  [facility];  Service: Orthopedics    W[place] HYSTEROSCOPY BX ENDOMETRIUM&/POLYPC W/WO D&C N/A 02/28/2017    Procedure: DILATATION AND CURETTAGE (D&C) WITH HYSTEROSCOPY,POLYPECTOMY;  Surgeon: Katie Hong MD;  [facility];  Service: Gynecology    REPLACEMENT TOTAL KNEE Left 10/22/2021    SHOULDER SURGERY Left     TONSILLECTOMY      TOTAL ABDOMINAL HYSTERECTOMY W/ BILATERAL SALPINGOOPHORECTOMY Bilateral 07/2017    endometrial cancer, ? stage 1    WISDOM TOOTH EXTRACTION       Social History   Social History     Substance and Sexual Activity   Alcohol Use Not Currently    Comment: very rarely     Social History     Substance and Sexual Activity   Drug Use No     Social History     Tobacco Use   Smoking Status Never   Smokeless Tobacco Never   Tobacco Comments    No secondhand smoke exposure     Family History:   Family History   Problem Relation Age of Onset    Ovarian cancer Mother 79    Cancer Mother     Esophageal cancer Father     Cancer Father     No Known Problems Maternal Grandmother     No Known Problems Maternal Grandfather     No Known Problems Paternal Grandmother     No Known Problems Paternal Grandfather     No Known Problems Maternal Aunt     No Known Problems Paternal Aunt     Breast cancer Neg Hx     Colon cancer Neg Hx     Uterine cancer Neg Hx        Current Outpatient Medications on File Prior to Visit   Medication Sig Dispense Refill    atorvastatin (LIPITOR) 10 mg tablet TAKE 1 TABLET(10 MG) BY MOUTH DAILY 90 tablet 0    Cholecalciferol (HM Vitamin D3) 100 MCG (4000 UT) CAPS Take 1 capsule (4,000 Units total) by mouth daily 30 capsule 0    Coenzyme Q10-Vitamin E (QUNOL ULTRA COQ10 PO)       cyanocobalamin (VITAMIN B-12) 1000 MCG tablet Take 1,000 mcg by mouth daily      Misc Natural Products (GLUCOSAMINE CHOND CMP TRIPLE PO) Take by mouth      Omega 3-6-9 Fatty Acids (OMEGA 3-6-9 PO) Take 1 capsule by mouth daily       Current Facility-Administered Medications on File Prior to Visit   Medication Dose Route Frequency Provider Last Rate Last Admin    lidocaine (XYLOCAINE) 1 % injection 2 mL  2 mL Injection  Laurena Sonia, DPM   2 mL at 05/20/22 1445    triamcinolone acetonide (KENALOG-40) 40 mg/mL injection 20 mg  20 mg Intra-articular  Laurena Sonia, DPM   20 mg at 05/20/22 1445     Allergies   Allergen Reactions    Iv Contrast [Iodinated Contrast Media] Hives    Vitamin C [Ascorbate - Food Allergy]      Queasy stomach, loose stool       Objective   Vitals: Blood pressure 137/82, pulse 74, height 5' 2" (1.575 m), weight 92.7 kg (204 lb 6.4 oz), not currently breastfeeding. ,Body mass index is 37.39 kg/m².     PE:  AAOx 3  WDWN  Hearing intact, no drainage from eyes  Regular rate  no audible wheezing  no abdominal distension  LE compartments soft, skin intact    left hip:   No dislocation/deformity  Positive [facility]  ROM: 120 flexion, 85 ER, 30 IR  Neg. Audrey Test  Pos Impingement test  No TTP over greater trochanter  Abduction: 4/5  Neg. Ying's test  No TTP over SIJ  TTP over Piriformis   Mild TTP over ischial tuberosity     leftLE:    Sensation grossly intact  Palpable 2+ pulse  AT/GS intact    Back:    No TTP over lumbar spinous processes, paraspinal musculature  SLR: Neg. Imaging Studies: I have personally reviewed pertinent films in PACS  lefthip:  Mild to moderate osteoarthritis of left hip. No signs of acute fracture, no lytic or blastic lesions.         Scribe Attestation      I,:  [facility] am acting as a scribe while in the presence of the attending physician.:       I,:  Sahara Harry, DO personally performed the services described in this documentation    as scribed in my presence.:

## 2023-10-18 RX ORDER — DICLOFENAC SODIUM 75 MG/1
75 TABLET, DELAYED RELEASE ORAL 2 TIMES DAILY PRN
Qty: 60 TABLET | Refills: 1 | Status: SHIPPED | OUTPATIENT
Start: 2023-10-18

## 2023-12-08 ENCOUNTER — RA CDI HCC (OUTPATIENT)
Dept: OTHER | Facility: HOSPITAL | Age: 63
End: 2023-12-08

## 2023-12-08 NOTE — PROGRESS NOTES
[facility] coding opportunities       Chart reviewed, no opportunity found: CHART REVIEWED, NO OPPORTUNITY FOUND        Patients Insurance        Commercial Insurance: Alexandru Ortez

## 2023-12-11 ENCOUNTER — TELEPHONE (OUTPATIENT)
Age: 63
End: 2023-12-11

## 2023-12-11 NOTE — TELEPHONE ENCOUNTER
Caller: Patient    Doctor: lilian    Reason for call:     Patient calling to schedule appointment, she was not sure who she wanted to schedule   With, will research and call back.     Call back#: n/a

## 2023-12-23 DIAGNOSIS — E78.2 MIXED HYPERLIPIDEMIA: ICD-10-CM

## 2023-12-26 ENCOUNTER — PATIENT MESSAGE (OUTPATIENT)
Dept: FAMILY MEDICINE CLINIC | Facility: CLINIC | Age: 63
End: 2023-12-26

## 2023-12-26 DIAGNOSIS — E78.2 MIXED HYPERLIPIDEMIA: ICD-10-CM

## 2023-12-26 RX ORDER — ATORVASTATIN CALCIUM 10 MG/1
10 TABLET, FILM COATED ORAL DAILY
Qty: 90 TABLET | Refills: 0 | Status: SHIPPED | OUTPATIENT
Start: 2023-12-26

## 2023-12-26 RX ORDER — ATORVASTATIN CALCIUM 10 MG/1
10 TABLET, FILM COATED ORAL DAILY
Qty: 90 TABLET | Refills: 0 | Status: SHIPPED | OUTPATIENT
Start: 2023-12-26 | End: 2023-12-26 | Stop reason: SDUPTHER

## 2024-01-03 ENCOUNTER — APPOINTMENT (OUTPATIENT)
Dept: LAB | Facility: CLINIC | Age: 64
End: 2024-01-03
Payer: MEDICARE

## 2024-01-03 DIAGNOSIS — E78.2 MIXED HYPERLIPIDEMIA: ICD-10-CM

## 2024-01-03 DIAGNOSIS — R73.9 HYPERGLYCEMIA: ICD-10-CM

## 2024-01-03 DIAGNOSIS — E55.9 VITAMIN D DEFICIENCY: ICD-10-CM

## 2024-01-03 LAB
25(OH)D3 SERPL-MCNC: 45 NG/ML (ref 30–100)
ALBUMIN SERPL BCP-MCNC: 4 G/DL (ref 3.5–5)
ALP SERPL-CCNC: 68 U/L (ref 34–104)
ALT SERPL W P-5'-P-CCNC: 32 U/L (ref 7–52)
ANION GAP SERPL CALCULATED.3IONS-SCNC: 10 MMOL/L
AST SERPL W P-5'-P-CCNC: 34 U/L (ref 13–39)
BILIRUB SERPL-MCNC: 0.79 MG/DL (ref 0.2–1)
BUN SERPL-MCNC: 27 MG/DL (ref 5–25)
CALCIUM SERPL-MCNC: 9.7 MG/DL (ref 8.4–10.2)
CHLORIDE SERPL-SCNC: 106 MMOL/L (ref 96–108)
CHOLEST SERPL-MCNC: 186 MG/DL
CO2 SERPL-SCNC: 25 MMOL/L (ref 21–32)
CREAT SERPL-MCNC: 0.71 MG/DL (ref 0.6–1.3)
EST. AVERAGE GLUCOSE BLD GHB EST-MCNC: 123 MG/DL
GFR SERPL CREATININE-BSD FRML MDRD: 90 ML/MIN/1.73SQ M
GLUCOSE P FAST SERPL-MCNC: 89 MG/DL (ref 65–99)
HBA1C MFR BLD: 5.9 %
HDLC SERPL-MCNC: 76 MG/DL
LDLC SERPL CALC-MCNC: 93 MG/DL (ref 0–100)
POTASSIUM SERPL-SCNC: 4.1 MMOL/L (ref 3.5–5.3)
PROT SERPL-MCNC: 6.6 G/DL (ref 6.4–8.4)
SODIUM SERPL-SCNC: 141 MMOL/L (ref 135–147)
TRIGL SERPL-MCNC: 83 MG/DL

## 2024-01-03 PROCEDURE — 80053 COMPREHEN METABOLIC PANEL: CPT

## 2024-01-03 PROCEDURE — 82306 VITAMIN D 25 HYDROXY: CPT

## 2024-01-03 PROCEDURE — 83036 HEMOGLOBIN GLYCOSYLATED A1C: CPT

## 2024-01-03 PROCEDURE — 80061 LIPID PANEL: CPT

## 2024-01-03 PROCEDURE — 36415 COLL VENOUS BLD VENIPUNCTURE: CPT

## 2024-01-05 ENCOUNTER — OFFICE VISIT (OUTPATIENT)
Dept: FAMILY MEDICINE CLINIC | Facility: CLINIC | Age: 64
End: 2024-01-05

## 2024-01-05 VITALS
OXYGEN SATURATION: 98 % | SYSTOLIC BLOOD PRESSURE: 122 MMHG | DIASTOLIC BLOOD PRESSURE: 74 MMHG | HEIGHT: 62 IN | BODY MASS INDEX: 37.98 KG/M2 | WEIGHT: 206.38 LBS | HEART RATE: 76 BPM | TEMPERATURE: 97.6 F

## 2024-01-05 DIAGNOSIS — M25.50 PAIN IN JOINT, MULTIPLE SITES: ICD-10-CM

## 2024-01-05 DIAGNOSIS — M75.51 BURSITIS OF RIGHT SHOULDER: ICD-10-CM

## 2024-01-05 DIAGNOSIS — E78.2 MIXED HYPERLIPIDEMIA: ICD-10-CM

## 2024-01-05 DIAGNOSIS — R73.9 HYPERGLYCEMIA: ICD-10-CM

## 2024-01-05 DIAGNOSIS — R20.2 PARESTHESIAS: ICD-10-CM

## 2024-01-05 DIAGNOSIS — E55.9 VITAMIN D DEFICIENCY: ICD-10-CM

## 2024-01-05 DIAGNOSIS — Z78.0 MENOPAUSE: Primary | ICD-10-CM

## 2024-01-05 DIAGNOSIS — C54.1 ENDOMETRIAL CANCER (HCC): ICD-10-CM

## 2024-01-05 DIAGNOSIS — Z00.00 INITIAL MEDICARE ANNUAL WELLNESS VISIT: ICD-10-CM

## 2024-01-05 PROBLEM — R03.0 ELEVATED BP WITHOUT DIAGNOSIS OF HYPERTENSION: Status: RESOLVED | Noted: 2021-05-12 | Resolved: 2024-01-05

## 2024-01-05 NOTE — ASSESSMENT & PLAN NOTE
Pt does have multiple areas of severe joint issues and it is very hard for her to walk long distances. I did complete a parking placard today.

## 2024-01-05 NOTE — ASSESSMENT & PLAN NOTE
PT has hx of severe OA in her R shoulder last xray was done in 2017. Voltaren gel as needed as pt has a sensitive stomach. Ortho as needed for injections.

## 2024-01-05 NOTE — PATIENT INSTRUCTIONS
Problem List Items Addressed This Visit          Musculoskeletal and Integument    Bursitis of right shoulder     PT has hx of severe OA in her R shoulder last xray was done in 2017. Voltaren gel as needed as pt has a sensitive stomach. Ortho as needed for injections.             Genitourinary    Endometrial cancer (HCC)     Status postsurgical care.            Other    Mixed hyperlipidemia     Patient is on Lipitor 10 mg once daily keeping her LDL at goal at 93 continue recheck 6 months.         Relevant Orders    Lipid Panel with Direct LDL reflex    Comprehensive metabolic panel    Pain in joint, multiple sites     Pt does have multiple areas of severe joint issues and it is very hard for her to walk long distances. I did complete a parking placard today.          Vitamin D deficiency     Vitamin D level is normal at 45 continue supplementation check yearly.         Hyperglycemia     Glucose is normal at 89 with an A1c of 5.9.  Continue to observe.         Initial Medicare annual wellness visit     Labs up to date. Dexa ordered. Mammo UTD. Pt refusing any vaccines.          Paresthesias     Intermittent marissa. soles. Explained possibility could be Periferal neuropathy. Check CBC and B12. Consider EMG. Seeing ortho soon.          Relevant Orders    CBC and differential    Vitamin B12/Folate, Serum Panel     Other Visit Diagnoses       Menopause    -  Primary    Relevant Orders    DXA bone density spine hip and pelvis

## 2024-01-05 NOTE — PROGRESS NOTES
Assessment and Plan:     Problem List Items Addressed This Visit        Musculoskeletal and Integument    Bursitis of right shoulder     PT has hx of severe OA in her R shoulder last xray was done in 2017. Voltaren gel as needed as pt has a sensitive stomach. Ortho as needed for injections.             Genitourinary    Endometrial cancer (HCC)     Status postsurgical care.            Other    Mixed hyperlipidemia     Patient is on Lipitor 10 mg once daily keeping her LDL at goal at 93 continue recheck 6 months.         Relevant Orders    Lipid Panel with Direct LDL reflex    Comprehensive metabolic panel    Pain in joint, multiple sites     Pt does have multiple areas of severe joint issues and it is very hard for her to walk long distances. I did complete a parking placard today.          Vitamin D deficiency     Vitamin D level is normal at 45 continue supplementation check yearly.         Hyperglycemia     Glucose is normal at 89 with an A1c of 5.9.  Continue to observe.         Initial Medicare annual wellness visit     Labs up to date. Dexa ordered. Mammo UTD. Pt refusing any vaccines.          Paresthesias     Intermittent marissa. soles. Explained possibility could be Periferal neuropathy. Check CBC and B12. Consider EMG. Seeing ortho soon.          Relevant Orders    CBC and differential    Vitamin B12/Folate, Serum Panel   Other Visit Diagnoses     Menopause    -  Primary    Relevant Orders    DXA bone density spine hip and pelvis            Depression Screening and Follow-up Plan: Patient was screened for depression during today's encounter. They screened negative with a PHQ-2 score of 0.      Preventive health issues were discussed with patient, and age appropriate screening tests were ordered as noted in patient's After Visit Summary.  Personalized health advice and appropriate referrals for health education or preventive services given if needed, as noted in patient's After Visit Summary.     History of  Present Illness:     Patient presents for a Medicare Wellness Visit      Cristin Nolan is here for chronic conditions f/u. Pt. had labs done prior to today's visit which included Recent Results (from the past 672 hour(s))  -Comprehensive metabolic panel:   Collection Time: 01/03/24  8:45 AM       Result                      Value             Ref Range           Sodium                      141               135 - 147 mm*       Potassium                   4.1               3.5 - 5.3 mm*       Chloride                    106               96 - 108 mmo*       CO2                         25                21 - 32 mmol*       ANION GAP                   10                mmol/L              BUN                         27 (H)            5 - 25 mg/dL        Creatinine                  0.71              0.60 - 1.30 *       Glucose, Fasting            89                65 - 99 mg/dL       Calcium                     9.7               8.4 - 10.2 m*       AST                         34                13 - 39 U/L         ALT                         32                7 - 52 U/L          Alkaline Phosphatase        68                34 - 104 U/L        Total Protein               6.6               6.4 - 8.4 g/*       Albumin                     4.0               3.5 - 5.0 g/*       Total Bilirubin             0.79              0.20 - 1.00 *       eGFR                        90                ml/min/1.73s*  -Lipid Panel with Direct LDL reflex:   Collection Time: 01/03/24  8:45 AM       Result                      Value             Ref Range           Cholesterol                 186               See Comment *       Triglycerides               83                See Comment *       HDL, Direct                 76                >=50 mg/dL          LDL Calculated              93                0 - 100 mg/dL  -Hemoglobin A1C:   Collection Time: 01/03/24  8:45 AM       Result                      Value             Ref Range            Hemoglobin A1C              5.9 (H)           Normal 4.0-5*       EAG                         123               mg/dl          -Vitamin D 25 hydroxy:   Collection Time: 01/03/24  8:45 AM       Result                      Value             Ref Range           Vit D, 25-Hydroxy           45.0              30.0 - 100.0*         Patient Care Team:  Manisha Ortiz PA-C as PCP - General (Family Medicine)  Dipesh Knowles MD     Review of Systems:     Review of Systems   Constitutional: Negative.    HENT: Negative.     Eyes: Negative.    Respiratory: Negative.     Cardiovascular: Negative.    Gastrointestinal: Negative.    Endocrine: Negative.    Genitourinary: Negative.    Musculoskeletal: Negative.    Skin: Negative.    Allergic/Immunologic: Negative.    Neurological: Negative.    Hematological: Negative.    Psychiatric/Behavioral: Negative.        Problem List:     Patient Active Problem List   Diagnosis   • S/P colonoscopy   • Endometrial cancer (HCC)   • Mixed hyperlipidemia   • OA (osteoarthritis) of knee   • Pain in joint, multiple sites   • Arthralgia   • Foot pain, bilateral   • Sciatica of right side   • Radicular leg pain   • Degenerative disc disease, lumbar   • Spinal stenosis of lumbosacral region   • Obesity   • Chronic pain syndrome   • Vitamin D deficiency   • Leukopenia   • Bursitis of right shoulder   • Acute medial meniscus tear of left knee   • Rupture of anterior cruciate ligament of left knee   • Hyperglycemia   • Absence of cervix, acquired   • Newly recognized heart murmur   • Synovial bursa cyst   • Elevated uric acid in blood   • Initial Medicare annual wellness visit   • Paresthesias      Past Medical and Surgical History:     Past Medical History:   Diagnosis Date   • Ambulatory dysfunction     uses walking stick   • Anxiety    • Arthritis    • Cancer (HCC) 7/15/17    All better now   • Chronic pain disorder    • Claustrophobia     mild   • DDD (degenerative disc disease), lumbar    •  Endometrial cancer (HCC) 07/06/2017    tRA TLH BSO SLND on 7/17/17 by Dr. Stone, followed by 3 cycles of vaginal brachytherapy completed in August 2017   • Low back pain    • Mild acid reflux    • OA (osteoarthritis)     marissa knees   • Spinal stenosis of lumbosacral region    • Wears glasses      Past Surgical History:   Procedure Laterality Date   • COLONOSCOPY     • HYSTERECTOMY     • JOINT REPLACEMENT Bilateral     knees   • ORTHOPEDIC SURGERY     • PLANTAR FASCIA SURGERY Bilateral    • VA ARTHRP KNE CONDYLE&PLATU MEDIAL&LAT COMPARTMENTS Right 11/30/2020    Procedure: ARTHROPLASTY KNEE TOTAL;  Surgeon: Dipesh Knowles MD;  Location: [facility];  Service: Orthopedics   • VA HYSTEROSCOPY BX ENDOMETRIUM&/POLYPC W/WO D&C N/A 02/28/2017    Procedure: DILATATION AND CURETTAGE (D&C) WITH HYSTEROSCOPY,POLYPECTOMY;  Surgeon: Roberto Anderson MD;  Location: [facility];  Service: Gynecology   • REPLACEMENT TOTAL KNEE Left 10/22/2021   • SHOULDER SURGERY Left    • TONSILLECTOMY     • TOTAL ABDOMINAL HYSTERECTOMY W/ BILATERAL SALPINGOOPHORECTOMY Bilateral 07/2017    endometrial cancer, ? stage 1   • WISDOM TOOTH EXTRACTION        Family History:     Family History   Problem Relation Age of Onset   • Ovarian cancer Mother 70   • Cancer Mother    • Esophageal cancer Father    • Cancer Father    • No Known Problems Maternal Grandmother    • No Known Problems Maternal Grandfather    • No Known Problems Paternal Grandmother    • No Known Problems Paternal Grandfather    • No Known Problems Maternal Aunt    • No Known Problems Paternal Aunt    • Breast cancer Neg Hx    • Colon cancer Neg Hx    • Uterine cancer Neg Hx       Social History:     Social History     Socioeconomic History   • Marital status:      Spouse name: None   • Number of children: None   • Years of education: None   • Highest education level: None   Occupational History   • None   Tobacco Use   • Smoking status: Never   • Smokeless tobacco: Never   •  Tobacco comments:     No secondhand smoke exposure   Vaping Use   • Vaping status: Never Used   Substance and Sexual Activity   • Alcohol use: Not Currently     Comment: very rarely   • Drug use: No   • Sexual activity: Not Currently     Partners: Male     Birth control/protection: Abstinence   Other Topics Concern   • None   Social History Narrative   • None     Social Determinants of Health     Financial Resource Strain: Low Risk  (1/2/2024)    Overall Financial Resource Strain (CARDIA)    • Difficulty of Paying Living Expenses: Not hard at all   Food Insecurity: Not on file   Transportation Needs: No Transportation Needs (1/2/2024)    PRAPARE - Transportation    • Lack of Transportation (Medical): No    • Lack of Transportation (Non-Medical): No   Physical Activity: Not on file   Stress: Not on file   Social Connections: Not on file   Intimate Partner Violence: Not on file   Housing Stability: Not on file      Medications and Allergies:     Current Outpatient Medications   Medication Sig Dispense Refill   • atorvastatin (LIPITOR) 10 mg tablet Take 1 tablet (10 mg total) by mouth daily 90 tablet 0   • Cholecalciferol (HM Vitamin D3) 100 MCG (4000 UT) CAPS Take 1 capsule (4,000 Units total) by mouth daily 30 capsule 0   • Coenzyme Q10-Vitamin E (QUNOL ULTRA COQ10 PO)      • cyanocobalamin (VITAMIN B-12) 1000 MCG tablet Take 1,000 mcg by mouth daily     • Misc Natural Products (GLUCOSAMINE CHOND CMP TRIPLE PO) Take by mouth     • Omega 3-6-9 Fatty Acids (OMEGA 3-6-9 PO) Take 1 capsule by mouth daily     • diclofenac (VOLTAREN) 75 mg EC tablet Take 1 tablet (75 mg total) by mouth 2 (two) times a day as needed (pain) Take with food (Patient not taking: Reported on 1/5/2024) 60 tablet 1     Current Facility-Administered Medications   Medication Dose Route Frequency Provider Last Rate Last Admin   • lidocaine (XYLOCAINE) 1 % injection 2 mL  2 mL Injection  Harjeet Black DPM   2 mL at 05/20/22 1445   • triamcinolone  acetonide (KENALOG-40) 40 mg/mL injection 20 mg  20 mg Intra-articular  Harjeet Black DPM   20 mg at 05/20/22 8375     Allergies   Allergen Reactions   • Iv Contrast [Iodinated Contrast Media] Hives   • Vitamin C [Ascorbate - Food Allergy]      Queasy stomach, loose stool      Immunizations:       There is no immunization history on file for this patient.   Health Maintenance:         Topic Date Due   • Breast Cancer Screening: Mammogram  09/28/2024   • Colorectal Cancer Screening  04/22/2033   • HIV Screening  Completed   • Hepatitis C Screening  Completed         Topic Date Due   • COVID-19 Vaccine (1) Never done   • DTaP,Tdap,and Td Vaccines (1 - Tdap) Never done   • Influenza Vaccine (1) Never done      Medicare Screening Tests and Risk Assessments:     Cristin is here for her Welcome to Medicare visit.     Health Risk Assessment:   Patient rates overall health as good. Patient feels that their physical health rating is slightly worse. Patient is satisfied with their life. Eyesight was rated as same. Hearing was rated as same. Patient feels that their emotional and mental health rating is same. Patients states they are sometimes angry. Patient states they are sometimes unusually tired/fatigued. Pain experienced in the last 7 days has been some. Patient's pain rating has been 7/10. Patient states that she has experienced no weight loss or gain in last 6 months.     Depression Screening:   PHQ-2 Score: 0      Fall Risk Screening:   In the past year, patient has experienced: no history of falling in past year      Urinary Incontinence Screening:   Patient has leaked urine accidently in the last six months.     Home Safety:  Patient has trouble with stairs inside or outside of their home. Patient has working smoke alarms and has working carbon monoxide detector. Home safety hazards include: none.     Nutrition:   Current diet is Regular, No Added Salt and Limited junk food.     Medications:   Patient is not currently  taking any over-the-counter supplements. Patient is able to manage medications.     Activities of Daily Living (ADLs)/Instrumental Activities of Daily Living (IADLs):   Walk and transfer into and out of bed and chair?: Yes  Dress and groom yourself?: Yes    Bathe or shower yourself?: Yes    Feed yourself? Yes  Do your laundry/housekeeping?: Yes  Manage your money, pay your bills and track your expenses?: Yes  Make your own meals?: Yes    Do your own shopping?: Yes    Previous Hospitalizations:   Any hospitalizations or ED visits within the last 12 months?: No      Advance Care Planning:   Living will: Yes    Durable POA for healthcare: Yes    Advanced directive: Yes    Advanced directive counseling given: Yes      Cognitive Screening:   Provider or family/friend/caregiver concerned regarding cognition?: No    PREVENTIVE SCREENINGS      Cardiovascular Screening:    General: Screening Not Indicated, History Lipid Disorder and Screening Current      Diabetes Screening:     General: Screening Current      Colorectal Cancer Screening:     General: Screening Current      Breast Cancer Screening:     General: Screening Current      Cervical Cancer Screening:    General: Screening Not Indicated      Osteoporosis Screening:      Due for: Bone Density Ultrasound      Abdominal Aortic Aneurysm (AAA) Screening:        General: Screening Not Indicated      Lung Cancer Screening:     General: Screening Not Indicated      Hepatitis C Screening:    General: Screening Current    Screening, Brief Intervention, and Referral to Treatment (SBIRT)    Screening  Typical number of drinks in a day: 0  Typical number of drinks in a week: 0  Interpretation: Low risk drinking behavior.    AUDIT-C Screenin) How often did you have a drink containing alcohol in the past year? never  2) How many drinks did you have on a typical day when you were drinking in the past year? 0  3) How often did you have 6 or more drinks on one occasion in the  "past year? never    AUDIT-C Score: 0  Interpretation: Score 0-2 (female): Negative screen for alcohol misuse    Single Item Drug Screening:  How often have you used an illegal drug (including marijuana) or a prescription medication for non-medical reasons in the past year? never    Single Item Drug Screen Score: 0  Interpretation: Negative screen for possible drug use disorder    Vision Screening    Right eye Left eye Both eyes   Without correction      With correction 20/30 20/20 20/20        Physical Exam:     /74 (BP Location: Left arm, Patient Position: Sitting, Cuff Size: Large)   Pulse 76   Temp 97.6 °F (36.4 °C) (Temporal)   Ht 5' 2\" (1.575 m)   Wt 93.6 kg (206 lb 6 oz)   SpO2 98%   BMI 37.75 kg/m²     Physical Exam  Vitals and nursing note reviewed.   Constitutional:       Appearance: Normal appearance. She is well-developed.   HENT:      Head: Normocephalic and atraumatic.   Eyes:      General: Lids are normal.      Conjunctiva/sclera: Conjunctivae normal.      Pupils: Pupils are equal, round, and reactive to light.   Cardiovascular:      Rate and Rhythm: Normal rate and regular rhythm.      Heart sounds: No murmur heard.  Pulmonary:      Effort: Pulmonary effort is normal.      Breath sounds: Normal breath sounds.   Musculoskeletal:      Comments: Pt does have a severe wobble side to side when ambulating. Shoulder is w/o crepitus and good FROM.    Skin:     General: Skin is warm and dry.   Neurological:      General: No focal deficit present.      Mental Status: She is alert.      Coordination: Coordination is intact.   Psychiatric:         Mood and Affect: Mood normal.         Behavior: Behavior normal. Behavior is cooperative.         Thought Content: Thought content normal.         Judgment: Judgment normal.          Manisha Ortiz PA-C  "

## 2024-01-05 NOTE — ASSESSMENT & PLAN NOTE
Intermittent marissa. soles. Explained possibility could be Periferal neuropathy. Check CBC and B12. Consider EMG. Seeing ortho soon.

## 2024-01-29 ENCOUNTER — EVALUATION (OUTPATIENT)
Dept: PHYSICAL THERAPY | Facility: CLINIC | Age: 64
End: 2024-01-29
Payer: MEDICARE

## 2024-01-29 ENCOUNTER — OFFICE VISIT (OUTPATIENT)
Dept: OBGYN CLINIC | Facility: MEDICAL CENTER | Age: 64
End: 2024-01-29
Payer: MEDICARE

## 2024-01-29 ENCOUNTER — APPOINTMENT (OUTPATIENT)
Dept: RADIOLOGY | Facility: MEDICAL CENTER | Age: 64
End: 2024-01-29
Payer: MEDICARE

## 2024-01-29 VITALS
DIASTOLIC BLOOD PRESSURE: 83 MMHG | SYSTOLIC BLOOD PRESSURE: 120 MMHG | HEIGHT: 62 IN | HEART RATE: 57 BPM | WEIGHT: 206 LBS | BODY MASS INDEX: 37.91 KG/M2

## 2024-01-29 DIAGNOSIS — G89.29 CHRONIC PAIN OF BOTH ANKLES: ICD-10-CM

## 2024-01-29 DIAGNOSIS — R26.2 DIFFICULTY WALKING: ICD-10-CM

## 2024-01-29 DIAGNOSIS — M79.672 BILATERAL FOOT PAIN: ICD-10-CM

## 2024-01-29 DIAGNOSIS — M25.552 ACUTE HIP PAIN, LEFT: ICD-10-CM

## 2024-01-29 DIAGNOSIS — Z96.653 HISTORY OF TOTAL KNEE ARTHROPLASTY, BILATERAL: ICD-10-CM

## 2024-01-29 DIAGNOSIS — M25.561 RIGHT KNEE PAIN, UNSPECIFIED CHRONICITY: ICD-10-CM

## 2024-01-29 DIAGNOSIS — M25.562 LEFT KNEE PAIN, UNSPECIFIED CHRONICITY: ICD-10-CM

## 2024-01-29 DIAGNOSIS — M65.9 SYNOVITIS OF LEFT KNEE: ICD-10-CM

## 2024-01-29 DIAGNOSIS — M25.561 ACUTE PAIN OF BOTH KNEES: ICD-10-CM

## 2024-01-29 DIAGNOSIS — M65.9 SYNOVITIS OF RIGHT KNEE: ICD-10-CM

## 2024-01-29 DIAGNOSIS — M25.562 ACUTE PAIN OF BOTH KNEES: ICD-10-CM

## 2024-01-29 DIAGNOSIS — M25.562 LEFT KNEE PAIN, UNSPECIFIED CHRONICITY: Primary | ICD-10-CM

## 2024-01-29 DIAGNOSIS — M54.42 ACUTE LEFT-SIDED LOW BACK PAIN WITH LEFT-SIDED SCIATICA: Primary | ICD-10-CM

## 2024-01-29 DIAGNOSIS — M25.572 CHRONIC PAIN OF BOTH ANKLES: ICD-10-CM

## 2024-01-29 DIAGNOSIS — M25.571 CHRONIC PAIN OF BOTH ANKLES: ICD-10-CM

## 2024-01-29 DIAGNOSIS — M79.671 BILATERAL FOOT PAIN: ICD-10-CM

## 2024-01-29 DIAGNOSIS — M21.70 LEG LENGTH DISCREPANCY: ICD-10-CM

## 2024-01-29 PROCEDURE — 97140 MANUAL THERAPY 1/> REGIONS: CPT | Performed by: PHYSICAL THERAPIST

## 2024-01-29 PROCEDURE — 97112 NEUROMUSCULAR REEDUCATION: CPT | Performed by: PHYSICAL THERAPIST

## 2024-01-29 PROCEDURE — 73564 X-RAY EXAM KNEE 4 OR MORE: CPT

## 2024-01-29 PROCEDURE — 97162 PT EVAL MOD COMPLEX 30 MIN: CPT | Performed by: PHYSICAL THERAPIST

## 2024-01-29 PROCEDURE — 99213 OFFICE O/P EST LOW 20 MIN: CPT | Performed by: ORTHOPAEDIC SURGERY

## 2024-01-29 NOTE — PROGRESS NOTES
PT Evaluation     Today's date: 2024  Patient name: Cristin Nolan  : 1960  MRN: [id number]  Referring provider: Mariia Granger DO  Dx:   Encounter Diagnosis     ICD-10-CM    1. Acute left-sided low back pain with left-sided sciatica  M54.42       2. Acute pain of both knees  M25.561     M25.562       3. Chronic pain of both ankles  M25.571     G89.29     M25.572       4. Bilateral foot pain  M79.671     M79.672       5. Leg length discrepancy  M21.70       6. Difficulty walking  R26.2       7. Acute hip pain, left  M25.552           Start Time: 1615  Stop Time: 1715  Total time in clinic (min): 60 minutes    Assessment/Plan    Cristin Nolan is a 63 y.o. female who was referred to physical therapy for management of multiple body parts by three separate specialists.  Primary impairments include poor core activation, decreased proximal hip strength, and both true and functional leg length discrepancy.  Consequently, patient has difficulty completing ADLs including prolonged ambulation/standing, stair navigation, and transitional movements.  Cristin would benefit from skilled intervention to address all deficits and improve functional capability.  Patient is a good candidate for therapy, pending compliance with HEP and consistent participation in physical therapy.  Thank you for the referral and please do not hesitate to contact me with any questions or concerns regarding Cristin’s care!      Plan  Frequency: 2x per week   Duration in weeks: 12   POC start date: 24  POC end date: 3/11/24  Therapeutic exercise/activity, neuromuscular reeducation, manual therapy, and modalities.   Patient understands and agrees to plan of care.    Goals  Short Term--4 weeks  1. Patient will be able to demonstrate strong, symmetrical TVA isometric activation while maintaining consistent breathing pattern and also demonstrating ability to independently perform simple lower extremity movements.  2. Patient will  demonstrate 1/2 point increase in all deficient MMT scores.  3. Patient will demonstrate independence with basic HEP.    Long Term--By Discharge  1. Patient will achieve expected FOTO score.  2. Patient will demonstrate more normalized gait pattern with ability to transfer weight to medial foot for toe off during late stance on R LE.  3. Patient will be able to maintain single limb stance > 10 sec, bilaterally, on firm surface with eyes open.  4. Patient will be able to safely navigate a flight of steps utilizing reciprocal step pattern and use of 1 HR with minimal to no aggravation of lumbar/lower extremity pain.    Patient's Goal: Not be in so much pain and be able to walk and exercise like she is used to. Be able to walk the full loop at the [facility].       Subjective    History   Date of Onset: approx 6 months ago Description: Patient reports that over the past six months she has begun to experience worsening pain in multiple body parts, and she is very frustrated because she is not sure why it is happening. In addition, she also reports that her gait is off, and she feels like she's lumbering side to side. She had recent physical bloodwork run, which included vitamin D, which was reportedly normal. She does not have an autoimmune diagnosis or familial history of rheumatoid arthritis, fibromyalgia, etc. Patient has three separate prescription for PT for (1) her lower back (2) feet/ankles and (3) bilateral knees.     Patient is and has been an avid exerciser for many years. She walks 5-6 times per week either at the [facility] in [place] or at the [facility]. She also does water cycling/aerobics/kaykay classes at [facility] 3-4 times per week. Despite all of this exercise, she is not feeling any better. Rather sx continue to worsen.    Patient had radiographic imaging done about a year ago that measured her leg length discrepancy at 1/2 inch shorter on the R side. During conversation, patient remembered  "that around the same time her pain started, she got new sneakers and lift for her shoe that increased it from 1/2 inch to 3/4 inch. She has been wearing this consistently until 4 days ago, when her most recent podiatrist decreased it back to 1/2 inch lift on the R. She has noticed some improvement since this change.     Symptoms  Lower back: Intermittent, left-sided pain just superior to glute. Pain aggravated with prolonged standing/sitting and stair ascent. Patient also reports noticeable weakness in her L leg when she has to ascend even a minimal incline.    Knees: History of replacements (2020 R and 2021 L). Intermittent \"sharp\" pain just inferolateral to patellae that is aggravated with sit to stand transfers, stair ascent > descent.     Feet ankles: Fairly constant pain in her ankles with all WBing activity, which is likely due to osteoarthritis. Her feet feel \"numb\" on plantar surface of her foot approximately that extends approximately the length of her medial arch (not in her toes or heel). She states that she has flat feet and wears custom inserts that were most recently updated approx 1 year ago.     Generalized Pain  Pain at best: 2  Pain at worst: 8    Objective--Due to complexity of symptoms; evaluation primarily spent on subjective portion. Additional objective measures, including orthotic assessment, should be taken as indicated.     GAIT: Increased lateral excursion with decreased step length; weight through lateral foot throughout stance;     SLS: RLE: <5 sec, min L hip drop,   LLE: < 5 sec, significant R  hip drop          MMT    Hip       L       R   Flex. 4 4+   Extn. 4- 4   Abd. 3- 3-                 MMT    Knee         L        R   Flex. 4 4+   Extn. 5 5       Observation:   -increased wear on lateral shoe tread with minimal to no wear medially;  -While lying supine; patient's R rearfoot in inverted position with associated forefoot varus (hypomobility into valgus/flat foot " "position)      Transverse abdominis: Bent knee fall out= Poor         SI joint: Standing flex =  POS R SIJ hypomobility       Active SLR =    POS L      Active SLR w/ stabilization =   POS L     Supine to sit =   (1) R PI -> MET (2) R PI (improved)            Precautions: universal.       Manuals 1/29            R PI MET 5x5\"                                                   Neuro Re-Ed             TVA iso 10x5\"            TVA + PB crush 10x5\"            TVA+ hip ABD                          TB pallof press             Rockerboard balance             SLS practice             Ther Ex             Stand hip 3-way TB             TB lateral step                          DL press             SL press                          TB ankle 4-way                          Ther Activity             Forward step ups             Lateral step ups             Lateral step downs             Gait Training                                       Modalities                                            "

## 2024-01-29 NOTE — LETTER
2024    Mariia Granger DO  [address]  [address]  [facility] [address]    Patient: Cristin Nolan   YOB: 1960   Date of Visit: 2024     Encounter Diagnosis     ICD-10-CM    1. Acute left-sided low back pain with left-sided sciatica  M54.42       2. Acute pain of both knees  M25.561     M25.562       3. Chronic pain of both ankles  M25.571     G89.29     M25.572       4. Bilateral foot pain  M79.671     M79.672       5. Leg length discrepancy  M21.70       6. Difficulty walking  R26.2       7. Acute hip pain, left  M25.552           Dear Dr. Granger:    Thank you for your recent referral of Cristin Nolan. Please review the attached evaluation summary from Cristin's recent visit.     Please verify that you agree with the plan of care by signing the attached order.     If you have any questions or concerns, please do not hesitate to call.     I sincerely appreciate the opportunity to share in the care of one of your patients and hope to have another opportunity to work with you in the near future.       Sincerely,    Ofelia Najera, PT      Referring Provider:      I certify that I have read the below Plan of Care and certify the need for these services furnished under this plan of treatment while under my care.                    Mariia Granger DO  [address]  [address]  [facility] [address]  Via Fax: [phone]          PT Evaluation     Today's date: 2024  Patient name: Cristin Nolan  : 1960  MRN: [id number]  Referring provider: Mariia Granger DO  Dx:   Encounter Diagnosis     ICD-10-CM    1. Acute left-sided low back pain with left-sided sciatica  M54.42       2. Acute pain of both knees  M25.561     M25.562       3. Chronic pain of both ankles  M25.571     G89.29     M25.572       4. Bilateral foot pain  M79.671     M79.672       5. Leg length discrepancy  M21.70       6. Difficulty walking  R26.2       7. Acute hip pain, left  M25.552            Start Time: 1615  Stop Time: 1715  Total time in clinic (min): 60 minutes    Assessment/Plan    Cristin Nolan is a 63 y.o. female who was referred to physical therapy for management of multiple body parts by three separate specialists.  Primary impairments include poor core activation, decreased proximal hip strength, and both true and functional leg length discrepancy.  Consequently, patient has difficulty completing ADLs including prolonged ambulation/standing, stair navigation, and transitional movements.  Cristin would benefit from skilled intervention to address all deficits and improve functional capability.  Patient is a good candidate for therapy, pending compliance with HEP and consistent participation in physical therapy.  Thank you for the referral and please do not hesitate to contact me with any questions or concerns regarding Cristin’s care!      Plan  Frequency: 2x per week   Duration in weeks: 12   POC start date: 1/29/24  POC end date: 3/11/24  Therapeutic exercise/activity, neuromuscular reeducation, manual therapy, and modalities.   Patient understands and agrees to plan of care.    Goals  Short Term--4 weeks  1. Patient will be able to demonstrate strong, symmetrical TVA isometric activation while maintaining consistent breathing pattern and also demonstrating ability to independently perform simple lower extremity movements.  2. Patient will demonstrate 1/2 point increase in all deficient MMT scores.  3. Patient will demonstrate independence with basic HEP.    Long Term--By Discharge  1. Patient will achieve expected FOTO score.  2. Patient will demonstrate more normalized gait pattern with ability to transfer weight to medial foot for toe off during late stance on R LE.  3. Patient will be able to maintain single limb stance > 10 sec, bilaterally, on firm surface with eyes open.  4. Patient will be able to safely navigate a flight of steps utilizing reciprocal step pattern and use of 1  HR with minimal to no aggravation of lumbar/lower extremity pain.    Patient's Goal: Not be in so much pain and be able to walk and exercise like she is used to. Be able to walk the full loop at the [employer].       Subjective    History   Date of Onset: approx 6 months ago Description: Patient reports that over the past six months she has begun to experience worsening pain in multiple body parts, and she is very frustrated because she is not sure why it is happening. In addition, she also reports that her gait is off, and she feels like she's lumbering side to side. She had recent physical bloodwork run, which included vitamin D, which was reportedly normal. She does not have an autoimmune diagnosis or familial history of rheumatoid arthritis, fibromyalgia, etc. Patient has three separate prescription for PT for (1) her lower back (2) feet/ankles and (3) bilateral knees.     Patient is and has been an avid exerciser for many years. She walks 5-6 times per week either at the [employer] in [place] or at the [employer]. She also does water cycling/aerobics/kaykay classes at [facility] 3-4 times per week. Despite all of this exercise, she is not feeling any better. Rather sx continue to worsen.    Patient had radiographic imaging done about a year ago that measured her leg length discrepancy at 1/2 inch shorter on the R side. During conversation, patient remembered that around the same time her pain started, she got new sneakers and lift for her shoe that increased it from 1/2 inch to 3/4 inch. She has been wearing this consistently until 4 days ago, when her most recent podiatrist decreased it back to 1/2 inch lift on the R. She has noticed some improvement since this change.     Symptoms  Lower back: Intermittent, left-sided pain just superior to glute. Pain aggravated with prolonged standing/sitting and stair ascent. Patient also reports noticeable weakness in her L leg when she has to ascend even a minimal  "incline.    Knees: History of replacements (2020 R and 2021 L). Intermittent \"sharp\" pain just inferolateral to patellae that is aggravated with sit to stand transfers, stair ascent > descent.     Feet ankles: Fairly constant pain in her ankles with all WBing activity, which is likely due to osteoarthritis. Her feet feel \"numb\" on plantar surface of her foot approximately that extends approximately the length of her medial arch (not in her toes or heel). She states that she has flat feet and wears custom inserts that were most recently updated approx 1 year ago.     Generalized Pain  Pain at best: 2  Pain at worst: 8    Objective--Due to complexity of symptoms; evaluation primarily spent on subjective portion. Additional objective measures, including orthotic assessment, should be taken as indicated.     GAIT: Increased lateral excursion with decreased step length; weight through lateral foot throughout stance;     SLS: RLE: <5 sec, min L hip drop,   LLE: < 5 sec, significant R  hip drop          MMT    Hip       L       R   Flex. 4 4+   Extn. 4- 4   Abd. 3- 3-                 MMT    Knee         L        R   Flex. 4 4+   Extn. 5 5       Observation:   -increased wear on lateral shoe tread with minimal to no wear medially;  -While lying supine; patient's R rearfoot in inverted position with associated forefoot varus (hypomobility into valgus/flat foot position)      Transverse abdominis: Bent knee fall out= Poor         SI joint: Standing flex =  POS R SIJ hypomobility       Active SLR =    POS L      Active SLR w/ stabilization =   POS L     Supine to sit =   (1) R PI -> MET (2) R PI (improved)            Precautions: universal.       Manuals 1/29            R PI MET 5x5\"                                                   Neuro Re-Ed             TVA iso 10x5\"            TVA + PB crush 10x5\"            TVA+ hip ABD                          TB pallof press             Rockerboard balance             SLS practice       "       Ther Ex             Stand hip 3-way TB             TB lateral step                          DL press             SL press                          TB ankle 4-way                          Ther Activity             Forward step ups             Lateral step ups             Lateral step downs             Gait Training                                       Modalities

## 2024-01-29 NOTE — PROGRESS NOTES
"Orthopaedic Surgery - Office Note  Cristin Nolan (63 y.o. female)   : 1960   MRN: [id number]  Encounter Date: 2024    Chief Complaint   Patient presents with    Right Knee - Follow-up    Left Knee - Follow-up       Assessment / Plan  Left total knee arthroplasty 10/21/21 at [facility] (used [facility])  Right total knee arthroplasty 20  Bilateral knee synovitis     Bilateral knees exams and x-rays are normal on exam today  Currently complaints of bilateral lateral knee pain with sensation of patellar shifting  Patient reassured clicking in knee is normal and not worrisome  Patient plans to return of bilateral shoulder complaints  Patient has physical therapy for hips/lumbar scheduled.  She is encouraged to attend this as this likely will help with knee complaints.    Patient can use Voltaren gel as needed for pain  Surgical excision on synovitis discussed yet is something we will try to avoid  Return in about 1 week (around 2024) for bilateral shoulders .    History of Present Illness  Cristin Nolan is a 63 y.o. female who presents presents today for follow up of bilateral TKA. Right was done by [facility], the left at [facility] 10/21/21.  She has increase of symptoms over the past several months.  Today he complains of bilateral lateral knee pain.  Transitional positions and descending steps aggravates while rest alleviates.  She does use Advil with benefit.  She denies recent physical therapy.        Review of Systems  Pertinent items are noted in HPI.  All other systems were reviewed and are negative.    Physical Exam  /83   Pulse 57   Ht 5' 2\" (1.575 m)   Wt 93.4 kg (206 lb)   BMI 37.68 kg/m²   Cons: Appears well.  No apparent distress.  Psych: Alert. Oriented x3.  Mood and affect normal.  Eyes: PERRLA, EOMI  Resp: Normal effort.  No audible wheezing or stridor.  CV: Palpable pulse.  No discernable arrhythmia.  No LE edema.  Lymph:  No palpable " cervical, axillary, or inguinal lymphadenopathy.  Skin: Warm.  No palpable masses.  No visible lesions.  Neuro: Normal muscle tone.  Normal and symmetric DTR's.     Bilateral Knee Exam  Alignment:  Normal knee alignment.  Inspection:  No swelling. No edema. No erythema. No ecchymosis. No muscle atrophy.  Palpation:  No tenderness.   ROM:  Normal knee ROM. With right > left crepitus   Strength:  5/5 quadriceps and hamstrings.  Stability:  No objective knee instability. Stable Varus / Valgus stress, Lachman, and Posterior drawer.  Tests:  No pertinent positive or negative tests.  Patella:  Normal patellar mobility.  Neurovascular:  Sensation intact in DP/SP/Gill/Sa/T nerve distributions.  2+ DP & PT pulses.  Gait:  Normal.    Studies Reviewed  XR of left knee - no fracture or dislocation, prothesis remains in acceptable alignment and position   XR of right knee - no fracture or dislocation, prothesis remains in acceptable alignment and position     Procedures  No procedures today.    Medical, Surgical, Family, and Social History  The patient's medical history, family history, and social history, were reviewed and updated as appropriate.    Past Medical History:   Diagnosis Date    Ambulatory dysfunction     uses walking stick    Anxiety     Arthritis     Cancer (HCC) 7/15/17    All better now    Chronic pain disorder     Claustrophobia     mild    DDD (degenerative disc disease), lumbar     Endometrial cancer (HCC) 07/06/2017    tRA TLH BSO SLND on 7/17/17 by Dr. Stone, followed by 3 cycles of vaginal brachytherapy completed in August 2017    Low back pain     Mild acid reflux     OA (osteoarthritis)     marissa knees    Spinal stenosis of lumbosacral region     Wears glasses        Past Surgical History:   Procedure Laterality Date    COLONOSCOPY      HYSTERECTOMY      JOINT REPLACEMENT Bilateral     knees    ORTHOPEDIC SURGERY      PLANTAR FASCIA SURGERY Bilateral     TN ARTHRP KNE CONDYLE&PLATU MEDIAL&LAT COMPARTMENTS  Right 11/30/2020    Procedure: ARTHROPLASTY KNEE TOTAL;  Surgeon: Dipesh Knowles MD;  Location: [facility];  Service: Orthopedics    NM HYSTEROSCOPY BX ENDOMETRIUM&/POLYPC W/WO D&C N/A 02/28/2017    Procedure: DILATATION AND CURETTAGE (D&C) WITH HYSTEROSCOPY,POLYPECTOMY;  Surgeon: Roberto Anderson MD;  Location: [facility];  Service: Gynecology    REPLACEMENT TOTAL KNEE Left 10/22/2021    SHOULDER SURGERY Left     TONSILLECTOMY      TOTAL ABDOMINAL HYSTERECTOMY W/ BILATERAL SALPINGOOPHORECTOMY Bilateral 07/2017    endometrial cancer, ? stage 1    WISDOM TOOTH EXTRACTION         Family History   Problem Relation Age of Onset    Ovarian cancer Mother 70    Cancer Mother     Esophageal cancer Father     Cancer Father     No Known Problems Maternal Grandmother     No Known Problems Maternal Grandfather     No Known Problems Paternal Grandmother     No Known Problems Paternal Grandfather     No Known Problems Maternal Aunt     No Known Problems Paternal Aunt     Breast cancer Neg Hx     Colon cancer Neg Hx     Uterine cancer Neg Hx        Social History     Occupational History    Not on file   Tobacco Use    Smoking status: Never    Smokeless tobacco: Never    Tobacco comments:     No secondhand smoke exposure   Vaping Use    Vaping status: Never Used   Substance and Sexual Activity    Alcohol use: Not Currently     Comment: very rarely    Drug use: No    Sexual activity: Not Currently     Partners: Male     Birth control/protection: Abstinence       Allergies   Allergen Reactions    Iv Contrast [Iodinated Contrast Media] Hives    Vitamin C [Ascorbate - Food Allergy]      Queasy stomach, loose stool         Current Outpatient Medications:     atorvastatin (LIPITOR) 10 mg tablet, Take 1 tablet (10 mg total) by mouth daily, Disp: 90 tablet, Rfl: 0    Cholecalciferol (HM Vitamin D3) 100 MCG (4000 UT) CAPS, Take 1 capsule (4,000 Units total) by mouth daily, Disp: 30 capsule, Rfl: 0    Coenzyme Q10-Vitamin E (QUNOL ULTRA  COQ10 PO), , Disp: , Rfl:     cyanocobalamin (VITAMIN B-12) 1000 MCG tablet, Take 1,000 mcg by mouth daily, Disp: , Rfl:     Misc Natural Products (GLUCOSAMINE CHOND CMP TRIPLE PO), Take by mouth, Disp: , Rfl:     Omega 3-6-9 Fatty Acids (OMEGA 3-6-9 PO), Take 1 capsule by mouth daily, Disp: , Rfl:     diclofenac (VOLTAREN) 75 mg EC tablet, Take 1 tablet (75 mg total) by mouth 2 (two) times a day as needed (pain) Take with food (Patient not taking: Reported on 1/5/2024), Disp: 60 tablet, Rfl: 1    Current Facility-Administered Medications:     lidocaine (XYLOCAINE) 1 % injection 2 mL, 2 mL, Injection, , Harjeet Black DPM, 2 mL at 05/20/22 1445    triamcinolone acetonide (KENALOG-40) 40 mg/mL injection 20 mg, 20 mg, Intra-articular, , Harjeet Black DPM, 20 mg at 05/20/22 1445      Roderick Ross    Scribe Attestation      I,:  Roderick Ross am acting as a scribe while in the presence of the attending physician.:       I,:  Dipesh Knowles MD personally performed the services described in this documentation    as scribed in my presence.:

## 2024-01-31 ENCOUNTER — HOSPITAL ENCOUNTER (OUTPATIENT)
Dept: BONE DENSITY | Facility: MEDICAL CENTER | Age: 64
Discharge: HOME | End: 2024-01-31
Payer: MEDICARE

## 2024-01-31 DIAGNOSIS — Z78.0 MENOPAUSE: ICD-10-CM

## 2024-01-31 PROCEDURE — 77080 DXA BONE DENSITY AXIAL: CPT

## 2024-02-01 NOTE — RESULT ENCOUNTER NOTE
Please let pt know that her DEXA shows she has normal bone density. It should be repeated in 2 years.

## 2024-02-02 ENCOUNTER — OFFICE VISIT (OUTPATIENT)
Dept: PHYSICAL THERAPY | Facility: CLINIC | Age: 64
End: 2024-02-02
Payer: MEDICARE

## 2024-02-02 DIAGNOSIS — M79.672 BILATERAL FOOT PAIN: ICD-10-CM

## 2024-02-02 DIAGNOSIS — M25.561 ACUTE PAIN OF BOTH KNEES: ICD-10-CM

## 2024-02-02 DIAGNOSIS — G89.29 CHRONIC PAIN OF BOTH ANKLES: ICD-10-CM

## 2024-02-02 DIAGNOSIS — M25.572 CHRONIC PAIN OF BOTH ANKLES: ICD-10-CM

## 2024-02-02 DIAGNOSIS — M79.671 BILATERAL FOOT PAIN: ICD-10-CM

## 2024-02-02 DIAGNOSIS — M25.571 CHRONIC PAIN OF BOTH ANKLES: ICD-10-CM

## 2024-02-02 DIAGNOSIS — M25.562 ACUTE PAIN OF BOTH KNEES: ICD-10-CM

## 2024-02-02 DIAGNOSIS — M54.42 ACUTE LEFT-SIDED LOW BACK PAIN WITH LEFT-SIDED SCIATICA: Primary | ICD-10-CM

## 2024-02-02 PROCEDURE — 97112 NEUROMUSCULAR REEDUCATION: CPT

## 2024-02-02 PROCEDURE — 97110 THERAPEUTIC EXERCISES: CPT

## 2024-02-02 NOTE — PROGRESS NOTES
"Daily Note     Today's date: 2024  Patient name: Cristin Nolan  : 1960  MRN: [id number]  Referring provider: Mariia Granger DO  Dx:   Encounter Diagnosis     ICD-10-CM    1. Acute left-sided low back pain with left-sided sciatica  M54.42       2. Acute pain of both knees  M25.561     M25.562       3. Chronic pain of both ankles  M25.571     G89.29     M25.572       4. Bilateral foot pain  M79.671     M79.672           Start Time: 1342  Stop Time: 1428  Total time in clinic (min): 46 minutes      Subjective: No significant changes to report in regards to pain.  Patient reports she did an hour of biking in the pool this morning.      Objective: See treatment diary below.      Assessment: No complaints reported throughout performance of TE program.  Weakness L > R during step ups.      Plan: Continue treatment as per PT plan of care.         Precautions: universal      Manuals            R PI MET 5x5\"            laser L knee  4'  JLW                                     Neuro Re-Ed             TVA iso 10x5\" 10x5\"           TVA + PB crush 10x5\" 10x5\"           TVA+ hip ABD             bridge w/ clamshell  green  20           TB pallof press  green  5\"x10 ea           Rockerboard balance  a/p, m/l  30 ea           SLS practice                                       Ther Ex             rows on airex  blue  15           low rows on airex  blue  15           Stand hip 3-way TB             TB sidestepping  green  12ft x6                        DL press  40#  3x10           SL press                          TB ankle 4-way                                       Ther Activity             Forward step ups  6\"  20 ea           Lateral step ups  6\"  20 ea           Lateral step downs                                       Gait Training                                       Modalities                                            " -Keep limiting foods high in carbohydrates in your diet.  -Begin exercising again - goal is 30 minutes most days of the week.    -Test 1-2x/day - good times are fasting, before supper, 2 hours after supper.   -Target levels are fasting and before supper , 2 hours after supper less than 180.   -A1c today was 6.5% which is down from 6.9% in June - Great job!  Goal is to keep it less than 7.0%.   -Follow up annually or sooner if needed.   -Call with any concerns - MARIZA Nichols, [phone].

## 2024-02-05 ENCOUNTER — OFFICE VISIT (OUTPATIENT)
Dept: OBGYN CLINIC | Facility: MEDICAL CENTER | Age: 64
End: 2024-02-05
Payer: MEDICARE

## 2024-02-05 ENCOUNTER — APPOINTMENT (OUTPATIENT)
Dept: RADIOLOGY | Facility: MEDICAL CENTER | Age: 64
End: 2024-02-05
Payer: MEDICARE

## 2024-02-05 VITALS
DIASTOLIC BLOOD PRESSURE: 88 MMHG | SYSTOLIC BLOOD PRESSURE: 147 MMHG | HEART RATE: 47 BPM | WEIGHT: 206 LBS | HEIGHT: 62 IN | BODY MASS INDEX: 37.91 KG/M2

## 2024-02-05 DIAGNOSIS — M25.511 RIGHT SHOULDER PAIN, UNSPECIFIED CHRONICITY: ICD-10-CM

## 2024-02-05 DIAGNOSIS — M19.011 PRIMARY OSTEOARTHRITIS OF BOTH SHOULDERS: ICD-10-CM

## 2024-02-05 DIAGNOSIS — M25.512 LEFT SHOULDER PAIN, UNSPECIFIED CHRONICITY: ICD-10-CM

## 2024-02-05 DIAGNOSIS — M19.012 PRIMARY OSTEOARTHRITIS OF BOTH SHOULDERS: ICD-10-CM

## 2024-02-05 DIAGNOSIS — M25.512 LEFT SHOULDER PAIN, UNSPECIFIED CHRONICITY: Primary | ICD-10-CM

## 2024-02-05 PROCEDURE — 99214 OFFICE O/P EST MOD 30 MIN: CPT | Performed by: ORTHOPAEDIC SURGERY

## 2024-02-05 PROCEDURE — 73030 X-RAY EXAM OF SHOULDER: CPT

## 2024-02-05 NOTE — PROGRESS NOTES
"Orthopaedic Surgery - Office Note  Cristin Nolan (63 y.o. female)   : 1960   MRN: [id number]  Encounter Date: 2024    Chief Complaint   Patient presents with    Left Shoulder - Pain    Right Shoulder - Pain       Assessment / Plan  Bilateral Shoulder Osteoarthritis    New plain films obtained and independently reviewed. We discussed she has moderate to severe OA of both shoulders.   Physical exam performed and we discussed the findings.   Recommend PT for her shoulders  Can consider CSI into her shoulders if she fails to get improvement at next visit    Return in about 2 months (around 2024) for Recheck. Will Re-eval her knees and shoulders    History of Present Illness  Cristin Nolan is a 63 y.o. female who presents for evaluation of bilateral shoulder pain Right worse than left  Patient states she has been having some inconsistent shoulder pains for a few months now. She is currently dealing with bilateral knee pain so she will get shoulder pains at times using a hand rail or getting out of a chair due to her knee pains. She denies any known injury  Her Left shoulder has pain generally more under her mid clavicle and her Right is more superior at her AC joint. She has been take NSAID's as needed  She ha snot done PT for her shoulders  No injections    Review of Systems  Pertinent items are noted in HPI.  All other systems were reviewed and are negative.    Physical Exam  /88   Pulse (!) 47   Ht 5' 2\" (1.575 m)   Wt 93.4 kg (206 lb)   BMI 37.68 kg/m²   Cons: Appears well.  No apparent distress.  Psych: Alert. Oriented x3.  Mood and affect normal.  Eyes: PERRLA, EOMI  Resp: Normal effort.  No audible wheezing or stridor.  CV: Palpable pulse.  No discernable arrhythmia.  No LE edema.  Lymph:  No palpable cervical, axillary, or inguinal lymphadenopathy.  Skin: Warm.  No palpable masses.  No visible lesions.  Neuro: Normal muscle tone.  Normal and symmetric DTR's.     Bilateral Shoulder " Exam  Alignment / Posture:  Normal cervical alignment. Normal elbow alignment and carrying angle.  Inspection:  No swelling.    Palpation:   AC joint tenderness at Right shoulder.  ROM:  Shoulder FE Right 150; Left 150. Shoulder ER Right 20; Left 70 . Shoulder IR Rigth T10; L T6.      Strength: Right 5/5 supraspinatus, infraspinatus, and subscapularis. Supraspinatus 4/5. Infraspinatus 5/5. Subscapularis 4/5. Left shoulder  Stability:  Not tested.  Tests: No pertinent positive or negative tests.  Neurovascular:  Sensation intact in Ax/R/M/U nerve distributions. 2+ radial pulse.      Studies Reviewed  XR of bilateral shoulder - Moderate degenerative changes noted Left shoulder;  Severe degenerative changes noted Right shoulder    Procedures  No procedures today.    Medical, Surgical, Family, and Social History  The patient's medical history, family history, and social history, were reviewed and updated as appropriate.    Past Medical History:   Diagnosis Date    Ambulatory dysfunction     uses walking stick    Anxiety     Arthritis     Cancer (HCC) 7/15/17    All better now    Chronic pain disorder     Claustrophobia     mild    DDD (degenerative disc disease), lumbar     Endometrial cancer (HCC) 07/06/2017    tRA TLH BSO SLND on 7/17/17 by Dr. Stone, followed by 3 cycles of vaginal brachytherapy completed in August 2017    Low back pain     Mild acid reflux     OA (osteoarthritis)     marissa knees    Spinal stenosis of lumbosacral region     Wears glasses        Past Surgical History:   Procedure Laterality Date    COLONOSCOPY      HYSTERECTOMY      JOINT REPLACEMENT Bilateral     knees    ORTHOPEDIC SURGERY      PLANTAR FASCIA SURGERY Bilateral     CT ARTHRP KNE CONDYLE&PLATU MEDIAL&LAT COMPARTMENTS Right 11/30/2020    Procedure: ARTHROPLASTY KNEE TOTAL;  Surgeon: Dipesh Knowles MD;  Location: [facility];  Service: Orthopedics    CT HYSTEROSCOPY BX ENDOMETRIUM&/POLYPC W/WO D&C N/A 02/28/2017    Procedure: DILATATION  AND CURETTAGE (D&C) WITH HYSTEROSCOPY,POLYPECTOMY;  Surgeon: Roberto Anderson MD;  Location: [facility];  Service: Gynecology    REPLACEMENT TOTAL KNEE Left 10/22/2021    SHOULDER SURGERY Left     TONSILLECTOMY      TOTAL ABDOMINAL HYSTERECTOMY W/ BILATERAL SALPINGOOPHORECTOMY Bilateral 07/2017    endometrial cancer, ? stage 1    WISDOM TOOTH EXTRACTION         Family History   Problem Relation Age of Onset    Ovarian cancer Mother 70    Cancer Mother     Esophageal cancer Father     Cancer Father     No Known Problems Maternal Grandmother     No Known Problems Maternal Grandfather     No Known Problems Paternal Grandmother     No Known Problems Paternal Grandfather     No Known Problems Maternal Aunt     No Known Problems Paternal Aunt     Breast cancer Neg Hx     Colon cancer Neg Hx     Uterine cancer Neg Hx        Social History     Occupational History    Not on file   Tobacco Use    Smoking status: Never    Smokeless tobacco: Never    Tobacco comments:     No secondhand smoke exposure   Vaping Use    Vaping status: Never Used   Substance and Sexual Activity    Alcohol use: Not Currently     Comment: very rarely    Drug use: No    Sexual activity: Not Currently     Partners: Male     Birth control/protection: Abstinence       Allergies   Allergen Reactions    Iv Contrast [Iodinated Contrast Media] Hives    Vitamin C [Ascorbate - Food Allergy]      Queasy stomach, loose stool         Current Outpatient Medications:     atorvastatin (LIPITOR) 10 mg tablet, Take 1 tablet (10 mg total) by mouth daily, Disp: 90 tablet, Rfl: 0    Cholecalciferol (HM Vitamin D3) 100 MCG (4000 UT) CAPS, Take 1 capsule (4,000 Units total) by mouth daily, Disp: 30 capsule, Rfl: 0    Coenzyme Q10-Vitamin E (QUNOL ULTRA COQ10 PO), , Disp: , Rfl:     cyanocobalamin (VITAMIN B-12) 1000 MCG tablet, Take 1,000 mcg by mouth daily, Disp: , Rfl:     Misc Natural Products (GLUCOSAMINE CHOND CMP TRIPLE PO), Take by mouth, Disp: , Rfl:     Omega  3-6-9 Fatty Acids (OMEGA 3-6-9 PO), Take 1 capsule by mouth daily, Disp: , Rfl:     diclofenac (VOLTAREN) 75 mg EC tablet, Take 1 tablet (75 mg total) by mouth 2 (two) times a day as needed (pain) Take with food (Patient not taking: Reported on 1/5/2024), Disp: 60 tablet, Rfl: 1    Current Facility-Administered Medications:     lidocaine (XYLOCAINE) 1 % injection 2 mL, 2 mL, Injection, , Harjeet Black, ANDREW, 2 mL at 05/20/22 1445    triamcinolone acetonide (KENALOG-40) 40 mg/mL injection 20 mg, 20 mg, Intra-articular, , Harjeet Black DPM, 20 mg at 05/20/22 1445      Dequan Chambers    Scribe Attestation      I,:  Dequan Chambers am acting as a scribe while in the presence of the attending physician.:       I,:  Dipesh Knowles MD personally performed the services described in this documentation    as scribed in my presence.:

## 2024-02-06 ENCOUNTER — OFFICE VISIT (OUTPATIENT)
Dept: PHYSICAL THERAPY | Facility: CLINIC | Age: 64
End: 2024-02-06
Payer: MEDICARE

## 2024-02-06 DIAGNOSIS — M54.42 ACUTE LEFT-SIDED LOW BACK PAIN WITH LEFT-SIDED SCIATICA: Primary | ICD-10-CM

## 2024-02-06 DIAGNOSIS — M25.571 CHRONIC PAIN OF BOTH ANKLES: ICD-10-CM

## 2024-02-06 DIAGNOSIS — M25.572 CHRONIC PAIN OF BOTH ANKLES: ICD-10-CM

## 2024-02-06 DIAGNOSIS — M21.70 LEG LENGTH DISCREPANCY: ICD-10-CM

## 2024-02-06 DIAGNOSIS — G89.29 CHRONIC PAIN OF BOTH ANKLES: ICD-10-CM

## 2024-02-06 PROCEDURE — 97112 NEUROMUSCULAR REEDUCATION: CPT | Performed by: PHYSICAL THERAPIST

## 2024-02-06 PROCEDURE — 97110 THERAPEUTIC EXERCISES: CPT | Performed by: PHYSICAL THERAPIST

## 2024-02-06 NOTE — PROGRESS NOTES
"Daily Note     Today's date: 2024  Patient name: Cristin Nolan  : 1960  MRN: [id number]  Referring provider: Mariia Granger DO  Dx:   Encounter Diagnosis     ICD-10-CM    1. Acute left-sided low back pain with left-sided sciatica  M54.42       2. Chronic pain of both ankles  M25.571     G89.29     M25.572       3. Leg length discrepancy  M21.70                      Subjective: Pt reports complaints of B foot pain since getting new orthotics made by podiatrist in May. Also reports that she was referred to a physiatrist after receiving her lumbar MRI.       Objective: See treatment diary below      Assessment: Pt reports more soreness in her R foot since MET pad was added to orthotics 6 months ago. Pt wished to have pad removed and reported comfort. Pt had good tolerance to all other activities. Reported fatigue only post session.       Plan: Continue per plan of care.      Precautions: universal      Manuals           R PI MET 5x5\"            laser L knee  4'  JLW 4'                        Grinded MET pad   MD          Neuro Re-Ed             TVA iso 10x5\" 10x5\" 10x5\"          TVA + PB crush 10x5\" 10x5\" 10x5\"          TVA+ hip ABD             bridge w/ clamshell  green  20 Green 20          TB pallof press  green  5\"x10 ea GTB x15 ea          Rockerboard balance  a/p, m/l  30 ea a/p, m/l  30 ea          SLS practice                                       Ther Ex             rows on airex  blue  15 Blue 15          low rows on airex  blue  15 Blue 15          Stand hip 3-way TB             TB sidestepping  green  12ft x6 BTB x2 laps                       DL press  40#  3x10           SL press                          TB ankle 4-way                                       Ther Activity             Forward step ups  6\"  20 ea 6\"x20 ea          Lateral step ups  6\"  20 ea 6\" 20 ea          Lateral step downs             Standing fire hydrant                          Gait Training                    "                    Modalities

## 2024-02-08 ENCOUNTER — OFFICE VISIT (OUTPATIENT)
Dept: PHYSICAL THERAPY | Facility: CLINIC | Age: 64
End: 2024-02-08
Payer: MEDICARE

## 2024-02-08 DIAGNOSIS — M25.571 CHRONIC PAIN OF BOTH ANKLES: ICD-10-CM

## 2024-02-08 DIAGNOSIS — G89.29 CHRONIC PAIN OF BOTH ANKLES: ICD-10-CM

## 2024-02-08 DIAGNOSIS — M25.572 CHRONIC PAIN OF BOTH ANKLES: ICD-10-CM

## 2024-02-08 DIAGNOSIS — M54.42 ACUTE LEFT-SIDED LOW BACK PAIN WITH LEFT-SIDED SCIATICA: Primary | ICD-10-CM

## 2024-02-08 DIAGNOSIS — M79.671 BILATERAL FOOT PAIN: ICD-10-CM

## 2024-02-08 DIAGNOSIS — M79.672 BILATERAL FOOT PAIN: ICD-10-CM

## 2024-02-08 PROCEDURE — 97530 THERAPEUTIC ACTIVITIES: CPT | Performed by: PHYSICAL THERAPIST

## 2024-02-08 PROCEDURE — 97110 THERAPEUTIC EXERCISES: CPT | Performed by: PHYSICAL THERAPIST

## 2024-02-08 PROCEDURE — 97112 NEUROMUSCULAR REEDUCATION: CPT | Performed by: PHYSICAL THERAPIST

## 2024-02-08 NOTE — PROGRESS NOTES
"Daily Note     Today's date: 2024  Patient name: Cristin Nolan  : 1960  MRN: [id number]  Referring provider: Mariia Granger DO  Dx:   Encounter Diagnosis     ICD-10-CM    1. Acute left-sided low back pain with left-sided sciatica  M54.42       2. Chronic pain of both ankles  M25.571     G89.29     M25.572       3. Bilateral foot pain  M79.671     M79.672                      Subjective: Pt reports soreness in B ankles and shoulders since last visit. Reports that orthotics are still not right.       Objective: See treatment diary below      Assessment: Pt had good tolerance to progression of program. Added lateral wedge to R shoe which improved foot contact with ground. Will trial over the weekend.       Plan: Continue per plan of care.      Precautions: universal      Manuals          R PI MET 5x5\"            laser L knee  4'  JLW 4'  4'                      Orthotics check   Grinded MET pad MD Trial lateral wedge in R shoe         Neuro Re-Ed             TVA iso 10x5\" 10x5\" 10x5\" 10x5\"         TVA + PB crush 10x5\" 10x5\" 10x5\" 10x5\"         TVA+ hip ABD             bridge w/ clamshell  green  20 Green 20 Blue x20         TB pallof press  green  5\"x10 ea GTB x15 ea GTB x15 ea         Rockerboard balance  a/p, m/l  30 ea a/p, m/l  30 ea a/p, m/l  30 ea         SLS practice                                       Ther Ex             rows on airex  blue  15 Blue 15          low rows on airex  blue  15 Blue 15          Stand hip 3-way TB             TB sidestepping  green  12ft x6 BTB x2 laps BTB x3 laps         Supine SLR    X20 ea         SL abduction    Min A- manual cues x15 ea                      DL press  40#  3x10           SL press                          TB ankle 4-way                                       Ther Activity             Forward step ups  6\"  20 ea 6\"x20 ea 6\" 20 ea         Lateral step ups  6\"  20 ea 6\" 20 ea 6\"x 20ea         Lateral step downs             Standing fire " hydrant    X15 ea                      Gait Training                                       Modalities

## 2024-02-13 ENCOUNTER — OFFICE VISIT (OUTPATIENT)
Dept: PHYSICAL THERAPY | Facility: CLINIC | Age: 64
End: 2024-02-13
Payer: MEDICARE

## 2024-02-13 DIAGNOSIS — M79.672 BILATERAL FOOT PAIN: ICD-10-CM

## 2024-02-13 DIAGNOSIS — G89.29 CHRONIC PAIN OF BOTH ANKLES: ICD-10-CM

## 2024-02-13 DIAGNOSIS — M79.671 BILATERAL FOOT PAIN: ICD-10-CM

## 2024-02-13 DIAGNOSIS — M25.571 CHRONIC PAIN OF BOTH ANKLES: ICD-10-CM

## 2024-02-13 DIAGNOSIS — M25.572 CHRONIC PAIN OF BOTH ANKLES: ICD-10-CM

## 2024-02-13 DIAGNOSIS — M54.42 ACUTE LEFT-SIDED LOW BACK PAIN WITH LEFT-SIDED SCIATICA: Primary | ICD-10-CM

## 2024-02-13 PROCEDURE — 97112 NEUROMUSCULAR REEDUCATION: CPT | Performed by: PHYSICAL THERAPIST

## 2024-02-13 PROCEDURE — 97110 THERAPEUTIC EXERCISES: CPT | Performed by: PHYSICAL THERAPIST

## 2024-02-13 PROCEDURE — 97530 THERAPEUTIC ACTIVITIES: CPT | Performed by: PHYSICAL THERAPIST

## 2024-02-13 NOTE — PROGRESS NOTES
"Daily Note     Today's date: 2024  Patient name: Cristin Nolan  : 1960  MRN: [id number]  Referring provider: Mariia Granger DO  Dx:   Encounter Diagnosis     ICD-10-CM    1. Acute left-sided low back pain with left-sided sciatica  M54.42       2. Chronic pain of both ankles  M25.571     G89.29     M25.572       3. Bilateral foot pain  M79.671     M79.672                      Subjective: Pt reports her shoulder a sore for shoveling. Her back is sore. She says the lateral wedge helped but she changed shoes so she not completely sure.      Objective: See treatment diary below      Assessment: Pt tolerating exercises well without increased pain. Pt shows significant lateral hip weakness, educated on strengthening. Continue to progress as able.      Plan: Continue per plan of care.      Precautions: universal      Manuals         R PI MET 5x5\"            laser L knee  4'  JLW 4'  4' 4'                     Orthotics check   Grinded MET pad MD Trial lateral wedge in R shoe         Neuro Re-Ed             TVA iso 10x5\" 10x5\" 10x5\" 10x5\" 15x5''        TVA + PB crush 10x5\" 10x5\" 10x5\" 10x5\" 15x5''        TVA+ hip ABD             bridge w/ clamshell  green  20 Green 20 Blue x20 20x blue        TB pallof press  green  5\"x10 ea GTB x15 ea GTB x15 ea Green 15xea        Rockerboard balance  a/p, m/l  30 ea a/p, m/l  30 ea a/p, m/l  30 ea a/p, m/l  30 ea        SLS practice                                       Ther Ex             rows on airex  blue  15 Blue 15          low rows on airex  blue  15 Blue 15          Stand hip 3-way TB             TB sidestepping  green  12ft x6 BTB x2 laps BTB x3 laps Blue  3 laps        Supine SLR    X20 ea 20xea        SL abduction    Min A- manual cues x15 ea Min A- manual cues x15 ea                     DL press  40#  3x10           SL press                          TB ankle 4-way                                       Ther Activity             Forward step ups " " 6\"  20 ea 6\"x20 ea 6\" 20 ea 20xea 6''        Lateral step ups  6\"  20 ea 6\" 20 ea 6\"x 20ea 20x ea 6''        Lateral step downs             Standing fire hydrant    X15 ea 20xea                     Gait Training                                       Modalities                                                  "

## 2024-02-16 ENCOUNTER — OFFICE VISIT (OUTPATIENT)
Dept: PHYSICAL THERAPY | Facility: CLINIC | Age: 64
End: 2024-02-16
Payer: MEDICARE

## 2024-02-16 DIAGNOSIS — M79.671 BILATERAL FOOT PAIN: ICD-10-CM

## 2024-02-16 DIAGNOSIS — M25.572 CHRONIC PAIN OF BOTH ANKLES: ICD-10-CM

## 2024-02-16 DIAGNOSIS — M79.672 BILATERAL FOOT PAIN: ICD-10-CM

## 2024-02-16 DIAGNOSIS — G89.29 CHRONIC PAIN OF BOTH ANKLES: ICD-10-CM

## 2024-02-16 DIAGNOSIS — M25.571 CHRONIC PAIN OF BOTH ANKLES: ICD-10-CM

## 2024-02-16 DIAGNOSIS — M54.42 ACUTE LEFT-SIDED LOW BACK PAIN WITH LEFT-SIDED SCIATICA: Primary | ICD-10-CM

## 2024-02-16 DIAGNOSIS — M21.70 LEG LENGTH DISCREPANCY: ICD-10-CM

## 2024-02-16 PROCEDURE — 97110 THERAPEUTIC EXERCISES: CPT

## 2024-02-16 PROCEDURE — 97112 NEUROMUSCULAR REEDUCATION: CPT

## 2024-02-16 PROCEDURE — 97530 THERAPEUTIC ACTIVITIES: CPT

## 2024-02-16 NOTE — PROGRESS NOTES
"Daily Note     Today's date: 2024  Patient name: Cristin Nolan  : 1960  MRN: [id number]  Referring provider: Mariia Granger DO  Dx:   Encounter Diagnosis     ICD-10-CM    1. Acute left-sided low back pain with left-sided sciatica  M54.42       2. Chronic pain of both ankles  M25.571     G89.29     M25.572       3. Bilateral foot pain  M79.671     M79.672       4. Leg length discrepancy  M21.70           Start Time: 1345  Stop Time: 1429  Total time in clinic (min): 44 minutes      Subjective: Patient states, \"I'm still having issues with the bottom of my feet.\"  Patient also complains of \"issues with my lower back\" and pain in her left groin area.  Patient reports she is scheduled to see a physiatrist on 24.      Objective: See treatment diary below.      Assessment: Lower extremity weakness L > R with progression to single leg squats.  Use of SPC does not significantly improve patient's gait.  Fatigue noted post session.      Plan: Continue treatment as per PT plan of care.       Precautions: universal      Manuals        R PI MET 5x5\"            laser L knee  4'  JLW 4'  4' 4' 4'  JLW                    Orthotics check   Grinded MET pad MD Trial lateral wedge in R shoe                                   Neuro Re-Ed             TVA iso 10x5\" 10x5\" 10x5\" 10x5\" 15x5'' 15x5\"       TVA + PB crush 10x5\" 10x5\" 10x5\" 10x5\" 15x5'' 15x5\"       TVA+ hip ABD             bridge w/ clamshell  green  20 Green 20 Blue x20 20x blue blue  20       TB pallof press  green  5\"x10 ea GTB x15 ea GTB x15 ea Green 15xea green  5\"x15 ea       Rockerboard balance  a/p, m/l  30 ea a/p, m/l  30 ea a/p, m/l  30 ea a/p, m/l  30 ea a/p, m/l  30 ea       SLS practice                                       Ther Ex             rows on airex  blue  15 Blue 15          low rows on airex  blue  15 Blue 15          Stand hip 3-way TB             TB sidestepping  green  12ft x6 BTB x2 laps BTB x3 laps Blue  3 " "laps        Supine SLR    X20 ea 20xea 2x10 ea       SL abduction    Min A- manual cues x15 ea Min A- manual cues x15 ea verbal and tactile cues 2x10 ea                    DL press  40#  3x10           SL press      R 40#  x20  L 20#  x10  30#  x10                    TB ankle 4-way                                       Ther Activity             Forward step ups  6\"  20 ea 6\"x20 ea 6\" 20 ea 20xea 6'' 6\"  20  R only       Lateral step ups  6\"  20 ea 6\" 20 ea 6\"x 20ea 20x ea 6'' 6\"  10 L  20 R       Lateral step downs             Standing fire hydrant    X15 ea 20xea  in pool                                   Gait Training                                       Modalities                                              "

## 2024-02-19 ENCOUNTER — OFFICE VISIT (OUTPATIENT)
Dept: PHYSICAL THERAPY | Facility: CLINIC | Age: 64
End: 2024-02-19
Payer: MEDICARE

## 2024-02-19 DIAGNOSIS — M79.672 BILATERAL FOOT PAIN: ICD-10-CM

## 2024-02-19 DIAGNOSIS — M79.671 BILATERAL FOOT PAIN: ICD-10-CM

## 2024-02-19 DIAGNOSIS — M21.70 LEG LENGTH DISCREPANCY: ICD-10-CM

## 2024-02-19 DIAGNOSIS — G89.29 CHRONIC PAIN OF BOTH ANKLES: ICD-10-CM

## 2024-02-19 DIAGNOSIS — M54.42 ACUTE LEFT-SIDED LOW BACK PAIN WITH LEFT-SIDED SCIATICA: Primary | ICD-10-CM

## 2024-02-19 DIAGNOSIS — M25.571 CHRONIC PAIN OF BOTH ANKLES: ICD-10-CM

## 2024-02-19 DIAGNOSIS — M25.572 CHRONIC PAIN OF BOTH ANKLES: ICD-10-CM

## 2024-02-19 PROCEDURE — 97110 THERAPEUTIC EXERCISES: CPT

## 2024-02-19 PROCEDURE — 97112 NEUROMUSCULAR REEDUCATION: CPT

## 2024-02-19 NOTE — PROGRESS NOTES
"Daily Note     Today's date: 2024  Patient name: Cristin Nolan  : 1960  MRN: [id number]  Referring provider: Mariia Granger DO  Dx:   Encounter Diagnosis     ICD-10-CM    1. Acute left-sided low back pain with left-sided sciatica  M54.42       2. Chronic pain of both ankles  M25.571     G89.29     M25.572       3. Bilateral foot pain  M79.671     M79.672       4. Leg length discrepancy  M21.70                      Subjective: Pt had consult w/physiatrist earlier today, suggested LB injections or \"burning the nerves.\"  C/o L lateral knee discomfort.      Objective: See treatment diary below    Assessment: Mild L knee discomfort/fatigue with L forward and L lat step ups.Performed remaining exercises w/o complaint. Tolerated treatment well. Will monitor.    Plan: Continue per plan of care.      Precautions: universal      Manuals       R PI MET 5x5\"            laser L knee  4'  JLW 4'  4' 4' 4'  JLW 4' MO                   Orthotics check   Grinded MET pad MD Trial lateral wedge in R shoe                                   Neuro Re-Ed             TVA iso 10x5\" 10x5\" 10x5\" 10x5\" 15x5'' 15x5\" 15x5\"      TVA + PB crush 10x5\" 10x5\" 10x5\" 10x5\" 15x5'' 15x5\" 15x5\"      TVA+ hip ABD             bridge w/ clamshell  green  20 Green 20 Blue x20 20x blue blue  20 Blue  20      TB pallof press  green  5\"x10 ea GTB x15 ea GTB x15 ea Green 15xea green  5\"x15 ea Green 5\"  x15ea      Rockerboard balance  a/p, m/l  30 ea a/p, m/l  30 ea a/p, m/l  30 ea a/p, m/l  30 ea a/p, m/l  30 ea A/p, m/l 30 ea      SLS practice                                       Ther Ex             rows on airex  blue  15 Blue 15          low rows on airex  blue  15 Blue 15          Stand hip 3-way TB             TB sidestepping  green  12ft x6 BTB x2 laps BTB x3 laps Blue  3 laps        Supine SLR    X20 ea 20xea 2x10 ea 2x10 ea      SL abduction    Min A- manual cues x15 ea Min A- manual cues x15 ea verbal and " "tactile cues 2x10 ea Verbal + tactile cues 2x10 ea                   DL press  40#  3x10           SL press      R 40#  x20  L 20#  x10  30#  x10 R 40# x20, L 30# x15                   TB ankle 4-way                                       Ther Activity             Forward step ups  6\"  20 ea 6\"x20 ea 6\" 20 ea 20xea 6'' 6\"  20  R only 6'  Rx20,Lx20      Lateral step ups  6\"  20 ea 6\" 20 ea 6\"x 20ea 20x ea 6'' 6\"  10 L  20 R 6\" 20x ea      Lateral step downs             Standing fire hydrant    X15 ea 20xea  in pool                                   Gait Training                                       Modalities                                                "

## 2024-02-21 ENCOUNTER — OFFICE VISIT (OUTPATIENT)
Dept: PHYSICAL THERAPY | Facility: CLINIC | Age: 64
End: 2024-02-21
Payer: MEDICARE

## 2024-02-21 DIAGNOSIS — M54.42 ACUTE LEFT-SIDED LOW BACK PAIN WITH LEFT-SIDED SCIATICA: Primary | ICD-10-CM

## 2024-02-21 DIAGNOSIS — R26.2 DIFFICULTY WALKING: ICD-10-CM

## 2024-02-21 DIAGNOSIS — M25.552 ACUTE HIP PAIN, LEFT: ICD-10-CM

## 2024-02-21 DIAGNOSIS — M25.561 ACUTE PAIN OF BOTH KNEES: ICD-10-CM

## 2024-02-21 DIAGNOSIS — G89.29 CHRONIC PAIN OF BOTH ANKLES: ICD-10-CM

## 2024-02-21 DIAGNOSIS — M25.562 ACUTE PAIN OF BOTH KNEES: ICD-10-CM

## 2024-02-21 DIAGNOSIS — M21.70 LEG LENGTH DISCREPANCY: ICD-10-CM

## 2024-02-21 DIAGNOSIS — M79.671 BILATERAL FOOT PAIN: ICD-10-CM

## 2024-02-21 DIAGNOSIS — M25.571 CHRONIC PAIN OF BOTH ANKLES: ICD-10-CM

## 2024-02-21 DIAGNOSIS — M25.572 CHRONIC PAIN OF BOTH ANKLES: ICD-10-CM

## 2024-02-21 DIAGNOSIS — M79.672 BILATERAL FOOT PAIN: ICD-10-CM

## 2024-02-21 PROBLEM — Z00.00 INITIAL MEDICARE ANNUAL WELLNESS VISIT: Status: RESOLVED | Noted: 2024-01-05 | Resolved: 2024-02-21

## 2024-02-21 PROCEDURE — 97110 THERAPEUTIC EXERCISES: CPT

## 2024-02-21 PROCEDURE — 97530 THERAPEUTIC ACTIVITIES: CPT

## 2024-02-21 PROCEDURE — 97112 NEUROMUSCULAR REEDUCATION: CPT

## 2024-02-21 NOTE — PROGRESS NOTES
"Daily Note     Today's date: 2024  Patient name: Cristin Nolan  : 1960  MRN: [id number]  Referring provider: Mariia Granger DO  Dx:   Encounter Diagnosis     ICD-10-CM    1. Acute left-sided low back pain with left-sided sciatica  M54.42       2. Chronic pain of both ankles  M25.571     G89.29     M25.572       3. Bilateral foot pain  M79.671     M79.672       4. Leg length discrepancy  M21.70       5. Acute pain of both knees  M25.561     M25.562       6. Difficulty walking  R26.2       7. Acute hip pain, left  M25.552           Start Time: 1608  Stop Time: 1650  Total time in clinic (min): 42 minutes      Subjective: Patient reports B/L ankles were sore after performing the rockerboard exercises last visit.      Objective: See treatment diary below.      Assessment: Fatigue noted throughout performance of TE program - especially when performing sidelying slr abduction.      Plan: Continue treatment as per PT plan of care.       Precautions: universal      Manuals      R PI MET 5x5\"            laser L knee  4'  JLW 4'  4' 4' 4'  JLW 4' MO 4'  JLW                  Orthotics check   Grinded MET pad MD Trial lateral wedge in R shoe                                   Neuro Re-Ed             TVA iso 10x5\" 10x5\" 10x5\" 10x5\" 15x5'' 15x5\" 15x5\" 15x5\"     TVA + PB crush 10x5\" 10x5\" 10x5\" 10x5\" 15x5'' 15x5\" 15x5\" 15x5\"     TVA+ hip ABD             bridge w/ clamshell  green  20 Green 20 Blue x20 20x blue blue  20 Blue  20 blue  20     TB pallof press  green  5\"x10 ea GTB x15 ea GTB x15 ea Green 15xea green  5\"x15 ea Green 5\"  x15ea green  5\"x15 ea     Rockerboard balance  a/p, m/l  30 ea a/p, m/l  30 ea a/p, m/l  30 ea a/p, m/l  30 ea a/p, m/l  30 ea A/p, m/l 30 ea      Tandem walking        fwd/lat  3 laps ea     SLS practice                                       Ther Ex             rows on airex  blue  15 Blue 15          low rows on airex  blue  15 Blue 15          Stand " "hip 3-way TB             TB sidestepping  green  12ft x6 BTB x2 laps BTB x3 laps Blue  3 laps        Supine SLR    X20 ea 20xea 2x10 ea 2x10 ea 2x10 ea     SL abduction    Min A- manual cues x15 ea Min A- manual cues x15 ea verbal and tactile cues 2x10 ea Verbal + tactile cues 2x10 ea verbal and tactile cues 2x10 ea                  DL press  40#  3x10           SL press      R 40#  x20  L 20#  x10  30#  x10 R 40# x20, L 30# x15 R 40#  L 30#  3x10 ea                  TB ankle 4-way                                       Ther Activity             Forward step ups  6\"  20 ea 6\"x20 ea 6\" 20 ea 20xea 6'' 6\"  20  R only 6'  Rx20,Lx20 6\"  20 ea     Lateral step ups  6\"  20 ea 6\" 20 ea 6\"x 20ea 20x ea 6'' 6\"  10 L  20 R 6\" 20x ea 6\"  20 ea     Lateral step downs             Standing fire hydrant    X15 ea 20xea  in pool                                   Gait Training                                       Modalities                                              "

## 2024-02-26 ENCOUNTER — OFFICE VISIT (OUTPATIENT)
Dept: PHYSICAL THERAPY | Facility: CLINIC | Age: 64
End: 2024-02-26
Payer: MEDICARE

## 2024-02-26 DIAGNOSIS — M25.571 CHRONIC PAIN OF BOTH ANKLES: ICD-10-CM

## 2024-02-26 DIAGNOSIS — M79.671 BILATERAL FOOT PAIN: ICD-10-CM

## 2024-02-26 DIAGNOSIS — G89.29 CHRONIC PAIN OF BOTH ANKLES: ICD-10-CM

## 2024-02-26 DIAGNOSIS — M54.42 ACUTE LEFT-SIDED LOW BACK PAIN WITH LEFT-SIDED SCIATICA: Primary | ICD-10-CM

## 2024-02-26 DIAGNOSIS — M79.672 BILATERAL FOOT PAIN: ICD-10-CM

## 2024-02-26 DIAGNOSIS — M25.572 CHRONIC PAIN OF BOTH ANKLES: ICD-10-CM

## 2024-02-26 PROCEDURE — 97112 NEUROMUSCULAR REEDUCATION: CPT

## 2024-02-26 PROCEDURE — 97110 THERAPEUTIC EXERCISES: CPT

## 2024-02-26 PROCEDURE — 97530 THERAPEUTIC ACTIVITIES: CPT

## 2024-02-26 NOTE — PROGRESS NOTES
"Daily Note     Today's date: 2024  Patient name: Cristin Nolan  : 1960  MRN: [id number]  Referring provider: Mariia Granger DO  Dx:   Encounter Diagnosis     ICD-10-CM    1. Acute left-sided low back pain with left-sided sciatica  M54.42       2. Chronic pain of both ankles  M25.571     G89.29     M25.572       3. Bilateral foot pain  M79.671     M79.672       4. Leg length discrepancy  M21.70       5. Acute pain of both knees  M25.561     M25.562       6. Difficulty walking  R26.2       7. Acute hip pain, left  M25.552                      Subjective: Pt reports some L lateral knee pain cont.      Objective: See treatment diary below      Assessment: Performed exercise program w/o increasing symptoms. Step ups cause fatigue, making end reps difficult. Comfortable during laser as below. Tolerated treatment well. Will monitor.      Plan: Continue per plan of care.      Precautions: universal      Manuals     R PI MET 5x5\"            laser L knee  4'  JLW 4'  4' 4' 4'  JLW 4' MO 4'  JLW 4'  MO                 Orthotics check   Grinded MET pad MD Trial lateral wedge in R shoe                                   Neuro Re-Ed             TVA iso 10x5\" 10x5\" 10x5\" 10x5\" 15x5'' 15x5\" 15x5\" 15x5\" 15x5\"    TVA + PB crush 10x5\" 10x5\" 10x5\" 10x5\" 15x5'' 15x5\" 15x5\" 15x5\" 15x5\"    TVA+ hip ABD             bridge w/ clamshell  green  20 Green 20 Blue x20 20x blue blue  20 Blue  20 blue  20 Blue  20    TB pallof press  green  5\"x10 ea GTB x15 ea GTB x15 ea Green 15xea green  5\"x15 ea Green 5\"  x15ea green  5\"x15 ea Green 5\"x15 ea    Rockerboard balance  a/p, m/l  30 ea a/p, m/l  30 ea a/p, m/l  30 ea a/p, m/l  30 ea a/p, m/l  30 ea A/p, m/l 30 ea      Tandem walking        fwd/lat  3 laps ea Fwd/lat 3 laps ea    SLS practice                                       Ther Ex             rows on airex  blue  15 Blue 15          low rows on airex  blue  15 Blue 15          Stand hip " "3-way TB             TB sidestepping  green  12ft x6 BTB x2 laps BTB x3 laps Blue  3 laps        Supine SLR    X20 ea 20xea 2x10 ea 2x10 ea 2x10 ea 2x10 ea    SL abduction    Min A- manual cues x15 ea Min A- manual cues x15 ea verbal and tactile cues 2x10 ea Verbal + tactile cues 2x10 ea verbal and tactile cues 2x10 ea Verbal + tactile cues  2x10 ea                 DL press  40#  3x10           SL press      R 40#  x20  L 20#  x10  30#  x10 R 40# x20, L 30# x15 R 40#  L 30#  3x10 ea R 40#  L 30#  3x10 ea                 TB ankle 4-way                                       Ther Activity             Forward step ups  6\"  20 ea 6\"x20 ea 6\" 20 ea 20xea 6'' 6\"  20  R only 6'  Rx20,Lx20 6\"  20 ea 6\"  20 ea    Lateral step ups  6\"  20 ea 6\" 20 ea 6\"x 20ea 20x ea 6'' 6\"  10 L  20 R 6\" 20x ea 6\"  20 ea 6\" 20 ea    Lateral step downs             Standing fire hydrant    X15 ea 20xea  in pool                                   Gait Training                                       Modalities                                                "

## 2024-02-28 ENCOUNTER — OFFICE VISIT (OUTPATIENT)
Dept: PHYSICAL THERAPY | Facility: CLINIC | Age: 64
End: 2024-02-28
Payer: MEDICARE

## 2024-02-28 DIAGNOSIS — M25.562 ACUTE PAIN OF BOTH KNEES: ICD-10-CM

## 2024-02-28 DIAGNOSIS — M25.572 CHRONIC PAIN OF BOTH ANKLES: ICD-10-CM

## 2024-02-28 DIAGNOSIS — M79.672 BILATERAL FOOT PAIN: ICD-10-CM

## 2024-02-28 DIAGNOSIS — M54.42 ACUTE LEFT-SIDED LOW BACK PAIN WITH LEFT-SIDED SCIATICA: Primary | ICD-10-CM

## 2024-02-28 DIAGNOSIS — G89.29 CHRONIC PAIN OF BOTH ANKLES: ICD-10-CM

## 2024-02-28 DIAGNOSIS — M25.561 ACUTE PAIN OF BOTH KNEES: ICD-10-CM

## 2024-02-28 DIAGNOSIS — M21.70 LEG LENGTH DISCREPANCY: ICD-10-CM

## 2024-02-28 DIAGNOSIS — M25.571 CHRONIC PAIN OF BOTH ANKLES: ICD-10-CM

## 2024-02-28 DIAGNOSIS — R26.2 DIFFICULTY WALKING: ICD-10-CM

## 2024-02-28 DIAGNOSIS — M79.671 BILATERAL FOOT PAIN: ICD-10-CM

## 2024-02-28 DIAGNOSIS — M25.552 ACUTE HIP PAIN, LEFT: ICD-10-CM

## 2024-02-28 PROCEDURE — 97110 THERAPEUTIC EXERCISES: CPT

## 2024-02-28 PROCEDURE — 97112 NEUROMUSCULAR REEDUCATION: CPT

## 2024-02-28 PROCEDURE — 97530 THERAPEUTIC ACTIVITIES: CPT

## 2024-02-28 NOTE — PROGRESS NOTES
"Daily Note     Today's date: 2024  Patient name: Cristin Nolan  : 1960  MRN: [id number]  Referring provider: Mariia Granger DO  Dx:   Encounter Diagnosis     ICD-10-CM    1. Acute left-sided low back pain with left-sided sciatica  M54.42       2. Chronic pain of both ankles  M25.571     G89.29     M25.572       3. Bilateral foot pain  M79.671     M79.672       4. Leg length discrepancy  M21.70       5. Acute pain of both knees  M25.561     M25.562       6. Difficulty walking  R26.2       7. Acute hip pain, left  M25.552           Start Time: 1445  Stop Time: 1523  Total time in clinic (min): 38 minutes      Subjective: Patient reports she's been more conscious of her posture when walking.  Patient reports she is practicing step ups in the pool.      Objective: See treatment diary below.      Assessment: Fatigue noted throughout performance of TE program.      Plan: Continue treatment as per PT plan of care.       Precautions: universal      Manuals    R PI MET 5x5\"            laser L knee  4'  JLW 4'  4' 4' 4'  JLW 4' MO 4'  JLW 4'  MO 4'  JLW                Orthotics check   Grinded MET pad MD Trial lateral wedge in R shoe                                   Neuro Re-Ed             TVA iso 10x5\" 10x5\" 10x5\" 10x5\" 15x5'' 15x5\" 15x5\" 15x5\" 15x5\"    TVA + PB crush 10x5\" 10x5\" 10x5\" 10x5\" 15x5'' 15x5\" 15x5\" 15x5\" 15x5\" 15x5\"   TVA+ hip ABD             bridge w/ clamshell  green  20 Green 20 Blue x20 20x blue blue  20 Blue  20 blue  20 Blue  20 blue  20   TB pallof press  green  5\"x10 ea GTB x15 ea GTB x15 ea Green 15xea green  5\"x15 ea Green 5\"  x15ea green  5\"x15 ea Green 5\"x15 ea green  5\"x15 ea   Rockerboard balance  a/p, m/l  30 ea a/p, m/l  30 ea a/p, m/l  30 ea a/p, m/l  30 ea a/p, m/l  30 ea A/p, m/l 30 ea      Tandem walking foam        fwd/lat  3 laps ea Fwd/lat 3 laps ea fwd/lat  3 laps ea   SLS practice                                       Ther Ex   " "          rows on airex  blue  15 Blue 15          low rows on airex  blue  15 Blue 15          Stand hip 3-way TB             TB sidestepping  green  12ft x6 BTB x2 laps BTB x3 laps Blue  3 laps        Supine SLR    X20 ea 20xea 2x10 ea 2x10 ea 2x10 ea 2x10 ea 20 ea   SL abduction    Min A- manual cues x15 ea Min A- manual cues x15 ea verbal and tactile cues 2x10 ea Verbal + tactile cues 2x10 ea verbal and tactile cues 2x10 ea Verbal + tactile cues  2x10 ea 20 ea                DL press  40#  3x10           SL press      R 40#  x20  L 20#  x10  30#  x10 R 40# x20, L 30# x15 R 40#  L 30#  3x10 ea R 40#  L 30#  3x10 ea R 40#  L 30#  3x10 ea   lateral lunges on slider          15 ea   TB ankle 4-way                                       Ther Activity             Forward step ups  6\"  20 ea 6\"x20 ea 6\" 20 ea 20xea 6'' 6\"  20  R only 6'  Rx20,Lx20 6\"  20 ea 6\"  20 ea 6\"  20 ea   Lateral step ups  6\"  20 ea 6\" 20 ea 6\"x 20ea 20x ea 6'' 6\"  10 L  20 R 6\" 20x ea 6\"  20 ea 6\" 20 ea 6\"  20 ea   Lateral step downs             Standing fire hydrant    X15 ea 20xea  in pool                                   Gait Training                                       Modalities                                                "

## 2024-03-04 ENCOUNTER — OFFICE VISIT (OUTPATIENT)
Dept: PHYSICAL THERAPY | Facility: CLINIC | Age: 64
End: 2024-03-04
Payer: MEDICARE

## 2024-03-04 DIAGNOSIS — R26.2 DIFFICULTY WALKING: ICD-10-CM

## 2024-03-04 DIAGNOSIS — M54.42 ACUTE LEFT-SIDED LOW BACK PAIN WITH LEFT-SIDED SCIATICA: Primary | ICD-10-CM

## 2024-03-04 DIAGNOSIS — G89.29 CHRONIC PAIN OF BOTH ANKLES: ICD-10-CM

## 2024-03-04 DIAGNOSIS — M25.572 CHRONIC PAIN OF BOTH ANKLES: ICD-10-CM

## 2024-03-04 DIAGNOSIS — M21.70 LEG LENGTH DISCREPANCY: ICD-10-CM

## 2024-03-04 DIAGNOSIS — M25.552 ACUTE HIP PAIN, LEFT: ICD-10-CM

## 2024-03-04 DIAGNOSIS — M25.571 CHRONIC PAIN OF BOTH ANKLES: ICD-10-CM

## 2024-03-04 DIAGNOSIS — M25.562 ACUTE PAIN OF BOTH KNEES: ICD-10-CM

## 2024-03-04 DIAGNOSIS — M79.672 BILATERAL FOOT PAIN: ICD-10-CM

## 2024-03-04 DIAGNOSIS — M25.561 ACUTE PAIN OF BOTH KNEES: ICD-10-CM

## 2024-03-04 DIAGNOSIS — M79.671 BILATERAL FOOT PAIN: ICD-10-CM

## 2024-03-04 PROCEDURE — 97110 THERAPEUTIC EXERCISES: CPT

## 2024-03-04 PROCEDURE — 97112 NEUROMUSCULAR REEDUCATION: CPT

## 2024-03-04 PROCEDURE — 97530 THERAPEUTIC ACTIVITIES: CPT

## 2024-03-04 NOTE — PROGRESS NOTES
"Daily Note     Today's date: 3/4/2024  Patient name: Cristin Nolan  : 1960  MRN: [id number]  Referring provider: Mariia Granger DO  Dx:   Encounter Diagnosis     ICD-10-CM    1. Acute pain of both knees  M25.561     M25.562       2. Difficulty walking  R26.2                      Subjective: Pt reports some L lat med discomfort cont.      Objective: See treatment diary below      Assessment:  Performed exercise program w/o complaint. Comfortable during laser to L lat knee. Tolerated treatment well. Will monitor.      Plan: Continue per plan of care.      Precautions: universal      Manuals 3/4  2/6 2/8 2/13 2/16 2/19 2/21 2/26 2/28   R PI MET             laser L knee 4' MO  4'  4' 4' 4'  JLW 4' MO 4'  JLW 4'  MO 4'  JLW                Orthotics check   Grinded MET pad MD Trial lateral wedge in R shoe                                   Neuro Re-Ed             TVA iso   10x5\" 10x5\" 15x5'' 15x5\" 15x5\" 15x5\" 15x5\"    TVA + PB crush 15x5\"  10x5\" 10x5\" 15x5'' 15x5\" 15x5\" 15x5\" 15x5\" 15x5\"   TVA+ hip ABD             bridge w/ clamshell Blue 20  Green 20 Blue x20 20x blue blue  20 Blue  20 blue  20 Blue  20 blue  20   TB pallof press GTB  5\"x15  GTB x15 ea GTB x15 ea Green 15xea green  5\"x15 ea Green 5\"  x15ea green  5\"x15 ea Green 5\"x15 ea green  5\"x15 ea   Rockerboard balance   a/p, m/l  30 ea a/p, m/l  30 ea a/p, m/l  30 ea a/p, m/l  30 ea A/p, m/l 30 ea      Tandem walking foam Fwd/lat 3 laps ea       fwd/lat  3 laps ea Fwd/lat 3 laps ea fwd/lat  3 laps ea   SLS practice                                       Ther Ex             rows on airex   Blue 15          low rows on airex   Blue 15          Stand hip 3-way TB             TB sidestepping   BTB x2 laps BTB x3 laps Blue  3 laps        Supine SLR 20 ea   X20 ea 20xea 2x10 ea 2x10 ea 2x10 ea 2x10 ea 20 ea   SL abduction Verbal + tactile cues 20 ea   Min A- manual cues x15 ea Min A- manual cues x15 ea verbal and tactile cues 2x10 ea Verbal + tactile cues 2x10 ea " "verbal and tactile cues 2x10 ea Verbal + tactile cues  2x10 ea 20 ea                DL press             SL press R 40#  L 30#  3x10 ea     R 40#  x20  L 20#  x10  30#  x10 R 40# x20, L 30# x15 R 40#  L 30#  3x10 ea R 40#  L 30#  3x10 ea R 40#  L 30#  3x10 ea   lateral lunges on slider 15 ea         15 ea   TB ankle 4-way                                       Ther Activity             Forward step ups 6\"x20  ea  6\"x20 ea 6\" 20 ea 20xea 6'' 6\"  20  R only 6'  Rx20,Lx20 6\"  20 ea 6\"  20 ea 6\"  20 ea   Lateral step ups 6\"x20  ea  6\" 20 ea 6\"x 20ea 20x ea 6'' 6\"  10 L  20 R 6\" 20x ea 6\"  20 ea 6\" 20 ea 6\"  20 ea   Lateral step downs             Standing fire hydrant    X15 ea 20xea  in pool                                   Gait Training                                       Modalities                                                  "

## 2024-03-06 ENCOUNTER — OFFICE VISIT (OUTPATIENT)
Dept: PHYSICAL THERAPY | Facility: CLINIC | Age: 64
End: 2024-03-06
Payer: MEDICARE

## 2024-03-06 DIAGNOSIS — M54.42 ACUTE LEFT-SIDED LOW BACK PAIN WITH LEFT-SIDED SCIATICA: Primary | ICD-10-CM

## 2024-03-06 DIAGNOSIS — R26.2 DIFFICULTY WALKING: ICD-10-CM

## 2024-03-06 DIAGNOSIS — G89.29 CHRONIC PAIN OF BOTH ANKLES: ICD-10-CM

## 2024-03-06 DIAGNOSIS — M25.562 ACUTE PAIN OF BOTH KNEES: ICD-10-CM

## 2024-03-06 DIAGNOSIS — M25.552 ACUTE HIP PAIN, LEFT: ICD-10-CM

## 2024-03-06 DIAGNOSIS — M25.561 ACUTE PAIN OF BOTH KNEES: ICD-10-CM

## 2024-03-06 DIAGNOSIS — M79.672 BILATERAL FOOT PAIN: ICD-10-CM

## 2024-03-06 DIAGNOSIS — M25.572 CHRONIC PAIN OF BOTH ANKLES: ICD-10-CM

## 2024-03-06 DIAGNOSIS — M25.571 CHRONIC PAIN OF BOTH ANKLES: ICD-10-CM

## 2024-03-06 DIAGNOSIS — M79.671 BILATERAL FOOT PAIN: ICD-10-CM

## 2024-03-06 DIAGNOSIS — M21.70 LEG LENGTH DISCREPANCY: ICD-10-CM

## 2024-03-06 PROCEDURE — 97110 THERAPEUTIC EXERCISES: CPT

## 2024-03-06 PROCEDURE — 97112 NEUROMUSCULAR REEDUCATION: CPT

## 2024-03-06 PROCEDURE — 97140 MANUAL THERAPY 1/> REGIONS: CPT

## 2024-03-06 NOTE — PROGRESS NOTES
"Daily Note     Today's date: 3/6/2024  Patient name: Cristin Nolan  : 1960  MRN: [id number]  Referring provider: Mariia Granger DO  Dx:   Encounter Diagnosis     ICD-10-CM    1. Acute left-sided low back pain with left-sided sciatica  M54.42       2. Chronic pain of both ankles  M25.571     G89.29     M25.572       3. Bilateral foot pain  M79.671     M79.672       4. Leg length discrepancy  M21.70       5. Acute hip pain, left  M25.552       6. Difficulty walking  R26.2       7. Acute pain of both knees  M25.561     M25.562           Start Time: 1102  Stop Time: 1146  Total time in clinic (min): 44 minutes      Subjective: Patient states, \"My knee is a little more stiff today.\"      Objective: See treatment diary below.      Assessment: Fatigue noted throughout performance of TE program.   Due to complaints of left lateral knee pain and tenderness, performed IASTM prior to laser therapy - treatment tolerated well.      Plan: Continue treatment as per PT plan of care.       Precautions: universal      Manuals 3/4 3/6  2/8 2/13 2/16 2/19 2/21 2/26 2/28   R PI MET             laser L knee 4' MO 4'  JLW  4' 4' 4'  JLW 4' MO 4'  JLW 4'  MO 4'  JLW   IASTM L knee  JLW           Orthotics check    Trial lateral wedge in R shoe                                   Neuro Re-Ed             TVA iso    10x5\" 15x5'' 15x5\" 15x5\" 15x5\" 15x5\"    TVA + PB crush 15x5\" 5\"x15  10x5\" 15x5'' 15x5\" 15x5\" 15x5\" 15x5\" 15x5\"   TVA+ hip ABD             bridge w/ clamshell Blue 20 blue  20  Blue x20 20x blue blue  20 Blue  20 blue  20 Blue  20 blue  20   TB pallof press GTB  5\"x15 blue  5\"x15 ea  GTB x15 ea Green 15xea green  5\"x15 ea Green 5\"  x15ea green  5\"x15 ea Green 5\"x15 ea green  5\"x15 ea   Rockerboard balance    a/p, m/l  30 ea a/p, m/l  30 ea a/p, m/l  30 ea A/p, m/l 30 ea      Tandem walking foam Fwd/lat 3 laps ea       fwd/lat  3 laps ea Fwd/lat 3 laps ea fwd/lat  3 laps ea   SLS practice                                     " "  Ther Ex             rows on airex             low rows on airex             Stand hip 3-way TB             TB sidestepping    BTB x3 laps Blue  3 laps        Supine SLR 20 ea 1.5#  20 ea  X20 ea 20xea 2x10 ea 2x10 ea 2x10 ea 2x10 ea 20 ea   SL abduction Verbal + tactile cues 20 ea 20 ea  Min A- manual cues x15 ea Min A- manual cues x15 ea verbal and tactile cues 2x10 ea Verbal + tactile cues 2x10 ea verbal and tactile cues 2x10 ea Verbal + tactile cues  2x10 ea 20 ea                DL press             SL press R 40#  L 30#  3x10 ea R 40#  L 30#  3x10 ea    R 40#  x20  L 20#  x10  30#  x10 R 40# x20, L 30# x15 R 40#  L 30#  3x10 ea R 40#  L 30#  3x10 ea R 40#  L 30#  3x10 ea   lateral lunges on slider 15 ea lateral and reverse  10 ea        15 ea   TB ankle 4-way                                       Ther Activity             Forward step ups 6\"x20  ea 6\"  20 ea  6\" 20 ea 20xea 6'' 6\"  20  R only 6'  Rx20,Lx20 6\"  20 ea 6\"  20 ea 6\"  20 ea   Lateral step ups 6\"x20  ea 6\"  20 ea  6\"x 20ea 20x ea 6'' 6\"  10 L  20 R 6\" 20x ea 6\"  20 ea 6\" 20 ea 6\"  20 ea   Lateral step downs             Standing fire hydrant    X15 ea 20xea  in pool                                   Gait Training                                       Modalities                                              "

## 2024-03-11 ENCOUNTER — OFFICE VISIT (OUTPATIENT)
Dept: PHYSICAL THERAPY | Facility: CLINIC | Age: 64
End: 2024-03-11
Payer: MEDICARE

## 2024-03-11 DIAGNOSIS — M25.571 CHRONIC PAIN OF BOTH ANKLES: ICD-10-CM

## 2024-03-11 DIAGNOSIS — M25.572 CHRONIC PAIN OF BOTH ANKLES: ICD-10-CM

## 2024-03-11 DIAGNOSIS — M54.42 ACUTE LEFT-SIDED LOW BACK PAIN WITH LEFT-SIDED SCIATICA: Primary | ICD-10-CM

## 2024-03-11 DIAGNOSIS — G89.29 CHRONIC PAIN OF BOTH ANKLES: ICD-10-CM

## 2024-03-11 PROCEDURE — 97112 NEUROMUSCULAR REEDUCATION: CPT

## 2024-03-11 PROCEDURE — 97110 THERAPEUTIC EXERCISES: CPT

## 2024-03-11 PROCEDURE — 97140 MANUAL THERAPY 1/> REGIONS: CPT

## 2024-03-11 NOTE — PROGRESS NOTES
"Daily Note     Today's date: 3/11/2024  Patient name: Cristin Nolan  : 1960  MRN: [id number]  Referring provider: Mariia Granger DO  Dx:   Encounter Diagnosis     ICD-10-CM    1. Acute left-sided low back pain with left-sided sciatica  M54.42       2. Chronic pain of both ankles  M25.571     G89.29     M25.572                      Subjective: Pt reports L knee is feeling better, lateral knee discomfort cont.  Pt c/o \"crackling\" in her L lat knee.      Objective: See treatment diary below      Assessment: Performed exercise program w/o difficulty or discomfort. Rec laser as below, f/b IASTM. Tolerated treatment well. Will monitor.      Plan: Continue per plan of care.      Precautions: universal      Manuals 3/4 3/6 3/11  2/13 2/16 2/19 2/21 2/26 2/28   R PI MET             laser L knee 4' MO 4'  JLW 4' MO  4' 4'  JLW 4' MO 4'  JLW 4'  MO 4'  JLW   IASTM L knee  JLW MO          Orthotics check                                       Neuro Re-Ed             TVA iso     15x5'' 15x5\" 15x5\" 15x5\" 15x5\"    TVA + PB crush 15x5\" 5\"x15 15x5\"  15x5'' 15x5\" 15x5\" 15x5\" 15x5\" 15x5\"   TVA+ hip ABD             bridge w/ clamshell Blue 20 blue  20 Blue  20  20x blue blue  20 Blue  20 blue  20 Blue  20 blue  20   TB pallof press GTB  5\"x15 blue  5\"x15 ea Blue 5\"x15 ea  Green 15xea green  5\"x15 ea Green 5\"  x15ea green  5\"x15 ea Green 5\"x15 ea green  5\"x15 ea   Rockerboard balance     a/p, m/l  30 ea a/p, m/l  30 ea A/p, m/l 30 ea      Tandem walking foam Fwd/lat 3 laps ea       fwd/lat  3 laps ea Fwd/lat 3 laps ea fwd/lat  3 laps ea   SLS practice                                       Ther Ex             rows on airex             low rows on airex             Stand hip 3-way TB             TB sidestepping     Blue  3 laps        Supine SLR 20 ea 1.5#  20 ea 1.5#   20ea  20xea 2x10 ea 2x10 ea 2x10 ea 2x10 ea 20 ea   SL abduction Verbal + tactile cues 20 ea 20 ea 20 ea  Min A- manual cues x15 ea verbal and tactile cues 2x10 " "ea Verbal + tactile cues 2x10 ea verbal and tactile cues 2x10 ea Verbal + tactile cues  2x10 ea 20 ea                DL press             SL press R 40#  L 30#  3x10 ea R 40#  L 30#  3x10 ea R 40#; L 30# 3x10 ea   R 40#  x20  L 20#  x10  30#  x10 R 40# x20, L 30# x15 R 40#  L 30#  3x10 ea R 40#  L 30#  3x10 ea R 40#  L 30#  3x10 ea   lateral lunges on slider 15 ea lateral and reverse  10 ea Lateral + reverse  10 ea       15 ea   TB ankle 4-way                                       Ther Activity             Forward step ups 6\"x20  ea 6\"  20 ea 6\" x20 ea  20xea 6'' 6\"  20  R only 6'  Rx20,Lx20 6\"  20 ea 6\"  20 ea 6\"  20 ea   Lateral step ups 6\"x20  ea 6\"  20 ea 6\"x20 ea  20x ea 6'' 6\"  10 L  20 R 6\" 20x ea 6\"  20 ea 6\" 20 ea 6\"  20 ea   Lateral step downs             Standing fire hydrant     20xea  in pool                                   Gait Training                                       Modalities                                                "

## 2024-03-13 ENCOUNTER — OFFICE VISIT (OUTPATIENT)
Dept: PHYSICAL THERAPY | Facility: CLINIC | Age: 64
End: 2024-03-13
Payer: MEDICARE

## 2024-03-13 DIAGNOSIS — G89.29 CHRONIC PAIN OF BOTH ANKLES: ICD-10-CM

## 2024-03-13 DIAGNOSIS — M25.572 CHRONIC PAIN OF BOTH ANKLES: ICD-10-CM

## 2024-03-13 DIAGNOSIS — M25.561 ACUTE PAIN OF BOTH KNEES: ICD-10-CM

## 2024-03-13 DIAGNOSIS — R26.2 DIFFICULTY WALKING: ICD-10-CM

## 2024-03-13 DIAGNOSIS — M79.671 BILATERAL FOOT PAIN: ICD-10-CM

## 2024-03-13 DIAGNOSIS — M25.552 ACUTE HIP PAIN, LEFT: ICD-10-CM

## 2024-03-13 DIAGNOSIS — M25.571 CHRONIC PAIN OF BOTH ANKLES: ICD-10-CM

## 2024-03-13 DIAGNOSIS — M79.672 BILATERAL FOOT PAIN: ICD-10-CM

## 2024-03-13 DIAGNOSIS — M21.70 LEG LENGTH DISCREPANCY: ICD-10-CM

## 2024-03-13 DIAGNOSIS — M25.562 ACUTE PAIN OF BOTH KNEES: ICD-10-CM

## 2024-03-13 DIAGNOSIS — M54.42 ACUTE LEFT-SIDED LOW BACK PAIN WITH LEFT-SIDED SCIATICA: Primary | ICD-10-CM

## 2024-03-13 PROCEDURE — 97112 NEUROMUSCULAR REEDUCATION: CPT

## 2024-03-13 PROCEDURE — 97110 THERAPEUTIC EXERCISES: CPT

## 2024-03-13 PROCEDURE — 97140 MANUAL THERAPY 1/> REGIONS: CPT

## 2024-03-13 NOTE — PROGRESS NOTES
"Daily Note     Today's date: 3/13/2024  Patient name: Cristin Nolan  : 1960  MRN: [id number]  Referring provider: Mariia Granger DO  Dx:   Encounter Diagnosis     ICD-10-CM    1. Acute left-sided low back pain with left-sided sciatica  M54.42       2. Bilateral foot pain  M79.671     M79.672       3. Chronic pain of both ankles  M25.571     G89.29     M25.572       4. Leg length discrepancy  M21.70       5. Acute pain of both knees  M25.561     M25.562       6. Difficulty walking  R26.2       7. Acute hip pain, left  M25.552           Start Time: 1147  Stop Time: 1230  Total time in clinic (min): 43 minutes      Subjective: Patient states, \"It feels like something is grabbing in there today.\"  Patient reports her left knee is not moving smoothly.      Objective: See treatment diary below.      Assessment: Fatigue noted throughout performance of TE program.  IASTM is performed to B/L knees on the lateral side as complaints of pain are similar left to right.      Plan: Continue treatment as per PT plan of care.       Precautions: universal      Manuals 3/ 3/6 3/11 3/13  2/16 2/19 2/21 2/26 2/28   R PI MET             laser L knee 4' MO 4'  JLW 4' MO   4'  JLW 4' MO 4'  JLW 4'  MO 4'  JLW   IASTM L knee  JLW MO B/L knees  JLW         Orthotics check                                       Neuro Re-Ed             TVA iso      15x5\" 15x5\" 15x5\" 15x5\"    TVA + PB crush 15x5\" 5\"x15 15x5\"   15x5\" 15x5\" 15x5\" 15x5\" 15x5\"   TVA+ hip ABD             bridge w/ clamshell Blue 20 blue  20 Blue  20 blue  20  blue  20 Blue  20 blue  20 Blue  20 blue  20   TB pallof press GTB  5\"x15 blue  5\"x15 ea Blue 5\"x15 ea blue  5\"x15 ea  green  5\"x15 ea Green 5\"  x15ea green  5\"x15 ea Green 5\"x15 ea green  5\"x15 ea   Rockerboard balance      a/p, m/l  30 ea A/p, m/l 30 ea      Tandem walking foam Fwd/lat 3 laps ea       fwd/lat  3 laps ea Fwd/lat 3 laps ea fwd/lat  3 laps ea   SLS practice                                       Ther Ex " "            rows on airex             low rows on airex             Stand hip 3-way TB             TB sidestepping             Supine SLR 20 ea 1.5#  20 ea 1.5#   20ea 1.5#  20 ea  2x10 ea 2x10 ea 2x10 ea 2x10 ea 20 ea   SL abduction Verbal + tactile cues 20 ea 20 ea 20 ea 20 ea  verbal and tactile cues 2x10 ea Verbal + tactile cues 2x10 ea verbal and tactile cues 2x10 ea Verbal + tactile cues  2x10 ea 20 ea                DL press             SL press R 40#  L 30#  3x10 ea R 40#  L 30#  3x10 ea R 40#; L 30# 3x10 ea R 40#  L 30#  3x10 ea  R 40#  x20  L 20#  x10  30#  x10 R 40# x20, L 30# x15 R 40#  L 30#  3x10 ea R 40#  L 30#  3x10 ea R 40#  L 30#  3x10 ea   lateral lunges on slider 15 ea lateral and reverse  10 ea Lateral + reverse  10 ea lateral and reverse  10 ea      15 ea   TB ankle 4-way                                       Ther Activity             Forward step ups 6\"x20  ea 6\"  20 ea 6\" x20 ea 6\"  20 ea  6\"  20  R only 6'  Rx20,Lx20 6\"  20 ea 6\"  20 ea 6\"  20 ea   Lateral step ups 6\"x20  ea 6\"  20 ea 6\"x20 ea 6\"  20 ea  6\"  10 L  20 R 6\" 20x ea 6\"  20 ea 6\" 20 ea 6\"  20 ea   Lateral step downs             Standing fire hydrant       in pool                                   Gait Training                                       Modalities                                              "

## 2024-03-18 ENCOUNTER — OFFICE VISIT (OUTPATIENT)
Dept: PHYSICAL THERAPY | Facility: CLINIC | Age: 64
End: 2024-03-18
Payer: MEDICARE

## 2024-03-18 DIAGNOSIS — R26.2 DIFFICULTY WALKING: ICD-10-CM

## 2024-03-18 DIAGNOSIS — M79.671 BILATERAL FOOT PAIN: ICD-10-CM

## 2024-03-18 DIAGNOSIS — M25.561 ACUTE PAIN OF BOTH KNEES: ICD-10-CM

## 2024-03-18 DIAGNOSIS — M79.672 BILATERAL FOOT PAIN: ICD-10-CM

## 2024-03-18 DIAGNOSIS — M25.562 ACUTE PAIN OF BOTH KNEES: ICD-10-CM

## 2024-03-18 DIAGNOSIS — M54.42 ACUTE LEFT-SIDED LOW BACK PAIN WITH LEFT-SIDED SCIATICA: Primary | ICD-10-CM

## 2024-03-18 PROCEDURE — 97110 THERAPEUTIC EXERCISES: CPT | Performed by: PHYSICAL THERAPIST

## 2024-03-18 PROCEDURE — 97140 MANUAL THERAPY 1/> REGIONS: CPT | Performed by: PHYSICAL THERAPIST

## 2024-03-18 NOTE — PROGRESS NOTES
"Daily Note     Today's date: 3/18/2024  Patient name: Cristin Nolan  : 1960  MRN: [id number]  Referring provider: Mariia Granger DO  Dx:   Encounter Diagnosis     ICD-10-CM    1. Acute left-sided low back pain with left-sided sciatica  M54.42       2. Bilateral foot pain  M79.671     M79.672       3. Difficulty walking  R26.2       4. Acute pain of both knees  M25.561     M25.562                      Subjective: Pt reports she is frustrated with her progress. She was at a play this weekend and walking up and down a ramp for very challenging for her.      Objective: See treatment diary below      Assessment: Pt is still very challenged with left LE single leg strengthening exercises. Discussed back versus hip symptoms. Will complete reassessment next visit.      Plan: Continue per plan of care.      Precautions: universal      Manuals 3/4 3/6 3/11 3/13 3/18   2/21 2/26 2/28   R PI MET             laser L knee 4' MO 4'  JLW 4' MO     4'  JLW 4'  MO 4'  JLW   IASTM L knee  JLW MO B/L knees  JLW B/l knees FH        Orthotics check                                       Neuro Re-Ed             TVA iso        15x5\" 15x5\"    TVA + PB crush 15x5\" 5\"x15 15x5\"     15x5\" 15x5\" 15x5\"   TVA+ hip ABD             bridge w/ clamshell Blue 20 blue  20 Blue  20 blue  20 Black 20   blue  20 Blue  20 blue  20   TB pallof press GTB  5\"x15 blue  5\"x15 ea Blue 5\"x15 ea blue  5\"x15 ea    green  5\"x15 ea Green 5\"x15 ea green  5\"x15 ea   Rockerboard balance             Tandem walking foam Fwd/lat 3 laps ea       fwd/lat  3 laps ea Fwd/lat 3 laps ea fwd/lat  3 laps ea   SLS practice                                       Ther Ex             rows on airex             low rows on airex             Stand hip 3-way TB     15xea red foam        TB sidestepping             Supine SLR 20 ea 1.5#  20 ea 1.5#   20ea 1.5#  20 ea 20x 1.5#   2x10 ea 2x10 ea 20 ea   SL abduction Verbal + tactile cues 20 ea 20 ea 20 ea 20 ea 20xea   verbal and " "tactile cues 2x10 ea Verbal + tactile cues  2x10 ea 20 ea   Clamshells     20x Lt        DL press             SL press R 40#  L 30#  3x10 ea R 40#  L 30#  3x10 ea R 40#; L 30# 3x10 ea R 40#  L 30#  3x10 ea R 40#  L 30#  3x10 ea   R 40#  L 30#  3x10 ea R 40#  L 30#  3x10 ea R 40#  L 30#  3x10 ea   lateral lunges on slider 15 ea lateral and reverse  10 ea Lateral + reverse  10 ea lateral and reverse  10 ea      15 ea   TB ankle 4-way                                       Ther Activity             Forward step ups 6\"x20  ea 6\"  20 ea 6\" x20 ea 6\"  20 ea 20xea 6''   6\"  20 ea 6\"  20 ea 6\"  20 ea   Lateral step ups 6\"x20  ea 6\"  20 ea 6\"x20 ea 6\"  20 ea 20xea 6''   6\"  20 ea 6\" 20 ea 6\"  20 ea   Lateral step downs             Standing fire hydrant                                          Gait Training                                       Modalities                                                "

## 2024-03-20 ENCOUNTER — APPOINTMENT (OUTPATIENT)
Dept: PHYSICAL THERAPY | Facility: CLINIC | Age: 64
End: 2024-03-20
Payer: MEDICARE

## 2024-03-21 ENCOUNTER — EVALUATION (OUTPATIENT)
Dept: PHYSICAL THERAPY | Facility: CLINIC | Age: 64
End: 2024-03-21
Payer: MEDICARE

## 2024-03-21 DIAGNOSIS — M79.672 BILATERAL FOOT PAIN: ICD-10-CM

## 2024-03-21 DIAGNOSIS — M54.42 ACUTE LEFT-SIDED LOW BACK PAIN WITH LEFT-SIDED SCIATICA: Primary | ICD-10-CM

## 2024-03-21 DIAGNOSIS — M21.70 LEG LENGTH DISCREPANCY: ICD-10-CM

## 2024-03-21 DIAGNOSIS — M25.571 CHRONIC PAIN OF BOTH ANKLES: ICD-10-CM

## 2024-03-21 DIAGNOSIS — M79.671 BILATERAL FOOT PAIN: ICD-10-CM

## 2024-03-21 DIAGNOSIS — G89.29 CHRONIC PAIN OF BOTH ANKLES: ICD-10-CM

## 2024-03-21 DIAGNOSIS — M25.572 CHRONIC PAIN OF BOTH ANKLES: ICD-10-CM

## 2024-03-21 PROCEDURE — 97164 PT RE-EVAL EST PLAN CARE: CPT | Performed by: PHYSICAL THERAPIST

## 2024-03-21 PROCEDURE — 97110 THERAPEUTIC EXERCISES: CPT | Performed by: PHYSICAL THERAPIST

## 2024-03-21 NOTE — PROGRESS NOTES
PT Re-Evaluation     Today's date: 3/21/2024  Patient name: Cristin Nolan  : 1960  MRN: [id number]  Referring provider: Mariia Granger DO  Dx:   Encounter Diagnosis     ICD-10-CM    1. Acute left-sided low back pain with left-sided sciatica  M54.42       2. Chronic pain of both ankles  M25.571     G89.29     M25.572       3. Bilateral foot pain  M79.671     M79.672       4. Leg length discrepancy  M21.70                      Cristin Nolan is a 63 y.o. female who was referred to physical therapy for management of multiple body parts by three separate specialists.  Pt continues to have much difficulty with despite extensive exercising with pool, walking, HEP. Pt is much weaker on Lt LE which is likely coming from the back. Hip weakness is likely affecting her knee pain. Due to limited progress, pt was educated on progressing strengthening for 4 weeks of HEP as well as follow up with spine and knee doctors for further management. Pt may follow up after 1 month for progress of strengthening LE.    Plan  Pt to follow up in 1 month regarding progress and strengthening.  Therapeutic exercise/activity, neuromuscular reeducation, manual therapy, and modalities.   Patient understands and agrees to plan of care.    Goals  Short Term--4 weeks Not Met  1. Patient will be able to demonstrate strong, symmetrical TVA isometric activation while maintaining consistent breathing pattern and also demonstrating ability to independently perform simple lower extremity movements.  2. Patient will demonstrate 1/2 point increase in all deficient MMT scores.  3. Patient will demonstrate independence with basic HEP.    Long Term--By Discharge Not Met  1. Patient will achieve expected FOTO score.  2. Patient will demonstrate more normalized gait pattern with ability to transfer weight to medial foot for toe off during late stance on R LE.  3. Patient will be able to maintain single limb stance > 10 sec, bilaterally, on firm surface  with eyes open.  4. Patient will be able to safely navigate a flight of steps utilizing reciprocal step pattern and use of 1 HR with minimal to no aggravation of lumbar/lower extremity pain.    Patient's Goal: Not be in so much pain and be able to walk and exercise like she is used to. Be able to walk the full loop at the [facility].       Subjective    History   Date of Onset: approx 6 months ago Description: Patient reports that over the past six months she has begun to experience worsening pain in multiple body parts, and she is very frustrated because she is not sure why it is happening. In addition, she also reports that her gait is off, and she feels like she's lumbering side to side. She had recent physical bloodwork run, which included vitamin D, which was reportedly normal. She does not have an autoimmune diagnosis or familial history of rheumatoid arthritis, fibromyalgia, etc. Patient has three separate prescription for PT for (1) her lower back (2) feet/ankles and (3) bilateral knees.     Patient is and has been an avid exerciser for many years. She walks 5-6 times per week either at the [facility] in [place] or at the [employer]. She also does water cycling/aerobics/kaykay classes at [facility] 3-4 times per week. Despite all of this exercise, she is not feeling any better. Rather sx continue to worsen.    Patient had radiographic imaging done about a year ago that measured her leg length discrepancy at 1/2 inch shorter on the R side. During conversation, patient remembered that around the same time her pain started, she got new sneakers and lift for her shoe that increased it from 1/2 inch to 3/4 inch. She has been wearing this consistently until 4 days ago, when her most recent podiatrist decreased it back to 1/2 inch lift on the R. She has noticed some improvement since this change.     Symptoms  Lower back: Intermittent, left-sided pain just superior to glute. Pain aggravated with prolonged  "standing and stair ascent. Patient also reports noticeable weakness in her L leg when she has to ascend even a minimal incline.    Knees: History of replacements (2020 R and 2021 L). Intermittent \"sharp\" pain just inferolateral to patellae that is aggravated with sit to stand transfers, stair ascent > descent or any incline/decline.     Feet ankles: Fairly constant pain in her ankles with all WBing activity, which is likely due to osteoarthritis. Her feet feel \"numb\" on plantar surface of her foot approximately that extends approximately the length of her medial arch (not in her toes or heel). She states that she has flat feet and wears custom inserts that were most recently updated approx 1 year ago.     Generalized Pain  Pain at best: 3-4  Pain at worst: 6-7    Objective    GAIT: Increased lateral excursion with decreased step length; weight through lateral foot throughout stance;     SLS: RLE: <5 sec, min L hip drop,   LLE: < 5 sec, significant R  hip drop          MMT    Hip       L       R   Flex. 4 4+   Extn. 4- 4   Abd. 3- 3                 MMT    Knee         L        R   Flex. 4- 4+   Extn. 5 5       Transverse abdominis: Bent knee fall out= Poor     SI joint: Standing flex =  POS R SIJ hypomobility       Active SLR =    POS L      Active SLR w/ stabilization =   POS L          Manuals 3/4 3/6 3/11 3/13 3/18 3/21  2/21 2/26 2/28   R PI MET             laser L knee 4' MO 4'  JLW 4' MO     4'  JLW 4'  MO 4'  JLW   IASTM L knee  JLW MO B/L knees  JLW B/l knees FH        Orthotics check                                       Neuro Re-Ed             TVA iso        15x5\" 15x5\"    TVA + PB crush 15x5\" 5\"x15 15x5\"     15x5\" 15x5\" 15x5\"   TVA+ hip ABD             bridge w/ clamshell Blue 20 blue  20 Blue  20 blue  20 Black 20   blue  20 Blue  20 blue  20   TB pallof press GTB  5\"x15 blue  5\"x15 ea Blue 5\"x15 ea blue  5\"x15 ea    green  5\"x15 ea Green 5\"x15 ea green  5\"x15 ea   Rockerboard balance             Tandem " "walking foam Fwd/lat 3 laps ea       fwd/lat  3 laps ea Fwd/lat 3 laps ea fwd/lat  3 laps ea   SLS practice                                       Ther Ex             rows on airex             low rows on airex             Stand hip 3-way TB     15xea red foam        TB sidestepping             Supine SLR 20 ea 1.5#  20 ea 1.5#   20ea 1.5#  20 ea 20x 1.5#   2x10 ea 2x10 ea 20 ea   SL abduction Verbal + tactile cues 20 ea 20 ea 20 ea 20 ea 20xea   verbal and tactile cues 2x10 ea Verbal + tactile cues  2x10 ea 20 ea   Clamshells     20x Lt 3x20 green       DL press             SL press R 40#  L 30#  3x10 ea R 40#  L 30#  3x10 ea R 40#; L 30# 3x10 ea R 40#  L 30#  3x10 ea R 40#  L 30#  3x10 ea   R 40#  L 30#  3x10 ea R 40#  L 30#  3x10 ea R 40#  L 30#  3x10 ea   lateral lunges on slider 15 ea lateral and reverse  10 ea Lateral + reverse  10 ea lateral and reverse  10 ea      15 ea   TB ankle 4-way                                       Ther Activity             Forward step ups 6\"x20  ea 6\"  20 ea 6\" x20 ea 6\"  20 ea 20xea 6''   6\"  20 ea 6\"  20 ea 6\"  20 ea   Lateral step ups 6\"x20  ea 6\"  20 ea 6\"x20 ea 6\"  20 ea 20xea 6''   6\"  20 ea 6\" 20 ea 6\"  20 ea   Lateral step downs             Standing fire hydrant                                          Gait Training                                       Modalities                                              "

## 2024-03-23 DIAGNOSIS — E78.2 MIXED HYPERLIPIDEMIA: ICD-10-CM

## 2024-03-25 ENCOUNTER — APPOINTMENT (OUTPATIENT)
Dept: PHYSICAL THERAPY | Facility: CLINIC | Age: 64
End: 2024-03-25
Payer: MEDICARE

## 2024-03-25 RX ORDER — ATORVASTATIN CALCIUM 10 MG/1
10 TABLET, FILM COATED ORAL DAILY
Qty: 90 TABLET | Refills: 1 | Status: SHIPPED | OUTPATIENT
Start: 2024-03-25

## 2024-03-25 RX ORDER — ATORVASTATIN CALCIUM 10 MG/1
10 TABLET, FILM COATED ORAL DAILY
Qty: 90 TABLET | Refills: 0 | Status: SHIPPED | OUTPATIENT
Start: 2024-03-25

## 2024-03-27 ENCOUNTER — APPOINTMENT (OUTPATIENT)
Dept: PHYSICAL THERAPY | Facility: CLINIC | Age: 64
End: 2024-03-27
Payer: MEDICARE

## 2024-04-08 ENCOUNTER — OFFICE VISIT (OUTPATIENT)
Dept: OBGYN CLINIC | Facility: MEDICAL CENTER | Age: 64
End: 2024-04-08
Payer: MEDICARE

## 2024-04-08 VITALS
DIASTOLIC BLOOD PRESSURE: 84 MMHG | WEIGHT: 210.6 LBS | HEART RATE: 73 BPM | BODY MASS INDEX: 38.76 KG/M2 | SYSTOLIC BLOOD PRESSURE: 127 MMHG | HEIGHT: 62 IN

## 2024-04-08 DIAGNOSIS — M19.012 PRIMARY OSTEOARTHRITIS OF BOTH SHOULDERS: Primary | ICD-10-CM

## 2024-04-08 DIAGNOSIS — M19.011 PRIMARY OSTEOARTHRITIS OF BOTH SHOULDERS: Primary | ICD-10-CM

## 2024-04-08 PROCEDURE — 20610 DRAIN/INJ JOINT/BURSA W/O US: CPT | Performed by: ORTHOPAEDIC SURGERY

## 2024-04-08 PROCEDURE — 99214 OFFICE O/P EST MOD 30 MIN: CPT | Performed by: ORTHOPAEDIC SURGERY

## 2024-04-08 RX ORDER — METHYLPREDNISOLONE ACETATE 40 MG/ML
40 INJECTION, SUSPENSION INTRA-ARTICULAR; INTRALESIONAL; INTRAMUSCULAR; SOFT TISSUE
Status: COMPLETED | OUTPATIENT
Start: 2024-04-08 | End: 2024-04-08

## 2024-04-08 RX ORDER — BUPIVACAINE HYDROCHLORIDE 2.5 MG/ML
4 INJECTION, SOLUTION INFILTRATION; PERINEURAL
Status: COMPLETED | OUTPATIENT
Start: 2024-04-08 | End: 2024-04-08

## 2024-04-08 RX ADMIN — METHYLPREDNISOLONE ACETATE 40 MG: 40 INJECTION, SUSPENSION INTRA-ARTICULAR; INTRALESIONAL; INTRAMUSCULAR; SOFT TISSUE at 14:00

## 2024-04-08 RX ADMIN — BUPIVACAINE HYDROCHLORIDE 4 ML: 2.5 INJECTION, SOLUTION INFILTRATION; PERINEURAL at 14:00

## 2024-04-08 NOTE — PROGRESS NOTES
"Orthopaedic Surgery - Office Note  Cristin Nolan (64 y.o. female)   : 1960   MRN: [id number]  Encounter Date: 2024    Assessment / Plan    Bilateral glenohumeral osteoarthritis     CSI of right glenohumeral joint was performed  Activity as tolerated  WBAT  Home exercise program reviewed  Continue outpatient PT and transition to HEP  Follow-up:  No follow-ups on file.      Chief Complaint / Date of Onset  Right shoulder pain   Injury Mechanism / Date  None  Surgery / Date  None for the shoulders     History of Present Illness   Cristin Nolan is a 64 y.o. female who presents for follow up of her right and left shoulder pain.  She reports since her last visit her left shoulder pain has improved with physical therapy.  However, her right shoulder pain persists despite physical therapy.  She reports the right shoulder pain is intermittent and worsens with overhead activity and motion.  The right shoulder pain interferes with her ability to perform her activities of daily living.  She denies new injury numbness and tingling right upper extremity.    Treatment Summary  Medications / Modalities  OTC medications   Bracing / Immobilization  None  Physical Therapy  Yes  Injections  CSI right GH joint performed today   Prior Surgeries  No prior shoulder surgeries. Previous bilateral total knee arthroplasties   Other Treatments  None    Employment / Current Status  NA    Sport / Organization / Current Status  None      Review of Systems  Pertinent items are noted in HPI.  All other systems were reviewed and are negative.      Physical Exam  /84   Pulse 73   Ht 5' 2\" (1.575 m)   Wt 95.5 kg (210 lb 9.6 oz)   BMI 38.52 kg/m²   Cons: Appears well.  No apparent distress.  Psych: Alert. Oriented x3.  Mood and affect normal.  Eyes: PERRLA, EOMI  Resp: Normal effort.  No audible wheezing or stridor.  CV: Palpable pulse.  No discernable arrhythmia.  No LE edema.  Lymph:  No palpable cervical, axillary, or " inguinal lymphadenopathy.  Skin: Warm.  No palpable masses.  No visible lesions.  Neuro: Normal muscle tone.  Normal and symmetric DTR's.     Right Shoulder Exam  Alignment / Posture:  Normal shoulder posture. Normal scapular position.  Inspection:  No swelling.  Palpation:   Anterior tenderness at right shoulder.  ROM:  Shoulder . Shoulder ER 30.  Strength:  4/5 supraspinatus, infraspinatus, and subscapularis.  Stability:  No objective shoulder instability.  Tests: No pertinent positive or negative tests.  Neurovascular:  Sensation intact in Ax/R/M/U nerve distributions. 2+ radial pulse.        Studies Reviewed  No studies to review      Large joint arthrocentesis: R glenohumeral  Switzer Protocol:  Consent: Verbal consent obtained.  Consent given by: patient  Patient understanding: patient states understanding of the procedure being performed  Patient identity confirmed: verbally with patient  Supporting Documentation  Indications: pain   Procedure Details  Location: shoulder - R glenohumeral  Needle size: 22 G  Ultrasound guidance: no  Approach: anterior  Medications administered: 4 mL bupivacaine 0.25 %; 40 mg methylPREDNISolone acetate 40 mg/mL    Patient tolerance: patient tolerated the procedure well with no immediate complications  Dressing:  Sterile dressing applied             Medical, Surgical, Family, and Social History  The patient's medical history, family history, and social history, were reviewed and updated as appropriate.    Past Medical History:   Diagnosis Date    Ambulatory dysfunction     uses walking stick    Anxiety     Arthritis     Cancer (HCC) 7/15/17    All better now    Chronic pain disorder     Claustrophobia     mild    DDD (degenerative disc disease), lumbar     Endometrial cancer (HCC) 07/06/2017    tRA TLH BSO SLND on 7/17/17 by Dr. Stone, followed by 3 cycles of vaginal brachytherapy completed in August 2017    Low back pain     Mild acid reflux     OA (osteoarthritis)      marissa knees    Spinal stenosis of lumbosacral region     Wears glasses        Past Surgical History:   Procedure Laterality Date    COLONOSCOPY      HYSTERECTOMY      JOINT REPLACEMENT Bilateral     knees    ORTHOPEDIC SURGERY      PLANTAR FASCIA SURGERY Bilateral     WY ARTHRP KNE CONDYLE&PLATU MEDIAL&LAT COMPARTMENTS Right 11/30/2020    Procedure: ARTHROPLASTY KNEE TOTAL;  Surgeon: Dipesh Knowles MD;  Location: [facility];  Service: Orthopedics    WY HYSTEROSCOPY BX ENDOMETRIUM&/POLYPC W/WO D&C N/A 02/28/2017    Procedure: DILATATION AND CURETTAGE (D&C) WITH HYSTEROSCOPY,POLYPECTOMY;  Surgeon: Roberto Anderson MD;  Location: [facility];  Service: Gynecology    REPLACEMENT TOTAL KNEE Left 10/22/2021    SHOULDER SURGERY Left     TONSILLECTOMY      TOTAL ABDOMINAL HYSTERECTOMY W/ BILATERAL SALPINGOOPHORECTOMY Bilateral 07/2017    endometrial cancer, ? stage 1    WISDOM TOOTH EXTRACTION         Family History   Problem Relation Age of Onset    Ovarian cancer Mother 70    Cancer Mother     Esophageal cancer Father     Cancer Father     No Known Problems Maternal Grandmother     No Known Problems Maternal Grandfather     No Known Problems Paternal Grandmother     No Known Problems Paternal Grandfather     No Known Problems Maternal Aunt     No Known Problems Paternal Aunt     Breast cancer Neg Hx     Colon cancer Neg Hx     Uterine cancer Neg Hx        Social History     Occupational History    Not on file   Tobacco Use    Smoking status: Never    Smokeless tobacco: Never    Tobacco comments:     No secondhand smoke exposure   Vaping Use    Vaping status: Never Used   Substance and Sexual Activity    Alcohol use: Not Currently     Comment: very rarely    Drug use: No    Sexual activity: Not Currently     Partners: Male     Birth control/protection: Abstinence       Allergies   Allergen Reactions    Iv Contrast [Iodinated Contrast Media] Hives    Vitamin C [Ascorbate - Food Allergy]      Queasy stomach, loose stool          Current Outpatient Medications:     atorvastatin (LIPITOR) 10 mg tablet, Take 1 tablet (10 mg total) by mouth daily, Disp: 90 tablet, Rfl: 1    Cholecalciferol (HM Vitamin D3) 100 MCG (4000 UT) CAPS, Take 1 capsule (4,000 Units total) by mouth daily, Disp: 30 capsule, Rfl: 0    Coenzyme Q10-Vitamin E (QUNOL ULTRA COQ10 PO), , Disp: , Rfl:     cyanocobalamin (VITAMIN B-12) 1000 MCG tablet, Take 1,000 mcg by mouth daily, Disp: , Rfl:     Misc Natural Products (GLUCOSAMINE CHOND CMP TRIPLE PO), Take by mouth, Disp: , Rfl:     Omega 3-6-9 Fatty Acids (OMEGA 3-6-9 PO), Take 1 capsule by mouth daily, Disp: , Rfl:     atorvastatin (LIPITOR) 10 mg tablet, TAKE 1 TABLET(10 MG) BY MOUTH DAILY, Disp: 90 tablet, Rfl: 0    diclofenac (VOLTAREN) 75 mg EC tablet, Take 1 tablet (75 mg total) by mouth 2 (two) times a day as needed (pain) Take with food (Patient not taking: Reported on 1/5/2024), Disp: 60 tablet, Rfl: 1    Current Facility-Administered Medications:     lidocaine (XYLOCAINE) 1 % injection 2 mL, 2 mL, Injection, , Harjeet Black DPM, 2 mL at 05/20/22 1445    triamcinolone acetonide (KENALOG-40) 40 mg/mL injection 20 mg, 20 mg, Intra-articular, , Harjeet Black DPM, 20 mg at 05/20/22 1445       Jacob Muhammad MD    Scribe Attestation      I,:   am acting as a scribe while in the presence of the attending physician.:       I,:   personally performed the services described in this documentation    as scribed in my presence.:

## 2024-06-10 ENCOUNTER — APPOINTMENT (OUTPATIENT)
Dept: RADIOLOGY | Facility: CLINIC | Age: 64
End: 2024-06-10
Payer: MEDICARE

## 2024-06-10 DIAGNOSIS — M99.03 SOMATIC DYSFUNCTION OF LUMBAR REGION: ICD-10-CM

## 2024-06-10 DIAGNOSIS — M54.17 LUMBOSACRAL NEURITIS: ICD-10-CM

## 2024-06-10 DIAGNOSIS — M99.05 SOMATIC DYSFUNCTION OF PELVIS REGION: ICD-10-CM

## 2024-06-10 DIAGNOSIS — M54.16 LUMBAR RADICULOPATHY: ICD-10-CM

## 2024-06-10 PROCEDURE — 72100 X-RAY EXAM L-S SPINE 2/3 VWS: CPT

## 2024-07-08 ENCOUNTER — RA CDI HCC (OUTPATIENT)
Dept: OTHER | Facility: HOSPITAL | Age: 64
End: 2024-07-08

## 2024-07-08 DIAGNOSIS — E66.01 MORBID OBESITY (HCC): Primary | ICD-10-CM

## 2024-07-10 ENCOUNTER — APPOINTMENT (OUTPATIENT)
Dept: LAB | Facility: CLINIC | Age: 64
End: 2024-07-10
Payer: MEDICARE

## 2024-07-10 DIAGNOSIS — E78.2 MIXED HYPERLIPIDEMIA: ICD-10-CM

## 2024-07-10 DIAGNOSIS — R20.2 PARESTHESIAS: ICD-10-CM

## 2024-07-10 LAB
ALBUMIN SERPL BCG-MCNC: 3.8 G/DL (ref 3.5–5)
ALP SERPL-CCNC: 68 U/L (ref 34–104)
ALT SERPL W P-5'-P-CCNC: 34 U/L (ref 7–52)
ANION GAP SERPL CALCULATED.3IONS-SCNC: 10 MMOL/L (ref 4–13)
AST SERPL W P-5'-P-CCNC: 32 U/L (ref 13–39)
BASOPHILS # BLD AUTO: 0.02 THOUSANDS/ÂΜL (ref 0–0.1)
BASOPHILS NFR BLD AUTO: 1 % (ref 0–1)
BILIRUB SERPL-MCNC: 0.56 MG/DL (ref 0.2–1)
BUN SERPL-MCNC: 21 MG/DL (ref 5–25)
CALCIUM SERPL-MCNC: 9.2 MG/DL (ref 8.4–10.2)
CHLORIDE SERPL-SCNC: 110 MMOL/L (ref 96–108)
CHOLEST SERPL-MCNC: 165 MG/DL
CO2 SERPL-SCNC: 25 MMOL/L (ref 21–32)
CREAT SERPL-MCNC: 0.7 MG/DL (ref 0.6–1.3)
EOSINOPHIL # BLD AUTO: 0.09 THOUSAND/ÂΜL (ref 0–0.61)
EOSINOPHIL NFR BLD AUTO: 2 % (ref 0–6)
ERYTHROCYTE [DISTWIDTH] IN BLOOD BY AUTOMATED COUNT: 14.2 % (ref 11.6–15.1)
FOLATE SERPL-MCNC: 20.8 NG/ML
GFR SERPL CREATININE-BSD FRML MDRD: 91 ML/MIN/1.73SQ M
GLUCOSE P FAST SERPL-MCNC: 90 MG/DL (ref 65–99)
HCT VFR BLD AUTO: 45.9 % (ref 34.8–46.1)
HDLC SERPL-MCNC: 73 MG/DL
HGB BLD-MCNC: 14.7 G/DL (ref 11.5–15.4)
IMM GRANULOCYTES # BLD AUTO: 0 THOUSAND/UL (ref 0–0.2)
IMM GRANULOCYTES NFR BLD AUTO: 0 % (ref 0–2)
LDLC SERPL CALC-MCNC: 76 MG/DL (ref 0–100)
LYMPHOCYTES # BLD AUTO: 1.34 THOUSANDS/ÂΜL (ref 0.6–4.47)
LYMPHOCYTES NFR BLD AUTO: 36 % (ref 14–44)
MCH RBC QN AUTO: 27.9 PG (ref 26.8–34.3)
MCHC RBC AUTO-ENTMCNC: 32 G/DL (ref 31.4–37.4)
MCV RBC AUTO: 87 FL (ref 82–98)
MONOCYTES # BLD AUTO: 0.29 THOUSAND/ÂΜL (ref 0.17–1.22)
MONOCYTES NFR BLD AUTO: 8 % (ref 4–12)
NEUTROPHILS # BLD AUTO: 2 THOUSANDS/ÂΜL (ref 1.85–7.62)
NEUTS SEG NFR BLD AUTO: 53 % (ref 43–75)
NRBC BLD AUTO-RTO: 0 /100 WBCS
PLATELET # BLD AUTO: 175 THOUSANDS/UL (ref 149–390)
PMV BLD AUTO: 8.7 FL (ref 8.9–12.7)
POTASSIUM SERPL-SCNC: 5.2 MMOL/L (ref 3.5–5.3)
PROT SERPL-MCNC: 6.5 G/DL (ref 6.4–8.4)
RBC # BLD AUTO: 5.26 MILLION/UL (ref 3.81–5.12)
SODIUM SERPL-SCNC: 145 MMOL/L (ref 135–147)
TRIGL SERPL-MCNC: 81 MG/DL
VIT B12 SERPL-MCNC: 689 PG/ML (ref 180–914)
WBC # BLD AUTO: 3.74 THOUSAND/UL (ref 4.31–10.16)

## 2024-07-10 PROCEDURE — 80061 LIPID PANEL: CPT

## 2024-07-10 PROCEDURE — 80053 COMPREHEN METABOLIC PANEL: CPT

## 2024-07-10 PROCEDURE — 85025 COMPLETE CBC W/AUTO DIFF WBC: CPT

## 2024-07-10 PROCEDURE — 82746 ASSAY OF FOLIC ACID SERUM: CPT

## 2024-07-10 PROCEDURE — 36415 COLL VENOUS BLD VENIPUNCTURE: CPT

## 2024-07-10 PROCEDURE — 82607 VITAMIN B-12: CPT

## 2024-07-15 ENCOUNTER — OFFICE VISIT (OUTPATIENT)
Dept: FAMILY MEDICINE CLINIC | Facility: CLINIC | Age: 64
End: 2024-07-15
Payer: MEDICARE

## 2024-07-15 VITALS
SYSTOLIC BLOOD PRESSURE: 124 MMHG | HEIGHT: 62 IN | HEART RATE: 62 BPM | WEIGHT: 209 LBS | DIASTOLIC BLOOD PRESSURE: 78 MMHG | OXYGEN SATURATION: 99 % | TEMPERATURE: 97 F | BODY MASS INDEX: 38.46 KG/M2

## 2024-07-15 DIAGNOSIS — R73.9 HYPERGLYCEMIA: ICD-10-CM

## 2024-07-15 DIAGNOSIS — R20.2 PARESTHESIAS: ICD-10-CM

## 2024-07-15 DIAGNOSIS — E66.01 MORBID OBESITY (HCC): ICD-10-CM

## 2024-07-15 DIAGNOSIS — E78.2 MIXED HYPERLIPIDEMIA: Primary | ICD-10-CM

## 2024-07-15 DIAGNOSIS — D72.819 LEUKOPENIA, UNSPECIFIED TYPE: ICD-10-CM

## 2024-07-15 PROCEDURE — G2211 COMPLEX E/M VISIT ADD ON: HCPCS | Performed by: PHYSICIAN ASSISTANT

## 2024-07-15 PROCEDURE — 99214 OFFICE O/P EST MOD 30 MIN: CPT | Performed by: PHYSICIAN ASSISTANT

## 2024-07-15 NOTE — PATIENT INSTRUCTIONS
1. Mixed hyperlipidemia  Assessment & Plan:  Patient is on Lipitor 10 keeping her LDL at goal at 76 continue recheck 6 months.  Orders:  -     Comprehensive metabolic panel; Future; Expected date: 01/15/2025  -     Lipid Panel with Direct LDL reflex; Future; Expected date: 01/15/2025  2. Hyperglycemia  Assessment & Plan:  Fasting glucose was normal at 90  3. Paresthesias  Assessment & Plan:  B12 folate within normal limits with no signs of anemia.  4. Leukopenia, unspecified type  Assessment & Plan:  WBC stable at 3.74 with normal indices  5. Morbid obesity (HCC)

## 2024-07-15 NOTE — PROGRESS NOTES
Ambulatory Visit  Name: Cristin Nolan      : 1960      MRN: [id number]  Encounter Provider: Manisha Ortiz PA-C  Encounter Date: 7/15/2024   Encounter department: [facility]    Assessment & Plan   1. Mixed hyperlipidemia  Assessment & Plan:  Patient is on Lipitor 10 keeping her LDL at goal at 76 continue recheck 6 months.  Orders:  -     Comprehensive metabolic panel; Future; Expected date: 01/15/2025  -     Lipid Panel with Direct LDL reflex; Future; Expected date: 01/15/2025  2. Hyperglycemia  Assessment & Plan:  Fasting glucose was normal at 90  3. Paresthesias  Assessment & Plan:  B12 folate within normal limits with no signs of anemia.  4. Leukopenia, unspecified type  Assessment & Plan:  WBC stable at 3.74 with normal indices  5. Morbid obesity (HCC)     History of Present Illness       Cristin Nolan is here for chronic conditions f/u. Pt. had labs done prior to today's visit which included Recent Results (from the past 672 hour(s))  -Lipid Panel with Direct LDL reflex:   Collection Time: 07/10/24  9:51 AM       Result                      Value             Ref Range           Cholesterol                 165               See Comment *       Triglycerides               81                See Comment *       HDL, Direct                 73                >=50 mg/dL          LDL Calculated              76                0 - 100 mg/dL  -Comprehensive metabolic panel:   Collection Time: 07/10/24  9:51 AM       Result                      Value             Ref Range           Sodium                      145               135 - 147 mm*       Potassium                   5.2               3.5 - 5.3 mm*       Chloride                    110 (H)           96 - 108 mmo*       CO2                         25                21 - 32 mmol*       ANION GAP                   10                4 - 13 mmol/L       BUN                         21                5 - 25 mg/dL        Creatinine                   0.70              0.60 - 1.30 *       Glucose, Fasting            90                65 - 99 mg/dL       Calcium                     9.2               8.4 - 10.2 m*       AST                         32                13 - 39 U/L         ALT                         34                7 - 52 U/L          Alkaline Phosphatase        68                34 - 104 U/L        Total Protein               6.5               6.4 - 8.4 g/*       Albumin                     3.8               3.5 - 5.0 g/*       Total Bilirubin             0.56              0.20 - 1.00 *       eGFR                        91                ml/min/1.73s*  -CBC and differential:   Collection Time: 07/10/24  9:51 AM       Result                      Value             Ref Range           WBC                         3.74 (L)          4.31 - 10.16*       RBC                         5.26 (H)          3.81 - 5.12 *       Hemoglobin                  14.7              11.5 - 15.4 *       Hematocrit                  45.9              34.8 - 46.1 %       MCV                         87                82 - 98 fL          MCH                         27.9              26.8 - 34.3 *       MCHC                        32.0              31.4 - 37.4 *       RDW                         14.2              11.6 - 15.1 %       MPV                         8.7 (L)           8.9 - 12.7 fL       Platelets                   175               149 - 390 Th*       nRBC                        0                 /100 WBCs           Segmented %                 53                43 - 75 %           Immature Grans %            0                 0 - 2 %             Lymphocytes %               36                14 - 44 %           Monocytes %                 8                 4 - 12 %            Eosinophils Relative        2                 0 - 6 %             Basophils Relative          1                 0 - 1 %             Absolute Neutrophils        2.00              1.85 - 7.62  "*       Absolute Immature Grans     0.00              0.00 - 0.20 *       Absolute Lymphocytes        1.34              0.60 - 4.47 *       Absolute Monocytes          0.29              0.17 - 1.22 *       Eosinophils Absolute        0.09              0.00 - 0.61 *       Basophils Absolute          0.02              0.00 - 0.10 *  -Folate:   Collection Time: 07/10/24  9:51 AM       Result                      Value             Ref Range           Folate                      20.8              >5.9 ng/mL     -Vitamin B12:   Collection Time: 07/10/24  9:51 AM       Result                      Value             Ref Range           Vitamin B-12                689               180 - 914 pg*          Review of Systems   Constitutional: Negative.    HENT: Negative.     Eyes: Negative.    Respiratory: Negative.     Cardiovascular: Negative.    Gastrointestinal: Negative.    Endocrine: Negative.    Genitourinary: Negative.    Musculoskeletal: Negative.    Skin: Negative.    Allergic/Immunologic: Negative.    Neurological: Negative.    Hematological: Negative.    Psychiatric/Behavioral: Negative.         Objective     /78 (BP Location: Left arm, Patient Position: Sitting, Cuff Size: Standard)   Pulse 62   Temp (!) 97 °F (36.1 °C) (Tympanic)   Ht 5' 2\" (1.575 m)   Wt 94.8 kg (209 lb)   SpO2 99%   BMI 38.23 kg/m²     Physical Exam  Vitals and nursing note reviewed.   Constitutional:       Appearance: Normal appearance. She is well-developed.   HENT:      Head: Normocephalic and atraumatic.   Eyes:      General: Lids are normal.      Conjunctiva/sclera: Conjunctivae normal.      Pupils: Pupils are equal, round, and reactive to light.   Cardiovascular:      Rate and Rhythm: Normal rate and regular rhythm.      Heart sounds: No murmur heard.  Pulmonary:      Effort: Pulmonary effort is normal.      Breath sounds: Normal breath sounds.   Skin:     General: Skin is warm and dry.   Neurological:      General: No focal " deficit present.      Mental Status: She is alert.      Coordination: Coordination is intact.   Psychiatric:         Mood and Affect: Mood normal.         Behavior: Behavior normal. Behavior is cooperative.         Thought Content: Thought content normal.         Judgment: Judgment normal.       Administrative Statements

## 2024-07-22 ENCOUNTER — OFFICE VISIT (OUTPATIENT)
Dept: OBGYN CLINIC | Facility: MEDICAL CENTER | Age: 64
End: 2024-07-22
Payer: MEDICARE

## 2024-07-22 VITALS
DIASTOLIC BLOOD PRESSURE: 73 MMHG | SYSTOLIC BLOOD PRESSURE: 111 MMHG | BODY MASS INDEX: 38.46 KG/M2 | HEIGHT: 62 IN | HEART RATE: 54 BPM | WEIGHT: 209 LBS

## 2024-07-22 DIAGNOSIS — M19.011 PRIMARY OSTEOARTHRITIS OF RIGHT SHOULDER: Primary | ICD-10-CM

## 2024-07-22 PROCEDURE — 99214 OFFICE O/P EST MOD 30 MIN: CPT | Performed by: ORTHOPAEDIC SURGERY

## 2024-07-22 PROCEDURE — 20610 DRAIN/INJ JOINT/BURSA W/O US: CPT | Performed by: ORTHOPAEDIC SURGERY

## 2024-07-22 RX ORDER — BUPIVACAINE HYDROCHLORIDE 2.5 MG/ML
4 INJECTION, SOLUTION INFILTRATION; PERINEURAL
Status: COMPLETED | OUTPATIENT
Start: 2024-07-22 | End: 2024-07-22

## 2024-07-22 RX ADMIN — BUPIVACAINE HYDROCHLORIDE 4 ML: 2.5 INJECTION, SOLUTION INFILTRATION; PERINEURAL at 12:00

## 2024-07-22 NOTE — PROGRESS NOTES
"Orthopaedic Surgery - Office Note  Cristin Nolan (64 y.o. female)   : 1960   MRN: [id number]  Encounter Date: 2024    Assessment / Plan    Bilateral glenohumeral osteoarthritis    CSI of right glenohumeral joint was performed  Continue home exercise program and she may follow-up in 3 months as needed for repeat injection if desired.  Follow-up:  No follow-ups on file.      Chief Complaint / Date of Onset  Right shoulder pain   Injury Mechanism / Date  None  Surgery / Date  None for the shoulders    History of Present Illness   Cristin Nolan is a 64 y.o. female who presents for follow up of right greater than left glenohumeral osteoarthritis. She got very good relief for a couple months with her last CSI in April and would like to undergo another one today.  She states that her left shoulder pain is not bothersome at this time and does not want to undergo an injection.  She is not interested in total shoulder replacement at this time.    Treatment Summary  Medications / Modalities  OTC medications   Bracing / Immobilization  None  Physical Therapy  Yes  Injections  CSI right GH joint performed today   Prior Surgeries  No prior shoulder surgeries. Previous bilateral total knee arthroplasties   Other Treatments  None    Employment / Current Status  NA     Sport / Organization / Current Status  None      Review of Systems  Pertinent items are noted in HPI.  All other systems were reviewed and are negative.      Physical Exam  /73   Pulse (!) 54   Ht 5' 2\" (1.575 m)   Wt 94.8 kg (209 lb)   BMI 38.23 kg/m²   Cons: Appears well.  No apparent distress.  Psych: Alert. Oriented x3.  Mood and affect normal.  Eyes: PERRLA, EOMI  Resp: Normal effort.  No audible wheezing or stridor.  CV: Palpable pulse.  No discernable arrhythmia.  No LE edema.  Lymph:  No palpable cervical, axillary, or inguinal lymphadenopathy.  Skin: Warm.  No palpable masses.  No visible lesions.  Neuro: Normal muscle tone.  Normal " and symmetric DTR's.     Right Shoulder Exam  Alignment / Posture:  Normal shoulder posture. Normal scapular position.  Inspection:  No swelling.  Palpation:   Anterior tenderness at right shoulder.  ROM:  Shoulder . Shoulder ER 30. IR to L4  Strength:  4+/5 supraspinatus, infraspinatus, and subscapularis.  Stability:  No objective shoulder instability.  Tests: No pertinent positive or negative tests.  Neurovascular:  Sensation intact in Ax/R/M/U nerve distributions. 2+ radial pulse.      Studies Reviewed  X-rays of the right shoulder demonstrate end-stage glenohumeral osteoarthritis inferior osteophytes and no signs of fracture or dislocation  X-rays of the left shoulder demonstrate mild glenohumeral osteoarthritis with calcific tendinitis of the rotator cuff.      Large joint arthrocentesis: R glenohumeral  [place] Protocol:  Consent: Verbal consent obtained.  Risks and benefits: risks, benefits and alternatives were discussed  Consent given by: patient  Timeout called at: 7/22/2024 12:32 PM.  Patient understanding: patient states understanding of the procedure being performed  Patient identity confirmed: verbally with patient  Supporting Documentation  Indications: pain   Procedure Details  Location: shoulder - R glenohumeral  Needle size: 22 G  Ultrasound guidance: no  Approach: anterior  Medications administered: 4 mL bupivacaine 0.25 %; 10 mg triamcinolone acetonide 10 mg/mL               Medical, Surgical, Family, and Social History  The patient's medical history, family history, and social history, were reviewed and updated as appropriate.    Past Medical History:   Diagnosis Date    Ambulatory dysfunction     uses walking stick    Anxiety     Arthritis     Cancer (HCC) 7/15/17    All better now    Chronic pain disorder     Claustrophobia     mild    DDD (degenerative disc disease), lumbar     Endometrial cancer (HCC) 07/06/2017    tRA TLH BSO SLND on 7/17/17 by Dr. Stone, followed by 3 cycles of  vaginal brachytherapy completed in August 2017    Low back pain     Mild acid reflux     OA (osteoarthritis)     marissa knees    Spinal stenosis of lumbosacral region     Wears glasses        Past Surgical History:   Procedure Laterality Date    COLONOSCOPY      HYSTERECTOMY      JOINT REPLACEMENT Bilateral     knees    ORTHOPEDIC SURGERY      PLANTAR FASCIA SURGERY Bilateral     [place] ARTHRP KNE CONDYLE&PLATU MEDIAL&LAT COMPARTMENTS Right 11/30/2020    Procedure: ARTHROPLASTY KNEE TOTAL;  Surgeon: Dipesh Knowles MD;  Location: [facility];  Service: Orthopedics    [place] HYSTEROSCOPY BX ENDOMETRIUM&/POLYPC W/WO D&C N/A 02/28/2017    Procedure: DILATATION AND CURETTAGE (D&C) WITH HYSTEROSCOPY,POLYPECTOMY;  Surgeon: Roberto Anderson MD;  Location: [facility];  Service: Gynecology    REPLACEMENT TOTAL KNEE Left 10/22/2021    SHOULDER SURGERY Left     TONSILLECTOMY      TOTAL ABDOMINAL HYSTERECTOMY W/ BILATERAL SALPINGOOPHORECTOMY Bilateral 07/2017    endometrial cancer, ? stage 1    WISDOM TOOTH EXTRACTION         Family History   Problem Relation Age of Onset    Ovarian cancer Mother 70    Cancer Mother     Esophageal cancer Father     Cancer Father     No Known Problems Maternal Grandmother     No Known Problems Maternal Grandfather     No Known Problems Paternal Grandmother     No Known Problems Paternal Grandfather     No Known Problems Maternal Aunt     No Known Problems Paternal Aunt     Breast cancer Neg Hx     Colon cancer Neg Hx     Uterine cancer Neg Hx        Social History     Occupational History    Not on file   Tobacco Use    Smoking status: Never    Smokeless tobacco: Never    Tobacco comments:     No secondhand smoke exposure   Vaping Use    Vaping status: Never Used   Substance and Sexual Activity    Alcohol use: Not Currently     Comment: very rarely    Drug use: No    Sexual activity: Not Currently     Partners: Male     Birth control/protection: Abstinence       Allergies   Allergen Reactions    Iv  Contrast [Iodinated Contrast Media] Hives    Vitamin C [Ascorbate - Food Allergy]      Queasy stomach, loose stool         Current Outpatient Medications:     atorvastatin (LIPITOR) 10 mg tablet, Take 1 tablet (10 mg total) by mouth daily, Disp: 90 tablet, Rfl: 1    Cholecalciferol (HM Vitamin D3) 100 MCG (4000 UT) CAPS, Take 1 capsule (4,000 Units total) by mouth daily, Disp: 30 capsule, Rfl: 0    Coenzyme Q10-Vitamin E (QUNOL ULTRA COQ10 PO), , Disp: , Rfl:     cyanocobalamin (VITAMIN B-12) 1000 MCG tablet, Take 1,000 mcg by mouth daily, Disp: , Rfl:     Misc Natural Products (GLUCOSAMINE CHOND CMP TRIPLE PO), Take by mouth, Disp: , Rfl:     Omega 3-6-9 Fatty Acids (OMEGA 3-6-9 PO), Take 1 capsule by mouth daily, Disp: , Rfl:     Current Facility-Administered Medications:     lidocaine (XYLOCAINE) 1 % injection 2 mL, 2 mL, Injection, , Harjeet Black DPM, 2 mL at 05/20/22 1445    triamcinolone acetonide (KENALOG-40) 40 mg/mL injection 20 mg, 20 mg, Intra-articular, , Harjeet Black DPM, 20 mg at 05/20/22 1445      Puneet Lebron MD    Scribe Attestation      I,:   am acting as a scribe while in the presence of the attending physician.:       I,:   personally performed the services described in this documentation    as scribed in my presence.:

## 2024-08-09 ENCOUNTER — APPOINTMENT (OUTPATIENT)
Dept: RADIOLOGY | Facility: MEDICAL CENTER | Age: 64
End: 2024-08-09
Payer: MEDICARE

## 2024-08-09 ENCOUNTER — OFFICE VISIT (OUTPATIENT)
Dept: OBGYN CLINIC | Facility: MEDICAL CENTER | Age: 64
End: 2024-08-09
Payer: MEDICARE

## 2024-08-09 VITALS
HEIGHT: 62 IN | DIASTOLIC BLOOD PRESSURE: 81 MMHG | HEART RATE: 59 BPM | SYSTOLIC BLOOD PRESSURE: 126 MMHG | WEIGHT: 209 LBS | BODY MASS INDEX: 38.46 KG/M2

## 2024-08-09 DIAGNOSIS — M54.16 RADICULOPATHY, LUMBAR REGION: Primary | ICD-10-CM

## 2024-08-09 DIAGNOSIS — M16.12 PRIMARY OSTEOARTHRITIS OF ONE HIP, LEFT: ICD-10-CM

## 2024-08-09 DIAGNOSIS — M25.552 LEFT HIP PAIN: ICD-10-CM

## 2024-08-09 PROCEDURE — 73502 X-RAY EXAM HIP UNI 2-3 VIEWS: CPT

## 2024-08-09 PROCEDURE — 99214 OFFICE O/P EST MOD 30 MIN: CPT | Performed by: ORTHOPAEDIC SURGERY

## 2024-08-09 NOTE — PROGRESS NOTES
"Orthopaedic Surgery - Office Note  Cristin Nolan (64 y.o. female)   : 1960   MRN: [id number]  Encounter Date: 2024    Assessment / Plan    Left hip moderate to severe osteoarthritis with lumbar radiculopathy.    Concern her gait abnormality is due to abductor weakness secondary to irritated L5 nerve root irritation.  PT for hip and core strengthening and ROM. Script provided.  Referral for diagnostic image guided intra-articular left hip joint CSI.   I explained she should keep track of the percentage of her pain relief from her CSI.  EMG 2 limb lower extremity ordered.  I explained a CARO would treat pain not antalgic gait/\"waddle\".  Xrays left hip from today were reviewed with the patient.  Follow-up:  Return for Recheck 4 weeks after image guided IA hip CSI.      Chief Complaint / Date of Onset  Gait abnormality   Left hip pain  Injury Mechanism / Date  None  Surgery / Date  None    History of Present Illness   Cristin Nolan is a 64 y.o. female who presents for evaluation of left hip pain referred to me by themself. Patient reports her c/o is gait abnormality, she is unsure if her pain is from the hip or back. She reports 3/10 pain in the groin and posterior hip/back region. Pain and \"waddle\" started in  after her first TKA. She reports trouble walking for long distances and stretching her leg with hydro-biking. She reports pain, numbness and tingling into the lateral proximal thigh. She reports occasional numbness and tingling into her feet. She has received injections for her back as described below.     Treatment Summary  Medications / Modalities  Advil  prn  Bracing / Immobilization  None  Physical Therapy  Patient reports she has attended 3 months of PT for her hip and back.  Hydro-biking for exercise  Injections  Patient has had RFA and MBB of L3,4,5.  Prior Surgeries  None  Other Treatments  None    Employment / Current Status  N/a    Sport / Organization / Current " "Status  Hydro-biking      Review of Systems  Pertinent items are noted in HPI.  All other systems were reviewed and are negative.      Physical Exam  /81   Pulse 59   Ht 5' 2\" (1.575 m)   Wt 94.8 kg (209 lb)   BMI 38.23 kg/m²   Cons: Appears well.  No apparent distress.  Psych: Alert. Oriented x3.  Mood and affect normal.  Eyes: PERRLA, EOMI  Resp: Normal effort.  No audible wheezing or stridor.  CV: Palpable pulse.  No discernable arrhythmia.  No LE edema.  Lymph:  No palpable cervical, axillary, or inguinal lymphadenopathy.  Skin: Warm.  No palpable masses.  No visible lesions.  Neuro: Normal muscle tone.  Normal and symmetric DTR's.     Bilateral Hip Exam  Alignment / Posture:  Normal resting hip posture.  Inspection:  No swelling. No edema. No erythema. No ecchymosis.  Palpation:  No tenderness.  ROM:  Hip Flexion 120 wo pain. Hip ER 90. Left Hip IR 30. Right Hip IR 10.  Strength:  Hip Abductors 3/5.  Stability:  (-) [place].  Tests:  (-) [place]. (-) FADDIR. + [place]  Neurovascular:  Sensation intact in DP/SP/Gill/Sa/T nerve distributions. 2+ DP & PT pulses.  Gait:   \"waddle\"        Studies Reviewed  I have personally reviewed pertinent films in PACS.  XR of left hip - 08/09/2024 - moderate to severe osteoarthritis with joint space narrowing and marginal osteophyte formation.      Procedures  No procedures today.    Medical, Surgical, Family, and Social History  The patient's medical history, family history, and social history, were reviewed and updated as appropriate.    Past Medical History:   Diagnosis Date    Ambulatory dysfunction     uses walking stick    Anxiety     Arthritis     Cancer (HCC) 7/15/17    All better now    Chronic pain disorder     Claustrophobia     mild    DDD (degenerative disc disease), lumbar     Endometrial cancer (HCC) 07/06/2017    tRA TLH BSO SLND on 7/17/17 by Dr. Stone, followed by 3 cycles of vaginal brachytherapy completed in August 2017    Low back pain     " Mild acid reflux     OA (osteoarthritis)     marissa knees    Spinal stenosis of lumbosacral region     Wears glasses        Past Surgical History:   Procedure Laterality Date    COLONOSCOPY      HYSTERECTOMY      JOINT REPLACEMENT Bilateral     knees    ORTHOPEDIC SURGERY      PLANTAR FASCIA SURGERY Bilateral     [place] ARTHRP KNE CONDYLE&PLATU MEDIAL&LAT COMPARTMENTS Right 11/30/2020    Procedure: ARTHROPLASTY KNEE TOTAL;  Surgeon: Dipesh Knowles MD;  Location: [facility];  Service: Orthopedics    [place] HYSTEROSCOPY BX ENDOMETRIUM&/POLYPC W/WO D&C N/A 02/28/2017    Procedure: DILATATION AND CURETTAGE (D&C) WITH HYSTEROSCOPY,POLYPECTOMY;  Surgeon: Roberto Anderson MD;  [facility];  Service: Gynecology    REPLACEMENT TOTAL KNEE Left 10/22/2021    SHOULDER SURGERY Left     TONSILLECTOMY      TOTAL ABDOMINAL HYSTERECTOMY W/ BILATERAL SALPINGOOPHORECTOMY Bilateral 07/2017    endometrial cancer, ? stage 1    WISDOM TOOTH EXTRACTION         Family History   Problem Relation Age of Onset    Ovarian cancer Mother 70    Cancer Mother     Esophageal cancer Father     Cancer Father     No Known Problems Maternal Grandmother     No Known Problems Maternal Grandfather     No Known Problems Paternal Grandmother     No Known Problems Paternal Grandfather     No Known Problems Maternal Aunt     No Known Problems Paternal Aunt     Breast cancer Neg Hx     Colon cancer Neg Hx     Uterine cancer Neg Hx        Social History     Occupational History    Not on file   Tobacco Use    Smoking status: Never    Smokeless tobacco: Never    Tobacco comments:     No secondhand smoke exposure   Vaping Use    Vaping status: Never Used   Substance and Sexual Activity    Alcohol use: Not Currently     Comment: very rarely    Drug use: No    Sexual activity: Not Currently     Partners: Male     Birth control/protection: Abstinence       Allergies   Allergen Reactions    Iv Contrast [Iodinated Contrast Media] Hives    Vitamin C [Ascorbate - Food  Allergy]      Queasy stomach, loose stool         Current Outpatient Medications:     atorvastatin (LIPITOR) 10 mg tablet, Take 1 tablet (10 mg total) by mouth daily, Disp: 90 tablet, Rfl: 1    Cholecalciferol (HM Vitamin D3) 100 MCG (4000 UT) CAPS, Take 1 capsule (4,000 Units total) by mouth daily, Disp: 30 capsule, Rfl: 0    Coenzyme Q10-Vitamin E (QUNOL ULTRA COQ10 PO), , Disp: , Rfl:     cyanocobalamin (VITAMIN B-12) 1000 MCG tablet, Take 1,000 mcg by mouth daily, Disp: , Rfl:     Misc Natural Products (GLUCOSAMINE CHOND CMP TRIPLE PO), Take by mouth, Disp: , Rfl:     Omega 3-6-9 Fatty Acids (OMEGA 3-6-9 PO), Take 1 capsule by mouth daily, Disp: , Rfl:     Current Facility-Administered Medications:     lidocaine (XYLOCAINE) 1 % injection 2 mL, 2 mL, Injection, , Harjeet Black, ZIGGYM, 2 mL at 05/20/22 1445    triamcinolone acetonide (KENALOG-40) 40 mg/mL injection 20 mg, 20 mg, Intra-articular, , Harjeet Black, DPM, 20 mg at 05/20/22 1445      Kelsie Blas    Scribe Attestation      I,:  Kelsie Blas am acting as a scribe while in the presence of the attending physician.:       I,:  Dipesh Knowles MD personally performed the services described in this documentation    as scribed in my presence.:

## 2024-08-28 ENCOUNTER — HOSPITAL ENCOUNTER (OUTPATIENT)
Dept: RADIOLOGY | Facility: MEDICAL CENTER | Age: 64
Discharge: HOME | End: 2024-08-28
Payer: MEDICARE

## 2024-08-28 VITALS
HEART RATE: 68 BPM | SYSTOLIC BLOOD PRESSURE: 146 MMHG | TEMPERATURE: 98 F | DIASTOLIC BLOOD PRESSURE: 80 MMHG | OXYGEN SATURATION: 99 % | RESPIRATION RATE: 20 BRPM

## 2024-08-28 DIAGNOSIS — M54.16 RADICULOPATHY, LUMBAR REGION: ICD-10-CM

## 2024-08-28 DIAGNOSIS — M16.12 PRIMARY OSTEOARTHRITIS OF ONE HIP, LEFT: ICD-10-CM

## 2024-08-28 DIAGNOSIS — M25.552 LEFT HIP PAIN: ICD-10-CM

## 2024-08-28 PROCEDURE — A9585 GADOBUTROL INJECTION: HCPCS | Performed by: PHYSICAL MEDICINE & REHABILITATION

## 2024-08-28 PROCEDURE — 20610 DRAIN/INJ JOINT/BURSA W/O US: CPT | Performed by: PHYSICAL MEDICINE & REHABILITATION

## 2024-08-28 PROCEDURE — 77002 NEEDLE LOCALIZATION BY XRAY: CPT

## 2024-08-28 PROCEDURE — 77002 NEEDLE LOCALIZATION BY XRAY: CPT | Performed by: PHYSICAL MEDICINE & REHABILITATION

## 2024-08-28 RX ORDER — GADOBUTROL 604.72 MG/ML
2 INJECTION INTRAVENOUS ONCE
Status: COMPLETED | OUTPATIENT
Start: 2024-08-28 | End: 2024-08-28

## 2024-08-28 RX ORDER — ROPIVACAINE HYDROCHLORIDE 2 MG/ML
3 INJECTION, SOLUTION EPIDURAL; INFILTRATION; PERINEURAL ONCE
Status: COMPLETED | OUTPATIENT
Start: 2024-08-28 | End: 2024-08-28

## 2024-08-28 RX ORDER — METHYLPREDNISOLONE ACETATE 40 MG/ML
40 INJECTION, SUSPENSION INTRA-ARTICULAR; INTRALESIONAL; INTRAMUSCULAR; PARENTERAL; SOFT TISSUE ONCE
Status: COMPLETED | OUTPATIENT
Start: 2024-08-28 | End: 2024-08-28

## 2024-08-28 RX ADMIN — GADOBUTROL 2 ML: 604.72 INJECTION INTRAVENOUS at 15:22

## 2024-08-28 RX ADMIN — ROPIVACAINE HYDROCHLORIDE 3 ML: 2 INJECTION, SOLUTION EPIDURAL; INFILTRATION; PERINEURAL at 15:22

## 2024-08-28 RX ADMIN — METHYLPREDNISOLONE ACETATE 40 MG: 40 INJECTION, SUSPENSION INTRA-ARTICULAR; INTRALESIONAL; INTRAMUSCULAR; PARENTERAL; SOFT TISSUE at 15:22

## 2024-08-28 NOTE — H&P
History of Present Illness: The patient is a 64 y.o. female who presents with complaints of left hip pain    Past Medical History:   Diagnosis Date    Ambulatory dysfunction     uses walking stick    Anxiety     Arthritis     Cancer (HCC) 7/15/17    All better now    Chronic pain disorder     Claustrophobia     mild    DDD (degenerative disc disease), lumbar     Endometrial cancer (HCC) 07/06/2017    tRA TLH BSO SLND on 7/17/17 by Dr. Stone, followed by 3 cycles of vaginal brachytherapy completed in August 2017    Low back pain     Mild acid reflux     OA (osteoarthritis)     marissa knees    Spinal stenosis of lumbosacral region     Wears glasses        Past Surgical History:   Procedure Laterality Date    COLONOSCOPY      HYSTERECTOMY      JOINT REPLACEMENT Bilateral     knees    ORTHOPEDIC SURGERY      PLANTAR FASCIA SURGERY Bilateral     [place] ARTHRP KNE CONDYLE&PLATU MEDIAL&LAT COMPARTMENTS Right 11/30/2020    Procedure: ARTHROPLASTY KNEE TOTAL;  Surgeon: Dipesh Knowles MD;  Location: [facility];  Service: Orthopedics    [place] HYSTEROSCOPY BX ENDOMETRIUM&/POLYPC W/WO D&C N/A 02/28/2017    Procedure: DILATATION AND CURETTAGE (D&C) WITH HYSTEROSCOPY,POLYPECTOMY;  Surgeon: Roberto Anderson MD;  Location[facility];  Service: Gynecology    REPLACEMENT TOTAL KNEE Left 10/22/2021    SHOULDER SURGERY Left     TONSILLECTOMY      TOTAL ABDOMINAL HYSTERECTOMY W/ BILATERAL SALPINGOOPHORECTOMY Bilateral 07/2017    endometrial cancer, ? stage 1    WISDOM TOOTH EXTRACTION           Current Outpatient Medications:     atorvastatin (LIPITOR) 10 mg tablet, Take 1 tablet (10 mg total) by mouth daily, Disp: 90 tablet, Rfl: 1    Cholecalciferol (HM Vitamin D3) 100 MCG (4000 UT) CAPS, Take 1 capsule (4,000 Units total) by mouth daily, Disp: 30 capsule, Rfl: 0    Coenzyme Q10-Vitamin E (QUNOL ULTRA COQ10 PO), , Disp: , Rfl:     cyanocobalamin (VITAMIN B-12) 1000 MCG tablet, Take 1,000 mcg by mouth daily, Disp: , Rfl:     Misc Natural  Products (GLUCOSAMINE CHOND CMP TRIPLE PO), Take by mouth, Disp: , Rfl:     Omega 3-6-9 Fatty Acids (OMEGA 3-6-9 PO), Take 1 capsule by mouth daily, Disp: , Rfl:     Current Facility-Administered Medications:     lidocaine (XYLOCAINE) 1 % injection 2 mL, 2 mL, Injection, , Harjeet Black, ANDREW, 2 mL at 05/20/22 1445    triamcinolone acetonide (KENALOG-40) 40 mg/mL injection 20 mg, 20 mg, Intra-articular, , Harjeet Black DPM, 20 mg at 05/20/22 1445    Allergies   Allergen Reactions    Iv Contrast [Iodinated Contrast Media] Hives    Vitamin C [Ascorbate - Food Allergy]      Queasy stomach, loose stool       Physical Exam:   Vitals:    08/28/24 1515   BP: 153/79   Pulse: 65   Resp: 20   Temp: 98 °F (36.7 °C)   SpO2: 98%     General: Awake, Alert, Oriented x 3, Mood and affect appropriate  Respiratory: Respirations even and unlabored  Cardiovascular: Peripheral pulses intact; no edema  Musculoskeletal Exam: left hip pain    ASA Score: 3    Patient/Chart Verification  Patient ID Verified: Verbal  Consents Confirmed: Procedural, To be obtained in the Pre-Procedure area  H&P( within 30 days) Verified: To be obtained in the Pre-Procedure area  Allergies Reviewed: Yes  Anticoag/NSAID held?: NA  Currently on antibiotics?: No  Pregnancy denied?: NA    Assessment:   1. Left hip pain    2. Primary osteoarthritis of one hip, left    3. Radiculopathy, lumbar region        Plan: Image guided intra-articular left hip joint CSI

## 2024-08-28 NOTE — DISCHARGE INSTRUCTIONS

## 2024-09-09 DIAGNOSIS — E78.2 MIXED HYPERLIPIDEMIA: ICD-10-CM

## 2024-09-09 RX ORDER — ATORVASTATIN CALCIUM 10 MG/1
10 TABLET, FILM COATED ORAL DAILY
Qty: 90 TABLET | Refills: 1 | Status: SHIPPED | OUTPATIENT
Start: 2024-09-09

## 2024-09-11 ENCOUNTER — TELEPHONE (OUTPATIENT)
Dept: PAIN MEDICINE | Facility: CLINIC | Age: 64
End: 2024-09-11

## 2024-09-13 NOTE — TELEPHONE ENCOUNTER
2nd attempt  Lm to cb with % improvement and pain level.  Someone picked up but then I I think the call dropped. I tried to call her back but she did not answer.

## 2024-09-27 ENCOUNTER — OFFICE VISIT (OUTPATIENT)
Dept: OBGYN CLINIC | Facility: MEDICAL CENTER | Age: 64
End: 2024-09-27
Payer: MEDICARE

## 2024-09-27 VITALS
DIASTOLIC BLOOD PRESSURE: 83 MMHG | HEART RATE: 63 BPM | BODY MASS INDEX: 38.46 KG/M2 | HEIGHT: 62 IN | SYSTOLIC BLOOD PRESSURE: 145 MMHG | WEIGHT: 209 LBS

## 2024-09-27 DIAGNOSIS — M16.12 PRIMARY OSTEOARTHRITIS OF ONE HIP, LEFT: Primary | ICD-10-CM

## 2024-09-27 PROCEDURE — 99214 OFFICE O/P EST MOD 30 MIN: CPT | Performed by: ORTHOPAEDIC SURGERY

## 2024-09-27 RX ORDER — CHLORHEXIDINE GLUCONATE ORAL RINSE 1.2 MG/ML
15 SOLUTION DENTAL ONCE
OUTPATIENT
Start: 2024-09-27 | End: 2024-09-27

## 2024-09-27 RX ORDER — MULTIVITAMIN
1 TABLET ORAL DAILY
Qty: 30 TABLET | Refills: 0 | Status: SHIPPED | OUTPATIENT
Start: 2024-09-27

## 2024-09-27 RX ORDER — FOLIC ACID 1 MG/1
1 TABLET ORAL DAILY
Qty: 30 TABLET | Refills: 0 | Status: SHIPPED | OUTPATIENT
Start: 2024-09-27 | End: 2024-10-27

## 2024-09-27 RX ORDER — TRANEXAMIC ACID 10 MG/ML
1000 INJECTION, SOLUTION INTRAVENOUS ONCE
OUTPATIENT
Start: 2024-09-27 | End: 2024-09-27

## 2024-09-27 RX ORDER — FERROUS SULFATE 324(65)MG
324 TABLET, DELAYED RELEASE (ENTERIC COATED) ORAL
Qty: 60 TABLET | Refills: 0 | Status: SHIPPED | OUTPATIENT
Start: 2024-09-27 | End: 2024-10-27

## 2024-09-27 NOTE — PROGRESS NOTES
"Orthopaedic Surgery - Office Note  Cristin Nolan (64 y.o. female)   : 1960   MRN: [id number]  Encounter Date: 2024    Assessment / Plan    Left hip moderate to severe osteoarthritis with lumbar radiculopathy.    We did have a long discussion about treatment options for osteoarthritis of the hip.  At this time the patient has failed conservative treatment and would like to proceed with hip replacement.  She did get very good relief after having the guided hip injection on 2024 so would expect her to get some relief after the hip replacement.    After discussion risk and benefits the patient agreed to proceed with a left total hip replacement.  Consent was signed at this time and surgery will be scheduled in the near future.  We do still have concern about her gait abnormality possibly due to abductor weakness secondary to irritated L5 nerve root irritation.  EMG study pending to further evaluate this.  Continue home exercise program.  I explained a CARO would treat pain not antalgic gait/\"waddle\".  Xrays left hip from last visit were reviewed with the patient.  Follow-up:  Return for follow up after EMG and 2 weeks post op with Stuart .      Chief Complaint / Date of Onset  Gait abnormality   Left hip pain secondary osteoarthritis chronic  Injury Mechanism / Date  None  Surgery / Date  None    History of Present Illness   Cristin Nolan is a 64 y.o. female following up for left hip osteoarthritis.  Currently she feels that conservative treatment has not been working for her and would like to proceed with a hip replacement.  She did have a steroid injection on 2024 which gave her great relief about 80% for about 1 week.  After this pain return to become even more limiting than it was prior.  She did schedule EMG study to further evaluate for lumbar radiculopathy due to her gait abnormality in 2025 but we will try to have this moved up prior to have that information before hip " "replacement.  Pain at this time is limited to the left groin area with some radiation down to the knee.. Pain and \"waddle\" started in 2020 after her first TKA. She reports trouble walking for long distances and stretching her leg with hydro-biking. She reports pain, numbness and tingling into the lateral proximal thigh. She reports occasional numbness and tingling into her feet. She has received injections for her back as described below.     Treatment Summary  Medications / Modalities  Advil  prn  Bracing / Immobilization  None  Physical Therapy  Patient reports she has attended 3 months of PT for her hip and back.  Hydro-biking for exercise  Injections  Patient has had RFA and MBB of L3,4,5.  Left hip guided hip injection on 8/20/2024  Prior Surgeries  Left total knee replacement 10/21/2021 at [place]  Right total knee replacement on November 30, 2020 with Dr. Knowles    other Treatments  None    Employment / Current Status  N/a    Sport / Organization / Current Status  Hydro-biking      Review of Systems  Pertinent items are noted in HPI.  All other systems were reviewed and are negative.      Physical Exam  /83   Pulse 63   Ht 5' 2\" (1.575 m)   Wt 94.8 kg (209 lb)   BMI 38.23 kg/m²   Cons: Appears well.  No apparent distress.  Psych: Alert. Oriented x3.  Mood and affect normal.  Eyes: PERRLA, EOMI  Resp: Normal effort.  No audible wheezing or stridor.  CV: Palpable pulse.  No discernable arrhythmia.  No LE edema.  Lymph:  No palpable cervical, axillary, or inguinal lymphadenopathy.  Skin: Warm.  No palpable masses.  No visible lesions.  Neuro: Normal muscle tone.  Normal and symmetric DTR's.     Bilateral Hip Exam  Alignment / Posture:  Normal resting hip posture.  Inspection:  No swelling. No edema. No erythema. No ecchymosis.  Palpation:   Mild tenderness at left groin with mild tenderness laterally over the trochanter.  ROM:  Hip Flexion 120 wo pain. Hip ER 90. Left Hip IR 30. Right Hip IR " "10.  Strength:  Hip Abductors 3/5.  Stability:  (-) Logroll.  Tests:  (+) Stinchfield. (+) FADDIR. (+) EMMETT. + [place]  Neurovascular:  Sensation intact in DP/SP/Gill/Sa/T nerve distributions. 2+ DP & PT pulses.  Gait:   \"waddle\"        Studies Reviewed  I have personally reviewed pertinent films in PACS.  XR of left hip - 08/09/2024 - moderate to severe osteoarthritis with joint space narrowing and marginal osteophyte formation.      Procedures  No procedures today.    Medical, Surgical, Family, and Social History  The patient's medical history, family history, and social history, were reviewed and updated as appropriate.    Past Medical History:   Diagnosis Date    Ambulatory dysfunction     uses walking stick    Anxiety     Arthritis     Cancer (HCC) 7/15/17    All better now    Chronic pain disorder     Claustrophobia     mild    DDD (degenerative disc disease), lumbar     Endometrial cancer ([facility]) 07/06/2017    tRA TLH BSO SLND on 7/17/17 by Dr. Stone, followed by 3 cycles of vaginal brachytherapy completed in August 2017    Low back pain     Mild acid reflux     OA (osteoarthritis)     marissa knees    Spinal stenosis of lumbosacral region     Wears glasses        Past Surgical History:   Procedure Laterality Date    COLONOSCOPY      HYSTERECTOMY      JOINT REPLACEMENT Bilateral     knees    ORTHOPEDIC SURGERY      PLANTAR FASCIA SURGERY Bilateral     AR ARTHRP KNE CONDYLE&PLATU MEDIAL&LAT COMPARTMENTS Right 11/30/2020    Procedure: ARTHROPLASTY KNEE TOTAL;  Surgeon: Dipesh Knowles MD;  [facility];  Service: Orthopedics    AR HYSTEROSCOPY BX ENDOMETRIUM&/POLYPC W/WO D&C N/A 02/28/2017    Procedure: DILATATION AND CURETTAGE (D&C) WITH HYSTEROSCOPY,POLYPECTOMY;  Surgeon: Roberto Anderson MD;  [facility];  Service: Gynecology    REPLACEMENT TOTAL KNEE Left 10/22/2021    SHOULDER SURGERY Left     TONSILLECTOMY      TOTAL ABDOMINAL HYSTERECTOMY W/ BILATERAL SALPINGOOPHORECTOMY Bilateral " 07/2017    endometrial cancer, ? stage 1    WISDOM TOOTH EXTRACTION         Family History   Problem Relation Age of Onset    Ovarian cancer Mother 70    Cancer Mother     Esophageal cancer Father     Cancer Father     No Known Problems Maternal Grandmother     No Known Problems Maternal Grandfather     No Known Problems Paternal Grandmother     No Known Problems Paternal Grandfather     No Known Problems Maternal Aunt     No Known Problems Paternal Aunt     Breast cancer Neg Hx     Colon cancer Neg Hx     Uterine cancer Neg Hx        Social History     Occupational History    Not on file   Tobacco Use    Smoking status: Never    Smokeless tobacco: Never    Tobacco comments:     No secondhand smoke exposure   Vaping Use    Vaping status: Never Used   Substance and Sexual Activity    Alcohol use: Not Currently     Comment: very rarely    Drug use: No    Sexual activity: Not Currently     Partners: Male     Birth control/protection: Abstinence       Allergies   Allergen Reactions    Iv Contrast [Iodinated Contrast Media] Hives    Vitamin C [Ascorbate - Food Allergy]      Queasy stomach, loose stool         Current Outpatient Medications:     atorvastatin (LIPITOR) 10 mg tablet, Take 1 tablet (10 mg total) by mouth daily, Disp: 90 tablet, Rfl: 1    Cholecalciferol (HM Vitamin D3) 100 MCG (4000 UT) CAPS, Take 1 capsule (4,000 Units total) by mouth daily, Disp: 30 capsule, Rfl: 0    Coenzyme Q10-Vitamin E (QUNOL ULTRA COQ10 PO), , Disp: , Rfl:     cyanocobalamin (VITAMIN B-12) 1000 MCG tablet, Take 1,000 mcg by mouth daily, Disp: , Rfl:     ferrous sulfate 324 (65 Fe) mg, Take 1 tablet (324 mg total) by mouth 2 (two) times a day before meals, Disp: 60 tablet, Rfl: 0    folic acid (FOLVITE) 1 mg tablet, Take 1 tablet (1 mg total) by mouth daily, Disp: 30 tablet, Rfl: 0    Misc Natural Products (GLUCOSAMINE CHOND CMP TRIPLE PO), Take by mouth, Disp: , Rfl:     Multiple Vitamin (multivitamin) tablet, Take 1 tablet by mouth  daily, Disp: 30 tablet, Rfl: 0    Omega 3-6-9 Fatty Acids (OMEGA 3-6-9 PO), Take 1 capsule by mouth daily, Disp: , Rfl:     Current Facility-Administered Medications:     lidocaine (XYLOCAINE) 1 % injection 2 mL, 2 mL, Injection, , Harjeet Black DPM, 2 mL at 05/20/22 1445    triamcinolone acetonide (KENALOG-40) 40 mg/mL injection 20 mg, 20 mg, Intra-articular, , Harjeet Black DPM, 20 mg at 05/20/22 1445      Stuart Mistry PA-C    Scribe Attestation      I,:   am acting as a scribe while in the presence of the attending physician.:       I,:   personally performed the services described in this documentation    as scribed in my presence.:

## 2024-10-17 ENCOUNTER — TELEPHONE (OUTPATIENT)
Age: 64
End: 2024-10-17

## 2024-10-18 ENCOUNTER — TELEPHONE (OUTPATIENT)
Dept: OBGYN CLINIC | Facility: MEDICAL CENTER | Age: 64
End: 2024-10-18

## 2024-10-18 ENCOUNTER — APPOINTMENT (OUTPATIENT)
Dept: RADIOLOGY | Facility: CLINIC | Age: 64
End: 2024-10-18
Payer: MEDICARE

## 2024-10-18 DIAGNOSIS — M54.14 THORACIC NEURITIS: ICD-10-CM

## 2024-10-18 DIAGNOSIS — M99.02 THORACIC SEGMENT DYSFUNCTION: ICD-10-CM

## 2024-10-18 PROCEDURE — 72072 X-RAY EXAM THORAC SPINE 3VWS: CPT

## 2024-10-18 NOTE — TELEPHONE ENCOUNTER
Lvm stating that Milad GRADY does inj. He does not have a 12, but does have a 10 opening. Will shced at 10 with MANJU bu also keep the 12 with FLOYD incase she cannot make the 10:00

## 2024-10-21 ENCOUNTER — OFFICE VISIT (OUTPATIENT)
Dept: OBGYN CLINIC | Facility: MEDICAL CENTER | Age: 64
End: 2024-10-21
Payer: MEDICARE

## 2024-10-21 VITALS
HEIGHT: 62 IN | DIASTOLIC BLOOD PRESSURE: 83 MMHG | HEART RATE: 50 BPM | WEIGHT: 211 LBS | BODY MASS INDEX: 38.83 KG/M2 | SYSTOLIC BLOOD PRESSURE: 138 MMHG

## 2024-10-21 DIAGNOSIS — M19.011 PRIMARY OSTEOARTHRITIS OF BOTH SHOULDERS: Primary | ICD-10-CM

## 2024-10-21 DIAGNOSIS — M19.012 PRIMARY OSTEOARTHRITIS OF BOTH SHOULDERS: Primary | ICD-10-CM

## 2024-10-21 PROCEDURE — 20610 DRAIN/INJ JOINT/BURSA W/O US: CPT | Performed by: PHYSICIAN ASSISTANT

## 2024-10-21 PROCEDURE — 99213 OFFICE O/P EST LOW 20 MIN: CPT | Performed by: PHYSICIAN ASSISTANT

## 2024-10-21 RX ORDER — IBUPROFEN 200 MG
200 TABLET ORAL EVERY 6 HOURS PRN
COMMUNITY

## 2024-10-21 RX ORDER — METHYLPREDNISOLONE ACETATE 40 MG/ML
1 INJECTION, SUSPENSION INTRA-ARTICULAR; INTRALESIONAL; INTRAMUSCULAR; SOFT TISSUE
Status: COMPLETED | OUTPATIENT
Start: 2024-10-21 | End: 2024-10-21

## 2024-10-21 RX ORDER — ROPIVACAINE HYDROCHLORIDE 5 MG/ML
4 INJECTION, SOLUTION EPIDURAL; INFILTRATION; PERINEURAL
Status: COMPLETED | OUTPATIENT
Start: 2024-10-21 | End: 2024-10-21

## 2024-10-21 RX ADMIN — ROPIVACAINE HYDROCHLORIDE 4 ML: 5 INJECTION, SOLUTION EPIDURAL; INFILTRATION; PERINEURAL at 10:00

## 2024-10-21 RX ADMIN — METHYLPREDNISOLONE ACETATE 1 ML: 40 INJECTION, SUSPENSION INTRA-ARTICULAR; INTRALESIONAL; INTRAMUSCULAR; SOFT TISSUE at 10:00

## 2024-10-21 NOTE — PROGRESS NOTES
"Orthopaedic Surgery - Office Note  Cristin Nolan (64 y.o. female)   : 1960   MRN: [id number]  Encounter Date: 10/21/2024    Assessment / Plan    Bilateral glenohumeral osteoarthritis     We did have a discussion about treatment options for osteoarthritis of the shoulders going forward.  This included surgical versus conservative treatment.    Discussion of risk and benefits patient agreed to proceed with CSI of bilateral glenohumeral joint which was prepared and performed sterilely and tolerated well.  Activity as tolerated  WBAT  Home exercise program reviewed  Continue outpatient PT and transition to HEP  Follow-up:  Return for Follow-up as scheduled after her hip replacement on 12/3/2024.      Chief Complaint / Date of Onset  Right shoulder pain   Injury Mechanism / Date  None  Surgery / Date  None for the shoulders     History of Present Illness   Cristin Nolan is a 64 y.o. female who presents for follow up of her right and left shoulder pain.  She reports since her last visit her left shoulder pain has improved with physical therapy.  However, her right shoulder pain persists despite physical therapy.  She reports the right shoulder pain is intermittent and worsens with overhead activity and motion.  The right shoulder pain interferes with her ability to perform her activities of daily living.  She denies new injury numbness and tingling right upper extremity.    Treatment Summary  Medications / Modalities  OTC medications   Bracing / Immobilization  None  Physical Therapy  Yes  Injections  CSI right GH joint performed today   Prior Surgeries  No prior shoulder surgeries. Previous bilateral total knee arthroplasties   Other Treatments  None    Employment / Current Status  NA    Sport / Organization / Current Status  None      Review of Systems  Pertinent items are noted in HPI.  All other systems were reviewed and are negative.      Physical Exam  /83   Pulse (!) 50   Ht 5' 2\" (1.575 m)   Wt " 95.7 kg (211 lb)   BMI 38.59 kg/m²   Cons: Appears well.  No apparent distress.  Psych: Alert. Oriented x3.  Mood and affect normal.  Eyes: PERRLA, EOMI  Resp: Normal effort.  No audible wheezing or stridor.  CV: Palpable pulse.  No discernable arrhythmia.  No LE edema.  Lymph:  No palpable cervical, axillary, or inguinal lymphadenopathy.  Skin: Warm.  No palpable masses.  No visible lesions.  Neuro: Normal muscle tone.  Normal and symmetric DTR's.     Bilateral shoulder Exam  Alignment / Posture:  Normal shoulder posture. Normal scapular position.  Inspection:  No swelling.  Palpation:   Anterior tenderness at right shoulder.  ROM: Right Shoulder . Shoulder ER 30.  Left shoulder  degrees, external rotation 60 degrees  Strength:  4/5 supraspinatus, infraspinatus, and subscapularis.  Stability:  No objective shoulder instability.  Tests: No pertinent positive or negative tests.  Neurovascular:  Sensation intact in Ax/R/M/U nerve distributions. 2+ radial pulse.        Studies Reviewed  No studies to review      Large joint arthrocentesis: bilateral glenohumeral  Universal Protocol:  procedure performed by consultantConsent: Verbal consent obtained.  Consent given by: patient  Patient understanding: patient states understanding of the procedure being performed  Patient identity confirmed: verbally with patient  Supporting Documentation  Indications: pain   Procedure Details  Location: shoulder - bilateral glenohumeral  Needle size: 22 G  Ultrasound guidance: no  Approach: anterior    Medications (Right): 1 mL methylPREDNISolone acetate 40 mg/mL; 4 mL ropivacaine 0.5 %Medications (Left): 1 mL methylPREDNISolone acetate 40 mg/mL; 4 mL ropivacaine 0.5 %   Patient tolerance: patient tolerated the procedure well with no immediate complications  Dressing:  Sterile dressing applied             Medical, Surgical, Family, and Social History  The patient's medical history, family history, and social history, were  reviewed and updated as appropriate.    Past Medical History:   Diagnosis Date    Ambulatory dysfunction     uses walking stick    Anxiety     Arthritis     Cancer (HCC) 7/15/17    All better now    Chronic pain disorder     Claustrophobia     mild    DDD (degenerative disc disease), lumbar     Endometrial cancer (HCC) 07/06/2017    tRA TLH BSO SLND on 7/17/17 by Dr. Stone, followed by 3 cycles of vaginal brachytherapy completed in August 2017    Low back pain     Mild acid reflux     OA (osteoarthritis)     marissa knees    Spinal stenosis of lumbosacral region     Wears glasses        Past Surgical History:   Procedure Laterality Date    COLONOSCOPY      HYSTERECTOMY      JOINT REPLACEMENT Bilateral     knees    ORTHOPEDIC SURGERY      PLANTAR FASCIA SURGERY Bilateral     IA ARTHRP KNE CONDYLE&PLATU MEDIAL&LAT COMPARTMENTS Right 11/30/2020    Procedure: ARTHROPLASTY KNEE TOTAL;  Surgeon: Dipesh Knowles MD;  [facility];  Service: Orthopedics    IA HYSTEROSCOPY BX ENDOMETRIUM&/POLYPC W/WO D&C N/A 02/28/2017    Procedure: DILATATION AND CURETTAGE (D&C) WITH HYSTEROSCOPY,POLYPECTOMY;  Surgeon: Roberto Anderson MD;  [facility];  Service: Gynecology    REPLACEMENT TOTAL KNEE Left 10/22/2021    SHOULDER SURGERY Left     TONSILLECTOMY      TOTAL ABDOMINAL HYSTERECTOMY W/ BILATERAL SALPINGOOPHORECTOMY Bilateral 07/2017    endometrial cancer, ? stage 1    WISDOM TOOTH EXTRACTION         Family History   Problem Relation Age of Onset    Ovarian cancer Mother 70    Cancer Mother     Esophageal cancer Father     Cancer Father     No Known Problems Maternal Grandmother     No Known Problems Maternal Grandfather     No Known Problems Paternal Grandmother     No Known Problems Paternal Grandfather     No Known Problems Maternal Aunt     No Known Problems Paternal Aunt     Breast cancer Neg Hx     Colon cancer Neg Hx     Uterine cancer Neg Hx        Social History     Occupational History    Not on file    Tobacco Use    Smoking status: Never    Smokeless tobacco: Never    Tobacco comments:     No secondhand smoke exposure   Vaping Use    Vaping status: Never Used   Substance and Sexual Activity    Alcohol use: Not Currently     Comment: very rarely    Drug use: No    Sexual activity: Not Currently     Partners: Male     Birth control/protection: Abstinence       Allergies   Allergen Reactions    Iv Contrast [Iodinated Contrast Media] Hives    Vitamin C [Ascorbate - Food Allergy]      Queasy stomach, loose stool         Current Outpatient Medications:     atorvastatin (LIPITOR) 10 mg tablet, Take 1 tablet (10 mg total) by mouth daily, Disp: 90 tablet, Rfl: 1    Cholecalciferol (HM Vitamin D3) 100 MCG (4000 UT) CAPS, Take 1 capsule (4,000 Units total) by mouth daily, Disp: 30 capsule, Rfl: 0    Coenzyme Q10-Vitamin E (QUNOL ULTRA COQ10 PO), , Disp: , Rfl:     cyanocobalamin (VITAMIN B-12) 1000 MCG tablet, Take 1,000 mcg by mouth daily, Disp: , Rfl:     ferrous sulfate 324 (65 Fe) mg, Take 1 tablet (324 mg total) by mouth 2 (two) times a day before meals, Disp: 60 tablet, Rfl: 0    folic acid (FOLVITE) 1 mg tablet, Take 1 tablet (1 mg total) by mouth daily, Disp: 30 tablet, Rfl: 0    ibuprofen (MOTRIN) 200 mg tablet, Take 200 mg by mouth every 6 (six) hours as needed for mild pain, Disp: , Rfl:     Misc Natural Products (GLUCOSAMINE CHOND CMP TRIPLE PO), Take by mouth, Disp: , Rfl:     Multiple Vitamin (multivitamin) tablet, Take 1 tablet by mouth daily, Disp: 30 tablet, Rfl: 0    Omega 3-6-9 Fatty Acids (OMEGA 3-6-9 PO), Take 1 capsule by mouth daily, Disp: , Rfl:     Current Facility-Administered Medications:     lidocaine (XYLOCAINE) 1 % injection 2 mL, 2 mL, Injection, , Harjeet Black DPM, 2 mL at 05/20/22 1445    triamcinolone acetonide (KENALOG-40) 40 mg/mL injection 20 mg, 20 mg, Intra-articular, , Harjeet Black DPM, 20 mg at 05/20/22 1445      Stuart Mistry PA-C    Scribe Attestation      I,:   am acting as a  scribe while in the presence of the attending physician.:       I,:   personally performed the services described in this documentation    as scribed in my presence.:

## 2024-10-21 NOTE — PROGRESS NOTES
"Orthopaedic Surgery - Office Note  Cristin Nolan (64 y.o. female)   : 1960   MRN: [id number]  Encounter Date: 10/21/2024    Assessment / Plan    ***  {[facility] PLAN:61363}  Follow-up:  No follow-ups on file.      Chief Complaint / Date of Onset  ***  Injury Mechanism / Date  {NONE:41157::\"None\"}  Surgery / Date  {NONE:32175::\"None\"}    History of Present Illness   Cristin Nolan is a 64 y.o. female who presents for ***    Treatment Summary  Medications / Modalities  {NONE:78391::\"None\"}  Bracing / Immobilization  {NONE:52512::\"None\"}  Physical Therapy  {NONE:38251::\"None\"}  Injections  {NONE:27258::\"None\"}  Prior Surgeries  {NONE:03808::\"None\"}  Other Treatments  {NONE:19071::\"None\"}    Employment / Current Status  ***    Sport / Organization / Current Status  ***      Review of Systems  {[facility] ROS COMPLETE:22120}      Physical Exam  /83   Pulse (!) 50   Ht 5' 2\" (1.575 m)   Wt 95.7 kg (211 lb)   BMI 38.59 kg/m²   Cons: Appears well.  No apparent distress.  Psych: Alert. Oriented x3.  Mood and affect normal.  Eyes: PERRLA, EOMI  Resp: Normal effort.  No audible wheezing or stridor.  CV: Palpable pulse.  No discernable arrhythmia.  {PE EDEMA:39960::\"No LE edema.\"}  Lymph:  No palpable cervical, axillary, or inguinal lymphadenopathy.  Skin: Warm.  No palpable masses.  No visible lesions.  Neuro: Normal muscle tone.  Normal and symmetric DTR's.     ***      Studies Reviewed  {STUDIES REVIEWED:03130}      Procedures  {NO PROCDOC:23714}    Medical, Surgical, Family, and Social History  The patient's medical history, family history, and social history, were reviewed and updated as appropriate.    Past Medical History:   Diagnosis Date    Ambulatory dysfunction     uses walking stick    Anxiety     Arthritis     Cancer (HCC) 7/15/17    All better now    Chronic pain disorder     Claustrophobia     mild    DDD (degenerative disc disease), lumbar     Endometrial cancer (HCC) 2017    tRA TLH BSO SLND on " 7/17/17 by Dr. Stone, followed by 3 cycles of vaginal brachytherapy completed in August 2017    Low back pain     Mild acid reflux     OA (osteoarthritis)     marissa knees    Spinal stenosis of lumbosacral region     Wears glasses        Past Surgical History:   Procedure Laterality Date    COLONOSCOPY      HYSTERECTOMY      JOINT REPLACEMENT Bilateral     knees    ORTHOPEDIC SURGERY      PLANTAR FASCIA SURGERY Bilateral     MS ARTHRP KNE CONDYLE&PLATU MEDIAL&LAT COMPARTMENTS Right 11/30/2020    Procedure: ARTHROPLASTY KNEE TOTAL;  Surgeon: Dipesh Knowles MD;  [facility];  Service: Orthopedics    MS HYSTEROSCOPY BX ENDOMETRIUM&/POLYPC W/WO D&C N/A 02/28/2017    Procedure: DILATATION AND CURETTAGE (D&C) WITH HYSTEROSCOPY,POLYPECTOMY;  Surgeon: Roberto Anderson MD;  [facility];  Service: Gynecology    REPLACEMENT TOTAL KNEE Left 10/22/2021    SHOULDER SURGERY Left     TONSILLECTOMY      TOTAL ABDOMINAL HYSTERECTOMY W/ BILATERAL SALPINGOOPHORECTOMY Bilateral 07/2017    endometrial cancer, ? stage 1    WISDOM TOOTH EXTRACTION         Family History   Problem Relation Age of Onset    Ovarian cancer Mother 70    Cancer Mother     Esophageal cancer Father     Cancer Father     No Known Problems Maternal Grandmother     No Known Problems Maternal Grandfather     No Known Problems Paternal Grandmother     No Known Problems Paternal Grandfather     No Known Problems Maternal Aunt     No Known Problems Paternal Aunt     Breast cancer Neg Hx     Colon cancer Neg Hx     Uterine cancer Neg Hx        Social History     Occupational History    Not on file   Tobacco Use    Smoking status: Never    Smokeless tobacco: Never    Tobacco comments:     No secondhand smoke exposure   Vaping Use    Vaping status: Never Used   Substance and Sexual Activity    Alcohol use: Not Currently     Comment: very rarely    Drug use: No    Sexual activity: Not Currently     Partners: Male     Birth control/protection: Abstinence        Allergies   Allergen Reactions    Iv Contrast [Iodinated Contrast Media] Hives    Vitamin C [Ascorbate - Food Allergy]      Queasy stomach, loose stool         Current Outpatient Medications:     atorvastatin (LIPITOR) 10 mg tablet, Take 1 tablet (10 mg total) by mouth daily, Disp: 90 tablet, Rfl: 1    Cholecalciferol (HM Vitamin D3) 100 MCG (4000 UT) CAPS, Take 1 capsule (4,000 Units total) by mouth daily, Disp: 30 capsule, Rfl: 0    Coenzyme Q10-Vitamin E (QUNOL ULTRA COQ10 PO), , Disp: , Rfl:     cyanocobalamin (VITAMIN B-12) 1000 MCG tablet, Take 1,000 mcg by mouth daily, Disp: , Rfl:     ferrous sulfate 324 (65 Fe) mg, Take 1 tablet (324 mg total) by mouth 2 (two) times a day before meals, Disp: 60 tablet, Rfl: 0    folic acid (FOLVITE) 1 mg tablet, Take 1 tablet (1 mg total) by mouth daily, Disp: 30 tablet, Rfl: 0    ibuprofen (MOTRIN) 200 mg tablet, Take 200 mg by mouth every 6 (six) hours as needed for mild pain, Disp: , Rfl:     Misc Natural Products (GLUCOSAMINE CHOND CMP TRIPLE PO), Take by mouth, Disp: , Rfl:     Multiple Vitamin (multivitamin) tablet, Take 1 tablet by mouth daily, Disp: 30 tablet, Rfl: 0    Omega 3-6-9 Fatty Acids (OMEGA 3-6-9 PO), Take 1 capsule by mouth daily, Disp: , Rfl:     Current Facility-Administered Medications:     lidocaine (XYLOCAINE) 1 % injection 2 mL, 2 mL, Injection, , Harjeet Black DPM, 2 mL at 05/20/22 1445    triamcinolone acetonide (KENALOG-40) 40 mg/mL injection 20 mg, 20 mg, Intra-articular, , Harjeet Black DPM, 20 mg at 05/20/22 1445      Stuart Mistry PA-C    Scribe Attestation      I,:   am acting as a scribe while in the presence of the attending physician.:       I,:   personally performed the services described in this documentation    as scribed in my presence.:

## 2024-10-25 ENCOUNTER — ANESTHESIA EVENT (OUTPATIENT)
Age: 64
End: 2024-10-25
Payer: MEDICARE

## 2024-10-25 ENCOUNTER — TELEPHONE (OUTPATIENT)
Dept: OBGYN CLINIC | Facility: HOSPITAL | Age: 64
End: 2024-10-25

## 2024-10-25 DIAGNOSIS — M19.012 PRIMARY OSTEOARTHRITIS OF BOTH SHOULDERS: Primary | ICD-10-CM

## 2024-10-25 DIAGNOSIS — M19.011 PRIMARY OSTEOARTHRITIS OF BOTH SHOULDERS: Primary | ICD-10-CM

## 2024-10-25 RX ORDER — MUPIROCIN 20 MG/G
OINTMENT TOPICAL 2 TIMES DAILY
Qty: 50 G | Refills: 0 | Status: SHIPPED | OUTPATIENT
Start: 2024-10-25

## 2024-11-01 ENCOUNTER — HOSPITAL ENCOUNTER (OUTPATIENT)
Dept: NEUROLOGY | Facility: CLINIC | Age: 64
End: 2024-11-01
Payer: MEDICARE

## 2024-11-01 DIAGNOSIS — M54.16 RADICULOPATHY, LUMBAR REGION: ICD-10-CM

## 2024-11-01 DIAGNOSIS — M16.12 PRIMARY OSTEOARTHRITIS OF ONE HIP, LEFT: ICD-10-CM

## 2024-11-01 DIAGNOSIS — M25.552 LEFT HIP PAIN: ICD-10-CM

## 2024-11-01 PROCEDURE — 95886 MUSC TEST DONE W/N TEST COMP: CPT | Performed by: PSYCHIATRY & NEUROLOGY

## 2024-11-01 PROCEDURE — 95911 NRV CNDJ TEST 9-10 STUDIES: CPT | Performed by: PSYCHIATRY & NEUROLOGY

## 2024-11-07 ENCOUNTER — APPOINTMENT (OUTPATIENT)
Dept: LAB | Facility: CLINIC | Age: 64
End: 2024-11-07
Payer: MEDICARE

## 2024-11-11 ENCOUNTER — HOSPITAL ENCOUNTER (OUTPATIENT)
Dept: MAMMOGRAPHY | Facility: MEDICAL CENTER | Age: 64
Discharge: HOME | End: 2024-11-11
Payer: MEDICARE

## 2024-11-11 VITALS — HEIGHT: 62 IN | WEIGHT: 211 LBS | BODY MASS INDEX: 38.83 KG/M2

## 2024-11-11 DIAGNOSIS — Z12.31 ENCOUNTER FOR SCREENING MAMMOGRAM FOR MALIGNANT NEOPLASM OF BREAST: ICD-10-CM

## 2024-11-11 PROCEDURE — 77067 SCR MAMMO BI INCL CAD: CPT

## 2024-11-11 PROCEDURE — 77063 BREAST TOMOSYNTHESIS BI: CPT

## 2024-11-12 ENCOUNTER — ANNUAL EXAM (OUTPATIENT)
Dept: OBGYN CLINIC | Facility: CLINIC | Age: 64
End: 2024-11-12
Payer: MEDICARE

## 2024-11-12 VITALS
WEIGHT: 212 LBS | SYSTOLIC BLOOD PRESSURE: 138 MMHG | DIASTOLIC BLOOD PRESSURE: 82 MMHG | BODY MASS INDEX: 39.01 KG/M2 | HEIGHT: 62 IN

## 2024-11-12 DIAGNOSIS — R92.8 ABNORMALITY OF RIGHT BREAST ON SCREENING MAMMOGRAM: ICD-10-CM

## 2024-11-12 DIAGNOSIS — Z91.89 GYN EXAM FOR HIGH-RISK MEDICARE PATIENT: Primary | ICD-10-CM

## 2024-11-12 PROCEDURE — G0101 CA SCREEN;PELVIC/BREAST EXAM: HCPCS | Performed by: NURSE PRACTITIONER

## 2024-11-12 NOTE — PROGRESS NOTES
Assessment / Plan    Assessment & Plan  GYN exam for high-risk [employer] patient  Normal well woman exam, post hyst/BSO for endometrial cancer.   Cervical cancer screening is no longer indicated.  DEXA up to date.  Up to date on colonoscopy       Abnormality of right breast on screening mammogram  Discussed recent mammo results with patient and need for further imaging.    Orders:    US breast right limited (diagnostic); Future    Mammo diagnostic right w 3d and cad; Future        Subjective      Cristin Nolan is a 64 y.o. female who presents for her annual gynecologic exam.    65 yo G0, PMF  HX of complete hysterectomy w/ BSO for early stage endometrial cancer, 7/2017, w/ BSO, Dr Stone.       Last pap: approx 2017  Pap hx NL  STD hx none  Last mammogram: 9/2023, normal  DXA 1/2024, normal  Colonoscopy: 4/2023, 10 yr update  Not currently sexually active, last time years ago.  Body mass index is 38.78 kg/m². ; given guidelines  Calcium: given guidelines  [employer]- 4-5x per week, walking     Periods are absent  Current contraception: status post hysterectomy  History of abnormal Pap smear: no  Family history of breast,uterine, ovarian or colon cancer: yes - Mother- ovarian cancer      The following portions of the patient's history were reviewed and updated as appropriate: allergies, current medications, past family history, past medical history, past social history, past surgical history, and problem list.    Review of Systems      Review of Systems   Constitutional:  Negative for chills and fever.   Respiratory:  Negative for cough and shortness of breath.    Gastrointestinal:  Negative for abdominal distention, abdominal pain, blood in stool, constipation, diarrhea, nausea and vomiting.   Genitourinary:  Negative for difficulty urinating, dysuria, frequency, genital sores, hematuria, menstrual problem, pelvic pain, urgency, vaginal bleeding and vaginal discharge.   Musculoskeletal:  Negative for  "arthralgias and myalgias.     Breasts:  Negative for skin changes, dimpling, asymmetry, nipple discharge, redness, tenderness or palpable masses      Objective     *patient agrees to exam without a chaperone present.     /82 (BP Location: Right arm, Patient Position: Sitting, Cuff Size: Standard)   Ht 5' 2\" (1.575 m)   Wt 96.2 kg (212 lb)   BMI 38.78 kg/m²   Physical Exam  Constitutional:       General: She is not in acute distress.     Appearance: Normal appearance. She is well-developed. She is not ill-appearing or diaphoretic.      Comments: Body mass index is 38.78 kg/m².     HENT:      Head: Normocephalic and atraumatic.   Eyes:      Pupils: Pupils are equal, round, and reactive to light.   Neck:      Thyroid: No thyromegaly.   Pulmonary:      Effort: Pulmonary effort is normal.   Chest:   Breasts:     Breasts are symmetrical.      Right: No inverted nipple, mass, nipple discharge, skin change or tenderness.      Left: No inverted nipple, mass, nipple discharge, skin change or tenderness.   Abdominal:      General: There is no distension.      Palpations: Abdomen is soft. There is no mass.      Tenderness: There is no abdominal tenderness. There is no guarding or rebound.   Genitourinary:     General: Normal vulva.      Exam position: Lithotomy position.      Labia:         Right: No rash, tenderness, lesion or injury.         Left: No rash, tenderness, lesion or injury.       Vagina: No signs of injury and foreign body. No vaginal discharge, erythema, tenderness or bleeding.      Comments: Cervix, uterus and ovaries are surgically absent.  Vagina is foreshortened, ~ 2 cm.  No palpable masses on exam.  Musculoskeletal:      Cervical back: Neck supple.   Lymphadenopathy:      Cervical: No cervical adenopathy.      Upper Body:      Right upper body: No supraclavicular adenopathy.      Left upper body: No supraclavicular adenopathy.   Skin:     General: Skin is warm and dry.   Neurological:      General: " No focal deficit present.      Mental Status: She is alert and oriented to person, place, and time.   Psychiatric:         Mood and Affect: Mood normal.         Behavior: Behavior normal.         Thought Content: Thought content normal.         Judgment: Judgment normal.

## 2024-11-12 NOTE — PATIENT INSTRUCTIONS
"Patient Education     Diet and health   The Basics   Written by the doctors and editors at [facility]   Why is it important to eat a healthy diet? -- It's important to eat a healthy diet because eating the right foods can keep you healthy now and later in life. It can lower the risk of problems like heart disease, diabetes, high blood pressure, and some types of cancer. It can also help you live longer and improve your quality of life.  What kind of diet is best? -- There is no 1 specific diet that experts recommend for everyone. People choose what foods to eat for many different reasons. These include personal preference, culture, [employer], allergies or intolerances, and nutritional goals. People also need to consider the cost and availability of different foods.  In general, experts recommend a diet that:   Includes lots of vegetables, fruits, beans, nuts, and whole grains   Limits red and processed meats, unhealthy fats, sugar, salt, and alcohol  What are dietary patterns? -- A dietary \"pattern\" means generally eating certain types of foods while limiting others. Some people need to follow a specific dietary pattern because of their health needs. For example, if you have high blood pressure, your doctor might recommend a diet low in salt.  If you are trying to improve your health in general, choosing a healthy dietary pattern can help. This does not have to mean being very strict about what you eat or avoid. The goal is to think about getting plenty of healthy foods while limiting less healthy foods.  Examples of dietary patterns include:   Mediterranean diet - This involves eating a lot of fruits, vegetables, nuts, and whole grains, and uses olive oil instead of other fats. It also includes some fish, poultry, and dairy products, but not a lot of red meat. Following this diet can help your overall health, and might even lower your risk of having a stroke.   Plant-based diets - These patterns focus on vegetables, " "fruits, grains, beans, and nuts. They limit or avoid food that comes from animals, such as meat and dairy. There are different types of plant-based diets, including vegetarian and vegan.   Low-fat diet - A low-fat diet involves limiting calories from fat. This might help some people keep weight off if that is their goal, but it does not have many other health benefits. If you choose to follow a low-fat diet, it is also important to focus on getting lots of whole grains, legumes, fruits, and vegetables. Limit refined grains and sugar.   Low-cholesterol diet - Cholesterol is found in foods with a lot of saturated fat, like red meat, butter, and cheese. A low-cholesterol diet focuses on limiting the amount of cholesterol that you eat. Limiting the cholesterol in your diet can also help lower the amount of unhealthy fats that you eat.  Which foods are especially healthy? -- Foods that are especially healthy include:   Fruits and vegetables - Eating a diet with lots of fruits and vegetables can help prevent heart disease and stroke. It might also help prevent certain types of cancer. Try to eat fruits and vegetables at each meal and also for snacks. If you don't have fresh fruits and vegetables available, you can eat frozen or canned ones instead. Doctors recommend eating at least 5 servings of fruits or vegetables each day.   Whole grains - Whole-grain foods include 100 percent whole-wheat bread, steel cut oats, and whole-grain pasta. These are healthier than foods made with \"refined\" grains, like white bread and white rice. Eating lots of whole grains instead of refined grains has been shown to help with weight control. It can also lower the risk of several health problems, including colon cancer, heart disease, and diabetes. Doctors recommend that most people try to eat 5 to 8 servings of whole-grain, high-fiber foods each day.   Foods with fiber - Eating foods with a lot of fiber can help prevent heart disease and " "stroke. If you have type 2 diabetes, it can also help control your blood sugar. Foods that have a lot of fiber include vegetables, fruits, beans, nuts, oatmeal, and whole-grain breads and cereals. You can tell how much fiber is in a food by reading the nutrition label (figure 1). Doctors recommend that most people eat about 25 to 34 grams of fiber each day.   Foods with calcium and vitamin D - Babies, children, and adults need calcium and vitamin D to help keep their bones strong. Adults also need calcium and vitamin D to help prevent osteoporosis. Osteoporosis is a condition that causes bones to get \"thin\" and break more easily than usual. Different foods and drinks have calcium and vitamin D in them (figure 2). People who don't get enough calcium and vitamin D in their diet might need to take a supplement. Doctors recommend that most people have 2 to 3 servings of foods with calcium and vitamin D each day.   Foods with protein - Protein helps your muscles and bones stay strong. Healthy foods with a lot of protein include chicken, fish, eggs, beans, nuts, and soy products. Red meat also has a lot of protein, but it also contains fats, which can be unhealthy. Doctors recommend that most people try to eat about 5 servings of protein each day.   Healthy fats - There are different types of fats. Some types of fats are better for your body than others. Healthy fats are \"monounsaturated\" or \"polyunsaturated\" fats. These are found in fatty fish, nuts and nut butters, and avocados. Use plant-based oils when cooking. Examples of these oils include olive, canola, safflower, sunflower, and corn oil. Eating foods with healthy fats, while avoiding or limiting foods with unhealthy fats, might lower the risk of heart disease.   Foods with folate - Folate is a vitamin that is important for pregnant people, since it helps prevent certain birth defects. It is also called \"folic acid.\" Anyone who could get pregnant should get at " "least 400 micrograms of folic acid daily, whether or not they are actively trying to get pregnant. Folate is found in many breakfast cereals, oranges, orange juice, and green leafy vegetables.  What foods should I avoid or limit? -- To eat a healthy diet, there are some things that you should avoid or limit. They include:   Unhealthy fats - \"Trans\" fats are especially unhealthy. They are found in margarines, many fast foods, and some store-bought baked goods. \"Saturated\" fats are found in animal products like meats, egg yolks, butter, cheese, and full-fat milk products. Unhealthy fats can raise your cholesterol level and increase your chance of getting heart disease.   Sugar - To have a healthy diet, it's important to limit or avoid added sugar, sweets, and refined grains. Refined grains are found in white bread, white rice, most pastas, and most packaged \"snack\" foods.  Avoiding sugar-sweetened beverages, like soda and sports drinks, can also help improve your health.  Avoid canned fruits in \"heavy\" syrup.   Red and processed meats - Studies have shown that eating a lot of red meat can increase your risk of certain health problems, including heart disease and cancer. You should limit the amount of red meat that you eat. This is also true for processed meats like sausage, hot dogs, and montelongo.  Can I drink alcohol as part of a healthy diet? -- Not drinking alcohol at all is the healthiest choice. Regular drinking can raise a person's chances of getting liver disease and certain types of cancers. In females, even 1 drink a day can increase the risk of getting breast cancer.  If you do choose to drink, most doctors recommend limiting alcohol to no more than:   1 drink a day for females   2 drinks a day for males  The limits are different because, generally, the female body takes longer to break down alcohol.  How many calories do I need each day? -- Calories give your body energy. The number of calories that you need " "each day depends on your weight, height, age, sex, and how active you are.  Your doctor or nurse can tell you about how many calories you should eat each day. You can also work with a dietitian (nutrition expert) to learn more about your dietary needs and options.  What if I am having trouble improving my diet? -- It can be hard to change the way that you eat. Remember that even small changes can improve your health.  Here are some tips that might help:   Try to make fruits and vegetables part of every meal. If you don't have fresh fruits and vegetables, frozen or canned are good options. Look for products without added salt or sugar.   Keep a bowl of fruit out for snacking.   When you can, choose whole grains instead of refined grains. Choose chicken, fish, and beans instead of red meat and cheese.   Try to eat prepared and processed foods less often.   Try flavored seltzer or water instead of soda or juice.   When eating at fast food restaurants, look for healthier items, like broiled chicken or salad.  If you have questions about which foods you should or should not eat, ask your doctor, nurse, or dietitian. The right diet for you will depend, in part, on your health and any medical conditions you have.  All topics are updated as new evidence becomes available and our peer review process is complete.  This topic retrieved from [employer] on: Feb 28, 2024.  Topic 18221 Version 28.0  Release: 32.2.4 - C32.58  © 2024 UpToDate, Inc. and/or its affiliates. All rights reserved.  figure 1: Nutrition label - Fiber     This is an example of a nutrition label. To figure out how much fiber is in a food, look for the line that says \"Dietary Fiber.\" It's also important to look at the serving size. This food has 7 grams of fiber in each serving, and each serving is 1 cup.  Graphic 51184 Version 8.0  figure 2: Foods and drinks with calcium and vitamin D     Foods rich in calcium include ice cream, soy milk, breads, kale, " "broccoli, milk, cheese, cottage cheese, almonds, yogurt, ready-to-eat cereals, beans, and tofu. Foods rich in vitamin D include milk, fortified plant-based \"milks\" (soy, almond), canned tuna fish, cod liver oil, yogurt, ready-to-eat-cereals, cooked salmon, canned sardines, mackerel, and eggs. Some of these foods are rich in both.  Graphic 62260 Version 4.0  Consumer Information Use and Disclaimer   Disclaimer: This generalized information is a limited summary of diagnosis, treatment, and/or medication information. It is not meant to be comprehensive and should be used as a tool to help the user understand and/or assess potential diagnostic and treatment options. It does NOT include all information about conditions, treatments, medications, side effects, or risks that may apply to a specific patient. It is not intended to be medical advice or a substitute for the medical advice, diagnosis, or treatment of a health care provider based on the health care provider's examination and assessment of a patient's specific and unique circumstances. Patients must speak with a health care provider for complete information about their health, medical questions, and treatment options, including any risks or benefits regarding use of medications. This information does not endorse any treatments or medications as safe, effective, or approved for treating a specific patient. UpToDate, Inc. and its affiliates disclaim any warranty or liability relating to this information or the use thereof.The use of this information is governed by the Terms of Use, available at https://www.wolterskluwer.com/en/know/clinical-effectiveness-terms. 2024© UpToDate, Inc. and its affiliates and/or licensors. All rights reserved.  Copyright   © 2024 UpToDate, Inc. and/or its affiliates. All rights reserved.  Patient Education     Calcium Rich Diet   About this topic   Calcium is one of the most important minerals and is found in almost all parts of your " body. It makes your teeth and bones strong and healthy. Your body does not make calcium. It is important for you to eat calcium rich foods to get enough calcium. It also helps your body:  Make blood clots  Keep your heartbeat normal  Helps blood move throughout the body  Release hormones  Release enzymes  Send and get signals between your nerves and brain  Make muscles work well         What will the results be?   It is important to have a good amount of calcium in your food. Calcium helps your body work well. It is very important during every life stage. Calcium may also keep you from losing bone mass. This is osteopenia. It may help keep you from having weak bones. This is osteoporosis.  What drugs may be needed?   The doctor may order calcium and vitamin D supplements. You need vitamin D to help your body take in the calcium. Your calcium supplement may have vitamin D in it.  Talk to your doctor about:  If it is OK to take your calcium with food.  How often to take your calcium supplement throughout the day.  All the drugs you are taking. Be sure to include all prescription and over-the-counter (OTC) drugs, and herbal supplements. Tell the doctor about any drug allergy. Bring a list of drugs you take with you. Some drugs may cause problems with how your body takes in calcium.  Will physical activity be limited?   No, physical activities will not be limited. It is good for you to do light exercises and to stay active.  What changes to diet are needed?   You will need to watch how much calcium is in the foods you eat. Your doctor will talk to you about the right amount of calcium for you. How much calcium you need is based on your age and life stage.  When is this diet used?   This diet is used when your normal diet is low in calcium. It is needed to raise the amount of calcium in your diet. Our bodies do not take in calcium well as we get older.  Who should not use this diet?   People with too much calcium in  their blood should not use this diet. This is called hypercalcemia.  What foods are good to eat?   Milk, yogurt, and cheese products are naturally high in calcium.  These foods have smaller amounts of calcium. If they are eaten often and in large amounts they can be good sources of calcium:  Oysters; dried fruit like figs and raisins; green leafy vegetables like broccoli, collards, kale, mustard greens, bok italo; soy beans; oranges  [place] and sardines. Be sure to eat the soft bones.  Nuts like almonds and [place] nuts, sunflower seeds, tahini, dried beans  Food products with calcium added to them like orange juice, tofu, cereal, bread; almond milk, rice milk, soy milk  What foods should be limited or avoided?   People who eat many kinds of foods do not have to be worried about limiting or avoiding certain foods.   What problems could happen?   Some people may get kidney stones. This may happen after taking high amounts of calcium over a long time. Calcium from food does not seem to cause kidney stones.  Hard stools.  Too much calcium may interfere with your body’s ability to absorb iron and zinc.  When do I need to call the doctor?   Call your doctor if you have concerns about your diet. Tell your doctor if you are allergic to any of the foods in your diet.  Helpful tips   If you have problems digesting milk, try lactose-free milk. You may also use a product to help you take in lactose.  Talk with your doctor if you are a vegetarian and do not eat dairy products. Your doctor can help you make sure you get the amount of calcium your body needs.  Last Reviewed Date   2021-03-12  Consumer Information Use and Disclaimer   This generalized information is a limited summary of diagnosis, treatment, and/or medication information. It is not meant to be comprehensive and should be used as a tool to help the user understand and/or assess potential diagnostic and treatment options. It does NOT include all information about  conditions, treatments, medications, side effects, or risks that may apply to a specific patient. It is not intended to be medical advice or a substitute for the medical advice, diagnosis, or treatment of a health care provider based on the health care provider's examination and assessment of a patient’s specific and unique circumstances. Patients must speak with a health care provider for complete information about their health, medical questions, and treatment options, including any risks or benefits regarding use of medications. This information does not endorse any treatments or medications as safe, effective, or approved for treating a specific patient. UpToDate, Inc. and its affiliates disclaim any warranty or liability relating to this information or the use thereof. The use of this information is governed by the Terms of Use, available at https://www.[employer].com/en/know/clinical-effectiveness-terms   Copyright   Copyright © 2024 UpToDate, Inc. and its affiliates and/or licensors. All rights reserved.  Patient Education     Exercise and movement   The Basics   Written by the doctors and editors at [employer]   What are the benefits of movement? -- Moving your body has many benefits. It can:   Burn calories, which helps people manage their weight   Help control blood sugar levels in people with diabetes   Lower blood pressure, especially in people with high blood pressure   Lower stress, and help with depression and anxiety   Keep bones strong, so they don't get thin and break easily   Lower the chance of dying from heart disease  Adding even small amounts of physical activity to your daily routine can improve your health.  What are the main types of exercise? -- There are 3 main types of exercise:   Aerobic exercise - This raises your heart rate. Examples include walking, running, dancing, riding a bike, and swimming.   Muscle strengthening - This helps make your muscles stronger. You can do it using  weights, exercise bands, or weight machines. You can also use your own body weight, as with push-ups, or by lifting items in your home, like jugs of water.   Stretching - These help your muscles and joints move more easily.  It's important to have all 3 types in your exercise program. That way, your body, muscles, and joints can be as healthy as possible.  Should I talk to my doctor or nurse before I start exercising? -- If you have not exercised before or have not exercised in a long time, talk with your doctor or nurse before you start a very active exercise program.  If you have heart disease or risk factors for heart disease (like high blood pressure or diabetes), your doctor or nurse might recommend that you have an exercise test before starting an exercise program.  When you begin an exercise program, start slowly. For example, do the exercise at a slow pace or for a few minutes only. Over time, you can exercise faster and for longer periods of time.  What should I do when I exercise? -- Each time you exercise, you should:   Warm up - This can help keep you from hurting your muscles when you exercise. To warm up, do a light aerobic exercise (such as walking slowly) or stretch for 5 to 10 minutes.   Work out - Try to get a mix of aerobic exercise, muscle strengthening, and stretching. During an aerobic workout, you can walk fast, swim, run, or use an exercise machine, for example. Other activities, like dancing or playing tennis, are also forms of aerobic exercise. You should also take time to stretch all of your joints, including your neck, shoulders, back, hips, and knees. At least 2 times a week, you can do muscle strengthening exercises as part of your workout.   Cool down - This helps keep you from feeling dizzy after you exercise and helps prevent muscle cramps. To cool down, you can stretch or do a light aerobic exercise for 5 minutes.  Some people go to a gym or do group exercise classes. But you can  exercise even without these things. Some exercises can be done even in a small space. You can also try online videos or smartphone apps to get ideas for different types of exercise.  How often should I exercise? -- Doctors recommend that people exercise at least 30 minutes a day, on 5 or more days of the week.  If you can't exercise for 30 minutes straight, try to exercise for 10 minutes at a time, 3 or 4 times a day. Even exercising for shorter amounts of time is good for you, especially if it means spending less time sitting.  When should I call my doctor or nurse? -- If you have any of the following symptoms when you exercise, stop exercising and call your doctor or nurse right away:   Pain or pressure in your chest, arms, throat, jaw, or back   Nausea or vomiting   Feeling like your heart is fluttering or racing very fast   Feeling dizzy or faint  What if I don't have time to exercise? -- Many people have very busy lives and might not think that they have time to exercise. But it's important to try to find time, even if you are tired or work a lot. Exercise can increase your energy level, which can make you feel better and might even help you get more work done.  Even if it's hard to set aside a lot of time to exercise, you can still improve your health by moving your body more. There are many ways that you can be more active. For example, you can:   Take the stairs instead of the elevator.   Park in a parking space that is farther away from the door.   Take a longer route when you walk from one place to another.  Spending a lot of time sitting still (for example, watching TV or working on the computer) is bad for your health. Try to get up and move around whenever you can. Even small amounts of movement, like taking short walks, doing household chores, or gardening, can improve your health. Finding activities that you enjoy, or doing them with other people, can help you add more movement into your daily  life.  What else should I do when I exercise? -- To exercise safely and avoid problems, it's important to:   Drink fluids during and after exercising (but avoid drinks with a lot of caffeine or sugar).   Avoid exercising outside if it is too hot or cold.   Wear layers of clothes, so that you can take them off if you get too hot.   Wear shoes that fit well and support your feet.   Be aware of your surroundings if you exercise outside.  All topics are updated as new evidence becomes available and our peer review process is complete.  This topic retrieved from [employer] on: May 18, 2024.  Topic 05171 Version 31.0  Release: 32.4.3 - C32.137  © 2024 UpToDate, Inc. and/or its affiliates. All rights reserved.  Consumer Information Use and Disclaimer   Disclaimer: This generalized information is a limited summary of diagnosis, treatment, and/or medication information. It is not meant to be comprehensive and should be used as a tool to help the user understand and/or assess potential diagnostic and treatment options. It does NOT include all information about conditions, treatments, medications, side effects, or risks that may apply to a specific patient. It is not intended to be medical advice or a substitute for the medical advice, diagnosis, or treatment of a health care provider based on the health care provider's examination and assessment of a patient's specific and unique circumstances. Patients must speak with a health care provider for complete information about their health, medical questions, and treatment options, including any risks or benefits regarding use of medications. This information does not endorse any treatments or medications as safe, effective, or approved for treating a specific patient. UpToDate, Inc. and its affiliates disclaim any warranty or liability relating to this information or the use thereof.The use of this information is governed by the Terms of Use, available at  https://www.woltersNEURA Energy Systemsuwer.com/en/know/clinical-effectiveness-terms. 2024© [employer] and its affiliates and/or licensors. All rights reserved.  Copyright   © 2024 [employer] and/or its affiliates. All rights reserved.

## 2024-11-13 ENCOUNTER — RESULTS FOLLOW-UP (OUTPATIENT)
Dept: FAMILY MEDICINE CLINIC | Facility: CLINIC | Age: 64
End: 2024-11-13

## 2024-11-13 NOTE — PRE-PROCEDURE INSTRUCTIONS
Pre-Surgery Instructions:   Medication Instructions    atorvastatin (LIPITOR) 10 mg tablet Take day of surgery.    Cholecalciferol (HM Vitamin D3) 100 MCG (4000 UT) CAPS Hold day of surgery.    Coenzyme Q10-Vitamin E (QUNOL ULTRA COQ10 PO) Stop taking 7 days prior to surgery.    cyanocobalamin (VITAMIN B-12) 1000 MCG tablet Stop taking 7 days prior to surgery.    ferrous sulfate 324 (65 Fe) mg Hold day of surgery.    folic acid (FOLVITE) 1 mg tablet Hold day of surgery.    ibuprofen (MOTRIN) 200 mg tablet Stop taking 3 days prior to surgery.    Misc Natural Products (GLUCOSAMINE CHOND CMP TRIPLE PO) Stop taking 7 days prior to surgery.    Multiple Vitamin (multivitamin) tablet Hold day of surgery.    mupirocin (BACTROBAN) 2 % ointment Instructions provided by MD    Omega 3-6-9 Fatty Acids (OMEGA 3-6-9 PO) Stop taking 7 days prior to surgery.   Medication instructions for day surgery reviewed. Please use only a sip of water to take your instructed medications. Avoid all over the counter vitamins, supplements and NSAIDS for one week prior to surgery per anesthesia guidelines. Tylenol is ok to take as needed.     You will receive a call one business day prior to surgery with an arrival time and hospital directions. If your surgery is scheduled on a Monday, the hospital will be calling you on the Friday prior to your surgery. If you have not heard from anyone by 8pm, please call the hospital supervisor through the hospital  at [phone]. ([place] [phone] or [place] [phone]).    Do not eat or drink anything after midnight the night before your surgery, including candy, mints, lifesavers, or chewing gum. Do not drink alcohol 24hrs before your surgery. Try not to smoke at least 24hrs before your surgery.       Follow the pre surgery showering instructions as listed in the “My Surgical Experience Booklet” or otherwise provided by your surgeon's office. Do not use a blade to shave the surgical area 1  week before surgery. It is okay to use a clean electric clippers up to 24 hours before surgery. Do not apply any lotions, creams, including makeup, cologne, deodorant, or perfumes after showering on the day of your surgery. Do not use dry shampoo, hair spray, hair gel, or any type of hair products.     No contact lenses, eye make-up, or artificial eyelashes. Remove nail polish, including gel polish, and any artificial, gel, or acrylic nails if possible. Remove all jewelry including rings and body piercing jewelry.     Wear causal clothing that is easy to take on and off. Consider your type of surgery.    Keep any valuables, jewelry, piercings at home. Please bring any specially ordered equipment (sling, braces) if indicated.    Arrange for a responsible person to drive you to and from the hospital on the day of your surgery. Please confirm the visitor policy for the day of your procedure when you receive your phone call with an arrival time.     Call the surgeon's office with any new illnesses, exposures, or additional questions prior to surgery.    Please reference your “My Surgical Experience Booklet” for additional information to prepare for your upcoming surgery.     Has walker and will bring DOS.

## 2024-11-13 NOTE — PATIENT INSTRUCTIONS
BEFORE SURGERY    Contact your surgical nurse  navigator with any questions regarding preoperative plan or schedule.  Stop all over the counter supplements, herbal, naturopathic  medications for 1 week prior to surgery UNLESS prescribed by your surgeon  Hold NSAIDS (i.e. advil, alleve, motrin, ibuprofen, celebrex) minimum 5 days prior to surgery  Follow presurgical medication instructions provided by preadmission nursing team reviewed during your presurgery phone call  Strategies for optimizing your surgery through breathing exercises, nutrition and physical activity can be found at [email or web address]  Call [phone] with any presurgical concerns or medications questions or use the messaging feature in your [employer] jennifer to contact your provider    AFTER SURGERY    Recommend using Tylenol ( acetaminophen ) 1000 mg every eight hours during the first week post discharge along with icing the area for 20 mins every 3-4 hours while awake can be helpful in reducing your need for post operative opioid use. This opioid sparing plan can be used along side your surgeons pain plan.  Use stool softener over the counter (colace) daily after surgery during the first 1-2 weeks to avoid post operative constipation issues  If no bowel movement within 3 days after surgery then use over the counter Miralax in addition to your stool softener   If cleared by your surgical team for activity then early and often walking is encouraged and can be important in prevention of post surgical blood clots. Additionally spend as much time out of bed as possible and allowed by your surgical team  Use your incentive spirometer twice per hour in the first seven days after surgery to help prevent post surgery lung complications and infections  It is very important you follow the instructions from your surgeon regarding any medications for after surgery blood clot prevention. Compliance with these medications is very important.  Call [phone] with  any post discharge concerns or medical issues or use the messaging feature in your [employer] jennifer to contact your provider

## 2024-11-13 NOTE — H&P (VIEW-ONLY)
Internal Medicine Pre-Operative Evaluation:     Reason for Visit: Pre-operative Evaluation for Risk Stratification and Optimization    Patient ID: Cristin Nolan is a 64 y.o. female.     Surgery: Arthroplasty of left Hip  Referring Provider: Dr. Knowles      Recommendations to Proceed withSurgery    Patient is considered to be Low risk for Medium risk procedure.     After evaluation and discussion with patient with emphasis that all surgery has some degree of inherent risk it is acknowledged by patient this risk is Acceptable.    Patient is optimized and may proceed with planned procedure.     Assessment    Pre-operative Medical Evaluation for planned surgery  Recommendations as listed in PLAN section below  Contact surgical nurse  navigator with any questions regarding preoperative plan or schedule.      Assessment & Plan  Primary osteoarthritis of one hip, left  Failed outpatient conservative measures  Elected to undergo total joint arthroplasty    Class 2 obesity due to excess calories without serious comorbidity with body mass index (BMI) of 35.0 to 35.9 in adult  Under presx BMI threshold of 40  Mixed hyperlipidemia    Low cholesterol diet  Cont statin therapy             Plan:     1. Further preoperative workup as follows:   - none no further testing required may proceed with surgery    2. Preoperative Medication Management Review performed by PAT nursing  YES    3. Patient requires further consultation with:   No Consults Required    4. Discharge Planning / Barriers to Discharge  none identified - patients has post discharge therapy plan in place, transportation arranged for discharge day, adequate family support at home to assist with discharge to home.        Subjective:           History of Present Illness:     Cristin Nolan is a 64 y.o. female who presents to the office today for a preoperative consultation at the request of surgeon. The patient understands this is an elective procedure and not  emergent. They are electing to undergo planned procedure with an understanding that all surgery has inherent risk. They have worked with their surgeon and failed conservative treatment measures. Today they present for preoperative risk assessment and recommendations for optimization in preparation for surgery.    Pt seen in surgical optimization center for upcoming proposed surgery. They have failed previous conservative measures and have elected surgical intervention.     Pt meets presurgical lab and BMI optimization goals.      Cristin Nolan has an IN HOSPITAL cardiac risk of RCI RISK CLASS I (0 risk factors, risk of major cardiac compl. appr. 0.5%) based on RCRI calculator    Cardiac Risk Estimation: per the Revised Cardiac Risk Index (Circ. 100:1043, 1999),         Pre-op Exam    Previous history of bleeding disorders or clots?: No  Previous Anesthesia reaction?: No  Prolonged steroid use in the last 6 months?: No    Assessment of Cardiac Risk:   - Unstable or severe angina or MI in the last 6 weeks or history of stent placement in the last year?: No   - Decompensated heart failure (e.g. New onset heart failure, NYHA  Class IV heart failure, or worsening existing heart failure)?: No  - Significant arrhythmias such as high grade AV block, symptomatic ventricular arrhythmia, newly recognized ventricular tachycardia, supraventricular tachycardia with resting heart rate >100, or symptomatic bradycardia?: No  - Severe heart valve disease including aortic stenosis or symptomatic mitral stenosis?: No      Pre-operative Risk Factors:  Elevated-risk surgery: No    History of cerebrovascular disease: No    History of ischemic heart disease: No  Pre-operative treatment with insulin: No  Pre-operative creatinine >2 mg/dL: No    History of congestive heart failure: No        ROS: No TIA's or unusual headaches, no dysphagia.  No prolonged cough. No dyspnea or chest pain on exertion.  No abdominal pain, change in bowel  "habits, black or bloody stools.  No urinary tract or BPH symptoms.  Positive reported pain in arthritic joint. Positive difficulty with gait. No skin rashes or issues.      Objective:    /84   Pulse (!) 50   Ht 5' 2\" (1.575 m)   Wt 94.3 kg (208 lb)   BMI 38.04 kg/m²       General Appearance: no distress, conversive  HEENT: PERRLA, conjuctiva normal; oropharynx clear; mucous membranes moist;   Neck:  Supple, no lymphadenopathy or thyromegaly  Lungs: breath sounds normal, normal respiratory effort, no retractions, expiratory effort normal  CV: normal heart sounds S1/S2, PMI normal   ABD: soft non tender, no masses , no hepatic or splenomegaly  EXT: DP pulses intact, no lymphadenopathy, no edema  Skin: normal turgor, normal texture, no rash  Psych: affect normal, mood normal  Neuro: AAOx3        The following portions of the patient's history were reviewed and updated as appropriate: allergies, current medications, past family history, past medical history, past social history, past surgical history and problem list.     Past History:       Past Medical History:   Diagnosis Date    Ambulatory dysfunction     uses walking stick    Anxiety     Arthritis     Cancer (HCC) 7/15/17    All better now    Chronic pain disorder     Claustrophobia     mild    DDD (degenerative disc disease), lumbar     Endometrial cancer (HCC) 07/06/2017    tRA TLH BSO SLND on 7/17/17 by Dr. Stone, followed by 3 cycles of vaginal brachytherapy completed in August 2017    Low back pain     Mild acid reflux     OA (osteoarthritis)     marissa knees    Spinal stenosis of lumbosacral region     Wears glasses     Past Surgical History:   Procedure Laterality Date    COLONOSCOPY      HYSTERECTOMY      JOINT REPLACEMENT Bilateral     knees    ORTHOPEDIC SURGERY      PLANTAR FASCIA SURGERY Bilateral     NH ARTHRP KNE CONDYLE&PLATU MEDIAL&LAT COMPARTMENTS Right 11/30/2020    Procedure: ARTHROPLASTY KNEE TOTAL;  Surgeon: Dipesh Knowles MD;  " Location: [facility];  Service: Orthopedics    GA HYSTEROSCOPY BX ENDOMETRIUM&/POLYPC W/WO D&C N/A 02/28/2017    Procedure: DILATATION AND CURETTAGE (D&C) WITH HYSTEROSCOPY,POLYPECTOMY;  Surgeon: Roberto Anderson MD;  Location: [facility];  Service: Gynecology    REPLACEMENT TOTAL KNEE Left 10/22/2021    SHOULDER SURGERY Left     TONSILLECTOMY      TOTAL ABDOMINAL HYSTERECTOMY W/ BILATERAL SALPINGOOPHORECTOMY Bilateral 07/2017    endometrial cancer, ? stage 1    WISDOM TOOTH EXTRACTION            Social History     Tobacco Use    Smoking status: Never    Smokeless tobacco: Never    Tobacco comments:     No secondhand smoke exposure   Vaping Use    Vaping status: Never Used   Substance Use Topics    Alcohol use: Not Currently     Comment: very rarely    Drug use: No     Family History   Problem Relation Age of Onset    Ovarian cancer Mother 70    Cancer Mother     Esophageal cancer Father     Cancer Father     No Known Problems Maternal Grandmother     No Known Problems Maternal Grandfather     No Known Problems Paternal Grandmother     No Known Problems Paternal Grandfather     No Known Problems Maternal Aunt     No Known Problems Paternal Aunt     Breast cancer Neg Hx     Colon cancer Neg Hx     Uterine cancer Neg Hx           Allergies:     Allergies   Allergen Reactions    Iv Contrast [Iodinated Contrast Media] Hives    Vitamin C [Ascorbate - Food Allergy]      Queasy stomach, loose stool        Current Medications:     Current Outpatient Medications   Medication Instructions    atorvastatin (LIPITOR) 10 mg, Oral, Daily    Coenzyme Q10-Vitamin E (QUNOL ULTRA COQ10 PO) No dose, route, or frequency recorded.    cyanocobalamin (VITAMIN B-12) 1,000 mcg, Daily    ferrous sulfate 324 mg, Oral, 2 times daily before meals    folic acid (FOLVITE) 1 mg, Oral, Daily    HM Vitamin D3 4,000 Units, Oral, Daily    ibuprofen (MOTRIN) 200 mg, Every 6 hours PRN    Misc Natural Products (GLUCOSAMINE CHOND CMP TRIPLE PO) Take by  "mouth    Multiple Vitamin (multivitamin) tablet 1 tablet, Oral, Daily    mupirocin (BACTROBAN) 2 % ointment Topical, 2 times daily, Apply to each nostril 2 times daily for 5 days before and including the day of surgery    Omega 3-6-9 Fatty Acids (OMEGA 3-6-9 PO) 1 capsule, Daily           PRE-OP WORKSHEET DATA    Assessment of Pre-Operative Risks     MLJ Quality Hard Stops:    BMI (<40) : Estimated body mass index is 38.04 kg/m² as calculated from the following:    Height as of this encounter: 5' 2\" (1.575 m).    Weight as of this encounter: 94.3 kg (208 lb).    Hgb ( >11):   Lab Results   Component Value Date    HGB 14.0 11/18/2024    HGB 14.7 07/10/2024    HGB 15.4 09/24/2021       HbA1c (<7.5) :   Lab Results   Component Value Date    HGBA1C 5.7 (H) 11/18/2024       GFR (>60) (Less then 45 = Nephrology consult):    Lab Results   Component Value Date    EGFR 85 11/18/2024    EGFR 91 07/10/2024    EGFR 90 01/03/2024            Pre-Op Data Reviewed:       Laboratory Results: I have personally reviewed the pertinent laboratory results/reports     EKG:I have personally reviewed pertinent reports.  . I personally reviewed and interpreted available tracings in the electronic medical record    9/2021 - acceptable NSR    OLD RECORDS: reviewed old records in the chart review section if EHR on day of visit.    Previous cardiopulmonary studies within the past year:  Echocardiogram: no   Cardiac Catheterization: no  Stress Test: no      Time of visit including pre-visit chart review, visit and post-visit coordination of plan and care , review of pre-surgical lab work, preparation and time spent documenting note in electronic medical record, time spent face-to-face in physical examination answering patient questions by care team 45 minutes             [facility]    "

## 2024-11-13 NOTE — PROGRESS NOTES
Internal Medicine Pre-Operative Evaluation:     Reason for Visit: Pre-operative Evaluation for Risk Stratification and Optimization    Patient ID: Cristin Nolan is a 64 y.o. female.     Surgery: Arthroplasty of left Hip  Referring Provider: Dr. Knowles      Recommendations to Proceed withSurgery    Patient is considered to be Low risk for Medium risk procedure.     After evaluation and discussion with patient with emphasis that all surgery has some degree of inherent risk it is acknowledged by patient this risk is Acceptable.    Patient is optimized and may proceed with planned procedure.     Assessment    Pre-operative Medical Evaluation for planned surgery  Recommendations as listed in PLAN section below  Contact surgical nurse  navigator with any questions regarding preoperative plan or schedule.      Assessment & Plan  Primary osteoarthritis of one hip, left  Failed outpatient conservative measures  Elected to undergo total joint arthroplasty    Class 2 obesity due to excess calories without serious comorbidity with body mass index (BMI) of 35.0 to 35.9 in adult  Under presx BMI threshold of 40  Mixed hyperlipidemia    Low cholesterol diet  Cont statin therapy             Plan:     1. Further preoperative workup as follows:   - none no further testing required may proceed with surgery    2. Preoperative Medication Management Review performed by PAT nursing  YES    3. Patient requires further consultation with:   No Consults Required    4. Discharge Planning / Barriers to Discharge  none identified - patients has post discharge therapy plan in place, transportation arranged for discharge day, adequate family support at home to assist with discharge to home.        Subjective:           History of Present Illness:     Cristin Nolan is a 64 y.o. female who presents to the office today for a preoperative consultation at the request of surgeon. The patient understands this is an elective procedure and not  emergent. They are electing to undergo planned procedure with an understanding that all surgery has inherent risk. They have worked with their surgeon and failed conservative treatment measures. Today they present for preoperative risk assessment and recommendations for optimization in preparation for surgery.    Pt seen in surgical optimization center for upcoming proposed surgery. They have failed previous conservative measures and have elected surgical intervention.     Pt meets presurgical lab and BMI optimization goals.      Cristin Nolan has an IN HOSPITAL cardiac risk of RCI RISK CLASS I (0 risk factors, risk of major cardiac compl. appr. 0.5%) based on RCRI calculator    Cardiac Risk Estimation: per the Revised Cardiac Risk Index (Circ. 100:1043, 1999),         Pre-op Exam    Previous history of bleeding disorders or clots?: No  Previous Anesthesia reaction?: No  Prolonged steroid use in the last 6 months?: No    Assessment of Cardiac Risk:   - Unstable or severe angina or MI in the last 6 weeks or history of stent placement in the last year?: No   - Decompensated heart failure (e.g. New onset heart failure, NYHA  Class IV heart failure, or worsening existing heart failure)?: No  - Significant arrhythmias such as high grade AV block, symptomatic ventricular arrhythmia, newly recognized ventricular tachycardia, supraventricular tachycardia with resting heart rate >100, or symptomatic bradycardia?: No  - Severe heart valve disease including aortic stenosis or symptomatic mitral stenosis?: No      Pre-operative Risk Factors:  Elevated-risk surgery: No    History of cerebrovascular disease: No    History of ischemic heart disease: No  Pre-operative treatment with insulin: No  Pre-operative creatinine >2 mg/dL: No    History of congestive heart failure: No        ROS: No TIA's or unusual headaches, no dysphagia.  No prolonged cough. No dyspnea or chest pain on exertion.  No abdominal pain, change in bowel  "habits, black or bloody stools.  No urinary tract or BPH symptoms.  Positive reported pain in arthritic joint. Positive difficulty with gait. No skin rashes or issues.      Objective:    /84   Pulse (!) 50   Ht 5' 2\" (1.575 m)   Wt 94.3 kg (208 lb)   BMI 38.04 kg/m²       General Appearance: no distress, conversive  HEENT: PERRLA, conjuctiva normal; oropharynx clear; mucous membranes moist;   Neck:  Supple, no lymphadenopathy or thyromegaly  Lungs: breath sounds normal, normal respiratory effort, no retractions, expiratory effort normal  CV: normal heart sounds S1/S2, PMI normal   ABD: soft non tender, no masses , no hepatic or splenomegaly  EXT: DP pulses intact, no lymphadenopathy, no edema  Skin: normal turgor, normal texture, no rash  Psych: affect normal, mood normal  Neuro: AAOx3        The following portions of the patient's history were reviewed and updated as appropriate: allergies, current medications, past family history, past medical history, past social history, past surgical history and problem list.     Past History:       Past Medical History:   Diagnosis Date    Ambulatory dysfunction     uses walking stick    Anxiety     Arthritis     Cancer (HCC) 7/15/17    All better now    Chronic pain disorder     Claustrophobia     mild    DDD (degenerative disc disease), lumbar     Endometrial cancer (HCC) 07/06/2017    tRA TLH BSO SLND on 7/17/17 by Dr. Stone, followed by 3 cycles of vaginal brachytherapy completed in August 2017    Low back pain     Mild acid reflux     OA (osteoarthritis)     marissa knees    Spinal stenosis of lumbosacral region     Wears glasses     Past Surgical History:   Procedure Laterality Date    COLONOSCOPY      HYSTERECTOMY      JOINT REPLACEMENT Bilateral     knees    ORTHOPEDIC SURGERY      PLANTAR FASCIA SURGERY Bilateral     DE ARTHRP KNE CONDYLE&PLATU MEDIAL&LAT COMPARTMENTS Right 11/30/2020    Procedure: ARTHROPLASTY KNEE TOTAL;  Surgeon: Dipesh Knowles MD;  " Location: [facility];  Service: Orthopedics    [place] HYSTEROSCOPY BX ENDOMETRIUM&/POLYPC W/WO D&C N/A 02/28/2017    Procedure: DILATATION AND CURETTAGE (D&C) WITH HYSTEROSCOPY,POLYPECTOMY;  Surgeon: Roberto Anderson MD;  Location: [facility];  Service: Gynecology    REPLACEMENT TOTAL KNEE Left 10/22/2021    SHOULDER SURGERY Left     TONSILLECTOMY      TOTAL ABDOMINAL HYSTERECTOMY W/ BILATERAL SALPINGOOPHORECTOMY Bilateral 07/2017    endometrial cancer, ? stage 1    WISDOM TOOTH EXTRACTION            Social History     Tobacco Use    Smoking status: Never    Smokeless tobacco: Never    Tobacco comments:     No secondhand smoke exposure   Vaping Use    Vaping status: Never Used   Substance Use Topics    Alcohol use: Not Currently     Comment: very rarely    Drug use: No     Family History   Problem Relation Age of Onset    Ovarian cancer Mother 70    Cancer Mother     Esophageal cancer Father     Cancer Father     No Known Problems Maternal Grandmother     No Known Problems Maternal Grandfather     No Known Problems Paternal Grandmother     No Known Problems Paternal Grandfather     No Known Problems Maternal Aunt     No Known Problems Paternal Aunt     Breast cancer Neg Hx     Colon cancer Neg Hx     Uterine cancer Neg Hx           Allergies:     Allergies   Allergen Reactions    Iv Contrast [Iodinated Contrast Media] Hives    Vitamin C [Ascorbate - Food Allergy]      Queasy stomach, loose stool        Current Medications:     Current Outpatient Medications   Medication Instructions    atorvastatin (LIPITOR) 10 mg, Oral, Daily    Coenzyme Q10-Vitamin E (QUNOL ULTRA COQ10 PO) No dose, route, or frequency recorded.    cyanocobalamin (VITAMIN B-12) 1,000 mcg, Daily    ferrous sulfate 324 mg, Oral, 2 times daily before meals    folic acid (FOLVITE) 1 mg, Oral, Daily    HM Vitamin D3 4,000 Units, Oral, Daily    ibuprofen (MOTRIN) 200 mg, Every 6 hours PRN    Misc Natural Products (GLUCOSAMINE CHOND CMP TRIPLE PO) Take by  "mouth    Multiple Vitamin (multivitamin) tablet 1 tablet, Oral, Daily    mupirocin (BACTROBAN) 2 % ointment Topical, 2 times daily, Apply to each nostril 2 times daily for 5 days before and including the day of surgery    Omega 3-6-9 Fatty Acids (OMEGA 3-6-9 PO) 1 capsule, Daily           PRE-OP WORKSHEET DATA    Assessment of Pre-Operative Risks     MLJ Quality Hard Stops:    BMI (<40) : Estimated body mass index is 38.04 kg/m² as calculated from the following:    Height as of this encounter: 5' 2\" (1.575 m).    Weight as of this encounter: 94.3 kg (208 lb).    Hgb ( >11):   Lab Results   Component Value Date    HGB 14.0 11/18/2024    HGB 14.7 07/10/2024    HGB 15.4 09/24/2021       HbA1c (<7.5) :   Lab Results   Component Value Date    HGBA1C 5.7 (H) 11/18/2024       GFR (>60) (Less then 45 = Nephrology consult):    Lab Results   Component Value Date    EGFR 85 11/18/2024    EGFR 91 07/10/2024    EGFR 90 01/03/2024            Pre-Op Data Reviewed:       Laboratory Results: I have personally reviewed the pertinent laboratory results/reports     EKG:I have personally reviewed pertinent reports.  . I personally reviewed and interpreted available tracings in the electronic medical record    9/2021 - acceptable NSR    OLD RECORDS: reviewed old records in the chart review section if EHR on day of visit.    Previous cardiopulmonary studies within the past year:  Echocardiogram: no   Cardiac Catheterization: no  Stress Test: no      Time of visit including pre-visit chart review, visit and post-visit coordination of plan and care , review of pre-surgical lab work, preparation and time spent documenting note in electronic medical record, time spent face-to-face in physical examination answering patient questions by care team 45 minutes             [facility]    "

## 2024-11-15 ENCOUNTER — OFFICE VISIT (OUTPATIENT)
Dept: OBGYN CLINIC | Facility: MEDICAL CENTER | Age: 64
End: 2024-11-15
Payer: MEDICARE

## 2024-11-15 VITALS — BODY MASS INDEX: 39.01 KG/M2 | WEIGHT: 212 LBS | HEIGHT: 62 IN

## 2024-11-15 DIAGNOSIS — M16.12 PRIMARY OSTEOARTHRITIS OF ONE HIP, LEFT: Primary | ICD-10-CM

## 2024-11-15 PROCEDURE — 99213 OFFICE O/P EST LOW 20 MIN: CPT | Performed by: ORTHOPAEDIC SURGERY

## 2024-11-15 NOTE — PROGRESS NOTES
"Orthopaedic Surgery - Office Note  Cristin Nolan (64 y.o. female)   : 1960   MRN: [id number]  Encounter Date: 11/15/2024    Assessment / Plan    Left hip moderate to severe osteoarthritis with lumbar radiculopathy.    Patient is scheduled for a left total hip replacement on 12/3/2024. She was previously consented for this and surgery was scheduled.     EMG showed L5 chronic denervation on the R side but normal innervation on the L leg. We did discuss that due to her hip abductor weakness bilaterally this may affect her recovery. We did recommend following up with Spine and pain to see if they have any other recommendations in regards to this issue.  It was explained that a CARO would treat pain not antalgic gait/\"waddle\". The patient will like to proceed with hip replacement surgery at this time and address the back issues after.   Continue home exercise program.  EMG study of the patient was reviewed today.   Follow-up:  Return for follow up as scheduled post operatively..      Chief Complaint / Date of Onset  Gait abnormality   Left hip pain secondary osteoarthritis chronic  Injury Mechanism / Date  None  Surgery / Date  None    History of Present Illness   Cristin Nolan is a 64 y.o. female following up for left hip osteoarthritis.  He is scheduled for left total hip on 12/3/2025 on 12/3/2024 after going through extensive conservative treatment that did not make much improvement for her left hip arthritis.  She did have a steroid injection on 2024 which gave her great relief about 80% for about 1 week.  After this pain return to become even more limiting than it was prior.  She is coming in today to review EMG study of her left lower extremity to further evaluate for lumbar radiculopathy due to her gait abnormality.  Pain at this time is limited to the left groin area with some radiation down to the knee. Pain and \"waddle\" started in  after her first TKA. She reports trouble walking for long " "distances and stretching her leg with hydro-biking. She reports pain, numbness and tingling into the lateral proximal thigh. She reports occasional numbness and tingling into her feet. She has received injections for her back as described below.     On 10/21/24 the patient did have bilateral GH shoulder injections which did provide good benefit up to this appointment.     Treatment Summary  Medications / Modalities  Advil  prn  Bracing / Immobilization  None  Physical Therapy  Patient reports she has attended 3 months of PT for her hip and back.  Hydro-biking for exercise  Injections  Patient has had RFA and MBB of L3,4,5.  Left hip guided hip injection on 8/20/2024  B/L shoulder GH injections 10/21/24  Prior Surgeries  Left total knee replacement 10/21/2021 at [facility]  Right total knee replacement on November 30, 2020 with Dr. Knowles    other Treatments  None    Employment / Current Status  N/a    Sport / Organization / Current Status  Hydro-biking      Review of Systems  Pertinent items are noted in HPI.  All other systems were reviewed and are negative.      Physical Exam  Ht 5' 2\" (1.575 m)   Wt 96.2 kg (212 lb)   BMI 38.78 kg/m²   Cons: Appears well.  No apparent distress.  Psych: Alert. Oriented x3.  Mood and affect normal.  Eyes: PERRLA, EOMI  Resp: Normal effort.  No audible wheezing or stridor.  CV: Palpable pulse.  No discernable arrhythmia.  No LE edema.  Lymph:  No palpable cervical, axillary, or inguinal lymphadenopathy.  Skin: Warm.  No palpable masses.  No visible lesions.  Neuro: Normal muscle tone.  Normal and symmetric DTR's.     Bilateral Hip Exam  Alignment / Posture:  Normal resting hip posture.  Inspection:  No swelling. No edema. No erythema. No ecchymosis.  Palpation:   Mild tenderness at left groin with mild tenderness laterally over the trochanter.  ROM:  Hip Flexion 120 wo pain. Hip ER 90. Left Hip IR 30. Right Hip IR 10.  Strength:  Hip Abductors 3/5.  Stability:  (-) [place].  Tests:  " "(+) Chuy. (+) ANDREW. (+) EMMETT. + [place]  Neurovascular:  Sensation intact in DP/SP/Gill/Sa/T nerve distributions. 2+ DP & PT pulses.  Gait:   \"waddle\"        Studies Reviewed  I have personally reviewed pertinent films in PACS.  XR of left hip - 08/09/2024 - moderate to severe osteoarthritis with joint space narrowing and marginal osteophyte formation.  EMG of bilateral lower - shows normal EMG of left leg.  EMG of the right leg reveals chronic denervation of L5 distribution.      Procedures  No procedures today.    Medical, Surgical, Family, and Social History  The patient's medical history, family history, and social history, were reviewed and updated as appropriate.    Past Medical History:   Diagnosis Date    Ambulatory dysfunction     uses walking stick    Anxiety     Arthritis     Cancer (HCC) 7/15/17    All better now    Chronic pain disorder     Claustrophobia     mild    DDD (degenerative disc disease), lumbar     Endometrial cancer (HCC) 07/06/2017    tRA TLH BSO SLND on 7/17/17 by Dr. Stone, followed by 3 cycles of vaginal brachytherapy completed in August 2017    Low back pain     Mild acid reflux     OA (osteoarthritis)     marissa knees    Spinal stenosis of lumbosacral region     Wears glasses        Past Surgical History:   Procedure Laterality Date    COLONOSCOPY      HYSTERECTOMY      JOINT REPLACEMENT Bilateral     knees    ORTHOPEDIC SURGERY      PLANTAR FASCIA SURGERY Bilateral     [place] ARTHRP KNE CONDYLE&PLATU MEDIAL&LAT COMPARTMENTS Right 11/30/2020    Procedure: ARTHROPLASTY KNEE TOTAL;  Surgeon: Dipesh Knowles MD;  [facility];  Service: Orthopedics    [place] HYSTEROSCOPY BX ENDOMETRIUM&/POLYPC W/WO D&C N/A 02/28/2017    Procedure: DILATATION AND CURETTAGE (D&C) WITH HYSTEROSCOPY,POLYPECTOMY;  Surgeon: Roberto Anderson MD;  [facility];  Service: Gynecology    REPLACEMENT TOTAL KNEE Left 10/22/2021    SHOULDER SURGERY Left     TONSILLECTOMY      TOTAL ABDOMINAL " HYSTERECTOMY W/ BILATERAL SALPINGOOPHORECTOMY Bilateral 07/2017    endometrial cancer, ? stage 1    WISDOM TOOTH EXTRACTION         Family History   Problem Relation Age of Onset    Ovarian cancer Mother 70    Cancer Mother     Esophageal cancer Father     Cancer Father     No Known Problems Maternal Grandmother     No Known Problems Maternal Grandfather     No Known Problems Paternal Grandmother     No Known Problems Paternal Grandfather     No Known Problems Maternal Aunt     No Known Problems Paternal Aunt     Breast cancer Neg Hx     Colon cancer Neg Hx     Uterine cancer Neg Hx        Social History     Occupational History    Not on file   Tobacco Use    Smoking status: Never    Smokeless tobacco: Never    Tobacco comments:     No secondhand smoke exposure   Vaping Use    Vaping status: Never Used   Substance and Sexual Activity    Alcohol use: Not Currently     Comment: very rarely    Drug use: No    Sexual activity: Not Currently     Partners: Male     Birth control/protection: Abstinence       Allergies   Allergen Reactions    Iv Contrast [Iodinated Contrast Media] Hives    Vitamin C [Ascorbate - Food Allergy]      Queasy stomach, loose stool         Current Outpatient Medications:     atorvastatin (LIPITOR) 10 mg tablet, Take 1 tablet (10 mg total) by mouth daily, Disp: 90 tablet, Rfl: 1    Cholecalciferol (HM Vitamin D3) 100 MCG (4000 UT) CAPS, Take 1 capsule (4,000 Units total) by mouth daily, Disp: 30 capsule, Rfl: 0    Coenzyme Q10-Vitamin E (QUNOL ULTRA COQ10 PO), , Disp: , Rfl:     cyanocobalamin (VITAMIN B-12) 1000 MCG tablet, Take 1,000 mcg by mouth daily, Disp: , Rfl:     ibuprofen (MOTRIN) 200 mg tablet, Take 200 mg by mouth every 6 (six) hours as needed for mild pain, Disp: , Rfl:     Misc Natural Products (GLUCOSAMINE CHOND CMP TRIPLE PO), Take by mouth, Disp: , Rfl:     Multiple Vitamin (multivitamin) tablet, Take 1 tablet by mouth daily, Disp: 30 tablet, Rfl: 0    mupirocin (BACTROBAN) 2 %  ointment, Apply topically 2 (two) times a day Apply to each nostril 2 times daily for 5 days before and including the day of surgery, Disp: 50 g, Rfl: 0    Omega 3-6-9 Fatty Acids (OMEGA 3-6-9 PO), Take 1 capsule by mouth daily, Disp: , Rfl:     ferrous sulfate 324 (65 Fe) mg, Take 1 tablet (324 mg total) by mouth 2 (two) times a day before meals, Disp: 60 tablet, Rfl: 0    folic acid (FOLVITE) 1 mg tablet, Take 1 tablet (1 mg total) by mouth daily, Disp: 30 tablet, Rfl: 0    Current Facility-Administered Medications:     lidocaine (XYLOCAINE) 1 % injection 2 mL, 2 mL, Injection, , Harjeet Black DPM, 2 mL at 05/20/22 1445    triamcinolone acetonide (KENALOG-40) 40 mg/mL injection 20 mg, 20 mg, Intra-articular, , Harjeet Black DPM, 20 mg at 05/20/22 1445      Stuart Mistry PA-C    Scribe Attestation      I,:   am acting as a scribe while in the presence of the attending physician.:       I,:   personally performed the services described in this documentation    as scribed in my presence.:

## 2024-11-18 ENCOUNTER — ANESTHESIA EVENT (OUTPATIENT)
Age: 64
End: 2024-11-18

## 2024-11-18 ENCOUNTER — APPOINTMENT (OUTPATIENT)
Dept: LAB | Facility: MEDICAL CENTER | Age: 64
End: 2024-11-18
Payer: MEDICARE

## 2024-11-18 ENCOUNTER — LAB REQUISITION (OUTPATIENT)
Dept: LAB | Facility: HOSPITAL | Age: 64
End: 2024-11-18
Payer: MEDICARE

## 2024-11-18 ENCOUNTER — ANESTHESIA (OUTPATIENT)
Age: 64
End: 2024-11-18

## 2024-11-18 DIAGNOSIS — M16.12 UNILATERAL PRIMARY OSTEOARTHRITIS, LEFT HIP: ICD-10-CM

## 2024-11-18 DIAGNOSIS — M16.12 PRIMARY OSTEOARTHRITIS OF ONE HIP, LEFT: ICD-10-CM

## 2024-11-18 DIAGNOSIS — M19.011 PRIMARY OSTEOARTHRITIS OF BOTH SHOULDERS: ICD-10-CM

## 2024-11-18 DIAGNOSIS — M19.012 PRIMARY OSTEOARTHRITIS OF BOTH SHOULDERS: ICD-10-CM

## 2024-11-18 LAB
ABO GROUP BLD: NORMAL
ALBUMIN SERPL BCG-MCNC: 3.9 G/DL (ref 3.5–5)
ALP SERPL-CCNC: 63 U/L (ref 34–104)
ALT SERPL W P-5'-P-CCNC: 28 U/L (ref 7–52)
ANION GAP SERPL CALCULATED.3IONS-SCNC: 4 MMOL/L (ref 4–13)
APTT PPP: 24 SECONDS (ref 23–34)
AST SERPL W P-5'-P-CCNC: 27 U/L (ref 13–39)
BASOPHILS # BLD AUTO: 0.01 THOUSANDS/ÂΜL (ref 0–0.1)
BASOPHILS NFR BLD AUTO: 0 % (ref 0–1)
BILIRUB SERPL-MCNC: 0.48 MG/DL (ref 0.2–1)
BLD GP AB SCN SERPL QL: NEGATIVE
BUN SERPL-MCNC: 21 MG/DL (ref 5–25)
CALCIUM SERPL-MCNC: 9.5 MG/DL (ref 8.4–10.2)
CHLORIDE SERPL-SCNC: 108 MMOL/L (ref 96–108)
CO2 SERPL-SCNC: 30 MMOL/L (ref 21–32)
CREAT SERPL-MCNC: 0.74 MG/DL (ref 0.6–1.3)
EOSINOPHIL # BLD AUTO: 0.09 THOUSAND/ÂΜL (ref 0–0.61)
EOSINOPHIL NFR BLD AUTO: 3 % (ref 0–6)
ERYTHROCYTE [DISTWIDTH] IN BLOOD BY AUTOMATED COUNT: 14.2 % (ref 11.6–15.1)
EST. AVERAGE GLUCOSE BLD GHB EST-MCNC: 117 MG/DL
GFR SERPL CREATININE-BSD FRML MDRD: 85 ML/MIN/1.73SQ M
GLUCOSE P FAST SERPL-MCNC: 95 MG/DL (ref 65–99)
HBA1C MFR BLD: 5.7 %
HCT VFR BLD AUTO: 43.7 % (ref 34.8–46.1)
HGB BLD-MCNC: 14 G/DL (ref 11.5–15.4)
IMM GRANULOCYTES # BLD AUTO: 0.01 THOUSAND/UL (ref 0–0.2)
IMM GRANULOCYTES NFR BLD AUTO: 0 % (ref 0–2)
INR PPP: 0.86 (ref 0.85–1.19)
LYMPHOCYTES # BLD AUTO: 1.36 THOUSANDS/ÂΜL (ref 0.6–4.47)
LYMPHOCYTES NFR BLD AUTO: 43 % (ref 14–44)
MCH RBC QN AUTO: 28.5 PG (ref 26.8–34.3)
MCHC RBC AUTO-ENTMCNC: 32 G/DL (ref 31.4–37.4)
MCV RBC AUTO: 89 FL (ref 82–98)
MONOCYTES # BLD AUTO: 0.26 THOUSAND/ÂΜL (ref 0.17–1.22)
MONOCYTES NFR BLD AUTO: 8 % (ref 4–12)
NEUTROPHILS # BLD AUTO: 1.41 THOUSANDS/ÂΜL (ref 1.85–7.62)
NEUTS SEG NFR BLD AUTO: 46 % (ref 43–75)
NRBC BLD AUTO-RTO: 0 /100 WBCS
PLATELET # BLD AUTO: 167 THOUSANDS/UL (ref 149–390)
PMV BLD AUTO: 8.7 FL (ref 8.9–12.7)
POTASSIUM SERPL-SCNC: 4.8 MMOL/L (ref 3.5–5.3)
PROT SERPL-MCNC: 6.4 G/DL (ref 6.4–8.4)
PROTHROMBIN TIME: 12.1 SECONDS (ref 12.3–15)
RBC # BLD AUTO: 4.91 MILLION/UL (ref 3.81–5.12)
RH BLD: POSITIVE
SODIUM SERPL-SCNC: 142 MMOL/L (ref 135–147)
SPECIMEN EXPIRATION DATE: NORMAL
WBC # BLD AUTO: 3.14 THOUSAND/UL (ref 4.31–10.16)

## 2024-11-18 PROCEDURE — 36415 COLL VENOUS BLD VENIPUNCTURE: CPT

## 2024-11-18 PROCEDURE — 86901 BLOOD TYPING SEROLOGIC RH(D): CPT | Performed by: ORTHOPAEDIC SURGERY

## 2024-11-18 PROCEDURE — 86900 BLOOD TYPING SEROLOGIC ABO: CPT | Performed by: ORTHOPAEDIC SURGERY

## 2024-11-18 PROCEDURE — 85730 THROMBOPLASTIN TIME PARTIAL: CPT

## 2024-11-18 PROCEDURE — 85025 COMPLETE CBC W/AUTO DIFF WBC: CPT

## 2024-11-18 PROCEDURE — 83036 HEMOGLOBIN GLYCOSYLATED A1C: CPT

## 2024-11-18 PROCEDURE — 85610 PROTHROMBIN TIME: CPT

## 2024-11-18 PROCEDURE — 80053 COMPREHEN METABOLIC PANEL: CPT

## 2024-11-18 PROCEDURE — 86850 RBC ANTIBODY SCREEN: CPT | Performed by: ORTHOPAEDIC SURGERY

## 2024-11-18 PROCEDURE — 87081 CULTURE SCREEN ONLY: CPT

## 2024-11-19 ENCOUNTER — VBI (OUTPATIENT)
Dept: ADMINISTRATIVE | Facility: OTHER | Age: 64
End: 2024-11-19

## 2024-11-19 LAB — MRSA NOSE QL CULT: NORMAL

## 2024-11-19 NOTE — TELEPHONE ENCOUNTER
11/19/24 3:51 PM    Patient contacted to bring Advance Directive, POLST, or Living Will document to next scheduled pcp visit.[facility] Department left message.    Thank you.  Carolina Justice MA  [facility]

## 2024-11-20 ENCOUNTER — OFFICE VISIT (OUTPATIENT)
Age: 64
End: 2024-11-20
Payer: MEDICARE

## 2024-11-20 VITALS
HEART RATE: 50 BPM | BODY MASS INDEX: 38.28 KG/M2 | WEIGHT: 208 LBS | HEIGHT: 62 IN | DIASTOLIC BLOOD PRESSURE: 84 MMHG | SYSTOLIC BLOOD PRESSURE: 130 MMHG

## 2024-11-20 DIAGNOSIS — E78.2 MIXED HYPERLIPIDEMIA: ICD-10-CM

## 2024-11-20 DIAGNOSIS — E66.09 CLASS 2 OBESITY DUE TO EXCESS CALORIES WITHOUT SERIOUS COMORBIDITY WITH BODY MASS INDEX (BMI) OF 35.0 TO 35.9 IN ADULT: ICD-10-CM

## 2024-11-20 DIAGNOSIS — E66.812 CLASS 2 OBESITY DUE TO EXCESS CALORIES WITHOUT SERIOUS COMORBIDITY WITH BODY MASS INDEX (BMI) OF 35.0 TO 35.9 IN ADULT: ICD-10-CM

## 2024-11-20 DIAGNOSIS — M16.12 PRIMARY OSTEOARTHRITIS OF ONE HIP, LEFT: Primary | ICD-10-CM

## 2024-11-20 PROCEDURE — 99215 OFFICE O/P EST HI 40 MIN: CPT | Performed by: INTERNAL MEDICINE

## 2024-11-25 ENCOUNTER — EVALUATION (OUTPATIENT)
Dept: PHYSICAL THERAPY | Facility: CLINIC | Age: 64
End: 2024-11-25
Payer: MEDICARE

## 2024-11-25 DIAGNOSIS — M16.12 PRIMARY OSTEOARTHRITIS OF ONE HIP, LEFT: Primary | ICD-10-CM

## 2024-11-25 PROCEDURE — 97110 THERAPEUTIC EXERCISES: CPT | Performed by: PHYSICAL THERAPIST

## 2024-11-25 PROCEDURE — 97161 PT EVAL LOW COMPLEX 20 MIN: CPT | Performed by: PHYSICAL THERAPIST

## 2024-11-25 NOTE — PROGRESS NOTES
PT Evaluation     Today's date: 2024  Patient name: Cristin Nolan  : 1960  MRN: [id number]  Referring provider: Dipesh Knowles, *  Dx:   Encounter Diagnosis     ICD-10-CM    1. Primary osteoarthritis of one hip, left  M16.12 Ambulatory referral to Physical Therapy                     Assessment  Impairments: abnormal or restricted ROM, activity intolerance, impaired physical strength, lacks appropriate home exercise program and pain with function  Functional limitations: walking  Symptom irritability: low    Assessment details: Pt is 63yo female presenting to therapy following pre-op for Lt CARO. Pt has good hip rom but very poor hip strength. Pt was educated in HEP, recovery, and hip precautions. Pt would benefit from PT services following Lt CARO to strength to improve gait mechanics.  Understanding of Dx/Px/POC: good     Prognosis: good    Goals  Pre-Op:  1. Pt will be independent with HEP upon discharge.  Post-Op:  1. Pt will be independent with HEP upon discharge.  2. Pt will improve Lt Hip rom to 0-110 degrees of flexion.  3. Pt will improve Lt hip strength to 4/5 in all directions to improve walking mechanics.  4. Pt will be able to return to walking 15 minutes without AD.    Plan  Patient would benefit from: skilled physical therapy    Planned therapy interventions: functional ROM exercises, therapeutic activities, therapeutic exercise, therapeutic training, stretching, strengthening, home exercise program, neuromuscular re-education, manual therapy and patient education    Frequency: 2x week  Duration in weeks: 8  Treatment plan discussed with: patient    Subjective Evaluation    History of Present Illness  Mechanism of injury: Pt is scheduled for Lt CARO on 12/3/24. Pt is known to the clinic. She had some testing to rule out back issues including injections and ablation. Pt is still having groin pain. Pt is still going to the pool 4xday, walking daily.  Pain  At worst pain rating:  8  Location: groin pain Rt  Quality: dull ache  Relieving factors: change in position  Aggravating factors: standing, walking and stair climbing        Objective     Passive Range of Motion   Left Hip   Normal passive range of motion    Right Hip   Normal passive range of motion    Additional Passive Range of Motion Details  Excessive hip ER bilaterally    Strength/Myotome Testing     Left Hip   Planes of Motion   Flexion: 3+  Extension: 3+  Abduction: 3-             Precautions: Lt CARO 12/3/24      Manuals 11/25                                                                Neuro Re-Ed                                                                                                        Ther Ex             Edu on recovery, HEP 10'                                                                                                       Ther Activity                                       Gait Training                                       Modalities

## 2024-12-03 ENCOUNTER — APPOINTMENT (OUTPATIENT)
Age: 64
End: 2024-12-03
Payer: MEDICARE

## 2024-12-03 ENCOUNTER — HOSPITAL ENCOUNTER (OUTPATIENT)
Age: 64
Setting detail: OUTPATIENT SURGERY
Discharge: HOME | End: 2024-12-03
Attending: ORTHOPAEDIC SURGERY | Admitting: ORTHOPAEDIC SURGERY
Payer: MEDICARE

## 2024-12-03 ENCOUNTER — ANESTHESIA (OUTPATIENT)
Age: 64
End: 2024-12-03
Payer: MEDICARE

## 2024-12-03 VITALS
WEIGHT: 209 LBS | RESPIRATION RATE: 18 BRPM | HEART RATE: 89 BPM | OXYGEN SATURATION: 95 % | TEMPERATURE: 97.8 F | HEIGHT: 62 IN | DIASTOLIC BLOOD PRESSURE: 67 MMHG | BODY MASS INDEX: 38.46 KG/M2 | SYSTOLIC BLOOD PRESSURE: 138 MMHG

## 2024-12-03 DIAGNOSIS — M16.12 PRIMARY OSTEOARTHRITIS OF ONE HIP, LEFT: Primary | ICD-10-CM

## 2024-12-03 PROCEDURE — 97535 SELF CARE MNGMENT TRAINING: CPT

## 2024-12-03 PROCEDURE — 97163 PT EVAL HIGH COMPLEX 45 MIN: CPT | Performed by: PHYSICAL THERAPIST

## 2024-12-03 PROCEDURE — C1776 JOINT DEVICE (IMPLANTABLE): HCPCS | Performed by: ORTHOPAEDIC SURGERY

## 2024-12-03 PROCEDURE — 73501 X-RAY EXAM HIP UNI 1 VIEW: CPT

## 2024-12-03 PROCEDURE — 27130 TOTAL HIP ARTHROPLASTY: CPT | Performed by: ORTHOPAEDIC SURGERY

## 2024-12-03 PROCEDURE — 97530 THERAPEUTIC ACTIVITIES: CPT | Performed by: PHYSICAL THERAPIST

## 2024-12-03 PROCEDURE — 97167 OT EVAL HIGH COMPLEX 60 MIN: CPT

## 2024-12-03 DEVICE — APEX HOLE ELIMINATOR - PS
Type: IMPLANTABLE DEVICE | Site: HIP | Status: FUNCTIONAL
Brand: APEX

## 2024-12-03 DEVICE — ACTIS DUOFIX HIP PROSTHESIS (FEMORAL STEM 12/14 TAPER CEMENTLESS SIZE 3 HIGH COLLAR)  CE
Type: IMPLANTABLE DEVICE | Site: HIP | Status: FUNCTIONAL
Brand: ACTIS

## 2024-12-03 DEVICE — BIOLOX DELTA CERAMIC FEMORAL HEAD 32MM DIA +5.0 12/14 TAPER
Type: IMPLANTABLE DEVICE | Site: HIP | Status: FUNCTIONAL
Brand: BIOLOX DELTA

## 2024-12-03 DEVICE — PINNACLE HIP SOLUTIONS ALTRX POLYETHYLENE ACETABULAR LINER +4 NEUTRAL 32MM ID 50MM OD
Type: IMPLANTABLE DEVICE | Site: HIP | Status: FUNCTIONAL
Brand: PINNACLE ALTRX

## 2024-12-03 DEVICE — PINNACLE POROCOAT ACETABULAR SHELL 100 SERIES 50MM OD
Type: IMPLANTABLE DEVICE | Site: HIP | Status: FUNCTIONAL
Brand: PINNACLE POROCOAT

## 2024-12-03 RX ORDER — ACETAMINOPHEN 325 MG/1
650 TABLET ORAL EVERY 6 HOURS PRN
Status: CANCELLED | OUTPATIENT
Start: 2024-12-03

## 2024-12-03 RX ORDER — FOLIC ACID 1 MG/1
1 TABLET ORAL DAILY
Status: CANCELLED | OUTPATIENT
Start: 2024-12-03

## 2024-12-03 RX ORDER — PROPOFOL 10 MG/ML
INJECTION, EMULSION INTRAVENOUS AS NEEDED
Status: DISCONTINUED | OUTPATIENT
Start: 2024-12-03 | End: 2024-12-03

## 2024-12-03 RX ORDER — PROMETHAZINE HYDROCHLORIDE 25 MG/ML
12.5 INJECTION, SOLUTION INTRAMUSCULAR; INTRAVENOUS ONCE AS NEEDED
Status: DISCONTINUED | OUTPATIENT
Start: 2024-12-03 | End: 2024-12-03 | Stop reason: HOSPADM

## 2024-12-03 RX ORDER — ONDANSETRON 4 MG/1
4 TABLET, ORALLY DISINTEGRATING ORAL EVERY 8 HOURS PRN
Qty: 15 TABLET | Refills: 0 | Status: SHIPPED | OUTPATIENT
Start: 2024-12-03

## 2024-12-03 RX ORDER — SODIUM CHLORIDE, SODIUM LACTATE, POTASSIUM CHLORIDE, CALCIUM CHLORIDE 600; 310; 30; 20 MG/100ML; MG/100ML; MG/100ML; MG/100ML
125 INJECTION, SOLUTION INTRAVENOUS CONTINUOUS
Status: DISCONTINUED | OUTPATIENT
Start: 2024-12-03 | End: 2024-12-03 | Stop reason: HOSPADM

## 2024-12-03 RX ORDER — CALCIUM CARBONATE 500 MG/1
1000 TABLET, CHEWABLE ORAL DAILY PRN
Status: CANCELLED | OUTPATIENT
Start: 2024-12-03

## 2024-12-03 RX ORDER — ASPIRIN 81 MG/1
81 TABLET ORAL 2 TIMES DAILY
Qty: 60 TABLET | Refills: 0 | Status: SHIPPED | OUTPATIENT
Start: 2024-12-03 | End: 2025-01-02

## 2024-12-03 RX ORDER — OXYCODONE HYDROCHLORIDE 5 MG/1
5 TABLET ORAL EVERY 4 HOURS PRN
Qty: 20 TABLET | Refills: 0 | Status: SHIPPED | OUTPATIENT
Start: 2024-12-03 | End: 2024-12-13

## 2024-12-03 RX ORDER — GLYCOPYRROLATE 0.2 MG/ML
INJECTION INTRAMUSCULAR; INTRAVENOUS AS NEEDED
Status: DISCONTINUED | OUTPATIENT
Start: 2024-12-03 | End: 2024-12-03

## 2024-12-03 RX ORDER — PANTOPRAZOLE SODIUM 40 MG/1
40 TABLET, DELAYED RELEASE ORAL DAILY
Status: CANCELLED | OUTPATIENT
Start: 2024-12-03

## 2024-12-03 RX ORDER — MIDAZOLAM HYDROCHLORIDE 2 MG/2ML
INJECTION, SOLUTION INTRAMUSCULAR; INTRAVENOUS AS NEEDED
Status: DISCONTINUED | OUTPATIENT
Start: 2024-12-03 | End: 2024-12-03

## 2024-12-03 RX ORDER — HYDROMORPHONE HCL/PF 1 MG/ML
0.5 SYRINGE (ML) INJECTION
Refills: 0 | Status: DISCONTINUED | OUTPATIENT
Start: 2024-12-03 | End: 2024-12-03 | Stop reason: HOSPADM

## 2024-12-03 RX ORDER — ONDANSETRON 2 MG/ML
4 INJECTION INTRAMUSCULAR; INTRAVENOUS EVERY 6 HOURS PRN
Status: CANCELLED | OUTPATIENT
Start: 2024-12-03

## 2024-12-03 RX ORDER — MEPERIDINE HYDROCHLORIDE 50 MG/ML
12.5 INJECTION INTRAMUSCULAR; INTRAVENOUS; SUBCUTANEOUS ONCE
Status: DISCONTINUED | OUTPATIENT
Start: 2024-12-03 | End: 2024-12-03 | Stop reason: HOSPADM

## 2024-12-03 RX ORDER — FERROUS SULFATE 325(65) MG
325 TABLET ORAL 2 TIMES DAILY WITH MEALS
Status: CANCELLED | OUTPATIENT
Start: 2024-12-03

## 2024-12-03 RX ORDER — GABAPENTIN 300 MG/1
300 CAPSULE ORAL
Status: CANCELLED | OUTPATIENT
Start: 2024-12-03

## 2024-12-03 RX ORDER — ASPIRIN 81 MG/1
81 TABLET, CHEWABLE ORAL 2 TIMES DAILY
Status: CANCELLED | OUTPATIENT
Start: 2024-12-03

## 2024-12-03 RX ORDER — CEFAZOLIN SODIUM 2 G/50ML
2000 SOLUTION INTRAVENOUS EVERY 8 HOURS
Status: CANCELLED | OUTPATIENT
Start: 2024-12-03 | End: 2024-12-04

## 2024-12-03 RX ORDER — MAGNESIUM HYDROXIDE 1200 MG/15ML
LIQUID ORAL AS NEEDED
Status: DISCONTINUED | OUTPATIENT
Start: 2024-12-03 | End: 2024-12-03 | Stop reason: HOSPADM

## 2024-12-03 RX ORDER — OXYCODONE HYDROCHLORIDE 10 MG/1
10 TABLET ORAL EVERY 4 HOURS PRN
Status: DISCONTINUED | OUTPATIENT
Start: 2024-12-03 | End: 2024-12-03 | Stop reason: HOSPADM

## 2024-12-03 RX ORDER — SENNOSIDES 8.6 MG
1 TABLET ORAL DAILY
Status: CANCELLED | OUTPATIENT
Start: 2024-12-03

## 2024-12-03 RX ORDER — OXYCODONE HYDROCHLORIDE 5 MG/1
5 TABLET ORAL EVERY 4 HOURS PRN
Qty: 45 TABLET | Refills: 0 | Status: SHIPPED | OUTPATIENT
Start: 2024-12-03 | End: 2024-12-03

## 2024-12-03 RX ORDER — DOCUSATE SODIUM 100 MG/1
100 CAPSULE, LIQUID FILLED ORAL 2 TIMES DAILY
Status: CANCELLED | OUTPATIENT
Start: 2024-12-03

## 2024-12-03 RX ORDER — LABETALOL HYDROCHLORIDE 5 MG/ML
5 INJECTION, SOLUTION INTRAVENOUS
Status: DISCONTINUED | OUTPATIENT
Start: 2024-12-03 | End: 2024-12-03 | Stop reason: HOSPADM

## 2024-12-03 RX ORDER — TRAMADOL HYDROCHLORIDE 50 MG/1
50 TABLET ORAL EVERY 4 HOURS PRN
Status: CANCELLED | OUTPATIENT
Start: 2024-12-03

## 2024-12-03 RX ORDER — CHLORHEXIDINE GLUCONATE ORAL RINSE 1.2 MG/ML
15 SOLUTION DENTAL ONCE
Status: COMPLETED | OUTPATIENT
Start: 2024-12-03 | End: 2024-12-03

## 2024-12-03 RX ORDER — OXYCODONE HYDROCHLORIDE 5 MG/1
5 TABLET ORAL EVERY 4 HOURS PRN
Status: DISCONTINUED | OUTPATIENT
Start: 2024-12-03 | End: 2024-12-03 | Stop reason: HOSPADM

## 2024-12-03 RX ORDER — ALBUTEROL SULFATE 0.83 MG/ML
2.5 SOLUTION RESPIRATORY (INHALATION) ONCE AS NEEDED
Status: DISCONTINUED | OUTPATIENT
Start: 2024-12-03 | End: 2024-12-03 | Stop reason: HOSPADM

## 2024-12-03 RX ORDER — ONDANSETRON 2 MG/ML
4 INJECTION INTRAMUSCULAR; INTRAVENOUS ONCE AS NEEDED
Status: DISCONTINUED | OUTPATIENT
Start: 2024-12-03 | End: 2024-12-03 | Stop reason: HOSPADM

## 2024-12-03 RX ORDER — FENTANYL CITRATE/PF 50 MCG/ML
25 SYRINGE (ML) INJECTION
Refills: 0 | Status: DISCONTINUED | OUTPATIENT
Start: 2024-12-03 | End: 2024-12-03 | Stop reason: HOSPADM

## 2024-12-03 RX ORDER — SENNOSIDES 8.6 MG
650 CAPSULE ORAL EVERY 6 HOURS PRN
Qty: 90 TABLET | Refills: 0 | Status: SHIPPED | OUTPATIENT
Start: 2024-12-03

## 2024-12-03 RX ORDER — ONDANSETRON 2 MG/ML
INJECTION INTRAMUSCULAR; INTRAVENOUS AS NEEDED
Status: DISCONTINUED | OUTPATIENT
Start: 2024-12-03 | End: 2024-12-03

## 2024-12-03 RX ORDER — EPHEDRINE SULFATE 50 MG/ML
INJECTION INTRAVENOUS AS NEEDED
Status: DISCONTINUED | OUTPATIENT
Start: 2024-12-03 | End: 2024-12-03

## 2024-12-03 RX ORDER — CEFAZOLIN SODIUM 2 G/50ML
SOLUTION INTRAVENOUS AS NEEDED
Status: DISCONTINUED | OUTPATIENT
Start: 2024-12-03 | End: 2024-12-03

## 2024-12-03 RX ORDER — SODIUM CHLORIDE, SODIUM LACTATE, POTASSIUM CHLORIDE, CALCIUM CHLORIDE 600; 310; 30; 20 MG/100ML; MG/100ML; MG/100ML; MG/100ML
75 INJECTION, SOLUTION INTRAVENOUS CONTINUOUS
Status: CANCELLED | OUTPATIENT
Start: 2024-12-03

## 2024-12-03 RX ORDER — NAPROXEN 500 MG/1
500 TABLET ORAL 2 TIMES DAILY WITH MEALS
Qty: 60 TABLET | Refills: 0 | Status: SHIPPED | OUTPATIENT
Start: 2024-12-03

## 2024-12-03 RX ORDER — TRANEXAMIC ACID 10 MG/ML
1000 INJECTION, SOLUTION INTRAVENOUS ONCE
Status: COMPLETED | OUTPATIENT
Start: 2024-12-03 | End: 2024-12-03

## 2024-12-03 RX ORDER — CEPHALEXIN 500 MG/1
2000 CAPSULE ORAL EVERY 8 HOURS SCHEDULED
Qty: 8 CAPSULE | Refills: 0 | Status: SHIPPED | OUTPATIENT
Start: 2024-12-03 | End: 2024-12-04

## 2024-12-03 RX ORDER — SODIUM CHLORIDE, SODIUM LACTATE, POTASSIUM CHLORIDE, CALCIUM CHLORIDE 600; 310; 30; 20 MG/100ML; MG/100ML; MG/100ML; MG/100ML
125 INJECTION, SOLUTION INTRAVENOUS CONTINUOUS
Status: CANCELLED | OUTPATIENT
Start: 2024-12-03

## 2024-12-03 RX ORDER — ACETAMINOPHEN 500 MG
500 TABLET ORAL EVERY 6 HOURS PRN
COMMUNITY
End: 2024-12-03

## 2024-12-03 RX ORDER — CEFAZOLIN SODIUM 2 G/50ML
2000 SOLUTION INTRAVENOUS ONCE
Status: DISCONTINUED | OUTPATIENT
Start: 2024-12-03 | End: 2024-12-03 | Stop reason: HOSPADM

## 2024-12-03 RX ADMIN — GLYCOPYRROLATE 0.2 MCG: 0.2 INJECTION INTRAMUSCULAR; INTRAVENOUS at 10:25

## 2024-12-03 RX ADMIN — PROPOFOL 50 MCG/KG/MIN: 10 INJECTION, EMULSION INTRAVENOUS at 10:16

## 2024-12-03 RX ADMIN — OXYCODONE 5 MG: 5 TABLET ORAL at 12:53

## 2024-12-03 RX ADMIN — CHLORHEXIDINE GLUCONATE 15 ML: 1.2 RINSE ORAL at 08:25

## 2024-12-03 RX ADMIN — PROPOFOL 20 MG: 10 INJECTION, EMULSION INTRAVENOUS at 11:01

## 2024-12-03 RX ADMIN — CEFAZOLIN SODIUM 2000 MG: 2 SOLUTION INTRAVENOUS at 10:29

## 2024-12-03 RX ADMIN — FENTANYL CITRATE 25 MCG: 50 INJECTION INTRAMUSCULAR; INTRAVENOUS at 12:31

## 2024-12-03 RX ADMIN — SODIUM CHLORIDE, SODIUM LACTATE, POTASSIUM CHLORIDE, AND CALCIUM CHLORIDE 125 ML/HR: .6; .31; .03; .02 INJECTION, SOLUTION INTRAVENOUS at 08:25

## 2024-12-03 RX ADMIN — SODIUM CHLORIDE, SODIUM LACTATE, POTASSIUM CHLORIDE, AND CALCIUM CHLORIDE: .6; .31; .03; .02 INJECTION, SOLUTION INTRAVENOUS at 10:17

## 2024-12-03 RX ADMIN — EPHEDRINE SULFATE 5 MG: 50 INJECTION, SOLUTION INTRAVENOUS at 10:33

## 2024-12-03 RX ADMIN — EPHEDRINE SULFATE 5 MG: 50 INJECTION, SOLUTION INTRAVENOUS at 10:38

## 2024-12-03 RX ADMIN — ONDANSETRON 4 MG: 2 INJECTION INTRAMUSCULAR; INTRAVENOUS at 11:49

## 2024-12-03 RX ADMIN — MIDAZOLAM 2 MG: 1 INJECTION INTRAMUSCULAR; INTRAVENOUS at 10:35

## 2024-12-03 RX ADMIN — TRANEXAMIC ACID 1000 MG: 10 INJECTION, SOLUTION INTRAVENOUS at 10:25

## 2024-12-03 RX ADMIN — FENTANYL CITRATE 25 MCG: 50 INJECTION INTRAMUSCULAR; INTRAVENOUS at 12:27

## 2024-12-03 RX ADMIN — GLYCOPYRROLATE 0.1 MCG: 0.2 INJECTION INTRAMUSCULAR; INTRAVENOUS at 10:29

## 2024-12-03 RX ADMIN — EPHEDRINE SULFATE 5 MG: 50 INJECTION, SOLUTION INTRAVENOUS at 10:47

## 2024-12-03 RX ADMIN — PHENYLEPHRINE HYDROCHLORIDE 10 MCG/MIN: 10 INJECTION INTRAVENOUS at 10:59

## 2024-12-03 NOTE — ANESTHESIA PROCEDURE NOTES
Spinal Block    Patient location during procedure: OR  Start time: 12/3/2024 10:30 AM  Reason for block: procedure for pain and at surgeon's request  Staffing  Performed by: Abelardo Pressley CRNA  Authorized by: Abelardo Pressley CRNA    Preanesthetic Checklist  Completed: patient identified, IV checked, site marked, risks and benefits discussed, surgical consent, monitors and equipment checked, pre-op evaluation and timeout performed  Spinal Block  Patient position: sitting  Prep: ChloraPrep and site prepped and draped  Patient monitoring: frequent blood pressure checks, continuous pulse ox and heart rate  Approach: midline  Location: L3-4  Needle  Needle type: Pencan   Needle gauge: 24 G  Needle length: 4 in  Assessment  Sensory level: T4  Injection Assessment:  negative aspiration for heme, no paresthesia on injection and positive aspiration for clear CSF.  Post-procedure:  site cleaned  Additional Notes  On e unsucceessful attempt abelardo pressley crna one successful attempt dr. Chacho badillo 3.5 ml 1.5 % polocaine

## 2024-12-03 NOTE — DISCHARGE INSTR - AVS FIRST PAGE
POSTOPERATIVE INSTRUCTIONS    MEDICATIONS:  Resume all home medications unless otherwise instructed by your surgeon.  Antibiotic: Keflex 500 mg take 4 pills at 8 PM tonight and 4 pills at 8 AM tomorrow morning.  Pain Medication:  Oxycodone 5 mg, 1-2 tablets every 4 hours as needed  If you were given a regional anesthetic (nerve block), please begin taking the pain medication as soon as you get home, even if you have minimal or no pain.  DO NOT WAIT FOR THE NERVE BLOCK TO WEAR OFF.  Possible side effects include nausea, constipation, and urinary retention.  If you experience these side effects, please call our office for assistance.  Pain med refills are authorized only during office hours (8am-4pm, Mon-Fri).  Anti-Inflammatory:  Tylenol 650 mg, 1 tablet every 6 hours and Naproxen 500 mg, 1 tablet every 12 hours  Take with food.  Stop if you experience nausea, reflux, or stomach pain.  Nausea Medication:  Zofran ODT 4 mg, 1 tablet every 6 hours as needed  Fill prescription ONLY if you expericnce severe nausea.  Blood Clot Prevention:  Aspirin 81 mg, 1 tablet twice daily for 30 days  Pump your foot up and down 20 times per hour while you are less mobile.    WOUND CARE:  Keep the dressing clean and dry.  Light drainage may occur the first 2 days postop.  Continue Mepilex dressing for 7 days and dry dressing change as needed.  Please call our office ([phone]) if you experience any of the following:  Sudden increase in swelling, redness, or warmth at the surgical site  Excessive incisional drainage that persists beyond the 3rd day after surgery  Oral temperature greater than 101 degrees, not relieved with Tylenol  Shortness of breath, chest pain, nausea, or any other concerning symptoms    SWELLING CONTROL:  Cold Therapy:  Apply ice (20 min on, 20 min off) as often as you feel is necessary.  Elevation:  Elevate the entire leg above heart level as much as possible.  Compression:  Apply ACE wraps or a compression  "stocking as needed.    RANGE OF MOTION:  Follow posterior hip precautions as instructed by physical therapy.    IMMOBILIZATION:  None.  You are allowed full range of motion as tolerated.    ACTIVITY:  BEAR FULL WEIGHT AS TOLERATED on the operative leg.  Use crutches to assist only as needed.  Using Crutches on Stairs:  Going up, lead with your \"good\" (nonoperative) leg.  Going down, lead with your \"bad\" (operative) leg.  Use a hand rail when available.  Quad Sets:  Sit or lie with your knee straight.  Tighten your quadriceps (front thigh) muscle.  Hold for 3 seconds, then relax.  Repeat 20 times per hour while awake.    PHYSICAL THERAPY:  Begin therapy 5 TO 7 DAYS AFTER SURGERY.  You were given a prescription for therapy at your preoperative office visit.  If you do not have physical therapy scheduled yet, please call our office for assistance.    FOLLOW-UP APPOINTMENT:  2 weeks after surgery with:    Dr. Dipesh Knowles MD  [facility]  [address] [place] [address], [address], [place] [address]  [phone]   "

## 2024-12-03 NOTE — OP NOTE
OPERATIVE REPORT    PATIENT NAME: Cristin Nolan   :  1960  MRN: [id number]  Pt Location: [facility] OR ROOM 05    SURGERY DATE: 12/3/2024    SURGEON(S) and ROLE:  Primary: Dipesh Knowles MD  Assisting: Stuart Mistry PA-C; Mike Becerril DPM    NOTE:  I was present for the entire procedure and performed all essential portions of the surgery.      PREOPERATIVE DIAGNOSES:  Left Hip Osteoarthritis    POSTOPERATIVE DIAGNOSES:  Same as Preoperative Diagnosis    PROCEDURES:  Left Posterior Total Hip Arthroplasty      ANESTHESIA TYPE:  Spinal    ANESTHESIA STAFF:   Anesthesiologist: Chacho Ansari MD  CRNA: Abelardo Pressley CRNA    ESTIMATED BLOOD LOSS:  150 mL    PERIOPERATIVE ANTIBIOTICS:  cefazolin, 2 grams    IMPLANTS: Femoral Stem: Size 3 DEPUY ACTIS High-Offset    Femoral Neck:  +5 mm    Femoral Head: 32 mm ceramic head    Acetabular Shell: 50 mm OD DEPUY PINNACLE 100 SERIES    Acetabular Screws: none    Acetabular Liner: +4 mm lateralized DEPUY ALTRX      Implant Name Type Inv. Item Serial No.  Lot No. LRB No. Used Action   SHELL ACETABULAR CUP 50MM PINNACLE 100 - [id number]  SHELL ACETABULAR CUP 50MM PINNACLE 100  DEPUY [id number] Left 1 Implanted   ELIMINATOR ACTB THRD POS STOP - [id number]  ELIMINATOR ACTB THRD POS STOP  DEPUY [id number] Left 1 Implanted   LINER ACTB ULT LNK PE 32 X 50MM NEUTRAL +4 A LTRX - [id number]  LINER ACTB ULT LNK PE 32 X 50MM NEUTRAL +4 A LTRX  DEPUY [id number] Left 1 Implanted   STEM FEM SZ 3 TAPER CMNTLS HI COLLAR ACTIS - [id number]  STEM FEM SZ 3 TAPER CMNTLS HI COLLAR ACTIS  DEPUY [id number] Left 1 Implanted   HEAD FEMORAL 12/14 TPR 32MM +5MM BIOLOX ARTICULEZE - [id number]  HEAD FEMORAL 12/14 TPR 32MM +5MM BIOLOX ARTICULEZE  DEPUY [id number] Left 1 Implanted       SPECIMENS:  * No specimens in log *    DRAINS:  None      OPERATIVE INDICATIONS:  The patient is a 64 y.o. female with left hip pain and severe osteoarthritis.  Surgical treatment was indicated due to persistent  symptoms despite non-surgical treatment.  After a thorough discussion of the potential risks, benefits, and alternative treatments, the patient agreed to proceed with surgery.  The patient understands that the risks of surgery include, but are not limited to: infection, neurovascular injury, wound healing complications, venous thromboembolism, persistent pain, stiffness, instability, recurrence of symptoms, potential need for additional surgeries, and loss of limb or life.  Oral and written consent for surgery was obtained from the patient preoperatively.    PROCEDURE AND TECHNIQUE:  On the day of surgery, the patient was identified in the preoperative holding area.  The operative site was marked by the surgeon.  The patient was taken into the operating room.  A time-out was conducted to confirm the patient's identity, the operative site, and the proposed procedure.  The patient was anesthetized, and perioperative antibiotics were administered.  The patient was positioned lateral on the OR table.  All bony prominences were padded.  The operative site was prepped and draped using standard sterile technique.      A posterior approach to the hip was performed.  Incision was carried down to the fascia and hemostasis was obtained with electrocautery.  The fascia was incised in line with the incision.  The gluteus daisy was divided bluntly and crossing vessels were cauterized.  The trochanteric bursa was excised.  The femur was gently internally rotated.  The short external rotators were identified, released from the femur, and tagged with nonabsorbable suture.  A posteriorly-based capsular flap was raised and tagged with nonabsorbable suture.  Remaining posterior, superior, and inferior capsule were excised.  The hip was gently dislocated.  The femoral head demonstrated severe degenerative changes.  A femoral neck osteotomy was made approximately 1 cm above the lesser trochanter with an oscillating saw and finished  with an osteotome.    Acetabular retractors were placed.  The labrum was excised.  The acetabulum demonstrated severe degenerative changes.  The acetabulum was reamed incrementally to a size that created a stable hemispherical socket with good rim contact and subchondral bone bed.  A trial acetabular shell was impacted in 40 degrees of abduction and 30 degrees of anteversion with excelllent stability.  A trial liner was placed.    The leg was flexed and internally rotated.  Femoral retractors were placed.  A canal finder and box osteotome were used to access the femoral canal.  Femoral broaches were passed incrementally in about 10 degrees of anteversion.  The final broach had a good axial and rotational stability, and it was left in place.  A trial head and neck were placed.    Trial reductions were performed.  Neck length and liner offset were adjusted as needed to provde appropriate stability and soft tissue tension.  With the final trial components in place, the hip was stable to 90 degrees of IR at 30 degrees of hip flexion, 80 degrees of IR at 70 degrees of hip flexion, and 90 degrees of ER in full hip extension.  There was no bony impingement, and soft tissue tension was appropriate.  Leg lengths were equal based on palpation of the knees and heels.    The hip was dislocated and the trial components were removed.  The acetabulum and femoral canal were irrigated with sterile saline.  The final components were placed and found to have excellent fit and stability.  The hip was reduced and found to have excellent stability, range of motion, and soft tissue tension, with measurements identical to the trial reduction.    The hip was irrigated again with sterile saline.  A Hemovac drain was not placed.  The posterior capsular flap and short external rotators were repaired to the abductor tendon at the level of the greater trochanter.  The fascia was repaired with #1 Vicryl to achieve a watertight closure.  The skin  was closed with 2-0 vicryl and 2-0 stratafix.  A sterile dressing was applied.    The drapes were removed, and a hip abduction pillow was placed.  The patient was awakened from anesthesia and taken to the PACU in stable condition.      COMPLICATIONS:  None    PATIENT DISPOSITION:  PACU       SIGNATURE:  Dipesh Knowles MD  DATE:  December 3, 2024  TIME:  5:25 PM

## 2024-12-03 NOTE — PHYSICAL THERAPY NOTE
PT EVALUATION and TREATMENT NOTE    Pt. Name: Cristin Nolan  Pt. Age: 64 y.o.  MRN: [id number]  LENGTH OF STAY: 0    Patient Active Problem List   Diagnosis    S/P colonoscopy    Endometrial cancer (HCC)    Mixed hyperlipidemia    OA (osteoarthritis) of knee    Pain in joint, multiple sites    Left hip pain    Foot pain, bilateral    Sciatica of right side    Radicular leg pain    Degenerative disc disease, lumbar    Spinal stenosis of lumbosacral region    Obesity    Chronic pain syndrome    Vitamin D deficiency    Leukopenia    Bursitis of right shoulder    Acute medial meniscus tear of left knee    Rupture of anterior cruciate ligament of left knee    Hyperglycemia    Absence of cervix, acquired    Newly recognized heart murmur    Synovial bursa cyst    Elevated uric acid in blood    Paresthesias    Morbid obesity (HCC)    Primary osteoarthritis of one hip, left    Radiculopathy, lumbar region       Admitting Diagnoses:   Primary osteoarthritis of one hip, left [M16.12]    Past Medical History:   Diagnosis Date    Ambulatory dysfunction     uses walking stick    Anxiety     Arthritis     Cancer (HCC) 7/15/17    All better now    Chronic pain disorder     Claustrophobia     mild    DDD (degenerative disc disease), lumbar     Endometrial cancer ([facility]) 07/06/2017    tRA TLH BSO SLND on 7/17/17 by Dr. Stone, followed by 3 cycles of vaginal brachytherapy completed in August 2017    Low back pain     Mild acid reflux     OA (osteoarthritis)     marissa knees    Spinal stenosis of lumbosacral region     Wears glasses        Past Surgical History:   Procedure Laterality Date    COLONOSCOPY      HYSTERECTOMY      JOINT REPLACEMENT Bilateral     knees    ORTHOPEDIC SURGERY      PLANTAR FASCIA SURGERY Bilateral     PA ARTHRP KNE CONDYLE&PLATU MEDIAL&LAT COMPARTMENTS Right 11/30/2020    Procedure: ARTHROPLASTY KNEE TOTAL;  Surgeon: Dipesh Knowles MD;  Location: [facility];  Service: Orthopedics    PA HYSTEROSCOPY BX  ENDOMETRIUM&/POLYPC W/WO D&C N/A 02/28/2017    Procedure: DILATATION AND CURETTAGE (D&C) WITH HYSTEROSCOPY,POLYPECTOMY;  Surgeon: Roberto Anderson MD;  Location: [facility];  Service: Gynecology    REPLACEMENT TOTAL KNEE Left 10/22/2021    SHOULDER SURGERY Left     TONSILLECTOMY      TOTAL ABDOMINAL HYSTERECTOMY W/ BILATERAL SALPINGOOPHORECTOMY Bilateral 07/2017    endometrial cancer, ? stage 1    WISDOM TOOTH EXTRACTION         Imaging Studies:  XR hip/pelv 1 vw left if performed   Final Result by Rashid Ricardo MD (12/03 1351)      Total hip arthroplasty without evidence of a hardware complication or failure.         Computerized Assisted Algorithm (CAA) may have been used to analyze all applicable images.            Workstation performed: [id number]              12/03/24 1500   PT Last Visit   PT Visit Date 12/03/24   Note Type   Note type Evaluation  (and treatment)   Pain Assessment   Pain Assessment Tool 0-10   Pain Score 3   Pain Location/Orientation Orientation: Left;Location: Hip   Hospital Pain Intervention(s) Repositioned;Ambulation/increased activity;Elevated;Emotional support;Rest   Restrictions/Precautions   Weight Bearing Precautions Per Order Yes   LLE Weight Bearing Per Order WBAT   Other Precautions WBS;THR;Fall Risk;Pain  (posterior hip precautions)   Home Living   Type of Home House   Home Layout One level;Performs ADLs on one level;Able to live on main level with bedroom/bathroom;Stairs to enter with rails;Laundry in basement  (6+1 BRYCE with R hand rail)   Bathroom Shower/Tub Tub/shower unit   Bathroom Toilet Raised   Bathroom Equipment Shower chair;Grab bars in shower;Commode   Home Equipment Walker;Cane;Sock aid;Reacher;Long-handled shoehorn   Prior Function   Level of [place] Independent with ADLs;Independent with functional mobility;Independent with IADLS   Lives With Alone   Receives Help From Family  (Brother in law will be staying until Monday.)   IADLs Independent with meal  prep;Independent with driving;Independent with medication management   Falls in the last 6 months 1 to 4   Vocational Retired   General   Family/Caregiver Present No   Cognition   Overall Cognitive Status WFL   Arousal/Participation Alert   Attention Within functional limits   Orientation Level Oriented X4   Following Commands Follows one step commands without difficulty   Subjective   Subjective Pt agreeable to PT/OT evaluations.   RUE Assessment   RUE Assessment   (refer to OT)   LUE Assessment   LUE Assessment   (refer to OT)   RLE Assessment   RLE Assessment WFL  (4+/5 grossly)   LLE Assessment   LLE Assessment X   Strength LLE   L Knee Flexion 3/5   L Knee Extension 3/5   L Ankle Dorsiflexion 4+/5   L Ankle Plantar Flexion 4+/5   Light Touch   RLE Light Touch Grossly intact   LLE Light Touch Grossly intact   Bed Mobility   Supine to Sit 5  Supervision   Additional items HOB elevated;Increased time required;Verbal cues   Additional Comments Pt greeted in supine.   Transfers   Sit to Stand 5  Supervision   Additional items Increased time required;Verbal cues  (w/ RW)   Stand to Sit 5  Supervision   Additional items Armrests;Increased time required;Verbal cues  (w/ RW)   Toilet transfer 5  Supervision   Additional items Armrests;Increased time required;Verbal cues;Commode  (BSC over standard toilet; w/ RW)   Additional Comments cues for hand placement   Ambulation/Elevation   Gait pattern Improper Weight shift;Antalgic;Decreased foot clearance;Decreased L stance;Step through pattern   Gait Assistance 5  Supervision   Additional items Verbal cues   Assistive Device Rolling walker   Distance 80'x1; 80'x1 (treatment)   Stair Management Assistance 5  Supervision   Additional items Verbal cues   Stair Management Technique One rail R;Step to pattern;Foreward;Nonreciprocal   Number of Stairs 4  (x2 stair trainers)   Balance   Static Sitting Good   Dynamic Sitting Fair +   Static Standing Fair  (w/ RW)   Dynamic Standing  Fair -  (w/ RW)   Ambulatory Fair -  (w/ RW)   Activity Tolerance   Activity Tolerance Patient tolerated treatment well   Medical Staff Made Aware NERISSA Torres   Nurse Made Aware DEBRA Yang   Assessment   Prognosis Good   Problem List Decreased strength;Decreased range of motion;Decreased endurance;Impaired balance;Decreased mobility;Obesity;Pain;Orthopedic restrictions   Assessment Pt. 64 y.o.female presents for elective surgery. Past medical hx includes endometrial CA, HLD, leukopenia, paresthesias, lumbar radiculopathy. Pt admitted for Primary osteoarthritis of one hip, left w/ Primary osteoarthritis of one hip, left (M16.12). S/p L elective THR (posterior) POD #0. Pt referred to PT for functional mobility evaluation & D/C planning w/ orders of activity as tolerated. WBAT LLE. Posterior hips precautions. PTA, pt reports being I w/o AD. Personal factors affecting pt at time of IE include: dec caregiver support, decreased independence in ADLs/IADLs, lives alone, and steps to enter. During evaluation, deficits included dec mobility, balance, ambulation. Required S for supine to sit, sit<>stand, toilet transfer and ambulation. Pt able to ambulate 80' with RW in unit. Antalgic step through gait with no gross LOB noted. See treatment assessment for further functional mobility. Pt demonstrated dec endurance and tolerance to activity. Denies reports of dizziness or SOB t/o session. Pt was educated on fall precautions and reinforced w/ good understanding. Pt would benefit from continued PT to address deficits as defined above and maximize level of independence with functional mobility and safety. Based on pt presentation and impaired function, pt would benefit from level III, (minimum resource intensity) at D/C. The patient's AM-PAC Basic Mobility Inpatient Short Form Raw Score is 22. A Raw score of greater than 16 suggests the patient may benefit from discharge to home. Please also refer to the recommendation of the Physical  Therapist for safe discharge planning. Nsg staff to continue to mobilized pt (OOB in chair for all meals & ambulate in room/unit) as tolerated to prevent further decline in function. Nsg notified. Co-eval performed to complete this PT evaluation for the pts best interest given pts medical complexity and functional level.   Barriers to Discharge None   Barriers to Discharge Comments From PT stand point, pt cleared functionally for D/C when medically appropriate.   Goals   Patient Goals to go home   STG Expiration Date 12/10/24   Short Term Goal #1 1) Inc overall LE strength by 1/2 MMT grade to improve functional mobility; 2) Pt will demonstrate improved bed mobility with mod I to dec caregiver burden; 3) Pt will demonstrate improved transfers w/ mod I for inc safety; 4) Pt will be able to amb w/ mod I >150' w/ RW for household distances to inc safety and dec caregiver burden; 5) Pt will be able to navigate stairs with mod I to dec caregiver burden and inc safety with functional mobility; 6) Improve general balance by 1 grade to inc safety; 7) PT for ongoing patient and caregiver education   PT Treatment Day 0   Plan   Treatment/Interventions Functional transfer training;LE strengthening/ROM;Elevations;Therapeutic exercise;Endurance training;Patient/family training;Bed mobility;Gait training;Spoke to nursing;OT   PT Frequency Twice a day  (PRN)   Discharge Recommendation   Rehab Resource Intensity Level, PT III (Minimum Resource Intensity)   Equipment Recommended Walker  (pt owns)   AM-PAC Basic Mobility Inpatient   Turning in Flat Bed Without Bedrails 4   Lying on Back to Sitting on Edge of Flat Bed Without Bedrails 4   Moving Bed to Chair 4   Standing Up From Chair Using Arms 4   Walk in Room 3   Climb 3-5 Stairs With Railing 3   Basic Mobility Inpatient Raw Score 22   Basic Mobility Standardized Score 47.4   [facility] Highest Level Of Mobility   -HLM Goal 7: Walk 25 feet or more   -HLM Achieved 7: Walk 25  feet or more   Additional Treatment Session   Start Time 1445   End Time 1500   Treatment Assessment Following initial evaluation, pt able to tolerate further functional mobility. Required S for ambulation and stair navigation. Pt able to ambulate 80' with RW in unit. Antalgic step through gait with no gross LOB noted. Able to perform nonreciprocal stair navigation with unilateral R hand rail to simulate home setup. S for stand to sit. Reviewed HEP and provided handout. All questions and concerns addressed at this time. Pt with OT at end of session.   Exercises   THR   (Reviewed HEP and provided handout. All questions and concerns addressed at this time.)   End of Consult   Patient Position at End of Consult Bedside chair   End of Consult Comments Pt with OT at end of session.       Hx/personal factors: co-morbidities, inaccessible home, dec caregiver support, home alone, use of AD, pain, total hip precautions, fall risk, assist w/ ADL's, and obesity, coping styles, social background, past experience, behavior pattern, post op precautions  Examination: dec mobility, dec balance, dec endurance, dec amb, risk for falls, pain, assessed body system, balance, endurance, amb, D/C disposition & fall risk, impairements in locomotion, musculoskeletal, balance, endurance, posture, coordination, assessed cognition, impairments in systems including multiple body structures involved; musculoskeletal (ROM, strength, posture, BMI), neuromuscular (balance,locomotion, gait, transfers, motor control and learning, sensation), joint integrity, integumentary (skin integrity, presence of scars or wounds), cardiopulmonary (vitals, edema); activity limitations (difficulties executing an action); participation restrictions (problems associated w involvement in life situations)  Clinical: unpredictable (ongoing medical status, risk for falls, POD #0, and pain mgt)  Complexity: high      Liza Zamora, PT

## 2024-12-03 NOTE — ANESTHESIA PREPROCEDURE EVALUATION
Procedure:  ARTHROPLASTY HIP TOTAL (psb same day dc) (Left: Hip)    Relevant Problems   CARDIO   (+) Mixed hyperlipidemia   (+) Newly recognized heart murmur      GYN   (+) Endometrial cancer (HCC)      MUSCULOSKELETAL   (+) Degenerative disc disease, lumbar   (+) OA (osteoarthritis) of knee   (+) Primary osteoarthritis of one hip, left   (+) Sciatica of right side      NEURO/PSYCH   (+) Chronic pain syndrome   (+) Paresthesias        Physical Exam    Airway    Mallampati score: II  TM Distance: >3 FB  Neck ROM: full     Dental   No notable dental hx     Cardiovascular  Rhythm: regular, Rate: normal, Cardiovascular exam normal    Pulmonary  Pulmonary exam normal Breath sounds clear to auscultation    Other Findings  post-pubertal.      Anesthesia Plan  ASA Score- 2     Anesthesia Type- spinal with ASA Monitors.         Additional Monitors:     Airway Plan:     Comment: Patient seen and examined.  History reviewed.  Patient to be done under spinal anesthesia with sedation, and GA as back-up.  Plan and risks discussed with the patient.  Consent obtained..       Plan Factors-Exercise tolerance (METS): >4 METS.    Chart reviewed. EKG reviewed.  Existing labs reviewed. Patient summary reviewed.    Patient is not a current smoker.  Patient instructed to abstain from smoking on day of procedure. Patient did not smoke on day of surgery.            Induction- intravenous.    Postoperative Plan- Plan for postoperative opioid use.         Informed Consent- Anesthetic plan and risks discussed with patient.  I personally reviewed this patient with the CRNA. Discussed and agreed on the Anesthesia Plan with the CRNA..

## 2024-12-03 NOTE — INTERVAL H&P NOTE
H&P reviewed. After examining the patient I find no changes in the patients condition since the H&P had been written.    Vitals:    12/03/24 0824   BP: 151/78   Pulse: 58   Resp: 17   Temp: 98.4 °F (36.9 °C)   SpO2: 97%

## 2024-12-03 NOTE — PLAN OF CARE
Problem: PHYSICAL THERAPY ADULT  Goal: Performs mobility at highest level of function for planned discharge setting.  See evaluation for individualized goals.  Description: Treatment/Interventions: Functional transfer training, LE strengthening/ROM, Elevations, Therapeutic exercise, Endurance training, Patient/family training, Bed mobility, Gait training, Spoke to nursing, OT  Equipment Recommended: Walker (pt owns)       See flowsheet documentation for full assessment, interventions and recommendations.  Note: Prognosis: Good  Problem List: Decreased strength, Decreased range of motion, Decreased endurance, Impaired balance, Decreased mobility, Obesity, Pain, Orthopedic restrictions  Assessment: Pt. 64 y.o.female presents for elective surgery. Past medical hx includes endometrial CA, HLD, leukopenia, paresthesias, lumbar radiculopathy. Pt admitted for Primary osteoarthritis of one hip, left w/ Primary osteoarthritis of one hip, left (M16.12). S/p L elective THR (posterior) POD #0. Pt referred to PT for functional mobility evaluation & D/C planning w/ orders of activity as tolerated. WBAT LLE. Posterior hips precautions. PTA, pt reports being I w/o AD. Personal factors affecting pt at time of IE include: dec caregiver support, decreased independence in ADLs/IADLs, lives alone, and steps to enter. During evaluation, deficits included dec mobility, balance, ambulation. Required S for supine to sit, sit<>stand, toilet transfer and ambulation. Pt able to ambulate 80' with RW in unit. Antalgic step through gait with no gross LOB noted. See treatment assessment for further functional mobility. Pt demonstrated dec endurance and tolerance to activity. Denies reports of dizziness or SOB t/o session. Pt was educated on fall precautions and reinforced w/ good understanding. Pt would benefit from continued PT to address deficits as defined above and maximize level of independence with functional mobility and safety. Based on  pt presentation and impaired function, pt would benefit from level III, (minimum resource intensity) at D/C. The patient's AM-PAC Basic Mobility Inpatient Short Form Raw Score is 22. A Raw score of greater than 16 suggests the patient may benefit from discharge to home. Please also refer to the recommendation of the Physical Therapist for safe discharge planning. Nsg staff to continue to mobilized pt (OOB in chair for all meals & ambulate in room/unit) as tolerated to prevent further decline in function. Nsg notified. Co-eval performed to complete this PT evaluation for the pts best interest given pts medical complexity and functional level.  Barriers to Discharge: None  Barriers to Discharge Comments: From PT stand point, pt cleared functionally for D/C when medically appropriate.  Rehab Resource Intensity Level, PT: III (Minimum Resource Intensity)    See flowsheet documentation for full assessment.

## 2024-12-03 NOTE — PLAN OF CARE
Problem: OCCUPATIONAL THERAPY ADULT  Goal: Performs self-care activities at highest level of function for planned discharge setting.  See evaluation for individualized goals.  Description: Treatment Interventions: ADL retraining, UE strengthening/ROM, Functional transfer training, Endurance training, Cognitive reorientation, Patient/family training, Equipment evaluation/education, Compensatory technique education, Activityengagement, Energy conservation          See flowsheet documentation for full assessment, interventions and recommendations.   12/3/2024 1609 by Melissa Suero OT  Outcome: Adequate for Discharge  Note: Limitation: Decreased ADL status, Decreased Safe judgement during ADL, Decreased UE strength, Decreased UE ROM, Decreased cognition, Decreased endurance, Decreased high-level ADLs, Decreased self-care trans  Prognosis: Fair  Assessment: Pt is a 64 y.o. female who presented to [facility] on 12/3/2024 with L hip OA. Pt s/p L CARO posterior on 12/3. Pt WBAT on LLE and with L THP. Pt  has a past medical history of Ambulatory dysfunction, Anxiety, Arthritis, Cancer ([facility]) (7/15/17), Chronic pain disorder, Claustrophobia, DDD (degenerative disc disease), lumbar, Endometrial cancer ([facility]) (07/06/2017), Low back pain, Mild acid reflux, OA (osteoarthritis), Spinal stenosis of lumbosacral region, and Wears glasses. Pt greeted bedside for OT evaluation on 12/03/24. Pt resides alone in a 1SH with 6+1 BRYCE. PTA, Pt reports being I with ADLs/IADLs/no AD/ +. Pt with supportive DEVIKA able to assist upon DC. Pt demonstrating the following occupational performance levels: Supervision with UB ADLs, Mod A  with LB ADLs, Supervision with bed mobility, Supervision with functional transfers, and Supervision with functional mobility with walker.Limitations that impact functional performance include decreased ADL status, UE ROM, UE strength, safe judgement during ADLs, cognition, endurance, self care transfers, and high  level ADLs. Occupational performance areas to address ADL retraining, functional transfer training, UE strengthening/ROM, endurance training, cognitive reorientation, Pt/caregiver education, equipment evaluation/education, compensatory technique education, energy conservation, and activity engagement . Pt would benefit from continued skilled OT services while in hospital to maximize independence with ADLs. Will continue to follow Pt's progress. Pt would benefit from returning home with increased social support upon DC to maximize safety and independence with ADLs and functional tasks of choice.     Rehab Resource Intensity Level, OT: No post-acute rehabilitation needs       12/3/2024 1609 by Melissa Suero, OT  Note: Limitation: Decreased ADL status, Decreased Safe judgement during ADL, Decreased UE strength, Decreased UE ROM, Decreased cognition, Decreased endurance, Decreased high-level ADLs, Decreased self-care trans  Prognosis: Fair  Assessment: Pt is a 64 y.o. female who presented to [facility] on 12/3/2024 with L hip OA. Pt s/p L CARO posterior on 12/3. Pt WBAT on LLE and with L THP. Pt  has a past medical history of Ambulatory dysfunction, Anxiety, Arthritis, Cancer (HCC) (7/15/17), Chronic pain disorder, Claustrophobia, DDD (degenerative disc disease), lumbar, Endometrial cancer ([facility]) (07/06/2017), Low back pain, Mild acid reflux, OA (osteoarthritis), Spinal stenosis of lumbosacral region, and Wears glasses. Pt greeted bedside for OT evaluation on 12/03/24. Pt resides alone in a 1SH with 6+1 BRYCE. PTA, Pt reports being I with ADLs/IADLs/no AD/ +. Pt with supportive DEVIKA able to assist upon DC. Pt demonstrating the following occupational performance levels: Supervision with UB ADLs, Mod A  with LB ADLs, Supervision with bed mobility, Supervision with functional transfers, and Supervision with functional mobility with walker.Limitations that impact functional performance include decreased ADL status, UE ROM,  UE strength, safe judgement during ADLs, cognition, endurance, self care transfers, and high level ADLs. Occupational performance areas to address ADL retraining, functional transfer training, UE strengthening/ROM, endurance training, cognitive reorientation, Pt/caregiver education, equipment evaluation/education, compensatory technique education, energy conservation, and activity engagement . Pt would benefit from continued skilled OT services while in hospital to maximize independence with ADLs. Will continue to follow Pt's progress. Pt would benefit from returning home with increased social support upon DC to maximize safety and independence with ADLs and functional tasks of choice.     Rehab Resource Intensity Level, OT: No post-acute rehabilitation needs

## 2024-12-03 NOTE — OCCUPATIONAL THERAPY NOTE
Occupational Therapy Evaluation & Treatment     Patient Name: Cristin Nolan  Today's Date: 12/3/2024  Problem List  Principal Problem:    Primary osteoarthritis of one hip, left    Past Medical History  Past Medical History:   Diagnosis Date    Ambulatory dysfunction     uses walking stick    Anxiety     Arthritis     Cancer (HCC) 7/15/17    All better now    Chronic pain disorder     Claustrophobia     mild    DDD (degenerative disc disease), lumbar     Endometrial cancer (HCC) 07/06/2017    tRA TLH BSO SLND on 7/17/17 by Dr. Stone, followed by 3 cycles of vaginal brachytherapy completed in August 2017    Low back pain     Mild acid reflux     OA (osteoarthritis)     marissa knees    Spinal stenosis of lumbosacral region     Wears glasses      Past Surgical History  Past Surgical History:   Procedure Laterality Date    COLONOSCOPY      HYSTERECTOMY      JOINT REPLACEMENT Bilateral     knees    ORTHOPEDIC SURGERY      PLANTAR FASCIA SURGERY Bilateral     IN ARTHRP KNE CONDYLE&PLATU MEDIAL&LAT COMPARTMENTS Right 11/30/2020    Procedure: ARTHROPLASTY KNEE TOTAL;  Surgeon: Dipesh Knowles MD;  Location: [facility];  Service: Orthopedics    IN HYSTEROSCOPY BX ENDOMETRIUM&/POLYPC W/WO D&C N/A 02/28/2017    Procedure: DILATATION AND CURETTAGE (D&C) WITH HYSTEROSCOPY,POLYPECTOMY;  Surgeon: Roberto Anderson MD;  Location: [facility];  Service: Gynecology    REPLACEMENT TOTAL KNEE Left 10/22/2021    SHOULDER SURGERY Left     TONSILLECTOMY      TOTAL ABDOMINAL HYSTERECTOMY W/ BILATERAL SALPINGOOPHORECTOMY Bilateral 07/2017    endometrial cancer, ? stage 1    WISDOM TOOTH EXTRACTION           12/03/24 1422   OT Last Visit   OT Visit Date 12/03/24   Note Type   Note type Evaluation  (and treatment)   Pain Assessment   Pain Assessment Tool 0-10   Pain Score 3   Pain Location/Orientation Orientation: Left;Location: Hip;Other (Comment)  (lower back)   Hospital Pain Intervention(s) Repositioned;Ambulation/increased activity  "  Restrictions/Precautions   Weight Bearing Precautions Per Order Yes   LLE Weight Bearing Per Order WBAT   Other Precautions WBS;Fall Risk;Pain;THR  (L THP)   Home Living   Type of Home House   Home Layout One level;Stairs to enter with rails  (6+1 BRYCE)   Bathroom Shower/Tub Tub/shower unit   Bathroom Toilet Raised   Bathroom Equipment Shower chair;Grab bars in shower;Commode   Home Equipment Walker;Cane;Sock aid;Reacher;Long-handled shoehorn   Additional Comments Pt resides alone in a 1SH with 6+1 BRYCE.  (Brother in law plans to stay until Monday)   Prior Function   Level of [place] Independent with ADLs;Independent with functional mobility;Independent with IADLS   Lives With Spouse   Receives Help From Family   IADLs Independent with meal prep;Independent with driving;Independent with medication management   Falls in the last 6 months 1 to 4  (x1 in shower)   Vocational Retired   Comments PTA, Pt reports being I with ADLs/IADLs/no AD/ +. Pt with supportive DEVIKA able to assist upon DC.   Lifestyle   Autonomy I with ADLs/IADLs/no AD/ +   Reciprocal Relationships Supportive DEVIKA   Service to Others Retired   Intrinsic Gratification Swim 4x/week (hydrobiking & deep water), yoga   Subjective   Subjective \"It feels better to stand up.\"   ADL   Where Assessed Other (Comment)  (Bathroom)   Eating Assistance 7  Independent   Grooming Assistance 5  Supervision/Setup   Grooming Deficit Setup;Increased time to complete;Supervision/safety;Wash/dry hands   UB Bathing Assistance 5  Supervision/Setup   LB Bathing Assistance 3  Moderate Assistance   UB Dressing Assistance 5  Supervision/Setup   LB Dressing Assistance 3  Moderate Assistance   Toileting Assistance  5  Supervision/Setup   Toileting Deficit Setup;Bedside commode;Increased time to complete;Supervison/safety  (BSC over toilet)   Functional Assistance 5  Supervision/Setup   Functional Deficit Supervision/safety;Increased time to complete;Setup   Additional " Comments Pt provided with tub transfer handout from OT toolkit, OT toolkit handout on use of LHAE, and posterior CARO precautions handout. Reviewed with Pt and Pt understanding.   Bed Mobility   Supine to Sit 5  Supervision   Additional items Increased time required;Verbal cues;HOB elevated   Sit to Supine Unable to assess   Additional Comments Pt greeted supine in bed.   Transfers   Sit to Stand 5  Supervision   Additional items Increased time required;Verbal cues   Stand to Sit 5  Supervision   Additional items Increased time required;Verbal cues   Additional Comments with RW- cues for safety and technique   Functional Mobility   Functional Mobility 5  Supervision   Additional Comments S with functional mobility with RW- household distances. (to/from bathroom)   Additional items Rolling walker   Balance   Static Sitting Fair +   Dynamic Sitting Fair   Static Standing Fair -   Dynamic Standing Fair -   Ambulatory Fair -   Activity Tolerance   Activity Tolerance Patient tolerated treatment well   Medical Staff Made Aware Co-eval with RAMESH De Jesus 2* to Pt's medical complexity, post-surgical, and decreased endurance.   Nurse Made Aware RN cleared/update.d   RUE Assessment   RUE Assessment WFL  (Pt reports soreness and R shoulder OA)   LUE Assessment   LUE Assessment WFL   Hand Function   Gross Motor Coordination Functional   Fine Motor Coordination Functional   Sensation   Light Touch No apparent deficits   Psychosocial   Psychosocial (WDL) WDL   Cognition   Overall Cognitive Status WFL   Arousal/Participation Alert;Cooperative   Attention Within functional limits   Orientation Level Oriented X4   Memory Decreased recall of precautions   Following Commands Follows one step commands without difficulty   Comments Pt very pleasant and cooperative during OT session. Pt benefits from one-step simple commands. Pt able to recall 2/3 THP at end of session. Pt wiht G carryover of safety cues.   Assessment   Limitation Decreased  ADL status;Decreased Safe judgement during ADL;Decreased UE strength;Decreased UE ROM;Decreased cognition;Decreased endurance;Decreased high-level ADLs;Decreased self-care trans   Prognosis Fair   Assessment Pt is a 64 y.o. female who presented to [facility] on 12/3/2024 with L hip OA. Pt s/p L CARO posterior on 12/3. Pt WBAT on LLE and with L THP. Pt  has a past medical history of Ambulatory dysfunction, Anxiety, Arthritis, Cancer ([facility]) (7/15/17), Chronic pain disorder, Claustrophobia, DDD (degenerative disc disease), lumbar, Endometrial cancer ([facility]) (07/06/2017), Low back pain, Mild acid reflux, OA (osteoarthritis), Spinal stenosis of lumbosacral region, and Wears glasses. Pt greeted bedside for OT evaluation on 12/03/24. Pt resides alone in a 1SH with 6+1 BRYCE. PTA, Pt reports being I with ADLs/IADLs/no AD/ +. Pt with supportive DEVIKA able to assist upon DC. Pt demonstrating the following occupational performance levels: Supervision with UB ADLs, Mod A  with LB ADLs, Supervision with bed mobility, Supervision with functional transfers, and Supervision with functional mobility with walker.Limitations that impact functional performance include decreased ADL status, UE ROM, UE strength, safe judgement during ADLs, cognition, endurance, self care transfers, and high level ADLs. Occupational performance areas to address ADL retraining, functional transfer training, UE strengthening/ROM, endurance training, cognitive reorientation, Pt/caregiver education, equipment evaluation/education, compensatory technique education, energy conservation, and activity engagement . Pt would benefit from continued skilled OT services while in hospital to maximize independence with ADLs. Will continue to follow Pt's progress. Pt would benefit from returning home with increased social support upon DC to maximize safety and independence with ADLs and functional tasks of choice.   Goals   Patient Goals To go home   LTG Time Frame 3-7   Long  Term Goal #1 See goals listed below   Plan   Treatment Interventions ADL retraining;UE strengthening/ROM;Functional transfer training;Endurance training;Cognitive reorientation;Patient/family training;Equipment evaluation/education;Compensatory technique education;Activityengagement;Energy conservation   Goal Expiration Date 12/17/24   OT Frequency 3-5x/wk   Discharge Recommendation   Rehab Resource Intensity Level, OT No post-acute rehabilitation needs   Additional Comments  The patient's raw score on the AM-PAC Daily Activity Inpatient Short Form is 19. A raw score of greater than or equal to 19 suggests the patient may benefit from discharge to home. Please refer to the recommendation of the Occupational Therapist for safe discharge planning.   AM-PAC Daily Activity Inpatient   Lower Body Dressing 3   Bathing 3   Toileting 3   Upper Body Dressing 3   Grooming 3   Eating 4   Daily Activity Raw Score 19   Daily Activity Standardized Score (Calc for Raw Score >=11) 40.22   AM-PAC Applied Cognition Inpatient   Following a Speech/Presentation 4   Understanding Ordinary Conversation 4   Taking Medications 4   Remembering Where Things Are Placed or Put Away 4   Remembering List of 4-5 Errands 4   Taking Care of Complicated Tasks 4   Applied Cognition Raw Score 24   Applied Cognition Standardized Score 62.21   Additional Treatment Session   Start Time 1501   End Time 1533   Treatment Assessment Pt greeted for follow up treatment focusing on maximizing independence with ADLs and use of LHAE. Pt completes dressing routine seated in chair. Educated on compensatory and safety techniques with all ADLs upon DC and Pt reports understanding. Pt S with UB dressing, and S with LB dressing (use of sock aid, reacher, and LH shoe horn). Pt completes functional transfers at S and functional mobility with RW at S (to bathroom again at end of session). Pt left seated on toilet and hand off to nursing staff).   Additional Treatment Day 1    End of Consult   Education Provided Yes   Patient Position at End of Consult Bedside chair;Bed/Chair alarm activated;All needs within reach   Nurse Communication Nurse aware of consult       GOALS:  Pt will complete UB ADLs with I in order to maximize participation with ADLs.   Pt will complete LB ADLs with I in order to maximize safety with ADLs.   Pt will complete toileting routine (transfer, hygiene, and clothing management) with I in order to return to prior level of function.  Pt will complete bed mobility with I in order to maximize participation with ADLs.   Pt will complete functional transfers at I level in order to increase participation with ADLs.  Pt will increase dynamic standing balance to F+ in order to increase safety with ADLs.  Pt will increase standing tolerance x10 min in order to increase participation with ADLs.   Pt will complete functional mobility with AD PRN for item retrieval task at [place] level in order to increase participation with ADLs.  Pt will complete IADL tasks/simulation of IADLs tasks with I in order to return to PLOF.  Pt will demonstrate G energy conservation techniques with ADLs/IADLs in order to reduce the risk of falls.  Pt will be attentive 100% of the time for ongoing functional/formal cognitive assessment to assist with safe dc planning prn.      Melissa Suero MS, OTR/L

## 2024-12-03 NOTE — ANESTHESIA POSTPROCEDURE EVALUATION
Post-Op Assessment Note    CV Status:  Stable  Pain Score: 0    Pain management: adequate       Mental Status:  Alert and awake   Hydration Status:  Euvolemic   PONV Controlled:  Controlled   Airway Patency:  Patent     Post Op Vitals Reviewed: Yes    No anethesia notable event occurred.    Staff: CRNA           Last Filed PACU Vitals:  Vitals Value Taken Time   Temp     Pulse 104 12/03/24 1206   /60 12/03/24 1201   Resp 20 12/03/24 1206   SpO2 97 % 12/03/24 1206   Vitals shown include unfiled device data.    Modified Juanpablo:  No data recorded

## 2024-12-04 ENCOUNTER — TELEPHONE (OUTPATIENT)
Dept: OBGYN CLINIC | Facility: HOSPITAL | Age: 64
End: 2024-12-04

## 2024-12-04 NOTE — TELEPHONE ENCOUNTER
Patient contacted for a postoperative follow up assessment. Patient states current pain level of a 3/10 when sitting and 9/10 when walking with RW.  Patient denies increase in swelling and dressing is Dressing C/D/I Patient isicing the site regularly. NN educated patient on post-op bruising, swelling, and icing. PT 12/10 at 11AM.      We reviewed patients AVS medication list. Patient is taking ASA 81mg BID, Tylenol 500mg every 6 hours, Naproxen 500mg BID, Oxycodone 5m every 4 hours, Keflex 2000mg every 8 hours (finishing 12/4) .Patient has not had a BM but is passing gas.       Patient denies nausea, vomiting, abdominal pain, chest pain, shortness of breath, fever, and calf pain Patient reports dizziness.Patient confirmed post-op appointment with surgeon on 12/16 at 2PM .Patient does not have any other questions or concerns at this time. Pt was encouraged to call with any questions, concerns or issues.  .

## 2024-12-04 NOTE — ANESTHESIA POSTPROCEDURE EVALUATION
Post-Op Assessment Note    CV Status:  Stable  Pain Score: 5    Pain management: adequate       Mental Status:  Alert and awake   Hydration Status:  Euvolemic   PONV Controlled:  Controlled   Airway Patency:  Patent     Post Op Vitals Reviewed: Yes    No anethesia notable event occurred.    Staff: Anesthesiologist           Last Filed PACU Vitals:  Vitals Value Taken Time   Temp 97.4 °F (36.3 °C) 12/03/24 1245   Pulse 77 12/03/24 1245   /74 12/03/24 1245   Resp 16 12/03/24 1245   SpO2 97 % 12/03/24 1245       Modified Juanpablo:  Activity: 2 (12/3/2024 12:45 PM)  Respiration: 2 (12/3/2024 12:45 PM)  Circulation: 2 (12/3/2024 12:45 PM)  Consciousness: 2 (12/3/2024 12:45 PM)  Oxygen Saturation: 2 (12/3/2024 12:45 PM)  Modified Juanpablo Score: 10 (12/3/2024 12:45 PM)

## 2024-12-10 ENCOUNTER — OFFICE VISIT (OUTPATIENT)
Dept: PHYSICAL THERAPY | Facility: CLINIC | Age: 64
End: 2024-12-10
Payer: MEDICARE

## 2024-12-10 DIAGNOSIS — M25.552 LEFT HIP PAIN: Primary | ICD-10-CM

## 2024-12-10 DIAGNOSIS — M16.12 PRIMARY OSTEOARTHRITIS OF ONE HIP, LEFT: ICD-10-CM

## 2024-12-10 PROCEDURE — 97110 THERAPEUTIC EXERCISES: CPT | Performed by: PHYSICAL THERAPIST

## 2024-12-10 PROCEDURE — 97161 PT EVAL LOW COMPLEX 20 MIN: CPT | Performed by: PHYSICAL THERAPIST

## 2024-12-10 NOTE — PROGRESS NOTES
PT Evaluation     Today's date: 12/10/2024  Patient name: Cristin Nolan  : 1960  MRN: [id number]  Referring provider: Dipesh Knowles, *  Dx:   Encounter Diagnosis     ICD-10-CM    1. Primary osteoarthritis of one hip, left  M16.12                      Assessment  Impairments: abnormal or restricted ROM, activity intolerance, impaired physical strength, lacks appropriate home exercise program and pain with function  Functional limitations: walking  Symptom irritability: low    Assessment details: Pt is 65yo female presenting to therapy following for Lt CARO. Pt has decreased hip abd rom as well as strength in all directions. Pt would benefit from PT services to improve rom and strength to return to PLOF.  Understanding of Dx/Px/POC: good     Prognosis: good    Goals  Post-Op:  1. Pt will be independent with HEP upon discharge.  2. Pt will improve Lt Hip rom to 0-110 degrees of flexion.  3. Pt will improve Lt hip strength to 4/5 in all directions to improve walking mechanics.  4. Pt will be able to return to walking 15 minutes without AD.    Plan  Patient would benefit from: skilled physical therapy    Planned therapy interventions: functional ROM exercises, therapeutic activities, therapeutic exercise, therapeutic training, stretching, strengthening, home exercise program, neuromuscular re-education, manual therapy and patient education    Frequency: 2x week  Duration in weeks: 8  Treatment plan discussed with: patient    Subjective Evaluation    History of Present Illness  Mechanism of injury: Pt is scheduled for Lt CARO on 12/3/24. Pt is known to the clinic. She had some testing to rule out back issues including injections and ablation. Pt is still having groin pain. Pt is still going to the pool 4xday, walking daily.    Pt reports difficulty with getting into bed and car. Pt is doing steps, exercises, she is taking showers.    Pain  At worst pain ratin-5  Location: groin pain Lt  Quality: dull  ache  Relieving factors: change in position  Aggravating factors: standing, walking and stair climbing        Objective     Passive Range of Motion   Left Hip   Flexion: 100 deg  Ext: 0 deg  ABD: 25 deg    Right Hip   Normal passive range of motion    Strength/Myotome Testing     Left Hip   Planes of Motion   Flexion: 3-  Extension: 3  Abduction: 3-         Precautions: Lt CARO 12/3/24      Manuals 11/25 12/10           Lt hip rom                                                    Neuro Re-Ed                                                                                                        Ther Ex             Edu on recovery, HEP 10'            Bridges  2x10           Supine March  2x10 Lt           Supine clamshell  20x5'' black           Ball sq  20x5''           Standing Hip 3 ways  15xea           SLS  5x10''                        Ther Activity             Sit to stands  2x5 1 foam                        Gait Training                                       Modalities

## 2024-12-12 ENCOUNTER — OFFICE VISIT (OUTPATIENT)
Dept: PHYSICAL THERAPY | Facility: CLINIC | Age: 64
End: 2024-12-12
Payer: MEDICARE

## 2024-12-12 DIAGNOSIS — M25.552 LEFT HIP PAIN: Primary | ICD-10-CM

## 2024-12-12 DIAGNOSIS — M16.12 PRIMARY OSTEOARTHRITIS OF ONE HIP, LEFT: ICD-10-CM

## 2024-12-12 PROCEDURE — 97110 THERAPEUTIC EXERCISES: CPT | Performed by: PHYSICAL THERAPIST

## 2024-12-12 PROCEDURE — 97140 MANUAL THERAPY 1/> REGIONS: CPT | Performed by: PHYSICAL THERAPIST

## 2024-12-12 NOTE — PROGRESS NOTES
"Daily Note     Today's date: 2024  Patient name: Cristin Nolan  : 1960  MRN: [id number]  Referring provider: Dipesh Knowles, *  Dx:   Encounter Diagnosis     ICD-10-CM    1. Left hip pain  M25.552       2. Primary osteoarthritis of one hip, left  M16.12           Start Time: 1400  Stop Time: 1445  Total time in clinic (min): 45 minutes    Subjective: Pt reports being sore after last PT visit therefore did not perform her exercises yesterday. She continues with some soreness arriving to PT today.       Objective: See treatment diary below      Assessment: Pt with good tolerance to treatment, pt reports subjective hip soreness from exercises therefore educated on appropriate recovery days as needed depending on degree of pain post surgery considering time frame post surgery. Encouraged pt to continue ambulating per tolerance. Pt with good ROM considering restrictions. Pt will benefit from continued skilled PT.       Plan: Continue per plan of care.      Precautions: Lt CARO 12/3/24      Manuals 11/25 12/10 12/12          Lt hip rom   DL                                                 Neuro Re-Ed                                                                                                        Ther Ex             Edu on recovery, HEP 10'            Bridges  2x10 2x10          Supine March  2x10 Lt 2x10 L          Supine clamshell  20x5'' black 20x5\" black           Ball sq  20x5'' 20x5\"          Standing Hip 3 ways  15xea 15x ea          SLS  5x10''           SAQ   20x L          Standing HS curl   20x L                       Ther Activity             Sit to stands  2x5 1 foam 2x5 1 foam          HR   3x10          Step up             Lateral step             Gait Training                                       Modalities                                              "

## 2024-12-16 ENCOUNTER — OFFICE VISIT (OUTPATIENT)
Dept: OBGYN CLINIC | Facility: MEDICAL CENTER | Age: 64
End: 2024-12-16
Payer: MEDICARE

## 2024-12-16 VITALS
HEIGHT: 62 IN | DIASTOLIC BLOOD PRESSURE: 84 MMHG | WEIGHT: 209 LBS | BODY MASS INDEX: 38.46 KG/M2 | HEART RATE: 85 BPM | SYSTOLIC BLOOD PRESSURE: 132 MMHG

## 2024-12-16 DIAGNOSIS — M19.012 PRIMARY OSTEOARTHRITIS OF BOTH SHOULDERS: Primary | ICD-10-CM

## 2024-12-16 DIAGNOSIS — M19.011 PRIMARY OSTEOARTHRITIS OF BOTH SHOULDERS: Primary | ICD-10-CM

## 2024-12-16 PROCEDURE — 20610 DRAIN/INJ JOINT/BURSA W/O US: CPT | Performed by: PHYSICIAN ASSISTANT

## 2024-12-16 PROCEDURE — 99024 POSTOP FOLLOW-UP VISIT: CPT | Performed by: PHYSICIAN ASSISTANT

## 2024-12-16 RX ORDER — ROPIVACAINE HYDROCHLORIDE 5 MG/ML
4 INJECTION, SOLUTION EPIDURAL; INFILTRATION; PERINEURAL
Status: COMPLETED | OUTPATIENT
Start: 2024-12-16 | End: 2024-12-16

## 2024-12-16 RX ORDER — METHYLPREDNISOLONE ACETATE 40 MG/ML
1 INJECTION, SUSPENSION INTRA-ARTICULAR; INTRALESIONAL; INTRAMUSCULAR; SOFT TISSUE
Status: COMPLETED | OUTPATIENT
Start: 2024-12-16 | End: 2024-12-16

## 2024-12-16 RX ADMIN — ROPIVACAINE HYDROCHLORIDE 4 ML: 5 INJECTION, SOLUTION EPIDURAL; INFILTRATION; PERINEURAL at 14:00

## 2024-12-16 RX ADMIN — METHYLPREDNISOLONE ACETATE 1 ML: 40 INJECTION, SUSPENSION INTRA-ARTICULAR; INTRALESIONAL; INTRAMUSCULAR; SOFT TISSUE at 14:00

## 2024-12-16 NOTE — PROGRESS NOTES
Orthopaedic Surgery - Office Note  Cristin Nolan (64 y.o. female)   : 1960   MRN: [id number]  Encounter Date: 2024    Assessment / Plan    Status post left posterior total hip arthroplasty on 12/3/2024  Right shoulder glenohumeral arthritis  Patient can continue to progress activity as tolerated in regards to the left hip.  She should continue to keep the posterior hip precautions in place.  Continue with PT.  Progress weightbearing as tolerated.  Aspirin 81 mg twice daily for DVT prophylaxis for 30 days total treatment.  We did discuss continuing with dental antibiotic prophylaxis which she has been doing up to this point for other joints.  After discussion risk and benefits the patient agreed to proceed with a right shoulder glenohumeral injection which was prepared and injected sterilely.  We did discuss that we would like to space these at least 3 months apart going forward.  Follow-up:  Return in about 6 weeks (around 2025) for follow up with Dr. Knowles.      Chief Complaint / Date of Onset  Gait abnormality   Left hip pain secondary osteoarthritis chronic  Injury Mechanism / Date  None  Surgery / Date  Left total hip arthroplasty 12/3/2024    History of Present Illness   Cristin Nolan is a 64 y.o. female who presents for follow-up status post left total hip arthroplasty on 12/3/2024.  Overall patient is doing very well with her hip.  She has been ambulating with a cane.  She denies any issues with the incision up to this point.  She has been using Tylenol regularly for pain along with ice.  She also has been taking the aspirin for a blood thinner.  Patient also has known glenohumeral arthritis in the right shoulder which she feels due to the cane use has been exacerbated.  She is asking for an injection in the shoulder today.    Treatment Summary  Medications / Modalities  Advil  prn  Bracing / Immobilization  None  Physical Therapy  Patient reports she has attended 3 months of PT for  "her hip and back.  Hydro-biking for exercise  Injections  Patient has had RFA and MBB of L3,4,5.  Left hip guided hip injection on 8/20/2024  B/L shoulder GH injections 10/21/24  Prior Surgeries  Left total knee replacement 10/21/2021 at [place]  Right total knee replacement on November 30, 2020 with Dr. Knowles     other Treatments  None     Employment / Current Status  N/a     Sport / Organization / Current Status  Hydro-biking      Review of Systems  Pertinent items are noted in HPI.  All other systems were reviewed and are negative.      Physical Exam  /84   Pulse 85   Ht 5' 2\" (1.575 m)   Wt 94.8 kg (209 lb)   BMI 38.23 kg/m²   Cons: Appears well.  No apparent distress.  Psych: Alert. Oriented x3.  Mood and affect normal.  Eyes: PERRLA, EOMI  Resp: Normal effort.  No audible wheezing or stridor.  CV: Palpable pulse.  No discernable arrhythmia.  No LE edema.  Lymph:  No palpable cervical, axillary, or inguinal lymphadenopathy.  Skin: Warm.  No palpable masses.  No visible lesions.  Neuro: Normal muscle tone.  Normal and symmetric DTR's.     Left Hip Exam  Alignment / Posture:  Normal resting hip posture.  Inspection:   Incision healing well at this time no erythema or drainage.  Palpation:   Mild tenderness at [place]-incisional area.  ROM:  Not tested.  Strength:  Not tested.  Stability:  Not tested.  Tests:  No pertinent positive or negative tests.  Neurovascular:  Sensation intact in DP/SP/Gill/Sa/T nerve distributions. Sensation intact in all digital nerve distributions. Toes warm and perfused.  Gait:  Steady with cane.       Studies Reviewed  No studies to review      Large joint arthrocentesis: R glenohumeral  [place] Protocol:  Consent: Verbal consent obtained.  Consent given by: patient  Supporting Documentation  Indications: pain   Procedure Details  Location: shoulder - R glenohumeral  Needle size: 22 G  Approach: anterior  Medications administered: 1 mL methylPREDNISolone acetate 40 mg/mL; 4 mL " ropivacaine 0.5 %    Patient tolerance: patient tolerated the procedure well with no immediate complications  Dressing:  Sterile dressing applied             Medical, Surgical, Family, and Social History  The patient's medical history, family history, and social history, were reviewed and updated as appropriate.    Past Medical History:   Diagnosis Date    Ambulatory dysfunction     uses walking stick    Anxiety     Arthritis     Cancer (HCC) 7/15/17    All better now    Chronic pain disorder     Claustrophobia     mild    DDD (degenerative disc disease), lumbar     Endometrial cancer (HCC) 07/06/2017    tRA TLH BSO SLND on 7/17/17 by Dr. Stone, followed by 3 cycles of vaginal brachytherapy completed in August 2017    Low back pain     Mild acid reflux     OA (osteoarthritis)     marissa knees    Spinal stenosis of lumbosacral region     Wears glasses        Past Surgical History:   Procedure Laterality Date    COLONOSCOPY      HYSTERECTOMY      JOINT REPLACEMENT Bilateral     knees    ORTHOPEDIC SURGERY      PLANTAR FASCIA SURGERY Bilateral     PA ARTHRP ACETBLR/PROX FEM PROSTC AGRFT/ALGRFT Left 12/3/2024    Procedure: ARTHROPLASTY HIP TOTAL (psb same day dc);  Surgeon: Dipesh Knowles MD;  Location: [facility];  Service: Orthopedics    PA ARTHRP KNE CONDYLE&PLATU MEDIAL&LAT COMPARTMENTS Right 11/30/2020    Procedure: ARTHROPLASTY KNEE TOTAL;  Surgeon: Dipesh Knowles MD;  Location[facility];  Service: Orthopedics    PA HYSTEROSCOPY BX ENDOMETRIUM&/POLYPC W/WO D&C N/A 02/28/2017    Procedure: DILATATION AND CURETTAGE (D&C) WITH HYSTEROSCOPY,POLYPECTOMY;  Surgeon: Roberto Anderson MD;  Location: [facility];  Service: Gynecology    REPLACEMENT TOTAL KNEE Left 10/22/2021    SHOULDER SURGERY Left     TONSILLECTOMY      TOTAL ABDOMINAL HYSTERECTOMY W/ BILATERAL SALPINGOOPHORECTOMY Bilateral 07/2017    endometrial cancer, ? stage 1    WISDOM TOOTH EXTRACTION         Family History   Problem Relation Age of Onset     Ovarian cancer Mother 70    Cancer Mother     Esophageal cancer Father     Cancer Father     No Known Problems Maternal Grandmother     No Known Problems Maternal Grandfather     No Known Problems Paternal Grandmother     No Known Problems Paternal Grandfather     No Known Problems Maternal Aunt     No Known Problems Paternal Aunt     Breast cancer Neg Hx     Colon cancer Neg Hx     Uterine cancer Neg Hx        Social History     Occupational History    Not on file   Tobacco Use    Smoking status: Never    Smokeless tobacco: Never    Tobacco comments:     No secondhand smoke exposure   Vaping Use    Vaping status: Never Used   Substance and Sexual Activity    Alcohol use: Not Currently     Comment: very rarely    Drug use: No    Sexual activity: Not Currently     Partners: Male     Birth control/protection: Abstinence       Allergies   Allergen Reactions    Iv Contrast [Iodinated Contrast Media] Hives    Vitamin C [Ascorbate - Food Allergy]      Queasy stomach, loose stool         Current Outpatient Medications:     acetaminophen (TYLENOL) 650 mg CR tablet, Take 1 tablet (650 mg total) by mouth every 6 (six) hours as needed for mild pain, Disp: 90 tablet, Rfl: 0    aspirin (ECOTRIN LOW STRENGTH) 81 mg EC tablet, Take 1 tablet (81 mg total) by mouth 2 (two) times a day, Disp: 60 tablet, Rfl: 0    atorvastatin (LIPITOR) 10 mg tablet, Take 1 tablet (10 mg total) by mouth daily, Disp: 90 tablet, Rfl: 1    Cholecalciferol (HM Vitamin D3) 100 MCG (4000 UT) CAPS, Take 1 capsule (4,000 Units total) by mouth daily, Disp: 30 capsule, Rfl: 0    Coenzyme Q10-Vitamin E (QUNOL ULTRA COQ10 PO), , Disp: , Rfl:     cyanocobalamin (VITAMIN B-12) 1000 MCG tablet, Take 1,000 mcg by mouth daily, Disp: , Rfl:     Misc Natural Products (GLUCOSAMINE CHOND CMP TRIPLE PO), Take by mouth, Disp: , Rfl:     Omega 3-6-9 Fatty Acids (OMEGA 3-6-9 PO), Take 1 capsule by mouth daily, Disp: , Rfl:     ferrous sulfate 324 (65 Fe) mg, Take 1 tablet  (324 mg total) by mouth 2 (two) times a day before meals, Disp: 60 tablet, Rfl: 0    folic acid (FOLVITE) 1 mg tablet, Take 1 tablet (1 mg total) by mouth daily, Disp: 30 tablet, Rfl: 0    Multiple Vitamin (multivitamin) tablet, Take 1 tablet by mouth daily, Disp: 30 tablet, Rfl: 0    mupirocin (BACTROBAN) 2 % ointment, Apply topically 2 (two) times a day Apply to each nostril 2 times daily for 5 days before and including the day of surgery, Disp: 50 g, Rfl: 0    naproxen (NAPROSYN) 500 mg tablet, Take 1 tablet (500 mg total) by mouth 2 (two) times a day with meals, Disp: 60 tablet, Rfl: 0    ondansetron (ZOFRAN-ODT) 4 mg disintegrating tablet, Take 1 tablet (4 mg total) by mouth every 8 (eight) hours as needed for nausea or vomiting (Patient not taking: Reported on 12/16/2024), Disp: 15 tablet, Rfl: 0    Current Facility-Administered Medications:     lidocaine (XYLOCAINE) 1 % injection 2 mL, 2 mL, Injection, , Harjeet Black, ANDREW, 2 mL at 05/20/22 1445    triamcinolone acetonide (KENALOG-40) 40 mg/mL injection 20 mg, 20 mg, Intra-articular, , Harjeet Black DPM, 20 mg at 05/20/22 1445      Stuart Mistry PA-C    Scribe Attestation      I,:   am acting as a scribe while in the presence of the attending physician.:       I,:   personally performed the services described in this documentation    as scribed in my presence.:

## 2024-12-17 ENCOUNTER — OFFICE VISIT (OUTPATIENT)
Dept: PHYSICAL THERAPY | Facility: CLINIC | Age: 64
End: 2024-12-17
Payer: MEDICARE

## 2024-12-17 DIAGNOSIS — M16.12 PRIMARY OSTEOARTHRITIS OF ONE HIP, LEFT: ICD-10-CM

## 2024-12-17 DIAGNOSIS — M25.552 LEFT HIP PAIN: Primary | ICD-10-CM

## 2024-12-17 PROCEDURE — 97140 MANUAL THERAPY 1/> REGIONS: CPT

## 2024-12-17 PROCEDURE — 97110 THERAPEUTIC EXERCISES: CPT

## 2024-12-17 NOTE — PROGRESS NOTES
"Daily Note     Today's date: 2024  Patient name: Cristin Nolan  : 1960  MRN: [id number]  Referring provider: Dipesh Knowles, *  Dx:   Encounter Diagnosis     ICD-10-CM    1. Left hip pain  M25.552       2. Primary osteoarthritis of one hip, left  M16.12           Start Time: 1400  Stop Time: 1438  Total time in clinic (min): 38 minutes    Subjective: Patient had 2 week f/u with Ortho yesterday and reports physician being pleased with progress. Patient reports minimal soreness after last session.       Objective: See treatment diary below      Assessment: Patient is tolerating strengthening and Wbing very well. Able to progress repetitions for most exercises without any significant increase in exertion. Quad and gluteal fatigue noted with hip 3-way and step ups. Patient would benefit from continued PT to improve level of function.       Plan: Continue per POC. Increase reps/resistance as tolerated.      Precautions: Lt CARO 12/3/24      Manuals 11/25 12/10 12/12 12/17         Lt hip rom   DL MS                                                Neuro Re-Ed                                                                                                        Ther Ex             Edu on recovery, HEP 10'            Bridges  2x10 2x10 2x10         Supine March  2x10 Lt 2x10 L 2x10 Lt         Supine clamshell  20x5'' black 20x5\" black  20x5\" black          Ball sq  20x5'' 20x5\" 20x5\"         Standing Hip 3 ways  15xea 15x ea 2x10 ea         SLS  5x10''           SAQ   20x L          Standing HS curl   20x L 20x ea         SLR    AA 5x         Ther Activity             Sit to stands  2x5 1 foam 2x5 1 foam  2x5 1 foam         HR   3x10 Hr/tr 30x ea         Step up    Fwd 10x4\"         Lateral step             Gait Training                                       Modalities                                                "

## 2024-12-19 ENCOUNTER — OFFICE VISIT (OUTPATIENT)
Dept: PHYSICAL THERAPY | Facility: CLINIC | Age: 64
End: 2024-12-19
Payer: MEDICARE

## 2024-12-19 DIAGNOSIS — M25.552 LEFT HIP PAIN: Primary | ICD-10-CM

## 2024-12-19 DIAGNOSIS — M16.12 PRIMARY OSTEOARTHRITIS OF ONE HIP, LEFT: ICD-10-CM

## 2024-12-19 PROCEDURE — 97530 THERAPEUTIC ACTIVITIES: CPT | Performed by: PHYSICAL THERAPIST

## 2024-12-19 PROCEDURE — 97110 THERAPEUTIC EXERCISES: CPT | Performed by: PHYSICAL THERAPIST

## 2024-12-19 NOTE — PROGRESS NOTES
"Daily Note     Today's date: 2024  Patient name: Cristin Nolan  : 1960  MRN: [id number]  Referring provider: Dipesh Knowles, *  Dx:   Encounter Diagnosis     ICD-10-CM    1. Left hip pain  M25.552       2. Primary osteoarthritis of one hip, left  M16.12                      Subjective: Pt reports using cane less. Still difficulty with steps ups and getting leg into car.      Objective: See treatment diary below      Assessment: Pt is progressing well with strengthening. Most difficulty with hip flexion and abduction strength. Verbal cues to improve step ups. Will continue to progress as able.      Plan: Continue per plan of care.      Precautions: Lt CARO 12/3/24      Manuals 11/25 12/10 12/12 12/17 12/19        Lt hip rom   DL MS                                                Neuro Re-Ed                                                                                                        Ther Ex             Edu on recovery, HEP 10'            Bridges  2x10 2x10 2x10 20x5''+ ball sq        Supine March  2x10 Lt 2x10 L 2x10 Lt 2x10 Lt 2#        Supine clamshell  20x5'' black 20x5\" black  20x5\" black  20x5'' black        Ball sq  20x5'' 20x5\" 20x5\" D/c        Clamshell     20x s/l        Standing Hip 3 ways  15xea 15x ea 2x10 ea 20xea        SLS  5x10''   5x10''        SAQ   20x L          Standing HS curl   20x L 20x ea 20xea         Bike     8'        SLR    AA 5x 2x5 AA        Ther Activity             Sit to stands  2x5 1 foam 2x5 1 foam  2x5 1 foam 10x 1 foam        HR   3x10 Hr/tr 30x ea Hr/tr 30x ea        Step up    Fwd 10x4\" Fwd 10x 4''        Lateral step             Gait Training                                       Modalities                                                  "

## 2024-12-24 ENCOUNTER — OFFICE VISIT (OUTPATIENT)
Dept: PHYSICAL THERAPY | Facility: CLINIC | Age: 64
End: 2024-12-24
Payer: MEDICARE

## 2024-12-24 DIAGNOSIS — M16.12 PRIMARY OSTEOARTHRITIS OF ONE HIP, LEFT: ICD-10-CM

## 2024-12-24 DIAGNOSIS — M25.552 LEFT HIP PAIN: Primary | ICD-10-CM

## 2024-12-24 PROCEDURE — 97530 THERAPEUTIC ACTIVITIES: CPT | Performed by: PHYSICAL THERAPIST

## 2024-12-24 PROCEDURE — 97110 THERAPEUTIC EXERCISES: CPT | Performed by: PHYSICAL THERAPIST

## 2024-12-24 NOTE — PROGRESS NOTES
"Daily Note     Today's date: 2024  Patient name: Cristin Nolan  : 1960  MRN: [id number]  Referring provider: Dipesh Knowles, *  Dx:   Encounter Diagnosis     ICD-10-CM    1. Left hip pain  M25.552       2. Primary osteoarthritis of one hip, left  M16.12                      Subjective: Pt reports she has been using her cane much less. Still not able to walk longer distances yet.      Objective: See treatment diary below      Assessment: Pt is tolerating exercises well and progressing appropriately. Pt still shows significant glut med weakness with SL exercises. Continue to progress as able.      Plan: Continue per plan of care.      Precautions: Lt CARO 12/3/24      Manuals 11/25 12/10 12/12 12/17 12/19 12/24       Lt hip rom   DL MS                                                Neuro Re-Ed                                                                                                        Ther Ex             Edu on recovery, HEP 10'            Bridges  2x10 2x10 2x10 20x5''+ ball sq 20x5'' + ball sq       Supine March  2x10 Lt 2x10 L 2x10 Lt 2x10 Lt 2# 2x10 Lt 2#       Supine clamshell  20x5'' black 20x5\" black  20x5\" black  20x5'' black 20x5'' black       Ball sq  20x5'' 20x5\" 20x5\" D/c        Clamshell     20x s/l 20x s/l       Standing Hip 3 ways  15xea 15x ea 2x10 ea 20xea 20xea       SLS  5x10''   5x10'' 5x10''       SAQ   20x L          Standing HS curl   20x L 20x ea 20xea         Bike     8' 8'        SLR    AA 5x 2x5 AA 2x5       Ther Activity             Sit to stands  2x5 1 foam 2x5 1 foam  2x5 1 foam 10x 1 foam 15x 1 foam       HR   3x10 Hr/tr 30x ea Hr/tr 30x ea Hr/tr 30x ea       Step up    Fwd 10x4\" Fwd 10x 4'' 10x 6'' fwd       Lateral step up      10x 6''       Gait Training                                       Modalities                                                    "

## 2024-12-26 ENCOUNTER — OFFICE VISIT (OUTPATIENT)
Dept: PHYSICAL THERAPY | Facility: CLINIC | Age: 64
End: 2024-12-26
Payer: MEDICARE

## 2024-12-26 DIAGNOSIS — M25.552 LEFT HIP PAIN: Primary | ICD-10-CM

## 2024-12-26 PROCEDURE — 97110 THERAPEUTIC EXERCISES: CPT | Performed by: PHYSICAL THERAPIST

## 2024-12-26 PROCEDURE — 97530 THERAPEUTIC ACTIVITIES: CPT | Performed by: PHYSICAL THERAPIST

## 2024-12-26 NOTE — PROGRESS NOTES
"Daily Note     Today's date: 2024  Patient name: Cristin Nolan  : 1960  MRN: [id number]  Referring provider: Dipesh Knowles, *  Dx:   Encounter Diagnosis     ICD-10-CM    1. Left hip pain  M25.552                      Subjective: Pt reports doing well without the cane.      Objective: See treatment diary below      Assessment: Pt tolerating progressions well. Lt UE assistance increased challenge of SLS on Lt greatly. Added hip hikes for glut med as well. Will continue to progress as able.      Plan: Continue per plan of care.      Precautions: Lt CARO 12/3/24      Manuals 11/25 12/10 12/12 12/17 12/19 12/24 12/26      Lt hip rom   DL MS                                                Neuro Re-Ed                                                                                                        Ther Ex             Edu on recovery, HEP 10'            Bridges  2x10 2x10 2x10 20x5''+ ball sq 20x5'' + ball sq 20x5'' + ball sq      Supine March  2x10 Lt 2x10 L 2x10 Lt 2x10 Lt 2# 2x10 Lt 2# 2x10 2#      Supine clamshell  20x5'' black 20x5\" black  20x5\" black  20x5'' black 20x5'' black 20x5'' black      Ball sq  20x5'' 20x5\" 20x5\" D/c        Clamshell     20x s/l 20x s/l 30xs/l      Standing Hip 3 ways  15xea 15x ea 2x10 ea 20xea 20xea 20xea Lt UE      SLS  5x10''   5x10'' 5x10'' 5x15''      SAQ   20x L          Standing HS curl   20x L 20x ea 20xea         Bike     8' 8'  8'      Hip Hike       10x      SLR    AA 5x 2x5 AA 2x5 3x5      Ther Activity             Sit to stands  2x5 1 foam 2x5 1 foam  2x5 1 foam 10x 1 foam 15x 1 foam 10x no foam 5x 1 foam      HR   3x10 Hr/tr 30x ea Hr/tr 30x ea Hr/tr 30x ea Hr/tr 30x ea      Step up    Fwd 10x4\" Fwd 10x 4'' 10x 6'' fwd 20x 6'' fwd      Lateral step up      10x 6'' 20x 6''      Gait Training                                       Modalities                                                      "

## 2024-12-30 DIAGNOSIS — M16.12 PRIMARY OSTEOARTHRITIS OF ONE HIP, LEFT: ICD-10-CM

## 2024-12-30 NOTE — PROGRESS NOTES
"Daily Note     Today's date: 2024  Patient name: Cristin Nolan  : 1960  MRN: [id number]  Referring provider: Dipesh Knowles, *  Dx:   Encounter Diagnosis     ICD-10-CM    1. Left hip pain  M25.552       2. Primary osteoarthritis of one hip, left  M16.12           Start Time: 1150  Stop Time: 1230  Total time in clinic (min): 40 minutes    Subjective: Patient reports hip is doing well but continues with most difficulty negotiating steps.       Objective: See treatment diary below      Assessment: Strength progressions continue to be tolerated well. Patient has difficulty with single leg stance on the left due to lateral glut weakness and hip drop. Added hip abductor isometrics to promote greater glut med strength/activation. Patient demonstrated fatigue post-tx and would benefit from continued PT to improve level of function.       Plan: Continue per POC. Increase reps/resistance as tolerated.      Precautions: Lt CARO 12/3/24      Manuals 11/25 12/10 12/12 12/17 12/19 12/24 12/26 12/31     Lt hip rom   DL MS                                                Neuro Re-Ed                                                                                                        Ther Ex             Edu on recovery, HEP 10'            Bridges  2x10 2x10 2x10 20x5''+ ball sq 20x5'' + ball sq 20x5'' + ball sq 20x5'' + ball sq     Supine March  2x10 Lt 2x10 L 2x10 Lt 2x10 Lt 2# 2x10 Lt 2# 2x10 2# 2x10 2#     Supine clamshell  20x5'' black 20x5\" black  20x5\" black  20x5'' black 20x5'' black 20x5'' black 30x5'' black     Ball sq  20x5'' 20x5\" 20x5\" D/c        Clamshell     20x s/l 20x s/l 30xs/l 30xs/l     Standing Hip 3 ways  15xea 15x ea 2x10 ea 20xea 20xea 20xea Lt UE 2# 15x ea     SLS  5x10''   5x10'' 5x10'' 5x15'' 5x15''     SAQ   20x L          Hip ABD iso        10x5\"     Standing HS curl   20x L 20x ea 20xea         Bike     8' 8'  8' 8'     Hip Hike       10x 10x     SLR    AA 5x 2x5 AA 2x5 3x5 3x5   " "  Ther Activity             Sit to stands  2x5 1 foam 2x5 1 foam  2x5 1 foam 10x 1 foam 15x 1 foam 10x no foam 5x 1 foam 10x no foam      HR   3x10 Hr/tr 30x ea Hr/tr 30x ea Hr/tr 30x ea Hr/tr 30x ea Hr/tr 30x ea     Step up    Fwd 10x4\" Fwd 10x 4'' 10x 6'' fwd 20x 6'' fwd 20x 6'' fwd     Lateral step up      10x 6'' 20x 6'' 20x 6''     Gait Training                                       Modalities                                                        "

## 2024-12-31 ENCOUNTER — OFFICE VISIT (OUTPATIENT)
Dept: PHYSICAL THERAPY | Facility: CLINIC | Age: 64
End: 2024-12-31
Payer: MEDICARE

## 2024-12-31 DIAGNOSIS — M16.12 PRIMARY OSTEOARTHRITIS OF ONE HIP, LEFT: ICD-10-CM

## 2024-12-31 DIAGNOSIS — M25.552 LEFT HIP PAIN: Primary | ICD-10-CM

## 2024-12-31 PROCEDURE — 97110 THERAPEUTIC EXERCISES: CPT

## 2024-12-31 PROCEDURE — 97530 THERAPEUTIC ACTIVITIES: CPT

## 2024-12-31 RX ORDER — NAPROXEN 500 MG/1
500 TABLET ORAL 2 TIMES DAILY WITH MEALS
Qty: 60 TABLET | Refills: 0 | Status: SHIPPED | OUTPATIENT
Start: 2024-12-31

## 2025-01-02 ENCOUNTER — HOSPITAL ENCOUNTER (EMERGENCY)
Facility: HOSPITAL | Age: 65
Discharge: HOME | End: 2025-01-02
Attending: EMERGENCY MEDICINE
Payer: MEDICARE

## 2025-01-02 ENCOUNTER — APPOINTMENT (OUTPATIENT)
Dept: PHYSICAL THERAPY | Facility: CLINIC | Age: 65
End: 2025-01-02
Payer: MEDICARE

## 2025-01-02 ENCOUNTER — APPOINTMENT (EMERGENCY)
Dept: RADIOLOGY | Facility: HOSPITAL | Age: 65
End: 2025-01-02
Payer: MEDICARE

## 2025-01-02 VITALS
SYSTOLIC BLOOD PRESSURE: 157 MMHG | WEIGHT: 227.74 LBS | HEART RATE: 52 BPM | RESPIRATION RATE: 18 BRPM | BODY MASS INDEX: 41.91 KG/M2 | OXYGEN SATURATION: 97 % | DIASTOLIC BLOOD PRESSURE: 71 MMHG | TEMPERATURE: 97.6 F | HEIGHT: 62 IN

## 2025-01-02 DIAGNOSIS — M25.552 LEFT HIP PAIN: Primary | ICD-10-CM

## 2025-01-02 DIAGNOSIS — S73.005A DISLOCATION OF LEFT HIP, INITIAL ENCOUNTER (HCC): ICD-10-CM

## 2025-01-02 PROCEDURE — 27265 TREAT HIP DISLOCATION: CPT | Performed by: EMERGENCY MEDICINE

## 2025-01-02 PROCEDURE — 73501 X-RAY EXAM HIP UNI 1 VIEW: CPT

## 2025-01-02 PROCEDURE — 96361 HYDRATE IV INFUSION ADD-ON: CPT

## 2025-01-02 PROCEDURE — 99284 EMERGENCY DEPT VISIT MOD MDM: CPT | Performed by: EMERGENCY MEDICINE

## 2025-01-02 PROCEDURE — 99152 MOD SED SAME PHYS/QHP 5/>YRS: CPT | Performed by: EMERGENCY MEDICINE

## 2025-01-02 PROCEDURE — 99283 EMERGENCY DEPT VISIT LOW MDM: CPT

## 2025-01-02 PROCEDURE — 96374 THER/PROPH/DIAG INJ IV PUSH: CPT

## 2025-01-02 PROCEDURE — 96376 TX/PRO/DX INJ SAME DRUG ADON: CPT

## 2025-01-02 PROCEDURE — 96375 TX/PRO/DX INJ NEW DRUG ADDON: CPT

## 2025-01-02 RX ORDER — PROPOFOL 10 MG/ML
100 INJECTION, EMULSION INTRAVENOUS ONCE
Status: COMPLETED | OUTPATIENT
Start: 2025-01-02 | End: 2025-01-02

## 2025-01-02 RX ORDER — KETOROLAC TROMETHAMINE 30 MG/ML
15 INJECTION, SOLUTION INTRAMUSCULAR; INTRAVENOUS ONCE
Status: COMPLETED | OUTPATIENT
Start: 2025-01-02 | End: 2025-01-02

## 2025-01-02 RX ORDER — HYDROMORPHONE HCL/PF 1 MG/ML
1 SYRINGE (ML) INJECTION ONCE
Status: COMPLETED | OUTPATIENT
Start: 2025-01-02 | End: 2025-01-02

## 2025-01-02 RX ORDER — MORPHINE SULFATE 4 MG/ML
4 INJECTION, SOLUTION INTRAMUSCULAR; INTRAVENOUS ONCE
Status: DISCONTINUED | OUTPATIENT
Start: 2025-01-02 | End: 2025-01-02

## 2025-01-02 RX ORDER — FENTANYL CITRATE 50 UG/ML
50 INJECTION, SOLUTION INTRAMUSCULAR; INTRAVENOUS ONCE
Refills: 0 | Status: DISCONTINUED | OUTPATIENT
Start: 2025-01-02 | End: 2025-01-02

## 2025-01-02 RX ADMIN — HYDROMORPHONE HYDROCHLORIDE 1 MG: 1 INJECTION, SOLUTION INTRAMUSCULAR; INTRAVENOUS; SUBCUTANEOUS at 16:07

## 2025-01-02 RX ADMIN — HYDROMORPHONE HYDROCHLORIDE 1 MG: 1 INJECTION, SOLUTION INTRAMUSCULAR; INTRAVENOUS; SUBCUTANEOUS at 17:18

## 2025-01-02 RX ADMIN — KETOROLAC TROMETHAMINE 15 MG: 30 INJECTION, SOLUTION INTRAMUSCULAR; INTRAVENOUS at 16:06

## 2025-01-02 RX ADMIN — PROPOFOL 100 MG: 10 INJECTION, EMULSION INTRAVENOUS at 17:32

## 2025-01-02 RX ADMIN — SODIUM CHLORIDE 1000 ML: 0.9 INJECTION, SOLUTION INTRAVENOUS at 16:34

## 2025-01-02 NOTE — PROGRESS NOTES
"Daily Note     Today's date: 2025  Patient name: Cristin Nolan  : 1960  MRN: [id number]  Referring provider: Dipesh Knowles, *  Dx: No diagnosis found.               Subjective: ***      Objective: See treatment diary below      Assessment: Patient tolerated treatment session well.      Plan: Continue per POC. Increase reps/resistance as tolerated.      Precautions: Lt CARO 12/3/24      Manuals 11/25 12/10 12/12 12/17 12/19 12/24 12/26 12/31 1/2    Lt hip rom   DL MS                                                Neuro Re-Ed                                                                                                        Ther Ex             Edu on recovery, HEP 10'            Bridges  2x10 2x10 2x10 20x5''+ ball sq 20x5'' + ball sq 20x5'' + ball sq 20x5'' + ball sq     Supine March  2x10 Lt 2x10 L 2x10 Lt 2x10 Lt 2# 2x10 Lt 2# 2x10 2# 2x10 2#     Supine clamshell  20x5'' black 20x5\" black  20x5\" black  20x5'' black 20x5'' black 20x5'' black 30x5'' black     Ball sq  20x5'' 20x5\" 20x5\" D/c        Clamshell     20x s/l 20x s/l 30xs/l 30xs/l     Standing Hip 3 ways  15xea 15x ea 2x10 ea 20xea 20xea 20xea Lt UE 2# 15x ea     SLS  5x10''   5x10'' 5x10'' 5x15'' 5x15''     SAQ   20x L          Hip ABD iso        10x5\"     Standing HS curl   20x L 20x ea 20xea         Bike     8' 8'  8' 8'     Hip Hike       10x 10x     SLR    AA 5x 2x5 AA 2x5 3x5 3x5     Ther Activity             Sit to stands  2x5 1 foam 2x5 1 foam  2x5 1 foam 10x 1 foam 15x 1 foam 10x no foam 5x 1 foam 10x no foam      HR   3x10 Hr/tr 30x ea Hr/tr 30x ea Hr/tr 30x ea Hr/tr 30x ea Hr/tr 30x ea     Step up    Fwd 10x4\" Fwd 10x 4'' 10x 6'' fwd 20x 6'' fwd 20x 6'' fwd     Lateral step up      10x 6'' 20x 6'' 20x 6''     Gait Training                                       Modalities                                                          "

## 2025-01-02 NOTE — Clinical Note
Cristin Nolan was seen and treated in our emergency department on 1/2/2025.                Diagnosis:     Cristin  may return to work on return date.    She may return on this date: 01/06/2025         If you have any questions or concerns, please don't hesitate to call.      Dipesh Bhagat MD    ______________________________           _______________          _______________  Hospital Representative                              Date                                Time

## 2025-01-02 NOTE — DISCHARGE INSTRUCTIONS
Please follow-up with Dr. Knowles's office tomorrow morning.  The office should call you around 8 AM.  They will arrange for a time for you to see the doctor between 830 and noon.  In the meantime you may bear weight as tolerated walking with a cane.  Please avoid bending or rotating at the hip is much as possible until then.   5

## 2025-01-02 NOTE — ED PROVIDER NOTES
ED Disposition       None          Assessment & Plan       Medical Decision Making  Alexa Nolan is a 64 yr old female with a PMHx of left hip arthroplasty on 12/03/24 who presents for left hip pain. Pt reports she was bending over to  her cat when she believes she head a popping noise. Pt denies injury. Pt reports she saw PT on 12/31 and everything was going well, she was given specific exercises to do. Pt is complaining of severe pain and unable to ambulate.     PE - LLE: Decreased ROM in left hip due to pain. Pulse intact 2+, sensation wnl    Hip XR ordered. Patient given Toradol and dilaudid for pain control. Patient provided sedation prior to hip reduction. Left hip successfully reduced and confirmed with XR.     Amount and/or Complexity of Data Reviewed  Radiology: ordered.    Risk  Prescription drug management.             Medications   ketorolac (TORADOL) injection 15 mg (has no administration in time range)   HYDROmorphone (DILAUDID) injection 1 mg (has no administration in time range)       ED Risk Strat Scores              SBIRT 22yo+      Flowsheet Row Most Recent Value   Initial Alcohol Screen: US AUDIT-C     1. How often do you have a drink containing alcohol? 0 Filed at: 01/02/2025 1537   2. How many drinks containing alcohol do you have on a typical day you are drinking?  0 Filed at: 01/02/2025 1537   3a. Male UNDER 65: How often do you have five or more drinks on one occasion? 0 Filed at: 01/02/2025 1537   3b. FEMALE Any Age, or MALE 65+: How often do you have 4 or more drinks on one occassion? 0 Filed at: 01/02/2025 1537   Audit-C Score 0 Filed at: 01/02/2025 1537   EDILMA: How many times in the past year have you...    Used an illegal drug or used a prescription medication for non-medical reasons? Never Filed at: 01/02/2025 1537                  History of Present Illness       Chief Complaint   Patient presents with    Hip Pain     Pt arrived via EMS c/o L hip pain.  Pt states she had  hip surgery on Dec 3 and today while bending over pt states she was in so much pain that she could not move or walk.         Past Medical History:   Diagnosis Date    Ambulatory dysfunction     uses walking stick    Anxiety     Arthritis     Cancer (HCC) 7/15/17    All better now    Chronic pain disorder     Claustrophobia     mild    DDD (degenerative disc disease), lumbar     Endometrial cancer (HCC) 07/06/2017    tRA TLH BSO SLND on 7/17/17 by Dr. Stone, followed by 3 cycles of vaginal brachytherapy completed in August 2017    Low back pain     Mild acid reflux     OA (osteoarthritis)     marissa knees    Spinal stenosis of lumbosacral region     Wears glasses       Past Surgical History:   Procedure Laterality Date    COLONOSCOPY      HYSTERECTOMY      JOINT REPLACEMENT Bilateral     knees    ORTHOPEDIC SURGERY      PLANTAR FASCIA SURGERY Bilateral     MT ARTHRP ACETBLR/PROX FEM PROSTC AGRFT/ALGRFT Left 12/3/2024    Procedure: ARTHROPLASTY HIP TOTAL (psb same day dc);  Surgeon: Dipesh Knowles MD;  Location:  MAIN OR;  Service: Orthopedics    MT ARTHRP KNE CONDYLE&PLATU MEDIAL&LAT COMPARTMENTS Right 11/30/2020    Procedure: ARTHROPLASTY KNEE TOTAL;  Surgeon: Dipesh Knowles MD;  Location: [facility];  Service: Orthopedics    MT HYSTEROSCOPY BX ENDOMETRIUM&/POLYPC W/WO D&C N/A 02/28/2017    Procedure: DILATATION AND CURETTAGE (D&C) WITH HYSTEROSCOPY,POLYPECTOMY;  Surgeon: Roberto Anderson MD;  Location: [facility];  Service: Gynecology    REPLACEMENT TOTAL KNEE Left 10/22/2021    SHOULDER SURGERY Left     TONSILLECTOMY      TOTAL ABDOMINAL HYSTERECTOMY W/ BILATERAL SALPINGOOPHORECTOMY Bilateral 07/2017    endometrial cancer, ? stage 1    WISDOM TOOTH EXTRACTION        Family History   Problem Relation Age of Onset    Ovarian cancer Mother 70    Cancer Mother     Esophageal cancer Father     Cancer Father     No Known Problems Maternal Grandmother     No Known Problems Maternal Grandfather     No Known  Problems Paternal Grandmother     No Known Problems Paternal Grandfather     No Known Problems Maternal Aunt     No Known Problems Paternal Aunt     Breast cancer Neg Hx     Colon cancer Neg Hx     Uterine cancer Neg Hx       Social History     Tobacco Use    Smoking status: Never    Smokeless tobacco: Never    Tobacco comments:     No secondhand smoke exposure   Vaping Use    Vaping status: Never Used   Substance Use Topics    Alcohol use: Not Currently     Comment: very rarely    Drug use: No      E-Cigarette/Vaping    E-Cigarette Use Never User       E-Cigarette/Vaping Substances    Nicotine No     THC No     CBD No     Flavoring No     Other No     Unknown No       I have reviewed and agree with the history as documented.       Hip Pain  Associated symptoms: no chest pain, no fever, no rash, no shortness of breath and no vomiting        Review of Systems   Constitutional:  Negative for chills and fever.   Respiratory:  Negative for shortness of breath.    Cardiovascular:  Negative for chest pain.   Gastrointestinal:  Negative for vomiting.   Musculoskeletal:         Left hip pain   Skin:  Negative for rash.   Neurological:  Negative for syncope.           Objective       ED Triage Vitals   Temperature Pulse Blood Pressure Respirations SpO2 Patient Position - Orthostatic VS   01/02/25 1529 01/02/25 1528 01/02/25 1528 01/02/25 1528 01/02/25 1528 01/02/25 1528   97.6 °F (36.4 °C) 62 139/81 16 100 % Lying      Temp Source Heart Rate Source BP Location FiO2 (%) Pain Score    01/02/25 1529 01/02/25 1528 01/02/25 1528 -- --    Oral Monitor Right arm        Vitals      Date and Time Temp Pulse SpO2 Resp BP Pain Score FACES Pain Rating User   01/02/25 1529 97.6 °F (36.4 °C) -- -- -- -- -- -- TM   01/02/25 1528 -- 62 100 % 16 139/81 -- -- TM            Physical Exam  Vitals and nursing note reviewed.   Constitutional:       General: She is not in acute distress.     Appearance: She is well-developed.   HENT:      Head:  Normocephalic and atraumatic.   Eyes:      Conjunctiva/sclera: Conjunctivae normal.   Cardiovascular:      Rate and Rhythm: Normal rate and regular rhythm.      Heart sounds: No murmur heard.  Pulmonary:      Effort: Pulmonary effort is normal. No respiratory distress.      Breath sounds: Normal breath sounds.   Abdominal:      Palpations: Abdomen is soft.      Tenderness: There is no abdominal tenderness.   Musculoskeletal:         General: Tenderness (in anterior aspect of L hip) present. No swelling.      Cervical back: Neck supple.      Comments: LLE is extended. Pt unable to flex her leg. No erythema noted    Skin:     General: Skin is warm and dry.      Capillary Refill: Capillary refill takes less than 2 seconds.   Neurological:      Mental Status: She is alert.   Psychiatric:         Mood and Affect: Mood normal.         Results Reviewed       None            XR hip/pelv 1 vw LEFT if performed    (Results Pending)       Procedures    ED Medication and Procedure Management   Prior to Admission Medications   Prescriptions Last Dose Informant Patient Reported? Taking?   Cholecalciferol (HM Vitamin D3) 100 MCG (4000 UT) CAPS  Self No Yes   Sig: Take 1 capsule (4,000 Units total) by mouth daily   Coenzyme Q10-Vitamin E (QUNOL ULTRA COQ10 PO)  Self Yes No   Misc Natural Products (GLUCOSAMINE CHOND CMP TRIPLE PO)  Self Yes No   Sig: Take by mouth   Multiple Vitamin (multivitamin) tablet   No No   Sig: Take 1 tablet by mouth daily   Omega 3-6-9 Fatty Acids (OMEGA 3-6-9 PO)  Self Yes Yes   Sig: Take 1 capsule by mouth daily   acetaminophen (TYLENOL) 650 mg CR tablet Not Taking  No No   Sig: Take 1 tablet (650 mg total) by mouth every 6 (six) hours as needed for mild pain   Patient not taking: Reported on 1/2/2025   aspirin (ECOTRIN LOW STRENGTH) 81 mg EC tablet   No Yes   Sig: Take 1 tablet (81 mg total) by mouth 2 (two) times a day   atorvastatin (LIPITOR) 10 mg tablet   No Yes   Sig: Take 1 tablet (10 mg total) by  mouth daily   cyanocobalamin (VITAMIN B-12) 1000 MCG tablet Not Taking Self Yes No   Sig: Take 1,000 mcg by mouth daily   Patient not taking: Reported on 1/2/2025   ferrous sulfate 324 (65 Fe) mg   No No   Sig: Take 1 tablet (324 mg total) by mouth 2 (two) times a day before meals   folic acid (FOLVITE) 1 mg tablet   No No   Sig: Take 1 tablet (1 mg total) by mouth daily   mupirocin (BACTROBAN) 2 % ointment   No No   Sig: Apply topically 2 (two) times a day Apply to each nostril 2 times daily for 5 days before and including the day of surgery   naproxen (NAPROSYN) 500 mg tablet Not Taking  No No   Sig: TAKE 1 TABLET(500 MG) BY MOUTH TWICE DAILY WITH MEALS   Patient not taking: Reported on 1/2/2025   ondansetron (ZOFRAN-ODT) 4 mg disintegrating tablet   No No   Sig: Take 1 tablet (4 mg total) by mouth every 8 (eight) hours as needed for nausea or vomiting   Patient not taking: Reported on 12/16/2024      Facility-Administered Medications Last Administration Doses Remaining   lidocaine (XYLOCAINE) 1 % injection 2 mL 5/20/2022  2:45 PM    triamcinolone acetonide (KENALOG-40) 40 mg/mL injection 20 mg 5/20/2022  2:45 PM         Patient's Medications   Discharge Prescriptions    No medications on file     No discharge procedures on file.  ED SEPSIS DOCUMENTATION       Kathleen Emery DO  [facility] [place] PGY1      Kathleen Emery DO  01/02/25 [id number]

## 2025-01-03 ENCOUNTER — OFFICE VISIT (OUTPATIENT)
Dept: OBGYN CLINIC | Facility: MEDICAL CENTER | Age: 65
End: 2025-01-03

## 2025-01-03 ENCOUNTER — APPOINTMENT (OUTPATIENT)
Dept: PHYSICAL THERAPY | Facility: CLINIC | Age: 65
End: 2025-01-03
Payer: MEDICARE

## 2025-01-03 VITALS — HEIGHT: 62 IN | BODY MASS INDEX: 41.77 KG/M2 | WEIGHT: 227 LBS

## 2025-01-03 DIAGNOSIS — Z96.642 STATUS POST TOTAL REPLACEMENT OF LEFT HIP: Primary | ICD-10-CM

## 2025-01-03 PROCEDURE — 99024 POSTOP FOLLOW-UP VISIT: CPT | Performed by: ORTHOPAEDIC SURGERY

## 2025-01-03 NOTE — PROGRESS NOTES
"Orthopaedic Surgery - Office Note  Cristin Nolan (64 y.o. female)   : 1960   MRN: [id number]  Encounter Date: 1/3/2025    Assessment / Plan  S/p L CARO on 2024, dislocation on 2025     Patient was fitted with hip ABD brace today   Reviewed images during the visit   Reviewed ED notes  Posterior hip precautions were reviewed extensively with patient  Communication with physical therapy was done to ensure pt is following posterior hip precautions  Follow-up:  Return in about 2 weeks (around 2025).      Chief Complaint / Date of Onset  Left hip OA  Injury Mechanism / Date  Post operative dislocation on 2025  Surgery / Date  2024    History of Present Illness   Cristin Nolan is a 64 y.o. female who presents for post dislocation. She was attempting to bend over and  her cat and felt a pop in her hip. She was transported via EMS to the ED. XR was obtained showing a hip dislocation, this was reduced successfully.     Treatment Summary  Medications / Modalities  Advil  prn  Bracing / Immobilization  None  Physical Therapy  Began post op on 12/10/2024   Injections  None post op  Prior Surgeries  Left total knee replacement 10/21/2021 at [facility]  Right total knee replacement on 2020 with Dr. Knowles     other Treatments  None     Employment / Current Status  N/a     Sport / Organization / Current Status  Hydro-biking      Review of Systems  Pertinent items are noted in HPI.  All other systems were reviewed and are negative.      Physical Exam  Ht 5' 2\" (1.575 m)   Wt 103 kg (227 lb)   BMI 41.52 kg/m²   Cons: Appears well.  No apparent distress.  Psych: Alert. Oriented x3.  Mood and affect normal.  Eyes: PERRLA, EOMI  Resp: Normal effort.  No audible wheezing or stridor.  CV: Palpable pulse.  No discernable arrhythmia.  No LE edema.  Lymph:  No palpable cervical, axillary, or inguinal lymphadenopathy.  Skin: Warm.  No palpable masses.  No visible " lesions.  Neuro: Normal muscle tone.  Normal and symmetric DTR's.     Left Hip Exam  Alignment / Posture:  Normal resting hip posture.  Inspection:  No swelling. No ecchymosis.  Palpation:   mild hip tenderness. No effusion.  ROM:  Hip Flexion 90. Rotation not tested  Strength:  Quadriceps 4/5. Hamstrings 4/5.  Stability:  Not tested.  Tests:   Not tested  Neurovascular:  Sensation intact in DP/SP/Gill/Sa/T nerve distributions. 2+ DP & PT pulses.  Gait:  Antalgic.       Studies Reviewed  I have personally reviewed pertinent films in PACS.  XR of left hip - images from 01/02/2025 showing reduction of hip arthroplasty, no signs of loosening       Procedures  No procedures today.    Medical, Surgical, Family, and Social History  The patient's medical history, family history, and social history, were reviewed and updated as appropriate.    Past Medical History:   Diagnosis Date    Ambulatory dysfunction     uses walking stick    Anxiety     Arthritis     Cancer (HCC) 7/15/17    All better now    Chronic pain disorder     Claustrophobia     mild    DDD (degenerative disc disease), lumbar     Endometrial cancer (HCC) 07/06/2017    tRA TLH BSO SLND on 7/17/17 by Dr. Stone, followed by 3 cycles of vaginal brachytherapy completed in August 2017    Low back pain     Mild acid reflux     OA (osteoarthritis)     marissa knees    Spinal stenosis of lumbosacral region     Wears glasses        Past Surgical History:   Procedure Laterality Date    COLONOSCOPY      HYSTERECTOMY      JOINT REPLACEMENT Bilateral     knees    ORTHOPEDIC SURGERY      PLANTAR FASCIA SURGERY Bilateral     RI ARTHRP ACETBLR/PROX FEM PROSTC AGRFT/ALGRFT Left 12/3/2024    Procedure: ARTHROPLASTY HIP TOTAL (psb same day dc);  Surgeon: Dipesh Knowles MD;  Location: [facility] OR;  Service: Orthopedics    RI ARTHRP KNE CONDYLE&PLATU MEDIAL&LAT COMPARTMENTS Right 11/30/2020    Procedure: ARTHROPLASTY KNEE TOTAL;  Surgeon: Dipesh Knowles MD;  Location: [facility]  OR;  Service: Orthopedics    NV HYSTEROSCOPY BX ENDOMETRIUM&/POLYPC W/WO D&C N/A 02/28/2017    Procedure: DILATATION AND CURETTAGE (D&C) WITH HYSTEROSCOPY,POLYPECTOMY;  Surgeon: Roberto Anderson MD;  Location[facility];  Service: Gynecology    REPLACEMENT TOTAL KNEE Left 10/22/2021    SHOULDER SURGERY Left     TONSILLECTOMY      TOTAL ABDOMINAL HYSTERECTOMY W/ BILATERAL SALPINGOOPHORECTOMY Bilateral 07/2017    endometrial cancer, ? stage 1    WISDOM TOOTH EXTRACTION         Family History   Problem Relation Age of Onset    Ovarian cancer Mother 70    Cancer Mother     Esophageal cancer Father     Cancer Father     No Known Problems Maternal Grandmother     No Known Problems Maternal Grandfather     No Known Problems Paternal Grandmother     No Known Problems Paternal Grandfather     No Known Problems Maternal Aunt     No Known Problems Paternal Aunt     Breast cancer Neg Hx     Colon cancer Neg Hx     Uterine cancer Neg Hx        Social History     Occupational History    Not on file   Tobacco Use    Smoking status: Never    Smokeless tobacco: Never    Tobacco comments:     No secondhand smoke exposure   Vaping Use    Vaping status: Never Used   Substance and Sexual Activity    Alcohol use: Not Currently     Comment: very rarely    Drug use: No    Sexual activity: Not Currently     Partners: Male     Birth control/protection: Abstinence       Allergies   Allergen Reactions    Iv Contrast [Iodinated Contrast Media] Hives    Vitamin C [Ascorbate - Food Allergy]      Queasy stomach, loose stool         Current Outpatient Medications:     atorvastatin (LIPITOR) 10 mg tablet, Take 1 tablet (10 mg total) by mouth daily, Disp: 90 tablet, Rfl: 1    Cholecalciferol (HM Vitamin D3) 100 MCG (4000 UT) CAPS, Take 1 capsule (4,000 Units total) by mouth daily, Disp: 30 capsule, Rfl: 0    Coenzyme Q10-Vitamin E (QUNOL ULTRA COQ10 PO), , Disp: , Rfl:     Misc Natural Products (GLUCOSAMINE CHOND CMP TRIPLE PO), Take by mouth,  Disp: , Rfl:     Omega 3-6-9 Fatty Acids (OMEGA 3-6-9 PO), Take 1 capsule by mouth daily, Disp: , Rfl:     acetaminophen (TYLENOL) 650 mg CR tablet, Take 1 tablet (650 mg total) by mouth every 6 (six) hours as needed for mild pain (Patient not taking: Reported on 1/3/2025), Disp: 90 tablet, Rfl: 0    aspirin (ECOTRIN LOW STRENGTH) 81 mg EC tablet, Take 1 tablet (81 mg total) by mouth 2 (two) times a day, Disp: 60 tablet, Rfl: 0    cyanocobalamin (VITAMIN B-12) 1000 MCG tablet, Take 1,000 mcg by mouth daily (Patient not taking: Reported on 1/2/2025), Disp: , Rfl:     ferrous sulfate 324 (65 Fe) mg, Take 1 tablet (324 mg total) by mouth 2 (two) times a day before meals, Disp: 60 tablet, Rfl: 0    folic acid (FOLVITE) 1 mg tablet, Take 1 tablet (1 mg total) by mouth daily, Disp: 30 tablet, Rfl: 0    Multiple Vitamin (multivitamin) tablet, Take 1 tablet by mouth daily, Disp: 30 tablet, Rfl: 0    mupirocin (BACTROBAN) 2 % ointment, Apply topically 2 (two) times a day Apply to each nostril 2 times daily for 5 days before and including the day of surgery, Disp: 50 g, Rfl: 0    naproxen (NAPROSYN) 500 mg tablet, TAKE 1 TABLET(500 MG) BY MOUTH TWICE DAILY WITH MEALS (Patient not taking: Reported on 1/3/2025), Disp: 60 tablet, Rfl: 0    ondansetron (ZOFRAN-ODT) 4 mg disintegrating tablet, Take 1 tablet (4 mg total) by mouth every 8 (eight) hours as needed for nausea or vomiting (Patient not taking: Reported on 1/3/2025), Disp: 15 tablet, Rfl: 0    Current Facility-Administered Medications:     lidocaine (XYLOCAINE) 1 % injection 2 mL, 2 mL, Injection, , Harjeet Black DPM, 2 mL at 05/20/22 1445    triamcinolone acetonide (KENALOG-40) 40 mg/mL injection 20 mg, 20 mg, Intra-articular, , Harjeet Black DPM, 20 mg at 05/20/22 1445      Dipesh Knowles MD    Scribe Attestation      I,:   am acting as a scribe while in the presence of the attending physician.:       I,:   personally performed the services described in this  documentation    as scribed in my presence.:

## 2025-01-07 ENCOUNTER — EVALUATION (OUTPATIENT)
Dept: PHYSICAL THERAPY | Facility: CLINIC | Age: 65
End: 2025-01-07
Payer: MEDICARE

## 2025-01-07 DIAGNOSIS — M25.552 LEFT HIP PAIN: Primary | ICD-10-CM

## 2025-01-07 DIAGNOSIS — M16.12 PRIMARY OSTEOARTHRITIS OF ONE HIP, LEFT: ICD-10-CM

## 2025-01-07 PROCEDURE — 97530 THERAPEUTIC ACTIVITIES: CPT | Performed by: PHYSICAL THERAPIST

## 2025-01-07 PROCEDURE — 97110 THERAPEUTIC EXERCISES: CPT | Performed by: PHYSICAL THERAPIST

## 2025-01-07 NOTE — PROGRESS NOTES
Daily Note     Today's date: 2025  Patient name: Cristin Nolan  : 1960  MRN: [id number]  Referring provider: Dipesh Knowles, *  Dx:   Encounter Diagnosis     ICD-10-CM    1. Left hip pain  M25.552       2. Primary osteoarthritis of one hip, left  M16.12                      Subjective: Pt had cancelled last week due to cat issues and then ended up dislocating her hip trying to  her cat. Hip was relocated in the ER. Dr. Knowles placed her in an abduction brace and is limiting her flexion to 80, ER to tolerance, and NO IR. Strict posterior hip precautions.       Objective: See treatment diary below  ROM: Flexion 80deg (limited by precautions) otherwise limiting    Strength: 3+/5 for flexion, extension; abduction and ER 3-/5    Assessment: Reviewed posterior hip precautions throughout the session multiple times. Answered all questions and reviewed bracing as well. Pt tolerating exercises well with only increased difficulty with hip abduction and ER strengthening exercises. Pt will benefit from continued PT services for next 6-8 weeks.    1. Pt will be independent with HEP upon discharge. Progressing  2. Pt will improve Lt Hip rom to 0-110 degrees of flexion. Progressing  3. Pt will improve Lt hip strength to 4/5 in all directions to improve walking mechanics. Progressing  4. Pt will be able to return to walking 15 minutes without AD. Progressing    Plan: Continue per plan of care.      Precautions: Lt CARO 12/3/24;recent dislocation 24; STRICT POST HIP PRECAUTIONS Limit flex to 80, NO IR      Manuals 11/25 12/10 12/12 12/17 12/19 12/24 12/26 12/31 1/7    Lt hip rom   DL MS                                                Neuro Re-Ed                                                                                                        Ther Ex             Edu on recovery, HEP 10'            Bridges  2x10 2x10 2x10 20x5''+ ball sq 20x5'' + ball sq 20x5'' + ball sq 20x5'' + ball sq 15x5'' + ball  "sq    Supine March  2x10 Lt 2x10 L 2x10 Lt 2x10 Lt 2# 2x10 Lt 2# 2x10 2# 2x10 2# 30x    Supine clamshell  20x5'' black 20x5\" black  20x5\" black  20x5'' black 20x5'' black 20x5'' black 30x5'' black 20x5'' black    Ball sq  20x5'' 20x5\" 20x5\" D/c        Clamshell     20x s/l 20x s/l 30xs/l 30xs/l 20x s/l    Standing Hip 3 ways  15xea 15x ea 2x10 ea 20xea 20xea 20xea Lt UE 2# 15x ea 30xea no #    SLS  5x10''   5x10'' 5x10'' 5x15'' 5x15''     SAQ   20x L          Hip ABD iso        10x5\" 10x5'' green    Standing HS curl   20x L 20x ea 20xea         Bike     8' 8'  8' 8' Try treadmill NV    Hip Hike       10x 10x     SLR    AA 5x 2x5 AA 2x5 3x5 3x5 3x5    Ther Activity             Sit to stands  2x5 1 foam 2x5 1 foam  2x5 1 foam 10x 1 foam 15x 1 foam 10x no foam 5x 1 foam 10x no foam  10x foam    HR   3x10 Hr/tr 30x ea Hr/tr 30x ea Hr/tr 30x ea Hr/tr 30x ea Hr/tr 30x ea     Step up    Fwd 10x4\" Fwd 10x 4'' 10x 6'' fwd 20x 6'' fwd 20x 6'' fwd 20x 4'' fwd    Lateral step up      10x 6'' 20x 6'' 20x 6''     Gait Training                                       Modalities                                                          "

## 2025-01-09 ENCOUNTER — OFFICE VISIT (OUTPATIENT)
Dept: PHYSICAL THERAPY | Facility: CLINIC | Age: 65
End: 2025-01-09
Payer: MEDICARE

## 2025-01-09 DIAGNOSIS — M25.552 LEFT HIP PAIN: Primary | ICD-10-CM

## 2025-01-09 DIAGNOSIS — M16.12 PRIMARY OSTEOARTHRITIS OF ONE HIP, LEFT: ICD-10-CM

## 2025-01-09 PROCEDURE — 97110 THERAPEUTIC EXERCISES: CPT

## 2025-01-09 PROCEDURE — 97530 THERAPEUTIC ACTIVITIES: CPT

## 2025-01-09 NOTE — PROGRESS NOTES
"Daily Note     Today's date: 2025  Patient name: Cristin Nolan  : 1960  MRN: [id number]  Referring provider: Dipesh Knowles, *  Dx:   Encounter Diagnosis     ICD-10-CM    1. Left hip pain  M25.552       2. Primary osteoarthritis of one hip, left  M16.12           Start Time: 1401  Stop Time: 1453  Total time in clinic (min): 52 minutes    Subjective: Patient reports soreness along incision following last visit.       Objective: See treatment diary below      Assessment: Patient tolerated treatment session well and was able to recall all THR precautions without cuing. Good tolerance to treadmill walking, wbing exercises, and supine sets. Concluded with ice post-tx to help modulate pain and reduce effects of DOMS. Patient would benefit from continued PT to improve level of function.       Plan: Continue per POC. Increase reps/resistance as tolerated.      Precautions: Lt CARO 12/3/24;recent dislocation 24; STRICT POST HIP PRECAUTIONS Limit flex to 80, NO IR      Manuals 11/25 12/10 12/12 12/17 12/19 12/24 12/26 12/31 1/7 1/9   Lt hip rom   DL MS                                                Neuro Re-Ed                                                                                                        Ther Ex             Edu on recovery, HEP 10'            Bridges  2x10 2x10 2x10 20x5''+ ball sq 20x5'' + ball sq 20x5'' + ball sq 20x5'' + ball sq 15x5'' + ball sq 15x5'' + ball sq   Supine March  2x10 Lt 2x10 L 2x10 Lt 2x10 Lt 2# 2x10 Lt 2# 2x10 2# 2x10 2# 30x 30x   Supine clamshell  20x5'' black 20x5\" black  20x5\" black  20x5'' black 20x5'' black 20x5'' black 30x5'' black 20x5'' black 20x5'' black   Ball sq  20x5'' 20x5\" 20x5\" D/c        Clamshell     20x s/l 20x s/l 30xs/l 30xs/l 20x s/l 20x s/l   Standing Hip 3 ways  15xea 15x ea 2x10 ea 20xea 20xea 20xea Lt UE 2# 15x ea 30xea no # 30xea no #   SLS  5x10''   5x10'' 5x10'' 5x15'' 5x15''     SAQ   20x L          Hip ABD iso        10x5\" 10x5'' " "green    Standing HS curl   20x L 20x ea 20xea         Bike     8' 8'  8' 8' Try treadmill NV 0.9 5'   Hip Hike       10x 10x     SLR    AA 5x 2x5 AA 2x5 3x5 3x5 3x5 3x5   Ther Activity             Sit to stands  2x5 1 foam 2x5 1 foam  2x5 1 foam 10x 1 foam 15x 1 foam 10x no foam 5x 1 foam 10x no foam  10x foam 10x foam   HR   3x10 Hr/tr 30x ea Hr/tr 30x ea Hr/tr 30x ea Hr/tr 30x ea Hr/tr 30x ea  Hr/tr 30x ea   Step up    Fwd 10x4\" Fwd 10x 4'' 10x 6'' fwd 20x 6'' fwd 20x 6'' fwd 20x 4'' fwd 20x 4'' fwd   Lateral step up      10x 6'' 20x 6'' 20x 6''     Gait Training                                       Modalities             CP          R s/l w/ pillow between knees 10'                                     "

## 2025-01-13 ENCOUNTER — RA CDI HCC (OUTPATIENT)
Dept: OTHER | Facility: HOSPITAL | Age: 65
End: 2025-01-13

## 2025-01-14 ENCOUNTER — OFFICE VISIT (OUTPATIENT)
Dept: PHYSICAL THERAPY | Facility: CLINIC | Age: 65
End: 2025-01-14
Payer: MEDICARE

## 2025-01-14 DIAGNOSIS — M16.12 PRIMARY OSTEOARTHRITIS OF ONE HIP, LEFT: ICD-10-CM

## 2025-01-14 DIAGNOSIS — M25.552 LEFT HIP PAIN: Primary | ICD-10-CM

## 2025-01-14 PROCEDURE — 97530 THERAPEUTIC ACTIVITIES: CPT

## 2025-01-14 PROCEDURE — 97110 THERAPEUTIC EXERCISES: CPT

## 2025-01-14 NOTE — PROGRESS NOTES
"Daily Note     Today's date: 2025  Patient name: Cristin Nolan  : 1960  MRN: [id number]  Referring provider: Dipesh Knowles, *  Dx:   Encounter Diagnosis     ICD-10-CM    1. Left hip pain  M25.552       2. Primary osteoarthritis of one hip, left  M16.12           Start Time: 1146  Stop Time: 1239  Total time in clinic (min): 53 minutes    Subjective: Patient reports lateral hip is aching today but otherwise, is doing well.     Objective: See treatment diary below      Assessment: Patient tolerating standing strengthening very well. Able to increase repetitions and reintegrate previously performed exercises without an increase in pain. All questions answered regarding technique with picking objects up off the floor without comprising hip precautions. Patient would benefit from continued PT to improve level of function.       Plan: Continue per POC. Increase reps/resistance as tolerate.d      Precautions: Lt CARO 12/3/24;recent dislocation 24; STRICT POST HIP PRECAUTIONS Limit flex to 80, NO IR      Manuals 11/25 12/10 12/12 12/17 12/19 12/24 12/26 12/31 1/7 1/9 1/14   Lt hip rom   DL MS                                                    Neuro Re-Ed                                                                                                                Ther Ex              Edu on recovery, HEP 10'             Bridges  2x10 2x10 2x10 20x5''+ ball sq 20x5'' + ball sq 20x5'' + ball sq 20x5'' + ball sq 15x5'' + ball sq 15x5'' + ball sq 5\"x20+ ball sq   Supine March  2x10 Lt 2x10 L 2x10 Lt 2x10 Lt 2# 2x10 Lt 2# 2x10 2# 2x10 2# 30x 30x Standing 20x ea   Supine clamshell  20x5'' black 20x5\" black  20x5\" black  20x5'' black 20x5'' black 20x5'' black 30x5'' black 20x5'' black 20x5'' black 20x5'' black   Ball sq  20x5'' 20x5\" 20x5\" D/c         Clamshell     20x s/l 20x s/l 30xs/l 30xs/l 20x s/l 20x s/l 20x s/l   Standing Hip 3 ways  15xea 15x ea 2x10 ea 20xea 20xea 20xea Lt UE 2# 15x ea 30xea no # " "30xea no # 30x ea B/L   SLS  5x10''   5x10'' 5x10'' 5x15'' 5x15''   Lt 10\"x5   SAQ   20x L           Hip ABD iso        10x5\" 10x5'' green     Standing HS curl   20x L 20x ea 20xea          Bike     8' 8'  8' 8' Try treadmill NV 0.9 5' 1.4 8'   Hip Hike       10x 10x      SLR    AA 5x 2x5 AA 2x5 3x5 3x5 3x5 3x5 3x5   Ther Activity              Sit to stands  2x5 1 foam 2x5 1 foam  2x5 1 foam 10x 1 foam 15x 1 foam 10x no foam 5x 1 foam 10x no foam  10x foam 10x foam 15x foam   HR   3x10 Hr/tr 30x ea Hr/tr 30x ea Hr/tr 30x ea Hr/tr 30x ea Hr/tr 30x ea  Hr/tr 30x ea Hr/tr 30x ea   Step up    Fwd 10x4\" Fwd 10x 4'' 10x 6'' fwd 20x 6'' fwd 20x 6'' fwd 20x 4'' fwd 20x 4'' fwd 20x 4'' fwd   Lateral step up      10x 6'' 20x 6'' 20x 6''   20x 4\"   Gait Training                                          Modalities              CP          R s/l w/ pillow between knees 10' R s/l w/ pillow between knees 10'                                        "

## 2025-01-16 ENCOUNTER — OFFICE VISIT (OUTPATIENT)
Dept: PHYSICAL THERAPY | Facility: CLINIC | Age: 65
End: 2025-01-16
Payer: MEDICARE

## 2025-01-16 DIAGNOSIS — M25.552 LEFT HIP PAIN: Primary | ICD-10-CM

## 2025-01-16 DIAGNOSIS — M16.12 PRIMARY OSTEOARTHRITIS OF ONE HIP, LEFT: ICD-10-CM

## 2025-01-16 PROCEDURE — 97110 THERAPEUTIC EXERCISES: CPT

## 2025-01-16 PROCEDURE — 97530 THERAPEUTIC ACTIVITIES: CPT

## 2025-01-16 NOTE — PROGRESS NOTES
"Daily Note     Today's date: 2025  Patient name: Cristin Nolan  : 1960  MRN: [id number]  Referring provider: Dipesh Knowles, *  Dx:   Encounter Diagnosis     ICD-10-CM    1. Left hip pain  M25.552       2. Primary osteoarthritis of one hip, left  M16.12           Start Time: 1610  Stop Time: 1658  Total time in clinic (min): 48 minutes    Subjective: Patient reports soreness in hip flexor and glut after last session.       Objective: See treatment diary below      Assessment: Patient is motivated to progress and is doing well with listed exercises. Moderately challenged with forward step ups from 6\" height and relied heavily on rail to properly ascend. Residual quad weakness evident and has a tendency to ER hip in order to complete SLRs but is corrected with tactile cues. Patient would benefit from continued PT to improve level of function.       Plan: Continue per POC. Increase reps/resistance as tolerated.      Precautions: Lt CARO 12/3/24;recent dislocation 24; STRICT POST HIP PRECAUTIONS Limit flex to 80, NO IR      Manuals 11/25 12/10 12/12 12/17 12/19 12/24 12/26 12/31 1/7 1/9 1/14 1/16   Lt hip rom   DL MS                                                        Neuro Re-Ed                                                                                                                        Ther Ex               Edu on recovery, HEP 10'              Bridges  2x10 2x10 2x10 20x5''+ ball sq 20x5'' + ball sq 20x5'' + ball sq 20x5'' + ball sq 15x5'' + ball sq 15x5'' + ball sq 5\"x20+ ball sq 5\"x20+ ball sq   Supine March  2x10 Lt 2x10 L 2x10 Lt 2x10 Lt 2# 2x10 Lt 2# 2x10 2# 2x10 2# 30x 30x Standing 20x ea Standing 20x ea   Supine clamshell  20x5'' black 20x5\" black  20x5\" black  20x5'' black 20x5'' black 20x5'' black 30x5'' black 20x5'' black 20x5'' black 20x5'' black 30x5\" black   Ball sq  20x5'' 20x5\" 20x5\" D/c          Clamshell     20x s/l 20x s/l 30xs/l 30xs/l 20x s/l 20x s/l 20x s/l " "5\"x20 S/l   Standing Hip 3 ways  15xea 15x ea 2x10 ea 20xea 20xea 20xea Lt UE 2# 15x ea 30xea no # 30xea no # 30x ea B/L 1.5# 20x ea B/L   SLS  5x10''   5x10'' 5x10'' 5x15'' 5x15''   Lt 10\"x5    SAQ   20x L            Hip ABD iso        10x5\" 10x5'' green      Standing HS curl   20x L 20x ea 20xea           Bike     8' 8'  8' 8' Try treadmill NV 0.9 5' 1.4 8' 1.5 8'   Hip Hike       10x 10x       SLR    AA 5x 2x5 AA 2x5 3x5 3x5 3x5 3x5 3x5 3x5   Ther Activity               Sit to stands  2x5 1 foam 2x5 1 foam  2x5 1 foam 10x 1 foam 15x 1 foam 10x no foam 5x 1 foam 10x no foam  10x foam 10x foam 15x foam 15x foam   HR   3x10 Hr/tr 30x ea Hr/tr 30x ea Hr/tr 30x ea Hr/tr 30x ea Hr/tr 30x ea  Hr/tr 30x ea Hr/tr 30x ea Hr/tr 30x ea   Step up    Fwd 10x4\" Fwd 10x 4'' 10x 6'' fwd 20x 6'' fwd 20x 6'' fwd 20x 4'' fwd 20x 4'' fwd 20x 4'' fwd 10x 6\" fwd   Lateral step up      10x 6'' 20x 6'' 20x 6''   20x 4\" 20x 4\"   Gait Training                                             Modalities               CP          R s/l w/ pillow between knees 10' R s/l w/ pillow between knees 10' R s/l w/ pillow between knees 10'                                           "

## 2025-01-17 ENCOUNTER — OFFICE VISIT (OUTPATIENT)
Dept: OBGYN CLINIC | Facility: MEDICAL CENTER | Age: 65
End: 2025-01-17

## 2025-01-17 VITALS — BODY MASS INDEX: 41.77 KG/M2 | WEIGHT: 227 LBS | HEIGHT: 62 IN

## 2025-01-17 DIAGNOSIS — M16.12 PRIMARY OSTEOARTHRITIS OF ONE HIP, LEFT: Primary | ICD-10-CM

## 2025-01-17 PROCEDURE — 99024 POSTOP FOLLOW-UP VISIT: CPT | Performed by: ORTHOPAEDIC SURGERY

## 2025-01-17 NOTE — PROGRESS NOTES
"Orthopaedic Surgery - Office Note  Cristin Nolan (64 y.o. female)   : 1960   MRN: [id number]  Encounter Date: 2025    Assessment / Plan  S/p L CARO on 2024, with subsequent dislocation on 2025     Continue with hip ABD brace 6 weeks post dislocation (25).  Progress gait training as tolerated from can when ready.   Posterior hip precautions were reviewed extensively with patient and recommending continuing to follow them as a lifetime recommendation due to her dislocation.   Continue with PT.  Updated x-rays at next visit.   Follow-up:  Return for after 25 with Dr. Knowles..  At that time we will most likely be progressing her out of the brace.      Chief Complaint / Date of Onset  Left hip OA  Injury Mechanism / Date  Post operative dislocation on 2025  Surgery / Date  L THR with Dr. Knowles on2024    History of Present Illness   Cristin Nolan is a 64 y.o. female following up s/p L THR on 24 and subsequent dislocation on 25. She has continued to improve since the dislocation and has been compliant with the ABD brace and restrictions. She has some soreness around the hip and anterior knee that are both continuing to improve with time.     Treatment Summary  Medications / Modalities  Advil  prn  Bracing / Immobilization  None  Physical Therapy  Began post op on 12/10/2024   Injections  None post op  Prior Surgeries  Left total knee replacement 10/21/2021 at [place]  Right total knee replacement on 2020 with Dr. Knowles     other Treatments  None     Employment / Current Status  N/a     Sport / Organization / Current Status  Hydro-biking      Review of Systems  Pertinent items are noted in HPI.  All other systems were reviewed and are negative.      Physical Exam  Ht 5' 2\" (1.575 m)   Wt 103 kg (227 lb)   BMI 41.52 kg/m²   Cons: Appears well.  No apparent distress.  Psych: Alert. Oriented x3.  Mood and affect normal.  Eyes: PERRLA, " EOMI  Resp: Normal effort.  No audible wheezing or stridor.  CV: Palpable pulse.  No discernable arrhythmia.  No LE edema.  Lymph:  No palpable cervical, axillary, or inguinal lymphadenopathy.  Skin: Warm.  No palpable masses.  No visible lesions.  Neuro: Normal muscle tone.  Normal and symmetric DTR's.     Left Hip Exam  Alignment / Posture:  Normal resting hip posture.  Inspection:  No swelling. No ecchymosis.  Palpation:   No hip tenderness. No effusion.  ROM:  Hip Flexion 90. Rotation not tested  Strength:  Quadriceps 5-/5. Hamstrings 5-/5.  Stability:  Not tested.  Tests:   Not tested  Neurovascular:  Sensation intact in DP/SP/Gill/Sa/T nerve distributions. 2+ DP & PT pulses.  Gait:  Steady with cane.       Studies Reviewed  I have personally reviewed pertinent films in PACS.  XR of left hip - images from 01/02/2025 showing reduction of hip arthroplasty, no signs of loosening       Procedures  No procedures today.    Medical, Surgical, Family, and Social History  The patient's medical history, family history, and social history, were reviewed and updated as appropriate.    Past Medical History:   Diagnosis Date    Ambulatory dysfunction     uses walking stick    Anxiety     Arthritis     Cancer (HCC) 7/15/17    All better now    Chronic pain disorder     Claustrophobia     mild    DDD (degenerative disc disease), lumbar     Endometrial cancer (HCC) 07/06/2017    tRA TLH BSO SLND on 7/17/17 by Dr. Stone, followed by 3 cycles of vaginal brachytherapy completed in August 2017    Low back pain     Mild acid reflux     OA (osteoarthritis)     marissa knees    Spinal stenosis of lumbosacral region     Wears glasses        Past Surgical History:   Procedure Laterality Date    COLONOSCOPY      HYSTERECTOMY      JOINT REPLACEMENT Bilateral     knees    ORTHOPEDIC SURGERY      PLANTAR FASCIA SURGERY Bilateral     [place] ARTHRP ACETBLR/PROX FEM PROSTC AGRFT/ALGRFT Left 12/3/2024    Procedure: ARTHROPLASTY HIP TOTAL (psb same day  moy);  Surgeon: Dipesh Knowles MD;  Location: [facility];  Service: Orthopedics    [place] ARTHRP KNE CONDYLE&PLATU MEDIAL&LAT COMPARTMENTS Right 11/30/2020    Procedure: ARTHROPLASTY KNEE TOTAL;  Surgeon: Dipesh Knowles MD;  Location: [facility];  Service: Orthopedics    [place] HYSTEROSCOPY BX ENDOMETRIUM&/POLYPC W/WO D&C N/A 02/28/2017    Procedure: DILATATION AND CURETTAGE (D&C) WITH HYSTEROSCOPY,POLYPECTOMY;  Surgeon: Roberto Anderson MD;  Location: [facility];  Service: Gynecology    REPLACEMENT TOTAL KNEE Left 10/22/2021    SHOULDER SURGERY Left     TONSILLECTOMY      TOTAL ABDOMINAL HYSTERECTOMY W/ BILATERAL SALPINGOOPHORECTOMY Bilateral 07/2017    endometrial cancer, ? stage 1    WISDOM TOOTH EXTRACTION         Family History   Problem Relation Age of Onset    Ovarian cancer Mother 70    Cancer Mother     Esophageal cancer Father     Cancer Father     No Known Problems Maternal Grandmother     No Known Problems Maternal Grandfather     No Known Problems Paternal Grandmother     No Known Problems Paternal Grandfather     No Known Problems Maternal Aunt     No Known Problems Paternal Aunt     Breast cancer Neg Hx     Colon cancer Neg Hx     Uterine cancer Neg Hx        Social History     Occupational History    Not on file   Tobacco Use    Smoking status: Never    Smokeless tobacco: Never    Tobacco comments:     No secondhand smoke exposure   Vaping Use    Vaping status: Never Used   Substance and Sexual Activity    Alcohol use: Not Currently     Comment: very rarely    Drug use: No    Sexual activity: Not Currently     Partners: Male     Birth control/protection: Abstinence       Allergies   Allergen Reactions    Iv Contrast [Iodinated Contrast Media] Hives    Vitamin C [Ascorbate - Food Allergy]      Queasy stomach, loose stool         Current Outpatient Medications:     atorvastatin (LIPITOR) 10 mg tablet, Take 1 tablet (10 mg total) by mouth daily, Disp: 90 tablet, Rfl: 1    Cholecalciferol (HM Vitamin  D3) 100 MCG (4000 UT) CAPS, Take 1 capsule (4,000 Units total) by mouth daily, Disp: 30 capsule, Rfl: 0    Coenzyme Q10-Vitamin E (QUNOL ULTRA COQ10 PO), , Disp: , Rfl:     Misc Natural Products (GLUCOSAMINE CHOND CMP TRIPLE PO), Take by mouth, Disp: , Rfl:     Omega 3-6-9 Fatty Acids (OMEGA 3-6-9 PO), Take 1 capsule by mouth daily, Disp: , Rfl:     acetaminophen (TYLENOL) 650 mg CR tablet, Take 1 tablet (650 mg total) by mouth every 6 (six) hours as needed for mild pain (Patient not taking: Reported on 1/3/2025), Disp: 90 tablet, Rfl: 0    aspirin (ECOTRIN LOW STRENGTH) 81 mg EC tablet, Take 1 tablet (81 mg total) by mouth 2 (two) times a day, Disp: 60 tablet, Rfl: 0    cyanocobalamin (VITAMIN B-12) 1000 MCG tablet, Take 1,000 mcg by mouth daily (Patient not taking: Reported on 1/2/2025), Disp: , Rfl:     ferrous sulfate 324 (65 Fe) mg, Take 1 tablet (324 mg total) by mouth 2 (two) times a day before meals, Disp: 60 tablet, Rfl: 0    folic acid (FOLVITE) 1 mg tablet, Take 1 tablet (1 mg total) by mouth daily, Disp: 30 tablet, Rfl: 0    Multiple Vitamin (multivitamin) tablet, Take 1 tablet by mouth daily, Disp: 30 tablet, Rfl: 0    mupirocin (BACTROBAN) 2 % ointment, Apply topically 2 (two) times a day Apply to each nostril 2 times daily for 5 days before and including the day of surgery, Disp: 50 g, Rfl: 0    naproxen (NAPROSYN) 500 mg tablet, TAKE 1 TABLET(500 MG) BY MOUTH TWICE DAILY WITH MEALS (Patient not taking: Reported on 1/3/2025), Disp: 60 tablet, Rfl: 0    ondansetron (ZOFRAN-ODT) 4 mg disintegrating tablet, Take 1 tablet (4 mg total) by mouth every 8 (eight) hours as needed for nausea or vomiting (Patient not taking: Reported on 1/3/2025), Disp: 15 tablet, Rfl: 0    Current Facility-Administered Medications:     lidocaine (XYLOCAINE) 1 % injection 2 mL, 2 mL, Injection, , Harjeet Black DPM, 2 mL at 05/20/22 1445    triamcinolone acetonide (KENALOG-40) 40 mg/mL injection 20 mg, 20 mg, Intra-articular, , Cosby  ANDREW Black, 20 mg at 05/20/22 1445      Stuart Mistry PA-C    Scribe Attestation      I,:  Stuart Mistry PA-C am acting as a scribe while in the presence of the attending physician.:       I,:  Dipesh Knowles MD personally performed the services described in this documentation    as scribed in my presence.:

## 2025-01-20 NOTE — ED ATTENDING ATTESTATION
"1/2/2025  I, Dipesh Bhagat MD, saw and evaluated the patient. I have discussed the patient with the resident/non-physician practitioner and agree with the resident's/non-physician practitioner's findings, Plan of Care, and MDM as documented in the resident's/non-physician practitioner's note, except where noted. All available labs and Radiology studies were reviewed.  I was present for key portions of any procedure(s) performed by the resident/non-physician practitioner and I was immediately available to provide assistance.       At this point I agree with the current assessment done in the Emergency Department.  I have conducted an independent evaluation of this patient a history and physical is as follows:    ED Course     64-year-old female with past medical history of left total hip arthroplasty on 12/3/2024 by Dr. Knowles here for acute onset of left hip pain and decreased range of motion.  She was bending over to  her cat when she heard/felt a \"pop\".  She fell to the ground was unable to get up or move her leg.  Denies head strike, loss of consciousness, or trauma to other extremities.  No numbness/paresthesia.    On exam the left lower extremity is shortened and internally rotated.  Appears consistent with posterior dislocation of CARO.  Palpable pulses with normal sensation distally.  Remainder the exam is atraumatic.  Patient is in acute pain distress but otherwise well-appearing with moist mucous membranes, sclera nonicteric conjunctive normal, regular rate and rhythm, clear to auscultation bilaterally, abdomen is soft nondistended nontender.  No rashes or skin changes.    Impression and plan: 64-year-old female with clinical dislocation of left hip arthroplasty.  Will plan for x-ray to confirm, will discuss with orthopedics, will likely require procedural sedation for reduction of left hip dislocation.    Plain radiographs confirmed left hip dislocation.  Patient underwent procedural sedation " and reduction of the dislocation as below.  I performed a neurovascular exam after successful reduction and it was within normal limits.  Patient was evaluated after sedation and had return to her baseline mental status and was hemodynamically stable.  Per conversation with Dr. Knowles of orthopedics, okay for discharge with weightbearing as tolerated.  She will follow-up tomorrow in the office.    Critical Care Time  Procedural Sedation    Date/Time: 1/2/2025 5:12 PM    Performed by: Dipesh Bhagat MD  Authorized by: Dipesh Bhagat MD    Procedure details (see MAR for exact dosages):     Preoxygenation:  Nasal cannula    Sedation:  Propofol    Analgesia:  Hydromorphone    Intra-procedure monitoring:  Blood pressure monitoring, continuous capnometry, frequent LOC assessments, cardiac monitor, continuous pulse oximetry and frequent vital sign checks    Intra-procedure events: none      Total sedation time (minutes):  10  Post-procedure details:     Attendance: Constant attendance by certified staff until patient recovered      Recovery: Patient returned to pre-procedure baseline      Post-sedation assessments completed and reviewed: airway patency, cardiovascular function, mental status, nausea/vomiting, pain level, respiratory function and temperature      Patient is stable for discharge or admission: yes      Patient tolerance:  Tolerated well, no immediate complications  Orthopedic injury treatment    Date/Time: 1/2/2025 5:15 PM    Performed by: Dipesh Bhagat MD  Authorized by: Dipesh Bhagat MD    Patient Location:  ED  [place] Protocol:  Consent: Verbal consent obtained. Written consent obtained.  Consent given by: patient  Patient understanding: patient states understanding of the procedure being performed  Patient consent: the patient's understanding of the procedure matches consent given  Procedure consent: procedure consent matches procedure scheduled  Radiology Images displayed and confirmed.  If images not available, report reviewed: imaging studies available  Patient identity confirmed: verbally with patient    Injury location:  Hip  Location details:  Left hip  Injury type:  Dislocation  Dislocation type: posterior    Spontaneous?: Yes    Prosthesis?: Yes    Neurovascular status: Neurovascularly intact    Distal perfusion: normal    Neurological function: normal    Range of motion: reduced    Local anesthesia used?: No    General anesthesia used?: No    Sedation type:  Moderate (conscious) sedation (See separate Procedural Sedation form)  Manipulation performed?: Yes    Reduction method: traction and counter traction  Reduction successful?: Yes    Confirmation: Reduction confirmed by x-ray    Neurovascular status: Neurovascularly intact    Distal perfusion: normal    Neurological function: normal    Range of motion: normal

## 2025-01-21 ENCOUNTER — HOSPITAL ENCOUNTER (OUTPATIENT)
Dept: ULTRASOUND IMAGING | Facility: CLINIC | Age: 65
Discharge: HOME | End: 2025-01-21
Payer: MEDICARE

## 2025-01-21 ENCOUNTER — OFFICE VISIT (OUTPATIENT)
Dept: PHYSICAL THERAPY | Facility: CLINIC | Age: 65
End: 2025-01-21
Payer: MEDICARE

## 2025-01-21 ENCOUNTER — HOSPITAL ENCOUNTER (OUTPATIENT)
Dept: MAMMOGRAPHY | Facility: CLINIC | Age: 65
Discharge: HOME | End: 2025-01-21
Payer: MEDICARE

## 2025-01-21 VITALS — WEIGHT: 227 LBS | HEIGHT: 62 IN | BODY MASS INDEX: 41.77 KG/M2

## 2025-01-21 DIAGNOSIS — M25.552 LEFT HIP PAIN: Primary | ICD-10-CM

## 2025-01-21 DIAGNOSIS — M16.12 PRIMARY OSTEOARTHRITIS OF ONE HIP, LEFT: ICD-10-CM

## 2025-01-21 DIAGNOSIS — R92.8 ABNORMALITY OF RIGHT BREAST ON SCREENING MAMMOGRAM: ICD-10-CM

## 2025-01-21 PROCEDURE — 77065 DX MAMMO INCL CAD UNI: CPT

## 2025-01-21 PROCEDURE — 97530 THERAPEUTIC ACTIVITIES: CPT

## 2025-01-21 PROCEDURE — G0279 TOMOSYNTHESIS, MAMMO: HCPCS

## 2025-01-21 PROCEDURE — 76642 ULTRASOUND BREAST LIMITED: CPT

## 2025-01-21 PROCEDURE — 97110 THERAPEUTIC EXERCISES: CPT

## 2025-01-21 NOTE — PROGRESS NOTES
"Daily Note     Today's date: 2025  Patient name: Cristin Nolan  : 1960  MRN: [id number]  Referring provider: Dipesh Knowles, *  Dx:   Encounter Diagnosis     ICD-10-CM    1. Left hip pain  M25.552       2. Primary osteoarthritis of one hip, left  M16.12           Start Time: 1402  Stop Time: 1505  Total time in clinic (min): 63 minutes    Subjective: Patient reports lateral hip aching persists. Last week she had f/u with Dr. Knowles and will continue to wear abductor brace for 6 more weeks.       Objective: See treatment diary below      Assessment: Patient is tolerating listed exercises well and demonstrates good technique/form throughout session. Added hold times to some exercises to promote greater glut activation. Step ups are most challenging secondary to contralateral hip drop. Patient would benefit from continued PT to improve level of function.       Plan: Continue per POC. Increase reps/resistance as tolerated.      Precautions: Lt CARO 12/3/24;recent dislocation 24; STRICT POST HIP PRECAUTIONS Limit flex to 80, NO IR      Manuals 11/25 12/10 12/12 12/17 12/19 12/24 12/26 12/31 1/7 1/9 1/14 1/16 1/21   Lt hip rom   DL MS                                                            Neuro Re-Ed                                                                                                                                Ther Ex                Edu on recovery, HEP 10'               Bridges  2x10 2x10 2x10 20x5''+ ball sq 20x5'' + ball sq 20x5'' + ball sq 20x5'' + ball sq 15x5'' + ball sq 15x5'' + ball sq 5\"x20+ ball sq 5\"x20+ ball sq 5\"x20+ ball sq   Supine March  2x10 Lt 2x10 L 2x10 Lt 2x10 Lt 2# 2x10 Lt 2# 2x10 2# 2x10 2# 30x 30x Standing 20x ea Standing 20x ea Standing 1.5# 20x ea   Supine clamshell  20x5'' black 20x5\" black  20x5\" black  20x5'' black 20x5'' black 20x5'' black 30x5'' black 20x5'' black 20x5'' black 20x5'' black 30x5\" black 30x5\" black   Ball sq  20x5'' 20x5\" 20x5\" D/c   " "        Clamshell     20x s/l 20x s/l 30xs/l 30xs/l 20x s/l 20x s/l 20x s/l 5\"x20 S/l 5\"x20 S/l   Standing Hip 3 ways  15xea 15x ea 2x10 ea 20xea 20xea 20xea Lt UE 2# 15x ea 30xea no # 30xea no # 30x ea B/L 1.5# 20x ea B/L 1.5# 3\"x20 ea B/L   SLS  5x10''   5x10'' 5x10'' 5x15'' 5x15''   Lt 10\"x5  Lt 10\"x5   SAQ   20x L             Hip ABD iso        10x5\" 10x5'' green       Standing HS curl   20x L 20x ea 20xea            Bike     8' 8'  8' 8' Try treadmill NV 0.9 5' 1.4 8' 1.5 8' 1.5 8'   Hip Hike       10x 10x        SLR    AA 5x 2x5 AA 2x5 3x5 3x5 3x5 3x5 3x5 3x5 4x5   Ther Activity                Sit to stands  2x5 1 foam 2x5 1 foam  2x5 1 foam 10x 1 foam 15x 1 foam 10x no foam 5x 1 foam 10x no foam  10x foam 10x foam 15x foam 15x foam 2x10 foam   HR   3x10 Hr/tr 30x ea Hr/tr 30x ea Hr/tr 30x ea Hr/tr 30x ea Hr/tr 30x ea  Hr/tr 30x ea Hr/tr 30x ea Hr/tr 30x ea Hr/tr 30x ea   Step up    Fwd 10x4\" Fwd 10x 4'' 10x 6'' fwd 20x 6'' fwd 20x 6'' fwd 20x 4'' fwd 20x 4'' fwd 20x 4'' fwd 10x 6\" fwd 10x 6\" fwd   Lateral step up      10x 6'' 20x 6'' 20x 6''   20x 4\" 20x 4\" 20x 4\"   Gait Training                                                Modalities                CP          R s/l w/ pillow between knees 10' R s/l w/ pillow between knees 10' R s/l w/ pillow between knees 10' R s/l w/ pillow between knees 10'                                              "

## 2025-01-22 ENCOUNTER — RESULTS FOLLOW-UP (OUTPATIENT)
Dept: OBGYN CLINIC | Facility: CLINIC | Age: 65
End: 2025-01-22

## 2025-01-22 ENCOUNTER — RA CDI HCC (OUTPATIENT)
Dept: OTHER | Facility: HOSPITAL | Age: 65
End: 2025-01-22

## 2025-01-23 ENCOUNTER — OFFICE VISIT (OUTPATIENT)
Dept: PHYSICAL THERAPY | Facility: CLINIC | Age: 65
End: 2025-01-23
Payer: MEDICARE

## 2025-01-23 DIAGNOSIS — M16.12 PRIMARY OSTEOARTHRITIS OF ONE HIP, LEFT: ICD-10-CM

## 2025-01-23 DIAGNOSIS — M25.552 LEFT HIP PAIN: Primary | ICD-10-CM

## 2025-01-23 PROCEDURE — 97110 THERAPEUTIC EXERCISES: CPT

## 2025-01-23 PROCEDURE — 97530 THERAPEUTIC ACTIVITIES: CPT

## 2025-01-23 NOTE — PROGRESS NOTES
"Daily Note     Today's date: 2025  Patient name: Cristin Nolan  : 1960  MRN: [id number]  Referring provider: Dipesh Knowles, *  Dx:   Encounter Diagnosis     ICD-10-CM    1. Left hip pain  M25.552       2. Primary osteoarthritis of one hip, left  M16.12           Start Time: 1400  Stop Time: 1453  Total time in clinic (min): 53 minutes    Subjective: Patient participated in first aqua therapy session today without an issue      Objective: See treatment diary below      Assessment: Continued progressing proximal hip/gluteal strengthening exercises as outlined below. Able to tolerate ambulation at quicker speed on treadmill. Patient exhibited good technique/form throughout session and requires minimal corrective cuing with therex. Patient would benefit from continued PT to improve level of function.       Plan: Continue per POC. Increase reps/resistance as tolerated.      Precautions: Lt CARO 12/3/24;recent dislocation 24; STRICT POST HIP PRECAUTIONS Limit flex to 80, NO IR      Manuals 11/25 12/10 12/12 12/17 12/19 12/24 12/26 12/31 1/7 1/9 1/14 1/16 1/21 1/23   Lt hip rom   DL MS                                                                Neuro Re-Ed                                                                                                                                        Ther Ex                 Edu on recovery, HEP 10'                Bridges  2x10 2x10 2x10 20x5''+ ball sq 20x5'' + ball sq 20x5'' + ball sq 20x5'' + ball sq 15x5'' + ball sq 15x5'' + ball sq 5\"x20+ ball sq 5\"x20+ ball sq 5\"x20+ ball sq 5\"x30+ ball sq   Supine March  2x10 Lt 2x10 L 2x10 Lt 2x10 Lt 2# 2x10 Lt 2# 2x10 2# 2x10 2# 30x 30x Standing 20x ea Standing 20x ea Standing 1.5# 20x ea Standing 1.5# 20x ea   Supine clamshell  20x5'' black 20x5\" black  20x5\" black  20x5'' black 20x5'' black 20x5'' black 30x5'' black 20x5'' black 20x5'' black 20x5'' black 30x5\" black 30x5\" black    Ball sq  20x5'' 20x5\" 20x5\" D/c   " "         Clamshell     20x s/l 20x s/l 30xs/l 30xs/l 20x s/l 20x s/l 20x s/l 5\"x20 S/l 5\"x20 S/l Red 20x S/l     Standing Hip 3 ways  15xea 15x ea 2x10 ea 20xea 20xea 20xea Lt UE 2# 15x ea 30xea no # 30xea no # 30x ea B/L 1.5# 20x ea B/L 1.5# 3\"x20 ea B/L 1.5# 3\"x20 ea B/L   SLS  5x10''   5x10'' 5x10'' 5x15'' 5x15''   Lt 10\"x5  Lt 10\"x5 Lt 30\"x3   SAQ   20x L              Hip ABD iso        10x5\" 10x5'' green        Standing HS curl   20x L 20x ea 20xea             Bike     8' 8'  8' 8' Try treadmill NV 0.9 5' 1.4 8' 1.5 8' 1.5 8' 1.7 8'   Side stepping               Red 3 laps   Standing clamshell              Red 5\"x15   Hip Hike       10x 10x      5\"x10   SLR    AA 5x 2x5 AA 2x5 3x5 3x5 3x5 3x5 3x5 3x5 4x5 4x5   Ther Activity                 Sit to stands  2x5 1 foam 2x5 1 foam  2x5 1 foam 10x 1 foam 15x 1 foam 10x no foam 5x 1 foam 10x no foam  10x foam 10x foam 15x foam 15x foam 2x10 foam 2x10 foam   HR   3x10 Hr/tr 30x ea Hr/tr 30x ea Hr/tr 30x ea Hr/tr 30x ea Hr/tr 30x ea  Hr/tr 30x ea Hr/tr 30x ea Hr/tr 30x ea Hr/tr 30x ea Hr/tr 30x ea   Step up    Fwd 10x4\" Fwd 10x 4'' 10x 6'' fwd 20x 6'' fwd 20x 6'' fwd 20x 4'' fwd 20x 4'' fwd 20x 4'' fwd 10x 6\" fwd 10x 6\" fwd 15x 6\" fwd   Lateral step up      10x 6'' 20x 6'' 20x 6''   20x 4\" 20x 4\" 20x 4\" 20x 4\"   Gait Training                                                   Modalities                 CP          R s/l w/ pillow between knees 10' R s/l w/ pillow between knees 10' R s/l w/ pillow between knees 10' R s/l w/ pillow between knees 10' R s/l w/ pillow between knees 10'                                                 "

## 2025-01-28 ENCOUNTER — OFFICE VISIT (OUTPATIENT)
Dept: PHYSICAL THERAPY | Facility: CLINIC | Age: 65
End: 2025-01-28
Payer: MEDICARE

## 2025-01-28 DIAGNOSIS — M25.552 LEFT HIP PAIN: Primary | ICD-10-CM

## 2025-01-28 DIAGNOSIS — M16.12 PRIMARY OSTEOARTHRITIS OF ONE HIP, LEFT: ICD-10-CM

## 2025-01-28 PROCEDURE — 97110 THERAPEUTIC EXERCISES: CPT

## 2025-01-28 PROCEDURE — 97530 THERAPEUTIC ACTIVITIES: CPT

## 2025-01-28 NOTE — PROGRESS NOTES
"Daily Note     Today's date: 2025  Patient name: Cristin Nolan  : 1960  MRN: [id number]  Referring provider: Dipesh Knowles, *  Dx:   Encounter Diagnosis     ICD-10-CM    1. Left hip pain  M25.552       2. Primary osteoarthritis of one hip, left  M16.12           Start Time: 1402  Stop Time: 1450  Total time in clinic (min): 48 minutes    Subjective: Patient reports participating in yoga and aqua class over the weekend.       Objective: See treatment diary below      Assessment: Patient is showing good effort, motivation, and confidence with all exercises. Able to complete listed repetitions of SLR's without extensor lag. Showing improved strength and form with step ups as well. Patient demonstrated fatigue post-tx and would benefit from continued PT to improve level of function.       Plan: Continue per POC. Increase reps/resistance as tolerated.      Precautions: Lt CARO 12/3/24;recent dislocation 24; STRICT POST HIP PRECAUTIONS Limit flex to 80, NO IR      Manuals 11/25 12/10 12/12 12/17 12/19 12/24 12/26 12/31 1/7 1/9 1/14 1/16 1/21 1/23 1/28   Lt hip rom   DL MS                                                                    Neuro Re-Ed                                                                                                                                                Ther Ex                  Edu on recovery, HEP 10'                 Bridges  2x10 2x10 2x10 20x5''+ ball sq 20x5'' + ball sq 20x5'' + ball sq 20x5'' + ball sq 15x5'' + ball sq 15x5'' + ball sq 5\"x20+ ball sq 5\"x20+ ball sq 5\"x20+ ball sq 5\"x30+ ball sq 5\"x30+ ball sq   Supine March  2x10 Lt 2x10 L 2x10 Lt 2x10 Lt 2# 2x10 Lt 2# 2x10 2# 2x10 2# 30x 30x Standing 20x ea Standing 20x ea Standing 1.5# 20x ea Standing 1.5# 20x ea Standing 2.0# 20x ea   Supine clamshell  20x5'' black 20x5\" black  20x5\" black  20x5'' black 20x5'' black 20x5'' black 30x5'' black 20x5'' black 20x5'' black 20x5'' black 30x5\" black 30x5\" black " "    Ball sq  20x5'' 20x5\" 20x5\" D/c             Clamshell     20x s/l 20x s/l 30xs/l 30xs/l 20x s/l 20x s/l 20x s/l 5\"x20 S/l 5\"x20 S/l Red 20x S/l   Red 5\" 20x S/l   Standing Hip 3 ways  15xea 15x ea 2x10 ea 20xea 20xea 20xea Lt UE 2# 15x ea 30xea no # 30xea no # 30x ea B/L 1.5# 20x ea B/L 1.5# 3\"x20 ea B/L 1.5# 3\"x20 ea B/L 2# 3\" x20 ea B/L   SLS  5x10''   5x10'' 5x10'' 5x15'' 5x15''   Lt 10\"x5  Lt 10\"x5 Lt 30\"x3 Lt 30\"x3   SAQ   20x L               Hip ABD iso        10x5\" 10x5'' green         Standing HS curl   20x L 20x ea 20xea              Bike     8' 8'  8' 8' Try treadmill NV 0.9 5' 1.4 8' 1.5 8' 1.5 8' 1.7 8' 1.6 mph 8'   Side stepping               Red 3 laps Green 3 laps   Standing clamshell              Red 5\"x15 Green 5\"x15 ea   Hip Hike       10x 10x      5\"x10 5\"x10   SLR    AA 5x 2x5 AA 2x5 3x5 3x5 3x5 3x5 3x5 3x5 4x5 4x5 4x5   Ther Activity                  Sit to stands  2x5 1 foam 2x5 1 foam  2x5 1 foam 10x 1 foam 15x 1 foam 10x no foam 5x 1 foam 10x no foam  10x foam 10x foam 15x foam 15x foam 2x10 foam 2x10 foam 2x10 foam   HR   3x10 Hr/tr 30x ea Hr/tr 30x ea Hr/tr 30x ea Hr/tr 30x ea Hr/tr 30x ea  Hr/tr 30x ea Hr/tr 30x ea Hr/tr 30x ea Hr/tr 30x ea Hr/tr 30x ea    Step up    Fwd 10x4\" Fwd 10x 4'' 10x 6'' fwd 20x 6'' fwd 20x 6'' fwd 20x 4'' fwd 20x 4'' fwd 20x 4'' fwd 10x 6\" fwd 10x 6\" fwd 15x 6\" fwd 15x 6\" fwd   Lateral step up      10x 6'' 20x 6'' 20x 6''   20x 4\" 20x 4\" 20x 4\" 20x 4\" 10x 6\"   Gait Training                                                      Modalities                  CP          R s/l w/ pillow between knees 10' R s/l w/ pillow between knees 10' R s/l w/ pillow between knees 10' R s/l w/ pillow between knees 10' R s/l w/ pillow between knees 10' R s/l w/ pillow between knees 10'                                                    "

## 2025-01-30 ENCOUNTER — OFFICE VISIT (OUTPATIENT)
Dept: PHYSICAL THERAPY | Facility: CLINIC | Age: 65
End: 2025-01-30
Payer: MEDICARE

## 2025-01-30 DIAGNOSIS — M25.552 LEFT HIP PAIN: ICD-10-CM

## 2025-01-30 DIAGNOSIS — M16.12 PRIMARY OSTEOARTHRITIS OF ONE HIP, LEFT: Primary | ICD-10-CM

## 2025-01-30 PROCEDURE — 97110 THERAPEUTIC EXERCISES: CPT

## 2025-01-30 PROCEDURE — 97530 THERAPEUTIC ACTIVITIES: CPT

## 2025-01-30 NOTE — PROGRESS NOTES
"Daily Note     Today's date: 2025  Patient name: Cristin Nolan  : 1960  MRN: [id number]  Referring provider: Dipesh Knowles, *  Dx:   Encounter Diagnosis     ICD-10-CM    1. Primary osteoarthritis of one hip, left  M16.12       2. Left hip pain  M25.552           Start Time: 1400  Stop Time: 1455  Total time in clinic (min): 55 minutes    Subjective: Patient reports feeling stronger and more ease with ADL's.       Objective: See treatment diary below      Assessment: Progressed glut med strengthening in order to improve overall stability of hips and pelvic alignment. Patient able to complete higher repetitions for some exercises as well without an adverse response. She negotiated a full flight steps with BUE support today however, notable Rt hip drop observed. S/l hip abduction added to further engage glut med. Issued RTB for home use. Patient would benefit from continued PT to improve level of function.       Plan: Continue per POC. Increase reps/resistance as tolerated.      Precautions: Lt CARO 12/3/24;recent dislocation 24; STRICT POST HIP PRECAUTIONS Limit flex to 80, NO IR      Manuals    Lt hip rom                                            Neuro Re-Ed                                                                                        Ther Ex           Edu on recovery, HEP           Bridges 15x5'' + ball sq 15x5'' + ball sq 5\"x20+ ball sq 5\"x20+ ball sq 5\"x20+ ball sq 5\"x30+ ball sq 5\"x30+ ball sq 5\"x30+ ball sq   Supine March 30x 30x Standing 20x ea Standing 20x ea Standing 1.5# 20x ea Standing 1.5# 20x ea Standing 2.0# 20x ea Standing 2.0# 20x ea   Supine clamshell 20x5'' black 20x5'' black 20x5'' black 30x5\" black 30x5\" black      Ball sq           Clamshell 20x s/l 20x s/l 20x s/l 5\"x20 S/l 5\"x20 S/l Red 20x S/l   Red 5\" 20x S/l Red 10\"x15 s/l   Standing Hip 3 ways 30xea no # 30xea no # 30x ea B/L 1.5# 20x ea B/L 1.5# 3\"x20 ea B/L 1.5# 3\"x20 " "ea B/L 2# 3\" x20 ea B/L 2# 3\" x20 ea B/L   SLS   Lt 10\"x5  Lt 10\"x5 Lt 30\"x3 Lt 30\"x3 Lt 30\"x3   SAQ           Hip ABD iso 10x5'' green          Standing HS curl           Bike Try treadmill NV 0.9 5' 1.4 8' 1.5 8' 1.5 8' 1.7 8' 1.6 mph 8' 1.7 mph 8'   Side stepping       Red 3 laps Green 3 laps Green 4 laps   Standing clamshell      Red 5\"x15 Green 5\"x15 ea Green 5\" 2x10 ea   Hip Hike      5\"x10 5\"x10    SLR 3x5 3x5 3x5 3x5 4x5 4x5 4x5 4x5   SL hip ABD        10x   Ther Activity           Sit to stands 10x foam 10x foam 15x foam 15x foam 2x10 foam 2x10 foam 2x10 foam 5# KB 15x   HR  Hr/tr 30x ea Hr/tr 30x ea Hr/tr 30x ea Hr/tr 30x ea Hr/tr 30x ea     Step up 20x 4'' fwd 20x 4'' fwd 20x 4'' fwd 10x 6\" fwd 10x 6\" fwd 15x 6\" fwd 15x 6\" fwd 2x10 6\" fwd   Lateral step up   20x 4\" 20x 4\" 20x 4\" 20x 4\" 10x 6\" 10x 6\"   Mini squats        15x   Gait Training           Stairs        1 flight step-to              Modalities           CP  R s/l w/ pillow between knees 10' R s/l w/ pillow between knees 10' R s/l w/ pillow between knees 10' R s/l w/ pillow between knees 10' R s/l w/ pillow between knees 10' R s/l w/ pillow between knees 10' R s/l w/ pillow between knees 10'                                               "

## 2025-02-04 ENCOUNTER — OFFICE VISIT (OUTPATIENT)
Dept: PHYSICAL THERAPY | Facility: CLINIC | Age: 65
End: 2025-02-04
Payer: MEDICARE

## 2025-02-04 DIAGNOSIS — M25.552 LEFT HIP PAIN: ICD-10-CM

## 2025-02-04 DIAGNOSIS — M19.011 PRIMARY OSTEOARTHRITIS OF BOTH SHOULDERS: Primary | ICD-10-CM

## 2025-02-04 DIAGNOSIS — M19.012 PRIMARY OSTEOARTHRITIS OF BOTH SHOULDERS: Primary | ICD-10-CM

## 2025-02-04 DIAGNOSIS — M16.12 PRIMARY OSTEOARTHRITIS OF ONE HIP, LEFT: Primary | ICD-10-CM

## 2025-02-04 PROCEDURE — 97110 THERAPEUTIC EXERCISES: CPT

## 2025-02-04 NOTE — PROGRESS NOTES
"Daily Note     Today's date: 2025  Patient name: Cristin Nolan  : 1960  MRN: [id number]  Referring provider: Dipesh Knowles, *  Dx:   Encounter Diagnosis     ICD-10-CM    1. Primary osteoarthritis of one hip, left  M16.12       2. Left hip pain  M25.552           Start Time: 1404  Stop Time: 1500  Total time in clinic (min): 56 minutes    Subjective: Patient reports hip and glut soreness after last session. Notes worsening of B/L shoulder OA, Rt>Lt.       Objective: See treatment diary below      Assessment: Patient continues to be tolerating strength progressions well. Also showing improved form and technique with therex. Does have a tendency to utilize moderate UE assistance with functional exercises, verbal cues to reduce HHA. Patient demonstrated fatigue post-tx and would benefit from continued PT to improve level of function.       Plan: Continue per POC. Increase reps/resistance as tolerated.      Precautions: Lt CARO 12/3/24;recent dislocation 24; STRICT POST HIP PRECAUTIONS Limit flex to 80, NO IR      Manuals    Lt hip rom                                                Neuro Re-Ed                                                                                                Ther Ex            Edu on recovery, HEP            Bridges 15x5'' + ball sq 15x5'' + ball sq 5\"x20+ ball sq 5\"x20+ ball sq 5\"x20+ ball sq 5\"x30+ ball sq 5\"x30+ ball sq 5\"x30+ ball sq 5\"x30+ ball sq   Supine March 30x 30x Standing 20x ea Standing 20x ea Standing 1.5# 20x ea Standing 1.5# 20x ea Standing 2.0# 20x ea Standing 2.0# 20x ea Standing 2.0# 20x ea   Supine clamshell 20x5'' black 20x5'' black 20x5'' black 30x5\" black 30x5\" black       Ball sq            Clamshell 20x s/l 20x s/l 20x s/l 5\"x20 S/l 5\"x20 S/l Red 20x S/l   Red 5\" 20x S/l Red 10\"x15 s/l Green 5\" 2X10   Standing Hip 3 ways 30xea no # 30xea no # 30x ea B/L 1.5# 20x ea B/L 1.5# 3\"x20 ea B/L 1.5# 3\"x20 ea B/L " "2# 3\" x20 ea B/L 2# 3\" x20 ea B/L 2# 3\" x20 ea B/L   SLS   Lt 10\"x5  Lt 10\"x5 Lt 30\"x3 Lt 30\"x3 Lt 30\"x3    SAQ            Hip ABD iso 10x5'' green           Standing HS curl            Bike Try treadmill NV 0.9 5' 1.4 8' 1.5 8' 1.5 8' 1.7 8' 1.6 mph 8' 1.7 mph 8' 1.7 mph 8'   Side stepping       Red 3 laps Green 3 laps Green 4 laps Blue  to fatigue    Standing clamshell      Red 5\"x15 Green 5\"x15 ea Green 5\" 2x10 ea Blue 5\" 15x ea   Hip Hike      5\"x10 5\"x10     SLR 3x5 3x5 3x5 3x5 4x5 4x5 4x5 4x5 4x5   SL hip ABD        10x    Ther Activity            Sit to stands 10x foam 10x foam 15x foam 15x foam 2x10 foam 2x10 foam 2x10 foam 5# KB 15x 5# KB 15x   HR  Hr/tr 30x ea Hr/tr 30x ea Hr/tr 30x ea Hr/tr 30x ea Hr/tr 30x ea      Step up 20x 4'' fwd 20x 4'' fwd 20x 4'' fwd 10x 6\" fwd 10x 6\" fwd 15x 6\" fwd 15x 6\" fwd 2x10 6\" fwd 2x10 6\"    Lateral step up   20x 4\" 20x 4\" 20x 4\" 20x 4\" 10x 6\" 10x 6\" 2x10 6\"   Mini squats        15x 2x10   Gait Training            Stairs        1 flight step-to 2 flights               Modalities            CP  R s/l w/ pillow between knees 10' R s/l w/ pillow between knees 10' R s/l w/ pillow between knees 10' R s/l w/ pillow between knees 10' R s/l w/ pillow between knees 10' R s/l w/ pillow between knees 10' R s/l w/ pillow between knees 10' R s/l w/ pillow between knees 10'                                                  "

## 2025-02-06 ENCOUNTER — APPOINTMENT (OUTPATIENT)
Dept: PHYSICAL THERAPY | Facility: CLINIC | Age: 65
End: 2025-02-06
Payer: MEDICARE

## 2025-02-11 ENCOUNTER — APPOINTMENT (OUTPATIENT)
Dept: PHYSICAL THERAPY | Facility: CLINIC | Age: 65
End: 2025-02-11
Payer: MEDICARE

## 2025-02-13 ENCOUNTER — OFFICE VISIT (OUTPATIENT)
Dept: PHYSICAL THERAPY | Facility: CLINIC | Age: 65
End: 2025-02-13
Payer: MEDICARE

## 2025-02-13 DIAGNOSIS — M25.552 LEFT HIP PAIN: ICD-10-CM

## 2025-02-13 DIAGNOSIS — M16.12 PRIMARY OSTEOARTHRITIS OF ONE HIP, LEFT: Primary | ICD-10-CM

## 2025-02-13 PROCEDURE — 97530 THERAPEUTIC ACTIVITIES: CPT | Performed by: PHYSICAL THERAPIST

## 2025-02-13 PROCEDURE — 97110 THERAPEUTIC EXERCISES: CPT | Performed by: PHYSICAL THERAPIST

## 2025-02-13 NOTE — PROGRESS NOTES
"Daily Note     Today's date: 2025  Patient name: Cristin Nolan  : 1960  MRN: [id number]  Referring provider: Dipesh Knowles, *  Dx:   Encounter Diagnosis     ICD-10-CM    1. Primary osteoarthritis of one hip, left  M16.12       2. Left hip pain  M25.552                      Subjective: Pt reports she's doing well, she missed her Tuesday appt due to an [employer] truck stuck in her driveway.      Objective: See treatment diary below      Assessment: Pt is showing good progress with strengthening. Hip ABD is still very challenging and limited by ability to get through full rom against gravity.      Plan: Continue per plan of care.      Precautions: Lt CARO 12/3/24;recent dislocation 24; STRICT POST HIP PRECAUTIONS Limit flex to 80, NO IR      Manuals  2/4   Lt hip rom                                                Neuro Re-Ed                                                                                                Ther Ex            Edu on recovery, HEP            Bridges 2x10 SL bridge  5\"x20+ ball sq 5\"x20+ ball sq 5\"x20+ ball sq 5\"x30+ ball sq 5\"x30+ ball sq 5\"x30+ ball sq 5\"x30+ ball sq   Supine March   Standing 20x ea Standing 20x ea Standing 1.5# 20x ea Standing 1.5# 20x ea Standing 2.0# 20x ea Standing 2.0# 20x ea Standing 2.0# 20x ea   Supine clamshell   20x5'' black 30x5\" black 30x5\" black       Ball sq            Clamshell   20x s/l 5\"x20 S/l 5\"x20 S/l Red 20x S/l   Red 5\" 20x S/l Red 10\"x15 s/l Green 5\" 2X10   Standing Hip 3 ways Hip ABD 1 UE 30xea  30x ea B/L 1.5# 20x ea B/L 1.5# 3\"x20 ea B/L 1.5# 3\"x20 ea B/L 2# 3\" x20 ea B/L 2# 3\" x20 ea B/L 2# 3\" x20 ea B/L   SLS Lt 3x30''  Lt 10\"x5  Lt 10\"x5 Lt 30\"x3 Lt 30\"x3 Lt 30\"x3    SAQ            Hip ABD iso            Standing HS curl            Bike/Treadmill 1.7mph 8'  1.4 8' 1.5 8' 1.5 8' 1.7 8' 1.6 mph 8' 1.7 mph 8' 1.7 mph 8'   Side stepping  Blue to fatigue     Red 3 laps Green 3 laps Green 4 laps " "Blue  to fatigue    Standing clamshell Blue 30xea     Red 5\"x15 Green 5\"x15 ea Green 5\" 2x10 ea Blue 5\" 15x ea   Hip Hike 25x     5\"x10 5\"x10  \"x   SLR 4x5  3x5 3x5 4x5 4x5 4x5 4x5 4x5   Prone Hip Ext 20x           SL hip ABD 10x       10x    Ther Activity            Sit to stands 10#KB foam 15x  15x foam 15x foam 2x10 foam 2x10 foam 2x10 foam 5# KB 15x 5# KB 15x   HR   Hr/tr 30x ea Hr/tr 30x ea Hr/tr 30x ea Hr/tr 30x ea      Step up   20x 4'' fwd 10x 6\" fwd 10x 6\" fwd 15x 6\" fwd 15x 6\" fwd 2x10 6\" fwd 2x10 6\"    Lateral step up 2x10 4''  20x 4\" 20x 4\" 20x 4\" 20x 4\" 10x 6\" 10x 6\" 2x10 6\"   Mini squats        15x 2x10   Gait Training            Stairs        1 flight step-to 2 flights               Modalities            CP   R s/l w/ pillow between knees 10' R s/l w/ pillow between knees 10' R s/l w/ pillow between knees 10' R s/l w/ pillow between knees 10' R s/l w/ pillow between knees 10' R s/l w/ pillow between knees 10' R s/l w/ pillow between knees 10'                                                    "

## 2025-02-18 ENCOUNTER — OFFICE VISIT (OUTPATIENT)
Dept: PHYSICAL THERAPY | Facility: CLINIC | Age: 65
End: 2025-02-18
Payer: MEDICARE

## 2025-02-18 DIAGNOSIS — M25.552 LEFT HIP PAIN: ICD-10-CM

## 2025-02-18 DIAGNOSIS — M16.12 PRIMARY OSTEOARTHRITIS OF ONE HIP, LEFT: Primary | ICD-10-CM

## 2025-02-18 PROCEDURE — 97110 THERAPEUTIC EXERCISES: CPT

## 2025-02-18 PROCEDURE — 97112 NEUROMUSCULAR REEDUCATION: CPT

## 2025-02-18 NOTE — PROGRESS NOTES
"Daily Note     Today's date: 2025  Patient name: Cristin Nolan  : 1960  MRN: [id number]  Referring provider: Dipesh Knowles, *  Dx:   Encounter Diagnosis     ICD-10-CM    1. Primary osteoarthritis of one hip, left  M16.12       2. Left hip pain  M25.552           Start Time: 1216  Stop Time: 1249  Total time in clinic (min): 33 minutes    Subjective: Patient reports no new complaints and states hip is doing well.       Objective: See treatment diary below      Assessment: Patient is doing well with listed exercises and is showing improved gait quality. Continues to benefit from glut med/max strengthening to address weakness of hip stabilizers. ROM very limited with Sl/ abd. Patient would benefit from continued PT to improve level of function.       Plan: Continue per POC. Increase reps/resistance as tolerated.      Precautions: Lt CARO 12/3/24;recent dislocation 24; STRICT POST HIP PRECAUTIONS Limit flex to 80, NO IR        Manuals  2   Lt hip rom                                                Neuro Re-Ed                                                                                                Ther Ex            Edu on recovery, HEP            Bridges 2x10 SL bridge 2x10 SL bridge 5\"x20+ ball sq 5\"x20+ ball sq 5\"x20+ ball sq 5\"x30+ ball sq 5\"x30+ ball sq 5\"x30+ ball sq 5\"x30+ ball sq   Supine March   Standing 20x ea Standing 20x ea Standing 1.5# 20x ea Standing 1.5# 20x ea Standing 2.0# 20x ea Standing 2.0# 20x ea Standing 2.0# 20x ea   Supine clamshell   20x5'' black 30x5\" black 30x5\" black       Ball sq            Clamshell   20x s/l 5\"x20 S/l 5\"x20 S/l Red 20x S/l   Red 5\" 20x S/l Red 10\"x15 s/l Green 5\" 2X10   Standing Hip 3 ways Hip ABD 1 UE 30xea Hip ABD 1 UE 30xea 30x ea B/L 1.5# 20x ea B/L 1.5# 3\"x20 ea B/L 1.5# 3\"x20 ea B/L 2# 3\" x20 ea B/L 2# 3\" x20 ea B/L 2# 3\" x20 ea B/L   SLS Lt 3x30'' Lt 3x30'' Lt 10\"x5  Lt 10\"x5 Lt 30\"x3 Lt 30\"x3 Lt " "30\"x3    SAQ            Hip ABD iso            Standing HS curl            Bike/Treadmill 1.7mph 8' 1.9 mph 10' 1.4 8' 1.5 8' 1.5 8' 1.7 8' 1.6 mph 8' 1.7 mph 8' 1.7 mph 8'   Side stepping  Blue to fatigue Blue to fatigue    Red 3 laps Green 3 laps Green 4 laps Blue  to fatigue    Standing clamshell Blue 30xea Blue 30xea    Red 5\"x15 Green 5\"x15 ea Green 5\" 2x10 ea Blue 5\" 15x ea   Hip Hike 25x     5\"x10 5\"x10  \"x   SLR 4x5 4x5 3x5 3x5 4x5 4x5 4x5 4x5 4x5   Prone Hip Ext 20x 20x          SL hip ABD 10x 10x      10x    Ther Activity            Sit to stands 10#KB foam 15x 10#KB foam 15x 15x foam 15x foam 2x10 foam 2x10 foam 2x10 foam 5# KB 15x 5# KB 15x   HR   Hr/tr 30x ea Hr/tr 30x ea Hr/tr 30x ea Hr/tr 30x ea      Step up   20x 4'' fwd 10x 6\" fwd 10x 6\" fwd 15x 6\" fwd 15x 6\" fwd 2x10 6\" fwd 2x10 6\"    Lateral step up 2x10 4'' 2x10 4'' 20x 4\" 20x 4\" 20x 4\" 20x 4\" 10x 6\" 10x 6\" 2x10 6\"   Mini squats        15x 2x10   Gait Training            Stairs        1 flight step-to 2 flights               Modalities            CP   R s/l w/ pillow between knees 10' R s/l w/ pillow between knees 10' R s/l w/ pillow between knees 10' R s/l w/ pillow between knees 10' R s/l w/ pillow between knees 10' R s/l w/ pillow between knees 10' R s/l w/ pillow between knees 10'                                                      "

## 2025-02-20 ENCOUNTER — OFFICE VISIT (OUTPATIENT)
Dept: PHYSICAL THERAPY | Facility: CLINIC | Age: 65
End: 2025-02-20
Payer: MEDICARE

## 2025-02-20 DIAGNOSIS — M16.12 PRIMARY OSTEOARTHRITIS OF ONE HIP, LEFT: Primary | ICD-10-CM

## 2025-02-20 DIAGNOSIS — M25.552 LEFT HIP PAIN: ICD-10-CM

## 2025-02-20 PROCEDURE — 97530 THERAPEUTIC ACTIVITIES: CPT | Performed by: PHYSICAL THERAPIST

## 2025-02-20 PROCEDURE — 97110 THERAPEUTIC EXERCISES: CPT | Performed by: PHYSICAL THERAPIST

## 2025-02-20 NOTE — PROGRESS NOTES
"Daily Note     Today's date: 2025  Patient name: Cristin Nolan  : 1960  MRN: [id number]  Referring provider: Dipesh Knowles, *  Dx:   Encounter Diagnosis     ICD-10-CM    1. Primary osteoarthritis of one hip, left  M16.12       2. Left hip pain  M25.552                      Subjective: No new changes voiced, amb with SPC.      Objective: See treatment diary below      Assessment: Tolerated treatment well. Patient demonstrated fatigue post treatment, exhibited good technique with therapeutic exercises, and would benefit from continued PTas per primary PT's POC. Pt is very motivated and compliant with HEP/therex. No pain post tx voiced.      Plan: Continue per plan of care.  Progress treatment as tolerated.       Precautions: Lt CARO 12/3/24;recent dislocation 24; STRICT POST HIP PRECAUTIONS Limit flex to 80, NO IR        Manuals  2    Lt hip rom                                                        Neuro Re-Ed                                                                                                                Ther Ex              Edu on recovery, HEP              Bridges 2x10 SL bridge 2x10 SL bridge 2x10 SL bridge 5\"x20+ ball sq 5\"x20+ ball sq 5\"x20+ ball sq 5\"x30+ ball sq 5\"x30+ ball sq 5\"x30+ ball sq 5\"x30+ ball sq    Supine March    Standing 20x ea Standing 20x ea Standing 1.5# 20x ea Standing 1.5# 20x ea Standing 2.0# 20x ea Standing 2.0# 20x ea Standing 2.0# 20x ea    Supine clamshell    20x5'' black 30x5\" black 30x5\" black        Ball sq              Clamshell    20x s/l 5\"x20 S/l 5\"x20 S/l Red 20x S/l   Red 5\" 20x S/l Red 10\"x15 s/l Green 5\" 2X10    Standing Hip 3 ways Hip ABD 1 UE 30xea Hip ABD 1 UE 30xea #3 10x2 u/l UE hold 30x ea B/L 1.5# 20x ea B/L 1.5# 3\"x20 ea B/L 1.5# 3\"x20 ea B/L 2# 3\" x20 ea B/L 2# 3\" x20 ea B/L 2# 3\" x20 ea B/L    SLS Lt 3x30'' Lt 3x30'' Lt 5x30\" Lt 10\"x5  Lt 10\"x5 Lt 30\"x3 Lt 30\"x3 Lt 30\"x3     SAQ     " "         Hip ABD iso              Standing HS curl              Bike/Treadmill 1.7mph 8' 1.9 mph 10' 1.9 mph 10' 1.4 8' 1.5 8' 1.5 8' 1.7 8' 1.6 mph 8' 1.7 mph 8' 1.7 mph 8'    Side stepping  Blue to fatigue Blue to fatigue Blue TB to fatigue    Red 3 laps Green 3 laps Green 4 laps Blue  to fatigue     Standing clamshell Blue 30xea Blue 30xea Blue TB 30x ea    Red 5\"x15 Green 5\"x15 ea Green 5\" 2x10 ea Blue 5\" 15x ea    Hip Hike 25x  25x    5\"x10 5\"x10  \"x    SLR 4x5 4x5 4x5 3x5 3x5 4x5 4x5 4x5 4x5 4x5    Prone Hip Ext 20x 20x 20x           SL hip ABD 10x 10x 10x      10x     Ther Activity              Sit to stands 10#KB foam 15x 10#KB foam 15x 10K B 20x 15x foam 15x foam 2x10 foam 2x10 foam 2x10 foam 5# KB 15x 5# KB 15x    HR    Hr/tr 30x ea Hr/tr 30x ea Hr/tr 30x ea Hr/tr 30x ea       Step up    20x 4'' fwd 10x 6\" fwd 10x 6\" fwd 15x 6\" fwd 15x 6\" fwd 2x10 6\" fwd 2x10 6\"     Lateral step up 2x10 4'' 2x10 4'' 2x10 4\" 20x 4\" 20x 4\" 20x 4\" 20x 4\" 10x 6\" 10x 6\" 2x10 6\"    Mini squats         15x 2x10    Gait Training              Stairs         1 flight step-to 2 flights                  Modalities              CP    R s/l w/ pillow between knees 10' R s/l w/ pillow between knees 10' R s/l w/ pillow between knees 10' R s/l w/ pillow between knees 10' R s/l w/ pillow between knees 10' R s/l w/ pillow between knees 10' R s/l w/ pillow between knees 10'                                                           "

## 2025-02-21 ENCOUNTER — OFFICE VISIT (OUTPATIENT)
Dept: OBGYN CLINIC | Facility: MEDICAL CENTER | Age: 65
End: 2025-02-21
Payer: MEDICARE

## 2025-02-21 ENCOUNTER — APPOINTMENT (OUTPATIENT)
Dept: RADIOLOGY | Facility: MEDICAL CENTER | Age: 65
End: 2025-02-21
Payer: MEDICARE

## 2025-02-21 VITALS — HEIGHT: 62 IN | WEIGHT: 225 LBS | BODY MASS INDEX: 41.41 KG/M2

## 2025-02-21 DIAGNOSIS — M19.012 PRIMARY OSTEOARTHRITIS OF BOTH SHOULDERS: ICD-10-CM

## 2025-02-21 DIAGNOSIS — M19.011 PRIMARY OSTEOARTHRITIS OF BOTH SHOULDERS: ICD-10-CM

## 2025-02-21 DIAGNOSIS — M54.16 LUMBAR RADICULOPATHY: ICD-10-CM

## 2025-02-21 DIAGNOSIS — M48.061 SPINAL STENOSIS OF LUMBAR REGION, UNSPECIFIED WHETHER NEUROGENIC CLAUDICATION PRESENT: ICD-10-CM

## 2025-02-21 DIAGNOSIS — Z96.642 STATUS POST TOTAL REPLACEMENT OF LEFT HIP: ICD-10-CM

## 2025-02-21 DIAGNOSIS — Z96.642 STATUS POST TOTAL REPLACEMENT OF LEFT HIP: Primary | ICD-10-CM

## 2025-02-21 PROCEDURE — 99024 POSTOP FOLLOW-UP VISIT: CPT | Performed by: ORTHOPAEDIC SURGERY

## 2025-02-21 PROCEDURE — 73502 X-RAY EXAM HIP UNI 2-3 VIEWS: CPT

## 2025-02-21 PROCEDURE — 20610 DRAIN/INJ JOINT/BURSA W/O US: CPT | Performed by: ORTHOPAEDIC SURGERY

## 2025-02-21 RX ORDER — METHYLPREDNISOLONE ACETATE 40 MG/ML
1 INJECTION, SUSPENSION INTRA-ARTICULAR; INTRALESIONAL; INTRAMUSCULAR; SOFT TISSUE
Status: COMPLETED | OUTPATIENT
Start: 2025-02-21 | End: 2025-02-21

## 2025-02-21 RX ORDER — BUPIVACAINE HYDROCHLORIDE 2.5 MG/ML
4 INJECTION, SOLUTION INFILTRATION; PERINEURAL
Status: COMPLETED | OUTPATIENT
Start: 2025-02-21 | End: 2025-02-21

## 2025-02-21 RX ADMIN — BUPIVACAINE HYDROCHLORIDE 4 ML: 2.5 INJECTION, SOLUTION INFILTRATION; PERINEURAL at 12:00

## 2025-02-21 RX ADMIN — METHYLPREDNISOLONE ACETATE 1 ML: 40 INJECTION, SUSPENSION INTRA-ARTICULAR; INTRALESIONAL; INTRAMUSCULAR; SOFT TISSUE at 12:00

## 2025-02-21 NOTE — PROGRESS NOTES
"Orthopaedic Surgery - Office Note  Cristin Nolan (64 y.o. female)   : 1960   MRN: [id number]  Encounter Date: 2025    Assessment / Plan  S/p L CARO on 2024, with subsequent dislocation on 2025   B/L shoulder OA    Left hip is progressing nicely on exam today.  Discontinue brace  Continue hip precautions until early April (reviewed today)  Left knee should improve with time  RLE hip abductor are associated with a chronic L5 denervation referral placed for comprehensive spine.  Discussed the may refer her back to her pain mgmt physician at [facility].   Patient provided with a left shoulder GH injection today it is too soon to repeat the right injection   Follow-up:  Return in about 2 months (around 2025).       Chief Complaint / Date of Onset  Left hip OA  Injury Mechanism / Date  Post operative dislocation on 2025  Surgery / Date  L THR with Dr. Knowles on2024    History of Present Illness   Cristin Nolan is a 64 y.o. female following up s/p L THR on 24 and subsequent dislocation on 25. She is attending PT as instructed. She reports posterior hip pain and describes a grinding sensation. She also reports increased left knee pain.      She also reports B/L shoulder pain.  She has known OA and was provided with a right GH injection on 24 and B/L shoulder injections 10/21/24.       Treatment Summary  Medications / Modalities  Advil  prn  Bracing / Immobilization  None  Physical Therapy  Began post op on 12/10/2024   Injections  None post op  Prior Surgeries  Left total knee replacement 10/21/2021 at [place]  Right total knee replacement on 2020 with Dr. Knowles     other Treatments  None     Employment / Current Status  N/a     Sport / Organization / Current Status  Hydro-biking      Review of Systems  Pertinent items are noted in HPI.  All other systems were reviewed and are negative.      Physical Exam  Ht 5' 2\" (1.575 m)   Wt 102 kg (225 lb)   BMI " 41.15 kg/m²   Cons: Appears well.  No apparent distress.  Psych: Alert. Oriented x3.  Mood and affect normal.  Eyes: PERRLA, EOMI  Resp: Normal effort.  No audible wheezing or stridor.  CV: Palpable pulse.  No discernable arrhythmia.  No LE edema.  Lymph:  No palpable cervical, axillary, or inguinal lymphadenopathy.  Skin: Warm.  No palpable masses.  No visible lesions.  Neuro: Normal muscle tone.  Normal and symmetric DTR's.     Left Hip Exam  Alignment / Posture:  Normal resting hip posture.  Inspection:  No swelling. No ecchymosis.  Palpation:   No hip tenderness. No effusion.  ROM:  Hip Flexion 90. Rotation not tested  Strength:  Quadriceps 5-/5. Hamstrings 5-/5.  Stability:  Not tested.  Tests:   Not tested  Neurovascular:  Sensation intact in DP/SP/Gill/Sa/T nerve distributions. 2+ DP & PT pulses.  Gait:  Steady with cane.     Left Knee Exam  Alignment:  Normal knee alignment.  Inspection:  No swelling. No edema. No erythema. No ecchymosis.  Palpation:  No tenderness.  ROM:  Normal knee ROM.  Strength:  5/5 quadriceps and hamstrings.  Stability:  No objective knee instability. Stable Varus / Valgus stress, Lachman, and Posterior drawer.  Tests:  No pertinent positive or negative tests.  Patella:  Patella tracks centrally without crepitus.  Neurovascular:  Sensation intact in DP/SP/Gill/Sa/T nerve distributions.  2+ radial pulse.  Gait:  Steady with cane.     Studies Reviewed  I have personally reviewed pertinent films in PACS.  XR of left hip -  prothesis remains in acceptable alignment and position, no sign of loosening, no fracture or dislocation       Large joint arthrocentesis: L glenohumeral  Universal Protocol:  procedure performed by consultantConsent: Verbal consent obtained.  Consent given by: patient  Patient understanding: patient states understanding of the procedure being performed  Site marked: the operative site was marked  Patient identity confirmed: verbally with patient  Supporting  Documentation  Indications: pain   Procedure Details  Location: shoulder - L glenohumeral  Needle size: 22 G  Ultrasound guidance: no  Medications administered: 4 mL bupivacaine 0.25 %; 1 mL methylPREDNISolone acetate 40 mg/mL    Patient tolerance: patient tolerated the procedure well with no immediate complications  Dressing:  Sterile dressing applied        No procedures today.    Medical, Surgical, Family, and Social History  The patient's medical history, family history, and social history, were reviewed and updated as appropriate.    Past Medical History:   Diagnosis Date    Ambulatory dysfunction     uses walking stick    Anxiety     Arthritis     Cancer (HCC) 7/15/17    All better now    Chronic pain disorder     Claustrophobia     mild    DDD (degenerative disc disease), lumbar     Endometrial cancer (HCC) 07/06/2017    tRA TLH BSO SLND on 7/17/17 by Dr. Stone, followed by 3 cycles of vaginal brachytherapy completed in August 2017    Low back pain     Mild acid reflux     OA (osteoarthritis)     marissa knees    Spinal stenosis of lumbosacral region     Wears glasses        Past Surgical History:   Procedure Laterality Date    COLONOSCOPY      HYSTERECTOMY      JOINT REPLACEMENT Bilateral     knees    ORTHOPEDIC SURGERY      PLANTAR FASCIA SURGERY Bilateral     MO ARTHRP ACETBLR/PROX FEM PROSTC AGRFT/ALGRFT Left 12/3/2024    Procedure: ARTHROPLASTY HIP TOTAL (psb same day dc);  Surgeon: Dipesh Knowles MD;  Location:  MAIN OR;  Service: Orthopedics    MO ARTHRP KNE CONDYLE&PLATU MEDIAL&LAT COMPARTMENTS Right 11/30/2020    Procedure: ARTHROPLASTY KNEE TOTAL;  Surgeon: Dipesh Knowles MD;  Location: [facility];  Service: Orthopedics    MO HYSTEROSCOPY BX ENDOMETRIUM&/POLYPC W/WO D&C N/A 02/28/2017    Procedure: DILATATION AND CURETTAGE (D&C) WITH HYSTEROSCOPY,POLYPECTOMY;  Surgeon: Roberto Anderson MD;  Location: [facility];  Service: Gynecology    REPLACEMENT TOTAL KNEE Left 10/22/2021    SHOULDER  SURGERY Left     TONSILLECTOMY      TOTAL ABDOMINAL HYSTERECTOMY W/ BILATERAL SALPINGOOPHORECTOMY Bilateral 07/2017    endometrial cancer, ? stage 1    WISDOM TOOTH EXTRACTION         Family History   Problem Relation Age of Onset    Ovarian cancer Mother 70    Cancer Mother     Esophageal cancer Father     Cancer Father     No Known Problems Maternal Grandmother     No Known Problems Maternal Grandfather     No Known Problems Paternal Grandmother     No Known Problems Paternal Grandfather     No Known Problems Maternal Aunt     No Known Problems Paternal Aunt     Breast cancer Neg Hx     Colon cancer Neg Hx     Uterine cancer Neg Hx        Social History     Occupational History    Not on file   Tobacco Use    Smoking status: Never    Smokeless tobacco: Never    Tobacco comments:     No secondhand smoke exposure   Vaping Use    Vaping status: Never Used   Substance and Sexual Activity    Alcohol use: Not Currently     Comment: very rarely    Drug use: No    Sexual activity: Not Currently     Partners: Male     Birth control/protection: Abstinence       Allergies   Allergen Reactions    Iv Contrast [Iodinated Contrast Media] Hives    Vitamin C [Ascorbate - Food Allergy]      Queasy stomach, loose stool         Current Outpatient Medications:     atorvastatin (LIPITOR) 10 mg tablet, Take 1 tablet (10 mg total) by mouth daily, Disp: 90 tablet, Rfl: 1    Cholecalciferol (HM Vitamin D3) 100 MCG (4000 UT) CAPS, Take 1 capsule (4,000 Units total) by mouth daily, Disp: 30 capsule, Rfl: 0    Coenzyme Q10-Vitamin E (QUNOL ULTRA COQ10 PO), , Disp: , Rfl:     Misc Natural Products (GLUCOSAMINE CHOND CMP TRIPLE PO), Take by mouth, Disp: , Rfl:     Omega 3-6-9 Fatty Acids (OMEGA 3-6-9 PO), Take 1 capsule by mouth daily, Disp: , Rfl:     acetaminophen (TYLENOL) 650 mg CR tablet, Take 1 tablet (650 mg total) by mouth every 6 (six) hours as needed for mild pain (Patient not taking: Reported on 1/3/2025), Disp: 90 tablet, Rfl: 0     aspirin (ECOTRIN LOW STRENGTH) 81 mg EC tablet, Take 1 tablet (81 mg total) by mouth 2 (two) times a day, Disp: 60 tablet, Rfl: 0    cyanocobalamin (VITAMIN B-12) 1000 MCG tablet, Take 1,000 mcg by mouth daily (Patient not taking: Reported on 1/2/2025), Disp: , Rfl:     ferrous sulfate 324 (65 Fe) mg, Take 1 tablet (324 mg total) by mouth 2 (two) times a day before meals, Disp: 60 tablet, Rfl: 0    folic acid (FOLVITE) 1 mg tablet, Take 1 tablet (1 mg total) by mouth daily, Disp: 30 tablet, Rfl: 0    Multiple Vitamin (multivitamin) tablet, Take 1 tablet by mouth daily, Disp: 30 tablet, Rfl: 0    mupirocin (BACTROBAN) 2 % ointment, Apply topically 2 (two) times a day Apply to each nostril 2 times daily for 5 days before and including the day of surgery, Disp: 50 g, Rfl: 0    naproxen (NAPROSYN) 500 mg tablet, TAKE 1 TABLET(500 MG) BY MOUTH TWICE DAILY WITH MEALS (Patient not taking: Reported on 1/3/2025), Disp: 60 tablet, Rfl: 0    ondansetron (ZOFRAN-ODT) 4 mg disintegrating tablet, Take 1 tablet (4 mg total) by mouth every 8 (eight) hours as needed for nausea or vomiting (Patient not taking: Reported on 1/3/2025), Disp: 15 tablet, Rfl: 0    Current Facility-Administered Medications:     lidocaine (XYLOCAINE) 1 % injection 2 mL, 2 mL, Injection, , Harjeet Black DPM, 2 mL at 05/20/22 1445    triamcinolone acetonide (KENALOG-40) 40 mg/mL injection 20 mg, 20 mg, Intra-articular, , Harjeet Black DPM, 20 mg at 05/20/22 1445      Maday Leahy MA    Scribe Attestation      I,:  Maday Leahy MA am acting as a scribe while in the presence of the attending physician.:       I,:  Dipesh Knowles MD personally performed the services described in this documentation    as scribed in my presence.:

## 2025-02-24 ENCOUNTER — TELEPHONE (OUTPATIENT)
Dept: PHYSICAL THERAPY | Facility: OTHER | Age: 65
End: 2025-02-24

## 2025-02-24 NOTE — TELEPHONE ENCOUNTER
Call placed to the patient per [facility] referral.    Voice message left for patient to call back. Phone number and hours of business provided.     This is the 1st attempt to reach the patient.  Will defer per protocol.    NOTE: Per chart Pt is established or was established with   [facility] 7/3/2024  [facility] 8/30/2024  PT 1/29/2024 -  3/21/2024  [facility] PM Dr Agrawal Consult 7/31/2020 (spinal Stenosis)    Was referred into Comp spine 2/21/25 by Ortho

## 2025-02-25 ENCOUNTER — OFFICE VISIT (OUTPATIENT)
Dept: PHYSICAL THERAPY | Facility: CLINIC | Age: 65
End: 2025-02-25
Payer: MEDICARE

## 2025-02-25 DIAGNOSIS — M16.12 PRIMARY OSTEOARTHRITIS OF ONE HIP, LEFT: Primary | ICD-10-CM

## 2025-02-25 DIAGNOSIS — M25.552 LEFT HIP PAIN: ICD-10-CM

## 2025-02-25 PROCEDURE — 97530 THERAPEUTIC ACTIVITIES: CPT | Performed by: PHYSICAL THERAPIST

## 2025-02-25 PROCEDURE — 97110 THERAPEUTIC EXERCISES: CPT | Performed by: PHYSICAL THERAPIST

## 2025-02-25 NOTE — PROGRESS NOTES
"Daily Note     Today's date: 2025  Patient name: Cristin Nolan  : 1960  MRN: [id number]  Referring provider: Dipesh Knowles, *  Dx:   Encounter Diagnosis     ICD-10-CM    1. Primary osteoarthritis of one hip, left  M16.12       2. Left hip pain  M25.552                      Subjective: Pt reports she is increasing her swim classes this week to 3xweek. Pt is frustrated with still waddling.      Objective: See treatment diary below      Assessment: Pt tolerating strengthening exercises well. Improvements with SLS and pt feels the glut med burn. Pt will continue to progress as able.      Plan: Continue per plan of care.      Precautions: Lt CARO 12/3/24;recent dislocation 24; STRICT POST HIP PRECAUTIONS Limit flex to 80, NO IR        Manuals  2    Lt hip rom                                                        Neuro Re-Ed                                                                                                                Ther Ex              Edu on recovery, HEP              Bridges 2x10 SL bridge 2x10 SL bridge 2x10 SL bridge    5\"x30+ ball sq 5\"x30+ ball sq 5\"x30+ ball sq 5\"x30+ ball sq    Supine March       Standing 1.5# 20x ea Standing 2.0# 20x ea Standing 2.0# 20x ea Standing 2.0# 20x ea    Supine clamshell              Ball sq              Clamshell       Red 20x S/l   Red 5\" 20x S/l Red 10\"x15 s/l Green 5\" 2X10    Standing Hip 3 ways Hip ABD 1 UE 30xea Hip ABD 1 UE 30xea #3 10x2 u/l UE hold #3 10x2 u/l UE hold   1.5# 3\"x20 ea B/L 2# 3\" x20 ea B/L 2# 3\" x20 ea B/L 2# 3\" x20 ea B/L    SLS Lt 3x30'' Lt 3x30'' Lt 5x30\" 5x30''Lt   Lt 30\"x3 Lt 30\"x3 Lt 30\"x3     SAQ              Hip ABD iso              Standing HS curl              Bike/Treadmill 1.7mph 8' 1.9 mph 10' 1.9 mph 10' 2.0 mph 8'   1.7 8' 1.6 mph 8' 1.7 mph 8' 1.7 mph 8'    Side stepping  Blue to fatigue Blue to fatigue Blue TB to fatigue Blue TB to fatigue   Red 3 laps Green 3 laps " "Green 4 laps Blue  to fatigue     Standing clamshell Blue 30xea Blue 30xea Blue TB 30x ea Blue TB 30x ea   Red 5\"x15 Green 5\"x15 ea Green 5\" 2x10 ea Blue 5\" 15x ea    Hip Hike 25x  25x 25x   5\"x10 5\"x10  \"x    SLR 4x5 4x5 4x5    4x5 4x5 4x5 4x5    Prone Hip Ext 20x 20x 20x           SL hip ABD 10x 10x 10x      10x     Ther Activity              Sit to stands 10#KB foam 15x 10#KB foam 15x 10K B 20x 10#   2x10 foam 2x10 foam 5# KB 15x 5# KB 15x    HR       Hr/tr 30x ea       Step up    10x 8'' Rt UE   15x 6\" fwd 15x 6\" fwd 2x10 6\" fwd 2x10 6\"     Lateral step up 2x10 4'' 2x10 4'' 2x10 4\" 2x10 6''   20x 4\" 10x 6\" 10x 6\" 2x10 6\"    Mini squats         15x 2x10    Gait Training              Stairs         1 flight step-to 2 flights                  Modalities              CP    R s/l w/ pillow between knees 10' R s/l w/ pillow between knees 10' R s/l w/ pillow between knees 10' R s/l w/ pillow between knees 10' R s/l w/ pillow between knees 10' R s/l w/ pillow between knees 10' R s/l w/ pillow between knees 10'                                                             "

## 2025-02-27 ENCOUNTER — OFFICE VISIT (OUTPATIENT)
Dept: PHYSICAL THERAPY | Facility: CLINIC | Age: 65
End: 2025-02-27
Payer: MEDICARE

## 2025-02-27 DIAGNOSIS — M16.12 PRIMARY OSTEOARTHRITIS OF ONE HIP, LEFT: Primary | ICD-10-CM

## 2025-02-27 DIAGNOSIS — M25.552 LEFT HIP PAIN: ICD-10-CM

## 2025-02-27 PROCEDURE — 97110 THERAPEUTIC EXERCISES: CPT

## 2025-02-27 PROCEDURE — 97530 THERAPEUTIC ACTIVITIES: CPT

## 2025-02-27 NOTE — PROGRESS NOTES
"Daily Note     Today's date: 2025  Patient name: Cristin Nolan  : 1960  MRN: [id number]  Referring provider: Dipesh Knowles, *  Dx:   Encounter Diagnosis     ICD-10-CM    1. Primary osteoarthritis of one hip, left  M16.12       2. Left hip pain  M25.552           Start Time: 1400  Stop Time: 1444  Total time in clinic (min): 44 minutes    Subjective: Patient reports no new changes since last visit.       Objective: See treatment diary below      Assessment: Continues to be tolerating strengthening well but continues with mild hip drop with SLS on Lt. Patient using very strong Rt UE assistance to perform step ups on 8\" therefore, regressed to 6\" to promote better quad/glut strength. Fatigues quickly with STS from lower chair but no hip discomfort is reported by patient during this activity. Patient would benefit from continued PT to improve level of function.       Plan: Continue per POC. Increase reps/resistance as tolerated.      Precautions: Lt CARO 12/3/24;recent dislocation 24; STRICT POST HIP PRECAUTIONS Limit flex to 80, NO IR        Manuals  2    Lt hip rom                                                        Neuro Re-Ed                                                                                                                Ther Ex              Edu on recovery, HEP              Bridges 2x10 SL bridge 2x10 SL bridge 2x10 SL bridge  2x15 ea  5\"x30+ ball sq 5\"x30+ ball sq 5\"x30+ ball sq 5\"x30+ ball sq    Supine March       Standing 1.5# 20x ea Standing 2.0# 20x ea Standing 2.0# 20x ea Standing 2.0# 20x ea    Supine clamshell              Ball sq              Clamshell       Red 20x S/l   Red 5\" 20x S/l Red 10\"x15 s/l Green 5\" 2X10    Standing Hip 3 ways Hip ABD 1 UE 30xea Hip ABD 1 UE 30xea #3 10x2 u/l UE hold #3 10x2 u/l UE hold 3# 30x ea u/l UE hold  1.5# 3\"x20 ea B/L 2# 3\" x20 ea B/L 2# 3\" x20 ea B/L 2# 3\" x20 ea B/L    SLS Lt 3x30'' Lt " "3x30'' Lt 5x30\" 5x30''Lt Foam 30\"x3  Lt 30\"x3 Lt 30\"x3 Lt 30\"x3     SAQ              Hip ABD iso              Standing HS curl              Bike/Treadmill 1.7mph 8' 1.9 mph 10' 1.9 mph 10' 2.0 mph 8' 2.0 mph 8'  1.7 8' 1.6 mph 8' 1.7 mph 8' 1.7 mph 8'    Side stepping  Blue to fatigue Blue to fatigue Blue TB to fatigue Blue TB to fatigue Blue TB to fatigue  Red 3 laps Green 3 laps Green 4 laps Blue  to fatigue     Standing clamshell Blue 30xea Blue 30xea Blue TB 30x ea Blue TB 30x ea Blue TB 30x ea  Red 5\"x15 Green 5\"x15 ea Green 5\" 2x10 ea Blue 5\" 15x ea    Hip Hike 25x  25x 25x   5\"x10 5\"x10      SLR 4x5 4x5 4x5  2x10  4x5 4x5 4x5 4x5    Prone Hip Ext 20x 20x 20x           SL hip ABD 10x 10x 10x  2x10    10x     Ther Activity              Sit to stands 10#KB foam 15x 10#KB foam 15x 10K B 20x 10# 10# 2x10  2x10 foam 2x10 foam 5# KB 15x 5# KB 15x    HR       Hr/tr 30x ea       Step up    10x 8'' Rt UE 2x10 6\"  15x 6\" fwd 15x 6\" fwd 2x10 6\" fwd 2x10 6\"     Lateral step up 2x10 4'' 2x10 4'' 2x10 4\" 2x10 6'' 2x10 6\"  20x 4\" 10x 6\" 10x 6\" 2x10 6\"    Mini squats         15x 2x10    Gait Training              Stairs         1 flight step-to 2 flights                  Modalities              CP    R s/l w/ pillow between knees 10'  R s/l w/ pillow between knees 10' R s/l w/ pillow between knees 10' R s/l w/ pillow between knees 10' R s/l w/ pillow between knees 10' R s/l w/ pillow between knees 10'                                                               "

## 2025-03-04 ENCOUNTER — OFFICE VISIT (OUTPATIENT)
Dept: PHYSICAL THERAPY | Facility: CLINIC | Age: 65
End: 2025-03-04
Payer: MEDICARE

## 2025-03-04 DIAGNOSIS — M16.12 PRIMARY OSTEOARTHRITIS OF ONE HIP, LEFT: Primary | ICD-10-CM

## 2025-03-04 DIAGNOSIS — M25.552 LEFT HIP PAIN: ICD-10-CM

## 2025-03-04 PROCEDURE — 97530 THERAPEUTIC ACTIVITIES: CPT

## 2025-03-04 PROCEDURE — 97110 THERAPEUTIC EXERCISES: CPT

## 2025-03-04 NOTE — PROGRESS NOTES
"Daily Note     Today's date: 3/4/2025  Patient name: Cristin Nolan  : 1960  MRN: [id number]  Referring provider: Dipesh Knowles, *  Dx:   Encounter Diagnosis     ICD-10-CM    1. Primary osteoarthritis of one hip, left  M16.12       2. Left hip pain  M25.552                      Subjective: Pt reports she has L groin and glute pain at times      Objective: See treatment diary below      Assessment: Performed exercise program w/o complaint,, other then fatigue. Demonstrates good knowledge of same. Tolerated treatment well. Will monitor. Patient would benefit from continued PT to decrease pain, increase strength and LOF.      Plan: Continue per plan of care.      Precautions: Lt CARO 12/3/24;recent dislocation 24; STRICT POST HIP PRECAUTIONS Limit flex to 80, NO IR        Manuals 2/13 2/18 2/20 2/25 2/27 3/4  1/28 1/30 2/4    Lt hip rom                                                        Neuro Re-Ed                                                                                                                Ther Ex              Edu on recovery, HEP              Bridges 2x10 SL bridge 2x10 SL bridge 2x10 SL bridge  2x15 ea 2x15  SL bridge  5\"x30+ ball sq 5\"x30+ ball sq 5\"x30+ ball sq    Supine March        Standing 2.0# 20x ea Standing 2.0# 20x ea Standing 2.0# 20x ea    Supine clamshell              Ball sq              Clamshell        Red 5\" 20x S/l Red 10\"x15 s/l Green 5\" 2X10    Standing Hip 3 ways Hip ABD 1 UE 30xea Hip ABD 1 UE 30xea #3 10x2 u/l UE hold #3 10x2 u/l UE hold 3# 30x ea u/l UE hold 3# 30x ea u/l UE hold  2# 3\" x20 ea B/L 2# 3\" x20 ea B/L 2# 3\" x20 ea B/L    SLS Lt 3x30'' Lt 3x30'' Lt 5x30\" 5x30''Lt Foam 30\"x3 Foam  30\"x3  Lt 30\"x3 Lt 30\"x3     SAQ              Hip ABD iso              Standing HS curl              Bike/Treadmill 1.7mph 8' 1.9 mph 10' 1.9 mph 10' 2.0 mph 8' 2.0 mph 8' 2.0 mph 8'  1.6 mph 8' 1.7 mph 8' 1.7 mph 8'    Side stepping  Blue to fatigue Blue to fatigue " "Blue TB to fatigue Blue TB to fatigue Blue TB to fatigue Blue TB to fatigue  Green 3 laps Green 4 laps Blue  to fatigue     Standing clamshell Blue 30xea Blue 30xea Blue TB 30x ea Blue TB 30x ea Blue TB 30x ea Blue TB 30x ea  Green 5\"x15 ea Green 5\" 2x10 ea Blue 5\" 15x ea    Hip Hike 25x  25x 25x    5\"x10      SLR 4x5 4x5 4x5  2x10 2x10  4x5 4x5 4x5    Prone Hip Ext 20x 20x 20x           SL hip ABD 10x 10x 10x  2x10 2x10   10x     Ther Activity              Sit to stands 10#KB foam 15x 10#KB foam 15x 10K B 20x 10# 10# 2x10 10# 2x10  2x10 foam 5# KB 15x 5# KB 15x    HR              Step up    10x 8'' Rt UE 2x10 6\" 2x10 6\"  15x 6\" fwd 2x10 6\" fwd 2x10 6\"     Lateral step up 2x10 4'' 2x10 4'' 2x10 4\" 2x10 6'' 2x10 6\" 2x10 6\"  10x 6\" 10x 6\" 2x10 6\"    Mini squats         15x 2x10    Gait Training              Stairs         1 flight step-to 2 flights                  Modalities              CP    R s/l w/ pillow between knees 10'    R s/l w/ pillow between knees 10' R s/l w/ pillow between knees 10' R s/l w/ pillow between knees 10'                                                                 "

## 2025-03-05 ENCOUNTER — NURSE TRIAGE (OUTPATIENT)
Dept: PHYSICAL THERAPY | Facility: OTHER | Age: 65
End: 2025-03-05

## 2025-03-05 DIAGNOSIS — E78.2 MIXED HYPERLIPIDEMIA: ICD-10-CM

## 2025-03-05 DIAGNOSIS — M54.50 ACUTE BILATERAL LOW BACK PAIN, UNSPECIFIED WHETHER SCIATICA PRESENT: Primary | ICD-10-CM

## 2025-03-05 NOTE — TELEPHONE ENCOUNTER
Additional Information   Negative: Has the patient had unexplained weight loss?   Negative: Does the patient have a fever?   Negative: Is the patient experiencing urine retention?   Negative: Is the patient experiencing acute drop foot or paralysis?   Negative: Has the patient experienced major trauma? (fall from height, high speed collision, direct blow to spine) and is also experiencing nausea, light-headedness, or loss of consciousness?   Negative: Is the patient experiencing blood in sputum?   Negative: Is this a chronic condition?    Protocols used: [facility] Protocol    Comprehensive Spine Program was reviewed in detail and what we can provide for their back pain.  Patient is agreeable to being triaged and would like to proceed with Physical Therapy.    Referral was placed for Physical Therapy at the [place] site. Patients information was sent to the  to make evaluation appointment. Patient made aware that the PT office  will be calling to schedule the appointment.  Patient was provided with the phone number to the PT office.    No further questions and/or concerns were voiced by the patient at this time. Patient states understanding of the referral that was placed.    Referral Closed.

## 2025-03-05 NOTE — TELEPHONE ENCOUNTER
"Additional Information   Negative: Is this related to a work injury?   Negative: Is this related to an MVA?   Negative: Are you currently recieving homecare services?    Background - Initial Assessment  Clinical complaint: Low Back Pain. New flare up started about \"a month ago\". Had left hip replacement sx in December with Dr. Knowles which has been treating her back and referred to [facility]. Hx of spinal stenosis. No radiating down her legs but she does have pain in her feet all the time after standing for more than 5-10 minutes. Numbness and tingling in her feet. NKI. Was established with [facility] spine center and had injection about 6 months ago. Was seen by [facility] in 2020. Pain is intermittent on and off but lately has been constant. Patient states back pain is worse when sitting. Feels some relief when moving. No previous back sx. Patient described pain as aching and throbbing.  Date of onset: a month ago (new flare up)  Frequency of pain: intermittent, lately has been constant.  Quality of pain: aching, numb, throbbing, and tingling.    Protocols used: [facility] Protocol    "

## 2025-03-06 ENCOUNTER — OFFICE VISIT (OUTPATIENT)
Dept: PHYSICAL THERAPY | Facility: CLINIC | Age: 65
End: 2025-03-06
Payer: MEDICARE

## 2025-03-06 DIAGNOSIS — M54.50 ACUTE BILATERAL LOW BACK PAIN, UNSPECIFIED WHETHER SCIATICA PRESENT: Primary | ICD-10-CM

## 2025-03-06 DIAGNOSIS — M16.12 PRIMARY OSTEOARTHRITIS OF ONE HIP, LEFT: ICD-10-CM

## 2025-03-06 DIAGNOSIS — M25.552 LEFT HIP PAIN: ICD-10-CM

## 2025-03-06 PROCEDURE — 97110 THERAPEUTIC EXERCISES: CPT

## 2025-03-06 PROCEDURE — 97530 THERAPEUTIC ACTIVITIES: CPT

## 2025-03-06 RX ORDER — ATORVASTATIN CALCIUM 10 MG/1
10 TABLET, FILM COATED ORAL DAILY
Qty: 90 TABLET | Refills: 1 | Status: SHIPPED | OUTPATIENT
Start: 2025-03-06

## 2025-03-06 NOTE — PROGRESS NOTES
"Daily Note     Today's date: 3/6/2025  Patient name: Cristin Nolan  : 1960  MRN: [id number]  Referring provider: Diepsh Knowles, *  Dx:   Encounter Diagnosis     ICD-10-CM    1. Acute bilateral low back pain, unspecified whether sciatica present  M54.50       2. Primary osteoarthritis of one hip, left  M16.12       3. Left hip pain  M25.552             Start Time: 1402  Stop Time: 1450  Total time in clinic (min): 48 minutes    Subjective: Pt notes having an ache in her hip today which is low in intensity.      Objective: See treatment diary below      Assessment: Continued per plan of care with good tolerance.Fatigue present on occasion. Utilized therapeutic recovery breaks as needed.  Patient would benefit from continued PT to decrease pain, increase strength and LOF. Good status noted post session. Improved performance with STS with cueing today.   Plan: Continue per plan of care.      Precautions: Lt CARO 12/3/24;recent dislocation 24; STRICT POST HIP PRECAUTIONS Limit flex to 80, NO IR        Manuals 2/13 2/18 2/20 2/25 2/27 3/4 3/6  1/30 2/4    Lt hip rom                                                        Neuro Re-Ed                                                                                                                Ther Ex              Edu on recovery, HEP              Bridges 2x10 SL bridge 2x10 SL bridge 2x10 SL bridge  2x15 ea 2x15  SL bridge 2x15  SL bridge  5\"x30+ ball sq 5\"x30+ ball sq    Supine March         Standing 2.0# 20x ea Standing 2.0# 20x ea    Supine clamshell              Ball sq              Clamshell         Red 10\"x15 s/l Green 5\" 2X10    Standing Hip 3 ways Hip ABD 1 UE 30xea Hip ABD 1 UE 30xea #3 10x2 u/l UE hold #3 10x2 u/l UE hold 3# 30x ea u/l UE hold 3# 30x ea u/l UE hold 3# 30x ea u/l UE hold  2# 3\" x20 ea B/L 2# 3\" x20 ea B/L    SLS Lt 3x30'' Lt 3x30'' Lt 5x30\" 5x30''Lt Foam 30\"x3 Foam  30\"x3 Foam  30\"x3  Lt 30\"x3     SAQ              Hip ABD iso    " "          Standing HS curl              Bike/Treadmill 1.7mph 8' 1.9 mph 10' 1.9 mph 10' 2.0 mph 8' 2.0 mph 8' 2.0 mph 8' 2.0 mph 8'  1.7 mph 8' 1.7 mph 8'    Side stepping  Blue to fatigue Blue to fatigue Blue TB to fatigue Blue TB to fatigue Blue TB to fatigue Blue TB to fatigue Blue TB to fatigue at table   Green 4 laps Blue  to fatigue     Standing clamshell Blue 30xea Blue 30xea Blue TB 30x ea Blue TB 30x ea Blue TB 30x ea Blue TB 30x ea Blue TB 30x ea  Green 5\" 2x10 ea Blue 5\" 15x ea    Hip Hike 25x  25x 25x          SLR 4x5 4x5 4x5  2x10 2x10 2x10  4x5 4x5    Prone Hip Ext 20x 20x 20x           SL hip ABD 10x 10x 10x  2x10 2x10 2x10  10x     Ther Activity              Sit to stands 10#KB foam 15x 10#KB foam 15x 10K B 20x 10# 10# 2x10 10# 2x10 10# 2x10  5# KB 15x 5# KB 15x    HR              Step up    10x 8'' Rt UE 2x10 6\" 2x10 6\" 2x10 6\"  2x10 6\" fwd 2x10 6\"     Lateral step up 2x10 4'' 2x10 4'' 2x10 4\" 2x10 6'' 2x10 6\" 2x10 6\" 2x10 6\"  10x 6\" 2x10 6\"    Mini squats         15x 2x10    Gait Training              Stairs         1 flight step-to 2 flights                  Modalities              CP    R s/l w/ pillow between knees 10'     R s/l w/ pillow between knees 10' R s/l w/ pillow between knees 10'                                                                 "

## 2025-03-10 ENCOUNTER — TELEPHONE (OUTPATIENT)
Age: 65
End: 2025-03-10

## 2025-03-10 NOTE — TELEPHONE ENCOUNTER
Spoke with the patient and did discuss any precautions and expectations.  She will call us back if she has any further issues.

## 2025-03-10 NOTE — TELEPHONE ENCOUNTER
Caller: Patient    Doctor: Dr. Knowles    Reason for call:     Patient had left hip surgery on 12/03/25, she called in and I read the message verbatim to her, but she still has questions and would like to speak to Stuart today.  She is asking for a call back .    Call back#: [phone]

## 2025-03-10 NOTE — TELEPHONE ENCOUNTER
I have left 2 voicemails for the patient and will try again little later this afternoon to speak to the patient

## 2025-03-11 ENCOUNTER — EVALUATION (OUTPATIENT)
Dept: PHYSICAL THERAPY | Facility: CLINIC | Age: 65
End: 2025-03-11
Payer: MEDICARE

## 2025-03-11 DIAGNOSIS — M25.552 LEFT HIP PAIN: Primary | ICD-10-CM

## 2025-03-11 DIAGNOSIS — M54.50 ACUTE BILATERAL LOW BACK PAIN, UNSPECIFIED WHETHER SCIATICA PRESENT: ICD-10-CM

## 2025-03-11 DIAGNOSIS — M16.12 PRIMARY OSTEOARTHRITIS OF ONE HIP, LEFT: ICD-10-CM

## 2025-03-11 PROCEDURE — 97110 THERAPEUTIC EXERCISES: CPT | Performed by: PHYSICAL THERAPIST

## 2025-03-11 PROCEDURE — 97164 PT RE-EVAL EST PLAN CARE: CPT | Performed by: PHYSICAL THERAPIST

## 2025-03-11 PROCEDURE — 97530 THERAPEUTIC ACTIVITIES: CPT | Performed by: PHYSICAL THERAPIST

## 2025-03-11 NOTE — PROGRESS NOTES
Daily Note     Today's date: 3/11/2025  Patient name: Cristin Nolan  : 1960  MRN: [id number]  Referring provider: Dipesh Knowles, *  Dx:   Encounter Diagnosis     ICD-10-CM    1. Left hip pain  M25.552       2. Primary osteoarthritis of one hip, left  M16.12       3. Acute bilateral low back pain, unspecified whether sciatica present  M54.50 Ambulatory referral to PT spine                     Subjective: Pt reports she felt like she popped her hip over the weekend when trying to get off the toilet. She notes some soreness but she was able to swim and walk earlier today. Pt notes she is very nervous about dislocating and has a fear about living her life now.      Objective: See treatment diary below    ROM: Flexion 80deg (limited by precautions) otherwise limiting    Strength: 4-/5 for flexion, extension 3+/5; abduction and ER 3-/5    Assessment: Pt is showing some progress with strength except hip abduction remains weak. This is likely due to chronic lumbar issues likely L5 radiculopathy. We discussed looking into an injection with pain management to possibly help this with improving hip abduction strength. Other hip movements has show slow improvement with strengthening. Will cotnine to try to progress with strengthening and more isometrics to improve stability of hip.    1. Pt will be independent with HEP upon discharge. Progressing  2. Pt will improve Lt Hip rom to 0-110 degrees of flexion. Met  3. Pt will improve Lt hip strength to 4/5 in all directions to improve walking mechanics. Progressing  4. Pt will be able to return to walking 15 minutes without AD. Met    Plan: Continue per plan of care.      Precautions: Lt CARO 12/3/24;recent dislocation 24; STRICT POST HIP PRECAUTIONS Limit flex to 80, NO IR        Manuals 2/13 2/18 2/20 2/25 2/27 3/4 3/6 3/11      Lt hip rom                                                        Neuro Re-Ed                                                            "                                                     Ther Ex              Edu on recovery, HEP              Bridges 2x10 SL bridge 2x10 SL bridge 2x10 SL bridge  2x15 ea 2x15  SL bridge 2x15  SL bridge 2x10 SL bridge      Supine March              Prone Hip Ext knee bent        2x15      Prone Hip IR with band        Blue 10x10''      Ball sq prone Hip ER        10x10''      Clamshell              Standing Hip 3 ways Hip ABD 1 UE 30xea Hip ABD 1 UE 30xea #3 10x2 u/l UE hold #3 10x2 u/l UE hold 3# 30x ea u/l UE hold 3# 30x ea u/l UE hold 3# 30x ea u/l UE hold       SLS Lt 3x30'' Lt 3x30'' Lt 5x30\" 5x30''Lt Foam 30\"x3 Foam  30\"x3 Foam  30\"x3       SAQ              Hip ABD iso              Standing HS curl              Bike/Treadmill 1.7mph 8' 1.9 mph 10' 1.9 mph 10' 2.0 mph 8' 2.0 mph 8' 2.0 mph 8' 2.0 mph 8' 2.0 mph 8'      Side stepping  Blue to fatigue Blue to fatigue Blue TB to fatigue Blue TB to fatigue Blue TB to fatigue Blue TB to fatigue Blue TB to fatigue at table        Standing clamshell Blue 30xea Blue 30xea Blue TB 30x ea Blue TB 30x ea Blue TB 30x ea Blue TB 30x ea Blue TB 30x ea       Hip Hike 25x  25x 25x          SLR 4x5 4x5 4x5  2x10 2x10 2x10       Prone Hip Ext 20x 20x 20x           SL hip ABD 10x 10x 10x  2x10 2x10 2x10       Ther Activity              Sit to stands 10#KB foam 15x 10#KB foam 15x 10K B 20x 10# 10# 2x10 10# 2x10 10# 2x10       HR              Step up    10x 8'' Rt UE 2x10 6\" 2x10 6\" 2x10 6\"       Lateral step up 2x10 4'' 2x10 4'' 2x10 4\" 2x10 6'' 2x10 6\" 2x10 6\" 2x10 6\"       Mini squats              Gait Training              Stairs                            Modalities              CP    R s/l w/ pillow between knees 10'                                                                         "

## 2025-03-13 ENCOUNTER — OFFICE VISIT (OUTPATIENT)
Dept: PHYSICAL THERAPY | Facility: CLINIC | Age: 65
End: 2025-03-13
Payer: MEDICARE

## 2025-03-13 DIAGNOSIS — M25.552 LEFT HIP PAIN: Primary | ICD-10-CM

## 2025-03-13 DIAGNOSIS — M16.12 PRIMARY OSTEOARTHRITIS OF ONE HIP, LEFT: ICD-10-CM

## 2025-03-13 PROCEDURE — 97530 THERAPEUTIC ACTIVITIES: CPT | Performed by: PHYSICAL THERAPIST

## 2025-03-13 PROCEDURE — 97110 THERAPEUTIC EXERCISES: CPT | Performed by: PHYSICAL THERAPIST

## 2025-03-13 NOTE — PROGRESS NOTES
"Daily Note     Today's date: 3/13/2025  Patient name: Cristin Nolan  : 1960  MRN: [id number]  Referring provider: Dipesh Knowles, *  Dx:   Encounter Diagnosis     ICD-10-CM    1. Left hip pain  M25.552       2. Primary osteoarthritis of one hip, left  M16.12                      Subjective: Pt reports some hamstring soreness still.       Objective: See treatment diary below      Assessment: Palpated the sore area today which seems to be posterior lateral area, very point specific. Not aggravated by any MMT testing, only pressure and palpation. Educated to continue to keep an eye on soreness and if getting worse, call the doctor. Pt seemed to fatigue easier today with standing exercises today.      Plan: Continue per plan of care.      Precautions: Lt CARO 12/3/24;recent dislocation 24; STRICT POST HIP PRECAUTIONS Limit flex to 80, NO IR        Manuals 2/13 2/18 2/20 2/25 2/27 3/4 3/6 3/11 3/13     Lt hip rom                                                        Neuro Re-Ed                                                                                                                Ther Ex              Edu on recovery, HEP              Bridges 2x10 SL bridge 2x10 SL bridge 2x10 SL bridge  2x15 ea 2x15  SL bridge 2x15  SL bridge 2x10 SL bridge 2x10 SL bridge     Supine March              Prone Hip Ext knee bent        2x15 2x15     Prone Hip IR with band        Blue 10x10'' Blue 15x10''     Ball sq prone Hip ER        10x10'' 15x10''     Clamshell         20x5''     Standing Hip 3 ways Hip ABD 1 UE 30xea Hip ABD 1 UE 30xea #3 10x2 u/l UE hold #3 10x2 u/l UE hold 3# 30x ea u/l UE hold 3# 30x ea u/l UE hold 3# 30x ea u/l UE hold       SLS Lt 3x30'' Lt 3x30'' Lt 5x30\" 5x30''Lt Foam 30\"x3 Foam  30\"x3 Foam  30\"x3       SAQ              Hip ABD iso              Standing HS curl              Bike/Treadmill 1.7mph 8' 1.9 mph 10' 1.9 mph 10' 2.0 mph 8' 2.0 mph 8' 2.0 mph 8' 2.0 mph 8' 2.0 mph 8' 2.0mph 8'  " "   Side stepping  Blue to fatigue Blue to fatigue Blue TB to fatigue Blue TB to fatigue Blue TB to fatigue Blue TB to fatigue Blue TB to fatigue at table   Blue TB to fatigue     Standing clamshell Blue 30xea Blue 30xea Blue TB 30x ea Blue TB 30x ea Blue TB 30x ea Blue TB 30x ea Blue TB 30x ea       Hip Hike 25x  25x 25x          SLR 4x5 4x5 4x5  2x10 2x10 2x10  20x     Prone Hip Ext 20x 20x 20x           SL hip ABD 10x 10x 10x  2x10 2x10 2x10       Ther Activity              Sit to stands 10#KB foam 15x 10#KB foam 15x 10K B 20x 10# 10# 2x10 10# 2x10 10# 2x10  Wall squat 1/4 15x10''     HR              Step up    10x 8'' Rt UE 2x10 6\" 2x10 6\" 2x10 6\"  2x10 6''     Lateral step up 2x10 4'' 2x10 4'' 2x10 4\" 2x10 6'' 2x10 6\" 2x10 6\" 2x10 6\"  2x10 6''     Mini squats              Gait Training              Stairs                            Modalities              CP    R s/l w/ pillow between knees 10'                                                                           "

## 2025-03-17 ENCOUNTER — OFFICE VISIT (OUTPATIENT)
Dept: OBGYN CLINIC | Facility: MEDICAL CENTER | Age: 65
End: 2025-03-17
Payer: MEDICARE

## 2025-03-17 VITALS — HEIGHT: 62 IN | BODY MASS INDEX: 41.15 KG/M2

## 2025-03-17 DIAGNOSIS — M54.16 LUMBAR RADICULOPATHY: ICD-10-CM

## 2025-03-17 DIAGNOSIS — Z96.642 STATUS POST TOTAL REPLACEMENT OF LEFT HIP: Primary | ICD-10-CM

## 2025-03-17 PROCEDURE — 99213 OFFICE O/P EST LOW 20 MIN: CPT | Performed by: ORTHOPAEDIC SURGERY

## 2025-03-17 NOTE — PROGRESS NOTES
Orthopaedic Surgery - Office Note  Cristin Nolan (64 y.o. female)   : 1960   MRN: [id number]  Encounter Date: 3/17/2025    Assessment / Plan    S/P Left CARO performed on 12/3/2024, with susequent dislocation on 2025 and continued pain likely attributed to Lumbar spine/weak Abductors  Referral for [place] spine and pain for lumbar injection. She [facility], but a referral was placed if she wishes to go to [facility]  Continue outpatient PT  Anti-inflammatories or Tylenol prn pain  Continue to follow hip precautions   Follow-up:  Return in about 4 weeks (around 2025) for Follow-up as regularly scheduled.      Chief Complaint / Date of Onset  Left hip OA  Injury Mechanism / Date  Post operative dislocation on 2025  Surgery / Date  L THR with Dr. Knowles on2024    History of Present Illness   Cristin Nolan is a 64 y.o. female who presents for follow-up evaluation status post left total hip arthroplasty performed on 12/3/2024 and subsequent dislocation of 2025.  She continues to be compliant with outpatient physical therapy.  She states recently she has noticed some groin pain that wraps around into her gluteal region.  She also reports that it radiates down her foot.  She states that her pain is worse with peers of prolonged sitting and does slightly improve with activity.  She recently over the weekend did get up from a seated position and felt a clicking sensation felt in her hip.    Treatment Summary  Medications / Modalities  Advil  prn  Bracing / Immobilization  None  Physical Therapy  Began post op on 12/10/2024   Injections  None post op  Prior Surgeries  Left total knee replacement 10/21/2021 at [place]  Right total knee replacement on 2020 with Dr. Knowles     other Treatments  None     Employment / Current Status  N/a     Sport / Organization / Current Status  Hydro-biking      Review of Systems  Pertinent items are noted in HPI.  All  "other systems were reviewed and are negative.      Physical Exam  Ht 5' 2\" (1.575 m)   BMI 41.15 kg/m²   Cons: Appears well.  No apparent distress.  Psych: Alert. Oriented x3.  Mood and affect normal.  Eyes: PERRLA, EOMI  Resp: Normal effort.  No audible wheezing or stridor.  CV: Palpable pulse.  No discernable arrhythmia.  No LE edema.  Lymph:  No palpable cervical, axillary, or inguinal lymphadenopathy.  Skin: Warm.  No palpable masses.  No visible lesions.  Neuro: Normal muscle tone.  Normal and symmetric DTR's.     Left Hip Exam  Alignment / Posture:  Normal resting hip posture. Normal knee alignment.  Inspection:  No swelling. No edema. No erythema. Incision healed.  Palpation:   Gluteal and SI tenderness. No effusion. No warmth. No crepitus.  ROM:  Hip Flexion 90.  Normal knee ROM.  Strength:  Quadriceps 5-/5. Hamstrings 5-/5.  Stability:  No objective hip instability.  Tests:  No pertinent positive or negative tests.  Neurovascular:  Sensation intact in DP/SP/Gill/Sa/T nerve distributions. 2+ DP & PT pulses.  Gait:  Steady with cane.       Studies Reviewed  I have personally reviewed pertinent films in PACS.  XR of left hip -performed on 2/21/2025 demonstrates well-placed well aligned left total hip arthroplasty  XR of lumbar spine -performed on 6/10/2024 demonstrates moderate multilevel disc degeneration with spondylosis most significant at L5-S1 with moderate to severe facet disease at L5-S1 as well.      Procedures  No procedures today.    Medical, Surgical, Family, and Social History  The patient's medical history, family history, and social history, were reviewed and updated as appropriate.    Past Medical History:   Diagnosis Date    Ambulatory dysfunction     uses walking stick    Anxiety     Arthritis     Cancer (HCC) 7/15/17    All better now    Chronic pain disorder     Claustrophobia     mild    DDD (degenerative disc disease), lumbar     Endometrial cancer (HCC) 07/06/2017    tRA TL BSO SLND on " 7/17/17 by Dr. Stone, followed by 3 cycles of vaginal brachytherapy completed in August 2017    Low back pain     Mild acid reflux     OA (osteoarthritis)     marissa knees    Spinal stenosis of lumbosacral region     Wears glasses        Past Surgical History:   Procedure Laterality Date    COLONOSCOPY      HYSTERECTOMY      JOINT REPLACEMENT Bilateral     knees    ORTHOPEDIC SURGERY      PLANTAR FASCIA SURGERY Bilateral     SD ARTHRP ACETBLR/PROX FEM PROSTC AGRFT/ALGRFT Left 12/3/2024    Procedure: ARTHROPLASTY HIP TOTAL (psb same day dc);  Surgeon: Dipesh Knowles MD;  [facility]: [facility];  Service: Orthopedics    SD ARTHRP KNE CONDYLE&PLATU MEDIAL&LAT COMPARTMENTS Right 11/30/2020    Procedure: ARTHROPLASTY KNEE TOTAL;  Surgeon: Dipesh Knowles MD;  Location: [facility];  Service: Orthopedics    SD HYSTEROSCOPY BX ENDOMETRIUM&/POLYPC W/WO D&C N/A 02/28/2017    Procedure: DILATATION AND CURETTAGE (D&C) WITH HYSTEROSCOPY,POLYPECTOMY;  Surgeon: Roberto Anderson MD;  Location: [facility];  Service: Gynecology    REPLACEMENT TOTAL KNEE Left 10/22/2021    SHOULDER SURGERY Left     TONSILLECTOMY      TOTAL ABDOMINAL HYSTERECTOMY W/ BILATERAL SALPINGOOPHORECTOMY Bilateral 07/2017    endometrial cancer, ? stage 1    WISDOM TOOTH EXTRACTION         Family History   Problem Relation Age of Onset    Ovarian cancer Mother 70    Cancer Mother     Esophageal cancer Father     Cancer Father     No Known Problems Maternal Grandmother     No Known Problems Maternal Grandfather     No Known Problems Paternal Grandmother     No Known Problems Paternal Grandfather     No Known Problems Maternal Aunt     No Known Problems Paternal Aunt     Breast cancer Neg Hx     Colon cancer Neg Hx     Uterine cancer Neg Hx        Social History     Occupational History    Not on file   Tobacco Use    Smoking status: Never    Smokeless tobacco: Never    Tobacco comments:     No secondhand smoke exposure   Vaping Use    Vaping status: Never  Used   Substance and Sexual Activity    Alcohol use: Not Currently     Comment: very rarely    Drug use: No    Sexual activity: Not Currently     Partners: Male     Birth control/protection: Abstinence       Allergies   Allergen Reactions    Iv Contrast [Iodinated Contrast Media] Hives    Vitamin C [Ascorbate - Food Allergy]      Queasy stomach, loose stool         Current Outpatient Medications:     atorvastatin (LIPITOR) 10 mg tablet, TAKE 1 TABLET(10 MG) BY MOUTH DAILY, Disp: 90 tablet, Rfl: 1    Cholecalciferol (HM Vitamin D3) 100 MCG (4000 UT) CAPS, Take 1 capsule (4,000 Units total) by mouth daily, Disp: 30 capsule, Rfl: 0    Coenzyme Q10-Vitamin E (QUNOL ULTRA COQ10 PO), , Disp: , Rfl:     Misc Natural Products (GLUCOSAMINE CHOND CMP TRIPLE PO), Take by mouth, Disp: , Rfl:     Omega 3-6-9 Fatty Acids (OMEGA 3-6-9 PO), Take 1 capsule by mouth daily, Disp: , Rfl:     acetaminophen (TYLENOL) 650 mg CR tablet, Take 1 tablet (650 mg total) by mouth every 6 (six) hours as needed for mild pain (Patient not taking: Reported on 1/3/2025), Disp: 90 tablet, Rfl: 0    aspirin (ECOTRIN LOW STRENGTH) 81 mg EC tablet, Take 1 tablet (81 mg total) by mouth 2 (two) times a day, Disp: 60 tablet, Rfl: 0    cyanocobalamin (VITAMIN B-12) 1000 MCG tablet, Take 1,000 mcg by mouth daily (Patient not taking: Reported on 1/2/2025), Disp: , Rfl:     ferrous sulfate 324 (65 Fe) mg, Take 1 tablet (324 mg total) by mouth 2 (two) times a day before meals, Disp: 60 tablet, Rfl: 0    folic acid (FOLVITE) 1 mg tablet, Take 1 tablet (1 mg total) by mouth daily, Disp: 30 tablet, Rfl: 0    Multiple Vitamin (multivitamin) tablet, Take 1 tablet by mouth daily, Disp: 30 tablet, Rfl: 0    mupirocin (BACTROBAN) 2 % ointment, Apply topically 2 (two) times a day Apply to each nostril 2 times daily for 5 days before and including the day of surgery, Disp: 50 g, Rfl: 0    naproxen (NAPROSYN) 500 mg tablet, TAKE 1 TABLET(500 MG) BY MOUTH TWICE DAILY WITH  MEALS (Patient not taking: Reported on 1/3/2025), Disp: 60 tablet, Rfl: 0    ondansetron (ZOFRAN-ODT) 4 mg disintegrating tablet, Take 1 tablet (4 mg total) by mouth every 8 (eight) hours as needed for nausea or vomiting (Patient not taking: Reported on 1/3/2025), Disp: 15 tablet, Rfl: 0    Current Facility-Administered Medications:     lidocaine (XYLOCAINE) 1 % injection 2 mL, 2 mL, Injection, , Harjeet Black, ANDREW, 2 mL at 05/20/22 1445    triamcinolone acetonide (KENALOG-40) 40 mg/mL injection 20 mg, 20 mg, Intra-articular, , ZIGGY WhittenM, 20 mg at 05/20/22 1445      Kaz Patton    Scribe Attestation      I,:  Kaz Patton am acting as a scribe while in the presence of the attending physician.:       I,:  Dipesh Knowles MD personally performed the services described in this documentation    as scribed in my presence.:

## 2025-03-18 ENCOUNTER — OFFICE VISIT (OUTPATIENT)
Dept: PHYSICAL THERAPY | Facility: CLINIC | Age: 65
End: 2025-03-18
Payer: MEDICARE

## 2025-03-18 DIAGNOSIS — M16.12 PRIMARY OSTEOARTHRITIS OF ONE HIP, LEFT: ICD-10-CM

## 2025-03-18 DIAGNOSIS — M54.50 ACUTE BILATERAL LOW BACK PAIN, UNSPECIFIED WHETHER SCIATICA PRESENT: ICD-10-CM

## 2025-03-18 DIAGNOSIS — M25.552 LEFT HIP PAIN: Primary | ICD-10-CM

## 2025-03-18 PROCEDURE — 97530 THERAPEUTIC ACTIVITIES: CPT

## 2025-03-18 PROCEDURE — 97110 THERAPEUTIC EXERCISES: CPT

## 2025-03-18 NOTE — PROGRESS NOTES
Daily Note     Today's date: 3/18/2025  Patient name: Cristin Nolan  : 1960  MRN: [id number]  Referring provider: Dipesh Knowles, *  Dx:   Encounter Diagnosis     ICD-10-CM    1. Left hip pain  M25.552       2. Primary osteoarthritis of one hip, left  M16.12       3. Acute bilateral low back pain, unspecified whether sciatica present  M54.50           Start Time: 1358  Stop Time: 1436  Total time in clinic (min): 38 minutes    Subjective: Patient continues with posterior lateral glut pin point tenderness. She is considering getting an ANAHY recommended by pain management.       Objective: See treatment diary below      Assessment: Patient tolerates strengthening well but continues to present with notable hip abductor weakness. Requires heavy tactile cuing when performing S/l clamshells in order to limit lumbar rotation. Current plan of care is challenging and patient reports hip fatigue and soreness post-tx. Patient would benefit from continued PT to improve level of function.       Plan: Continue per POC. Increase reps/resistance as tolerated.      Precautions: Lt CARO 12/3/24;recent dislocation 24; STRICT POST HIP PRECAUTIONS Limit flex to 80, NO IR        Manuals 2/13 2/18 2/20 2/25 2/27 3/4 3/6 3/11 3/13 3/18    Lt hip rom                                                        Neuro Re-Ed                                                                                                                Ther Ex              Edu on recovery, HEP              Bridges 2x10 SL bridge 2x10 SL bridge 2x10 SL bridge  2x15 ea 2x15  SL bridge 2x15  SL bridge 2x10 SL bridge 2x10 SL bridge 2x10 SL bridge    Supine March              Prone Hip Ext knee bent        2x15 2x15     Prone Hip IR with band        Blue 10x10'' Blue 15x10'' Blue 15x10''    Ball sq prone Hip ER        10x10'' 15x10'' 15x10''    Clamshell         20x5'' 20x5''    Standing Hip 3 ways Hip ABD 1 UE 30xea Hip ABD 1 UE 30xea #3 10x2 u/l UE hold  "#3 10x2 u/l UE hold 3# 30x ea u/l UE hold 3# 30x ea u/l UE hold 3# 30x ea u/l UE hold       SLS Lt 3x30'' Lt 3x30'' Lt 5x30\" 5x30''Lt Foam 30\"x3 Foam  30\"x3 Foam  30\"x3       SAQ              Hip ABD iso              Standing HS curl              Bike/Treadmill 1.7mph 8' 1.9 mph 10' 1.9 mph 10' 2.0 mph 8' 2.0 mph 8' 2.0 mph 8' 2.0 mph 8' 2.0 mph 8' 2.0mph 8' 2.0mph 8'    Side stepping  Blue to fatigue Blue to fatigue Blue TB to fatigue Blue TB to fatigue Blue TB to fatigue Blue TB to fatigue Blue TB to fatigue at table   Blue TB to fatigue Blue TB to fatigue    Standing clamshell Blue 30xea Blue 30xea Blue TB 30x ea Blue TB 30x ea Blue TB 30x ea Blue TB 30x ea Blue TB 30x ea   Blue TB 30x ea    Hip Hike 25x  25x 25x          SLR 4x5 4x5 4x5  2x10 2x10 2x10  20x 20x    Prone Hip Ext 20x 20x 20x           SL hip ABD 10x 10x 10x  2x10 2x10 2x10       Ther Activity              Sit to stands 10#KB foam 15x 10#KB foam 15x 10K B 20x 10# 10# 2x10 10# 2x10 10# 2x10  Wall squat 1/4 15x10'' Wall squat 1/4 15x10'    HR              Step up    10x 8'' Rt UE 2x10 6\" 2x10 6\" 2x10 6\"  2x10 6'' 2x10 6''    Lateral step up 2x10 4'' 2x10 4'' 2x10 4\" 2x10 6'' 2x10 6\" 2x10 6\" 2x10 6\"  2x10 6'' 2x10 6''    Mini squats              Gait Training              Stairs                            Modalities              CP    R s/l w/ pillow between knees 10'                                                                             "

## 2025-03-20 ENCOUNTER — OFFICE VISIT (OUTPATIENT)
Dept: PHYSICAL THERAPY | Facility: CLINIC | Age: 65
End: 2025-03-20
Payer: MEDICARE

## 2025-03-20 DIAGNOSIS — M25.552 LEFT HIP PAIN: Primary | ICD-10-CM

## 2025-03-20 DIAGNOSIS — M16.12 PRIMARY OSTEOARTHRITIS OF ONE HIP, LEFT: ICD-10-CM

## 2025-03-20 PROCEDURE — 97530 THERAPEUTIC ACTIVITIES: CPT | Performed by: PHYSICAL THERAPIST

## 2025-03-20 PROCEDURE — 97110 THERAPEUTIC EXERCISES: CPT | Performed by: PHYSICAL THERAPIST

## 2025-03-20 NOTE — PROGRESS NOTES
"Daily Note     Today's date: 3/20/2025  Patient name: Cristin Nolan  : 1960  MRN: [id number]  Referring provider: Dipesh Knowles, *  Dx:   Encounter Diagnosis     ICD-10-CM    1. Left hip pain  M25.552       2. Primary osteoarthritis of one hip, left  M16.12                      Subjective: Pt reports she feels worse today. Increased lateral posterior butt and hamstring pain. Pt is seeing pain mgt today hopefully for an injection.      Objective: See treatment diary below      Assessment: Pt having much more difficulty with ambulating with increased lateral sway. Pt notes more pain and difficulty with hip extension strengthening exercises as well as clamshells and hip abduction strengthening exercises today. Strengthening continues to be very difficult with abductors and extensors. Will continue attempt progress.      Plan: Continue per plan of care.  Progress note during next visit.      Precautions: Lt CARO 12/3/24;recent dislocation 24; STRICT POST HIP PRECAUTIONS Limit flex to 80, NO IR        Manuals 2/13 2/18 2/20 2/25 2/27 3/4 3/6 3/11 3/13 3/18 3/20   Lt hip rom                                                        Neuro Re-Ed                                                                                                                Ther Ex              Edu on recovery, HEP              Bridges 2x10 SL bridge 2x10 SL bridge 2x10 SL bridge  2x15 ea 2x15  SL bridge 2x15  SL bridge 2x10 SL bridge 2x10 SL bridge 2x10 SL bridge 10x5''                 Prone Hip Ext knee bent        2x15 2x15     Prone Hip IR with band        Blue 10x10'' Blue 15x10'' Blue 15x10'' Blue 15x10''   Ball sq prone Hip ER        10x10'' 15x10'' 15x10'' 15x10''   Clamshell         20x5'' 20x5'' 15x 5''   Standing Hip 3 ways Hip ABD 1 UE 30xea Hip ABD 1 UE 30xea #3 10x2 u/l UE hold #3 10x2 u/l UE hold 3# 30x ea u/l UE hold 3# 30x ea u/l UE hold 3# 30x ea u/l UE hold       SLS Lt 3x30'' Lt 3x30'' Lt 5x30\" " "5x30''Lt Foam 30\"x3 Foam  30\"x3 Foam  30\"x3       SAQ              Hip ABD iso              Standing HS curl              Bike/Treadmill 1.7mph 8' 1.9 mph 10' 1.9 mph 10' 2.0 mph 8' 2.0 mph 8' 2.0 mph 8' 2.0 mph 8' 2.0 mph 8' 2.0mph 8' 2.0mph 8' 8' 1.4 mph   Side stepping  Blue to fatigue Blue to fatigue Blue TB to fatigue Blue TB to fatigue Blue TB to fatigue Blue TB to fatigue Blue TB to fatigue at table   Blue TB to fatigue Blue TB to fatigue Blue to fatigue   Standing clamshell Blue 30xea Blue 30xea Blue TB 30x ea Blue TB 30x ea Blue TB 30x ea Blue TB 30x ea Blue TB 30x ea   Blue TB 30x ea Blue 30xea   Hip Hike 25x  25x 25x          SLR 4x5 4x5 4x5  2x10 2x10 2x10  20x 20x 20x   Prone Hip Ext 20x 20x 20x           SL hip ABD 10x 10x 10x  2x10 2x10 2x10       Ther Activity              Sit to stands 10#KB foam 15x 10#KB foam 15x 10K B 20x 10# 10# 2x10 10# 2x10 10# 2x10  Wall squat 1/4 15x10'' Wall squat 1/4 15x10' Wall squat 1/4 10x10'   HR              Step up    10x 8'' Rt UE 2x10 6\" 2x10 6\" 2x10 6\"  2x10 6'' 2x10 6'' 15x 6''   Lateral step up 2x10 4'' 2x10 4'' 2x10 4\" 2x10 6'' 2x10 6\" 2x10 6\" 2x10 6\"  2x10 6'' 2x10 6'' 15x 6''   Mini squats              Gait Training              Stairs                            Modalities              CP    R s/l w/ pillow between knees 10'                                                                               "

## 2025-03-25 ENCOUNTER — OFFICE VISIT (OUTPATIENT)
Dept: PHYSICAL THERAPY | Facility: CLINIC | Age: 65
End: 2025-03-25
Payer: MEDICARE

## 2025-03-25 DIAGNOSIS — M25.552 LEFT HIP PAIN: Primary | ICD-10-CM

## 2025-03-25 DIAGNOSIS — M16.12 PRIMARY OSTEOARTHRITIS OF ONE HIP, LEFT: ICD-10-CM

## 2025-03-25 DIAGNOSIS — M54.50 ACUTE BILATERAL LOW BACK PAIN, UNSPECIFIED WHETHER SCIATICA PRESENT: ICD-10-CM

## 2025-03-25 PROCEDURE — 97530 THERAPEUTIC ACTIVITIES: CPT

## 2025-03-25 PROCEDURE — 97110 THERAPEUTIC EXERCISES: CPT

## 2025-03-25 NOTE — PROGRESS NOTES
Daily Note     Today's date: 3/25/2025  Patient name: Cristin Nolan  : 1960  MRN: [id number]  Referring provider: Dipesh Knowles, *  Dx:   Encounter Diagnosis     ICD-10-CM    1. Left hip pain  M25.552       2. Primary osteoarthritis of one hip, left  M16.12       3. Acute bilateral low back pain, unspecified whether sciatica present  M54.50           Start Time: 1530  Stop Time: 1615  Total time in clinic (min): 45 minutes    Subjective: Patient reports receiving ANAHY last week and had minimal to no relief in pain symptoms.       Objective: See treatment diary below      Assessment: Patient tolerating exercises well but continues to demonstrate altered mechanics secondary to significant glut weakness. Very challenged performing sit to stands from a chair height without UE support. Patient demonstrated fatigue post-tx and would benefit from continued PT to improve level of function.       Plan: Continue per POC. Increase reps/resistance as tolerated.      Precautions: Lt CARO 12/3/24;recent dislocation 24; STRICT POST HIP PRECAUTIONS Limit flex to 80, NO IR        Manuals  3/4 3/6 3/11 3/13 3/18 3/20 3/25   Lt hip rom                                                            Neuro Re-Ed                                                                                                                        Ther Ex               Edu on recovery, HEP               Bridges 2x10 SL bridge 2x10 SL bridge 2x10 SL bridge  2x15 ea 2x15  SL bridge 2x15  SL bridge 2x10 SL bridge 2x10 SL bridge 2x10 SL bridge 10x5'' 2x10 SL bridge   Supine March               Prone Hip Ext knee bent        2x15 2x15      Prone Hip IR with band        Blue 10x10'' Blue 15x10'' Blue 15x10'' Blue 15x10'' Blue 15x10''   Ball sq prone Hip ER        10x10'' 15x10'' 15x10'' 15x10'' 15x10''   Clamshell         20x5'' 20x5'' 15x 5'' 20x5''   Standing Hip 3 ways Hip ABD 1 UE 30xea Hip ABD 1 UE 30xea #3 10x2 u/l UE  "hold #3 10x2 u/l UE hold 3# 30x ea u/l UE hold 3# 30x ea u/l UE hold 3# 30x ea u/l UE hold        SLS Lt 3x30'' Lt 3x30'' Lt 5x30\" 5x30''Lt Foam 30\"x3 Foam  30\"x3 Foam  30\"x3        SAQ               Hip ABD iso               Standing HS curl               Bike/Treadmill 1.7mph 8' 1.9 mph 10' 1.9 mph 10' 2.0 mph 8' 2.0 mph 8' 2.0 mph 8' 2.0 mph 8' 2.0 mph 8' 2.0mph 8' 2.0mph 8' 8' 1.4 mph 8' 1.8 mph   Side stepping  Blue to fatigue Blue to fatigue Blue TB to fatigue Blue TB to fatigue Blue TB to fatigue Blue TB to fatigue Blue TB to fatigue at table   Blue TB to fatigue Blue TB to fatigue Blue to fatigue Blue to fatigue   Standing clamshell Blue 30xea Blue 30xea Blue TB 30x ea Blue TB 30x ea Blue TB 30x ea Blue TB 30x ea Blue TB 30x ea   Blue TB 30x ea Blue 30xea Blue 30xea   Hip Hike 25x  25x 25x           SLR 4x5 4x5 4x5  2x10 2x10 2x10  20x 20x 20x 20x   Prone Hip Ext 20x 20x 20x            SL hip ABD 10x 10x 10x  2x10 2x10 2x10        Ther Activity               Sit to stands 10#KB foam 15x 10#KB foam 15x 10K B 20x 10# 10# 2x10 10# 2x10 10# 2x10  Wall squat 1/4 15x10'' Wall squat 1/4 15x10' Wall squat 1/4 10x10' Wall squat 1/4 10x10'   HR               Step up    10x 8'' Rt UE 2x10 6\" 2x10 6\" 2x10 6\"  2x10 6'' 2x10 6'' 15x 6'' 15x 6''   Lateral step up 2x10 4'' 2x10 4'' 2x10 4\" 2x10 6'' 2x10 6\" 2x10 6\" 2x10 6\"  2x10 6'' 2x10 6'' 15x 6'' 15x 6''   Mini squats               Gait Training               Stairs                              Modalities               CP    R s/l w/ pillow between knees 10'                                                                                   "

## 2025-03-27 ENCOUNTER — OFFICE VISIT (OUTPATIENT)
Dept: PHYSICAL THERAPY | Facility: CLINIC | Age: 65
End: 2025-03-27
Payer: MEDICARE

## 2025-03-27 DIAGNOSIS — M25.552 LEFT HIP PAIN: Primary | ICD-10-CM

## 2025-03-27 DIAGNOSIS — M16.12 PRIMARY OSTEOARTHRITIS OF ONE HIP, LEFT: ICD-10-CM

## 2025-03-27 DIAGNOSIS — M54.50 ACUTE BILATERAL LOW BACK PAIN, UNSPECIFIED WHETHER SCIATICA PRESENT: ICD-10-CM

## 2025-03-27 PROCEDURE — 97110 THERAPEUTIC EXERCISES: CPT

## 2025-03-27 PROCEDURE — 97530 THERAPEUTIC ACTIVITIES: CPT

## 2025-03-27 NOTE — PROGRESS NOTES
Daily Note     Today's date: 3/27/2025  Patient name: Cristin Nolan  : 1960  MRN: [id number]  Referring provider: Dipesh Knowles, *  Dx:   Encounter Diagnosis     ICD-10-CM    1. Left hip pain  M25.552       2. Primary osteoarthritis of one hip, left  M16.12       3. Acute bilateral low back pain, unspecified whether sciatica present  M54.50           Start Time: 1530  Stop Time: 1612  Total time in clinic (min): 42 minutes    Subjective: Patient reports no new complaints.       Objective: See treatment diary below      Assessment: Patient tolerating program well but is challenged with functional strengthening. Continues to require strong tactile cuing to limit rotation at L/s. Able to initiate some movement with prone hip extension w/ knee bent. Patient would benefit from continued PT to improve level of function.       Plan: Continue per POC. Increase reps/resistance as tolerated.      Precautions: Lt CARO 12/3/24;recent dislocation 24; STRICT POST HIP PRECAUTIONS Limit flex to 80, NO IR        Manuals  3/ 3/6 3/11 3/13 3/18 3/20 3/25 3/27   Lt hip rom                                                                Neuro Re-Ed                                                                    3                                                            Ther Ex                Edu on recovery, HEP                Bridges 2x10 SL bridge 2x10 SL bridge 2x10 SL bridge  2x15 ea 2x15  SL bridge 2x15  SL bridge 2x10 SL bridge 2x10 SL bridge 2x10 SL bridge 10x5'' 2x10 SL bridge 2x10 SL bridge   Supine March                Prone Hip Ext knee bent        2x15 2x15    2x10   Prone Hip IR with band        Blue 10x10'' Blue 15x10'' Blue 15x10'' Blue 15x10'' Blue 15x10'' Blue 15x10''   Ball sq prone Hip ER        10x10'' 15x10'' 15x10'' 15x10'' 15x10'' 15x10''   Clamshell         20x5'' 20x5'' 15x 5'' 20x5'' 20x5''   Standing Hip 3 ways Hip ABD 1 UE 30xea Hip ABD 1 UE 30xea #3 10x2 u/l  "UE hold #3 10x2 u/l UE hold 3# 30x ea u/l UE hold 3# 30x ea u/l UE hold 3# 30x ea u/l UE hold         SLS Lt 3x30'' Lt 3x30'' Lt 5x30\" 5x30''Lt Foam 30\"x3 Foam  30\"x3 Foam  30\"x3         SAQ                Hip ABD iso                Standing HS curl                Bike/Treadmill 1.7mph 8' 1.9 mph 10' 1.9 mph 10' 2.0 mph 8' 2.0 mph 8' 2.0 mph 8' 2.0 mph 8' 2.0 mph 8' 2.0mph 8' 2.0mph 8' 8' 1.4 mph 8' 1.8 mph 8' 1.8 mph   Side stepping  Blue to fatigue Blue to fatigue Blue TB to fatigue Blue TB to fatigue Blue TB to fatigue Blue TB to fatigue Blue TB to fatigue at table   Blue TB to fatigue Blue TB to fatigue Blue to fatigue Blue to fatigue Black to fatigue   Standing clamshell Blue 30xea Blue 30xea Blue TB 30x ea Blue TB 30x ea Blue TB 30x ea Blue TB 30x ea Blue TB 30x ea   Blue TB 30x ea Blue 30xea Blue 30xea Black 30x ea   Hip Hike 25x  25x 25x            SLR 4x5 4x5 4x5  2x10 2x10 2x10  20x 20x 20x 20x 20x   Prone Hip Ext 20x 20x 20x             SL hip ABD 10x 10x 10x  2x10 2x10 2x10         Ther Activity                Sit to stands 10#KB foam 15x 10#KB foam 15x 10K B 20x 10# 10# 2x10 10# 2x10 10# 2x10  Wall squat 1/4 15x10'' Wall squat 1/4 15x10' Wall squat 1/4 10x10' Wall squat 1/4 10x10' Wall squat 1/4 10x10'   HR                Step up    10x 8'' Rt UE 2x10 6\" 2x10 6\" 2x10 6\"  2x10 6'' 2x10 6'' 15x 6'' 15x 6'' 15x 6''   Lateral step up 2x10 4'' 2x10 4'' 2x10 4\" 2x10 6'' 2x10 6\" 2x10 6\" 2x10 6\"  2x10 6'' 2x10 6'' 15x 6'' 15x 6'' 15x 6''   Mini squats                Gait Training                Stairs                                Modalities                CP    R s/l w/ pillow between knees 10'                                                                                       "

## 2025-03-28 DIAGNOSIS — M54.16 LUMBAR RADICULOPATHY: Primary | ICD-10-CM

## 2025-03-29 ENCOUNTER — HOSPITAL ENCOUNTER (EMERGENCY)
Facility: HOSPITAL | Age: 65
Discharge: HOME | End: 2025-03-29
Attending: EMERGENCY MEDICINE
Payer: MEDICARE

## 2025-03-29 ENCOUNTER — APPOINTMENT (EMERGENCY)
Dept: RADIOLOGY | Facility: HOSPITAL | Age: 65
End: 2025-03-29
Payer: MEDICARE

## 2025-03-29 VITALS
HEART RATE: 90 BPM | OXYGEN SATURATION: 98 % | RESPIRATION RATE: 20 BRPM | BODY MASS INDEX: 38.79 KG/M2 | WEIGHT: 212.08 LBS | TEMPERATURE: 97.6 F | SYSTOLIC BLOOD PRESSURE: 158 MMHG | DIASTOLIC BLOOD PRESSURE: 91 MMHG

## 2025-03-29 DIAGNOSIS — M25.552 LEFT HIP PAIN: Primary | ICD-10-CM

## 2025-03-29 PROCEDURE — 99284 EMERGENCY DEPT VISIT MOD MDM: CPT

## 2025-03-29 PROCEDURE — 73552 X-RAY EXAM OF FEMUR 2/>: CPT

## 2025-03-29 PROCEDURE — 72170 X-RAY EXAM OF PELVIS: CPT

## 2025-03-29 PROCEDURE — 99283 EMERGENCY DEPT VISIT LOW MDM: CPT

## 2025-03-29 PROCEDURE — 96372 THER/PROPH/DIAG INJ SC/IM: CPT

## 2025-03-29 RX ORDER — KETOROLAC TROMETHAMINE 30 MG/ML
15 INJECTION, SOLUTION INTRAMUSCULAR; INTRAVENOUS ONCE
Status: COMPLETED | OUTPATIENT
Start: 2025-03-29 | End: 2025-03-29

## 2025-03-29 RX ORDER — LIDOCAINE 50 MG/G
1 PATCH TOPICAL ONCE
Status: DISCONTINUED | OUTPATIENT
Start: 2025-03-29 | End: 2025-03-30 | Stop reason: HOSPADM

## 2025-03-29 RX ADMIN — KETOROLAC TROMETHAMINE 15 MG: 30 INJECTION, SOLUTION INTRAMUSCULAR; INTRAVENOUS at 20:13

## 2025-03-29 RX ADMIN — LIDOCAINE 1 PATCH: 700 PATCH TOPICAL at 20:16

## 2025-03-29 NOTE — ED PROVIDER NOTES
Time reflects when diagnosis was documented in both MDM as applicable and the Disposition within this note       Time User Action Codes Description Comment    3/29/2025  9:55 PM Anne Marie Rodríguez Add [M25.552] Left hip pain           ED Disposition       ED Disposition   Discharge    Condition   Stable    Date/Time   Sat Mar 29, 2025  9:55 PM    Comment   Cristin A Ovidio discharge to home/self care.                   Assessment & Plan       Medical Decision Making  jack is a 65-year-old female with PMH of OA, ambulatory dysfunction, lumbar degenerative disc disease, spinal stenosis of lumbosacral region, endometrial cancer, left knee (2021) and hip replacement (12/03/24).  Presenting to the ED for evaluation of left hip pain x 3 weeks.  Patient reports that on 01/02/25 her left hip was dislocated and she came to the ED to have it put back in place.  Patient states that since having it reduced in the ED January, 3 weeks ago she had an episode where she stood up from the toilet and fell hip popped out again and she grabbed her hip and it went back into place.  States this occurred again yesterday.  Was able to walk, vomiting today.  However states that it is painful to walk and has been since surgery but every time it pops out it worsens.  Reports some numbness over left lateral hip that is intermittent.  Denies any numbness elsewhere.  Patient states she has been having a wobbly gait since surgery December.  States pain is located to left hip and radiates down left lateral thigh.  States she has some pain radiation along the inguinal crease, denies any numbness or pain in vaginal or perianal area.  States this has been ongoing since surgery.Denies any new back pain, fall, trauma, urinary incontinence, urinary retention, fecal incontinence, saddle anesthesia, unexplained weight loss, night sweats, fevers, chills, sweats, IV drug use, diabetes, steroid use.    Well-appearing healed surgical incision scar over left hip.   "No signs of infection.  No erythema, swelling, ecchymosis, drainage or deformities appreciated.  Tenderness palpation of left hip and left lateral thigh.  Sensation, strength, range of motion, and pulses intact bilateral upper and lower extremities.  Patient without any sensation deficits on physical examination.  Hip does not appear to be dislocated on physical exam.  Plan: X-ray of left hip and left femur evaluate for any acute osseous abnormalities or dislocations.  IM Toradol and Lidoderm patch for pain.  X-ray left hip and femur final read: \"Pelvis: Left total hip arthroplasty. No perihardware lucency or fracture. No fracture or dislocation. Lower lumbar degenerative changes. Impression: No acute osseous abnormality.\"    Findings not consistent with infectious cause, acute dislocation, fracture, or other emergent causes at this time.    Instruct patient to contact Ortho and have close follow-up.  Patient able to ambulate in ED.  Patient states that her pain is well-controlled and she feels ready to go home.  Patient understands and agrees with plan to follow-up with Ortho for further management.    Discussed findings from the visit with the patient.  We had a conversation regarding supportive care and indications for return.  Recommended appropriate follow-up.  Patient and/or family understand and agree with plan.    Portions of the record may have been created with voice recognition software. Occasional use of the incorrect word or \"sound a like\" substitutions may have occurred due to the inherent limitations of voice recognition software. Read the chart carefully and recognize, using context, where substitutions have occurred.       Amount and/or Complexity of Data Reviewed  Radiology: ordered.    Risk  Prescription drug management.             Medications   ketorolac (TORADOL) injection 15 mg (15 mg Intramuscular Given 3/29/25 2013)       ED Risk Strat Scores                                          "       History of Present Illness       Chief Complaint   Patient presents with    Hip Pain     Hip replaced in Dec, dislocated in Jan x3 weeks with increased pain        Past Medical History:   Diagnosis Date    Ambulatory dysfunction     uses walking stick    Anxiety     Arthritis     Cancer (HCC) 7/15/17    All better now    Chronic pain disorder     Claustrophobia     mild    DDD (degenerative disc disease), lumbar     Endometrial cancer (HCC) 07/06/2017    tRA TLH BSO SLND on 7/17/17 by Dr. Stone, followed by 3 cycles of vaginal brachytherapy completed in August 2017    Low back pain     Mild acid reflux     OA (osteoarthritis)     marissa knees    Spinal stenosis of lumbosacral region     Wears glasses       Past Surgical History:   Procedure Laterality Date    COLONOSCOPY      HYSTERECTOMY      JOINT REPLACEMENT Bilateral     knees    ORTHOPEDIC SURGERY      PLANTAR FASCIA SURGERY Bilateral     HI ARTHRP ACETBLR/PROX FEM PROSTC AGRFT/ALGRFT Left 12/3/2024    Procedure: ARTHROPLASTY HIP TOTAL (psb same day dc);  Surgeon: Dipesh Knowles MD;  Location:  MAIN OR;  Service: Orthopedics    HI ARTHRP KNE CONDYLE&PLATU MEDIAL&LAT COMPARTMENTS Right 11/30/2020    Procedure: ARTHROPLASTY KNEE TOTAL;  Surgeon: Dipesh Knowles MD;  Location: [facility];  Service: Orthopedics    HI HYSTEROSCOPY BX ENDOMETRIUM&/POLYPC W/WO D&C N/A 02/28/2017    Procedure: DILATATION AND CURETTAGE (D&C) WITH HYSTEROSCOPY,POLYPECTOMY;  Surgeon: Roberto Anderson MD;  Location: [facility];  Service: Gynecology    REPLACEMENT TOTAL KNEE Left 10/22/2021    SHOULDER SURGERY Left     TONSILLECTOMY      TOTAL ABDOMINAL HYSTERECTOMY W/ BILATERAL SALPINGOOPHORECTOMY Bilateral 07/2017    endometrial cancer, ? stage 1    WISDOM TOOTH EXTRACTION        Family History   Problem Relation Age of Onset    Ovarian cancer Mother 70    Cancer Mother     Esophageal cancer Father     Cancer Father     No Known Problems Maternal Grandmother     No Known  Problems Maternal Grandfather     No Known Problems Paternal Grandmother     No Known Problems Paternal Grandfather     No Known Problems Maternal Aunt     No Known Problems Paternal Aunt     Breast cancer Neg Hx     Colon cancer Neg Hx     Uterine cancer Neg Hx       Social History     Tobacco Use    Smoking status: Never    Smokeless tobacco: Never    Tobacco comments:     No secondhand smoke exposure   Vaping Use    Vaping status: Never Used   Substance Use Topics    Alcohol use: Not Currently     Comment: very rarely    Drug use: No      E-Cigarette/Vaping    E-Cigarette Use Never User       E-Cigarette/Vaping Substances    Nicotine No     THC No     CBD No     Flavoring No     Other No     Unknown No       I have reviewed and agree with the history as documented.     Patient is a 65-year-old female with PMH of OA, ambulatory dysfunction, lumbar degenerative disc disease, spinal stenosis of lumbosacral region, endometrial cancer, left knee (2021) and hip replacement (12/03/24).  Presenting to the ED for evaluation of left hip pain x 3 weeks.  Patient reports that on 01/02/25 her left hip was dislocated and she came to the ED to have it put back in place.  At that time patient was bending over to  her cat when she felt a popping sensation in went to the ED.  Today patient states that 3 weeks ago she was standing up from the toilet when she felt like her hip popped out of place, she states that she immediately grabbed her hip and felt like it went back into place.  States she has been having pain over her hip area since and pain with walking.  States she still able to ambulate however it is painful.  Then yesterday afternoon, patient states that she sat up from the toilet again when she felt her hip pop out of place, states she immediately grabbed her hip and felt as though it went back into place.  States that she was able to walk a mile today, however it was painful.  States that she is having increased  tightness sensation over hip area.  Reports some numbness over left lateral hip that is intermittent.  Denies any numbness elsewhere.  Patient states she has been having a wobbly gait since surgery December.  States pain is located to left hip and radiates down left lateral thigh.  States she has some pain radiation along the inguinal crease, denies any numbness or pain in vaginal or perianal area.  Denies any new back pain, fall, trauma, urinary incontinence, urinary retention, fecal incontinence, saddle anesthesia, unexplained weight loss, night sweats, fevers, chills, sweats, IV drug use, diabetes, steroid use.      Hip Pain  Associated symptoms: no abdominal pain, no chest pain, no congestion, no cough, no diarrhea, no ear pain, no fever, no headaches, no nausea, no rash, no rhinorrhea, no shortness of breath, no sore throat and no vomiting        Review of Systems   Constitutional:  Negative for chills and fever.   HENT:  Negative for congestion, ear pain, rhinorrhea, sore throat, trouble swallowing and voice change.    Eyes:  Negative for pain and visual disturbance.   Respiratory:  Negative for cough and shortness of breath.    Cardiovascular:  Negative for chest pain and palpitations.   Gastrointestinal:  Negative for abdominal pain, blood in stool, constipation, diarrhea, nausea and vomiting.   Genitourinary:  Negative for difficulty urinating, dysuria, flank pain, frequency, hematuria, pelvic pain, urgency and vaginal pain.   Musculoskeletal:  Positive for back pain (chronic) and gait problem. Negative for arthralgias, neck pain and neck stiffness.        Left hip pain   Skin:  Negative for color change and rash.   Neurological:  Negative for dizziness, seizures, syncope, weakness, light-headedness, numbness and headaches.   All other systems reviewed and are negative.          Objective       ED Triage Vitals [03/29/25 1920]   Temperature Pulse Blood Pressure Respirations SpO2 Patient Position -  Orthostatic VS   97.6 °F (36.4 °C) 90 158/91 20 98 % Sitting      Temp Source Heart Rate Source BP Location FiO2 (%) Pain Score    Oral Monitor Right arm -- 9      Vitals      Date and Time Temp Pulse SpO2 Resp BP Pain Score FACES Pain Rating User   03/29/25 2013 -- -- -- -- -- 7 -- DF   03/29/25 1920 97.6 °F (36.4 °C) 90 98 % 20 158/91 9 -- LAP            Physical Exam  Vitals and nursing note reviewed.   Constitutional:       General: She is not in acute distress.     Appearance: She is well-developed. She is not ill-appearing, toxic-appearing or diaphoretic.   HENT:      Head: Normocephalic and atraumatic.      Right Ear: External ear normal.      Left Ear: External ear normal.      Nose: Nose normal.      Mouth/Throat:      Mouth: Mucous membranes are moist.   Eyes:      General: No scleral icterus.        Right eye: No discharge.         Left eye: No discharge.      Extraocular Movements: Extraocular movements intact.      Conjunctiva/sclera: Conjunctivae normal.   Cardiovascular:      Rate and Rhythm: Normal rate and regular rhythm.      Pulses: Normal pulses.      Heart sounds: Normal heart sounds. No murmur heard.  Pulmonary:      Effort: Pulmonary effort is normal. No respiratory distress.      Breath sounds: Normal breath sounds. No stridor. No wheezing, rhonchi or rales.   Abdominal:      General: Bowel sounds are normal. There is no distension.      Palpations: Abdomen is soft. There is no mass.      Tenderness: There is no abdominal tenderness. There is no right CVA tenderness, left CVA tenderness, guarding or rebound.      Hernia: No hernia is present.   Musculoskeletal:         General: Tenderness present. No swelling, deformity or signs of injury. Normal range of motion.      Cervical back: Normal, normal range of motion and neck supple. No rigidity, tenderness or bony tenderness.      Thoracic back: Normal. No tenderness or bony tenderness.      Lumbar back: Normal. No tenderness or bony tenderness.       Right hip: Normal.      Left hip: Tenderness present. No deformity, lacerations or crepitus. Normal range of motion. Normal strength.      Right upper leg: Normal.      Left upper leg: Normal.      Right knee: Normal.      Left knee: Normal.      Right lower leg: Normal. No edema.      Left lower leg: Normal. No edema.      Right ankle: Normal.      Left ankle: Normal.      Right foot: Normal.      Left foot: Normal.        Legs:       Comments: Well-appearing healed surgical incision scar over left hip.  No signs of infection.  No erythema, swelling, ecchymosis, drainage or deformities appreciated.  Tenderness palpation of left hip and left lateral thigh.  Sensation, strength, range of motion, and pulses intact bilateral upper and lower extremities.  Patient without any sensation deficits on physical examination.   Skin:     General: Skin is warm and dry.      Capillary Refill: Capillary refill takes less than 2 seconds.      Coloration: Skin is not jaundiced or pale.      Findings: No bruising, erythema, lesion or rash.   Neurological:      General: No focal deficit present.      Mental Status: She is alert and oriented to person, place, and time.      Cranial Nerves: No cranial nerve deficit.      Sensory: No sensory deficit.      Motor: No weakness.      Gait: Gait normal.   Psychiatric:         Mood and Affect: Mood normal.         Results Reviewed       None            XR femur 2 views LEFT   Final Interpretation by Mike Mauricio MD (03/29 2105)      No acute osseous abnormality            Workstation performed: [id number]         XR pelvis ap only 1 or 2 vw   Final Interpretation by Mike Mauricio MD (03/29 2105)      No acute osseous abnormality            Workstation performed: [id number]             Procedures    ED Medication and Procedure Management   Prior to Admission Medications   Prescriptions Last Dose Informant Patient Reported? Taking?   Cholecalciferol ( Vitamin D3) 100 MCG (4000 UT) CAPS   Self No No   Sig: Take 1 capsule (4,000 Units total) by mouth daily   Coenzyme Q10-Vitamin E (QUNOL ULTRA COQ10 PO)  Self Yes No   Misc Natural Products (GLUCOSAMINE CHOND CMP TRIPLE PO)  Self Yes No   Sig: Take by mouth   Multiple Vitamin (multivitamin) tablet   No No   Sig: Take 1 tablet by mouth daily   Omega 3-6-9 Fatty Acids (OMEGA 3-6-9 PO)  Self Yes No   Sig: Take 1 capsule by mouth daily   acetaminophen (TYLENOL) 650 mg CR tablet   No No   Sig: Take 1 tablet (650 mg total) by mouth every 6 (six) hours as needed for mild pain   Patient not taking: Reported on 1/3/2025   aspirin (ECOTRIN LOW STRENGTH) 81 mg EC tablet   No No   Sig: Take 1 tablet (81 mg total) by mouth 2 (two) times a day   atorvastatin (LIPITOR) 10 mg tablet   No No   Sig: TAKE 1 TABLET(10 MG) BY MOUTH DAILY   cyanocobalamin (VITAMIN B-12) 1000 MCG tablet  Self Yes No   Sig: Take 1,000 mcg by mouth daily   Patient not taking: Reported on 1/2/2025   ferrous sulfate 324 (65 Fe) mg   No No   Sig: Take 1 tablet (324 mg total) by mouth 2 (two) times a day before meals   folic acid (FOLVITE) 1 mg tablet   No No   Sig: Take 1 tablet (1 mg total) by mouth daily   mupirocin (BACTROBAN) 2 % ointment   No No   Sig: Apply topically 2 (two) times a day Apply to each nostril 2 times daily for 5 days before and including the day of surgery   naproxen (NAPROSYN) 500 mg tablet   No No   Sig: TAKE 1 TABLET(500 MG) BY MOUTH TWICE DAILY WITH MEALS   Patient not taking: Reported on 1/3/2025   ondansetron (ZOFRAN-ODT) 4 mg disintegrating tablet   No No   Sig: Take 1 tablet (4 mg total) by mouth every 8 (eight) hours as needed for nausea or vomiting   Patient not taking: Reported on 1/3/2025      Facility-Administered Medications Last Administration Doses Remaining   lidocaine (XYLOCAINE) 1 % injection 2 mL 5/20/2022  2:45 PM    triamcinolone acetonide (KENALOG-40) 40 mg/mL injection 20 mg 5/20/2022  2:45 PM         Discharge Medication List as of 3/29/2025 10:16 PM         CONTINUE these medications which have NOT CHANGED    Details   acetaminophen (TYLENOL) 650 mg CR tablet Take 1 tablet (650 mg total) by mouth every 6 (six) hours as needed for mild pain, Starting Tue 12/3/2024, Normal      aspirin (ECOTRIN LOW STRENGTH) 81 mg EC tablet Take 1 tablet (81 mg total) by mouth 2 (two) times a day, Starting Tue 12/3/2024, Until Thu 1/2/2025, Normal      atorvastatin (LIPITOR) 10 mg tablet TAKE 1 TABLET(10 MG) BY MOUTH DAILY, Starting Thu 3/6/2025, Normal      Cholecalciferol (HM Vitamin D3) 100 MCG (4000 UT) CAPS Take 1 capsule (4,000 Units total) by mouth daily, Starting Tue 6/15/2021, No Print      Coenzyme Q10-Vitamin E (QUNOL ULTRA COQ10 PO) Starting Thu 9/1/2022, Historical Med      cyanocobalamin (VITAMIN B-12) 1000 MCG tablet Take 1,000 mcg by mouth daily, Historical Med      ferrous sulfate 324 (65 Fe) mg Take 1 tablet (324 mg total) by mouth 2 (two) times a day before meals, Starting Fri 9/27/2024, Until Tue 12/3/2024, Normal      folic acid (FOLVITE) 1 mg tablet Take 1 tablet (1 mg total) by mouth daily, Starting Fri 9/27/2024, Until Tue 12/3/2024, Normal      Misc Natural Products (GLUCOSAMINE CHOND CMP TRIPLE PO) Take by mouth, Historical Med      Multiple Vitamin (multivitamin) tablet Take 1 tablet by mouth daily, Starting Fri 9/27/2024, Normal      mupirocin (BACTROBAN) 2 % ointment Apply topically 2 (two) times a day Apply to each nostril 2 times daily for 5 days before and including the day of surgery, Starting Fri 10/25/2024, Normal      naproxen (NAPROSYN) 500 mg tablet TAKE 1 TABLET(500 MG) BY MOUTH TWICE DAILY WITH MEALS, Starting Tue 12/31/2024, Normal      Omega 3-6-9 Fatty Acids (OMEGA 3-6-9 PO) Take 1 capsule by mouth daily, Historical Med      ondansetron (ZOFRAN-ODT) 4 mg disintegrating tablet Take 1 tablet (4 mg total) by mouth every 8 (eight) hours as needed for nausea or vomiting, Starting Tue 12/3/2024, Normal           No discharge procedures on  file.  ED SEPSIS DOCUMENTATION   Time reflects when diagnosis was documented in both MDM as applicable and the Disposition within this note       Time User Action Codes Description Comment    3/29/2025  9:55 PM Anne Marie Rodríguez Add [M25.552] Left hip pain                  Anne Marie Rodríguez PA-C  03/30/25 0223

## 2025-03-30 NOTE — DISCHARGE INSTRUCTIONS
Contact Ortho within the next 24 hours.  Return to ED if symptoms worsen, fail to improve, or develop as listed in follow-up section.  Symptomatic care as discussed.

## 2025-03-31 ENCOUNTER — TELEPHONE (OUTPATIENT)
Dept: OBGYN CLINIC | Facility: HOSPITAL | Age: 65
End: 2025-03-31

## 2025-03-31 ENCOUNTER — TELEPHONE (OUTPATIENT)
Dept: OBGYN CLINIC | Facility: MEDICAL CENTER | Age: 65
End: 2025-03-31

## 2025-03-31 ENCOUNTER — OFFICE VISIT (OUTPATIENT)
Dept: PHYSICAL THERAPY | Facility: CLINIC | Age: 65
End: 2025-03-31
Payer: MEDICARE

## 2025-03-31 DIAGNOSIS — M25.552 LEFT HIP PAIN: Primary | ICD-10-CM

## 2025-03-31 DIAGNOSIS — M16.12 PRIMARY OSTEOARTHRITIS OF ONE HIP, LEFT: ICD-10-CM

## 2025-03-31 PROCEDURE — 97110 THERAPEUTIC EXERCISES: CPT | Performed by: PHYSICAL THERAPIST

## 2025-03-31 PROCEDURE — 97530 THERAPEUTIC ACTIVITIES: CPT | Performed by: PHYSICAL THERAPIST

## 2025-03-31 NOTE — TELEPHONE ENCOUNTER
I called and spoke with the patient about her hip symptoms.  She will continue with PT.  I am going to try to help her get an appointment with spine and pain center.  We also discussed extensively some possibilities to explain her hip pain.  I will follow up with her again later this week to monitor her progress.  She has an appointment scheduled with me in a few weeks.

## 2025-03-31 NOTE — TELEPHONE ENCOUNTER
"Caller: Patient    Doctor: Dr. Knowles    Reason for call: Alexa had sent a message through [email or web address] and asked if I would send to you as well. Please call patient. Thank you.    \"had another mishap with hip moving about. It was very painful and limited my walking ability. I went to er. What can we do so this doesn't continue to happen. It is scary and painful. Seems like it keeps setting me back.     Left total hip sx 12/3/24    Call back#: [phone]    "

## 2025-03-31 NOTE — PROGRESS NOTES
Daily Note     Today's date: 3/31/2025  Patient name: Cristin Nolan  : 1960  MRN: [id number]  Referring provider: Dipesh Knowles, *  Dx:   Encounter Diagnosis     ICD-10-CM    1. Left hip pain  M25.552       2. Primary osteoarthritis of one hip, left  M16.12                      Subjective: Pt went to ER over the weekend due to significant hip pain from getting off the toilet again and short inability to ambulate. Pt was able to ambulate into the ER and xrays were negative. Pt is frustrated about these situations and scared this is always going to happen to her. Pt also notes lumbar injection was not at L5 level and pain mgt at [facility] said L5 would not help as there is no active firing and to get a second opinion.      Objective: See treatment diary below      Assessment: Pt was asking what to do if this instability moments continues to happen. She is worried it will happen in public and she wont be able to get help or walk. Noted it has been very difficult to strength glut med possibly due to EMG results of no active firing at Rt L5 level. Unsure if an injection or any other further treatment would help her strength beyond PT.    Pt had increased difficulty with prone hip ext and single leg bridges today. She's also was showing increased lateral sway with her gait.      Plan: Continue per plan of care.      Precautions: Lt CARO 12/3/24;recent dislocation 24; STRICT POST HIP PRECAUTIONS Limit flex to 80, NO IR        Manuals 3/31   2/25 2/27 3/ 3/6 3/11 3/13 3/18 3/20 3/25 3/27   Lt hip rom                                                                Neuro Re-Ed                                                                    3                                                            Ther Ex                Edu on recovery, HEP                Bridges 2x10 5'' DL bridge    2x15 ea 2x15  SL bridge 2x15  SL bridge 2x10 SL bridge 2x10 SL bridge 2x10 SL bridge 10x5'' 2x10 SL bridge 2x10 SL bridge  "  Supine March                Prone Hip Ext knee bent 2x10       2x15 2x15    2x10   Prone Hip IR with band Blue 15x10''       Blue 10x10'' Blue 15x10'' Blue 15x10'' Blue 15x10'' Blue 15x10'' Blue 15x10''   Ball sq prone Hip ER 15x10''       10x10'' 15x10'' 15x10'' 15x10'' 15x10'' 15x10''   Clamshell 20x5''        20x5'' 20x5'' 15x 5'' 20x5'' 20x5''   Standing Hip 3 ways    #3 10x2 u/l UE hold 3# 30x ea u/l UE hold 3# 30x ea u/l UE hold 3# 30x ea u/l UE hold         SLS    5x30''Lt Foam 30\"x3 Foam  30\"x3 Foam  30\"x3         SAQ                Hip ABD iso                Standing HS curl                Bike/Treadmill 8' 1.8mph   2.0 mph 8' 2.0 mph 8' 2.0 mph 8' 2.0 mph 8' 2.0 mph 8' 2.0mph 8' 2.0mph 8' 8' 1.4 mph 8' 1.8 mph 8' 1.8 mph   Side stepping  Black to fatigue   Blue TB to fatigue Blue TB to fatigue Blue TB to fatigue Blue TB to fatigue at table   Blue TB to fatigue Blue TB to fatigue Blue to fatigue Blue to fatigue Black to fatigue   Standing clamshell Black 30xea   Blue TB 30x ea Blue TB 30x ea Blue TB 30x ea Blue TB 30x ea   Blue TB 30x ea Blue 30xea Blue 30xea Black 30x ea   Hip Hike    25x            SLR     2x10 2x10 2x10  20x 20x 20x 20x 20x   Prone Hip Ext                SL hip ABD     2x10 2x10 2x10         Ther Activity                Sit to stands Wall squat 1/4 10x10'   10# 10# 2x10 10# 2x10 10# 2x10  Wall squat 1/4 15x10'' Wall squat 1/4 15x10' Wall squat 1/4 10x10' Wall squat 1/4 10x10' Wall squat 1/4 10x10'   HR                Step up 15x 6''   10x 8'' Rt UE 2x10 6\" 2x10 6\" 2x10 6\"  2x10 6'' 2x10 6'' 15x 6'' 15x 6'' 15x 6''   Lateral step up 15x 6''   2x10 6'' 2x10 6\" 2x10 6\" 2x10 6\"  2x10 6'' 2x10 6'' 15x 6'' 15x 6'' 15x 6''   Mini squats                Gait Training                Stairs                                Modalities                CP    R s/l w/ pillow between knees 10'                                                                                         "

## 2025-04-03 ENCOUNTER — OFFICE VISIT (OUTPATIENT)
Dept: PHYSICAL THERAPY | Facility: CLINIC | Age: 65
End: 2025-04-03
Payer: MEDICARE

## 2025-04-03 DIAGNOSIS — M54.50 ACUTE BILATERAL LOW BACK PAIN, UNSPECIFIED WHETHER SCIATICA PRESENT: ICD-10-CM

## 2025-04-03 DIAGNOSIS — M25.552 LEFT HIP PAIN: Primary | ICD-10-CM

## 2025-04-03 DIAGNOSIS — M16.12 PRIMARY OSTEOARTHRITIS OF ONE HIP, LEFT: ICD-10-CM

## 2025-04-03 PROCEDURE — 97110 THERAPEUTIC EXERCISES: CPT

## 2025-04-03 PROCEDURE — 97530 THERAPEUTIC ACTIVITIES: CPT

## 2025-04-03 NOTE — PROGRESS NOTES
Daily Note     Today's date: 4/3/2025  Patient name: Cristin Nolan  : 1960  MRN: [id number]  Referring provider: Dipesh Knowles, *  Dx:   Encounter Diagnosis     ICD-10-CM    1. Left hip pain  M25.552       2. Primary osteoarthritis of one hip, left  M16.12       3. Acute bilateral low back pain, unspecified whether sciatica present  M54.50           Start Time: 1400  Stop Time: 1445  Total time in clinic (min): 45 minutes    Subjective: Patient reports being disappointed with lack of progress, multiple instances of hip instability, and waddling gait.       Objective: See treatment diary below      Assessment: Continued with outlined exercises focusing on maintaining adequate posterior chain strength. Patient continues to verbalize and demonstrate hesitation with functional movements, like sit to stand transfers, due to fear of hip instability/dislocation. She also demonstrates regression of hip abductor strength since last week. Patient would benefit from continued PT to improve strength, stability, and function.       Plan: Continue per POC. Increase reps/resistance as tolerated.      Precautions: Lt CARO 12/3/24;recent dislocation 24; STRICT POST HIP PRECAUTIONS Limit flex to 80, NO IR        Manuals 3/31 4/3  2/25 2/27 3/4 3/6 3/11 3/13 3/18 3/20 3/25 3/27   Lt hip rom                                                                Neuro Re-Ed                                                                    3                                                            Ther Ex                Edu on recovery, HEP                Bridges 2x10 5'' DL bridge 20x SL   2x15 ea 2x15  SL bridge 2x15  SL bridge 2x10 SL bridge 2x10 SL bridge 2x10 SL bridge 10x5'' 2x10 SL bridge 2x10 SL bridge   Supine March                Prone Hip Ext knee bent 2x10 2x10      2x15 2x15    2x10   Prone Hip IR with band Blue 15x10''       Blue 10x10'' Blue 15x10'' Blue 15x10'' Blue 15x10'' Blue 15x10'' Blue 15x10''   Ball  "sq prone Hip ER 15x10'' 15x10''      10x10'' 15x10'' 15x10'' 15x10'' 15x10'' 15x10''   Clamshell 20x5'' 20x5''       20x5'' 20x5'' 15x 5'' 20x5'' 20x5''   Standing Hip 3 ways    #3 10x2 u/l UE hold 3# 30x ea u/l UE hold 3# 30x ea u/l UE hold 3# 30x ea u/l UE hold         SLS    5x30''Lt Foam 30\"x3 Foam  30\"x3 Foam  30\"x3         SAQ                Hip ABD iso                Standing HS curl                Bike/Treadmill 8' 1.8mph 8' 1.8mph  2.0 mph 8' 2.0 mph 8' 2.0 mph 8' 2.0 mph 8' 2.0 mph 8' 2.0mph 8' 2.0mph 8' 8' 1.4 mph 8' 1.8 mph 8' 1.8 mph   Side stepping  Black to fatigue Black to fatigue  Blue TB to fatigue Blue TB to fatigue Blue TB to fatigue Blue TB to fatigue at table   Blue TB to fatigue Blue TB to fatigue Blue to fatigue Blue to fatigue Black to fatigue   Standing clamshell Black 30xea Black 30xea  Blue TB 30x ea Blue TB 30x ea Blue TB 30x ea Blue TB 30x ea   Blue TB 30x ea Blue 30xea Blue 30xea Black 30x ea   Hip Hike    25x            SLR     2x10 2x10 2x10  20x 20x 20x 20x 20x   Prone Hip Ext                SL hip ABD     2x10 2x10 2x10         Ther Activity                Sit to stands Wall squat 1/4 10x10' Wall squat 1/4 10x10'  10# 10# 2x10 10# 2x10 10# 2x10  Wall squat 1/4 15x10'' Wall squat 1/4 15x10' Wall squat 1/4 10x10' Wall squat 1/4 10x10' Wall squat 1/4 10x10'   HR                Step up 15x 6'' 15x 6''  10x 8'' Rt UE 2x10 6\" 2x10 6\" 2x10 6\"  2x10 6'' 2x10 6'' 15x 6'' 15x 6'' 15x 6''   Lateral step up 15x 6'' 15x 6''  2x10 6'' 2x10 6\" 2x10 6\" 2x10 6\"  2x10 6'' 2x10 6'' 15x 6'' 15x 6'' 15x 6''   Mini squats                Gait Training                Stairs                                Modalities                CP    R s/l w/ pillow between knees 10'                                                                                           "

## 2025-04-07 ENCOUNTER — APPOINTMENT (OUTPATIENT)
Dept: LAB | Facility: CLINIC | Age: 65
End: 2025-04-07
Payer: MEDICARE

## 2025-04-07 DIAGNOSIS — E78.2 MIXED HYPERLIPIDEMIA: ICD-10-CM

## 2025-04-07 LAB
ALBUMIN SERPL BCG-MCNC: 3.9 G/DL (ref 3.5–5)
ALP SERPL-CCNC: 61 U/L (ref 34–104)
ALT SERPL W P-5'-P-CCNC: 21 U/L (ref 7–52)
ANION GAP SERPL CALCULATED.3IONS-SCNC: 7 MMOL/L (ref 4–13)
AST SERPL W P-5'-P-CCNC: 17 U/L (ref 13–39)
BILIRUB SERPL-MCNC: 0.77 MG/DL (ref 0.2–1)
BUN SERPL-MCNC: 27 MG/DL (ref 5–25)
CALCIUM SERPL-MCNC: 9.3 MG/DL (ref 8.4–10.2)
CHLORIDE SERPL-SCNC: 106 MMOL/L (ref 96–108)
CHOLEST SERPL-MCNC: 201 MG/DL (ref ?–200)
CO2 SERPL-SCNC: 29 MMOL/L (ref 21–32)
CREAT SERPL-MCNC: 0.73 MG/DL (ref 0.6–1.3)
GFR SERPL CREATININE-BSD FRML MDRD: 86 ML/MIN/1.73SQ M
GLUCOSE P FAST SERPL-MCNC: 89 MG/DL (ref 65–99)
HDLC SERPL-MCNC: 76 MG/DL
LDLC SERPL CALC-MCNC: 105 MG/DL (ref 0–100)
POTASSIUM SERPL-SCNC: 4.2 MMOL/L (ref 3.5–5.3)
PROT SERPL-MCNC: 6.3 G/DL (ref 6.4–8.4)
SODIUM SERPL-SCNC: 142 MMOL/L (ref 135–147)
TRIGL SERPL-MCNC: 101 MG/DL (ref ?–150)

## 2025-04-07 PROCEDURE — 36415 COLL VENOUS BLD VENIPUNCTURE: CPT

## 2025-04-07 PROCEDURE — 80061 LIPID PANEL: CPT

## 2025-04-07 PROCEDURE — 80053 COMPREHEN METABOLIC PANEL: CPT

## 2025-04-08 ENCOUNTER — APPOINTMENT (OUTPATIENT)
Dept: PHYSICAL THERAPY | Facility: CLINIC | Age: 65
End: 2025-04-08
Payer: MEDICARE

## 2025-04-08 ENCOUNTER — TELEPHONE (OUTPATIENT)
Dept: PAIN MEDICINE | Facility: MEDICAL CENTER | Age: 65
End: 2025-04-08

## 2025-04-08 NOTE — TELEPHONE ENCOUNTER
Calling to offer Pt a sooner apt with Dr. Oliva on April 10th at 7:15 or 10:15.     *Pt was scheduled as consult, should be [facility] instead*    Pt requested 10:15. Pt original apt canceled. New apt made.     Sandra CHENG

## 2025-04-08 NOTE — PROGRESS NOTES
"Daily Note     Today's date: 2025  Patient name: Cristin Nolan  : 1960  MRN: [id number]  Referring provider: Dipesh Knowles, *  Dx: No diagnosis found.               Subjective: ***      Objective: See treatment diary below      Assessment: Patient tolerated treatment session well.       Plan: Continue per POC. Increase reps/resistance as tolerated.      Precautions: Lt CARO 12/3/24;recent dislocation 24; STRICT POST HIP PRECAUTIONS Limit flex to 80, NO IR        Manuals 3/31 4/3 4/8 2/25 2/27 3/4 3/6 3/11 3/13 3/18 3/20 3/25 3/27   Lt hip rom                                                                Neuro Re-Ed                                                                    3                                                            Ther Ex                Edu on recovery, HEP                Bridges 2x10 5'' DL bridge 20x SL   2x15 ea 2x15  SL bridge 2x15  SL bridge 2x10 SL bridge 2x10 SL bridge 2x10 SL bridge 10x5'' 2x10 SL bridge 2x10 SL bridge                   Prone Hip Ext knee bent 2x10 2x10      2x15 2x15    2x10   Prone Hip IR with band Blue 15x10''       Blue 10x10'' Blue 15x10'' Blue 15x10'' Blue 15x10'' Blue 15x10'' Blue 15x10''   Ball sq prone Hip ER 15x10'' 15x10''      10x10'' 15x10'' 15x10'' 15x10'' 15x10'' 15x10''   Clamshell 20x5'' 20x5''       20x5'' 20x5'' 15x 5'' 20x5'' 20x5''   Standing Hip 3 ways    #3 10x2 u/l UE hold 3# 30x ea u/l UE hold 3# 30x ea u/l UE hold 3# 30x ea u/l UE hold         SLS    5x30''Lt Foam 30\"x3 Foam  30\"x3 Foam  30\"x3         SAQ                Hip ABD iso                Standing HS curl                Bike/Treadmill 8' 1.8mph 8' 1.8mph  2.0 mph 8' 2.0 mph 8' 2.0 mph 8' 2.0 mph 8' 2.0 mph 8' 2.0mph 8' 2.0mph 8' 8' 1.4 mph 8' 1.8 mph 8' 1.8 mph   Side stepping  Black to fatigue Black to fatigue  Blue TB to fatigue Blue TB to fatigue Blue TB to fatigue Blue TB to fatigue at table   Blue TB to fatigue Blue TB to fatigue Blue to " "fatigue Blue to fatigue Black to fatigue   Standing clamshell Black 30xea Black 30xea  Blue TB 30x ea Blue TB 30x ea Blue TB 30x ea Blue TB 30x ea   Blue TB 30x ea Blue 30xea Blue 30xea Black 30x ea   Hip Hike    25x            SLR     2x10 2x10 2x10  20x 20x 20x 20x 20x   Prone Hip Ext                SL hip ABD     2x10 2x10 2x10         Ther Activity                Sit to stands Wall squat 1/4 10x10' Wall squat 1/4 10x10'  10# 10# 2x10 10# 2x10 10# 2x10  Wall squat 1/4 15x10'' Wall squat 1/4 15x10' Wall squat 1/4 10x10' Wall squat 1/4 10x10' Wall squat 1/4 10x10'   HR                Step up 15x 6'' 15x 6''  10x 8'' Rt UE 2x10 6\" 2x10 6\" 2x10 6\"  2x10 6'' 2x10 6'' 15x 6'' 15x 6'' 15x 6''   Lateral step up 15x 6'' 15x 6''  2x10 6'' 2x10 6\" 2x10 6\" 2x10 6\"  2x10 6'' 2x10 6'' 15x 6'' 15x 6'' 15x 6''   Mini squats                Gait Training                Stairs                                Modalities                CP    R s/l w/ pillow between knees 10'                                                                                             "

## 2025-04-09 ENCOUNTER — OFFICE VISIT (OUTPATIENT)
Dept: FAMILY MEDICINE CLINIC | Facility: CLINIC | Age: 65
End: 2025-04-09
Payer: MEDICARE

## 2025-04-09 VITALS
HEIGHT: 62 IN | HEART RATE: 55 BPM | SYSTOLIC BLOOD PRESSURE: 128 MMHG | OXYGEN SATURATION: 100 % | WEIGHT: 214 LBS | DIASTOLIC BLOOD PRESSURE: 76 MMHG | BODY MASS INDEX: 39.38 KG/M2

## 2025-04-09 DIAGNOSIS — D72.819 LEUKOPENIA, UNSPECIFIED TYPE: ICD-10-CM

## 2025-04-09 DIAGNOSIS — E66.01 MORBID OBESITY (HCC): ICD-10-CM

## 2025-04-09 DIAGNOSIS — R73.9 HYPERGLYCEMIA: ICD-10-CM

## 2025-04-09 DIAGNOSIS — C54.1 ENDOMETRIAL CANCER (HCC): ICD-10-CM

## 2025-04-09 DIAGNOSIS — R35.0 URINARY FREQUENCY: ICD-10-CM

## 2025-04-09 DIAGNOSIS — E78.2 MIXED HYPERLIPIDEMIA: Primary | ICD-10-CM

## 2025-04-09 DIAGNOSIS — E55.9 VITAMIN D DEFICIENCY: ICD-10-CM

## 2025-04-09 PROBLEM — M21.751: Status: ACTIVE | Noted: 2024-01-25

## 2025-04-09 PROBLEM — M19.90 ARTHRITIS: Status: ACTIVE | Noted: 2025-04-09

## 2025-04-09 PROBLEM — M21.42 FLAT FEET: Status: ACTIVE | Noted: 2025-04-09

## 2025-04-09 PROBLEM — M21.41 FLAT FEET: Status: ACTIVE | Noted: 2025-04-09

## 2025-04-09 PROBLEM — G57.61 MORTON'S NEUROMA OF RIGHT FOOT: Status: ACTIVE | Noted: 2024-10-28

## 2025-04-09 PROBLEM — R20.2 PARESTHESIAS: Status: RESOLVED | Noted: 2024-01-05 | Resolved: 2025-04-09

## 2025-04-09 LAB
SL AMB  POCT GLUCOSE, UA: NEGATIVE
SL AMB LEUKOCYTE ESTERASE,UA: ABNORMAL
SL AMB POCT BILIRUBIN,UA: NEGATIVE
SL AMB POCT BLOOD,UA: ABNORMAL
SL AMB POCT CLARITY,UA: CLEAR
SL AMB POCT COLOR,UA: YELLOW
SL AMB POCT KETONES,UA: NEGATIVE
SL AMB POCT NITRITE,UA: NEGATIVE
SL AMB POCT PH,UA: 5.5
SL AMB POCT SPECIFIC GRAVITY,UA: 1.03
SL AMB POCT URINE PROTEIN: ABNORMAL
SL AMB POCT UROBILINOGEN: 0.2

## 2025-04-09 PROCEDURE — 87086 URINE CULTURE/COLONY COUNT: CPT | Performed by: PHYSICIAN ASSISTANT

## 2025-04-09 PROCEDURE — 81002 URINALYSIS NONAUTO W/O SCOPE: CPT | Performed by: PHYSICIAN ASSISTANT

## 2025-04-09 PROCEDURE — 99214 OFFICE O/P EST MOD 30 MIN: CPT | Performed by: PHYSICIAN ASSISTANT

## 2025-04-09 PROCEDURE — 87077 CULTURE AEROBIC IDENTIFY: CPT | Performed by: PHYSICIAN ASSISTANT

## 2025-04-09 PROCEDURE — G2211 COMPLEX E/M VISIT ADD ON: HCPCS | Performed by: PHYSICIAN ASSISTANT

## 2025-04-09 PROCEDURE — 87186 SC STD MICRODIL/AGAR DIL: CPT | Performed by: PHYSICIAN ASSISTANT

## 2025-04-09 NOTE — PROGRESS NOTES
Name: Cristin Nolan      : 1960      MRN: [id number]  Encounter Provider: Manisha Ortiz PA-C  Encounter Date: 2025   Encounter department: [facility]  :  Assessment & Plan  Morbid obesity (HCC)  BMI is 39 encouraged to decrease caloric intake and increase activity over time to attain weight loss.  This is difficult for the patient as she does have a plethora of arthritic conditions in her joints.  I would significantly then concentrate more on decreasing calories in the day and using a smart jennifer to track.  Orders:  •  CBC and differential; Future    Endometrial cancer (HCC)  S/p hysterectomy       Mixed hyperlipidemia  Patient is on Lipitor 10 and LDL most recently was 105 with an HDL of 76.  At this point in time I do believe this is satisfactory for this patient without diabetes or heart disease continue recheck 6 months.  Orders:  •  Comprehensive metabolic panel; Future  •  Lipid panel; Future    Hyperglycemia  Fasting glucose was normal at 89.  Orders:  •  Comprehensive metabolic panel; Future    Vitamin D deficiency  Check vitamin D with next labs.  Orders:  •  Vitamin D 25 hydroxy; Future    Leukopenia, unspecified type  Check CBC with next labs.       Urinary frequency  In house urine dip today shows large amount of blood and WBC and protien with neg nitrates. Send out for urine culture.  If positive will treat with antibiotic as per sensitivity of the results however if negative will need to perform ultrasound kidneys and bladder.  Orders:  •  POCT urine dip  •  Urine culture; Future           History of Present Illness   Patient presents with:  Follow-up: Patient present today for a routine follow up. Pt will like to discuss with sarita about urinary incontinence issues.      Cristin Nolan is here for chronic conditions f/u. Pt. had labs done prior to today's visit which included Recent Results (from the past 4 weeks)  -Comprehensive metabolic panel:   Collection  Time: 04/07/25 12:11 PM       Result                      Value             Ref Range           Sodium                      142               135 - 147 mm*       Potassium                   4.2               3.5 - 5.3 mm*       Chloride                    106               96 - 108 mmo*       CO2                         29                21 - 32 mmol*       ANION GAP                   7                 4 - 13 mmol/L       BUN                         27 (H)            5 - 25 mg/dL        Creatinine                  0.73              0.60 - 1.30 *       Glucose, Fasting            89                65 - 99 mg/dL       Calcium                     9.3               8.4 - 10.2 m*       AST                         17                13 - 39 U/L         ALT                         21                7 - 52 U/L          Alkaline Phosphatase        61                34 - 104 U/L        Total Protein               6.3 (L)           6.4 - 8.4 g/*       Albumin                     3.9               3.5 - 5.0 g/*       Total Bilirubin             0.77              0.20 - 1.00 *       eGFR                        86                ml/min/1.73s*  -Lipid Panel with Direct LDL reflex:   Collection Time: 04/07/25 12:11 PM       Result                      Value             Ref Range           Cholesterol                 201 (H)           See Comment *       Triglycerides               101               See Comment *       HDL, Direct                 76                >=50 mg/dL          LDL Calculated              105 (H)           0 - 100 mg/dL        Review of Systems   Constitutional: Negative.    HENT: Negative.     Eyes: Negative.    Respiratory: Negative.     Cardiovascular: Negative.    Gastrointestinal: Negative.    Endocrine: Negative.    Genitourinary: Negative.    Musculoskeletal: Negative.    Skin: Negative.    Allergic/Immunologic: Negative.    Neurological: Negative.    Hematological: Negative.    Psychiatric/Behavioral:  "Negative.         Objective   /76 (BP Location: Left arm, Patient Position: Sitting, Cuff Size: Large)   Pulse 55   Ht 5' 2\" (1.575 m)   Wt 97.1 kg (214 lb)   SpO2 100%   BMI 39.14 kg/m²      Physical Exam  Vitals and nursing note reviewed.   Constitutional:       Appearance: Normal appearance. She is well-developed.   HENT:      Head: Normocephalic and atraumatic.   Eyes:      General: Lids are normal.      Conjunctiva/sclera: Conjunctivae normal.      Pupils: Pupils are equal, round, and reactive to light.   Cardiovascular:      Rate and Rhythm: Normal rate and regular rhythm.      Heart sounds: No murmur heard.  Pulmonary:      Effort: Pulmonary effort is normal.      Breath sounds: Normal breath sounds.   Skin:     General: Skin is warm and dry.   Neurological:      General: No focal deficit present.      Mental Status: She is alert.      Coordination: Coordination is intact.   Psychiatric:         Mood and Affect: Mood normal.         Behavior: Behavior normal. Behavior is cooperative.         Thought Content: Thought content normal.         Judgment: Judgment normal.         "

## 2025-04-09 NOTE — ASSESSMENT & PLAN NOTE
Patient is on Lipitor 10 and LDL most recently was 105 with an HDL of 76.  At this point in time I do believe this is satisfactory for this patient without diabetes or heart disease continue recheck 6 months.  Orders:  •  Comprehensive metabolic panel; Future  •  Lipid panel; Future

## 2025-04-09 NOTE — ASSESSMENT & PLAN NOTE
BMI is 39 encouraged to decrease caloric intake and increase activity over time to attain weight loss.  This is difficult for the patient as she does have a plethora of arthritic conditions in her joints.  I would significantly then concentrate more on decreasing calories in the day and using a smart jennifer to track.  Orders:  •  CBC and differential; Future

## 2025-04-10 ENCOUNTER — APPOINTMENT (OUTPATIENT)
Dept: PHYSICAL THERAPY | Facility: CLINIC | Age: 65
End: 2025-04-10
Payer: MEDICARE

## 2025-04-10 ENCOUNTER — OFFICE VISIT (OUTPATIENT)
Dept: PAIN MEDICINE | Facility: MEDICAL CENTER | Age: 65
End: 2025-04-10
Payer: MEDICARE

## 2025-04-10 VITALS — BODY MASS INDEX: 39.93 KG/M2 | HEIGHT: 62 IN | WEIGHT: 217 LBS

## 2025-04-10 DIAGNOSIS — M54.16 LUMBAR RADICULOPATHY: Primary | ICD-10-CM

## 2025-04-10 PROCEDURE — G2211 COMPLEX E/M VISIT ADD ON: HCPCS | Performed by: PHYSICAL MEDICINE & REHABILITATION

## 2025-04-10 PROCEDURE — 99214 OFFICE O/P EST MOD 30 MIN: CPT | Performed by: PHYSICAL MEDICINE & REHABILITATION

## 2025-04-10 RX ORDER — GABAPENTIN 300 MG/1
300 CAPSULE ORAL 3 TIMES DAILY
Qty: 90 CAPSULE | Refills: 1 | Status: SHIPPED | OUTPATIENT
Start: 2025-04-10 | End: 2025-05-10

## 2025-04-10 NOTE — PROGRESS NOTES
Assessment  1. Lumbar radiculopathy - Right        Plan  At this time an updated MRI of the lumbar spine is warranted as the patient has been experiencing new symptomatology consistent with L5 radiculopathy.  She does have prior MRI at [facility] from February 2024 but her symptoms have changed since that image.  Once the MRI is available for review we will determine further treatment options which may include epidural steroid injection.  We will initiate gabapentin 300 mg nightly and titrate up to 3 times daily dosing based on tolerance.    My impressions and treatment recommendations were discussed in detail with the patient who verbalized understanding and had no further questions.  Discharge instructions were provided. I personally saw and examined the patient and I agree with the above discussed plan of care.    No orders of the defined types were placed in this encounter.    New Medications Ordered This Visit   Medications    gabapentin (NEURONTIN) 300 mg capsule     Sig: Take 1 capsule (300 mg total) by mouth 3 (three) times a day 1 QHS x 3 days, 1 BID x 3 days, then 1 TID     Dispense:  90 capsule     Refill:  1       History of Present Illness    Cristin Nolan is a 65 y.o. female seen as transfer of care regarding back and radiating leg pain at the request of Dr. Knowles.  The patient's been experiencing significant pain in the back and radiating down her right leg over the past few years but the current symptoms are more recent over the past year.  She describes moderate to severe intensity pain rated as a 9/10 which is constant.  Pain is described as cramping shooting numbness sharp pins-and-needles pressure-like throbbing dull and aching.  She describes lower extremity weakness on the right and is ambulating with a cane.    Aggravating factors include standing bending walking and exercise.  Alleviating factors are unknown.    She has had epidural steroid injection recently with Dr. Lindsey at  L2-3.  She states this did not provide any relief.    She did have an EMG study documenting chronic L5 radiculopathy.    She has noted some moderate relief in the past with multiple treatments including injections physical therapy exercise heat or ice application TENS unit and chiropractic manipulation.    Social history negative for tobacco marijuana and alcohol use.    Currently using lidocaine patches acetaminophen and ibuprofen.  She has not tried neuropathic pain medications in the past.    She has had radiofrequency ablation and prior interventional approaches performed at [facility] with Dr. Echols and Dr. Rubalcava.    I have personally reviewed and/or updated the patient's past medical history, past surgical history, family history, social history, current medications, allergies, and vital signs today.     Review of Systems   Constitutional:  Negative for chills and fever.   HENT:  Negative for ear pain, hearing loss, sinus pain and sore throat.    Eyes:  Negative for pain and redness.   Respiratory:  Negative for shortness of breath and wheezing.    Cardiovascular:  Negative for palpitations and leg swelling.   Gastrointestinal:  Negative for abdominal pain, constipation, nausea and vomiting.   Endocrine: Positive for polyuria. Negative for polydipsia.   Genitourinary:  Negative for difficulty urinating and hematuria.   Musculoskeletal:  Positive for arthralgias, back pain, gait problem and myalgias. Negative for joint swelling.   Skin:  Negative for rash.   Neurological:  Positive for weakness and numbness. Negative for dizziness and headaches.   Psychiatric/Behavioral:  Negative for confusion and sleep disturbance. The patient is nervous/anxious.        Patient Active Problem List   Diagnosis    S/P colonoscopy    Endometrial cancer (HCC)    Mixed hyperlipidemia    OA (osteoarthritis) of knee    Pain in joint, multiple sites    Left hip pain    Foot pain, bilateral    Sciatica of right  side    Radicular leg pain    Degenerative disc disease, lumbar    Spinal stenosis of lumbosacral region    Obesity    Chronic pain syndrome    Vitamin D deficiency    Leukopenia    Bursitis of right shoulder    Acute medial meniscus tear of left knee    Rupture of anterior cruciate ligament of left knee    Hyperglycemia    Absence of cervix, acquired    Newly recognized heart murmur    Synovial bursa cyst    Elevated uric acid in blood    Morbid obesity (HCC)    Primary osteoarthritis of one hip, left    Radiculopathy, lumbar region    Status post total replacement of left hip    Lisa's neuroma of right foot    Unequal limb length (acquired), right femur    Flat feet    Arthritis       Past Medical History:   Diagnosis Date    Ambulatory dysfunction     uses walking stick    Anxiety     Arthritis     Cancer ([facility]) 7/15/17    All better now    Chronic pain disorder     Claustrophobia     mild    DDD (degenerative disc disease), lumbar     Endometrial cancer ([facility]) 07/06/2017    tRA TLH BSO SLND on 7/17/17 by Dr. Stone, followed by 3 cycles of vaginal brachytherapy completed in August 2017    Low back pain     Mild acid reflux     OA (osteoarthritis)     marissa knees    Spinal stenosis of lumbosacral region     Wears glasses        Past Surgical History:   Procedure Laterality Date    COLONOSCOPY      HYSTERECTOMY      JOINT REPLACEMENT Bilateral     knees    ORTHOPEDIC SURGERY      PLANTAR FASCIA SURGERY Bilateral     VT ARTHRP ACETBLR/PROX FEM PROSTC AGRFT/ALGRFT Left 12/3/2024    Procedure: ARTHROPLASTY HIP TOTAL (psb same day dc);  Surgeon: Dipesh Knowles MD;  Location:  MAIN OR;  Service: Orthopedics    VT ARTHRP KNE CONDYLE&PLATU MEDIAL&LAT COMPARTMENTS Right 11/30/2020    Procedure: ARTHROPLASTY KNEE TOTAL;  Surgeon: Dipesh Knowles MD;  Location: [facility];  Service: Orthopedics    VT HYSTEROSCOPY BX ENDOMETRIUM&/POLYPC W/WO D&C N/A 02/28/2017    Procedure: DILATATION AND CURETTAGE (D&C) WITH  HYSTEROSCOPY,POLYPECTOMY;  Surgeon: Roberto Anderson MD;  Location: [facility];  Service: Gynecology    REPLACEMENT TOTAL KNEE Left 10/22/2021    SHOULDER SURGERY Left     TONSILLECTOMY      TOTAL ABDOMINAL HYSTERECTOMY W/ BILATERAL SALPINGOOPHORECTOMY Bilateral 07/2017    endometrial cancer, ? stage 1    WISDOM TOOTH EXTRACTION         Family History   Problem Relation Age of Onset    Ovarian cancer Mother 70    Cancer Mother     Esophageal cancer Father     Cancer Father     No Known Problems Maternal Grandmother     No Known Problems Maternal Grandfather     No Known Problems Paternal Grandmother     No Known Problems Paternal Grandfather     No Known Problems Maternal Aunt     No Known Problems Paternal Aunt     Breast cancer Neg Hx     Colon cancer Neg Hx     Uterine cancer Neg Hx        Social History     Occupational History    Not on file   Tobacco Use    Smoking status: Never    Smokeless tobacco: Never    Tobacco comments:     No secondhand smoke exposure   Vaping Use    Vaping status: Never Used   Substance and Sexual Activity    Alcohol use: Not Currently     Comment: very rarely    Drug use: No    Sexual activity: Not Currently     Partners: Male     Birth control/protection: Abstinence       Current Outpatient Medications on File Prior to Visit   Medication Sig    atorvastatin (LIPITOR) 10 mg tablet TAKE 1 TABLET(10 MG) BY MOUTH DAILY    Cholecalciferol (HM Vitamin D3) 100 MCG (4000 UT) CAPS Take 1 capsule (4,000 Units total) by mouth daily    Coenzyme Q10-Vitamin E (QUNOL ULTRA COQ10 PO)     Misc Natural Products (GLUCOSAMINE CHOND CMP TRIPLE PO) Take by mouth    Omega 3-6-9 Fatty Acids (OMEGA 3-6-9 PO) Take 1 capsule by mouth daily    cyanocobalamin (VITAMIN B-12) 1000 MCG tablet Take 1,000 mcg by mouth daily (Patient not taking: Reported on 1/2/2025)     Current Facility-Administered Medications on File Prior to Visit   Medication    lidocaine (XYLOCAINE) 1 % injection 2 mL    triamcinolone  "acetonide (KENALOG-40) 40 mg/mL injection 20 mg       Allergies   Allergen Reactions    Iv Contrast [Iodinated Contrast Media] Hives    Iodine Strong Other (See Comments)    Vitamin C [Ascorbate - Food Allergy]      Queasy stomach, loose stool       Physical Exam    Ht 5' 2\" (1.575 m)   Wt 98.4 kg (217 lb)   BMI 39.69 kg/m²     Constitutional: normal, well developed, well nourished, alert, in no distress and non-toxic and no overt pain behavior.  Eyes: anicteric  HEENT: grossly intact  Neck: supple, symmetric, trachea midline and no masses   Pulmonary:even and unlabored  Cardiovascular:No edema or pitting edema present  Psychiatric:Mood and affect appropriate  Neurologic:Cranial Nerves II-XII grossly intact bilateral lower extremity strength is normal except for right hip flexion graded 3/5 secondary to pain, bilateral lower extremity muscle stretch reflexes are physiologic and symmetric at the knees and ankles, negative straight leg raise from a seated position  Musculoskeletal:normal    Imaging  Study Result    Narrative & Impression         LUMBAR SPINE     INDICATION:   Segmental and somatic dysfunction of lumbar region. Radiculopathy, lumbar region. Segmental and somatic dysfunction of pelvic region. Radiculopathy, lumbosacral region.      COMPARISON:  None.     VIEWS:  XR SPINE LUMBAR 2 OR 3 VIEWS INJURY  Images: 2     FINDINGS:     There are 5 non rib bearing lumbar vertebral bodies.      There is no evidence of acute fracture or destructive osseous lesion.     There is mild anterolisthesis of L4 and L5.      There is moderate multilevel disc space narrowing with spondylosis throughout the lumbar spine worse at L5-S1. There is moderate-severe facet disease also worse at L5-S1..     The pedicles appear intact.     Soft tissues are unremarkable.     IMPRESSION:        Moderate-severe multilevel spondylosis and facet disease worse at L5-S1.  Mild anterolisthesis L4 on L5.     Electronically signed: 06/10/2024 " 03:08 PM Charlie Ac MD

## 2025-04-11 ENCOUNTER — OFFICE VISIT (OUTPATIENT)
Dept: OBGYN CLINIC | Facility: MEDICAL CENTER | Age: 65
End: 2025-04-11
Payer: MEDICARE

## 2025-04-11 ENCOUNTER — HOSPITAL ENCOUNTER (OUTPATIENT)
Facility: MEDICAL CENTER | Age: 65
Discharge: HOME | End: 2025-04-11
Payer: MEDICARE

## 2025-04-11 ENCOUNTER — RESULTS FOLLOW-UP (OUTPATIENT)
Dept: FAMILY MEDICINE CLINIC | Facility: CLINIC | Age: 65
End: 2025-04-11

## 2025-04-11 VITALS — HEIGHT: 62 IN | BODY MASS INDEX: 39.93 KG/M2 | WEIGHT: 217 LBS

## 2025-04-11 DIAGNOSIS — R31.9 URINARY TRACT INFECTION WITH HEMATURIA, SITE UNSPECIFIED: Primary | ICD-10-CM

## 2025-04-11 DIAGNOSIS — N39.0 URINARY TRACT INFECTION WITH HEMATURIA, SITE UNSPECIFIED: Primary | ICD-10-CM

## 2025-04-11 DIAGNOSIS — M54.16 LUMBAR RADICULOPATHY: ICD-10-CM

## 2025-04-11 DIAGNOSIS — Z96.642 STATUS POST TOTAL REPLACEMENT OF LEFT HIP: Primary | ICD-10-CM

## 2025-04-11 LAB
BACTERIA UR CULT: ABNORMAL
BACTERIA UR CULT: ABNORMAL

## 2025-04-11 PROCEDURE — 72148 MRI LUMBAR SPINE W/O DYE: CPT

## 2025-04-11 PROCEDURE — 99213 OFFICE O/P EST LOW 20 MIN: CPT | Performed by: ORTHOPAEDIC SURGERY

## 2025-04-11 RX ORDER — SULFAMETHOXAZOLE AND TRIMETHOPRIM 800; 160 MG/1; MG/1
1 TABLET ORAL EVERY 12 HOURS SCHEDULED
Qty: 14 TABLET | Refills: 0 | Status: SHIPPED | OUTPATIENT
Start: 2025-04-11 | End: 2025-04-18

## 2025-04-11 NOTE — RESULT ENCOUNTER NOTE
Communicated to the pt through her My Chart that she does have a UTI and Rx for antibiotic Bactrim was called to the pharmacy to take as directed.

## 2025-04-11 NOTE — PROGRESS NOTES
"Orthopaedic Surgery - Office Note  Cristin Nolan (65 y.o. female)   : 1960   MRN: [id number]  Encounter Date: 2025    Assessment / Plan    S/P Left CARO performed on 12/3/2024, with susequent dislocation on 2025 and continued pain likely attributed to Lumbar spine/weak Abductors   Discussed we will have better answers once MRI of Lumbar spine is completed. MRI is scheduled for later today.  DC PT until MRI is completed. Consult with spine and pain on returning.  All patients questions and concerns addressed at today's visit.  Follow-up:  Return for follow up with Dr. Knowles onc MRI results are obtained..      Chief Complaint / Date of Onset  Left hip OA  Injury Mechanism / Date  Post operative dislocation on 2025  Surgery / Date  L THR with Dr. Knowles on2024    History of Present Illness   Cristin Nolan is a 65 y.o. female who presents for 4 month follow up evaluation of the above listed procedure. She states that she has not had any improvement. She states that she is still experiencing pain that radiates down to her feet. She states that she has an MRI of her lumbar spine scheduled for today.     Treatment Summary  Medications / Modalities  Advil  prn  Bracing / Immobilization  None  Physical Therapy  Began post op on 12/10/2024   Injections  None post op  Prior Surgeries  Left total knee replacement 10/21/2021 at [place]  Right total knee replacement on 2020 with Dr. Knowles     other Treatments  None     Employment / Current Status  N/a     Sport / Organization / Current Status  Hydro-biking      Review of Systems  Pertinent items are noted in HPI.  All other systems were reviewed and are negative.      Physical Exam  Ht 5' 2\" (1.575 m)   Wt 98.4 kg (217 lb)   BMI 39.69 kg/m²   Cons: Appears well.  No apparent distress.  Psych: Alert. Oriented x3.  Mood and affect normal.  Eyes: PERRLA, EOMI  Resp: Normal effort.  No audible wheezing or stridor.  CV: Palpable pulse. "  No discernable arrhythmia.  No LE edema.  Lymph:  No palpable cervical, axillary, or inguinal lymphadenopathy.  Skin: Warm.  No palpable masses.  No visible lesions.  Neuro: Normal muscle tone.  Normal and symmetric DTR's.     Left Hip Exam  Alignment / Posture:  Normal resting hip posture. Normal knee alignment.  Inspection:  No swelling. No edema. No erythema. Incision healed.  Palpation:   Gluteal and SI tenderness. No effusion. No warmth. No crepitus.  ROM:  Hip Flexion 90.  Normal knee ROM.  Strength:  Quadriceps 5-/5. Hamstrings 5-/5.  Stability:  No objective hip instability.  Tests:  No pertinent positive or negative tests.  Neurovascular:  Sensation intact in DP/SP/Gill/Sa/T nerve distributions. 2+ DP & PT pulses.  Gait:  Steady with cane.       Studies Reviewed  No studies to review      Procedures  No procedures today.    Medical, Surgical, Family, and Social History  The patient's medical history, family history, and social history, were reviewed and updated as appropriate.    Past Medical History:   Diagnosis Date    Ambulatory dysfunction     uses walking stick    Anxiety     Arthritis     Cancer (HCC) 7/15/17    All better now    Chronic pain disorder     Claustrophobia     mild    DDD (degenerative disc disease), lumbar     Endometrial cancer (HCC) 07/06/2017    tRA TLH BSO SLND on 7/17/17 by Dr. Stone, followed by 3 cycles of vaginal brachytherapy completed in August 2017    Low back pain     Mild acid reflux     OA (osteoarthritis)     marissa knees    Spinal stenosis of lumbosacral region     Wears glasses        Past Surgical History:   Procedure Laterality Date    COLONOSCOPY      HYSTERECTOMY      JOINT REPLACEMENT Bilateral     knees    ORTHOPEDIC SURGERY      PLANTAR FASCIA SURGERY Bilateral     DC ARTHRP ACETBLR/PROX FEM PROSTC AGRFT/ALGRFT Left 12/3/2024    Procedure: ARTHROPLASTY HIP TOTAL (psb same day dc);  Surgeon: Dipesh Knowles MD;  [facility];  Service: Orthopedics     NE ARTHRP KNE CONDYLE&PLATU MEDIAL&LAT COMPARTMENTS Right 11/30/2020    Procedure: ARTHROPLASTY KNEE TOTAL;  Surgeon: Dipesh Knowles MD;  [facility];  Service: Orthopedics    NE HYSTEROSCOPY BX ENDOMETRIUM&/POLYPC W/WO D&C N/A 02/28/2017    Procedure: DILATATION AND CURETTAGE (D&C) WITH HYSTEROSCOPY,POLYPECTOMY;  Surgeon: Roberto Anderson MD;  [facility];  Service: Gynecology    REPLACEMENT TOTAL KNEE Left 10/22/2021    SHOULDER SURGERY Left     TONSILLECTOMY      TOTAL ABDOMINAL HYSTERECTOMY W/ BILATERAL SALPINGOOPHORECTOMY Bilateral 07/2017    endometrial cancer, ? stage 1    WISDOM TOOTH EXTRACTION         Family History   Problem Relation Age of Onset    Ovarian cancer Mother 70    Cancer Mother     Esophageal cancer Father     Cancer Father     No Known Problems Maternal Grandmother     No Known Problems Maternal Grandfather     No Known Problems Paternal Grandmother     No Known Problems Paternal Grandfather     No Known Problems Maternal Aunt     No Known Problems Paternal Aunt     Breast cancer Neg Hx     Colon cancer Neg Hx     Uterine cancer Neg Hx        Social History     Occupational History    Not on file   Tobacco Use    Smoking status: Never    Smokeless tobacco: Never    Tobacco comments:     No secondhand smoke exposure   Vaping Use    Vaping status: Never Used   Substance and Sexual Activity    Alcohol use: Not Currently     Comment: very rarely    Drug use: No    Sexual activity: Not Currently     Partners: Male     Birth control/protection: Abstinence       Allergies   Allergen Reactions    Iv Contrast [Iodinated Contrast Media] Hives    Iodine Strong Other (See Comments)    Vitamin C [Ascorbate - Food Allergy]      Queasy stomach, loose stool         Current Outpatient Medications:     atorvastatin (LIPITOR) 10 mg tablet, TAKE 1 TABLET(10 MG) BY MOUTH DAILY, Disp: 90 tablet, Rfl: 1    Cholecalciferol (HM Vitamin D3) 100 MCG (4000 UT) CAPS, Take 1 capsule (4,000 Units  total) by mouth daily, Disp: 30 capsule, Rfl: 0    Coenzyme Q10-Vitamin E (QUNOL ULTRA COQ10 PO), , Disp: , Rfl:     gabapentin (NEURONTIN) 300 mg capsule, Take 1 capsule (300 mg total) by mouth 3 (three) times a day 1 QHS x 3 days, 1 BID x 3 days, then 1 TID, Disp: 90 capsule, Rfl: 1    Misc Natural Products (GLUCOSAMINE CHOND CMP TRIPLE PO), Take by mouth, Disp: , Rfl:     Omega 3-6-9 Fatty Acids (OMEGA 3-6-9 PO), Take 1 capsule by mouth daily, Disp: , Rfl:     cyanocobalamin (VITAMIN B-12) 1000 MCG tablet, Take 1,000 mcg by mouth daily (Patient not taking: Reported on 1/2/2025), Disp: , Rfl:     Current Facility-Administered Medications:     lidocaine (XYLOCAINE) 1 % injection 2 mL, 2 mL, Injection, , Harjeet Black, ZIGGYM, 2 mL at 05/20/22 1445    triamcinolone acetonide (KENALOG-40) 40 mg/mL injection 20 mg, 20 mg, Intra-articular, , Harjeet Black, DPM, 20 mg at 05/20/22 1445      Basilio Lacy    Scribe Attestation      I,:  Basilio Lacy am acting as a scribe while in the presence of the attending physician.:       I,:  Dipesh Knowles MD personally performed the services described in this documentation    as scribed in my presence.:

## 2025-04-14 ENCOUNTER — RESULTS FOLLOW-UP (OUTPATIENT)
Dept: PAIN MEDICINE | Facility: MEDICAL CENTER | Age: 65
End: 2025-04-14

## 2025-04-15 NOTE — TELEPHONE ENCOUNTER
Caller: Patient    Doctor: Dr. HUNT    Reason for call: Would like to speak with RN  Would like to go over her MRI again.   Patient stated she was just waking up and to much info was given at once    Please advise  Call back#:

## 2025-04-15 NOTE — RESULT ENCOUNTER NOTE
Procedure scheduled 4/16/25    Reviewed instructions: , NPO 1 hour prior, loose-fitting/comfortable pants, if ill/fever/infx/abx to call and reschedule.  No immunizations or vaccinations 2 days prior/after steroid injections. Patient stated verbal understanding.      Patient would like another call to explain MRI results.

## 2025-04-16 ENCOUNTER — HOSPITAL ENCOUNTER (OUTPATIENT)
Dept: RADIOLOGY | Facility: MEDICAL CENTER | Age: 65
Discharge: HOME | End: 2025-04-16
Attending: PHYSICAL MEDICINE & REHABILITATION

## 2025-04-16 DIAGNOSIS — M54.16 LUMBAR RADICULOPATHY: ICD-10-CM

## 2025-04-16 NOTE — PROGRESS NOTES
Pt on Bactrim ordered 4/11 for 7 days for UTI. JE aware and cancelled procedure.  Pt verbalized understanding.  Pt will call next week about rescheduling.

## 2025-04-17 DIAGNOSIS — R35.0 URINARY FREQUENCY: Primary | ICD-10-CM

## 2025-04-19 ENCOUNTER — APPOINTMENT (OUTPATIENT)
Dept: LAB | Facility: MEDICAL CENTER | Age: 65
End: 2025-04-19
Payer: MEDICARE

## 2025-04-19 ENCOUNTER — RESULTS FOLLOW-UP (OUTPATIENT)
Dept: FAMILY MEDICINE CLINIC | Facility: CLINIC | Age: 65
End: 2025-04-19

## 2025-04-19 DIAGNOSIS — R35.0 URINARY FREQUENCY: ICD-10-CM

## 2025-04-19 LAB
BACTERIA UR QL AUTO: ABNORMAL /HPF
BILIRUB UR QL STRIP: NEGATIVE
CLARITY UR: CLEAR
COLOR UR: ABNORMAL
GLUCOSE UR STRIP-MCNC: NEGATIVE MG/DL
HGB UR QL STRIP.AUTO: NEGATIVE
KETONES UR STRIP-MCNC: NEGATIVE MG/DL
LEUKOCYTE ESTERASE UR QL STRIP: NEGATIVE
NITRITE UR QL STRIP: NEGATIVE
NON-SQ EPI CELLS URNS QL MICRO: ABNORMAL /HPF
PH UR STRIP.AUTO: 6 [PH]
PROT UR STRIP-MCNC: ABNORMAL MG/DL
RBC #/AREA URNS AUTO: ABNORMAL /HPF
SP GR UR STRIP.AUTO: 1.01 (ref 1–1.03)
UROBILINOGEN UR STRIP-ACNC: <2 MG/DL
WBC #/AREA URNS AUTO: ABNORMAL /HPF

## 2025-04-19 PROCEDURE — 87086 URINE CULTURE/COLONY COUNT: CPT

## 2025-04-19 PROCEDURE — 81001 URINALYSIS AUTO W/SCOPE: CPT

## 2025-04-20 LAB — BACTERIA UR CULT: NORMAL

## 2025-04-21 ENCOUNTER — OFFICE VISIT (OUTPATIENT)
Dept: OBGYN CLINIC | Facility: MEDICAL CENTER | Age: 65
End: 2025-04-21
Payer: MEDICARE

## 2025-04-21 VITALS — HEIGHT: 62 IN | WEIGHT: 217 LBS | BODY MASS INDEX: 39.93 KG/M2

## 2025-04-21 DIAGNOSIS — Z96.642 STATUS POST TOTAL REPLACEMENT OF LEFT HIP: Primary | ICD-10-CM

## 2025-04-21 DIAGNOSIS — M54.16 LUMBAR RADICULOPATHY: ICD-10-CM

## 2025-04-21 DIAGNOSIS — M19.011 PRIMARY OSTEOARTHRITIS OF RIGHT SHOULDER: ICD-10-CM

## 2025-04-21 PROCEDURE — 99214 OFFICE O/P EST MOD 30 MIN: CPT | Performed by: ORTHOPAEDIC SURGERY

## 2025-04-21 PROCEDURE — 20610 DRAIN/INJ JOINT/BURSA W/O US: CPT | Performed by: ORTHOPAEDIC SURGERY

## 2025-04-21 RX ORDER — IBUPROFEN 200 MG
200 TABLET ORAL EVERY 6 HOURS PRN
COMMUNITY

## 2025-04-21 RX ORDER — METHYLPREDNISOLONE ACETATE 40 MG/ML
1 INJECTION, SUSPENSION INTRA-ARTICULAR; INTRALESIONAL; INTRAMUSCULAR; SOFT TISSUE
Status: COMPLETED | OUTPATIENT
Start: 2025-04-21 | End: 2025-04-21

## 2025-04-21 RX ORDER — BUPIVACAINE HYDROCHLORIDE 2.5 MG/ML
4 INJECTION, SOLUTION INFILTRATION; PERINEURAL
Status: COMPLETED | OUTPATIENT
Start: 2025-04-21 | End: 2025-04-21

## 2025-04-21 RX ADMIN — BUPIVACAINE HYDROCHLORIDE 4 ML: 2.5 INJECTION, SOLUTION INFILTRATION; PERINEURAL at 14:00

## 2025-04-21 RX ADMIN — METHYLPREDNISOLONE ACETATE 1 ML: 40 INJECTION, SUSPENSION INTRA-ARTICULAR; INTRALESIONAL; INTRAMUSCULAR; SOFT TISSUE at 14:00

## 2025-04-21 NOTE — PROGRESS NOTES
Orthopaedic Surgery - Office Note  Cristin Nolan (65 y.o. female)   : 1960   MRN: [id number]  Encounter Date: 2025    Assessment / Plan    Status post left total hip arthroplasty performed on 12/3/2024 with subsequent dislocation on 2025  Lumbar degeneration as noted in MRI -being treated by Dr. Oliva  Right glenohumeral joint osteoarthritis    CSI of right glenohumeral joint was performed  Lumbar injection scheduled for tomorrow 2025 with Dr. Dee. Discussed that there are multiple areas on her MRI that show changes and she should discuss this with him  Activity as tolerated  Continue use of cane and walker as needed  Discussed possibly resuming PT after Lumbar injection  Home exercise program reviewed  Anti-inflammatories or Tylenol prn pain  Follow-up:  No follow-ups on file.      Chief Complaint / Date of Onset  Left hip osteoarthritis  Right shoulder osteoarthritis  Lumbar radiculopathy  Injury Mechanism / Date  Postoperative dislocation on 2025  Surgery / Date  Left total hip arthroplasty with Dr. Knowles on 12/3/2024    History of Present Illness   Cristin Nolan is a 65 y.o. female who presents for follow-up evaluation 4-1/2 months status post left total hip arthroplasty performed on 12/3/2024 with subsequent dislocation on 2025.  In regards to her left hip she has been having ongoing gluteal and lateral hip pain.  She states that it is difficult to describe if it is numb/burning/painful as it does cause her significant discomfort.  She mentions that she did have a injection with Dr. Oliva scheduled, however she did have a recent UTI and needed to reschedule for tomorrow 2025.    In regards to her right shoulder she states that she continues to have diffuse pain that causes stiffness and limited motion.  She mentions that the previous cortisone injection that she had on her shoulder in 2024 did provide her significant improvement of her symptoms and wishes  "to repeat this at today's visit.    Treatment Summary  Medications / Modalities  Advil as needed  Bracing / Immobilization  None  Physical Therapy  Began postop 12/10/2024  Injections  Prior right glenohumeral injection on 12/16/2024  Prior Surgeries  See above  Left total knee arthroplasty on 10/21/2021 at [place]  Right total knee arthroplasty on 11/30/2020 with Dr. Knowles  Other Treatments  See above    Employment / Current Status  N/A    Sport / Organization / Current Status  N/A      Review of Systems  Pertinent items are noted in HPI.  All other systems were reviewed and are negative.      Physical Exam  Ht 5' 2\" (1.575 m)   Wt 98.4 kg (217 lb)   BMI 39.69 kg/m²   Cons: Appears well.  No apparent distress.  Psych: Alert. Oriented x3.  Mood and affect normal.  Eyes: PERRLA, EOMI  Resp: Normal effort.  No audible wheezing or stridor.  CV: Palpable pulse.  No discernable arrhythmia.  No LE edema.  Lymph:  No palpable cervical, axillary, or inguinal lymphadenopathy.  Skin: Warm.  No palpable masses.  No visible lesions.  Neuro: Normal muscle tone.  Normal and symmetric DTR's.         Right shoulder Exam  Alignment / Posture:  Normal shoulder posture. Normal scapular position.  Inspection:  No swelling.  Palpation:   Anterior tenderness at right shoulder.  ROM: Right Shoulder . Shoulder ER 30.  Left shoulder  degrees, external rotation 60 degrees  Strength:  4/5 supraspinatus, infraspinatus, and subscapularis.  Stability:  No objective shoulder instability.  Tests: No pertinent positive or negative tests.  Neurovascular:  Sensation intact in Ax/R/M/U nerve distributions. 2+ radial pulse.        Studies Reviewed  I have personally reviewed pertinent films in PACS.  XR of right shoulder - Performed on 2/5/2025 demonstrates severe glenohumeral joint osteoarthritis  XR of left hip - Performed on 2/21/2025 demonstrates stable appearance of CARO.  MRI of lumbar spine - Performed on 4/11/2025 demonstrates " "Multilevel degenerative changes with mass effect on the thecal sac at L2-L3.      Large joint arthrocentesis: R glenohumeral  Waldorf Protocol:  Consent: Verbal consent obtained.  Risks and benefits: risks, benefits and alternatives were discussed  Consent given by: patient  Time out: Immediately prior to procedure a \"time out\" was called to verify the correct patient, procedure, equipment, support staff and site/side marked as required.  Timeout called at: 4/21/2025 2:25 PM.  Patient understanding: patient states understanding of the procedure being performed  Patient consent: the patient's understanding of the procedure matches consent given  Site marked: the operative site was marked  Patient identity confirmed: verbally with patient  Supporting Documentation  Indications: pain and diagnostic evaluation     Is this a Visco injection? NoProcedure Details  Location: shoulder - R glenohumeral  Needle size: 22 G  Ultrasound guidance: no  Approach: anterior  Medications administered: 4 mL bupivacaine 0.25 %; 1 mL methylPREDNISolone acetate 40 mg/mL    Patient tolerance: patient tolerated the procedure well with no immediate complications  Dressing:  Sterile dressing applied             Medical, Surgical, Family, and Social History  The patient's medical history, family history, and social history, were reviewed and updated as appropriate.    Past Medical History:   Diagnosis Date    Ambulatory dysfunction     uses walking stick    Anxiety     Arthritis     Cancer (HCC) 7/15/17    All better now    Chronic pain disorder     Claustrophobia     mild    DDD (degenerative disc disease), lumbar     Endometrial cancer (HCC) 07/06/2017    tRA TLH BSO SLND on 7/17/17 by Dr. Stone, followed by 3 cycles of vaginal brachytherapy completed in August 2017    Low back pain     Mild acid reflux     OA (osteoarthritis)     marissa knees    Spinal stenosis of lumbosacral region     Wears glasses        Past Surgical History: "   Procedure Laterality Date    COLONOSCOPY      HYSTERECTOMY      JOINT REPLACEMENT Bilateral     knees    ORTHOPEDIC SURGERY      PLANTAR FASCIA SURGERY Bilateral     MA ARTHRP ACETBLR/PROX FEM PROSTC AGRFT/ALGRFT Left 12/3/2024    Procedure: ARTHROPLASTY HIP TOTAL (psb same day dc);  Surgeon: Dipesh Knowles MD;  [facility];  Service: Orthopedics    MA ARTHRP KNE CONDYLE&PLATU MEDIAL&LAT COMPARTMENTS Right 11/30/2020    Procedure: ARTHROPLASTY KNEE TOTAL;  Surgeon: Dipesh Knowles MD;  [facility];  Service: Orthopedics    MA HYSTEROSCOPY BX ENDOMETRIUM&/POLYPC W/WO D&C N/A 02/28/2017    Procedure: DILATATION AND CURETTAGE (D&C) WITH HYSTEROSCOPY,POLYPECTOMY;  Surgeon: Roberto Anderson MD;  [facility];  Service: Gynecology    REPLACEMENT TOTAL KNEE Left 10/22/2021    SHOULDER SURGERY Left     TONSILLECTOMY      TOTAL ABDOMINAL HYSTERECTOMY W/ BILATERAL SALPINGOOPHORECTOMY Bilateral 07/2017    endometrial cancer, ? stage 1    WISDOM TOOTH EXTRACTION         Family History   Problem Relation Age of Onset    Ovarian cancer Mother 70    Cancer Mother     Esophageal cancer Father     Cancer Father     No Known Problems Maternal Grandmother     No Known Problems Maternal Grandfather     No Known Problems Paternal Grandmother     No Known Problems Paternal Grandfather     No Known Problems Maternal Aunt     No Known Problems Paternal Aunt     Breast cancer Neg Hx     Colon cancer Neg Hx     Uterine cancer Neg Hx        Social History     Occupational History    Not on file   Tobacco Use    Smoking status: Never    Smokeless tobacco: Never    Tobacco comments:     No secondhand smoke exposure   Vaping Use    Vaping status: Never Used   Substance and Sexual Activity    Alcohol use: Not Currently     Comment: very rarely    Drug use: No    Sexual activity: Not Currently     Partners: Male     Birth control/protection: Abstinence       Allergies   Allergen Reactions    Iv Contrast  [Iodinated Contrast Media] Hives    Iodine Strong Other (See Comments)    Vitamin C [Ascorbate - Food Allergy]      Queasy stomach, loose stool         Current Outpatient Medications:     atorvastatin (LIPITOR) 10 mg tablet, TAKE 1 TABLET(10 MG) BY MOUTH DAILY, Disp: 90 tablet, Rfl: 1    Cholecalciferol (HM Vitamin D3) 100 MCG (4000 UT) CAPS, Take 1 capsule (4,000 Units total) by mouth daily, Disp: 30 capsule, Rfl: 0    Coenzyme Q10-Vitamin E (QUNOL ULTRA COQ10 PO), , Disp: , Rfl:     gabapentin (NEURONTIN) 300 mg capsule, Take 1 capsule (300 mg total) by mouth 3 (three) times a day 1 QHS x 3 days, 1 BID x 3 days, then 1 TID, Disp: 90 capsule, Rfl: 1    Misc Natural Products (GLUCOSAMINE CHOND CMP TRIPLE PO), Take by mouth, Disp: , Rfl:     Omega 3-6-9 Fatty Acids (OMEGA 3-6-9 PO), Take 1 capsule by mouth daily, Disp: , Rfl:     cyanocobalamin (VITAMIN B-12) 1000 MCG tablet, Take 1,000 mcg by mouth daily (Patient not taking: Reported on 1/2/2025), Disp: , Rfl:     Current Facility-Administered Medications:     lidocaine (XYLOCAINE) 1 % injection 2 mL, 2 mL, Injection, , Harjeet Black, DPBADNAR, 2 mL at 05/20/22 1445    triamcinolone acetonide (KENALOG-40) 40 mg/mL injection 20 mg, 20 mg, Intra-articular, , Harjeet Black DPBANDAR, 20 mg at 05/20/22 1445      Kaz Patton    Scribe Attestation      I,:  Kaz Patton am acting as a scribe while in the presence of the attending physician.:       I,:  Dipesh Knowles MD personally performed the services described in this documentation    as scribed in my presence.:

## 2025-04-22 ENCOUNTER — HOSPITAL ENCOUNTER (OUTPATIENT)
Dept: RADIOLOGY | Facility: MEDICAL CENTER | Age: 65
Discharge: HOME | End: 2025-04-22
Attending: PHYSICAL MEDICINE & REHABILITATION
Payer: MEDICARE

## 2025-04-22 VITALS
RESPIRATION RATE: 16 BRPM | OXYGEN SATURATION: 96 % | DIASTOLIC BLOOD PRESSURE: 77 MMHG | SYSTOLIC BLOOD PRESSURE: 169 MMHG | HEART RATE: 57 BPM | TEMPERATURE: 98 F

## 2025-04-22 PROCEDURE — 64483 NJX AA&/STRD TFRM EPI L/S 1: CPT | Performed by: PHYSICAL MEDICINE & REHABILITATION

## 2025-04-22 PROCEDURE — A9585 GADOBUTROL INJECTION: HCPCS | Performed by: PHYSICAL MEDICINE & REHABILITATION

## 2025-04-22 RX ORDER — PAPAVERINE HCL 150 MG
10 CAPSULE, EXTENDED RELEASE ORAL ONCE
Status: COMPLETED | OUTPATIENT
Start: 2025-04-22 | End: 2025-04-22

## 2025-04-22 RX ORDER — GADOBUTROL 604.72 MG/ML
1 INJECTION INTRAVENOUS ONCE
Status: COMPLETED | OUTPATIENT
Start: 2025-04-22 | End: 2025-04-22

## 2025-04-22 RX ADMIN — DEXAMETHASONE SODIUM PHOSPHATE 10 MG: 10 INJECTION INTRAMUSCULAR; INTRAVENOUS at 11:25

## 2025-04-22 RX ADMIN — GADOBUTROL 1 ML: 604.72 INJECTION INTRAVENOUS at 11:25

## 2025-04-22 NOTE — H&P
History of Present Illness: The patient is a 65 y.o. female who presents with complaints of right back and leg pain    Past Medical History:   Diagnosis Date    Ambulatory dysfunction     uses walking stick    Anxiety     Arthritis     Cancer (HCC) 7/15/17    All better now    Chronic pain disorder     Claustrophobia     mild    DDD (degenerative disc disease), lumbar     Endometrial cancer (HCC) 07/06/2017    tRA TLH BSO SLND on 7/17/17 by Dr. Stone, followed by 3 cycles of vaginal brachytherapy completed in August 2017    Low back pain     Mild acid reflux     OA (osteoarthritis)     marissa knees    Spinal stenosis of lumbosacral region     Wears glasses        Past Surgical History:   Procedure Laterality Date    COLONOSCOPY      HYSTERECTOMY      JOINT REPLACEMENT Bilateral     knees    ORTHOPEDIC SURGERY      PLANTAR FASCIA SURGERY Bilateral     FL ARTHRP ACETBLR/PROX FEM PROSTC AGRFT/ALGRFT Left 12/3/2024    Procedure: ARTHROPLASTY HIP TOTAL (psb same day dc);  Surgeon: Dipesh Knowles MD;  Location:  [facility] OR;  Service: Orthopedics    FL ARTHRP KNE CONDYLE&PLATU MEDIAL&LAT COMPARTMENTS Right 11/30/2020    Procedure: ARTHROPLASTY KNEE TOTAL;  Surgeon: Dipesh Knowles MD;  [facility];  Service: Orthopedics    FL HYSTEROSCOPY BX ENDOMETRIUM&/POLYPC W/WO D&C N/A 02/28/2017    Procedure: DILATATION AND CURETTAGE (D&C) WITH HYSTEROSCOPY,POLYPECTOMY;  Surgeon: Roberto Anderson MD;  [facility];  Service: Gynecology    REPLACEMENT TOTAL KNEE Left 10/22/2021    SHOULDER SURGERY Left     TONSILLECTOMY      TOTAL ABDOMINAL HYSTERECTOMY W/ BILATERAL SALPINGOOPHORECTOMY Bilateral 07/2017    endometrial cancer, ? stage 1    WISDOM TOOTH EXTRACTION           Current Outpatient Medications:     atorvastatin (LIPITOR) 10 mg tablet, TAKE 1 TABLET(10 MG) BY MOUTH DAILY, Disp: 90 tablet, Rfl: 1    Cholecalciferol (HM Vitamin D3) 100 MCG (4000 UT) CAPS, Take 1 capsule (4,000 Units total) by mouth  daily, Disp: 30 capsule, Rfl: 0    Coenzyme Q10-Vitamin E (QUNOL ULTRA COQ10 PO), , Disp: , Rfl:     cyanocobalamin (VITAMIN B-12) 1000 MCG tablet, Take 1,000 mcg by mouth daily (Patient not taking: Reported on 1/2/2025), Disp: , Rfl:     gabapentin (NEURONTIN) 300 mg capsule, Take 1 capsule (300 mg total) by mouth 3 (three) times a day 1 QHS x 3 days, 1 BID x 3 days, then 1 TID (Patient not taking: Reported on 4/21/2025), Disp: 90 capsule, Rfl: 1    ibuprofen (MOTRIN) 200 mg tablet, Take 200 mg by mouth every 6 (six) hours as needed for mild pain 3 pills every 4 hours, Disp: , Rfl:     Misc Natural Products (GLUCOSAMINE CHOND CMP TRIPLE PO), Take by mouth, Disp: , Rfl:     Omega 3-6-9 Fatty Acids (OMEGA 3-6-9 PO), Take 1 capsule by mouth daily, Disp: , Rfl:     Current Facility-Administered Medications:     dexamethasone (PF) (DECADRON) injection 10 mg, 10 mg, Epidural, Once, Nicholas Oliva DO    Gadobutrol injection (SINGLE-DOSE) SOLN 1 mL, 1 mL, Other, Once, Nicholas Oliva DO    lidocaine (XYLOCAINE) 1 % injection 2 mL, 2 mL, Injection, , Harjeet Black DPM, 2 mL at 05/20/22 1445    triamcinolone acetonide (KENALOG-40) 40 mg/mL injection 20 mg, 20 mg, Intra-articular, , Harjeet Black DPM, 20 mg at 05/20/22 1445    Allergies   Allergen Reactions    Iv Contrast [Iodinated Contrast Media] Hives    Iodine Strong Other (See Comments)    Vitamin C [Ascorbate - Food Allergy]      Queasy stomach, loose stool       Physical Exam:   Vitals:    04/22/25 1114   BP: 165/88   Pulse: 56   Resp: 16   Temp: 98 °F (36.7 °C)   SpO2: 97%     General: Awake, Alert, Oriented x 3, Mood and affect appropriate  Respiratory: Respirations even and unlabored  Cardiovascular: Peripheral pulses intact; no edema  Musculoskeletal Exam: right back and leg pain    ASA Score: 3    Patient/Chart Verification  Patient ID Verified: Verbal  Consents Confirmed: To be obtained in the Procedural area  H&P( within 30 days) Verified: To be obtained in the  Procedural area  Allergies Reviewed: Yes  Anticoag/NSAID held?: NA  Currently on antibiotics?: No  Pregnancy denied?: NA    Assessment: No diagnosis found.    Plan: (R) L5-S1 TFESI ([id number])

## 2025-04-22 NOTE — DISCHARGE INSTR - LAB
Epidural Steroid Injection   WHAT YOU NEED TO KNOW:   An epidural steroid injection (ANAHY) is a procedure to inject steroid medicine into the epidural space. The epidural space is between your spinal cord and vertebrae. Steroids reduce inflammation and fluid buildup in your spine that may be causing pain. You may be given pain medicine along with the steroids.          ACTIVITY  Do not drive or operate machinery today.  No strenuous activity today - bending, lifting, etc.  You may resume normal activites starting tomorrow - start slowly and as tolerated.  You may shower today, but no tub baths or hot tubs.  You may have numbness for several hours from the local anesthetic. Please use caution and common sense, especially with weight-bearing activities.    CARE OF THE INJECTION SITE  If you have soreness or pain, apply ice to the area today (20 minutes on/20 minutes off).  Starting tomorrow, you may use warm, moist heat or ice if needed.  You may have an increase or change in your discomfort for 36-48 hours after your treatment.  Apply ice and continue with any pain medication you have been prescribed.  Notify the [facility] if you have any of the following: redness, drainage, swelling, headache, stiff neck or fever above 100°F.    SPECIAL INSTRUCTIONS  Our office will contact you in approximately 14 days for a progress report.    MEDICATIONS  Continue to take all routine medications.  Our office may have instructed you to hold some medications.    As no general anesthesia was used in today's procedure, you should not experience any side effects related to anesthesia.     If you are diabetic, the steroids used in today's injection may temporarily increase your blood sugar levels after the first few days after your injection. Please keep a close eye on your sugars and alert the doctor who manages your diabetes if your sugars are significantly high from your baseline or you are symptomatic.     If you have a  problem specifically related to your procedure, please call our office at [phone].  Problems not related to your procedure should be directed to your primary care physician.

## 2025-04-24 ENCOUNTER — TELEPHONE (OUTPATIENT)
Dept: PAIN MEDICINE | Facility: CLINIC | Age: 65
End: 2025-04-24

## 2025-04-24 NOTE — TELEPHONE ENCOUNTER
S/W pt.  Pt s/p right L5-S1 TFESI on 4/22.  Advised her the steroids kick in on days 3-5 and takes up to 2 weeks to see the full effect. She has pain in right lower back and shooting down her right leg.  Pain is a 8-9/10.  Advised the pain can increase or change 36-48 hrs after the injection.  Pt stated she can take tylenol.  Advised her to take tylenol 1,000 mg 3x/day.  Pt stated she can take NSAIDs.  Advised to take Advil or Ibuprofen and alternate it with the tylenol and to follow the directions on the bottle.  Pt is using ice and heat.  Pt denies S&S of infection and denies fevers.  Advised to give the injection more time to work and injections affect everyone differently.  Pt verbalized understanding.

## 2025-04-24 NOTE — TELEPHONE ENCOUNTER
Caller: Alexa     Doctor: Dr. Oliva    Reason for call: patient states her pain is still shooting down her back, and hips she feels the same, what else can she can do now unable to sleep or get comfortable. Please advise.    Call back#: [phone]

## 2025-05-06 ENCOUNTER — TELEPHONE (OUTPATIENT)
Dept: PAIN MEDICINE | Facility: CLINIC | Age: 65
End: 2025-05-06

## 2025-05-06 DIAGNOSIS — M79.18 MYOFASCIAL PAIN SYNDROME: Primary | ICD-10-CM

## 2025-05-06 RX ORDER — BACLOFEN 10 MG/1
5 TABLET ORAL 3 TIMES DAILY
Qty: 45 TABLET | Refills: 0 | Status: SHIPPED | OUTPATIENT
Start: 2025-05-06

## 2025-05-06 NOTE — TELEPHONE ENCOUNTER
Patient reports 0-25% improvement post inj  Pain level 9/10    Patient would like to discuss next steps.

## 2025-05-06 NOTE — TELEPHONE ENCOUNTER
S/w pt. Pt had a right L5-S1 transforaminal epidural steroid injection on 4/22 without relief. Pt has a follow up ov scheduled on 5/9. Sooner appt offered on 5/7 pt has a conflicting appt. Pt states she is really struggling. Her pain is LT sided, feels muscular and radiates into her groin. Pt has tried OTC options oral and topical with minimal relief. Pt has not tried any muscle relaxers in the past and is requesting to try one.    Please advise- Regarding if a low dose muscle relaxer is an option?

## 2025-05-09 ENCOUNTER — APPOINTMENT (OUTPATIENT)
Dept: RADIOLOGY | Facility: MEDICAL CENTER | Age: 65
End: 2025-05-09
Payer: MEDICARE

## 2025-05-09 ENCOUNTER — OFFICE VISIT (OUTPATIENT)
Dept: OBGYN CLINIC | Facility: MEDICAL CENTER | Age: 65
End: 2025-05-09
Payer: MEDICARE

## 2025-05-09 ENCOUNTER — RESULTS FOLLOW-UP (OUTPATIENT)
Dept: PAIN MEDICINE | Facility: MEDICAL CENTER | Age: 65
End: 2025-05-09

## 2025-05-09 ENCOUNTER — OFFICE VISIT (OUTPATIENT)
Dept: PAIN MEDICINE | Facility: MEDICAL CENTER | Age: 65
End: 2025-05-09
Payer: MEDICARE

## 2025-05-09 VITALS — BODY MASS INDEX: 39.01 KG/M2 | WEIGHT: 212 LBS | HEIGHT: 62 IN

## 2025-05-09 DIAGNOSIS — M16.12 PRIMARY OSTEOARTHRITIS OF ONE HIP, LEFT: Primary | ICD-10-CM

## 2025-05-09 DIAGNOSIS — M54.16 LUMBAR RADICULOPATHY: ICD-10-CM

## 2025-05-09 DIAGNOSIS — M54.16 LUMBAR RADICULITIS: Primary | ICD-10-CM

## 2025-05-09 DIAGNOSIS — M79.18 MYOFASCIAL PAIN SYNDROME: ICD-10-CM

## 2025-05-09 DIAGNOSIS — M16.11 PRIMARY OSTEOARTHRITIS OF ONE HIP, RIGHT: ICD-10-CM

## 2025-05-09 DIAGNOSIS — M25.551 RIGHT HIP PAIN: ICD-10-CM

## 2025-05-09 PROCEDURE — 99214 OFFICE O/P EST MOD 30 MIN: CPT

## 2025-05-09 PROCEDURE — G2211 COMPLEX E/M VISIT ADD ON: HCPCS

## 2025-05-09 PROCEDURE — 99214 OFFICE O/P EST MOD 30 MIN: CPT | Performed by: ORTHOPAEDIC SURGERY

## 2025-05-09 PROCEDURE — 73502 X-RAY EXAM HIP UNI 2-3 VIEWS: CPT

## 2025-05-09 RX ORDER — MELOXICAM 7.5 MG/1
7.5 TABLET ORAL DAILY
Qty: 30 TABLET | Refills: 1 | Status: SHIPPED | OUTPATIENT
Start: 2025-05-09

## 2025-05-09 RX ORDER — PREGABALIN 75 MG/1
75 CAPSULE ORAL 2 TIMES DAILY
Qty: 60 CAPSULE | Refills: 1 | Status: SHIPPED | OUTPATIENT
Start: 2025-05-09

## 2025-05-09 NOTE — PROGRESS NOTES
Assessment:  1. Lumbar radiculitis    2. Right hip pain    3. Myofascial pain syndrome        Plan:  Schedule for right L5-S1 LESI.  Complete risks and benefits including hyperglycemia, bleeding, infection, local tissue reaction, nerve injury, and allergic reaction were discussed. The approach was demonstrated using models and literature was provided. The patient was agreeable, verbalized an understanding, and wishes to proceed with scheduling the procedure.    If failure of right L5-S1 LESI, can consider right SIJ injection.    Patient recently self discontinued the use of gabapentin due to experiencing side effects such as shakiness.  Begin Lyrica 5 mg twice daily as prescribed.  I discussed with the patient the type of medication it is, how it works, and that it requires a titration process that is specific to each individual. I reviewed with the patient that it may take 3-4 weeks for the medication's effects to be noticed and that it should never be abruptly stopped. Possible side effects include but are not limited to; vertigo, lethargy, nausea, and edema of the extremities. Advised the patient to call our office if they experience any side effects. The patient verbalized an understanding.    Continue the use of baclofen as prescribed.  States she tolerates this medication well without side effects.  No refills of this medication were needed at today's visit.    May begin meloxicam 7.5 mg daily as prescribed.  Discussed with patient that she should not take any other NSAIDs while using this medication.  Patient agreeable verbalized understanding.    Will obtain right hip x-rays to further assess patient's right hip pain.  May consider referral to orthopedics for further evaluation pending right hip x-ray results.  Right hip x-rays ordered.    I discussed with patient that I believe she would greatly benefit from attending formal physical therapy sessions for lumbar strengthening and stretching.  Patient  agreeable.  Patient has her first physical therapy session scheduled for 5/14/2025.  Can consider aqua therapy in the future.    Follow-up after injection, or sooner if needed.    My impressions and treatment recommendations were discussed in detail with the patient who verbalized understanding and had no further questions.  Discharge instructions were provided. I personally saw and examined the patient and I agree with the above discussed plan of care.    Orders Placed This Encounter   Procedures    XR hip/pelv 2-3 vws right if performed     Standing Status:   Future     Number of Occurrences:   1     Expected Date:   5/9/2025     Expiration Date:   5/9/2029     Scheduling Instructions:      Bring along any outside films relating to this procedure.          FL spine and pain procedure     Standing Status:   Future     Expected Date:   5/9/2025     Expiration Date:   5/9/2029     Reason for Exam::   Right L5-S1 LESI     Anticoagulant hold needed?:   No    Ambulatory referral to Physical Therapy     Standing Status:   Future     Expiration Date:   5/9/2026     Referral Priority:   Routine     Referral Type:   Physical Therapy     Referral Reason:   Specialty Services Required     Requested Specialty:   Physical Therapy     Number of Visits Requested:   1     Expiration Date:   5/9/2026     New Medications Ordered This Visit   Medications    pregabalin (LYRICA) 75 mg capsule     Sig: Take 1 capsule (75 mg total) by mouth 2 (two) times a day     Dispense:  60 capsule     Refill:  1    meloxicam (Mobic) 7.5 mg tablet     Sig: Take 1 tablet (7.5 mg total) by mouth daily     Dispense:  30 tablet     Refill:  1       History of Present Illness:  Cristin Nolan is a 65 y.o. female who presents for a follow up office visit undergoing right L5-S1 TFESI on/20 2/2025.  Patient reports experiencing approximately 0% symptom relief following this recent injection.  Patient continues to note right-sided low back pain.  She  describes this pain as a constant sharp, throbbing, cramping, and shooting pain.  She rates her pain 9/10 on the numeric rating scale today.  Admits radiating pain along the right buttock and anterior aspect of the right lower.  Patient notes her pain is exacerbated with standing, sitting, bending, and walking.  Denies numbness and tingling of bilateral lower extremities.    On 4/10/2025 patient was started on gabapentin 300 mg 3 times daily.  Patient states she has since self discontinued this medication due to experiencing side effects of shakiness and dizziness. On 5/6/2025 patient was started on baclofen 5 mg 3 times daily. Patient states she has been tolerating this medication well without side effects.    Patient is scheduled to begin physical therapy sessions on 5/14/2025.    I have personally reviewed and/or updated the patient's past medical history, past surgical history, family history, social history, current medications, allergies, and vital signs today.     Review of Systems   Respiratory:  Negative for shortness of breath.    Cardiovascular:  Negative for chest pain.   Gastrointestinal:  Negative for constipation, diarrhea, nausea and vomiting.   Musculoskeletal:  Positive for arthralgias, gait problem and myalgias. Negative for joint swelling.   Skin:  Negative for rash.   Neurological:  Positive for weakness. Negative for dizziness and seizures.   All other systems reviewed and are negative.      Patient Active Problem List   Diagnosis    S/P colonoscopy    Endometrial cancer (HCC)    Mixed hyperlipidemia    OA (osteoarthritis) of knee    Pain in joint, multiple sites    Left hip pain    Foot pain, bilateral    Sciatica of right side    Radicular leg pain    Degenerative disc disease, lumbar    Spinal stenosis of lumbosacral region    Obesity    Chronic pain syndrome    Vitamin D deficiency    Leukopenia    Bursitis of right shoulder    Acute medial meniscus tear of left knee    Rupture of anterior  cruciate ligament of left knee    Hyperglycemia    Absence of cervix, acquired    Newly recognized heart murmur    Synovial bursa cyst    Elevated uric acid in blood    Morbid obesity (HCC)    Primary osteoarthritis of one hip, left    Lumbar radiculopathy    Status post total replacement of left hip    Lisa's neuroma of right foot    Unequal limb length (acquired), right femur    Flat feet    Arthritis       Past Medical History:   Diagnosis Date    Ambulatory dysfunction     uses walking stick    Anxiety     Arthritis     Cancer ([facility]) 7/15/17    All better now    Chronic pain disorder     Claustrophobia     mild    DDD (degenerative disc disease), lumbar     Endometrial cancer ([facility]) 07/06/2017    tRA TLH BSO SLND on 7/17/17 by Dr. Stone, followed by 3 cycles of vaginal brachytherapy completed in August 2017    Low back pain     Mild acid reflux     OA (osteoarthritis)     marissa knees    Spinal stenosis of lumbosacral region     Wears glasses        Past Surgical History:   Procedure Laterality Date    COLONOSCOPY      FOOT SURGERY      HYSTERECTOMY      JOINT REPLACEMENT Bilateral     knees    ORTHOPEDIC SURGERY      PLANTAR FASCIA SURGERY Bilateral     [facility] ARTHRP ACETBLR/PROX FEM PROSTC AGRFT/ALGRFT Left 12/03/2024    Procedure: ARTHROPLASTY HIP TOTAL (psb same day dc);  Surgeon: Dipesh Knowles MD;  Location:  MAIN OR;  Service: Orthopedics    [facility] ARTHRP KNE CONDYLE&PLATU MEDIAL&LAT COMPARTMENTS Right 11/30/2020    Procedure: ARTHROPLASTY KNEE TOTAL;  Surgeon: Dipesh Knowles MD;  Location: [facility];  Service: Orthopedics    [place] HYSTEROSCOPY BX ENDOMETRIUM&/POLYPC W/WO D&C N/A 02/28/2017    Procedure: DILATATION AND CURETTAGE (D&C) WITH HYSTEROSCOPY,POLYPECTOMY;  Surgeon: Roberto Anderson MD;  Location: [facility];  Service: Gynecology    REPLACEMENT TOTAL KNEE Left 10/22/2021    SHOULDER SURGERY Left     TONSILLECTOMY      TOTAL ABDOMINAL HYSTERECTOMY W/ BILATERAL SALPINGOOPHORECTOMY Bilateral  07/2017    ((Pt Qnr Sub: ovaries removed)) endometrial cancer, ? stage 1    WISDOM TOOTH EXTRACTION         Family History   Problem Relation Age of Onset    Ovarian cancer Mother 70    Cancer Mother     Esophageal cancer Father     Cancer Father     No Known Problems Maternal Grandmother     No Known Problems Maternal Grandfather     No Known Problems Paternal Grandmother     No Known Problems Paternal Grandfather     No Known Problems Maternal Aunt     No Known Problems Paternal Aunt     Breast cancer Neg Hx     Colon cancer Neg Hx     Uterine cancer Neg Hx        Social History     Occupational History    Not on file   Tobacco Use    Smoking status: Never    Smokeless tobacco: Never    Tobacco comments:     No secondhand smoke exposure   Vaping Use    Vaping status: Never Used   Substance and Sexual Activity    Alcohol use: Not Currently     Comment: very rarely    Drug use: No    Sexual activity: Not Currently     Partners: Male     Birth control/protection: Abstinence       Current Outpatient Medications on File Prior to Visit   Medication Sig    atorvastatin (LIPITOR) 10 mg tablet TAKE 1 TABLET(10 MG) BY MOUTH DAILY    baclofen 10 mg tablet Take 0.5 tablets (5 mg total) by mouth 3 (three) times a day    Cholecalciferol (HM Vitamin D3) 100 MCG (4000 UT) CAPS Take 1 capsule (4,000 Units total) by mouth daily    Coenzyme Q10-Vitamin E (QUNOL ULTRA COQ10 PO)     cyanocobalamin (VITAMIN B-12) 1000 MCG tablet Take 1,000 mcg by mouth daily    ibuprofen (MOTRIN) 200 mg tablet Take 200 mg by mouth every 6 (six) hours as needed for mild pain 3 pills every 4 hours    Misc Natural Products (GLUCOSAMINE CHOND CMP TRIPLE PO) Take by mouth    Omega 3-6-9 Fatty Acids (OMEGA 3-6-9 PO) Take 1 capsule by mouth daily    [DISCONTINUED] gabapentin (NEURONTIN) 300 mg capsule Take 1 capsule (300 mg total) by mouth 3 (three) times a day 1 QHS x 3 days, 1 BID x 3 days, then 1 TID (Patient not taking: Reported on 4/21/2025)     Current  "Facility-Administered Medications on File Prior to Visit   Medication    lidocaine (XYLOCAINE) 1 % injection 2 mL    triamcinolone acetonide (KENALOG-40) 40 mg/mL injection 20 mg       Allergies   Allergen Reactions    Iv Contrast [Iodinated Contrast Media] Hives    Iodine Strong Other (See Comments)    Vitamin C [Ascorbate - Food Allergy]      Queasy stomach, loose stool       Physical Exam:    Ht 5' 2\" (1.575 m)   Wt 96.2 kg (212 lb)   BMI 38.78 kg/m²     Constitutional:normal, well developed, well nourished, alert, in no distress and non-toxic and no overt pain behavior.  Eyes:anicteric  HEENT:grossly intact  Neck:supple, symmetric, trachea midline and no masses   Pulmonary:even and unlabored  Cardiovascular:No edema or pitting edema present  Skin:Normal without rashes or lesions and well hydrated  Psychiatric:Mood and affect appropriate  Neurologic:Cranial Nerves II-XII grossly intact, bilateral lower extremity strength is normal except for right hip flexion graded 3/5 secondary to pain, negative seated straight leg raise bilaterally  Musculoskeletal: Ambulates with a cane, tenderness to palpation along right SI joint    Imaging    "

## 2025-05-09 NOTE — PROGRESS NOTES
"Orthopaedic Surgery - Office Note  Cristin Nolan (65 y.o. female)   : 1960   MRN: [id number]  Encounter Date: 2025    Assessment & Plan        Assessment / Plan    ***  {[facility] PLAN:14463}  Follow-up:  No follow-ups on file.      Chief Complaint / Date of Onset  ***  Injury Mechanism / Date  {NONE:77362::\"None\"}  Surgery / Date  {NONE:18835::\"None\"}    History of Present Illness   Cristin Nolan is a 65 y.o. female who presents for ***    Treatment Summary  Medications / Modalities  {NONE:27255::\"None\"}  Bracing / Immobilization  {NONE:63978::\"None\"}  Physical Therapy  {NONE:96053::\"None\"}  Injections  {NONE:66948::\"None\"}  Prior Surgeries  {NONE:98117::\"None\"}  Other Treatments  {NONE:73166::\"None\"}    Employment / Current Status  ***    Sport / Organization / Current Status  ***      Review of Systems  {[facility] ROS COMPLETE:27163}      Physical Exam  Ht 5' 2\" (1.575 m)   Wt 96.2 kg (212 lb)   BMI 38.78 kg/m²   Cons: Appears well.  No apparent distress.  Psych: Alert. Oriented x3.  Mood and affect normal.  Eyes: PERRLA, EOMI  Resp: Normal effort.  No audible wheezing or stridor.  CV: Palpable pulse.  No discernable arrhythmia.  {PE EDEMA:50525::\"No LE edema.\"}  Lymph:  No palpable cervical, axillary, or inguinal lymphadenopathy.  Skin: Warm.  No palpable masses.  No visible lesions.  Neuro: Normal muscle tone.  Normal and symmetric DTR's.     ***      Studies Reviewed  {STUDIES REVIEWED:70862}      Procedures  {NO PROCDOC:25880}    Medical, Surgical, Family, and Social History  The patient's medical history, family history, and social history, were reviewed and updated as appropriate.    Past Medical History:   Diagnosis Date    Ambulatory dysfunction     uses walking stick    Anxiety     Arthritis     Cancer (HCC) 7/15/17    All better now    Chronic pain disorder     Claustrophobia     mild    DDD (degenerative disc disease), lumbar     Endometrial cancer (HCC) 2017    tRA TLH BSO SLND on 17 " by Dr. Stone, followed by 3 cycles of vaginal brachytherapy completed in August 2017    Low back pain     Mild acid reflux     OA (osteoarthritis)     marissa knees    Spinal stenosis of lumbosacral region     Wears glasses        Past Surgical History:   Procedure Laterality Date    COLONOSCOPY      FOOT SURGERY      HYSTERECTOMY      JOINT REPLACEMENT Bilateral     knees    ORTHOPEDIC SURGERY      PLANTAR FASCIA SURGERY Bilateral     [place] ARTHRP ACETBLR/PROX FEM PROSTC AGRFT/ALGRFT Left 12/03/2024    Procedure: ARTHROPLASTY HIP TOTAL (psb same day dc);  Surgeon: Dipesh Knowles MD;  [facility]: [facility];  Service: Orthopedics    [place] ARTHRP KNE CONDYLE&PLATU MEDIAL&LAT COMPARTMENTS Right 11/30/2020    Procedure: ARTHROPLASTY KNEE TOTAL;  Surgeon: Dipesh Knowles MD;  [facility];  Service: Orthopedics    [place] HYSTEROSCOPY BX ENDOMETRIUM&/POLYPC W/WO D&C N/A 02/28/2017    Procedure: DILATATION AND CURETTAGE (D&C) WITH HYSTEROSCOPY,POLYPECTOMY;  Surgeon: Roberto Anderson MD;  Location: [facility];  Service: Gynecology    REPLACEMENT TOTAL KNEE Left 10/22/2021    SHOULDER SURGERY Left     TONSILLECTOMY      TOTAL ABDOMINAL HYSTERECTOMY W/ BILATERAL SALPINGOOPHORECTOMY Bilateral 07/2017    ((Pt Qnr Sub: ovaries removed)) endometrial cancer, ? stage 1    WISDOM TOOTH EXTRACTION         Family History   Problem Relation Age of Onset    Ovarian cancer Mother 70    Cancer Mother     Esophageal cancer Father     Cancer Father     No Known Problems Maternal Grandmother     No Known Problems Maternal Grandfather     No Known Problems Paternal Grandmother     No Known Problems Paternal Grandfather     No Known Problems Maternal Aunt     No Known Problems Paternal Aunt     Breast cancer Neg Hx     Colon cancer Neg Hx     Uterine cancer Neg Hx        Social History     Occupational History    Not on file   Tobacco Use    Smoking status: Never    Smokeless tobacco: Never    Tobacco comments:     No secondhand smoke  exposure   Vaping Use    Vaping status: Never Used   Substance and Sexual Activity    Alcohol use: Not Currently     Comment: very rarely    Drug use: No    Sexual activity: Not Currently     Partners: Male     Birth control/protection: Abstinence       Allergies   Allergen Reactions    Iv Contrast [Iodinated Contrast Media] Hives    Iodine Strong Other (See Comments)    Vitamin C [Ascorbate - Food Allergy]      Queasy stomach, loose stool         Current Outpatient Medications:     atorvastatin (LIPITOR) 10 mg tablet, TAKE 1 TABLET(10 MG) BY MOUTH DAILY, Disp: 90 tablet, Rfl: 1    baclofen 10 mg tablet, Take 0.5 tablets (5 mg total) by mouth 3 (three) times a day, Disp: 45 tablet, Rfl: 0    Cholecalciferol (HM Vitamin D3) 100 MCG (4000 UT) CAPS, Take 1 capsule (4,000 Units total) by mouth daily, Disp: 30 capsule, Rfl: 0    Coenzyme Q10-Vitamin E (QUNOL ULTRA COQ10 PO), , Disp: , Rfl:     cyanocobalamin (VITAMIN B-12) 1000 MCG tablet, Take 1,000 mcg by mouth daily, Disp: , Rfl:     ibuprofen (MOTRIN) 200 mg tablet, Take 200 mg by mouth every 6 (six) hours as needed for mild pain 3 pills every 4 hours, Disp: , Rfl:     meloxicam (Mobic) 7.5 mg tablet, Take 1 tablet (7.5 mg total) by mouth daily, Disp: 30 tablet, Rfl: 1    Misc Natural Products (GLUCOSAMINE CHOND CMP TRIPLE PO), Take by mouth, Disp: , Rfl:     Omega 3-6-9 Fatty Acids (OMEGA 3-6-9 PO), Take 1 capsule by mouth daily, Disp: , Rfl:     pregabalin (LYRICA) 75 mg capsule, Take 1 capsule (75 mg total) by mouth 2 (two) times a day, Disp: 60 capsule, Rfl: 1    Current Facility-Administered Medications:     lidocaine (XYLOCAINE) 1 % injection 2 mL, 2 mL, Injection, , Harjeet Black DPM, 2 mL at 05/20/22 1445    triamcinolone acetonide (KENALOG-40) 40 mg/mL injection 20 mg, 20 mg, Intra-articular, , Harjeet Black DPM, 20 mg at 05/20/22 1445      Juana Morrison    Scribe Attestation      I,:   am acting as a scribe while in the presence of the attending physician.:        I,:   personally performed the services described in this documentation    as scribed in my presence.:

## 2025-05-09 NOTE — PATIENT INSTRUCTIONS
Start Lyrica as prescribed.  Start Meloxicam as prescribed.  Do not take any other NSAIDs with this medication such as ibuprofen, Aleve, and Motrin.  May continue the use of baclofen as prescribed.  Begin physical therapy as discussed  Complete right hip x-rays as ordered.

## 2025-05-09 NOTE — PROGRESS NOTES
Orthopaedic Surgery - Office Note  Cristin Nolan (65 y.o. female)   : 1960   MRN: [id number]  Encounter Date: 2025    Assessment & Plan  Primary osteoarthritis of one hip, left    Lumbar radiculopathy    Primary osteoarthritis of one hip, right      Assessment / Plan    Status post left total hip arthroplasty performed on 12/3/2024 with subsequent dislocation on 2025    Lumbar spondylosis with referred pain to BLE    Right hip osteoarthritis    She has had minimal relief from her recent Lspine injection  Recommend f/u with Dr. Oliva for further management of spine  Activity as tolerated  Continue use of cane and walker as needed  Continue with PT  Home exercise program reviewed  Anti-inflammatories or Tylenol prn pain  Follow-up:  Return in about 1 month (around 2025).      Chief Complaint / Date of Onset  Left hip osteoarthritis  Lumbar radiculopathy  Right hip osteoarthritis  Injury Mechanism / Date  Postoperative left CARO dislocation on 2025  Surgery / Date  Left total hip arthroplasty with Dr. Knowles on 12/3/2024    History of Present Illness   Cristin Nolan is a 65 y.o. female who presents for follow-up evaluation status post left total hip arthroplasty performed on 12/3/2024 with subsequent dislocation on 2025.  Her postoperative course has been characterized by persistent left hip pain that has mildly improved.  She has also had interval development of back pain and right hip / leg pain that has been concerning for lumbar etiology.  She recently had a right L5 injection by Dr. Oliva that has not provided significant relief.  She continues to reports primarily back and right hip/leg pain and paresthesias.  She continues to ambulate with a walker.  She has constant pain that does not seem to increase with activity. She recently had right hip xrays that show known right hip OA.  With respect to her left hip, she has not had any recurrent instability or sensation of partial  "subluxation since her last visit.  She has been having a \"ratcheting\" sensation in the left hip with active hip extension.  Otherwise no new complaints today.      Treatment Summary  Medications / Modalities  Advil as needed  Bracing / Immobilization  None  Physical Therapy  Began postop 12/10/2024  Injections  Prior right glenohumeral injection on 12/16/2024  Prior Surgeries  See above  Left total knee arthroplasty on 10/21/2021 at [facility]  Right total knee arthroplasty on 11/30/2020 with Dr. Knowles  Other Treatments  See above    Employment / Current Status  N/A    Sport / Organization / Current Status  N/A      Review of Systems  Pertinent items are noted in HPI.  All other systems were reviewed and are negative.      Physical Exam  Ht 5' 2\" (1.575 m)   Wt 96.2 kg (212 lb)   BMI 38.78 kg/m²   Cons: Appears well.  No apparent distress.  Psych: Alert. Oriented x3.  Mood and affect normal.  Eyes: PERRLA, EOMI  Resp: Normal effort.  No audible wheezing or stridor.  CV: Palpable pulse.  No discernable arrhythmia.  No LE edema.  Lymph:  No palpable cervical, axillary, or inguinal lymphadenopathy.  Skin: Warm.  No palpable masses.  No visible lesions.  Neuro: Normal muscle tone.  Normal and symmetric DTR's.     Left Hip Exam  Alignment / Posture:  Normal resting hip posture. Leg lengths appear equal.  Inspection:  No swelling. No erythema. Incision healed.  Palpation:   mild lateral hip tenderness. No warmth. No clicking, catching, or snapping.  ROM:  Hip Flexion 110. Hip ER 50. Hip IR 30.  Strength:  Hip Flexors 4/5. Hip Abductors 4/5.  Stability:  Not tested.  Tests:  (-) Chuy.  Neurovascular:  Sensation intact in DP/SP/Gill/Sa/T nerve distributions. 2+ DP & PT pulses.  Gait:  Trendelenberg.          Studies Reviewed  I have personally reviewed pertinent films in PACS.  XR of right shoulder - Performed on 2/5/2025 demonstrates severe glenohumeral joint osteoarthritis  XR of left hip - Performed on 2/21/2025 " demonstrates stable appearance of CARO.  MRI of lumbar spine - Performed on 4/11/2025 demonstrates Multilevel degenerative changes with mass effect on the thecal sac at L2-L3.      Procedures       Medical, Surgical, Family, and Social History  The patient's medical history, family history, and social history, were reviewed and updated as appropriate.    Past Medical History:   Diagnosis Date    Ambulatory dysfunction     uses walking stick    Anxiety     Arthritis     Cancer (HCC) 7/15/17    All better now    Chronic pain disorder     Claustrophobia     mild    DDD (degenerative disc disease), lumbar     Endometrial cancer (HCC) 07/06/2017    tRA TLH BSO SLND on 7/17/17 by Dr. Stone, followed by 3 cycles of vaginal brachytherapy completed in August 2017    Low back pain     Mild acid reflux     OA (osteoarthritis)     marissa knees    Spinal stenosis of lumbosacral region     Wears glasses        Past Surgical History:   Procedure Laterality Date    COLONOSCOPY      FOOT SURGERY      HYSTERECTOMY      JOINT REPLACEMENT Bilateral     knees    ORTHOPEDIC SURGERY      PLANTAR FASCIA SURGERY Bilateral     CT ARTHRP ACETBLR/PROX FEM PROSTC AGRFT/ALGRFT Left 12/03/2024    Procedure: ARTHROPLASTY HIP TOTAL (psb same day dc);  Surgeon: Dipesh Knowles MD;  Location: [facility];  Service: Orthopedics    CT ARTHRP KNE CONDYLE&PLATU MEDIAL&LAT COMPARTMENTS Right 11/30/2020    Procedure: ARTHROPLASTY KNEE TOTAL;  Surgeon: Dipesh Knowles MD;  Location[facility];  Service: Orthopedics    CT HYSTEROSCOPY BX ENDOMETRIUM&/POLYPC W/WO D&C N/A 02/28/2017    Procedure: DILATATION AND CURETTAGE (D&C) WITH HYSTEROSCOPY,POLYPECTOMY;  Surgeon: Roberto Anderson MD;  Location: [facility];  Service: Gynecology    REPLACEMENT TOTAL KNEE Left 10/22/2021    SHOULDER SURGERY Left     TONSILLECTOMY      TOTAL ABDOMINAL HYSTERECTOMY W/ BILATERAL SALPINGOOPHORECTOMY Bilateral 07/2017    ((Pt Qnr Sub: ovaries removed)) endometrial cancer, ?  stage 1    WISDOM TOOTH EXTRACTION         Family History   Problem Relation Age of Onset    Ovarian cancer Mother 70    Cancer Mother     Esophageal cancer Father     Cancer Father     No Known Problems Maternal Grandmother     No Known Problems Maternal Grandfather     No Known Problems Paternal Grandmother     No Known Problems Paternal Grandfather     No Known Problems Maternal Aunt     No Known Problems Paternal Aunt     Breast cancer Neg Hx     Colon cancer Neg Hx     Uterine cancer Neg Hx        Social History     Occupational History    Not on file   Tobacco Use    Smoking status: Never    Smokeless tobacco: Never    Tobacco comments:     No secondhand smoke exposure   Vaping Use    Vaping status: Never Used   Substance and Sexual Activity    Alcohol use: Not Currently     Comment: very rarely    Drug use: No    Sexual activity: Not Currently     Partners: Male     Birth control/protection: Abstinence       Allergies   Allergen Reactions    Iv Contrast [Iodinated Contrast Media] Hives    Iodine Strong Other (See Comments)    Vitamin C [Ascorbate - Food Allergy]      Queasy stomach, loose stool         Current Outpatient Medications:     atorvastatin (LIPITOR) 10 mg tablet, TAKE 1 TABLET(10 MG) BY MOUTH DAILY, Disp: 90 tablet, Rfl: 1    baclofen 10 mg tablet, Take 0.5 tablets (5 mg total) by mouth 3 (three) times a day, Disp: 45 tablet, Rfl: 0    Cholecalciferol (HM Vitamin D3) 100 MCG (4000 UT) CAPS, Take 1 capsule (4,000 Units total) by mouth daily, Disp: 30 capsule, Rfl: 0    Coenzyme Q10-Vitamin E (QUNOL ULTRA COQ10 PO), , Disp: , Rfl:     cyanocobalamin (VITAMIN B-12) 1000 MCG tablet, Take 1,000 mcg by mouth daily, Disp: , Rfl:     ibuprofen (MOTRIN) 200 mg tablet, Take 200 mg by mouth every 6 (six) hours as needed for mild pain 3 pills every 4 hours, Disp: , Rfl:     meloxicam (Mobic) 7.5 mg tablet, Take 1 tablet (7.5 mg total) by mouth daily, Disp: 30 tablet, Rfl: 1    Misc Natural Products  (GLUCOSAMINE CHOND CMP TRIPLE PO), Take by mouth, Disp: , Rfl:     Omega 3-6-9 Fatty Acids (OMEGA 3-6-9 PO), Take 1 capsule by mouth daily, Disp: , Rfl:     pregabalin (LYRICA) 75 mg capsule, Take 1 capsule (75 mg total) by mouth 2 (two) times a day, Disp: 60 capsule, Rfl: 1    Current Facility-Administered Medications:     lidocaine (XYLOCAINE) 1 % injection 2 mL, 2 mL, Injection, , Harjeet Black DPM, 2 mL at 05/20/22 1445    triamcinolone acetonide (KENALOG-40) 40 mg/mL injection 20 mg, 20 mg, Intra-articular, , Harjeet Black DPM, 20 mg at 05/20/22 1445      Dipesh Knowles MD    Scribe Attestation      I,:   am acting as a scribe while in the presence of the attending physician.:       I,:   personally performed the services described in this documentation    as scribed in my presence.:

## 2025-05-12 ENCOUNTER — TELEPHONE (OUTPATIENT)
Age: 65
End: 2025-05-12

## 2025-05-12 NOTE — TELEPHONE ENCOUNTER
Caller: Patient    Doctor: Dr. Oliva    Reason for call: patient calling to check for sooner procedure appt    Please assist    Call back#: [phone]

## 2025-05-13 DIAGNOSIS — M25.50 ARTHRALGIA, UNSPECIFIED JOINT: Primary | ICD-10-CM

## 2025-05-13 DIAGNOSIS — M16.11 PRIMARY OSTEOARTHRITIS OF ONE HIP, RIGHT: Primary | ICD-10-CM

## 2025-05-13 NOTE — TELEPHONE ENCOUNTER
Patient is already on list to move up if procedure is authorized and cancellation spot becomes available.

## 2025-05-14 ENCOUNTER — APPOINTMENT (OUTPATIENT)
Dept: LAB | Facility: CLINIC | Age: 65
End: 2025-05-14
Payer: MEDICARE

## 2025-05-14 ENCOUNTER — EVALUATION (OUTPATIENT)
Dept: PHYSICAL THERAPY | Facility: CLINIC | Age: 65
End: 2025-05-14
Attending: ORTHOPAEDIC SURGERY
Payer: MEDICARE

## 2025-05-14 DIAGNOSIS — M54.16 LUMBAR RADICULOPATHY: Primary | ICD-10-CM

## 2025-05-14 DIAGNOSIS — M25.50 ARTHRALGIA, UNSPECIFIED JOINT: ICD-10-CM

## 2025-05-14 PROCEDURE — 87484 EHRLICHA CHAFFEENSIS AMP PRB: CPT

## 2025-05-14 PROCEDURE — 87469 BABESIA MICROTI AMP PRB: CPT

## 2025-05-14 PROCEDURE — 36415 COLL VENOUS BLD VENIPUNCTURE: CPT

## 2025-05-14 PROCEDURE — 97110 THERAPEUTIC EXERCISES: CPT | Performed by: PHYSICAL THERAPIST

## 2025-05-14 PROCEDURE — 87478 BORRELIA MIYAMOTOI AMP PRB: CPT

## 2025-05-14 PROCEDURE — 97161 PT EVAL LOW COMPLEX 20 MIN: CPT | Performed by: PHYSICAL THERAPIST

## 2025-05-14 PROCEDURE — 86618 LYME DISEASE ANTIBODY: CPT

## 2025-05-14 PROCEDURE — 87468 ANAPLSMA PHGCYTOPHLM AMP PRB: CPT

## 2025-05-14 NOTE — PROGRESS NOTES
PT Evaluation     Today's date: 2025  Patient name: Cristin Nolan  : 1960  MRN: [id number]  Referring provider: Dipesh Knowles, *  Dx:   Encounter Diagnosis     ICD-10-CM    1. Lumbar radiculopathy  M54.16                      Assessment  Impairments: abnormal or restricted ROM, activity intolerance, impaired physical strength, lacks appropriate home exercise program and pain with function  Functional limitations: bending; walking; standing  Symptom irritability: high    Assessment details: Pt is 66yo female presenting to therapy following acute on chronic Rt hip and leg pain that seems to be consistent with some radiculopathy and sciatic nerve tension. Pt has decreased hip strength is all directions, sciatic nerve tension with (+) SLR and slump tests. Directional preference testing was difficult to test due to difficulty with standing and changing positions. Pt would benefit from PT services to improve nerve tension, strength and ability to walk and stand better.  Understanding of Dx/Px/POC: good     Prognosis: good    Goals  1. Pt will be independent with HEP upon discharge.  2. Pt will have Rt hip rom WFL and painfree.  3. Pt will improve Rt hip strength to grossly 3+/5 to walk a 1/2 mile.  4. 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    Plan  Patient would benefit from: skilled physical therapy    Planned therapy interventions: functional ROM exercises, therapeutic activities, therapeutic exercise, therapeutic training, stretching, strengthening, home exercise program, neuromuscular re-education, manual therapy and patient education    Frequency: 2x week  Duration in weeks: 8  Treatment plan discussed with: patient    Subjective Evaluation    History of Present Illness  Mechanism of injury: Pt is well known to our clinic. She had her Lt CARO on 12/3/24. She dislocated her hip on 25. Since the dislocation she has had much difficulty with ambulation and  movement. She is currently walking with a cane. When we previously saw her last we was walking with a SPC or no device. Pt has difficulty with strengthening her hips and continues to having increased trouble with walking.    Pt notes walking at the [employer] and having difficulty with Rt leg and radicular symptoms. Pt had an Rt L5-S1 epidural injection that made her pain worse. Pt reports her left hip is doing well but her Rt hip in the problem. Currently, taking baclofen and meloxicam for the pain. She notes being drowsy. Pt reports going to the pool less due to fear of falling at the pool. Pt reports she can only stand for a minute or two. Pt reports scheduled for hip injection next week and then another back injection the following week.  Patient Goals  Patient goals for therapy: decreased pain, increased strength and independence with ADLs/IADLs    Pain  Current pain ratin  At worst pain rating: 10  Location: Rt hip can travel into her knee and calf  Quality: throbbing  Relieving factors: change in position and medications  Aggravating factors: standing, walking and stair climbing  Progression: worsening    Treatments  Previous treatment: physical therapy    Objective     Active Range of Motion     Lumbar   Flexion:  WFL  Extension:  Restriction level: maximal    Passive Range of Motion   Left Hip   Normal passive range of motion    Right Hip   Flexion: 90 degrees with pain  Abduction: 30 degrees with pain  Mechanical Assessment    Cervical      Thoracic      Lumbar    Standing extension: repeated movements  Pain location: peripheralized    Strength/Myotome Testing     Left Hip   Planes of Motion   Flexion: 4-  Extension: 3+  Abduction: 3    Right Hip   Planes of Motion   Flexion: 3  Extension: 3  Abduction: 3    Tests     Lumbar     Right   Positive passive SLR and slump test.   Negative crossed SLR.              Precautions: Lt CARO still following hip precautions      Manuals                                                                  Neuro Re-Ed                                                                                                        Ther Ex             Seated flexion stretch 10x            Seated sciatic nerve glide 10x            Seated march 10x            Supine march             SKTC             Standing Hip 3 ways             Bike             Side stepping             Ther Activity             Sit to stands             Step ups             Gait Training                                       Modalities

## 2025-05-14 NOTE — HOME EXERCISE EDUCATION
Program_ID:[id number]   Access Code: [email or web address]  URL: [email or web address]  Date: 05-  Prepared By: Tarcy Tolentino    Program Notes      Exercises      - Seated Sciatic Tensioner - 1 x daily - 7 x weekly - 1-3 sets - 10 reps      - Seated Flexion Stretch - 1 x daily - 7 x weekly - 1-3 sets - 10 reps      - Seated March - 1 x daily - 7 x weekly - 1-3 sets - 10 reps

## 2025-05-15 ENCOUNTER — RESULTS FOLLOW-UP (OUTPATIENT)
Dept: FAMILY MEDICINE CLINIC | Facility: CLINIC | Age: 65
End: 2025-05-15

## 2025-05-15 LAB — B BURGDOR IGG+IGM SER QL IA: NEGATIVE

## 2025-05-18 LAB
A PHAGOCYTOPH DNA BLD QL NAA+PROBE: NOT DETECTED
B MICROTI DNA BLD QL NAA+PROBE: NOT DETECTED
B MIYAMOTOI FLAB SPEC QL NAA+PROBE: NOT DETECTED
E CHAFFEENSIS DNA BLD QL NAA+PROBE: NOT DETECTED

## 2025-05-19 ENCOUNTER — TELEPHONE (OUTPATIENT)
Dept: RADIOLOGY | Facility: HOSPITAL | Age: 65
End: 2025-05-19

## 2025-05-19 NOTE — NURSING NOTE
Call placed to pt to discuss upcoming appointment at [facility] Radiology Department and consultation completed.  Pt is having a R Hip Non-Arthrogram completed on 5/23/2025.  Allergies reviewed and verified pt does not currently take any anticoagulant medications.  Pre procedure instructions including diet and taking own medications discussed with pt.  Pt instructed that she may eat normally and take medications as usual before the procedure.  Pt verbalized understanding of instructions given.  Reminded pt of the location, date and time of procedure.  My number was given to call if any questions or concerns arise pre or post procedure.

## 2025-05-20 ENCOUNTER — OFFICE VISIT (OUTPATIENT)
Dept: PHYSICAL THERAPY | Facility: CLINIC | Age: 65
End: 2025-05-20
Attending: ORTHOPAEDIC SURGERY
Payer: MEDICARE

## 2025-05-20 DIAGNOSIS — M54.16 LUMBAR RADICULOPATHY: Primary | ICD-10-CM

## 2025-05-20 PROCEDURE — 97530 THERAPEUTIC ACTIVITIES: CPT

## 2025-05-20 PROCEDURE — 97110 THERAPEUTIC EXERCISES: CPT

## 2025-05-20 NOTE — PROGRESS NOTES
"Daily Note     Today's date: 2025  Patient name: Cristin Nolan  : 1960  MRN: [id number]  Referring provider: Dipesh Knowles, *  Dx:   Encounter Diagnosis     ICD-10-CM    1. Lumbar radiculopathy  M54.16           Start Time: 1445  Stop Time: 1523  Total time in clinic (min): 38 minutes    Subjective: Patient reports getting an adjustment at the chiropractor this morning and her pain seems to have gotten worse this afternoon.       Objective: See treatment diary below      Assessment: Initiated POC as outlined below. Patient challenged today with all exercises due to significant weakness. Onset of paresthesia and radicular pain in Rt LE while Wbing with hip 3-way. We were able to resolve symptoms with pball flexion stretch and nerve flossing. Patient would benefit from continued PT to improve level of function.       Plan: Continue per POC. Increase reps/resistance as tolerated.      Precautions: Lt CARO still following hip precautions      Manuals                                                                Neuro Re-Ed                                                                                                        Ther Ex             Seated flexion stretch 10x 10x5\"           Seated sciatic nerve glide 10x 5\" 2x10           Seated march 10x 2x10           Supine march             SKTC             Standing Hip 3 ways  ABD 1 UE 15x B/L           Heel/toe raises  20x ea           Bike  5' seat 14           Pball rollout  10x10\"           Side stepping  1 UE 3 laps           Ther Activity             Sit to stands  1 foam 2x5           Step ups  4\" 15x ea           Gait Training                                       Modalities                                                   "

## 2025-05-22 ENCOUNTER — OFFICE VISIT (OUTPATIENT)
Dept: PHYSICAL THERAPY | Facility: CLINIC | Age: 65
End: 2025-05-22
Attending: ORTHOPAEDIC SURGERY
Payer: MEDICARE

## 2025-05-22 DIAGNOSIS — M54.16 LUMBAR RADICULOPATHY: Primary | ICD-10-CM

## 2025-05-22 PROCEDURE — 97110 THERAPEUTIC EXERCISES: CPT

## 2025-05-22 PROCEDURE — 97530 THERAPEUTIC ACTIVITIES: CPT

## 2025-05-22 NOTE — PROGRESS NOTES
"Daily Note     Today's date: 2025  Patient name: Cristin Nolan  : 1960  MRN: [id number]  Referring provider: Dipesh Knowles, *  Dx:   Encounter Diagnosis     ICD-10-CM    1. Lumbar radiculopathy  M54.16           Start Time: 1530  Stop Time: 1612  Total time in clinic (min): 42 minutes    Subjective: Patient reports her hip was sore after last session but pain did not get worse. Patient scheduled to receive an injection to Rt hip tomorrow.       Objective: See treatment diary below      Assessment: Patient demonstrated fairly good tolerance to exercises. Ambulation continues to be challenging with SPC due to significant lateral sway. Flexion biased stretches needed to be performed throughout session in order to modulate pain symptoms.Hip 3-ways had not reproduced radicular pain today. Patient would benefit from continued PT to improve level of function.       Plan: Continue per POC. Increase reps/resistance as tolerated.      Precautions: Lt CARO still following hip precautions      Manuals                                                               Neuro Re-Ed                                                                                                        Ther Ex             Seated flexion stretch 10x 10x5\" 10x5\"          Seated sciatic nerve glide 10x 5\" 2x10 5\" 2x10          Seated march 10x 2x10 Std 2x10          Supine march             SKTC   5x10\" ea          Standing Hip 3 ways  ABD 1 UE 15x B/L Abd/ext 1 UE 15x B/L          Heel/toe raises  20x ea 20x ea          Bike  5' seat 14 6'          Pball rollout  10x10\" 10x10\"          Side stepping  1 UE 3 laps Green 3 laps          Bridges             Ther Activity             Sit to stands  1 foam 2x5 1 foam 2x5          Step ups  4\" 15x ea 4\" 2x10 ea  Fwd/lat          Gait Training                                       Modalities                                                     "

## 2025-05-23 ENCOUNTER — HOSPITAL ENCOUNTER (OUTPATIENT)
Dept: RADIOLOGY | Facility: HOSPITAL | Age: 65
Discharge: HOME | End: 2025-05-23
Attending: PHYSICIAN ASSISTANT
Payer: MEDICARE

## 2025-05-23 DIAGNOSIS — M16.11 PRIMARY OSTEOARTHRITIS OF ONE HIP, RIGHT: ICD-10-CM

## 2025-05-23 PROCEDURE — 77002 NEEDLE LOCALIZATION BY XRAY: CPT

## 2025-05-23 PROCEDURE — 20610 DRAIN/INJ JOINT/BURSA W/O US: CPT

## 2025-05-23 RX ORDER — ROPIVACAINE HYDROCHLORIDE 2 MG/ML
2 INJECTION, SOLUTION EPIDURAL; INFILTRATION; PERINEURAL ONCE
Status: COMPLETED | OUTPATIENT
Start: 2025-05-23 | End: 2025-05-23

## 2025-05-23 RX ORDER — METHYLPREDNISOLONE ACETATE 80 MG/ML
80 INJECTION, SUSPENSION INTRA-ARTICULAR; INTRALESIONAL; INTRAMUSCULAR; SOFT TISSUE ONCE
Status: COMPLETED | OUTPATIENT
Start: 2025-05-23 | End: 2025-05-23

## 2025-05-23 RX ADMIN — METHYLPREDNISOLONE ACETATE 80 MG: 80 INJECTION, SUSPENSION INTRA-ARTICULAR; INTRALESIONAL; INTRAMUSCULAR; SOFT TISSUE at 11:54

## 2025-05-23 RX ADMIN — ROPIVACAINE HYDROCHLORIDE 2 ML: 2 INJECTION EPIDURAL; INFILTRATION; PERINEURAL at 11:55

## 2025-05-23 RX ADMIN — METHYLPREDNISOLONE ACETATE 80 MG: 80 INJECTION, SUSPENSION INTRA-ARTICULAR; INTRALESIONAL; INTRAMUSCULAR; SOFT TISSUE at 11:51

## 2025-05-29 ENCOUNTER — OFFICE VISIT (OUTPATIENT)
Dept: PHYSICAL THERAPY | Facility: CLINIC | Age: 65
End: 2025-05-29
Attending: ORTHOPAEDIC SURGERY
Payer: MEDICARE

## 2025-05-29 DIAGNOSIS — M54.16 LUMBAR RADICULOPATHY: Primary | ICD-10-CM

## 2025-05-29 PROCEDURE — 97530 THERAPEUTIC ACTIVITIES: CPT

## 2025-05-29 PROCEDURE — 97110 THERAPEUTIC EXERCISES: CPT

## 2025-05-29 NOTE — PROGRESS NOTES
"Daily Note     Today's date: 2025  Patient name: Cristin Nolan  : 1960  MRN: [id number]  Referring provider: Dipesh Knowles, *  Dx:   Encounter Diagnosis     ICD-10-CM    1. Lumbar radiculopathy  M54.16           Start Time: 1402  Stop Time: 1445  Total time in clinic (min): 43 minutes    Subjective: Patient received hip injection last week and is feeling good. She going for lumbar injection tomorrow.       Objective: See treatment diary below      Assessment: Patient tolerating exercises better. Able to progress multiple exercises without reproducing sharp pain. Cone taps on Lt very challenging due strength deficit. Fatigued noted by end of session. Patient would benefit from continued PT improve level of function.       Plan: Continue per POC. Increase reps/resistance as tolerated.      Precautions: Lt CARO still following hip precautions      Manuals                                                              Neuro Re-Ed             Cone taps    15x ea                                                                                       Ther Ex             Seated flexion stretch 10x 10x5\" 10x5\" 20x         Seated sciatic nerve glide 10x 5\" 2x10 5\" 2x10          Seated march 10x 2x10 Std 2x10 Std 2x10         Supine march             SKTC   5x10\" ea          Standing Hip 3 ways  ABD 1 UE 15x B/L Abd/ext 1 UE 15x B/L Abd/ext 1 UE 2x10 B/L         Heel/toe raises  20x ea 20x ea 30x ea         Bike  5' seat 14 6' 8'         Pball rollout  10x10\" 10x10\" 10x10\"         Side stepping  1 UE 3 laps Green 3 laps Green to fatigue         Bridges    + ball sq 2x10         Ther Activity             Sit to stands  1 foam 2x5 1 foam 2x5 1 foam 2x5         Step ups  4\" 15x ea 4\" 2x10 ea  Fwd/lat 6\" 10x ea          Gait Training                                       Modalities                                                       "

## 2025-05-30 ENCOUNTER — HOSPITAL ENCOUNTER (OUTPATIENT)
Dept: RADIOLOGY | Facility: MEDICAL CENTER | Age: 65
Discharge: HOME | End: 2025-05-30
Payer: MEDICARE

## 2025-05-30 VITALS
RESPIRATION RATE: 16 BRPM | OXYGEN SATURATION: 98 % | HEART RATE: 54 BPM | TEMPERATURE: 97.7 F | SYSTOLIC BLOOD PRESSURE: 148 MMHG | DIASTOLIC BLOOD PRESSURE: 82 MMHG

## 2025-05-30 DIAGNOSIS — M54.16 LUMBAR RADICULITIS: ICD-10-CM

## 2025-05-30 PROCEDURE — 62323 NJX INTERLAMINAR LMBR/SAC: CPT | Performed by: PHYSICAL MEDICINE & REHABILITATION

## 2025-05-30 RX ORDER — METHYLPREDNISOLONE ACETATE 80 MG/ML
80 INJECTION, SUSPENSION INTRA-ARTICULAR; INTRALESIONAL; INTRAMUSCULAR; PARENTERAL; SOFT TISSUE ONCE
Status: COMPLETED | OUTPATIENT
Start: 2025-05-30 | End: 2025-05-30

## 2025-05-30 RX ADMIN — METHYLPREDNISOLONE ACETATE 80 MG: 80 INJECTION, SUSPENSION INTRA-ARTICULAR; INTRALESIONAL; INTRAMUSCULAR; SOFT TISSUE at 08:52

## 2025-05-30 NOTE — DISCHARGE INSTR - LAB
Epidural Steroid Injection   WHAT YOU NEED TO KNOW:   An epidural steroid injection (ANAHY) is a procedure to inject steroid medicine into the epidural space. The epidural space is between your spinal cord and vertebrae. Steroids reduce inflammation and fluid buildup in your spine that may be causing pain. You may be given pain medicine along with the steroids.          ACTIVITY  Do not drive or operate machinery today.  No strenuous activity today - bending, lifting, etc.  You may resume normal activites starting tomorrow - start slowly and as tolerated.  You may shower today, but no tub baths or hot tubs.  You may have numbness for several hours from the local anesthetic. Please use caution and common sense, especially with weight-bearing activities.    CARE OF THE INJECTION SITE  If you have soreness or pain, apply ice to the area today (20 minutes on/20 minutes off).  Starting tomorrow, you may use warm, moist heat or ice if needed.  You may have an increase or change in your discomfort for 36-48 hours after your treatment.  Apply ice and continue with any pain medication you have been prescribed.  Notify the [facility] if you have any of the following: redness, drainage, swelling, headache, stiff neck or fever above 100°F.    SPECIAL INSTRUCTIONS  Our office will contact you in approximately 14 days for a progress report.    MEDICATIONS  Continue to take all routine medications.  Our office may have instructed you to hold some medications.    As no general anesthesia was used in today's procedure, you should not experience any side effects related to anesthesia.     If you are diabetic, the steroids used in today's injection may temporarily increase your blood sugar levels after the first few days after your injection. Please keep a close eye on your sugars and alert the doctor who manages your diabetes if your sugars are significantly high from your baseline or you are symptomatic.     If you have a  problem specifically related to your procedure, please call our office at [phone].  Problems not related to your procedure should be directed to your primary care physician.

## 2025-05-30 NOTE — H&P
History of Present Illness: The patient is a 65 y.o. female who presents with complaints of right back and leg pain    Past Medical History[1]    Past Surgical History[2]    Current Medications[3]    Allergies[4]    Physical Exam:   Vitals:    05/30/25 0836   BP: 154/81   Pulse: (!) 54   Resp: 16   Temp: 97.7 °F (36.5 °C)   SpO2: 98%     General: Awake, Alert, Oriented x 3, Mood and affect appropriate  Respiratory: Respirations even and unlabored  Cardiovascular: Peripheral pulses intact; no edema  Musculoskeletal Exam: right back and leg pain    ASA Score: 3    Patient/Chart Verification  Patient ID Verified: Verbal  Consents Confirmed: To be obtained in the Procedural area  H&P( within 30 days) Verified: To be obtained in the Procedural area  Allergies Reviewed: Yes  Anticoag/NSAID held?: NA  Currently on antibiotics?: No  Pregnancy denied?: NA    Assessment:   1. Lumbar radiculitis        Plan: Right L5-S1 LESI         [1]   Past Medical History:  Diagnosis Date    Ambulatory dysfunction     uses walking stick    Anxiety     Arthritis     Cancer (HCC) 7/15/17    All better now    Chronic pain disorder     Claustrophobia     mild    DDD (degenerative disc disease), lumbar     Endometrial cancer (HCC) 07/06/2017    tRA TLH BSO SLND on 7/17/17 by Dr. Stone, followed by 3 cycles of vaginal brachytherapy completed in August 2017    Low back pain     Mild acid reflux     OA (osteoarthritis)     marissa knees    Spinal stenosis of lumbosacral region     Wears glasses    [2]   Past Surgical History:  Procedure Laterality Date    COLONOSCOPY      FL INJECTION RIGHT HIP (NON ARTHROGRAM)  5/23/2025    FOOT SURGERY      HYSTERECTOMY      JOINT REPLACEMENT Bilateral     knees    ORTHOPEDIC SURGERY      PLANTAR FASCIA SURGERY Bilateral     IA ARTHRP ACETBLR/PROX FEM PROSTC AGRFT/ALGRFT Left 12/03/2024    Procedure: ARTHROPLASTY HIP TOTAL (psb same day dc);  Surgeon: Dipesh Knowles MD;  [facility]: [facility];  Service:  Orthopedics    IN ARTHRP KNE CONDYLE&PLATU MEDIAL&LAT COMPARTMENTS Right 11/30/2020    Procedure: ARTHROPLASTY KNEE TOTAL;  Surgeon: Dipesh Knowles MD;  [facility];  Service: Orthopedics    IN HYSTEROSCOPY BX ENDOMETRIUM&/POLYPC W/WO D&C N/A 02/28/2017    Procedure: DILATATION AND CURETTAGE (D&C) WITH HYSTEROSCOPY,POLYPECTOMY;  Surgeon: Roberto Anderson MD;  Location[facility];  Service: Gynecology    REPLACEMENT TOTAL KNEE Left 10/22/2021    SHOULDER SURGERY Left     TONSILLECTOMY      TOTAL ABDOMINAL HYSTERECTOMY W/ BILATERAL SALPINGOOPHORECTOMY Bilateral 07/2017    ((Pt Qnr Sub: ovaries removed)) endometrial cancer, ? stage 1    WISDOM TOOTH EXTRACTION     [3]   Current Outpatient Medications:     atorvastatin (LIPITOR) 10 mg tablet, TAKE 1 TABLET(10 MG) BY MOUTH DAILY, Disp: 90 tablet, Rfl: 1    baclofen 10 mg tablet, Take 0.5 tablets (5 mg total) by mouth 3 (three) times a day, Disp: 45 tablet, Rfl: 0    Cholecalciferol (HM Vitamin D3) 100 MCG (4000 UT) CAPS, Take 1 capsule (4,000 Units total) by mouth daily, Disp: 30 capsule, Rfl: 0    Coenzyme Q10-Vitamin E (QUNOL ULTRA COQ10 PO), , Disp: , Rfl:     cyanocobalamin (VITAMIN B-12) 1000 MCG tablet, Take 1,000 mcg by mouth daily, Disp: , Rfl:     ibuprofen (MOTRIN) 200 mg tablet, Take 200 mg by mouth every 6 (six) hours as needed for mild pain 3 pills every 4 hours, Disp: , Rfl:     meloxicam (Mobic) 7.5 mg tablet, Take 1 tablet (7.5 mg total) by mouth daily, Disp: 30 tablet, Rfl: 1    Misc Natural Products (GLUCOSAMINE CHOND CMP TRIPLE PO), Take by mouth, Disp: , Rfl:     Omega 3-6-9 Fatty Acids (OMEGA 3-6-9 PO), Take 1 capsule by mouth daily, Disp: , Rfl:     pregabalin (LYRICA) 75 mg capsule, Take 1 capsule (75 mg total) by mouth 2 (two) times a day, Disp: 60 capsule, Rfl: 1    Current Facility-Administered Medications:     lidocaine (XYLOCAINE) 1 % injection 2 mL, 2 mL, Injection, , Harjeet Black DPM, 2 mL at 05/20/22 5503    triamcinolone  acetonide (KENALOG-40) 40 mg/mL injection 20 mg, 20 mg, Intra-articular, , Harjeet Black DPM, 20 mg at 05/20/22 3027  [4]   Allergies  Allergen Reactions    Iv Contrast [Iodinated Contrast Media] Hives    Iodine Strong Other (See Comments)    Vitamin C [Ascorbate - Food Allergy]      Queasy stomach, loose stool

## 2025-06-05 ENCOUNTER — OFFICE VISIT (OUTPATIENT)
Dept: PHYSICAL THERAPY | Facility: CLINIC | Age: 65
End: 2025-06-05
Attending: ORTHOPAEDIC SURGERY
Payer: MEDICARE

## 2025-06-05 DIAGNOSIS — M54.16 LUMBAR RADICULOPATHY: Primary | ICD-10-CM

## 2025-06-05 PROCEDURE — 97110 THERAPEUTIC EXERCISES: CPT

## 2025-06-05 PROCEDURE — 97530 THERAPEUTIC ACTIVITIES: CPT

## 2025-06-05 NOTE — PROGRESS NOTES
"Daily Note     Today's date: 2025  Patient name: Cristin Nolan  : 1960  MRN: [id number]  Referring provider: Dipesh Knowles, *  Dx:   Encounter Diagnosis     ICD-10-CM    1. Lumbar radiculopathy  M54.16           Start Time: 1402  Stop Time: 1445  Total time in clinic (min): 43 minutes    Subjective: Patient reports she's experiencing a shocking pain in the front of her Rt hip since receiving lumbar injection.       Objective: See treatment diary below      Assessment: Patient tolerated program fairly well today despite recent onset of pain, which patient attributes to injection. Fatigues easily with any lateral gluteal strengthening and tends to use compensatory patterns due to weakness. Nerve flossing and flexion biased movements continue to be beneficial in modulating pain symptoms. Patient would benefit form continued PT to improve level of function.     Plan: Continue per POC. Increase reps/resistance as tolerated.      Precautions: Lt CARO still following hip precautions      Manuals                                                             Neuro Re-Ed             Cone taps    15x ea 15x ea                                                                                      Ther Ex             Seated flexion stretch 10x 10x5\" 10x5\" 20x         Seated sciatic nerve glide 10x 5\" 2x10 5\" 2x10  5\" 2x10        Seated march 10x 2x10 Std 2x10 Std 2x10 Foam 2x10        Supine march             SKTC   5x10\" ea  5x10\" ea        Standing Hip 3 ways  ABD 1 UE 15x B/L Abd/ext 1 UE 15x B/L Abd/ext 1 UE 2x10 B/L Foam 2x10 ea 1 UE        Heel/toe raises  20x ea 20x ea 30x ea 20x ea no UE        Bike  5' seat 14 6' 8'         Pball rollout  10x10\" 10x10\" 10x10\" 10x10\"        Side stepping  1 UE 3 laps Green 3 laps Green to fatigue Green to fatigue        Bridges    + ball sq 2x10 + ball sq 2x10        Std clamshells     Green 20x ea        Hip ABD iso     10x10\"        Ther Activity  " "           Sit to stands  1 foam 2x5 1 foam 2x5 1 foam 2x5 1 foam 2x5        Step ups  4\" 15x ea 4\" 2x10 ea  Fwd/lat 6\" 10x ea  6\" 10x ea         Gait Training                                       Modalities                                                         "

## 2025-06-06 ENCOUNTER — OFFICE VISIT (OUTPATIENT)
Dept: OBGYN CLINIC | Facility: MEDICAL CENTER | Age: 65
End: 2025-06-06
Payer: MEDICARE

## 2025-06-06 VITALS — WEIGHT: 209.8 LBS | HEIGHT: 62 IN | BODY MASS INDEX: 38.61 KG/M2

## 2025-06-06 DIAGNOSIS — M19.011 PRIMARY OSTEOARTHRITIS OF BOTH SHOULDERS: ICD-10-CM

## 2025-06-06 DIAGNOSIS — M19.012 PRIMARY OSTEOARTHRITIS OF BOTH SHOULDERS: ICD-10-CM

## 2025-06-06 DIAGNOSIS — M16.11 PRIMARY OSTEOARTHRITIS OF ONE HIP, RIGHT: ICD-10-CM

## 2025-06-06 DIAGNOSIS — M54.16 LUMBAR RADICULOPATHY: ICD-10-CM

## 2025-06-06 DIAGNOSIS — M16.12 PRIMARY OSTEOARTHRITIS OF ONE HIP, LEFT: Primary | ICD-10-CM

## 2025-06-06 PROCEDURE — 20610 DRAIN/INJ JOINT/BURSA W/O US: CPT | Performed by: ORTHOPAEDIC SURGERY

## 2025-06-06 PROCEDURE — 99214 OFFICE O/P EST MOD 30 MIN: CPT | Performed by: ORTHOPAEDIC SURGERY

## 2025-06-06 RX ORDER — ROPIVACAINE HYDROCHLORIDE 5 MG/ML
4 INJECTION, SOLUTION EPIDURAL; INFILTRATION; PERINEURAL
Status: COMPLETED | OUTPATIENT
Start: 2025-06-06 | End: 2025-06-06

## 2025-06-06 RX ORDER — METHYLPREDNISOLONE ACETATE 40 MG/ML
1 INJECTION, SUSPENSION INTRA-ARTICULAR; INTRALESIONAL; INTRAMUSCULAR; SOFT TISSUE
Status: COMPLETED | OUTPATIENT
Start: 2025-06-06 | End: 2025-06-06

## 2025-06-06 RX ORDER — ROPIVACAINE HYDROCHLORIDE 2 MG/ML
4 INJECTION, SOLUTION EPIDURAL; INFILTRATION; PERINEURAL
Status: COMPLETED | OUTPATIENT
Start: 2025-06-06 | End: 2025-06-06

## 2025-06-06 RX ADMIN — METHYLPREDNISOLONE ACETATE 1 ML: 40 INJECTION, SUSPENSION INTRA-ARTICULAR; INTRALESIONAL; INTRAMUSCULAR; SOFT TISSUE at 12:15

## 2025-06-06 RX ADMIN — ROPIVACAINE HYDROCHLORIDE 4 ML: 2 INJECTION, SOLUTION EPIDURAL; INFILTRATION; PERINEURAL at 12:15

## 2025-06-06 RX ADMIN — ROPIVACAINE HYDROCHLORIDE 4 ML: 5 INJECTION, SOLUTION EPIDURAL; INFILTRATION; PERINEURAL at 12:15

## 2025-06-06 NOTE — PROGRESS NOTES
Orthopaedic Surgery - Office Note  Cristin Nolan (65 y.o. female)   : 1960   MRN: [id number]  Encounter Date: 2025    Assessment & Plan  Primary osteoarthritis of one hip, left    Primary osteoarthritis of one hip, right    Lumbar radiculopathy  Referral to Dr. Hoang was placed to evaluate the lumbar spine due to failed CSI's and worsening pain.  Primary osteoarthritis of both shoulders  CSI of the bilateral shoulder was offered and accepted by patient. Patient tolerated procedures well with no complications    Assessment / Plan    Status post left total hip arthroplasty performed on 12/3/2024 with subsequent dislocation on 2025    Lumbar spondylosis with referred pain to BLE    Right hip osteoarthritis    She has had no relief from Intraarticular CSI right hip 25 and L5-S1 CSI on 25  Discussed with patient that her symptoms are most likely coming from her lumbar spine  Discussed talking with spine and pain on 25 about a potential lumbar ablation   Activity as tolerated  Continue use of cane and walker as needed  Continue with PT  Home exercise program reviewed  Anti-inflammatories or Tylenol prn pain  Follow-up:  Return in about 3 months (around 2025).      Chief Complaint / Date of Onset  Left hip osteoarthritis  Lumbar radiculopathy  Right hip osteoarthritis  Injury Mechanism / Date  Postoperative left CARO dislocation on 2025  Surgery / Date  Left total hip arthroplasty with Dr. Knowles on 12/3/2024    History of Present Illness   Cristin Nolan is a 65 y.o. female who presents for follow-up evaluation status post left total hip arthroplasty performed on 12/3/2024 with subsequent dislocation on 2025.  She received a CSI to her right hip on 25 and L5-S1 injection on 25 with no relief. She claims that her symptoms have worsened since her injections. She has an increased sense of instability in the right hip/lower extremity since the injections. She claims  "that she is unable to ambulate without a cane or walker due to the instability.       Treatment Summary  Medications / Modalities  Advil as needed  Bracing / Immobilization  None  Physical Therapy  Began postop 12/10/2024  Injections  Intraarticular CSI right hip 5/23/25 and L5-S1 CSI on 5/30/25  Prior Surgeries  See above  Left total knee arthroplasty on 10/21/2021 at [facility]  Right total knee arthroplasty on 11/30/2020 with Dr. Knowles  Other Treatments  See above    Employment / Current Status  N/A    Sport / Organization / Current Status  N/A      Review of Systems  Pertinent items are noted in HPI.  All other systems were reviewed and are negative.      Physical Exam  Ht 5' 2\" (1.575 m)   Wt 95.2 kg (209 lb 12.8 oz)   BMI 38.37 kg/m²   Cons: Appears well.  No apparent distress.  Psych: Alert. Oriented x3.  Mood and affect normal.  Eyes: PERRLA, EOMI  Resp: Normal effort.  No audible wheezing or stridor.  CV: Palpable pulse.  No discernable arrhythmia.  No LE edema.  Lymph:  No palpable cervical, axillary, or inguinal lymphadenopathy.  Skin: Warm.  No palpable masses.  No visible lesions.  Neuro: Normal muscle tone.  Normal and symmetric DTR's.     Left Hip Exam  Alignment / Posture:  Normal resting hip posture. Leg lengths appear equal.  Inspection:  No swelling. No erythema. Incision healed.  Palpation:  mild lateral hip tenderness. No warmth. No clicking, catching, or snapping.  ROM:  Hip Flexion 110. Hip ER 50. Hip IR 30.  Strength:  Hip Flexors 4/5. Hip Abductors 4/5.  Stability:  Not tested.  Tests:  (-) Chuy.  Neurovascular:  Sensation intact in DP/SP/Gill/Sa/T nerve distributions. 2+ DP & PT pulses.  Gait:  Trendelenberg.    Bilateral shoulder Exam  Alignment / Posture:  Normal shoulder posture. Normal scapular position.  Inspection:  No swelling.  Palpation:  Anterior tenderness at right shoulder.  ROM: Right Shoulder . Shoulder ER 30.  Left shoulder  degrees, external rotation 60 " degrees  Strength:  4/5 supraspinatus, infraspinatus, and subscapularis.  Stability:  No objective shoulder instability.  Tests: No pertinent positive or negative tests.  Neurovascular:  Sensation intact in Ax/R/M/U nerve distributions. 2+ radial pulse.          Studies Reviewed  I have personally reviewed pertinent films in PACS.  XR of bilateral shoulder - Performed on 2/5/2025 demonstrates severe glenohumeral joint osteoarthritis  XR of left hip - Performed on 2/21/2025 demonstrates stable appearance of CARO.  MRI of lumbar spine - Performed on 4/11/2025 demonstrates Multilevel degenerative changes with mass effect on the thecal sac at L2-L3.      Large joint arthrocentesis: R glenohumeral    Performed by: Dipesh Knowles MD  Authorized by: Dipesh Knowles MD    Universal Protocol:  procedure performed by consultantConsent: Verbal consent obtained  Risks and benefits: risks, benefits and alternatives were discussed  Consent given by: patient  Patient understanding: patient states understanding of the procedure being performed  Site marked: the operative site was marked  Patient identity confirmed: verbally with patient  Supporting Documentation  Indications: pain and joint swelling     Is this a Visco injection? NoProcedure Details  Location: shoulder - R glenohumeral  Preparation: Patient was prepped and draped in the usual sterile fashion  Needle size: 22 G  Ultrasound guidance: no  Approach: lateral  Medications administered: 1 mL methylPREDNISolone acetate 40 mg/mL; 4 mL ropivacaine 0.2 %    Patient tolerance: patient tolerated the procedure well with no immediate complications  Dressing:  Sterile dressing applied      Large joint arthrocentesis: L glenohumeral    Performed by: Dipesh Knowles MD  Authorized by: Dipesh Knowles MD    Universal Protocol:  Procedure performed by:  Consent: Verbal consent obtained  Risks and benefits: risks, benefits and alternatives were  discussed  Consent given by: patient  Patient understanding: patient states understanding of the procedure being performed  Site marked: the operative site was marked  Patient identity confirmed: verbally with patient  Supporting Documentation  Indications: pain and joint swelling     Is this a Visco injection? NoProcedure Details  Location: shoulder - L glenohumeral  Needle size: 22 G  Ultrasound guidance: no  Approach: lateral  Medications administered: 4 mL ropivacaine 0.5 %; 1 mL methylPREDNISolone acetate 40 mg/mL               Medical, Surgical, Family, and Social History  The patient's medical history, family history, and social history, were reviewed and updated as appropriate.    Past Medical History:   Diagnosis Date    Ambulatory dysfunction     uses walking stick    Anxiety     Arthritis     Cancer (HCC) 7/15/17    All better now    Chronic pain disorder     Claustrophobia     mild    DDD (degenerative disc disease), lumbar     Endometrial cancer ([facility]) 07/06/2017    tRA TLH BSO SLND on 7/17/17 by Dr. Stone, followed by 3 cycles of vaginal brachytherapy completed in August 2017    Low back pain     Mild acid reflux     OA (osteoarthritis)     marissa knees    Spinal stenosis of lumbosacral region     Wears glasses        Past Surgical History:   Procedure Laterality Date    COLONOSCOPY      FL INJECTION RIGHT HIP (NON ARTHROGRAM)  5/23/2025    FOOT SURGERY      HYSTERECTOMY      JOINT REPLACEMENT Bilateral     knees    ORTHOPEDIC SURGERY      PLANTAR FASCIA SURGERY Bilateral     WI ARTHRP ACETBLR/PROX FEM PROSTC AGRFT/ALGRFT Left 12/03/2024    Procedure: ARTHROPLASTY HIP TOTAL (psb same day dc);  Surgeon: Dipesh Knowles MD;  Location:  [facility];  Service: Orthopedics    WI ARTHRP KNE CONDYLE&PLATU MEDIAL&LAT COMPARTMENTS Right 11/30/2020    Procedure: ARTHROPLASTY KNEE TOTAL;  Surgeon: Dipesh Knowles MD;  [facility];  Service: Orthopedics    WI HYSTEROSCOPY BX ENDOMETRIUM&/POLYPC W/WO D&C  N/A 02/28/2017    Procedure: DILATATION AND CURETTAGE (D&C) WITH HYSTEROSCOPY,POLYPECTOMY;  Surgeon: Roberto Anderson MD;  [facility];  Service: Gynecology    REPLACEMENT TOTAL KNEE Left 10/22/2021    SHOULDER SURGERY Left     TONSILLECTOMY      TOTAL ABDOMINAL HYSTERECTOMY W/ BILATERAL SALPINGOOPHORECTOMY Bilateral 07/2017    ((Pt Qnr Sub: ovaries removed)) endometrial cancer, ? stage 1    WISDOM TOOTH EXTRACTION         Family History   Problem Relation Name Age of Onset    Ovarian cancer Mother Flora medina 70    Cancer Mother Flora medina     Esophageal cancer Father Damion medina     Cancer Father Damion medina     No Known Problems Maternal Grandmother      No Known Problems Maternal Grandfather      No Known Problems Paternal Grandmother      No Known Problems Paternal Grandfather      No Known Problems Maternal Aunt      No Known Problems Paternal Aunt      Breast cancer Neg Hx      Colon cancer Neg Hx      Uterine cancer Neg Hx         Social History     Occupational History    Not on file   Tobacco Use    Smoking status: Never    Smokeless tobacco: Never    Tobacco comments:     No secondhand smoke exposure   Vaping Use    Vaping status: Never Used   Substance and Sexual Activity    Alcohol use: Not Currently     Comment: very rarely    Drug use: No    Sexual activity: Not Currently     Partners: Male     Birth control/protection: Abstinence       Allergies   Allergen Reactions    Iv Contrast [Iodinated Contrast Media] Hives    Iodine Strong Other (See Comments)    Vitamin C [Ascorbate - Food Allergy]      Queasy stomach, loose stool         Current Outpatient Medications:     atorvastatin (LIPITOR) 10 mg tablet, TAKE 1 TABLET(10 MG) BY MOUTH DAILY, Disp: 90 tablet, Rfl: 1    baclofen 10 mg tablet, Take 0.5 tablets (5 mg total) by mouth 3 (three) times a day, Disp: 45 tablet, Rfl: 0    Cholecalciferol (HM Vitamin D3) 100 MCG (4000 UT) CAPS, Take 1 capsule (4,000 Units total) by mouth  daily, Disp: 30 capsule, Rfl: 0    Coenzyme Q10-Vitamin E (QUNOL ULTRA COQ10 PO), , Disp: , Rfl:     cyanocobalamin (VITAMIN B-12) 1000 MCG tablet, Take 1,000 mcg by mouth in the morning., Disp: , Rfl:     meloxicam (Mobic) 7.5 mg tablet, Take 1 tablet (7.5 mg total) by mouth daily, Disp: 30 tablet, Rfl: 1    Misc Natural Products (GLUCOSAMINE CHOND CMP TRIPLE PO), Take by mouth, Disp: , Rfl:     Omega 3-6-9 Fatty Acids (OMEGA 3-6-9 PO), Take 1 capsule by mouth in the morning., Disp: , Rfl:     pregabalin (LYRICA) 75 mg capsule, Take 1 capsule (75 mg total) by mouth 2 (two) times a day, Disp: 60 capsule, Rfl: 1    ibuprofen (MOTRIN) 200 mg tablet, Take 200 mg by mouth every 6 (six) hours as needed for mild pain 3 pills every 4 hours, Disp: , Rfl:     Current Facility-Administered Medications:     lidocaine (XYLOCAINE) 1 % injection 2 mL, 2 mL, Injection, , Harjeet Black DPM, 2 mL at 05/20/22 1445    triamcinolone acetonide (KENALOG-40) 40 mg/mL injection 20 mg, 20 mg, Intra-articular, , Harjeet Black DPM, 20 mg at 05/20/22 1445      Dipesh Knowles MD    Scribe Attestation      I,:   am acting as a scribe while in the presence of the attending physician.:       I,:   personally performed the services described in this documentation    as scribed in my presence.:

## 2025-06-10 ENCOUNTER — APPOINTMENT (OUTPATIENT)
Dept: PHYSICAL THERAPY | Facility: CLINIC | Age: 65
End: 2025-06-10
Attending: ORTHOPAEDIC SURGERY
Payer: MEDICARE

## 2025-06-10 NOTE — PROGRESS NOTES
"Daily Note     Today's date: 6/10/2025  Patient name: Cristin Nolan  : 1960  MRN: [id number]  Referring provider: Dipesh Knowles, *  Dx: No diagnosis found.               Subjective: ***      Objective: See treatment diary below      Assessment: Patient tolerated treatment session well.       Plan: Continue per POC. Increase reps/resistance as tolerated.     Precautions: Lt CARO still following hip precautions      Manuals 5/14 5/20 5/22 5/29 6/5 6/10                                                           Neuro Re-Ed             Cone taps    15x ea 15x ea                                                                                      Ther Ex             Seated flexion stretch 10x 10x5\" 10x5\" 20x         Seated sciatic nerve glide 10x 5\" 2x10 5\" 2x10  5\" 2x10        Seated march 10x 2x10 Std 2x10 Std 2x10 Foam 2x10        Supine march             SKTC   5x10\" ea  5x10\" ea        Standing Hip 3 ways  ABD 1 UE 15x B/L Abd/ext 1 UE 15x B/L Abd/ext 1 UE 2x10 B/L Foam 2x10 ea 1 UE        Heel/toe raises  20x ea 20x ea 30x ea 20x ea no UE        Bike  5' seat 14 6' 8'         Pball rollout  10x10\" 10x10\" 10x10\" 10x10\"        Side stepping  1 UE 3 laps Green 3 laps Green to fatigue Green to fatigue        Bridges    + ball sq 2x10 + ball sq 2x10        Std clamshells     Green 20x ea        Hip ABD iso     10x10\"        Ther Activity             Sit to stands  1 foam 2x5 1 foam 2x5 1 foam 2x5 1 foam 2x5        Step ups  4\" 15x ea 4\" 2x10 ea  Fwd/lat 6\" 10x ea  6\" 10x ea         Gait Training                                       Modalities                                                           "

## 2025-06-11 DIAGNOSIS — M79.18 MYOFASCIAL PAIN SYNDROME: ICD-10-CM

## 2025-06-11 RX ORDER — BACLOFEN 10 MG/1
5 TABLET ORAL 3 TIMES DAILY
Qty: 45 TABLET | Refills: 0 | Status: CANCELLED | OUTPATIENT
Start: 2025-06-11

## 2025-06-12 ENCOUNTER — OFFICE VISIT (OUTPATIENT)
Dept: PHYSICAL THERAPY | Facility: CLINIC | Age: 65
End: 2025-06-12
Attending: ORTHOPAEDIC SURGERY
Payer: MEDICARE

## 2025-06-12 DIAGNOSIS — M79.18 MYOFASCIAL PAIN SYNDROME: ICD-10-CM

## 2025-06-12 DIAGNOSIS — M54.16 LUMBAR RADICULOPATHY: Primary | ICD-10-CM

## 2025-06-12 PROCEDURE — 97530 THERAPEUTIC ACTIVITIES: CPT

## 2025-06-12 PROCEDURE — 97110 THERAPEUTIC EXERCISES: CPT

## 2025-06-12 RX ORDER — BACLOFEN 10 MG/1
TABLET ORAL
Qty: 45 TABLET | Refills: 1 | Status: SHIPPED | OUTPATIENT
Start: 2025-06-12

## 2025-06-12 NOTE — PROGRESS NOTES
"Daily Note     Today's date: 2025  Patient name: Cristin Nolan  : 1960  MRN: [id number]  Referring provider: Dipesh Knowles, *  Dx:   Encounter Diagnosis     ICD-10-CM    1. Lumbar radiculopathy  M54.16           Start Time: 1407  Stop Time: 1445  Total time in clinic (min): 38 minutes    Subjective: Patient reports she needed to use her walker after going to the pool yesterday due to anterior hip \"not feeling right\".       Objective: See treatment diary below      Assessment: Patient continues to be tolerating program well despite ongoing difficulty reported with ADLs. Reviewed hip flexor stretch off a step to reduce anterior tightness in hip. Compensated with lateral trunk lean to Rt in order to get more hip flexion during cone taps. Patient would benefit from continued PT to improve level of function.       Plan: Continue per POC. Increase reps/resistance as tolerated.      Precautions: Lt CARO still following hip precautions      Manuals                                                            Neuro Re-Ed             Cone taps    15x ea 15x ea 2x10 ea       SLS      2x30\"                                                                        Ther Ex             Seated flexion stretch 10x 10x5\" 10x5\" 20x         Seated sciatic nerve glide 10x 5\" 2x10 5\" 2x10  5\" 2x10        Seated march 10x 2x10 Std 2x10 Std 2x10 Foam 2x10 Foam 2x10 1 UE       Supine march             SKTC   5x10\" ea  5x10\" ea        Standing Hip 3 ways  ABD 1 UE 15x B/L Abd/ext 1 UE 15x B/L Abd/ext 1 UE 2x10 B/L Foam 2x10 ea 1 UE Foam 2x10 ea 1 UE       Heel/toe raises  20x ea 20x ea 30x ea 20x ea no UE 30x ea       Bike  5' seat 14 6' 8'  6'       Pball rollout  10x10\" 10x10\" 10x10\" 10x10\" 10x10\"       Side stepping  1 UE 3 laps Green 3 laps Green to fatigue Green to fatigue Blue to fatigue        Bridges    + ball sq 2x10 + ball sq 2x10        Std clamshells     Green 20x ea Blue 20x5\" ea     " "  Hip ABD iso     10x10\"        Hip flexor stretch off step      5x10\" Rt       Ther Activity             Sit to stands  1 foam 2x5 1 foam 2x5 1 foam 2x5 1 foam 2x5 1 foam 10x       Step ups  4\" 15x ea 4\" 2x10 ea  Fwd/lat 6\" 10x ea  6\" 10x ea  6\" 15x ea        Gait Training                                       Modalities                                                             "

## 2025-06-13 ENCOUNTER — OFFICE VISIT (OUTPATIENT)
Dept: PAIN MEDICINE | Facility: MEDICAL CENTER | Age: 65
End: 2025-06-13
Payer: MEDICARE

## 2025-06-13 ENCOUNTER — TELEPHONE (OUTPATIENT)
Dept: PAIN MEDICINE | Facility: CLINIC | Age: 65
End: 2025-06-13

## 2025-06-13 VITALS — WEIGHT: 209 LBS | BODY MASS INDEX: 38.46 KG/M2 | HEIGHT: 62 IN

## 2025-06-13 DIAGNOSIS — R35.0 URINARY FREQUENCY: Primary | ICD-10-CM

## 2025-06-13 DIAGNOSIS — R35.0 URINARY FREQUENCY: ICD-10-CM

## 2025-06-13 DIAGNOSIS — M25.551 RIGHT HIP PAIN: ICD-10-CM

## 2025-06-13 DIAGNOSIS — M46.1 SACROILIITIS (HCC): Primary | ICD-10-CM

## 2025-06-13 DIAGNOSIS — M79.18 MYOFASCIAL PAIN SYNDROME: ICD-10-CM

## 2025-06-13 DIAGNOSIS — M79.2 NEUROPATHIC PAIN: ICD-10-CM

## 2025-06-13 DIAGNOSIS — M54.16 LUMBAR RADICULITIS: ICD-10-CM

## 2025-06-13 PROCEDURE — 99214 OFFICE O/P EST MOD 30 MIN: CPT

## 2025-06-13 PROCEDURE — G2211 COMPLEX E/M VISIT ADD ON: HCPCS

## 2025-06-13 RX ORDER — OXYBUTYNIN CHLORIDE 5 MG/1
5 TABLET, EXTENDED RELEASE ORAL DAILY
Qty: 30 TABLET | Refills: 1 | Status: SHIPPED | OUTPATIENT
Start: 2025-06-13 | End: 2025-06-13

## 2025-06-13 RX ORDER — OXYBUTYNIN CHLORIDE 5 MG/1
5 TABLET, EXTENDED RELEASE ORAL DAILY
Qty: 90 TABLET | Refills: 1 | Status: SHIPPED | OUTPATIENT
Start: 2025-06-13

## 2025-06-13 RX ORDER — DULOXETIN HYDROCHLORIDE 30 MG/1
30 CAPSULE, DELAYED RELEASE ORAL DAILY
Qty: 30 CAPSULE | Refills: 1 | Status: SHIPPED | OUTPATIENT
Start: 2025-06-13

## 2025-06-13 NOTE — TELEPHONE ENCOUNTER
Patient reports 0-25% improvement post inj  Pain level 6-7/10    Pt would like to know next steps? Please advise.

## 2025-06-13 NOTE — PROGRESS NOTES
Name: Cristin Nolan      : 1960      MRN: [id number]  Encounter Provider: Jennifer Marte PA-C  Encounter Date: 2025   Encounter department: [facility] [place]  :  Assessment & Plan  Sacroiliitis (HCC)    Orders:    FL spine and pain procedure; Future    Lumbar radiculitis    Orders:    DULoxetine (CYMBALTA) 30 mg delayed release capsule; Take 1 capsule (30 mg total) by mouth daily    Neuropathic pain    Orders:    DULoxetine (CYMBALTA) 30 mg delayed release capsule; Take 1 capsule (30 mg total) by mouth daily    Right hip pain         Myofascial pain syndrome           Schedule for right SIJ injection.  Complete risks and benefits including hyperglycemia, bleeding, infection, local tissue reaction, nerve injury, and allergic reaction were discussed. The approach was demonstrated using models and literature was provided. The patient was agreeable, verbalized an understanding, and wishes to proceed with scheduling the procedure.    If failure of right SIJ injection, can consider right intra-articular hip injection.    Start Cymbalta 30 mg nightly as prescribed.  The patient denied being prescribed any anti-depressant and/or psychiatric medications. I reviewed with the patient that it may take 3-4 weeks for the medication's effects to be noticed and that it should never be abruptly stopped. Possible side effects include but are not limited to; vertigo, lethargy, nausea, worsening depression/anxiety, and confusion. I advised the patient to call our office if they experience any side effects. The patient verbalized an understanding.    Continue the use of baclofen as prescribed.  Patient states she tolerates this medication well without side effects.    Continue attending formal physical therapy sessions as discussed.    Proceed with consultation with orthopedic spine, Dr. Hoang, as scheduled.    Follow-up in 1 month to review medication response and reevaluation.    My impressions and  treatment recommendations were discussed in detail with the patient who verbalized understanding and had no further questions.  Discharge instructions were provided. I personally saw and examined the patient and I agree with the above discussed plan of care.    History of Present Illness     Cristin Nolan is a 65 y.o. female who presents for a follow up office visit after undergoing right L5-S1 LESI on 5/30/2025.  Patient reports experiencing approximately 10% symptom relief lasting about 2 days following this recent injection.  At last visit, patient was started on Lyrica 50 mg twice daily.  Patient has since self discontinued the use of Lyrica due to experiencing the side effects of dizziness.    She continues to locate her pain to the bilateral aspect of the low back, with right side worse than left.  She describes her pain as a constant dull, aching, sharp, and shooting pain.  She rates her pain today 6/10 on the numeric rating scale.  Denies numbness and tingling of bilateral lower extremities.  Admits radiating pain along the anterior aspect of the right lower leg.  Admits to use of baclofen, ibuprofen, and acetaminophen for symptom management.  Patient states she recently began attending formal physical therapy sessions 2 times per week with minimal symptom relief.    Patient is followed by orthopedics, Dr. Knowles, for osteoarthritis of bilateral hips.  At last visit with Dr. Knowles on 6/6/2025, patient was referred to orthopedic spine, Dr. Hoang for lumbar radiculopathy with history of failed epidural steroid injections.  Patient has consult visit with Dr. Hoang scheduled for 6/30/2025.    Unfortunately, patient has recently failed right L5-S1 TFESI and right L5-S1 LESI.  She also underwent previous right L2-3 ANAHY with [facility] without symptom relief.    Review of Systems   Constitutional:  Negative for fever.   HENT:  Negative for hearing loss.    Eyes:  Negative for visual disturbance.   Respiratory:  " Negative for cough.    Cardiovascular:  Negative for leg swelling.   Gastrointestinal:  Negative for abdominal pain and nausea.   Endocrine: Negative for polydipsia.   Genitourinary:  Negative for difficulty urinating.   Musculoskeletal:  Positive for back pain and gait problem. Negative for myalgias.   Skin:  Negative for rash.   Neurological:  Positive for weakness. Negative for numbness.   Hematological:  Does not bruise/bleed easily.   Psychiatric/Behavioral:  Negative for dysphoric mood and sleep disturbance.        Medical History Reviewed by provider this encounter:     .  Medications Ordered Prior to Encounter[1]      Objective   Ht 5' 2\" (1.575 m)   Wt 94.8 kg (209 lb)   BMI 38.23 kg/m²      Pain Score:   6  Physical Exam  Constitutional: normal, well developed, well nourished, alert, in no distress and non-toxic and no overt pain behavior.  Eyes: anicteric  HEENT: grossly intact  Neck: supple, symmetric, trachea midline and no masses   Pulmonary: even and unlabored  Cardiovascular: No edema or pitting edema present  Skin: Normal without rashes or lesions and well hydrated  Psychiatric: Mood and affect appropriate  Neurologic: Cranial Nerves II-XII grossly intact, bilateral lower extremity strength is normal, negative seated straight leg raise bilaterally  Musculoskeletal: normal, except for tenderness to palpation along the right SI joint and right side of the lower lumbar facet joint    Sacroiliac joint testing is positive on the right for the following tests:  + Roseanna finger sign  + Delmar's test  + Gaenslen's test    Radiology Results Review: I have reviewed radiology reports from 4/11/2025 including: MRI spine.         [1]   Current Outpatient Medications on File Prior to Visit   Medication Sig Dispense Refill    atorvastatin (LIPITOR) 10 mg tablet TAKE 1 TABLET(10 MG) BY MOUTH DAILY 90 tablet 1    baclofen 10 mg tablet TAKE 1/2 TABLET(5 MG) BY MOUTH THREE TIMES DAILY 45 tablet 1    " Cholecalciferol (HM Vitamin D3) 100 MCG (4000 UT) CAPS Take 1 capsule (4,000 Units total) by mouth daily 30 capsule 0    Coenzyme Q10-Vitamin E (QUNOL ULTRA COQ10 PO)       cyanocobalamin (VITAMIN B-12) 1000 MCG tablet Take 1,000 mcg by mouth in the morning.      Misc Natural Products (GLUCOSAMINE CHOND CMP TRIPLE PO) Take by mouth      Omega 3-6-9 Fatty Acids (OMEGA 3-6-9 PO) Take 1 capsule by mouth in the morning.      ibuprofen (MOTRIN) 200 mg tablet Take 200 mg by mouth every 6 (six) hours as needed for mild pain 3 pills every 4 hours      meloxicam (Mobic) 7.5 mg tablet Take 1 tablet (7.5 mg total) by mouth daily (Patient not taking: Reported on 6/13/2025) 30 tablet 1    pregabalin (LYRICA) 75 mg capsule Take 1 capsule (75 mg total) by mouth 2 (two) times a day (Patient not taking: Reported on 6/13/2025) 60 capsule 1     Current Facility-Administered Medications on File Prior to Visit   Medication Dose Route Frequency Provider Last Rate Last Admin    lidocaine (XYLOCAINE) 1 % injection 2 mL  2 mL Injection  Harjeet Black DPM   2 mL at 05/20/22 1444    triamcinolone acetonide (KENALOG-40) 40 mg/mL injection 20 mg  20 mg Intra-articular  Harjeet Black DPM   20 mg at 05/20/22 0114

## 2025-06-17 ENCOUNTER — OFFICE VISIT (OUTPATIENT)
Dept: PHYSICAL THERAPY | Facility: CLINIC | Age: 65
End: 2025-06-17
Attending: ORTHOPAEDIC SURGERY
Payer: MEDICARE

## 2025-06-17 DIAGNOSIS — M54.16 LUMBAR RADICULOPATHY: Primary | ICD-10-CM

## 2025-06-17 PROCEDURE — 97110 THERAPEUTIC EXERCISES: CPT | Performed by: PHYSICAL THERAPIST

## 2025-06-17 PROCEDURE — 97530 THERAPEUTIC ACTIVITIES: CPT | Performed by: PHYSICAL THERAPIST

## 2025-06-17 NOTE — PROGRESS NOTES
"Daily Note     Today's date: 2025  Patient name: Cristin Nolan  : 1960  MRN: [id number]  Referring provider: Dipesh Knowles, *  Dx:   Encounter Diagnosis     ICD-10-CM    1. Lumbar radiculopathy  M54.16                      Subjective: Pt reports shooting pains have been worse today, mostly groin area. Pt notes she is getting another injection tomorrow.      Objective: See treatment diary below      Assessment: Pt tolerating strengthening exercises well. Pt still relies on cane for ambulation and shows significant hip drop. Will continue to progress as able.      Plan: Continue per plan of care.      Precautions: Lt CARO still following hip precautions      Manuals                                                           Neuro Re-Ed             Cone taps    15x ea 15x ea 2x10 ea 2x10ea      SLS      2x30\" 2x30''                                                                       Ther Ex             Seated flexion stretch 10x 10x5\" 10x5\" 20x         Seated sciatic nerve glide 10x 5\" 2x10 5\" 2x10  5\" 2x10        Seated march 10x 2x10 Std 2x10 Std 2x10 Foam 2x10 Foam 2x10 1 UE Foam 20x 1 UE      Supine march             SKTC   5x10\" ea  5x10\" ea        Standing Hip 3 ways  ABD 1 UE 15x B/L Abd/ext 1 UE 15x B/L Abd/ext 1 UE 2x10 B/L Foam 2x10 ea 1 UE Foam 2x10 ea 1 UE Foam 2x10 ea 1 UE      Heel/toe raises  20x ea 20x ea 30x ea 20x ea no UE 30x ea 30xea      Bike  5' seat 14 6' 8'  6' 6'      Pball rollout  10x10\" 10x10\" 10x10\" 10x10\" 10x10\"       Side stepping  1 UE 3 laps Green 3 laps Green to fatigue Green to fatigue Blue to fatigue  Blue to fatigue      Bridges    + ball sq 2x10 + ball sq 2x10        Std clamshells     Green 20x ea Blue 20x5\" ea Blue 20x5''ea      Hip ABD iso     10x10\"        Hip flexor stretch off step      5x10\" Rt       Ther Activity             Sit to stands  1 foam 2x5 1 foam 2x5 1 foam 2x5 1 foam 2x5 1 foam 10x 1 foam 10x      Step ups  " "4\" 15x ea 4\" 2x10 ea  Fwd/lat 6\" 10x ea  6\" 10x ea  6\" 15x ea  15xea 6''      Gait Training                                       Modalities                                                               "

## 2025-06-18 ENCOUNTER — HOSPITAL ENCOUNTER (OUTPATIENT)
Dept: RADIOLOGY | Facility: MEDICAL CENTER | Age: 65
Discharge: HOME | End: 2025-06-18
Payer: MEDICARE

## 2025-06-18 VITALS
SYSTOLIC BLOOD PRESSURE: 155 MMHG | DIASTOLIC BLOOD PRESSURE: 90 MMHG | TEMPERATURE: 97.4 F | OXYGEN SATURATION: 93 % | RESPIRATION RATE: 18 BRPM | HEART RATE: 58 BPM

## 2025-06-18 DIAGNOSIS — M46.1 SACROILIITIS (HCC): ICD-10-CM

## 2025-06-18 PROCEDURE — 27096 INJECT SACROILIAC JOINT: CPT | Performed by: PHYSICAL MEDICINE & REHABILITATION

## 2025-06-18 RX ORDER — ROPIVACAINE HYDROCHLORIDE 2 MG/ML
1.5 INJECTION, SOLUTION EPIDURAL; INFILTRATION; PERINEURAL ONCE
Status: COMPLETED | OUTPATIENT
Start: 2025-06-18 | End: 2025-06-18

## 2025-06-18 RX ORDER — METHYLPREDNISOLONE ACETATE 40 MG/ML
40 INJECTION, SUSPENSION INTRA-ARTICULAR; INTRALESIONAL; INTRAMUSCULAR; PARENTERAL; SOFT TISSUE ONCE
Status: COMPLETED | OUTPATIENT
Start: 2025-06-18 | End: 2025-06-18

## 2025-06-18 RX ADMIN — METHYLPREDNISOLONE ACETATE 40 MG: 40 INJECTION, SUSPENSION INTRA-ARTICULAR; INTRALESIONAL; INTRAMUSCULAR; SOFT TISSUE at 11:18

## 2025-06-18 RX ADMIN — ROPIVACAINE HYDROCHLORIDE 1.5 ML: 2 INJECTION, SOLUTION EPIDURAL; INFILTRATION at 11:18

## 2025-06-18 NOTE — H&P
History of Present Illness: The patient is a 65 y.o. female who presents with complaints of right low back pain    Past Medical History[1]    Past Surgical History[2]    Current Medications[3]    Allergies[4]    Physical Exam:   Vitals:    06/18/25 1106   BP: 136/85   Pulse: 67   Resp: 20   Temp: (!) 97.4 °F (36.3 °C)   SpO2: 98%     General: Awake, Alert, Oriented x 3, Mood and affect appropriate  Respiratory: Respirations even and unlabored  Cardiovascular: Peripheral pulses intact; no edema  Musculoskeletal Exam: right low back pain    ASA Score: 3    Patient/Chart Verification  Patient ID Verified: Verbal  ID Band Applied: No  Consents Confirmed: To be obtained in the Procedural area  Interval H&P(within 24 hr) Complete (required for Outpatients and Surgery Admit only): To be obtained in the Procedural area  Allergies Reviewed: Yes (contrast allergy)  Anticoag/NSAID held?: NA  Currently on antibiotics?: No    Assessment:   1. Sacroiliitis (HCC)        Plan: Right SIJ injection         [1]   Past Medical History:  Diagnosis Date    Ambulatory dysfunction     uses walking stick    Anxiety     Arthritis     Cancer (HCC) 7/15/17    All better now    Chronic pain disorder     Claustrophobia     mild    DDD (degenerative disc disease), lumbar     Endometrial cancer (HCC) 07/06/2017    tRA TLH BSO SLND on 7/17/17 by Dr. Stone, followed by 3 cycles of vaginal brachytherapy completed in August 2017    Low back pain     Mild acid reflux     OA (osteoarthritis)     marissa knees    Spinal stenosis of lumbosacral region     Wears glasses    [2]   Past Surgical History:  Procedure Laterality Date    COLONOSCOPY      FL INJECTION RIGHT HIP (NON ARTHROGRAM)  5/23/2025    FOOT SURGERY      HYSTERECTOMY      JOINT REPLACEMENT Bilateral     knees    ORTHOPEDIC SURGERY      PLANTAR FASCIA SURGERY Bilateral     LA ARTHRP ACETBLR/PROX FEM PROSTC AGRFT/ALGRFT Left 12/03/2024    Procedure: ARTHROPLASTY HIP TOTAL (psb same day dc);   Surgeon: Dipesh Knowles MD;  Location:  MAIN OR;  Service: Orthopedics    MD ARTHRP KNE CONDYLE&PLATU MEDIAL&LAT COMPARTMENTS Right 11/30/2020    Procedure: ARTHROPLASTY KNEE TOTAL;  Surgeon: Dipesh Knowles MD;  Location: [facility];  Service: Orthopedics    MD HYSTEROSCOPY BX ENDOMETRIUM&/POLYPC W/WO D&C N/A 02/28/2017    Procedure: DILATATION AND CURETTAGE (D&C) WITH HYSTEROSCOPY,POLYPECTOMY;  Surgeon: Roberto Anderson MD;  Location: [facility];  Service: Gynecology    REPLACEMENT TOTAL KNEE Left 10/22/2021    SHOULDER SURGERY Left     TONSILLECTOMY      TOTAL ABDOMINAL HYSTERECTOMY W/ BILATERAL SALPINGOOPHORECTOMY Bilateral 07/2017    ((Pt Qnr Sub: ovaries removed)) endometrial cancer, ? stage 1    WISDOM TOOTH EXTRACTION     [3]   Current Outpatient Medications:     atorvastatin (LIPITOR) 10 mg tablet, TAKE 1 TABLET(10 MG) BY MOUTH DAILY, Disp: 90 tablet, Rfl: 1    baclofen 10 mg tablet, TAKE 1/2 TABLET(5 MG) BY MOUTH THREE TIMES DAILY, Disp: 45 tablet, Rfl: 1    Cholecalciferol (HM Vitamin D3) 100 MCG (4000 UT) CAPS, Take 1 capsule (4,000 Units total) by mouth daily, Disp: 30 capsule, Rfl: 0    Coenzyme Q10-Vitamin E (QUNOL ULTRA COQ10 PO), , Disp: , Rfl:     cyanocobalamin (VITAMIN B-12) 1000 MCG tablet, Take 1,000 mcg by mouth in the morning., Disp: , Rfl:     DULoxetine (CYMBALTA) 30 mg delayed release capsule, Take 1 capsule (30 mg total) by mouth daily, Disp: 30 capsule, Rfl: 1    ibuprofen (MOTRIN) 200 mg tablet, Take 200 mg by mouth every 6 (six) hours as needed for mild pain 3 pills every 4 hours, Disp: , Rfl:     meloxicam (Mobic) 7.5 mg tablet, Take 1 tablet (7.5 mg total) by mouth daily (Patient not taking: Reported on 6/13/2025), Disp: 30 tablet, Rfl: 1    Misc Natural Products (GLUCOSAMINE CHOND CMP TRIPLE PO), Take by mouth, Disp: , Rfl:     Omega 3-6-9 Fatty Acids (OMEGA 3-6-9 PO), Take 1 capsule by mouth in the morning., Disp: , Rfl:     oxybutynin (DITROPAN-XL) 5 mg 24 hr tablet,  TAKE 1 TABLET(5 MG) BY MOUTH DAILY, Disp: 90 tablet, Rfl: 1    pregabalin (LYRICA) 75 mg capsule, Take 1 capsule (75 mg total) by mouth 2 (two) times a day (Patient not taking: Reported on 6/13/2025), Disp: 60 capsule, Rfl: 1    Current Facility-Administered Medications:     lidocaine (XYLOCAINE) 1 % injection 2 mL, 2 mL, Injection, , Harjeet Black DPM, 2 mL at 05/20/22 1445    triamcinolone acetonide (KENALOG-40) 40 mg/mL injection 20 mg, 20 mg, Intra-articular, , Harjeet Black DPM, 20 mg at 05/20/22 1445  [4]   Allergies  Allergen Reactions    Iv Contrast [Iodinated Contrast Media] Hives    Iodine Strong Other (See Comments)    Vitamin C [Ascorbate - Food Allergy]      Queasy stomach, loose stool

## 2025-06-18 NOTE — DISCHARGE INSTR - LAB

## 2025-06-19 ENCOUNTER — APPOINTMENT (OUTPATIENT)
Dept: PHYSICAL THERAPY | Facility: CLINIC | Age: 65
End: 2025-06-19
Attending: ORTHOPAEDIC SURGERY
Payer: MEDICARE

## 2025-06-24 ENCOUNTER — OFFICE VISIT (OUTPATIENT)
Dept: PHYSICAL THERAPY | Facility: CLINIC | Age: 65
End: 2025-06-24
Attending: ORTHOPAEDIC SURGERY
Payer: MEDICARE

## 2025-06-24 DIAGNOSIS — M54.16 LUMBAR RADICULOPATHY: Primary | ICD-10-CM

## 2025-06-24 PROCEDURE — 97530 THERAPEUTIC ACTIVITIES: CPT

## 2025-06-24 PROCEDURE — 97110 THERAPEUTIC EXERCISES: CPT

## 2025-06-24 NOTE — PROGRESS NOTES
"Daily Note     Today's date: 2025  Patient name: Cristin Nolan  : 1960  MRN: [id number]  Referring provider: Dipesh Knowles, *  Dx:   Encounter Diagnosis     ICD-10-CM    1. Lumbar radiculopathy  M54.16           Start Time: 1400  Stop Time: 1440  Total time in clinic (min): 40 minutes    Subjective: Patient reports that hip and back are feeling better since receiving most recent injection. Mentions she felt good in her water zomba class last night.       Objective: See treatment diary below      Assessment: Patient is showing improved tolerance to strengthening since receiving Rt SIJ injection. She notes having most of her discomfort in Rt anterior hip/groin region but doesn't worsen with listed exercises. Added ed step overs as an alternative way to strengthen hip flexors. Will continue to progress to improve strength, rom, and gait quality.       Plan: Continue per POC. Increase reps/resistance as tolerated.      Precautions: Lt CARO still following hip precautions      Manuals                                                          Neuro Re-Ed             Cone taps    15x ea 15x ea 2x10 ea 2x10ea      SLS      2x30\" 2x30''                                                                       Ther Ex             Seated flexion stretch 10x 10x5\" 10x5\" 20x         Seated sciatic nerve glide 10x 5\" 2x10 5\" 2x10  5\" 2x10        Seated march 10x 2x10 Std 2x10 Std 2x10 Foam 2x10 Foam 2x10 1 UE Foam 20x 1 UE      Supine march             SKTC   5x10\" ea  5x10\" ea        Standing Hip 3 ways  ABD 1 UE 15x B/L Abd/ext 1 UE 15x B/L Abd/ext 1 UE 2x10 B/L Foam 2x10 ea 1 UE Foam 2x10 ea 1 UE Foam 2x10 ea 1 UE      Heel/toe raises  20x ea 20x ea 30x ea 20x ea no UE 30x ea 30xea 30xea     Bike  5' seat 14 6' 8'  6' 6' 8'     Pball rollout  10x10\" 10x10\" 10x10\" 10x10\" 10x10\"  10x10\"     Side stepping  1 UE 3 laps Green 3 laps Green to fatigue Green to fatigue Blue to " "fatigue  Blue to fatigue Blue 4 laps     Bridges    + ball sq 2x10 + ball sq 2x10        Std clamshells     Green 20x ea Blue 20x5\" ea Blue 20x5''ea Blue 20x5''ea     Hip ABD iso     10x10\"        Hip flexor stretch off step      5x10\" Rt       Ther Activity             Sit to stands  1 foam 2x5 1 foam 2x5 1 foam 2x5 1 foam 2x5 1 foam 10x 1 foam 10x 1 foam      Step ups  4\" 15x ea 4\" 2x10 ea  Fwd/lat 6\" 10x ea  6\" 10x ea  6\" 15x ea  15xea 6'' 15xea 6''     Aimee step overs        2x10 ea fwd/lat     Gait Training                                       Modalities                                                                 "

## 2025-06-26 ENCOUNTER — OFFICE VISIT (OUTPATIENT)
Dept: PHYSICAL THERAPY | Facility: CLINIC | Age: 65
End: 2025-06-26
Attending: ORTHOPAEDIC SURGERY
Payer: MEDICARE

## 2025-06-26 DIAGNOSIS — M54.16 LUMBAR RADICULOPATHY: Primary | ICD-10-CM

## 2025-06-26 PROCEDURE — 97110 THERAPEUTIC EXERCISES: CPT

## 2025-06-26 PROCEDURE — 97530 THERAPEUTIC ACTIVITIES: CPT

## 2025-06-26 NOTE — PROGRESS NOTES
"Daily Note     Today's date: 2025  Patient name: Cristin Nolan  : 1960  MRN: [id number]  Referring provider: Dipesh Knowles, *  Dx:   Encounter Diagnosis     ICD-10-CM    1. Lumbar radiculopathy  M54.16           Start Time: 1404  Stop Time: 1443  Total time in clinic (min): 39 minutes    Subjective: Patient reports she's been doing more hip biased exercises in pool.       Objective: See treatment diary below      Assessment: Patient is tolerating outlined program well. She continues to respond well to trunk flexion stretching and demonstrates good effort with hip strengthening. Sit to stands were more challenging today due increased groin discomfort. Will continue to progress as able in order for patient to improve functional strength and mobility.       Plan: Continue per POC. Increase reps/resistance as tolerated.      Precautions: Lt CARO still following hip precautions    Manuals                                                         Neuro Re-Ed             Cone taps    15x ea 15x ea 2x10 ea 2x10ea      SLS      2x30\" 2x30''  2x30'' ea                                                                     Ther Ex             Seated flexion stretch 10x 10x5\" 10x5\" 20x         Seated sciatic nerve glide 10x 5\" 2x10 5\" 2x10  5\" 2x10    5\" 2x10    Seated march 10x 2x10 Std 2x10 Std 2x10 Foam 2x10 Foam 2x10 1 UE Foam 20x 1 UE      Supine march             SKTC   5x10\" ea  5x10\" ea        Standing Hip 3 ways  ABD 1 UE 15x B/L Abd/ext 1 UE 15x B/L Abd/ext 1 UE 2x10 B/L Foam 2x10 ea 1 UE Foam 2x10 ea 1 UE Foam 2x10 ea 1 UE      Heel/toe raises  20x ea 20x ea 30x ea 20x ea no UE 30x ea 30xea 30xea 30xea    Bike  5' seat 14 6' 8'  6' 6' 8' 6'    Pball rollout  10x10\" 10x10\" 10x10\" 10x10\" 10x10\"  10x10\" 10x10\"    Side stepping  1 UE 3 laps Green 3 laps Green to fatigue Green to fatigue Blue to fatigue  Blue to fatigue Blue 4 laps Blue 4 laps    Bridges    + ball " "sq 2x10 + ball sq 2x10        Std clamshells     Green 20x ea Blue 20x5\" ea Blue 20x5''ea Blue 20x5''ea Blue 20x5''ea    Hip ABD iso     10x10\"        Hip flexor stretch off step      5x10\" Rt   10x10\"    Ther Activity             Sit to stands  1 foam 2x5 1 foam 2x5 1 foam 2x5 1 foam 2x5 1 foam 10x 1 foam 10x 1 foam  1 foam  2x10     Step ups  4\" 15x ea 4\" 2x10 ea  Fwd/lat 6\" 10x ea  6\" 10x ea  6\" 15x ea  15xea 6'' 15xea 6'' 15xea 6''    Aimee step overs        2x10 ea fwd/lat 3 laps    Gait Training                                       Modalities                                                                   "

## 2025-06-30 ENCOUNTER — OFFICE VISIT (OUTPATIENT)
Dept: OBGYN CLINIC | Facility: HOSPITAL | Age: 65
End: 2025-06-30
Attending: ORTHOPAEDIC SURGERY
Payer: MEDICARE

## 2025-06-30 ENCOUNTER — HOSPITAL ENCOUNTER (OUTPATIENT)
Dept: RADIOLOGY | Facility: HOSPITAL | Age: 65
Discharge: HOME | End: 2025-06-30
Attending: ORTHOPAEDIC SURGERY
Payer: MEDICARE

## 2025-06-30 VITALS — HEIGHT: 62 IN | BODY MASS INDEX: 38.46 KG/M2 | WEIGHT: 209 LBS

## 2025-06-30 DIAGNOSIS — M51.369 DEGENERATION OF INTERVERTEBRAL DISC OF LUMBAR REGION, UNSPECIFIED WHETHER PAIN PRESENT: ICD-10-CM

## 2025-06-30 DIAGNOSIS — M54.16 LUMBAR RADICULOPATHY: ICD-10-CM

## 2025-06-30 DIAGNOSIS — M16.11 PRIMARY OSTEOARTHRITIS OF ONE HIP, RIGHT: ICD-10-CM

## 2025-06-30 DIAGNOSIS — M47.816 SPONDYLOSIS OF LUMBAR SPINE: ICD-10-CM

## 2025-06-30 DIAGNOSIS — M43.16 SPONDYLOLISTHESIS AT L4-L5 LEVEL: Primary | ICD-10-CM

## 2025-06-30 PROCEDURE — 72110 X-RAY EXAM L-2 SPINE 4/>VWS: CPT

## 2025-06-30 PROCEDURE — 99214 OFFICE O/P EST MOD 30 MIN: CPT | Performed by: ORTHOPAEDIC SURGERY

## 2025-06-30 NOTE — ASSESSMENT & PLAN NOTE
Alexa demonstrates multiple levels of degenerative changes with anterolisthesis of L4 over L5.  There is also severe spondylosis at L3-L4.  Discussed continuation of nonoperative treatment in the form of physical therapy, injection therapy, anti-inflammatories, and chiropractic treatments.  Surgical intervention would be in the form of a multilevel lumbar fusion with approximately a 3 to 6-month recovery.  Due to the severe nature of the arthritic changes of her hip and her clinical signs of hip joint pathology I do recommend pursuing a total hip arthroplasty prior to considering lumbar fusion.  Alexa verbalized understanding and will follow-up with Dr. Knowles for further treatments of her hip  Follow-up with me if she has persistent symptoms following total hip arthroplasty.      Orders:    XR spine lumbar minimum 4 views non injury; Future    Ambulatory Referral to Orthopedic Surgery

## 2025-06-30 NOTE — PROGRESS NOTES
Name: Cristin Nolan      : 1960       MRN: [id number]   Encounter Provider: Hakeem Hoang MD   Encounter Date: 25  Encounter department: [facility] ORTHOPEDIC SPINE SURGERY    Medical Decision Making:     Assessment & Plan  Spondylolisthesis at L4-L5 level  Lumbar radiculopathy  Spondylosis of lumbar spine    Alexa demonstrates multiple levels of degenerative changes with anterolisthesis of L4 over L5.  There is also severe spondylosis at L3-L4.  Discussed continuation of nonoperative treatment in the form of physical therapy, injection therapy, anti-inflammatories, and chiropractic treatments.  Surgical intervention would be in the form of a multilevel lumbar fusion with approximately a 3 to 6-month recovery.  Due to the severe nature of the arthritic changes of her hip and her clinical signs of hip joint pathology I do recommend pursuing a total hip arthroplasty prior to considering lumbar fusion.  Alexa verbalized understanding and will follow-up with Dr. Knowles for further treatments of her hip  Follow-up with me if she has persistent symptoms following total hip arthroplasty.      Orders:    XR spine lumbar minimum 4 views non injury; Future    Ambulatory Referral to Orthopedic Surgery    Primary osteoarthritis of one hip, right  Alexa demonstrates severe arthritic changes of the hip joint.  This is evident on x-rays.  She does also have symptoms consistent with intra-articular hip pathology.  Recommend considering total hip arthroplasty prior to lumbar fusion  I did note that it is our hopes that restoration of the hip pain will allow for easier ambulation with the hopes of alleviating painful symptoms in her spine  Recommend follow-up with Dr. Knowles to discuss potential total hip arthroplasty.              Diagnostic Tests   IMAGING: I have personally reviewed the images and these are my findings:  Lumbar Spine X-rays from 25:  multi level lumbar spondylosis with loss of disc height, facet hypertrophy, no apparent spondylolisthesis, no appreciated lytic/blastic lesions, no obvious instability    Lumbar Spine MRI from 4/11/25: multi level lumbar disc degeneration with anterolisthesis of L4-L5 type I endplate degenerative changes at L3-L4..  Severe facet arthrosis of L4-L5 no significant canal or foraminal stenosis     Subjective:      Chief Complaint: Back Pain    HPI:  Cristin Nolan is a 65 y.o. female presenting for initial visit with chief complaint of right low back and anterior hip pain.  He is referred to me today by Dr. Dipesh Knowles. This has been an ongoing issue for approximately 1 year.  She does have underlying osteoarthritis of the hip joint that is severe in nature.  She has a history of a total hip arthroplasty of the left as well as bilateral total knee arthroplasties.  She does also have a history of a left hip dislocation approximately 1 month after her total hip arthroplasty.  She states that she has been seeing Dr. Oliva of pain management and has undergone multiple rounds of injections with the first being an FL guided intra-articular hip injection on August 20, 2024.  She did then have a L5-S1 interlaminar injection on May 30, 2025 as well as a right sacroiliac joint injection on June 18, 2025.  She states that all injections failed to provide her with lasting relief.  She has been partaking in physical therapy as well as aquatic therapy without lasting relief.  She states that her pain is localized into the right lower lumbar spine with radiation wrapping circumferentially around the hip into the anterior aspect as well as distally into the thigh, knee and lower leg.  She does also acknowledge groin pain with motion of the hip.  She did have an EMG study of the lower extremities performed in November 20 24th that demonstrated right sided L5 radiculopathy.  She denies bladder or bowel changes.  She does ambulate  with the assistance of a cane.  She denies any sangita trauma.  Denies fevers or chills, no night sweats        Conservative therapy includes the following:   Medications: Baclofen, Advil, Tylenol  Injections:   Refill guided intra-articular hip injection-right 8/20/2024: Dr. Oliva  L5-S1 interlaminar epidural injection 5/30/2025: Dr. Oliva  Sacroiliac joint injection  6/18/2025: Dr. Oliva  Physical Therapy: Has attempted  Chiropractic Medicine: has not attempted  Accupunture/Massage Therapy: has not attempted     Nicotine dependent: Denies  Living situation: Lives alone  ADLs: patient is able to perform     Objective:     Family History[1]    Past Medical History[2]    Current Medications[3]    Past Surgical History[4]    Social History     Socioeconomic History    Marital status:      Spouse name: Not on file    Number of children: Not on file    Years of education: Not on file    Highest education level: Not on file   Occupational History    Not on file   Tobacco Use    Smoking status: Never    Smokeless tobacco: Never    Tobacco comments:     No secondhand smoke exposure   Vaping Use    Vaping status: Never Used   Substance and Sexual Activity    Alcohol use: Not Currently     Comment: very rarely    Drug use: No    Sexual activity: Not Currently     Partners: Male     Birth control/protection: Abstinence   Other Topics Concern    Not on file   Social History Narrative    Not on file     Social Drivers of Health     Financial Resource Strain: Low Risk  (1/2/2024)    Overall Financial Resource Strain (CARDIA)     Difficulty of Paying Living Expenses: Not hard at all   Food Insecurity: No Food Insecurity (10/22/2021)    Received from [facility]    Food Needs     In the past 12 months, did the food you bought just not last and you didn't have money to get more?: Never true     Within the past 12 months, did you worry  that your food would run out before you got money to buy more?: Never true  "  Transportation Needs: No Transportation Needs (1/2/2024)    PRAPARE - Transportation     Lack of Transportation (Medical): No     Lack of Transportation (Non-Medical): No   Physical Activity: Not on file   Stress: Not on file   Social Connections: Not on file   Intimate Partner Violence: Not on file   Housing Stability: Low Risk  (4/21/2022)    Received from [facility]    Housing Insecurity     Do you have housing?: I have housing     Are you worried about losing your housing ? : No       Allergies[5]    Review of Systems  General- denies fever/chills  HEENT- denies hearing loss or sore throat  Eyes- denies eye pain or visual disturbances, denies red eyes  Respiratory- denies cough or SOB  Cardio- denies chest pain or palpitations  GI- denies abdominal pain  Endocrine- denies urinary frequency  Urinary- denies pain with urination  Musculoskeletal- Negative except noted above  Skin- denies rashes or wounds  Neurological- denies dizziness or headache  Psychiatric- denies anxiety or difficulty concentrating    Physical Exam  Ht 5' 2\" (1.575 m)   Wt 94.8 kg (208 lb 15.9 oz)   BMI 38.23 kg/m²     General/Constitutional: No apparent distress: well-nourished and well developed.  Lymphatic: No appreciable lymphadenopathy  Respiratory: Non-labored breathing  Vascular: No edema, swelling or tenderness, except as noted in detailed exam.  Integumentary: No impressive skin lesions present, except as noted in detailed exam.  Psych: Normal mood and affect, oriented to person, place and time.  MSK: normal other than stated in HPI and exam  Gait & balance: no evidence of myelopathic gait, ambulates with a Cane    Lumbar spine range of motion:  -Forward flexion to 90  -Extension to neutral  There is mild tenderness with palpation along lumbar paraspinal musculature, no midline tenderness     Neurologic:    Lower Extremity Motor Function    Right  Left    Iliopsoas  5/5  5/5    Quadriceps 5/5 5/5   Tibialis anterior  5/5  5/5  " "  EHL  5/5  5/5    Gastroc. muscle  5/5  5/5      Sensory: light touch is intact to bilateral lower extremities     Reflexes:  Intact, diminished     Other tests:  Straight Leg Raise: negative  Roseanna SI: negative  EMMETT SI: negative  Greater troch: no tenderness   Internal/external hip ROM: intact, with pain into groin  Flexion/extension knee ROM: intact, no pain   Vascular: WWP extremities    Electronic Medical Records were reviewed including: Office notes, image studies, procedure notes.    Procedures, if performed today     None performed       Portions of the record may have been created with voice recognition software.  Occasional wrong word or \"sound a like\" substitutions may have occurred due to the inherent limitations of voice recognition software.  Read the chart carefully and recognize, using context, where substitutions have occurred.          [1]   Family History  Problem Relation Name Age of Onset    Ovarian cancer Mother Flora medina 70    Cancer Mother Flora medina     Esophageal cancer Father Damion medina     Cancer Father Damion medina     No Known Problems Maternal Grandmother      No Known Problems Maternal Grandfather      No Known Problems Paternal Grandmother      No Known Problems Paternal Grandfather      No Known Problems Maternal Aunt      No Known Problems Paternal Aunt      Breast cancer Neg Hx      Colon cancer Neg Hx      Uterine cancer Neg Hx     [2]   Past Medical History:  Diagnosis Date    Ambulatory dysfunction     uses walking stick    Anxiety     Arthritis     Cancer (HCC) 7/15/17    All better now    Chronic pain disorder     Claustrophobia     mild    DDD (degenerative disc disease), lumbar     Endometrial cancer (HCC) 07/06/2017    tRA TLH BSO SLND on 7/17/17 by Dr. Stone, followed by 3 cycles of vaginal brachytherapy completed in August 2017    Low back pain     Mild acid reflux     OA (osteoarthritis)     marissa knees    Spinal stenosis of lumbosacral region     " Wears glasses    [3]   Current Outpatient Medications   Medication Sig Dispense Refill    atorvastatin (LIPITOR) 10 mg tablet TAKE 1 TABLET(10 MG) BY MOUTH DAILY 90 tablet 1    baclofen 10 mg tablet TAKE 1/2 TABLET(5 MG) BY MOUTH THREE TIMES DAILY 45 tablet 1    Cholecalciferol (HM Vitamin D3) 100 MCG (4000 UT) CAPS Take 1 capsule (4,000 Units total) by mouth daily 30 capsule 0    Coenzyme Q10-Vitamin E (QUNOL ULTRA COQ10 PO)       cyanocobalamin (VITAMIN B-12) 1000 MCG tablet Take 1,000 mcg by mouth in the morning.      Misc Natural Products (GLUCOSAMINE CHOND CMP TRIPLE PO) Take by mouth      Omega 3-6-9 Fatty Acids (OMEGA 3-6-9 PO) Take 1 capsule by mouth in the morning.      DULoxetine (CYMBALTA) 30 mg delayed release capsule Take 1 capsule (30 mg total) by mouth daily 30 capsule 1     Current Facility-Administered Medications   Medication Dose Route Frequency Provider Last Rate Last Admin    lidocaine (XYLOCAINE) 1 % injection 2 mL  2 mL Injection  Harjeet Black DPM   2 mL at 05/20/22 1445    triamcinolone acetonide (KENALOG-40) 40 mg/mL injection 20 mg  20 mg Intra-articular  Harjeet Black DPM   20 mg at 05/20/22 1445   [4]   Past Surgical History:  Procedure Laterality Date    COLONOSCOPY      FL INJECTION RIGHT HIP (NON ARTHROGRAM)  5/23/2025    FOOT SURGERY      HYSTERECTOMY      JOINT REPLACEMENT Bilateral     knees    ORTHOPEDIC SURGERY      PLANTAR FASCIA SURGERY Bilateral     PA ARTHRP ACETBLR/PROX FEM PROSTC AGRFT/ALGRFT Left 12/03/2024    Procedure: ARTHROPLASTY HIP TOTAL (psb same day dc);  Surgeon: Dipesh Knowles MD;  Location:  MAIN OR;  Service: Orthopedics    PA ARTHRP KNE CONDYLE&PLATU MEDIAL&LAT COMPARTMENTS Right 11/30/2020    Procedure: ARTHROPLASTY KNEE TOTAL;  Surgeon: Dipesh Knowles MD;  Location: [facility];  Service: Orthopedics    PA HYSTEROSCOPY BX ENDOMETRIUM&/POLYPC W/WO D&C N/A 02/28/2017    Procedure: DILATATION AND CURETTAGE (D&C) WITH HYSTEROSCOPY,POLYPECTOMY;  Surgeon: Roberto  MD Justin;  Location: [facility];  Service: Gynecology    REPLACEMENT TOTAL KNEE Left 10/22/2021    SHOULDER SURGERY Left     TONSILLECTOMY      TOTAL ABDOMINAL HYSTERECTOMY W/ BILATERAL SALPINGOOPHORECTOMY Bilateral 07/2017    ((Pt Qnr Sub: ovaries removed)) endometrial cancer, ? stage 1    WISDOM TOOTH EXTRACTION     [5]   Allergies  Allergen Reactions    Iv Contrast [Iodinated Contrast Media] Hives    Iodine Strong Other (See Comments)    Vitamin C [Ascorbate - Food Allergy]      Queasy stomach, loose stool

## 2025-06-30 NOTE — ASSESSMENT & PLAN NOTE
Alexa demonstrates severe arthritic changes of the hip joint.  This is evident on x-rays.  She does also have symptoms consistent with intra-articular hip pathology.  Recommend considering total hip arthroplasty prior to lumbar fusion  I did note that it is our hopes that restoration of the hip pain will allow for easier ambulation with the hopes of alleviating painful symptoms in her spine  Recommend follow-up with Dr. Knowles to discuss potential total hip arthroplasty.

## 2025-06-30 NOTE — LETTER
2025     Dipesh Knowles MD  [facility]  [address] [place] [id number]    Patient: Cristin Nolan   YOB: 1960   Date of Visit: 2025       Dear Dr. Dipesh Knowles MD:    Thank you for referring Cristin Nolan to me for evaluation. Below are my notes for this consultation.    If you have questions, please do not hesitate to call me. I look forward to following your patient along with you.         Sincerely,        Hakeem Hoang MD        CC: No Recipients    Hakeem Hoang MD  2025  3:11 PM  Sign when Signing Visit  Name: Cristin Nolan      : 1960       MRN: [id number]   Encounter Provider: Hakeem Hoang MD   Encounter Date: 25  Encounter department: [facility]    Medical Decision Making:     Assessment & Plan  Spondylolisthesis at L4-L5 level  Lumbar radiculopathy  Spondylosis of lumbar spine    Alexa demonstrates multiple levels of degenerative changes with anterolisthesis of L4 over L5.  There is also severe spondylosis at L3-L4.  Discussed continuation of nonoperative treatment in the form of physical therapy, injection therapy, anti-inflammatories, and chiropractic treatments.  Surgical intervention would be in the form of a multilevel lumbar fusion with approximately a 3 to 6-month recovery.  Due to the severe nature of the arthritic changes of her hip and her clinical signs of hip joint pathology I do recommend pursuing a total hip arthroplasty prior to considering lumbar fusion.  Alexa verbalized understanding and will follow-up with Dr. Knowles for further treatments of her hip  Follow-up with me if she has persistent symptoms following total hip arthroplasty.      Orders:  •  XR spine lumbar minimum 4 views non injury; Future  •  Ambulatory Referral to Orthopedic Surgery    Primary osteoarthritis of one hip, right  Alexa demonstrates severe arthritic  changes of the hip joint.  This is evident on x-rays.  She does also have symptoms consistent with intra-articular hip pathology.  Recommend considering total hip arthroplasty prior to lumbar fusion  I did note that it is our hopes that restoration of the hip pain will allow for easier ambulation with the hopes of alleviating painful symptoms in her spine  Recommend follow-up with Dr. Knowles to discuss potential total hip arthroplasty.              Diagnostic Tests   IMAGING: I have personally reviewed the images and these are my findings:  Lumbar Spine X-rays from 6/30/25: multi level lumbar spondylosis with loss of disc height, facet hypertrophy, no apparent spondylolisthesis, no appreciated lytic/blastic lesions, no obvious instability    Lumbar Spine MRI from 4/11/25: multi level lumbar disc degeneration with anterolisthesis of L4-L5 type I endplate degenerative changes at L3-L4..  Severe facet arthrosis of L4-L5 no significant canal or foraminal stenosis     Subjective:      Chief Complaint: Back Pain    HPI:  Cristin Nolan is a 65 y.o. female presenting for initial visit with chief complaint of right low back and anterior hip pain.  He is referred to me today by Dr. Dipesh Knowles. This has been an ongoing issue for approximately 1 year.  She does have underlying osteoarthritis of the hip joint that is severe in nature.  She has a history of a total hip arthroplasty of the left as well as bilateral total knee arthroplasties.  She does also have a history of a left hip dislocation approximately 1 month after her total hip arthroplasty.  She states that she has been seeing Dr. Oliva of pain management and has undergone multiple rounds of injections with the first being an FL guided intra-articular hip injection on August 20, 2024.  She did then have a L5-S1 interlaminar injection on May 30, 2025 as well as a right sacroiliac joint injection on June 18, 2025.  She states that all injections failed to  provide her with lasting relief.  She has been partaking in physical therapy as well as aquatic therapy without lasting relief.  She states that her pain is localized into the right lower lumbar spine with radiation wrapping circumferentially around the hip into the anterior aspect as well as distally into the thigh, knee and lower leg.  She does also acknowledge groin pain with motion of the hip.  She did have an EMG study of the lower extremities performed in November 20 24th that demonstrated right sided L5 radiculopathy.  She denies bladder or bowel changes.  She does ambulate with the assistance of a cane.  She denies any sangita trauma.  Denies fevers or chills, no night sweats        Conservative therapy includes the following:   Medications: Baclofen, Advil, Tylenol  Injections:   Refill guided intra-articular hip injection-right 8/20/2024: Dr. Oliva  L5-S1 interlaminar epidural injection 5/30/2025: Dr. Oliva  Sacroiliac joint injection  6/18/2025: Dr. Oliva  Physical Therapy: Has attempted  Chiropractic Medicine: has not attempted  Accupunture/Massage Therapy: has not attempted     Nicotine dependent: Denies  Living situation: Lives alone  ADLs: patient is able to perform     Objective:     Family History[1]    Past Medical History[2]    Current Medications[3]    Past Surgical History[4]    Social History     Socioeconomic History   • Marital status:      Spouse name: Not on file   • Number of children: Not on file   • Years of education: Not on file   • Highest education level: Not on file   Occupational History   • Not on file   Tobacco Use   • Smoking status: Never   • Smokeless tobacco: Never   • Tobacco comments:     No secondhand smoke exposure   Vaping Use   • Vaping status: Never Used   Substance and Sexual Activity   • Alcohol use: Not Currently     Comment: very rarely   • Drug use: No   • Sexual activity: Not Currently     Partners: Male     Birth control/protection: Abstinence  "  Other Topics Concern   • Not on file   Social History Narrative   • Not on file     Social Drivers of Health     Financial Resource Strain: Low Risk  (1/2/2024)    Overall Financial Resource Strain (CARDIA)    • Difficulty of Paying Living Expenses: Not hard at all   Food Insecurity: No Food Insecurity (10/22/2021)    Received from [facility]    Food Needs    • In the past 12 months, did the food you bought just not last and you didn't have money to get more?: Never true    • Within the past 12 months, did you worry  that your food would run out before you got money to buy more?: Never true   Transportation Needs: No Transportation Needs (1/2/2024)    PRAPARE - Transportation    • Lack of Transportation (Medical): No    • Lack of Transportation (Non-Medical): No   Physical Activity: Not on file   Stress: Not on file   Social Connections: Not on file   Intimate Partner Violence: Not on file   Housing Stability: Low Risk  (4/21/2022)    Received from [facility]    Housing Insecurity    • Do you have housing?: I have housing    • Are you worried about losing your housing ? : No       Allergies[5]    Review of Systems  General- denies fever/chills  HEENT- denies hearing loss or sore throat  Eyes- denies eye pain or visual disturbances, denies red eyes  Respiratory- denies cough or SOB  Cardio- denies chest pain or palpitations  GI- denies abdominal pain  Endocrine- denies urinary frequency  Urinary- denies pain with urination  Musculoskeletal- Negative except noted above  Skin- denies rashes or wounds  Neurological- denies dizziness or headache  Psychiatric- denies anxiety or difficulty concentrating    Physical Exam  Ht 5' 2\" (1.575 m)   Wt 94.8 kg (208 lb 15.9 oz)   BMI 38.23 kg/m²     General/Constitutional: No apparent distress: well-nourished and well developed.  Lymphatic: No appreciable lymphadenopathy  Respiratory: Non-labored breathing  Vascular: No edema, swelling or tenderness, except as noted in " "detailed exam.  Integumentary: No impressive skin lesions present, except as noted in detailed exam.  Psych: Normal mood and affect, oriented to person, place and time.  MSK: normal other than stated in HPI and exam  Gait & balance: no evidence of myelopathic gait, ambulates with a Cane    Lumbar spine range of motion:  -Forward flexion to 90  -Extension to neutral  There is mild tenderness with palpation along lumbar paraspinal musculature, no midline tenderness     Neurologic:    Lower Extremity Motor Function    Right  Left    Iliopsoas  5/5  5/5    Quadriceps 5/5 5/5   Tibialis anterior  5/5  5/5    EHL  5/5  5/5    Gastroc. muscle  5/5  5/5      Sensory: light touch is intact to bilateral lower extremities     Reflexes:  Intact, diminished     Other tests:  Straight Leg Raise: negative  Roseanna SI: negative  EMMETT SI: negative  Greater troch: no tenderness   Internal/external hip ROM: intact, with pain into groin  Flexion/extension knee ROM: intact, no pain   Vascular: WWP extremities    Electronic Medical Records were reviewed including: Office notes, image studies, procedure notes.    Procedures, if performed today     None performed       Portions of the record may have been created with voice recognition software.  Occasional wrong word or \"sound a like\" substitutions may have occurred due to the inherent limitations of voice recognition software.  Read the chart carefully and recognize, using context, where substitutions have occurred.          [1]   Family History  Problem Relation Name Age of Onset   • Ovarian cancer Mother Flora medina 70   • Cancer Mother Flora medina    • Esophageal cancer Father Damion medina    • Cancer Father Damion medina    • No Known Problems Maternal Grandmother     • No Known Problems Maternal Grandfather     • No Known Problems Paternal Grandmother     • No Known Problems Paternal Grandfather     • No Known Problems Maternal Aunt     • No Known Problems Paternal Aunt "     • Breast cancer Neg Hx     • Colon cancer Neg Hx     • Uterine cancer Neg Hx     [2]   Past Medical History:  Diagnosis Date   • Ambulatory dysfunction     uses walking stick   • Anxiety    • Arthritis    • Cancer (HCC) 7/15/17    All better now   • Chronic pain disorder    • Claustrophobia     mild   • DDD (degenerative disc disease), lumbar    • Endometrial cancer (HCC) 07/06/2017    tRA TLH BSO SLND on 7/17/17 by Dr. Stone, followed by 3 cycles of vaginal brachytherapy completed in August 2017   • Low back pain    • Mild acid reflux    • OA (osteoarthritis)     marissa knees   • Spinal stenosis of lumbosacral region    • Wears glasses    [3]   Current Outpatient Medications   Medication Sig Dispense Refill   • atorvastatin (LIPITOR) 10 mg tablet TAKE 1 TABLET(10 MG) BY MOUTH DAILY 90 tablet 1   • baclofen 10 mg tablet TAKE 1/2 TABLET(5 MG) BY MOUTH THREE TIMES DAILY 45 tablet 1   • Cholecalciferol (HM Vitamin D3) 100 MCG (4000 UT) CAPS Take 1 capsule (4,000 Units total) by mouth daily 30 capsule 0   • Coenzyme Q10-Vitamin E (QUNOL ULTRA COQ10 PO)      • cyanocobalamin (VITAMIN B-12) 1000 MCG tablet Take 1,000 mcg by mouth in the morning.     • Misc Natural Products (GLUCOSAMINE CHOND CMP TRIPLE PO) Take by mouth     • Omega 3-6-9 Fatty Acids (OMEGA 3-6-9 PO) Take 1 capsule by mouth in the morning.     • DULoxetine (CYMBALTA) 30 mg delayed release capsule Take 1 capsule (30 mg total) by mouth daily 30 capsule 1     Current Facility-Administered Medications   Medication Dose Route Frequency Provider Last Rate Last Admin   • lidocaine (XYLOCAINE) 1 % injection 2 mL  2 mL Injection  Harjeet Black DPM   2 mL at 05/20/22 1445   • triamcinolone acetonide (KENALOG-40) 40 mg/mL injection 20 mg  20 mg Intra-articular  Harjeet Black DPM   20 mg at 05/20/22 1445   [4]   Past Surgical History:  Procedure Laterality Date   • COLONOSCOPY     • FL INJECTION RIGHT HIP (NON ARTHROGRAM)  5/23/2025   • FOOT SURGERY     • HYSTERECTOMY      • JOINT REPLACEMENT Bilateral     knees   • ORTHOPEDIC SURGERY     • PLANTAR FASCIA SURGERY Bilateral    • MI ARTHRP ACETBLR/PROX FEM PROSTC AGRFT/ALGRFT Left 12/03/2024    Procedure: ARTHROPLASTY HIP TOTAL (psb same day dc);  Surgeon: Dipesh Knowles MD;  Location:  MAIN OR;  Service: Orthopedics   • MI ARTHRP KNE CONDYLE&PLATU MEDIAL&LAT COMPARTMENTS Right 11/30/2020    Procedure: ARTHROPLASTY KNEE TOTAL;  Surgeon: Dipesh Knowles MD;  [facility];  Service: Orthopedics   • MI HYSTEROSCOPY BX ENDOMETRIUM&/POLYPC W/WO D&C N/A 02/28/2017    Procedure: DILATATION AND CURETTAGE (D&C) WITH HYSTEROSCOPY,POLYPECTOMY;  Surgeon: Roberto Anderson MD;  [facility];  Service: Gynecology   • REPLACEMENT TOTAL KNEE Left 10/22/2021   • SHOULDER SURGERY Left    • TONSILLECTOMY     • TOTAL ABDOMINAL HYSTERECTOMY W/ BILATERAL SALPINGOOPHORECTOMY Bilateral 07/2017    ((Pt Qnr Sub: ovaries removed)) endometrial cancer, ? stage 1   • WISDOM TOOTH EXTRACTION     [5]   Allergies  Allergen Reactions   • Iv Contrast [Iodinated Contrast Media] Hives   • Iodine Strong Other (See Comments)   • Vitamin C [Ascorbate - Food Allergy]      Queasy stomach, loose stool        [1]  Family History  Problem Relation Name Age of Onset   • Ovarian cancer Mother Flora medina 70   • Cancer Mother Flora medina    • Esophageal cancer Father Damion medina    • Cancer Father Damion medina    • No Known Problems Maternal Grandmother     • No Known Problems Maternal Grandfather     • No Known Problems Paternal Grandmother     • No Known Problems Paternal Grandfather     • No Known Problems Maternal Aunt     • No Known Problems Paternal Aunt     • Breast cancer Neg Hx     • Colon cancer Neg Hx     • Uterine cancer Neg Hx     [2]  Past Medical History:  Diagnosis Date   • Ambulatory dysfunction     uses walking stick   • Anxiety    • Arthritis    • Cancer (HCC) 7/15/17    All better now   • Chronic pain disorder    •  Claustrophobia     mild   • DDD (degenerative disc disease), lumbar    • Endometrial cancer (HCC) 07/06/2017    tRA TLH BSO SLND on 7/17/17 by Dr. Stone, followed by 3 cycles of vaginal brachytherapy completed in August 2017   • Low back pain    • Mild acid reflux    • OA (osteoarthritis)     marissa knees   • Spinal stenosis of lumbosacral region    • Wears glasses    [3]  Current Outpatient Medications   Medication Sig Dispense Refill   • atorvastatin (LIPITOR) 10 mg tablet TAKE 1 TABLET(10 MG) BY MOUTH DAILY 90 tablet 1   • baclofen 10 mg tablet TAKE 1/2 TABLET(5 MG) BY MOUTH THREE TIMES DAILY 45 tablet 1   • Cholecalciferol (HM Vitamin D3) 100 MCG (4000 UT) CAPS Take 1 capsule (4,000 Units total) by mouth daily 30 capsule 0   • Coenzyme Q10-Vitamin E (QUNOL ULTRA COQ10 PO)      • cyanocobalamin (VITAMIN B-12) 1000 MCG tablet Take 1,000 mcg by mouth in the morning.     • Misc Natural Products (GLUCOSAMINE CHOND CMP TRIPLE PO) Take by mouth     • Omega 3-6-9 Fatty Acids (OMEGA 3-6-9 PO) Take 1 capsule by mouth in the morning.     • DULoxetine (CYMBALTA) 30 mg delayed release capsule Take 1 capsule (30 mg total) by mouth daily 30 capsule 1     Current Facility-Administered Medications   Medication Dose Route Frequency Provider Last Rate Last Admin   • lidocaine (XYLOCAINE) 1 % injection 2 mL  2 mL Injection  Harjeet Black DPM   2 mL at 05/20/22 1445   • triamcinolone acetonide (KENALOG-40) 40 mg/mL injection 20 mg  20 mg Intra-articular  Harjeet Black DPM   20 mg at 05/20/22 1445   [4]  Past Surgical History:  Procedure Laterality Date   • COLONOSCOPY     • FL INJECTION RIGHT HIP (NON ARTHROGRAM)  5/23/2025   • FOOT SURGERY     • HYSTERECTOMY     • JOINT REPLACEMENT Bilateral     knees   • ORTHOPEDIC SURGERY     • PLANTAR FASCIA SURGERY Bilateral    • IN ARTHRP ACETBLR/PROX FEM PROSTC AGRFT/ALGRFT Left 12/03/2024    Procedure: ARTHROPLASTY HIP TOTAL (psb same day dc);  Surgeon: Dipesh Knowles MD;  Location: [facility]  OR;  Service: Orthopedics   • AR ARTHRP KNE CONDYLE&PLATU MEDIAL&LAT COMPARTMENTS Right 11/30/2020    Procedure: ARTHROPLASTY KNEE TOTAL;  Surgeon: Dipesh Knowles MD;  [facility];  Service: Orthopedics   • AR HYSTEROSCOPY BX ENDOMETRIUM&/POLYPC W/WO D&C N/A 02/28/2017    Procedure: DILATATION AND CURETTAGE (D&C) WITH HYSTEROSCOPY,POLYPECTOMY;  Surgeon: Roberto Anderson MD;  [facility];  Service: Gynecology   • REPLACEMENT TOTAL KNEE Left 10/22/2021   • SHOULDER SURGERY Left    • TONSILLECTOMY     • TOTAL ABDOMINAL HYSTERECTOMY W/ BILATERAL SALPINGOOPHORECTOMY Bilateral 07/2017    ((Pt Qnr Sub: ovaries removed)) endometrial cancer, ? stage 1   • WISDOM TOOTH EXTRACTION     [5]  Allergies  Allergen Reactions   • Iv Contrast [Iodinated Contrast Media] Hives   • Iodine Strong Other (See Comments)   • Vitamin C [Ascorbate - Food Allergy]      Queasy stomach, loose stool

## 2025-07-01 ENCOUNTER — OFFICE VISIT (OUTPATIENT)
Dept: PHYSICAL THERAPY | Facility: CLINIC | Age: 65
End: 2025-07-01
Attending: ORTHOPAEDIC SURGERY
Payer: MEDICARE

## 2025-07-01 DIAGNOSIS — M54.16 LUMBAR RADICULOPATHY: Primary | ICD-10-CM

## 2025-07-01 PROCEDURE — 97530 THERAPEUTIC ACTIVITIES: CPT

## 2025-07-01 PROCEDURE — 97110 THERAPEUTIC EXERCISES: CPT

## 2025-07-01 NOTE — PROGRESS NOTES
"Daily Note     Today's date: 2025  Patient name: Cristin Nolan  : 1960  MRN: [id number]  Referring provider: Dipesh Knowles, *  Dx:   Encounter Diagnosis     ICD-10-CM    1. Lumbar radiculopathy  M54.16           Start Time: 1452  Stop Time: 1530  Total time in clinic (min): 38 minutes    Subjective: Patient reports seeing Dr. Hoang for low back and is recommending getting Rt hip replaced.       Objective: See treatment diary below      Assessment: Patient continues to be tolerating exercises fairly well. Balance is showing improvements but is having ongoing difficulty making gains with hip strength. Strong B/l UE assistance is needed for all functional strengthening like step ups and sit to stands. Patient reported fatigue with current program post-tx. Patient would benefit from continued PT to improve level of function.       Plan: Continue per POC. Increase reps/resistance as tolerated.      Precautions: Lt CARO still following hip precautions    Manuals                                                        Neuro Re-Ed             Cone taps    15x ea 15x ea 2x10 ea 2x10ea      SLS      2x30\" 2x30''  2x30'' ea 2x30'' ea                                                                    Ther Ex             Seated flexion stretch 10x 10x5\" 10x5\" 20x         Seated sciatic nerve glide 10x 5\" 2x10 5\" 2x10  5\" 2x10    5\" 2x10    Seated march 10x 2x10 Std 2x10 Std 2x10 Foam 2x10 Foam 2x10 1 UE Foam 20x 1 UE      Supine march             SKTC   5x10\" ea  5x10\" ea        Standing Hip 3 ways  ABD 1 UE 15x B/L Abd/ext 1 UE 15x B/L Abd/ext 1 UE 2x10 B/L Foam 2x10 ea 1 UE Foam 2x10 ea 1 UE Foam 2x10 ea 1 UE      Heel/toe raises  20x ea 20x ea 30x ea 20x ea no UE 30x ea 30xea 30xea 30xea 30x ea   Bike  5' seat 14 6' 8'  6' 6' 8' 6'    Pball rollout  10x10\" 10x10\" 10x10\" 10x10\" 10x10\"  10x10\" 10x10\" 10x10\"   Side stepping  1 UE 3 laps Green 3 laps Green to " "fatigue Green to fatigue Blue to fatigue  Blue to fatigue Blue 4 laps Blue 4 laps Blue 4 laps   Bridges    + ball sq 2x10 + ball sq 2x10        Std clamshells     Green 20x ea Blue 20x5\" ea Blue 20x5''ea Blue 20x5''ea Blue 20x5''ea    Hip ABD iso     10x10\"        Hip flexor stretch off step      5x10\" Rt   10x10\"    Ther Activity             Sit to stands  1 foam 2x5 1 foam 2x5 1 foam 2x5 1 foam 2x5 1 foam 10x 1 foam 10x 1 foam  1 foam  2x10  1 foam  2x10    Step ups  4\" 15x ea 4\" 2x10 ea  Fwd/lat 6\" 10x ea  6\" 10x ea  6\" 15x ea  15xea 6'' 15xea 6'' 15xea 6'' 15xea 6''   Aimee step overs        2x10 ea fwd/lat 3 laps    Gait Training                                       Modalities                                                                     "

## 2025-07-02 ENCOUNTER — TELEPHONE (OUTPATIENT)
Dept: PAIN MEDICINE | Facility: CLINIC | Age: 65
End: 2025-07-02

## 2025-07-02 NOTE — TELEPHONE ENCOUNTER
Demetraer: Alexa  Doctor/office: Dr Oliva   CB#: [phone]    % of improvement:0 %    Pain Scale (1-10): 9/10

## 2025-07-03 ENCOUNTER — OFFICE VISIT (OUTPATIENT)
Dept: PHYSICAL THERAPY | Facility: CLINIC | Age: 65
End: 2025-07-03
Attending: ORTHOPAEDIC SURGERY
Payer: MEDICARE

## 2025-07-03 DIAGNOSIS — M54.16 LUMBAR RADICULOPATHY: Primary | ICD-10-CM

## 2025-07-03 PROCEDURE — 97530 THERAPEUTIC ACTIVITIES: CPT

## 2025-07-03 PROCEDURE — 97110 THERAPEUTIC EXERCISES: CPT

## 2025-07-03 NOTE — PROGRESS NOTES
"Daily Note     Today's date: 7/3/2025  Patient name: Cristin Nolan  : 1960  MRN: [id number]  Referring provider: Dipesh Knowles, *  Dx:   Encounter Diagnosis     ICD-10-CM    1. Lumbar radiculopathy  M54.16           Start Time: 1400  Stop Time: 1443  Total time in clinic (min): 43 minutes    Subjective: Patient reports her shoulders are hurting today and is asking if we can modify some exercises.       Objective: See treatment diary below      Assessment: Due to subjective complaints, modified exercises so that patient did not require as much upper extremity assistance. Displayed greater hip flexion rom on Rt with cone taps compared to previous sessions. Reported fatigue with addition of more gluteal/hip abductor strengthening. Continue to progress functional strengthening as able.       Plan: Continue per POC. Increase reps/resistance as tolerated.      Precautions: Lt CARO still following hip precautio    Manuals 5/14 5/20 5/22 5/29 6/5 6/12 6/17 6/24 6/26 7/1 7/3                                                           Neuro Re-Ed              Cone taps    15x ea 15x ea 2x10 ea 2x10ea    2x10 ea   SLS      2x30\" 2x30''  2x30'' ea 2x30'' ea 2x30'' ea                                                                         Ther Ex              Seated flexion stretch 10x 10x5\" 10x5\" 20x          Seated sciatic nerve glide 10x 5\" 2x10 5\" 2x10  5\" 2x10    5\" 2x10     Seated march 10x 2x10 Std 2x10 Std 2x10 Foam 2x10 Foam 2x10 1 UE Foam 20x 1 UE       Supine march              SKTC   5x10\" ea  5x10\" ea         Standing Hip 3 ways  ABD 1 UE 15x B/L Abd/ext 1 UE 15x B/L Abd/ext 1 UE 2x10 B/L Foam 2x10 ea 1 UE Foam 2x10 ea 1 UE Foam 2x10 ea 1 UE    1 UE 2x10 ea   Heel/toe raises  20x ea 20x ea 30x ea 20x ea no UE 30x ea 30xea 30xea 30xea 30x ea 30x ea   Bike  5' seat 14 6' 8'  6' 6' 8' 6'  8'   Pball rollout  10x10\" 10x10\" 10x10\" 10x10\" 10x10\"  10x10\" 10x10\" 10x10\" 10x10\"   Side stepping  1 UE 3 laps Green " "3 laps Green to fatigue Green to fatigue Blue to fatigue  Blue to fatigue Blue 4 laps Blue 4 laps Blue 4 laps Blue 4 laps   Bridges    + ball sq 2x10 + ball sq 2x10         Std clamshells     Green 20x ea Blue 20x5\" ea Blue 20x5''ea Blue 20x5''ea Blue 20x5''ea  Blue 20x5''ea   Hip ABD iso     10x10\"         Hip flexor stretch off step      5x10\" Rt   10x10\"     Ther Activity              Sit to stands  1 foam 2x5 1 foam 2x5 1 foam 2x5 1 foam 2x5 1 foam 10x 1 foam 10x 1 foam  1 foam  2x10  1 foam  2x10  2 foam 2x10 no UE   Step ups  4\" 15x ea 4\" 2x10 ea  Fwd/lat 6\" 10x ea  6\" 10x ea  6\" 15x ea  15xea 6'' 15xea 6'' 15xea 6'' 15xea 6'' 4\" 2x10 ea  Fwd/lat   Aimee step overs        2x10 ea fwd/lat 3 laps     Gait Training                                          Modalities                                                                          "

## 2025-07-08 ENCOUNTER — OFFICE VISIT (OUTPATIENT)
Dept: PHYSICAL THERAPY | Facility: CLINIC | Age: 65
End: 2025-07-08
Attending: ORTHOPAEDIC SURGERY
Payer: MEDICARE

## 2025-07-08 DIAGNOSIS — M54.16 LUMBAR RADICULOPATHY: Primary | ICD-10-CM

## 2025-07-08 PROCEDURE — 97110 THERAPEUTIC EXERCISES: CPT

## 2025-07-08 PROCEDURE — 97530 THERAPEUTIC ACTIVITIES: CPT

## 2025-07-08 NOTE — PROGRESS NOTES
"Daily Note     Today's date: 2025  Patient name: Cristin Nolan  : 1960  MRN: [id number]  Referring provider: Dipesh Knowles, *  Dx:   Encounter Diagnosis     ICD-10-CM    1. Lumbar radiculopathy  M54.16           Start Time: 1445  Stop Time: 1528  Total time in clinic (min): 43 minutes    Subjective: Patient reports she almost went to the ER yesterday because Rt groin pain was so bad. Continues to voice frustration with regression of her symptoms.       Objective: See treatment diary below      Assessment: Patient tolerated treatment session fair-. She had increased difficulty with most exercises, ambulation, and hip rom. Today's session focused on low back and hip stretches which provided some relief of her symptoms. Will progress program as tolerated.       Plan: Continue per POC. Increase reps/resistance as tolerated.      Precautions: Lt CARO still following hip precautio    Manuals 5/14 5/20 5/22 5/29 6/5 6/12 6/17 6/24 6/26 7/1 7/3 7/8                                                               Neuro Re-Ed               Cone taps    15x ea 15x ea 2x10 ea 2x10ea    2x10 ea    SLS      2x30\" 2x30''  2x30'' ea 2x30'' ea 2x30'' ea                                                                               Ther Ex               Seated flexion stretch 10x 10x5\" 10x5\" 20x           Seated sciatic nerve glide 10x 5\" 2x10 5\" 2x10  5\" 2x10    5\" 2x10      Seated march 10x 2x10 Std 2x10 Std 2x10 Foam 2x10 Foam 2x10 1 UE Foam 20x 1 UE        Supine march               SKTC   5x10\" ea  5x10\" ea          Standing Hip 3 ways  ABD 1 UE 15x B/L Abd/ext 1 UE 15x B/L Abd/ext 1 UE 2x10 B/L Foam 2x10 ea 1 UE Foam 2x10 ea 1 UE Foam 2x10 ea 1 UE    1 UE 2x10 ea 1 UE 2x10 ea   Heel/toe raises  20x ea 20x ea 30x ea 20x ea no UE 30x ea 30xea 30xea 30xea 30x ea 30x ea 30x ea   Bike  5' seat 14 6' 8'  6' 6' 8' 6'  8' 8'   Pball rollout  10x10\" 10x10\" 10x10\" 10x10\" 10x10\"  10x10\" 10x10\" 10x10\" 10x10\" 10x10\"   Side " "stepping  1 UE 3 laps Green 3 laps Green to fatigue Green to fatigue Blue to fatigue  Blue to fatigue Blue 4 laps Blue 4 laps Blue 4 laps Blue 4 laps    Bridges    + ball sq 2x10 + ball sq 2x10          Std clamshells     Green 20x ea Blue 20x5\" ea Blue 20x5''ea Blue 20x5''ea Blue 20x5''ea  Blue 20x5''ea    Hip ABD iso     10x10\"          Hip flexor stretch off step      5x10\" Rt   10x10\"      Ther Activity               Sit to stands  1 foam 2x5 1 foam 2x5 1 foam 2x5 1 foam 2x5 1 foam 10x 1 foam 10x 1 foam  1 foam  2x10  1 foam  2x10  2 foam 2x10 no UE    Step ups  4\" 15x ea 4\" 2x10 ea  Fwd/lat 6\" 10x ea  6\" 10x ea  6\" 15x ea  15xea 6'' 15xea 6'' 15xea 6'' 15xea 6'' 4\" 2x10 ea  Fwd/lat    Aimee step overs        2x10 ea fwd/lat 3 laps      Gait Training                                             Modalities                                                                               "

## 2025-07-09 ENCOUNTER — OFFICE VISIT (OUTPATIENT)
Dept: URGENT CARE | Facility: CLINIC | Age: 65
End: 2025-07-09
Payer: MEDICARE

## 2025-07-09 VITALS
DIASTOLIC BLOOD PRESSURE: 100 MMHG | SYSTOLIC BLOOD PRESSURE: 188 MMHG | OXYGEN SATURATION: 96 % | TEMPERATURE: 97.4 F | RESPIRATION RATE: 20 BRPM | HEART RATE: 60 BPM

## 2025-07-09 DIAGNOSIS — M16.11 PRIMARY OSTEOARTHRITIS OF ONE HIP, RIGHT: Primary | ICD-10-CM

## 2025-07-09 PROCEDURE — 99203 OFFICE O/P NEW LOW 30 MIN: CPT

## 2025-07-09 PROCEDURE — G0463 HOSPITAL OUTPT CLINIC VISIT: HCPCS

## 2025-07-09 NOTE — ASSESSMENT & PLAN NOTE
Orders:    Diclofenac Sodium (VOLTAREN) 1 %; Apply 2 g topically 4 (four) times a day as needed (right hip pain)

## 2025-07-09 NOTE — PROGRESS NOTES
"[facility]  Name: Cristin Nolan      : 1960      MRN: [id number]  Encounter Provider: RENY Hardy  Encounter Date: 2025   Encounter department: [facility] [place]  :  Assessment & Plan  Primary osteoarthritis of one hip, right    Orders:    Diclofenac Sodium (VOLTAREN) 1 %; Apply 2 g topically 4 (four) times a day as needed (right hip pain)        Patient Instructions  Follow up with PCP in 3-5 days.  Proceed to  ER if symptoms worsen.    If tests are performed, our office will contact you with results only if changes need to made to the care plan discussed with you at the visit. You can review your full results on [facility] MyChart.    Chief Complaint:   Chief Complaint   Patient presents with    Hip Pain     Patient has chronic right hip pain , but started to take a turn for the worse about 10 days ago. Was told she needs a hip replacement, but has been getting back injections to see if the issue could be stemming from the back. Has been taking Ibuprofen, extra strength tylenol, and baclofen at home with no relief      History of Present Illness   65-year-old female presenting with right low back and right anterior hip pain. Patient reports acute exacerbation of chronic pain. Chart review performed. She does follow with ortho and spine and pain for management. Patient reports \"severe\" arthritis in her right hip and states she needs to have a total hip replacement. States her usual pain management regimen is not working. Denies bladder or bowel changes. She does ambulate with the assistance of a cane. No recent trauma including fall. Would like to know if there is anything else she can try - has not tried using anything topical.      History obtained from: patient    Review of Systems   Musculoskeletal:  Positive for arthralgias, back pain, gait problem and myalgias.     Past Medical History   Past Medical History[1]  Past Surgical History[2]  Family History[3]  she " "reports that she has never smoked. She has never used smokeless tobacco. She reports that she does not currently use alcohol. She reports that she does not use drugs.  Current Outpatient Medications   Medication Instructions    atorvastatin (LIPITOR) 10 mg, Oral, Daily    baclofen 10 mg tablet TAKE 1/2 TABLET(5 MG) BY MOUTH THREE TIMES DAILY    Coenzyme Q10-Vitamin E (QUNOL ULTRA COQ10 PO) No dose, route, or frequency recorded.    cyanocobalamin (VITAMIN B-12) 1,000 mcg, Daily    Diclofenac Sodium (VOLTAREN) 2 g, Topical, 4 times daily PRN    DULoxetine (CYMBALTA) 30 mg, Oral, Daily    HM Vitamin D3 4,000 Units, Oral, Daily    Misc Natural Products (GLUCOSAMINE CHOND CMP TRIPLE PO) Take by mouth    Omega 3-6-9 Fatty Acids (OMEGA 3-6-9 PO) 1 capsule, Daily   Allergies[4]     Objective   BP (!) 188/100   Pulse 60   Temp (!) 97.4 °F (36.3 °C)   Resp 20   SpO2 96%      Physical Exam  Vitals and nursing note reviewed.   Constitutional:       General: She is not in acute distress.     Appearance: She is not ill-appearing, toxic-appearing or diaphoretic.   HENT:      Head: Normocephalic and atraumatic.      Mouth/Throat:      Mouth: Mucous membranes are moist.     Cardiovascular:      Rate and Rhythm: Normal rate.   Pulmonary:      Effort: Pulmonary effort is normal.     Musculoskeletal:      Cervical back: Normal range of motion and neck supple.     Skin:     General: Skin is warm and dry.     Neurological:      Mental Status: She is alert and oriented to person, place, and time.      Gait: Gait abnormal (using cane).         Portions of the record may have been created with voice recognition software.  Occasional wrong word or \"sound a like\" substitutions may have occurred due to the inherent limitations of voice recognition software.  Read the chart carefully and recognize, using context, where substitutions have occurred.       [1]   Past Medical History:  Diagnosis Date    Ambulatory dysfunction     uses walking " stick    Anxiety     Arthritis     Cancer (HCC) 7/15/17    All better now    Chronic pain disorder     Claustrophobia     mild    DDD (degenerative disc disease), lumbar     Endometrial cancer ([facility]) 07/06/2017    tRA TLH BSO SLND on 7/17/17 by Dr. Stone, followed by 3 cycles of vaginal brachytherapy completed in August 2017    Low back pain     Mild acid reflux     OA (osteoarthritis)     marissa knees    Spinal stenosis of lumbosacral region     Wears glasses    [2]   Past Surgical History:  Procedure Laterality Date    COLONOSCOPY      FL INJECTION RIGHT HIP (NON ARTHROGRAM)  5/23/2025    FOOT SURGERY      HYSTERECTOMY      JOINT REPLACEMENT Bilateral     knees    ORTHOPEDIC SURGERY      PLANTAR FASCIA SURGERY Bilateral     WV ARTHRP ACETBLR/PROX FEM PROSTC AGRFT/ALGRFT Left 12/03/2024    Procedure: ARTHROPLASTY HIP TOTAL (psb same day dc);  Surgeon: Dipesh Knowles MD;  Location:  MAIN OR;  Service: Orthopedics    WV ARTHRP KNE CONDYLE&PLATU MEDIAL&LAT COMPARTMENTS Right 11/30/2020    Procedure: ARTHROPLASTY KNEE TOTAL;  Surgeon: Dipesh Knowles MD;  Location: [facility];  Service: Orthopedics    WV HYSTEROSCOPY BX ENDOMETRIUM&/POLYPC W/WO D&C N/A 02/28/2017    Procedure: DILATATION AND CURETTAGE (D&C) WITH HYSTEROSCOPY,POLYPECTOMY;  Surgeon: Roberto Andreson MD;  Location: [facility];  Service: Gynecology    REPLACEMENT TOTAL KNEE Left 10/22/2021    SHOULDER SURGERY Left     TONSILLECTOMY      TOTAL ABDOMINAL HYSTERECTOMY W/ BILATERAL SALPINGOOPHORECTOMY Bilateral 07/2017    ((Pt Qnr Sub: ovaries removed)) endometrial cancer, ? stage 1    WISDOM TOOTH EXTRACTION     [3]   Family History  Problem Relation Name Age of Onset    Ovarian cancer Mother Flora medina 70    Cancer Mother Flora medina     Esophageal cancer Father Damion medina     Cancer Father Damion medina     No Known Problems Maternal Grandmother      No Known Problems Maternal Grandfather      No Known Problems Paternal Grandmother       No Known Problems Paternal Grandfather      No Known Problems Maternal Aunt      No Known Problems Paternal Aunt      Breast cancer Neg Hx      Colon cancer Neg Hx      Uterine cancer Neg Hx     [4]   Allergies  Allergen Reactions    Iv Contrast [Iodinated Contrast Media] Hives    Iodine Strong Other (See Comments)    Vitamin C [Ascorbate - Food Allergy]      Queasy stomach, loose stool

## 2025-07-10 ENCOUNTER — OFFICE VISIT (OUTPATIENT)
Dept: PHYSICAL THERAPY | Facility: CLINIC | Age: 65
End: 2025-07-10
Attending: ORTHOPAEDIC SURGERY
Payer: MEDICARE

## 2025-07-10 DIAGNOSIS — M54.16 LUMBAR RADICULOPATHY: Primary | ICD-10-CM

## 2025-07-10 PROCEDURE — 97110 THERAPEUTIC EXERCISES: CPT

## 2025-07-10 PROCEDURE — 97530 THERAPEUTIC ACTIVITIES: CPT

## 2025-07-10 NOTE — PROGRESS NOTES
"Daily Note     Today's date: 7/10/2025  Patient name: Cristin Nolan  : 1960  MRN: [id number]  Referring provider: Dipesh Knowles, *  Dx:   Encounter Diagnosis     ICD-10-CM    1. Lumbar radiculopathy  M54.16           Start Time: 1405  Stop Time: 1444  Total time in clinic (min): 39 minutes    Subjective: Patient reports going to urgent care yesterday because her Rt hip was very painful. She tried the topical cream that was ordered which provided minimal relief of symptoms. Further mentions being unable to partake in aqua therapy sessions this week due to pain.       Objective: See treatment diary below      Assessment: Treatment program encouraged to be completed within a pain-free range without too much discomfort. Continuing to note a regression in patient's strength and rom since it is limited by pain. Repeated standing lumbar extensions peripheralize pain, repeated seated lumbar flexion do not worsen or reduce pain. Primary therapist will re-evaluate next week. Continue to progress as able.       Plan: Continue per POC. Increase reps/resistance as tolerated.      Precautions: Lt CARO still following hip precautio    Manuals 5/14 5/20 5/22 5/29 6/5 6/12 6/17 6/24 6/26 7/1 7/3 7/8 7/10                                                                   Neuro Re-Ed                Cone taps    15x ea 15x ea 2x10 ea 2x10ea    2x10 ea     SLS      2x30\" 2x30''  2x30'' ea 2x30'' ea 2x30'' ea     PPT                                                                                Ther Ex                Seated flexion stretch 10x 10x5\" 10x5\" 20x         3x10   Seated sciatic nerve glide 10x 5\" 2x10 5\" 2x10  5\" 2x10    5\" 2x10       Seated march 10x 2x10 Std 2x10 Std 2x10 Foam 2x10 Foam 2x10 1 UE Foam 20x 1 UE         Supine march                SKTC   5x10\" ea  5x10\" ea           Standing Hip 3 ways  ABD 1 UE 15x B/L Abd/ext 1 UE 15x B/L Abd/ext 1 UE 2x10 B/L Foam 2x10 ea 1 UE Foam 2x10 ea 1 UE Foam 2x10 ea 1 " "UE    1 UE 2x10 ea 1 UE 2x10 ea 1 UE 2x10 ea   Heel/toe raises  20x ea 20x ea 30x ea 20x ea no UE 30x ea 30xea 30xea 30xea 30x ea 30x ea 30x ea 30x ea   Bike  5' seat 14 6' 8'  6' 6' 8' 6'  8' 8' 8'   Pball rollout  10x10\" 10x10\" 10x10\" 10x10\" 10x10\"  10x10\" 10x10\" 10x10\" 10x10\" 10x10\" 10x10\"   Side stepping  1 UE 3 laps Green 3 laps Green to fatigue Green to fatigue Blue to fatigue  Blue to fatigue Blue 4 laps Blue 4 laps Blue 4 laps Blue 4 laps     Bridges    + ball sq 2x10 + ball sq 2x10           Std clamshells     Green 20x ea Blue 20x5\" ea Blue 20x5''ea Blue 20x5''ea Blue 20x5''ea  Blue 20x5''ea     Hip ABD iso     10x10\"           Hip flexor stretch off step      5x10\" Rt   10x10\"       Ther Activity                Sit to stands  1 foam 2x5 1 foam 2x5 1 foam 2x5 1 foam 2x5 1 foam 10x 1 foam 10x 1 foam  1 foam  2x10  1 foam  2x10  2 foam 2x10 no UE  2 foam 2x10 no UE   Step ups  4\" 15x ea 4\" 2x10 ea  Fwd/lat 6\" 10x ea  6\" 10x ea  6\" 15x ea  15xea 6'' 15xea 6'' 15xea 6'' 15xea 6'' 4\" 2x10 ea  Fwd/lat  4\" 2x10 for. lat 10x   Aimee step overs        2x10 ea fwd/lat 3 laps       Gait Training                                                Modalities                                                                                    "

## 2025-07-15 ENCOUNTER — OFFICE VISIT (OUTPATIENT)
Age: 65
End: 2025-07-15
Payer: MEDICARE

## 2025-07-15 ENCOUNTER — OFFICE VISIT (OUTPATIENT)
Dept: PHYSICAL THERAPY | Facility: CLINIC | Age: 65
End: 2025-07-15
Attending: ORTHOPAEDIC SURGERY
Payer: MEDICARE

## 2025-07-15 VITALS — HEIGHT: 62 IN | WEIGHT: 214.4 LBS | BODY MASS INDEX: 39.45 KG/M2

## 2025-07-15 DIAGNOSIS — M16.11 PRIMARY OSTEOARTHRITIS OF ONE HIP, RIGHT: Primary | ICD-10-CM

## 2025-07-15 DIAGNOSIS — M54.16 LUMBAR RADICULOPATHY: Primary | ICD-10-CM

## 2025-07-15 DIAGNOSIS — M25.59 PAIN IN OTHER JOINT: ICD-10-CM

## 2025-07-15 PROCEDURE — 97110 THERAPEUTIC EXERCISES: CPT

## 2025-07-15 PROCEDURE — 99214 OFFICE O/P EST MOD 30 MIN: CPT | Performed by: STUDENT IN AN ORGANIZED HEALTH CARE EDUCATION/TRAINING PROGRAM

## 2025-07-15 RX ORDER — SODIUM CHLORIDE, SODIUM LACTATE, POTASSIUM CHLORIDE, CALCIUM CHLORIDE 600; 310; 30; 20 MG/100ML; MG/100ML; MG/100ML; MG/100ML
20 INJECTION, SOLUTION INTRAVENOUS CONTINUOUS
OUTPATIENT
Start: 2025-07-15

## 2025-07-15 RX ORDER — VITAMIN B COMPLEX
2000 TABLET ORAL DAILY
Qty: 60 TABLET | Refills: 1 | Status: SHIPPED | OUTPATIENT
Start: 2025-07-15

## 2025-07-15 RX ORDER — CHLORHEXIDINE GLUCONATE 40 MG/ML
SOLUTION TOPICAL DAILY PRN
OUTPATIENT
Start: 2025-07-15

## 2025-07-15 RX ORDER — FOLIC ACID 1 MG/1
1 TABLET ORAL DAILY
Qty: 30 TABLET | Refills: 1 | Status: SHIPPED | OUTPATIENT
Start: 2025-07-15

## 2025-07-15 RX ORDER — SODIUM CHLORIDE, SODIUM LACTATE, POTASSIUM CHLORIDE, CALCIUM CHLORIDE 600; 310; 30; 20 MG/100ML; MG/100ML; MG/100ML; MG/100ML
125 INJECTION, SOLUTION INTRAVENOUS CONTINUOUS
OUTPATIENT
Start: 2025-07-15

## 2025-07-15 RX ORDER — ACETAMINOPHEN 325 MG/1
975 TABLET ORAL ONCE
OUTPATIENT
Start: 2025-07-15 | End: 2025-07-15

## 2025-07-15 RX ORDER — MUPIROCIN 2 %
OINTMENT (GRAM) TOPICAL
Qty: 15 G | Refills: 1 | Status: SHIPPED | OUTPATIENT
Start: 2025-07-15

## 2025-07-15 RX ORDER — CHLORHEXIDINE GLUCONATE ORAL RINSE 1.2 MG/ML
15 SOLUTION DENTAL ONCE
OUTPATIENT
Start: 2025-07-15 | End: 2025-07-15

## 2025-07-17 ENCOUNTER — APPOINTMENT (OUTPATIENT)
Dept: PHYSICAL THERAPY | Facility: CLINIC | Age: 65
End: 2025-07-17
Attending: ORTHOPAEDIC SURGERY
Payer: MEDICARE

## 2025-07-23 ENCOUNTER — OFFICE VISIT (OUTPATIENT)
Dept: PAIN MEDICINE | Facility: MEDICAL CENTER | Age: 65
End: 2025-07-23
Payer: MEDICARE

## 2025-07-23 VITALS — WEIGHT: 214 LBS | BODY MASS INDEX: 39.38 KG/M2 | HEIGHT: 62 IN

## 2025-07-23 DIAGNOSIS — M25.551 RIGHT HIP PAIN: ICD-10-CM

## 2025-07-23 DIAGNOSIS — M79.18 MYOFASCIAL PAIN SYNDROME: ICD-10-CM

## 2025-07-23 DIAGNOSIS — M54.16 LUMBAR RADICULITIS: Primary | ICD-10-CM

## 2025-07-23 DIAGNOSIS — M54.16 LUMBAR RADICULITIS: ICD-10-CM

## 2025-07-23 PROCEDURE — G2211 COMPLEX E/M VISIT ADD ON: HCPCS

## 2025-07-23 PROCEDURE — 99214 OFFICE O/P EST MOD 30 MIN: CPT

## 2025-07-23 RX ORDER — CELECOXIB 200 MG/1
200 CAPSULE ORAL 2 TIMES DAILY
Qty: 60 CAPSULE | Refills: 2 | Status: SHIPPED | OUTPATIENT
Start: 2025-07-23 | End: 2025-07-24

## 2025-07-23 RX ORDER — IBUPROFEN 200 MG
200 TABLET ORAL EVERY 6 HOURS PRN
COMMUNITY

## 2025-07-23 RX ORDER — BACLOFEN 10 MG/1
5 TABLET ORAL 3 TIMES DAILY PRN
Qty: 45 TABLET | Refills: 2 | Status: SHIPPED | OUTPATIENT
Start: 2025-07-23

## 2025-07-23 NOTE — PROGRESS NOTES
Name: Cristin Nolan      : 1960      MRN: [id number]  Encounter Provider: Jennifer Marte PA-C  Encounter Date: 2025   [facility]  :  Assessment & Plan  Lumbar radiculitis    Orders:    celecoxib (CeleBREX) 200 mg capsule; Take 1 capsule (200 mg total) by mouth 2 (two) times a day    Right hip pain         Myofascial pain syndrome    Orders:    baclofen 10 mg tablet; Take 0.5 tablets (5 mg total) by mouth 3 (three) times a day as needed for muscle spasms          Start Celebrex as prescribed.  I discussed with patient she cannot take this medication with other NSAIDs such as ibuprofen and Aleve.  Patient agreeable and verbalized understanding.    Continue the use of baclofen as prescribed.  Patient states she tolerates this medication well without side effects.  Refills of this medication were sent to patient's pharmacy today.    I discussed with patient she may continue the use of acetaminophen.  I instructed patient that she can take 1000 mg of acetaminophen every 8 hours as needed for pain.  I instructed patient that she cannot exceed more than 3000 mg of acetaminophen within a 24-hour period.  Patient agreeable and verbalized understanding.    Patient is scheduled to undergo right CARO with Dr. Donovan on 2025.    Follow-up with orthopedic spine, Dr. Hoang, following right CARO as discussed.    Patient will follow-up as needed following right CARO.    My impressions and treatment recommendations were discussed in detail with the patient who verbalized understanding and had no further questions.  Discharge instructions were provided. I personally saw and examined the patient and I agree with the above discussed plan of care.    History of Present Illness     Cristin Nolan is a 65 y.o. female who presents to [facility] for interval re-evaluation in regards to pain in the Low back.patient recently underwent right SIJ  injection on 6/18/2025.  Patient reports experiencing 0% symptom relief following most recent injection.    Patient presents today with pain in lower back pain that radiates to  the anterior aspect of the right lower leg, not passing the right ankle. Pain is described as Intermittent, Dull-aching, and Throbbing. Symptoms are worse with sleeping and walking. Alleviating factors identifiable by the patient include aqua therapy.  On the numeric pain scale of 1-10, the pain typically increases to max of 6 out of 10, which is currently impacting their quality of life and interferes with their activities of daily living.  Denies numbness and tingling of bilateral lower extremities.  Admits to the use of ibuprofen, baclofen, and acetaminophen for symptom management.  At last visit, patient was started on Cymbalta.  Patient notes self discontinuation of this medication due to experiencing the side effect of sleepiness and drowsiness.    Patient states she is scheduled to undergo right CARO with Dr. Donovan on 9/4/2025.    Patient recently seen by orthopedic spine, Dr. Hoang, on 6/30/2025.  Patient states that she was instructed to follow-up with orthopedic spine following right CARO.    Unfortunately, patient lacked response to right L5-S1 LESI, right SIJ, and right L5-S1 TFESI.    Review of Systems   Constitutional:  Negative for fever.   HENT:  Negative for hearing loss.    Eyes:  Negative for visual disturbance.   Respiratory:  Negative for cough.    Cardiovascular:  Negative for leg swelling.   Gastrointestinal:  Negative for abdominal pain and nausea.   Endocrine: Negative for polydipsia.   Genitourinary:  Negative for difficulty urinating.   Musculoskeletal:  Positive for back pain and gait problem. Negative for myalgias.   Skin:  Negative for rash.   Neurological:  Negative for numbness.   Hematological:  Does not bruise/bleed easily.   Psychiatric/Behavioral:  Negative for dysphoric mood and sleep disturbance.   "      Medical History Reviewed by provider this encounter:     .  Medications Ordered Prior to Encounter[1]      Objective   Ht 5' 2\" (1.575 m)   Wt 97.1 kg (214 lb)   BMI 39.14 kg/m²      Pain Score:   6  Physical Exam  Constitutional: normal, well developed, well nourished, alert, in no distress and non-toxic and no overt pain behavior.  Eyes: anicteric  HEENT: grossly intact  Neck: supple, symmetric, trachea midline and no masses   Pulmonary: even and unlabored  Cardiovascular: No edema or pitting edema present  Skin: Normal without rashes or lesions and well hydrated  Psychiatric: Mood and affect appropriate  Neurologic: Cranial Nerves II-XII grossly intact  Musculoskeletal: ambulates with cane    Radiology Results Review: I have reviewed radiology reports from 4/11/2025 including: MRI spine.       [1]   Current Outpatient Medications on File Prior to Visit   Medication Sig Dispense Refill    atorvastatin (LIPITOR) 10 mg tablet TAKE 1 TABLET(10 MG) BY MOUTH DAILY 90 tablet 1    cholecalciferol (VITAMIN D3) 25 mcg (1,000 units) tablet Take 2 tablets (2,000 Units total) by mouth daily 60 tablet 1    Coenzyme Q10-Vitamin E (QUNOL ULTRA COQ10 PO)       cyanocobalamin (VITAMIN B-12) 1000 MCG tablet Take 1,000 mcg by mouth in the morning.      folic acid (FOLVITE) 1 mg tablet Take 1 tablet (1 mg total) by mouth daily 30 tablet 1    ibuprofen (MOTRIN) 200 mg tablet Take 200 mg by mouth every 6 (six) hours as needed for mild pain      Misc Natural Products (GLUCOSAMINE CHOND CMP TRIPLE PO) Take by mouth      mupirocin (BACTROBAN) 2 % ointment Apply topically to the inside of the left and right nostrils twice daily for 5 days before surgery, including the morning of surgery. 15 g 1    Omega 3-6-9 Fatty Acids (OMEGA 3-6-9 PO) Take 1 capsule by mouth in the morning.      [DISCONTINUED] baclofen 10 mg tablet TAKE 1/2 TABLET(5 MG) BY MOUTH THREE TIMES DAILY 45 tablet 1    Cholecalciferol (HM Vitamin D3) 100 MCG (4000 UT) " CAPS Take 1 capsule (4,000 Units total) by mouth daily 30 capsule 0    Diclofenac Sodium (VOLTAREN) 1 % Apply 2 g topically 4 (four) times a day as needed (right hip pain) (Patient not taking: Reported on 7/23/2025) 50 g 0    [DISCONTINUED] DULoxetine (CYMBALTA) 30 mg delayed release capsule Take 1 capsule (30 mg total) by mouth daily 30 capsule 1     Current Facility-Administered Medications on File Prior to Visit   Medication Dose Route Frequency Provider Last Rate Last Admin    lidocaine (XYLOCAINE) 1 % injection 2 mL  2 mL Injection  Harjeet Black DPM   2 mL at 05/20/22 1445    triamcinolone acetonide (KENALOG-40) 40 mg/mL injection 20 mg  20 mg Intra-articular  Harjeet Black DPM   20 mg at 05/20/22 1449

## 2025-07-24 RX ORDER — CELECOXIB 200 MG/1
200 CAPSULE ORAL 2 TIMES DAILY
Qty: 180 CAPSULE | Refills: 0 | Status: SHIPPED | OUTPATIENT
Start: 2025-07-24

## 2025-07-31 ENCOUNTER — TELEPHONE (OUTPATIENT)
Dept: OBGYN CLINIC | Facility: HOSPITAL | Age: 65
End: 2025-07-31

## 2025-08-12 ENCOUNTER — APPOINTMENT (OUTPATIENT)
Dept: LAB | Facility: CLINIC | Age: 65
End: 2025-08-12
Attending: PHYSICIAN ASSISTANT
Payer: MEDICARE

## 2025-08-12 ENCOUNTER — APPOINTMENT (OUTPATIENT)
Dept: LAB | Facility: CLINIC | Age: 65
End: 2025-08-12
Payer: MEDICARE

## 2025-08-19 ENCOUNTER — OFFICE VISIT (OUTPATIENT)
Age: 65
End: 2025-08-19
Payer: MEDICARE

## 2025-08-19 VITALS
WEIGHT: 212 LBS | SYSTOLIC BLOOD PRESSURE: 130 MMHG | BODY MASS INDEX: 39.01 KG/M2 | HEIGHT: 62 IN | OXYGEN SATURATION: 97 % | HEART RATE: 81 BPM | DIASTOLIC BLOOD PRESSURE: 88 MMHG

## 2025-08-19 DIAGNOSIS — E66.812 CLASS 2 OBESITY DUE TO EXCESS CALORIES WITHOUT SERIOUS COMORBIDITY WITH BODY MASS INDEX (BMI) OF 35.0 TO 35.9 IN ADULT: ICD-10-CM

## 2025-08-19 DIAGNOSIS — E78.2 MIXED HYPERLIPIDEMIA: ICD-10-CM

## 2025-08-19 DIAGNOSIS — M16.11 PRIMARY OSTEOARTHRITIS OF ONE HIP, RIGHT: Primary | ICD-10-CM

## 2025-08-19 DIAGNOSIS — E66.09 CLASS 2 OBESITY DUE TO EXCESS CALORIES WITHOUT SERIOUS COMORBIDITY WITH BODY MASS INDEX (BMI) OF 35.0 TO 35.9 IN ADULT: ICD-10-CM

## 2025-08-19 DIAGNOSIS — Z01.818 PRE-OP EXAMINATION: ICD-10-CM

## 2025-08-19 PROCEDURE — 99215 OFFICE O/P EST HI 40 MIN: CPT | Performed by: INTERNAL MEDICINE

## 2025-08-19 PROCEDURE — G2211 COMPLEX E/M VISIT ADD ON: HCPCS | Performed by: INTERNAL MEDICINE

## 2025-08-21 ENCOUNTER — ANESTHESIA EVENT (OUTPATIENT)
Age: 65
End: 2025-08-21

## 2025-08-21 ENCOUNTER — ANESTHESIA (OUTPATIENT)
Age: 65
End: 2025-08-21

## (undated) DEVICE — ACE WRAP 6 IN UNSTERILE

## (undated) DEVICE — SUT STRATAFIX SPIRAL MONOCRYL PLUS 2-0 CT-1 30CM SXMP1B412

## (undated) DEVICE — STRL ALLENTOWN HYSTEROSCOPY PK: Brand: CARDINAL HEALTH

## (undated) DEVICE — IMPERVIOUS STOCKINETTE: Brand: DEROYAL

## (undated) DEVICE — SUT STRATAFIX SPIRAL PLUS 3-0 PS-2 30 X 30 CM SXMP2B408

## (undated) DEVICE — GLOVE INDICATOR PI UNDERGLOVE SZ 7 BLUE

## (undated) DEVICE — COBAN 6 IN STERILE

## (undated) DEVICE — DRAPE EQUIPMENT RF WAND

## (undated) DEVICE — GLOVE SRG BIOGEL 8

## (undated) DEVICE — ADHESIVE SKIN HIGH VISCOSITY EXOFIN 1ML

## (undated) DEVICE — GAUZE SPONGES,USP TYPE VII GAUZE, 12 PLY: Brand: CURITY

## (undated) DEVICE — BETHLEHEM TOTAL HIP, KIT: Brand: CARDINAL HEALTH

## (undated) DEVICE — SURGICAL GOWN, XL SMARTSLEEVE: Brand: CONVERTORS

## (undated) DEVICE — SYRINGE 50ML LL

## (undated) DEVICE — SUT STRATAFIX SPIRAL PDS PLUS 1 CTX 18 IN SXPP1A400

## (undated) DEVICE — CAPIT HIP COP -CMNT/POR-ACTIS

## (undated) DEVICE — SAW BLADE OSCILLATING BRAZOL 167

## (undated) DEVICE — SUT VICRYL 0 CT-1 36 IN J946H

## (undated) DEVICE — SUT VICRYL 1 CTX 36 IN J977H

## (undated) DEVICE — TIBURON HIP DRAPE WITH POUCHES: Brand: CONVERTORS

## (undated) DEVICE — SCD SEQUENTIAL COMPRESSION COMFORT SLEEVE MEDIUM KNEE LENGTH: Brand: KENDALL SCD

## (undated) DEVICE — THE SIMPULSE SOLO SYSTEM WITH ULTREX RETRACTABLE SPLASH SHIELD TIP: Brand: SIMPULSE SOLO

## (undated) DEVICE — 3M™ STERI-DRAPE™ U-DRAPE 1015: Brand: STERI-DRAPE™

## (undated) DEVICE — CHLORAPREP HI-LITE 26ML ORANGE

## (undated) DEVICE — TUBING SUCTION 5MM X 12 FT

## (undated) DEVICE — ABDOMINAL PAD: Brand: DERMACEA

## (undated) DEVICE — 3000CC GUARDIAN II: Brand: GUARDIAN

## (undated) DEVICE — TUBE OUTFLOW F/FLUID CONTROL SYSTEM AQUILEX

## (undated) DEVICE — GLOVE SRG BIOGEL 7.5

## (undated) DEVICE — HANDPIECE SET WITH RETRACTABLE COAXIAL FAN SPRAY TIP AND SUCTION TUBE: Brand: INTERPULSE

## (undated) DEVICE — SAW BLADE RECIPROCATING 179

## (undated) DEVICE — SUT ETHIBOND 5 V-37 30 IN MB66G

## (undated) DEVICE — BETHLEHEM UNIV TOTAL KNEE, KIT: Brand: CARDINAL HEALTH

## (undated) DEVICE — CYSTO TUBING SINGLE IRRIGATION

## (undated) DEVICE — GLOVE INDICATOR PI UNDERGLOVE SZ 8.5 BLUE

## (undated) DEVICE — COOL TEMP PAD

## (undated) DEVICE — WET SKIN PREP TRAY: Brand: MEDLINE INDUSTRIES, INC.

## (undated) DEVICE — EXOFIN PRECISION PEN HIGH VISCOSITY TOPICAL SKIN ADHESIVE: Brand: EXOFIN PRECISION PEN, 1G

## (undated) DEVICE — NEEDLE 18 G X 1 1/2

## (undated) DEVICE — 3M™ IOBAN™ 2 ANTIMICROBIAL INCISE DRAPE 6648EZ: Brand: IOBAN™ 2

## (undated) DEVICE — CUFF TOURNIQUET 34 X 4 IN QUICK CONNECT DISP 1BLA

## (undated) DEVICE — SKIN MARKER DUAL TIP WITH RULER CAP, FLEXIBLE RULER AND LABELS: Brand: DEVON

## (undated) DEVICE — ELECTRODE BLADE E-Z CLEAN 6.5IN -0014

## (undated) DEVICE — TRAY FOLEY 16FR URIMETER SURESTEP

## (undated) DEVICE — WEBRIL 6 IN UNSTERILE

## (undated) DEVICE — MEDI-VAC TUBING CONNECTOR 6-IN-1 STRAIGHT: Brand: CARDINAL HEALTH

## (undated) DEVICE — INTENDED FOR TISSUE SEPARATION, AND OTHER PROCEDURES THAT REQUIRE A SHARP SURGICAL BLADE TO PUNCTURE OR CUT.: Brand: BARD-PARKER SAFETY BLADES SIZE 10, STERILE

## (undated) DEVICE — 2000CC GUARDIAN II: Brand: GUARDIAN

## (undated) DEVICE — 10FR FRAZIER SUCTION HANDLE: Brand: CARDINAL HEALTH

## (undated) DEVICE — HOOD: Brand: FLYTE, SURGICOOL

## (undated) DEVICE — PADDING CAST 6IN COTTON STRL

## (undated) DEVICE — DRESSING MEPILEX AG BORDER 4 X 12 IN

## (undated) DEVICE — 3M™ STERI-STRIP™ REINFORCED ADHESIVE SKIN CLOSURES, R1547, 1/2 IN X 4 IN (12 MM X 100 MM), 6 STRIPS/ENVELOPE: Brand: 3M™ STERI-STRIP™

## (undated) DEVICE — SPONGE LAP 18 X 18 IN STRL RFD

## (undated) DEVICE — CAPIT KNEE ATTUNE RP CEMENT - DEPUY

## (undated) DEVICE — CEMENT MIXING CARTRIDGE PRISM II

## (undated) DEVICE — PLUMEPEN PRO 10FT

## (undated) DEVICE — SUT MONOCRYL 2-0 CT-1 36 IN Y945H

## (undated) DEVICE — DRESSING MEPILEX AG BORDER POST-OP 4 X 12 IN

## (undated) DEVICE — HOOD: Brand: T7PLUS

## (undated) DEVICE — STRAIGHT CATH RED RUBBER 12FR

## (undated) DEVICE — TUBE INFLOW F/FLUID CONTROL SYSTEM AQUILEX

## (undated) DEVICE — DEVICE MYOSURE LITE TISSUE REMOVAL HYSTEROSCOPIC

## (undated) DEVICE — IV EXTENSION TUBING 33 IN

## (undated) DEVICE — GLOVE SRG BIOGEL 6.5